# Patient Record
Sex: MALE | Race: WHITE | NOT HISPANIC OR LATINO | Employment: OTHER | URBAN - METROPOLITAN AREA
[De-identification: names, ages, dates, MRNs, and addresses within clinical notes are randomized per-mention and may not be internally consistent; named-entity substitution may affect disease eponyms.]

---

## 2017-05-30 ENCOUNTER — TRANSCRIBE ORDERS (OUTPATIENT)
Dept: ADMINISTRATIVE | Facility: HOSPITAL | Age: 77
End: 2017-05-30

## 2017-05-30 ENCOUNTER — APPOINTMENT (OUTPATIENT)
Dept: LAB | Facility: MEDICAL CENTER | Age: 77
End: 2017-05-30
Payer: COMMERCIAL

## 2017-05-30 DIAGNOSIS — E03.9 UNSPECIFIED HYPOTHYROIDISM: Primary | ICD-10-CM

## 2017-05-30 LAB
T4 FREE SERPL-MCNC: 1.42 NG/DL (ref 0.76–1.46)
TSH SERPL DL<=0.05 MIU/L-ACNC: 0.2 UIU/ML (ref 0.36–3.74)

## 2017-05-30 PROCEDURE — 84439 ASSAY OF FREE THYROXINE: CPT | Performed by: FAMILY MEDICINE

## 2017-05-30 PROCEDURE — 36415 COLL VENOUS BLD VENIPUNCTURE: CPT | Performed by: FAMILY MEDICINE

## 2017-05-30 PROCEDURE — 84443 ASSAY THYROID STIM HORMONE: CPT | Performed by: FAMILY MEDICINE

## 2018-02-17 ENCOUNTER — APPOINTMENT (OUTPATIENT)
Dept: RADIOLOGY | Facility: MEDICAL CENTER | Age: 78
End: 2018-02-17
Payer: COMMERCIAL

## 2018-02-17 ENCOUNTER — TRANSCRIBE ORDERS (OUTPATIENT)
Dept: ADMINISTRATIVE | Facility: HOSPITAL | Age: 78
End: 2018-02-17

## 2018-02-17 DIAGNOSIS — M54.5 LOW BACK PAIN, UNSPECIFIED BACK PAIN LATERALITY, UNSPECIFIED CHRONICITY, WITH SCIATICA PRESENCE UNSPECIFIED: Primary | ICD-10-CM

## 2018-02-17 PROCEDURE — 72100 X-RAY EXAM L-S SPINE 2/3 VWS: CPT

## 2018-02-27 ENCOUNTER — TRANSCRIBE ORDERS (OUTPATIENT)
Dept: ADMINISTRATIVE | Facility: HOSPITAL | Age: 78
End: 2018-02-27

## 2018-02-27 DIAGNOSIS — M54.5 LOW BACK PAIN, UNSPECIFIED BACK PAIN LATERALITY, UNSPECIFIED CHRONICITY, WITH SCIATICA PRESENCE UNSPECIFIED: Primary | ICD-10-CM

## 2018-02-27 DIAGNOSIS — M54.10 RADICULOPATHY, UNSPECIFIED SPINAL REGION: ICD-10-CM

## 2018-03-02 ENCOUNTER — LAB REQUISITION (OUTPATIENT)
Dept: LAB | Facility: HOSPITAL | Age: 78
End: 2018-03-02
Payer: COMMERCIAL

## 2018-03-02 ENCOUNTER — TRANSCRIBE ORDERS (OUTPATIENT)
Dept: ADMINISTRATIVE | Facility: HOSPITAL | Age: 78
End: 2018-03-02

## 2018-03-02 DIAGNOSIS — M54.50 LOW BACK PAIN: ICD-10-CM

## 2018-03-02 DIAGNOSIS — M54.5 LOW BACK PAIN, UNSPECIFIED BACK PAIN LATERALITY, UNSPECIFIED CHRONICITY, WITH SCIATICA PRESENCE UNSPECIFIED: Primary | ICD-10-CM

## 2018-03-02 LAB
BUN SERPL-MCNC: 22 MG/DL (ref 5–25)
CREAT SERPL-MCNC: 1.06 MG/DL (ref 0.6–1.3)
GFR SERPL CREATININE-BSD FRML MDRD: 68 ML/MIN/1.73SQ M

## 2018-03-02 PROCEDURE — 82565 ASSAY OF CREATININE: CPT | Performed by: FAMILY MEDICINE

## 2018-03-02 PROCEDURE — 84520 ASSAY OF UREA NITROGEN: CPT | Performed by: FAMILY MEDICINE

## 2018-03-13 ENCOUNTER — TRANSCRIBE ORDERS (OUTPATIENT)
Dept: URGENT CARE | Facility: MEDICAL CENTER | Age: 78
End: 2018-03-13

## 2018-03-13 ENCOUNTER — APPOINTMENT (OUTPATIENT)
Dept: RADIOLOGY | Facility: MEDICAL CENTER | Age: 78
End: 2018-03-13
Payer: COMMERCIAL

## 2018-03-13 DIAGNOSIS — R05.9 COUGH: ICD-10-CM

## 2018-03-13 DIAGNOSIS — R05.9 COUGH: Primary | ICD-10-CM

## 2018-03-13 PROCEDURE — 71046 X-RAY EXAM CHEST 2 VIEWS: CPT

## 2018-03-15 ENCOUNTER — HOSPITAL ENCOUNTER (OUTPATIENT)
Dept: RADIOLOGY | Facility: MEDICAL CENTER | Age: 78
Discharge: HOME | End: 2018-03-15
Payer: COMMERCIAL

## 2018-03-15 DIAGNOSIS — M54.10 RADICULOPATHY, UNSPECIFIED SPINAL REGION: ICD-10-CM

## 2018-03-15 DIAGNOSIS — M54.5 LOW BACK PAIN, UNSPECIFIED BACK PAIN LATERALITY, UNSPECIFIED CHRONICITY, WITH SCIATICA PRESENCE UNSPECIFIED: ICD-10-CM

## 2018-03-15 PROCEDURE — 72131 CT LUMBAR SPINE W/O DYE: CPT

## 2019-04-03 ENCOUNTER — TRANSCRIBE ORDERS (OUTPATIENT)
Dept: ADMINISTRATIVE | Facility: HOSPITAL | Age: 79
End: 2019-04-03

## 2019-04-03 DIAGNOSIS — M48.062 SPINAL STENOSIS OF LUMBAR REGION WITH NEUROGENIC CLAUDICATION: Primary | ICD-10-CM

## 2019-04-11 ENCOUNTER — HOSPITAL ENCOUNTER (OUTPATIENT)
Dept: RADIOLOGY | Facility: MEDICAL CENTER | Age: 79
Discharge: HOME | End: 2019-04-11
Payer: COMMERCIAL

## 2019-04-11 DIAGNOSIS — M48.062 SPINAL STENOSIS OF LUMBAR REGION WITH NEUROGENIC CLAUDICATION: ICD-10-CM

## 2019-04-11 PROCEDURE — 72131 CT LUMBAR SPINE W/O DYE: CPT

## 2019-04-16 ENCOUNTER — TRANSCRIBE ORDERS (OUTPATIENT)
Dept: NEUROSURGERY | Facility: CLINIC | Age: 79
End: 2019-04-16

## 2019-04-16 DIAGNOSIS — M54.50 LOWER BACK PAIN: Primary | ICD-10-CM

## 2019-04-17 ENCOUNTER — CONSULT (OUTPATIENT)
Dept: NEUROSURGERY | Facility: CLINIC | Age: 79
End: 2019-04-17
Payer: COMMERCIAL

## 2019-04-17 VITALS
RESPIRATION RATE: 16 BRPM | SYSTOLIC BLOOD PRESSURE: 161 MMHG | HEIGHT: 62 IN | BODY MASS INDEX: 34.27 KG/M2 | WEIGHT: 186.2 LBS | HEART RATE: 98 BPM | TEMPERATURE: 98.6 F | DIASTOLIC BLOOD PRESSURE: 94 MMHG

## 2019-04-17 DIAGNOSIS — M96.1 POSTLAMINECTOMY SYNDROME: ICD-10-CM

## 2019-04-17 DIAGNOSIS — M54.50 LOWER BACK PAIN: ICD-10-CM

## 2019-04-17 DIAGNOSIS — M48.062 SPINAL STENOSIS OF LUMBAR REGION WITH NEUROGENIC CLAUDICATION: Primary | ICD-10-CM

## 2019-04-17 PROCEDURE — 99204 OFFICE O/P NEW MOD 45 MIN: CPT | Performed by: NEUROLOGICAL SURGERY

## 2019-04-17 RX ORDER — CHLORAL HYDRATE 500 MG
1000 CAPSULE ORAL 2 TIMES DAILY
COMMUNITY
End: 2021-09-01 | Stop reason: HOSPADM

## 2019-04-17 RX ORDER — LOSARTAN POTASSIUM 100 MG/1
50 TABLET ORAL
COMMUNITY
End: 2021-06-23 | Stop reason: HOSPADM

## 2019-04-17 RX ORDER — MELOXICAM 15 MG/1
15 TABLET ORAL DAILY
COMMUNITY
End: 2021-06-23 | Stop reason: HOSPADM

## 2019-04-22 ENCOUNTER — TELEPHONE (OUTPATIENT)
Dept: OBGYN CLINIC | Facility: HOSPITAL | Age: 79
End: 2019-04-22

## 2019-05-17 ENCOUNTER — CONSULT (OUTPATIENT)
Dept: PAIN MEDICINE | Facility: CLINIC | Age: 79
End: 2019-05-17
Payer: COMMERCIAL

## 2019-05-17 VITALS — HEART RATE: 74 BPM | SYSTOLIC BLOOD PRESSURE: 122 MMHG | DIASTOLIC BLOOD PRESSURE: 76 MMHG | TEMPERATURE: 98.4 F

## 2019-05-17 DIAGNOSIS — M96.1 POSTLAMINECTOMY SYNDROME: ICD-10-CM

## 2019-05-17 DIAGNOSIS — M48.062 SPINAL STENOSIS OF LUMBAR REGION WITH NEUROGENIC CLAUDICATION: ICD-10-CM

## 2019-05-17 DIAGNOSIS — G89.4 CHRONIC PAIN SYNDROME: Primary | ICD-10-CM

## 2019-05-17 PROCEDURE — 99204 OFFICE O/P NEW MOD 45 MIN: CPT | Performed by: ANESTHESIOLOGY

## 2019-06-10 ENCOUNTER — TRANSCRIBE ORDERS (OUTPATIENT)
Dept: ADMINISTRATIVE | Facility: HOSPITAL | Age: 79
End: 2019-06-10

## 2019-06-10 ENCOUNTER — APPOINTMENT (OUTPATIENT)
Dept: RADIOLOGY | Facility: MEDICAL CENTER | Age: 79
End: 2019-06-10
Payer: COMMERCIAL

## 2019-06-10 DIAGNOSIS — J44.9 OBSTRUCTIVE CHRONIC BRONCHITIS WITHOUT EXACERBATION (HCC): ICD-10-CM

## 2019-06-10 DIAGNOSIS — J44.9 OBSTRUCTIVE CHRONIC BRONCHITIS WITHOUT EXACERBATION (HCC): Primary | ICD-10-CM

## 2019-06-10 PROCEDURE — 71046 X-RAY EXAM CHEST 2 VIEWS: CPT

## 2019-07-15 ENCOUNTER — APPOINTMENT (OUTPATIENT)
Dept: LAB | Facility: MEDICAL CENTER | Age: 79
End: 2019-07-15
Payer: COMMERCIAL

## 2019-07-15 ENCOUNTER — TRANSCRIBE ORDERS (OUTPATIENT)
Dept: ADMINISTRATIVE | Facility: HOSPITAL | Age: 79
End: 2019-07-15

## 2019-07-15 DIAGNOSIS — I10 ESSENTIAL HYPERTENSION, BENIGN: Primary | ICD-10-CM

## 2019-07-15 DIAGNOSIS — I25.10 ATHEROSCLEROSIS OF NATIVE CORONARY ARTERY OF NATIVE HEART, ANGINA PRESENCE UNSPECIFIED: ICD-10-CM

## 2019-07-15 DIAGNOSIS — N42.9 DISEASE OF PROSTATE: ICD-10-CM

## 2019-07-15 DIAGNOSIS — E03.9 HYPOTHYROIDISM, UNSPECIFIED TYPE: ICD-10-CM

## 2019-07-15 LAB
ALBUMIN SERPL BCP-MCNC: 4 G/DL (ref 3.5–5)
ALP SERPL-CCNC: 78 U/L (ref 46–116)
ALT SERPL W P-5'-P-CCNC: 32 U/L (ref 12–78)
ANION GAP SERPL CALCULATED.3IONS-SCNC: 4 MMOL/L (ref 4–13)
AST SERPL W P-5'-P-CCNC: 23 U/L (ref 5–45)
BASOPHILS # BLD AUTO: 0.03 THOUSANDS/ΜL (ref 0–0.1)
BASOPHILS NFR BLD AUTO: 1 % (ref 0–1)
BILIRUB SERPL-MCNC: 0.58 MG/DL (ref 0.2–1)
BUN SERPL-MCNC: 24 MG/DL (ref 5–25)
CALCIUM SERPL-MCNC: 9.7 MG/DL (ref 8.3–10.1)
CHLORIDE SERPL-SCNC: 107 MMOL/L (ref 100–108)
CHOLEST SERPL-MCNC: 129 MG/DL (ref 50–200)
CO2 SERPL-SCNC: 28 MMOL/L (ref 21–32)
CREAT SERPL-MCNC: 1.11 MG/DL (ref 0.6–1.3)
EOSINOPHIL # BLD AUTO: 0.18 THOUSAND/ΜL (ref 0–0.61)
EOSINOPHIL NFR BLD AUTO: 3 % (ref 0–6)
ERYTHROCYTE [DISTWIDTH] IN BLOOD BY AUTOMATED COUNT: 13.9 % (ref 11.6–15.1)
GFR SERPL CREATININE-BSD FRML MDRD: 64 ML/MIN/1.73SQ M
GLUCOSE P FAST SERPL-MCNC: 106 MG/DL (ref 65–99)
HCT VFR BLD AUTO: 45.6 % (ref 36.5–49.3)
HDLC SERPL-MCNC: 25 MG/DL (ref 40–60)
HGB BLD-MCNC: 14.7 G/DL (ref 12–17)
IMM GRANULOCYTES # BLD AUTO: 0.05 THOUSAND/UL (ref 0–0.2)
IMM GRANULOCYTES NFR BLD AUTO: 1 % (ref 0–2)
LDLC SERPL CALC-MCNC: 60 MG/DL (ref 0–100)
LYMPHOCYTES # BLD AUTO: 1.17 THOUSANDS/ΜL (ref 0.6–4.47)
LYMPHOCYTES NFR BLD AUTO: 18 % (ref 14–44)
MCH RBC QN AUTO: 30.1 PG (ref 26.8–34.3)
MCHC RBC AUTO-ENTMCNC: 32.2 G/DL (ref 31.4–37.4)
MCV RBC AUTO: 93 FL (ref 82–98)
MONOCYTES # BLD AUTO: 0.5 THOUSAND/ΜL (ref 0.17–1.22)
MONOCYTES NFR BLD AUTO: 8 % (ref 4–12)
NEUTROPHILS # BLD AUTO: 4.59 THOUSANDS/ΜL (ref 1.85–7.62)
NEUTS SEG NFR BLD AUTO: 69 % (ref 43–75)
NONHDLC SERPL-MCNC: 104 MG/DL
NRBC BLD AUTO-RTO: 0 /100 WBCS
PLATELET # BLD AUTO: 175 THOUSANDS/UL (ref 149–390)
PMV BLD AUTO: 11.3 FL (ref 8.9–12.7)
POTASSIUM SERPL-SCNC: 4.6 MMOL/L (ref 3.5–5.3)
PROT SERPL-MCNC: 8.2 G/DL (ref 6.4–8.2)
PSA SERPL-MCNC: 1.9 NG/ML (ref 0–4)
RBC # BLD AUTO: 4.89 MILLION/UL (ref 3.88–5.62)
SODIUM SERPL-SCNC: 139 MMOL/L (ref 136–145)
T4 FREE SERPL-MCNC: 1.34 NG/DL (ref 0.76–1.46)
TRIGL SERPL-MCNC: 218 MG/DL
TSH SERPL DL<=0.05 MIU/L-ACNC: 0.03 UIU/ML (ref 0.36–3.74)
WBC # BLD AUTO: 6.52 THOUSAND/UL (ref 4.31–10.16)

## 2019-07-15 PROCEDURE — 85025 COMPLETE CBC W/AUTO DIFF WBC: CPT | Performed by: FAMILY MEDICINE

## 2019-07-15 PROCEDURE — 80061 LIPID PANEL: CPT | Performed by: FAMILY MEDICINE

## 2019-07-15 PROCEDURE — 80053 COMPREHEN METABOLIC PANEL: CPT | Performed by: FAMILY MEDICINE

## 2019-07-15 PROCEDURE — 36415 COLL VENOUS BLD VENIPUNCTURE: CPT | Performed by: FAMILY MEDICINE

## 2019-07-15 PROCEDURE — 84153 ASSAY OF PSA TOTAL: CPT | Performed by: FAMILY MEDICINE

## 2019-07-15 PROCEDURE — 84439 ASSAY OF FREE THYROXINE: CPT | Performed by: FAMILY MEDICINE

## 2019-07-15 PROCEDURE — 84443 ASSAY THYROID STIM HORMONE: CPT | Performed by: FAMILY MEDICINE

## 2021-02-15 ENCOUNTER — IMMUNIZATIONS (OUTPATIENT)
Dept: FAMILY MEDICINE CLINIC | Facility: HOSPITAL | Age: 81
End: 2021-02-15

## 2021-02-15 DIAGNOSIS — Z23 ENCOUNTER FOR IMMUNIZATION: Primary | ICD-10-CM

## 2021-02-15 PROCEDURE — 0001A SARS-COV-2 / COVID-19 MRNA VACCINE (PFIZER-BIONTECH) 30 MCG: CPT

## 2021-02-15 PROCEDURE — 91300 SARS-COV-2 / COVID-19 MRNA VACCINE (PFIZER-BIONTECH) 30 MCG: CPT

## 2021-03-09 ENCOUNTER — IMMUNIZATIONS (OUTPATIENT)
Dept: FAMILY MEDICINE CLINIC | Facility: HOSPITAL | Age: 81
End: 2021-03-09

## 2021-03-09 DIAGNOSIS — Z23 ENCOUNTER FOR IMMUNIZATION: Primary | ICD-10-CM

## 2021-03-09 PROCEDURE — 91300 SARS-COV-2 / COVID-19 MRNA VACCINE (PFIZER-BIONTECH) 30 MCG: CPT

## 2021-03-09 PROCEDURE — 0002A SARS-COV-2 / COVID-19 MRNA VACCINE (PFIZER-BIONTECH) 30 MCG: CPT

## 2021-05-28 ENCOUNTER — APPOINTMENT (OUTPATIENT)
Dept: LAB | Facility: MEDICAL CENTER | Age: 81
End: 2021-05-28
Payer: COMMERCIAL

## 2021-05-28 ENCOUNTER — TRANSCRIBE ORDERS (OUTPATIENT)
Dept: ADMINISTRATIVE | Facility: HOSPITAL | Age: 81
End: 2021-05-28

## 2021-05-28 DIAGNOSIS — E03.9 HYPOTHYROIDISM, UNSPECIFIED TYPE: ICD-10-CM

## 2021-05-28 DIAGNOSIS — I25.10 ATHEROSCLEROSIS OF NATIVE CORONARY ARTERY, UNSPECIFIED WHETHER ANGINA PRESENT, UNSPECIFIED WHETHER NATIVE OR TRANSPLANTED HEART: Primary | ICD-10-CM

## 2021-05-28 DIAGNOSIS — I25.10 ATHEROSCLEROSIS OF NATIVE CORONARY ARTERY, UNSPECIFIED WHETHER ANGINA PRESENT, UNSPECIFIED WHETHER NATIVE OR TRANSPLANTED HEART: ICD-10-CM

## 2021-05-28 LAB
ALBUMIN SERPL BCP-MCNC: 2.7 G/DL (ref 3.5–5)
ALP SERPL-CCNC: 89 U/L (ref 46–116)
ALT SERPL W P-5'-P-CCNC: 14 U/L (ref 12–78)
ANION GAP SERPL CALCULATED.3IONS-SCNC: 5 MMOL/L (ref 4–13)
AST SERPL W P-5'-P-CCNC: 11 U/L (ref 5–45)
BASOPHILS # BLD AUTO: 0.02 THOUSANDS/ΜL (ref 0–0.1)
BASOPHILS NFR BLD AUTO: 0 % (ref 0–1)
BILIRUB SERPL-MCNC: 0.43 MG/DL (ref 0.2–1)
BUN SERPL-MCNC: 22 MG/DL (ref 5–25)
CALCIUM ALBUM COR SERPL-MCNC: 11 MG/DL (ref 8.3–10.1)
CALCIUM SERPL-MCNC: 10 MG/DL (ref 8.3–10.1)
CHLORIDE SERPL-SCNC: 107 MMOL/L (ref 100–108)
CHOLEST SERPL-MCNC: 118 MG/DL (ref 50–200)
CO2 SERPL-SCNC: 28 MMOL/L (ref 21–32)
CREAT SERPL-MCNC: 1.12 MG/DL (ref 0.6–1.3)
EOSINOPHIL # BLD AUTO: 0.23 THOUSAND/ΜL (ref 0–0.61)
EOSINOPHIL NFR BLD AUTO: 4 % (ref 0–6)
ERYTHROCYTE [DISTWIDTH] IN BLOOD BY AUTOMATED COUNT: 14.4 % (ref 11.6–15.1)
GFR SERPL CREATININE-BSD FRML MDRD: 63 ML/MIN/1.73SQ M
GLUCOSE P FAST SERPL-MCNC: 135 MG/DL (ref 65–99)
HCT VFR BLD AUTO: 33.9 % (ref 36.5–49.3)
HDLC SERPL-MCNC: 28 MG/DL
HGB BLD-MCNC: 9.7 G/DL (ref 12–17)
IMM GRANULOCYTES # BLD AUTO: 0.11 THOUSAND/UL (ref 0–0.2)
IMM GRANULOCYTES NFR BLD AUTO: 2 % (ref 0–2)
LDLC SERPL CALC-MCNC: 69 MG/DL (ref 0–100)
LYMPHOCYTES # BLD AUTO: 0.51 THOUSANDS/ΜL (ref 0.6–4.47)
LYMPHOCYTES NFR BLD AUTO: 9 % (ref 14–44)
MCH RBC QN AUTO: 23.8 PG (ref 26.8–34.3)
MCHC RBC AUTO-ENTMCNC: 28.6 G/DL (ref 31.4–37.4)
MCV RBC AUTO: 83 FL (ref 82–98)
MONOCYTES # BLD AUTO: 0.48 THOUSAND/ΜL (ref 0.17–1.22)
MONOCYTES NFR BLD AUTO: 8 % (ref 4–12)
NEUTROPHILS # BLD AUTO: 4.6 THOUSANDS/ΜL (ref 1.85–7.62)
NEUTS SEG NFR BLD AUTO: 77 % (ref 43–75)
NRBC BLD AUTO-RTO: 0 /100 WBCS
PATHOLOGIST INTERPRETATION: NORMAL
PATHOLOGY REVIEW: YES
PLATELET # BLD AUTO: 3 THOUSANDS/UL (ref 149–390)
PLATELET BLD QL SMEAR: ABNORMAL
POTASSIUM SERPL-SCNC: 4.8 MMOL/L (ref 3.5–5.3)
PROT SERPL-MCNC: 8.2 G/DL (ref 6.4–8.2)
RBC # BLD AUTO: 4.08 MILLION/UL (ref 3.88–5.62)
SODIUM SERPL-SCNC: 140 MMOL/L (ref 136–145)
T4 FREE SERPL-MCNC: 1.54 NG/DL (ref 0.76–1.46)
TOTAL CELLS COUNTED SPEC: 100
TRIGL SERPL-MCNC: 105 MG/DL
TSH SERPL DL<=0.05 MIU/L-ACNC: 0.03 UIU/ML (ref 0.36–3.74)
WBC # BLD AUTO: 5.95 THOUSAND/UL (ref 4.31–10.16)

## 2021-05-28 PROCEDURE — 84443 ASSAY THYROID STIM HORMONE: CPT | Performed by: FAMILY MEDICINE

## 2021-05-28 PROCEDURE — 84439 ASSAY OF FREE THYROXINE: CPT | Performed by: FAMILY MEDICINE

## 2021-05-28 PROCEDURE — 85007 BL SMEAR W/DIFF WBC COUNT: CPT | Performed by: FAMILY MEDICINE

## 2021-05-28 PROCEDURE — 36415 COLL VENOUS BLD VENIPUNCTURE: CPT | Performed by: FAMILY MEDICINE

## 2021-05-28 PROCEDURE — 85027 COMPLETE CBC AUTOMATED: CPT | Performed by: FAMILY MEDICINE

## 2021-05-28 PROCEDURE — 85025 COMPLETE CBC W/AUTO DIFF WBC: CPT | Performed by: FAMILY MEDICINE

## 2021-05-28 PROCEDURE — 80053 COMPREHEN METABOLIC PANEL: CPT | Performed by: FAMILY MEDICINE

## 2021-05-28 PROCEDURE — 80061 LIPID PANEL: CPT

## 2021-06-01 ENCOUNTER — TELEPHONE (OUTPATIENT)
Dept: SURGICAL ONCOLOGY | Facility: CLINIC | Age: 81
End: 2021-06-01

## 2021-06-01 NOTE — TELEPHONE ENCOUNTER
New Patient Encounter    New Patient Intake Form   Patient Details:  Mariam Segura  1940  [id number]    Background Information:  [address] starts by opening a telephone encounter and gathering the following information   Who is calling to schedule? If not self, relationship to patient? provider   Referring Provider Dr Tam Eagle office   What is the diagnosis? Low platelets   Is this diagnosis confirmed? Yes   When was the diagnosis? 6/1   Is there a confirmed diagnosis from a biopsy/tissue reviewed by pathology? No   Were outside slides requested? NA   Is patient aware of diagnosis? Yes   Is there a personal history and what kind? No   Is there a family history and what kind? No   Reason for visit? New Diagnosis   Have you had any imaging or labs done? If so: when, where? yes  [facility]   Are records in [place]? yes   If patient has a prior history of cancer were old records obtained? NA   Was the patient told to bring a disk? No   Does the patient smoke or Vape? No   If yes, how many packs or cartridges per day? na   Scheduling Information:   Preferred [place]: Halie Lim     Are there any dates/time the patient cannot be seen? na   Miscellaneous: Per Dr Tam Eagle office pt refused to go to ED  After completing the above information, please route to Financial Counselor and the appropriate Nurse Navigator for review

## 2021-06-02 ENCOUNTER — TELEPHONE (OUTPATIENT)
Dept: HEMATOLOGY ONCOLOGY | Facility: CLINIC | Age: 81
End: 2021-06-02

## 2021-06-02 NOTE — TELEPHONE ENCOUNTER
Patient referred to our office for low platelets  Has an appointment 6/9/21  Platelet count was 0,198 5/28/21  He needs to be seen ASAP

## 2021-06-02 NOTE — TELEPHONE ENCOUNTER
Spoke with patient scheduled him to see Alyson Crowell on 6/3/21 at 9:30 am in the [place] office, also provided address to patient he voiced understanding

## 2021-06-03 ENCOUNTER — CONSULT (OUTPATIENT)
Dept: HEMATOLOGY ONCOLOGY | Facility: CLINIC | Age: 81
End: 2021-06-03
Payer: COMMERCIAL

## 2021-06-03 ENCOUNTER — APPOINTMENT (OUTPATIENT)
Dept: LAB | Facility: CLINIC | Age: 81
End: 2021-06-03
Payer: COMMERCIAL

## 2021-06-03 ENCOUNTER — TRANSCRIBE ORDERS (OUTPATIENT)
Dept: LAB | Facility: CLINIC | Age: 81
End: 2021-06-03

## 2021-06-03 ENCOUNTER — TELEPHONE (OUTPATIENT)
Dept: HEMATOLOGY ONCOLOGY | Facility: CLINIC | Age: 81
End: 2021-06-03

## 2021-06-03 VITALS
OXYGEN SATURATION: 97 % | SYSTOLIC BLOOD PRESSURE: 138 MMHG | RESPIRATION RATE: 18 BRPM | TEMPERATURE: 98.4 F | BODY MASS INDEX: 32.19 KG/M2 | DIASTOLIC BLOOD PRESSURE: 70 MMHG | WEIGHT: 176 LBS | HEART RATE: 151 BPM

## 2021-06-03 DIAGNOSIS — D69.3 IMMUNE THROMBOCYTOPENIC PURPURA (HCC): ICD-10-CM

## 2021-06-03 DIAGNOSIS — D69.6 THROMBOCYTOPENIA (HCC): ICD-10-CM

## 2021-06-03 DIAGNOSIS — D69.3 ACUTE ITP (HCC): ICD-10-CM

## 2021-06-03 DIAGNOSIS — D69.6 THROMBOCYTOPENIA (HCC): Primary | ICD-10-CM

## 2021-06-03 DIAGNOSIS — D69.6 THROMBOCYTOPENIA, UNSPECIFIED (HCC): Primary | ICD-10-CM

## 2021-06-03 LAB
ABO GROUP BLD: NORMAL
BASOPHILS # BLD AUTO: 0.02 THOUSANDS/ΜL (ref 0–0.1)
BASOPHILS NFR BLD AUTO: 0 % (ref 0–1)
BLD GP AB SCN SERPL QL: NEGATIVE
EOSINOPHIL # BLD AUTO: 0.18 THOUSAND/ΜL (ref 0–0.61)
EOSINOPHIL NFR BLD AUTO: 3 % (ref 0–6)
ERYTHROCYTE [DISTWIDTH] IN BLOOD BY AUTOMATED COUNT: 14.6 % (ref 11.6–15.1)
HCT VFR BLD AUTO: 32.5 % (ref 36.5–49.3)
HGB BLD-MCNC: 9.6 G/DL (ref 12–17)
IMM GRANULOCYTES # BLD AUTO: 0.12 THOUSAND/UL (ref 0–0.2)
IMM GRANULOCYTES NFR BLD AUTO: 2 % (ref 0–2)
LYMPHOCYTES # BLD AUTO: 0.4 THOUSANDS/ΜL (ref 0.6–4.47)
LYMPHOCYTES NFR BLD AUTO: 7 % (ref 14–44)
MCH RBC QN AUTO: 23.8 PG (ref 26.8–34.3)
MCHC RBC AUTO-ENTMCNC: 29.5 G/DL (ref 31.4–37.4)
MCV RBC AUTO: 81 FL (ref 82–98)
MONOCYTES # BLD AUTO: 0.46 THOUSAND/ΜL (ref 0.17–1.22)
MONOCYTES NFR BLD AUTO: 8 % (ref 4–12)
NEUTROPHILS # BLD AUTO: 4.58 THOUSANDS/ΜL (ref 1.85–7.62)
NEUTS SEG NFR BLD AUTO: 80 % (ref 43–75)
NRBC BLD AUTO-RTO: 0 /100 WBCS
PLATELET # BLD AUTO: 6 THOUSANDS/UL (ref 149–390)
RBC # BLD AUTO: 4.03 MILLION/UL (ref 3.88–5.62)
RH BLD: POSITIVE
SPECIMEN EXPIRATION DATE: NORMAL
WBC # BLD AUTO: 5.76 THOUSAND/UL (ref 4.31–10.16)

## 2021-06-03 PROCEDURE — 86900 BLOOD TYPING SEROLOGIC ABO: CPT

## 2021-06-03 PROCEDURE — 86901 BLOOD TYPING SEROLOGIC RH(D): CPT

## 2021-06-03 PROCEDURE — 36415 COLL VENOUS BLD VENIPUNCTURE: CPT

## 2021-06-03 PROCEDURE — 85025 COMPLETE CBC W/AUTO DIFF WBC: CPT | Performed by: NURSE PRACTITIONER

## 2021-06-03 PROCEDURE — 99205 OFFICE O/P NEW HI 60 MIN: CPT | Performed by: NURSE PRACTITIONER

## 2021-06-03 PROCEDURE — 86850 RBC ANTIBODY SCREEN: CPT

## 2021-06-03 RX ORDER — ALLOPURINOL 300 MG/1
300 TABLET ORAL DAILY
COMMUNITY
Start: 2021-02-27 | End: 2021-08-24 | Stop reason: HOSPADM

## 2021-06-03 RX ORDER — DIPHENHYDRAMINE HCL 25 MG
25 TABLET ORAL ONCE
Status: CANCELLED | OUTPATIENT
Start: 2021-06-03

## 2021-06-03 RX ORDER — DIPHENHYDRAMINE HCL 25 MG
25 TABLET ORAL ONCE
Status: CANCELLED | OUTPATIENT
Start: 2021-06-04

## 2021-06-03 RX ORDER — SODIUM CHLORIDE 9 MG/ML
20 INJECTION, SOLUTION INTRAVENOUS ONCE
Status: CANCELLED | OUTPATIENT
Start: 2021-06-03

## 2021-06-03 RX ORDER — LEVOTHYROXINE SODIUM 0.15 MG/1
150 TABLET ORAL DAILY
COMMUNITY
Start: 2021-05-10 | End: 2021-09-01 | Stop reason: HOSPADM

## 2021-06-03 RX ORDER — AMLODIPINE BESYLATE 2.5 MG/1
2.5 TABLET ORAL EVERY MORNING
COMMUNITY
Start: 2021-04-01 | End: 2021-06-23 | Stop reason: HOSPADM

## 2021-06-03 RX ORDER — DEXAMETHASONE 4 MG/1
40 TABLET ORAL
Qty: 40 TABLET | Refills: 0 | Status: SHIPPED | OUTPATIENT
Start: 2021-06-03 | End: 2021-06-07

## 2021-06-03 RX ORDER — ACETAMINOPHEN 325 MG/1
650 TABLET ORAL ONCE
Status: CANCELLED | OUTPATIENT
Start: 2021-06-04

## 2021-06-03 RX ORDER — SODIUM CHLORIDE 9 MG/ML
20 INJECTION, SOLUTION INTRAVENOUS ONCE
Status: CANCELLED | OUTPATIENT
Start: 2021-06-04

## 2021-06-03 RX ORDER — ACETAMINOPHEN 325 MG/1
650 TABLET ORAL ONCE
Status: CANCELLED | OUTPATIENT
Start: 2021-06-03

## 2021-06-03 RX ORDER — PANTOPRAZOLE SODIUM 40 MG/1
40 TABLET, DELAYED RELEASE ORAL DAILY
Qty: 30 TABLET | Refills: 0 | Status: SHIPPED | OUTPATIENT
Start: 2021-06-03 | End: 2021-06-17 | Stop reason: SDUPTHER

## 2021-06-03 NOTE — PROGRESS NOTES
Hematology/Oncology Outpatient Consult Note  Sandy Angel 68 y o  male MRN: @ Encounter: [id number]        Date:  6/3/2021        CC: Thrombocytopenia      HPI:  Damion Massey is a 70-year-old gentleman with a history of chronic pain syndrome secondary to underlying lumbar spinal stenosis as well as post laminectomy syndrome, hypothyroidism, hypertension, ? Parkinson's  who was noted to have an extremely low platelet count on routine blood work completed on 5/28  Patient's platelet count is 3  Path review demonstrated a very low platelet count, less than 5000/mL  He has been referred to Hematology for further evaluation and is being seen for initial consultation 6/3/2021   Blood work on file reviewed:    5/28/2021:  WBC 5 95, hemoglobin 9 7, MCV 83, platelet count 3  CMP: normal LFT's, T BIli 0 43, Creatinine 1 12, Ca+ 10, corrected Ca+ 11    7/15/2019:  WBC 6 52, hemoglobin 14 7, MCV 93, platelets 131  CMP unremarkable     Patient reports he has not had any routine medical care for 2 years  His PCP is Dr April Morales in [place]  He states He went to see his PCP for worsening resting tremors and routine labs were ordered  His platelet count was 3  His PCP recommended ED evaluation however patient refused stating that he is the sole provider/care taker of his wife who is bed bound at home  Patient states he cannot be admitted because there is no one to provide any care for his wife at home  He is responsible for all the cooking, cleaning and caretaking  Patient denies any abnormal bleeding  Patient states that he cut himself shaving last week but this healed easily and has scabbed over without any difficulty  He had a minor nose bleed last week but this resolved spontaneously  He has not had any further epistaxis, gingival bleeding  He denies easy bruisability  There is no ecchymosis on his back, abdomen, lower extremities  He denies any melena, hematochezia, hematuria       He does endorse some fatigue  He has lost approximately 10 lb over last few months  He states he is under a tremendous amount of stress at home  He does have children but due to their work hours they do not provide much help  Patient completed his COVID vaccination series in March 2021    Test Results:    Imaging: No results found  Labs:   Lab Results   Component Value Date    WBC 5 95 05/28/2021    HGB 9 7 (L) 05/28/2021    HCT 33 9 (L) 05/28/2021    MCV 83 05/28/2021    PLT 3 (LL) 05/28/2021     Lab Results   Component Value Date    K 4 8 05/28/2021     05/28/2021    CO2 28 05/28/2021    BUN 22 05/28/2021    CREATININE 1 12 05/28/2021    GLUF 135 (H) 05/28/2021    CALCIUM 10 0 05/28/2021    CORRECTEDCA 11 0 (H) 05/28/2021    AST 11 05/28/2021    ALT 14 05/28/2021    ALKPHOS 89 05/28/2021    EGFR 63 05/28/2021         ROS:  Review of Systems   Constitutional: Positive for fatigue  Hematological: Does not bruise/bleed easily  All other systems reviewed and are negative  Active Problems:   Patient Active Problem List   Diagnosis    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[address] )    Thrombocytopenia (HCC)       Past Medical History:   Past Medical History:   Diagnosis Date    Hypertension     Hypertension        Surgical History:   Past Surgical History:   Procedure Laterality Date    BACK SURGERY      CARPAL TUNNEL RELEASE Bilateral     LUMBAR DISC SURGERY         Family History:    Family History   Problem Relation Age of Onset    Cancer Mother    Jazzy Salcedo Father        Cancer-related family history includes Cancer in his mother      Social History:   Social History     Socioeconomic History    Marital status: /Civil Union     Spouse name: Not on file    Number of children: Not on file    Years of education: Not on file    Highest education level: Not on file   Occupational History    Not on file   Social Needs    Financial resource strain: Not on file    Food insecurity     Worry: Not on file     Inability: Not on file  Transportation needs     Medical: Not on file     Non-medical: Not on file   Tobacco Use    Smoking status: Current Every Day Smoker     Packs/day: 3 00    Smokeless tobacco: Never Used   Substance and Sexual Activity    Alcohol use: Not Currently    Drug use: Never    Sexual activity: Not on file   Lifestyle    Physical activity     Days per week: Not on file     Minutes per session: Not on file    Stress: Not on file   Relationships    Social connections     Talks on phone: Not on file     Gets together: Not on file     Attends Am[employer] service: Not on file     Active member of club or organization: Not on file     Attends meetings of clubs or organizations: Not on file     Relationship status: Not on file    Intimate partner violence     Fear of current or ex partner: Not on file     Emotionally abused: Not on file     Physically abused: Not on file     Forced sexual activity: Not on file   Other Topics Concern    Not on file   Social History Narrative    Not on file       Current Medications:   Current Outpatient Medications   Medication Sig Dispense Refill    acetaminophen (TYLENOL) 100 mg/mL solution Take 10 mg/kg by mouth every 4 (four) hours as needed for fever      amLODIPine (NORVASC) 2 5 mg tablet Take 2 5 mg by mouth every morning      levothyroxine 150 mcg tablet Take 150 mcg by mouth daily      losartan (COZAAR) 100 MG tablet Take 50 mg by mouth daily      meloxicam (MOBIC) 15 mg tablet Take 15 mg by mouth daily      Omega-3 Fatty Acids (FISH OIL) 1,000 mg Take 1,000 mg by mouth 2 (two) times a day      allopurinol (ZYLOPRIM) 300 mg tablet Take 300 mg by mouth daily       No current facility-administered medications for this visit  Allergies: No Known Allergies      Physical Exam:    Body surface area is 1 81 meters squared      Wt Readings from Last 3 Encounters:   06/03/21 79 8 kg (176 lb)   04/17/19 84 5 kg (186 lb 3 2 oz)        Temp Readings from Last 3 Encounters: 06/03/21 98 4 °F (36 9 °C)   05/17/19 98 4 °F (36 9 °C) (Oral)   04/17/19 98 6 °F (37 °C) (Tympanic)        BP Readings from Last 3 Encounters:   06/03/21 138/70   05/17/19 122/76   04/17/19 161/94         Pulse Readings from Last 3 Encounters:   06/03/21 (!) 151   05/17/19 74   04/17/19 98          Physical Exam  Constitutional:       General: He is not in acute distress  Appearance: He is well-developed  He is not diaphoretic  Comments: Patient is alert, pleasant, elderly male  NAD   HENT:      Head: Normocephalic and atraumatic  Mouth/Throat:      Pharynx: No oropharyngeal exudate  Eyes:      General: No scleral icterus  Pupils: Pupils are equal, round, and reactive to light  Neck:      Musculoskeletal: Normal range of motion and neck supple  Cardiovascular:      Rate and Rhythm: Rhythm irregular  Heart sounds: No murmur  Pulmonary:      Effort: Pulmonary effort is normal  No respiratory distress  Breath sounds: Normal breath sounds  Abdominal:      General: Bowel sounds are normal  There is no distension  Palpations: Abdomen is soft  There is no mass  Tenderness: There is no abdominal tenderness  Musculoskeletal: Normal range of motion  Lymphadenopathy:      Cervical: No cervical adenopathy  Skin:     General: Skin is warm and dry  Findings: No bruising or rash (No evidence of petechial rash)  Neurological:      General: No focal deficit present  Mental Status: He is alert and oriented to person, place, and time  Psychiatric:         Mood and Affect: Mood normal          Behavior: Behavior normal          Thought Content: Thought content normal          Judgment: Judgment normal        Assessment/ Plan:    1  Thrombocytopenia ([address] )    2  Acute ITP (HCC)      The patient is a very pleasant 42-year-old gentleman who was noted to have a significant thrombocytopenia with a platelet count of 3 on routine blood work completed on 05/28/2021    Patient was recommended to go to the ED for further evaluation by his PCP, however due to significant social issues he refused  A significant amount of time was spent today explaining the severity of this critically low lab value and associated increased risk for bleeding  Patient is also demonstrating evidence of anemia with a hemoglobin of 9 7  He could have an underlying bone marrow disorder, he could have acute ITP  I explained importance of workup for his lab abnormalities and my recommendation is for hospital admission to expedite this workup  Patient would benefit  from hospitalization and treatment with steroids, IVIG and platelet transfusion  He does require a bone marrow biopsy for further delineation  This was explained in great detail  I also explained that with a platelet count as low as his he could die if he sustained a fall or any type of trauma resulting in a bleed  Patient verbalized understanding, however states he absolutely can not be admitted as he can not leave his wife home alone and uncared for  Patient states he also has a little dog that needs attention as well  I have put in an urgent consult to our social work team to reach out to patient and offer support and review support services that may be available to both him and his wife  I also left a message with BIPIN Banks in this regard    I will set patient up for a platelet transfusion to be done ASAP  Patient was informed of the risk of anaphylaxis, mismatch reaction, risk of infection  Patient was agreeable to proceed, consent obtained   I will treat him for an acute ITP and start him on high-dose dexamethasone 40 mg daily for 4 days along with a PPI  Patient was instructed to start taking steroids today with food  I will check CBC twice a week  Patient was instructed to have blood work done every Monday and Thursday  I will also arrange for him to have outpatient bone marrow biopsy done as soon as possible    IR consult has been placed and [facility] form has been completed  Patient was instructed to report to the emergency room immediately if he has any evidence of bleeding, headaches, visual disturbance, lightheadedness/dizziness, chest pain  He did agree to present to the ED if any of these symptoms present  Patient will be called with results of blood work that he does on Monday  Hopefully he will show some response to the high-dose steroids and platelet transfusion  He is instructed to call at any time with questions or concerns  I also reiterated the importance of coming up with a plan of care for his wife in the case that he needs to have emergent medical attention  He will return for a follow up in 2 weeks  Goals and Barriers:  Current Goal: prolong survival thrombocytopenia   Barriers:  Patient is still caretaker took of his wife who is homebound/bed-bound    Patient's Capacity to Self Care:  Patient  able to self care  Portions of the record may have been created with voice recognition software  Occasional wrong word or "sound a like" substitutions may have occurred due to the inherent limitations of voice recognition software  Read the chart carefully and recognize, using context, where substitutions have occurred

## 2021-06-03 NOTE — H&P (VIEW-ONLY)
Hematology/Oncology Outpatient Consult Note  Taye Angel 68 y o  male MRN: @ Encounter: [id number]        Date:  6/3/2021        CC: Thrombocytopenia      HPI:  Isai Ku is a 68-year-old gentleman with a history of chronic pain syndrome secondary to underlying lumbar spinal stenosis as well as post laminectomy syndrome, hypothyroidism, hypertension, ? Parkinson's  who was noted to have an extremely low platelet count on routine blood work completed on 5/28  Patient's platelet count is 3  Path review demonstrated a very low platelet count, less than 5000/mL  He has been referred to Hematology for further evaluation and is being seen for initial consultation 6/3/2021   Blood work on file reviewed:    5/28/2021:  WBC 5 95, hemoglobin 9 7, MCV 83, platelet count 3  CMP: normal LFT's, T BIli 0 43, Creatinine 1 12, Ca+ 10, corrected Ca+ 11    7/15/2019:  WBC 6 52, hemoglobin 14 7, MCV 93, platelets 082  CMP unremarkable     Patient reports he has not had any routine medical care for 2 years  His PCP is Dr Mark Shipley in [place]  He states He went to see his PCP for worsening resting tremors and routine labs were ordered  His platelet count was 3  His PCP recommended ED evaluation however patient refused stating that he is the sole provider/care taker of his wife who is bed bound at home  Patient states he cannot be admitted because there is no one to provide any care for his wife at home  He is responsible for all the cooking, cleaning and caretaking  Patient denies any abnormal bleeding  Patient states that he cut himself shaving last week but this healed easily and has scabbed over without any difficulty  He had a minor nose bleed last week but this resolved spontaneously  He has not had any further epistaxis, gingival bleeding  He denies easy bruisability  There is no ecchymosis on his back, abdomen, lower extremities  He denies any melena, hematochezia, hematuria       He does endorse some fatigue  He has lost approximately 10 lb over last few months  He states he is under a tremendous amount of stress at home  He does have children but due to their work hours they do not provide much help  Patient completed his COVID vaccination series in March 2021    Test Results:    Imaging: No results found  Labs:   Lab Results   Component Value Date    WBC 5 95 05/28/2021    HGB 9 7 (L) 05/28/2021    HCT 33 9 (L) 05/28/2021    MCV 83 05/28/2021    PLT 3 (LL) 05/28/2021     Lab Results   Component Value Date    K 4 8 05/28/2021     05/28/2021    CO2 28 05/28/2021    BUN 22 05/28/2021    CREATININE 1 12 05/28/2021    GLUF 135 (H) 05/28/2021    CALCIUM 10 0 05/28/2021    CORRECTEDCA 11 0 (H) 05/28/2021    AST 11 05/28/2021    ALT 14 05/28/2021    ALKPHOS 89 05/28/2021    EGFR 63 05/28/2021         ROS:  Review of Systems   Constitutional: Positive for fatigue  Hematological: Does not bruise/bleed easily  All other systems reviewed and are negative  Active Problems:   Patient Active Problem List   Diagnosis    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[facility] [address] )    Thrombocytopenia (HCC)       Past Medical History:   Past Medical History:   Diagnosis Date    Hypertension     Hypertension        Surgical History:   Past Surgical History:   Procedure Laterality Date    BACK SURGERY      CARPAL TUNNEL RELEASE Bilateral     LUMBAR DISC SURGERY         Family History:    Family History   Problem Relation Age of Onset    Cancer Mother    Alla Hurdsfield Father        Cancer-related family history includes Cancer in his mother      Social History:   Social History     Socioeconomic History    Marital status: /Civil Union     Spouse name: Not on file    Number of children: Not on file    Years of education: Not on file    Highest education level: Not on file   Occupational History    Not on file   Social Needs    Financial resource strain: Not on file    Food insecurity     Worry: Not on file     Inability: Not on file  Transportation needs     Medical: Not on file     Non-medical: Not on file   Tobacco Use    Smoking status: Current Every Day Smoker     Packs/day: 3 00    Smokeless tobacco: Never Used   Substance and Sexual Activity    Alcohol use: Not Currently    Drug use: Never    Sexual activity: Not on file   Lifestyle    Physical activity     Days per week: Not on file     Minutes per session: Not on file    Stress: Not on file   Relationships    Social connections     Talks on phone: Not on file     Gets together: Not on file     Attends Judaism service: Not on file     Active member of club or organization: Not on file     Attends meetings of clubs or organizations: Not on file     Relationship status: Not on file    Intimate partner violence     Fear of current or ex partner: Not on file     Emotionally abused: Not on file     Physically abused: Not on file     Forced sexual activity: Not on file   Other Topics Concern    Not on file   Social History Narrative    Not on file       Current Medications:   Current Outpatient Medications   Medication Sig Dispense Refill    acetaminophen (TYLENOL) 100 mg/mL solution Take 10 mg/kg by mouth every 4 (four) hours as needed for fever      amLODIPine (NORVASC) 2 5 mg tablet Take 2 5 mg by mouth every morning      levothyroxine 150 mcg tablet Take 150 mcg by mouth daily      losartan (COZAAR) 100 MG tablet Take 50 mg by mouth daily      meloxicam (MOBIC) 15 mg tablet Take 15 mg by mouth daily      Omega-3 Fatty Acids (FISH OIL) 1,000 mg Take 1,000 mg by mouth 2 (two) times a day      allopurinol (ZYLOPRIM) 300 mg tablet Take 300 mg by mouth daily       No current facility-administered medications for this visit  Allergies: No Known Allergies      Physical Exam:    Body surface area is 1 81 meters squared      Wt Readings from Last 3 Encounters:   06/03/21 79 8 kg (176 lb)   04/17/19 84 5 kg (186 lb 3 2 oz)        Temp Readings from Last 3 Encounters: 06/03/21 98 4 °F (36 9 °C)   05/17/19 98 4 °F (36 9 °C) (Oral)   04/17/19 98 6 °F (37 °C) (Tympanic)        BP Readings from Last 3 Encounters:   06/03/21 138/70   05/17/19 122/76   04/17/19 161/94         Pulse Readings from Last 3 Encounters:   06/03/21 (!) 151   05/17/19 74   04/17/19 98          Physical Exam  Constitutional:       General: He is not in acute distress  Appearance: He is well-developed  He is not diaphoretic  Comments: Patient is alert, pleasant, elderly male  NAD   HENT:      Head: Normocephalic and atraumatic  Mouth/Throat:      Pharynx: No oropharyngeal exudate  Eyes:      General: No scleral icterus  Pupils: Pupils are equal, round, and reactive to light  Neck:      Musculoskeletal: Normal range of motion and neck supple  Cardiovascular:      Rate and Rhythm: Rhythm irregular  Heart sounds: No murmur  Pulmonary:      Effort: Pulmonary effort is normal  No respiratory distress  Breath sounds: Normal breath sounds  Abdominal:      General: Bowel sounds are normal  There is no distension  Palpations: Abdomen is soft  There is no mass  Tenderness: There is no abdominal tenderness  Musculoskeletal: Normal range of motion  Lymphadenopathy:      Cervical: No cervical adenopathy  Skin:     General: Skin is warm and dry  Findings: No bruising or rash (No evidence of petechial rash)  Neurological:      General: No focal deficit present  Mental Status: He is alert and oriented to person, place, and time  Psychiatric:         Mood and Affect: Mood normal          Behavior: Behavior normal          Thought Content: Thought content normal          Judgment: Judgment normal        Assessment/ Plan:    1  Thrombocytopenia ([address] )    2  Acute ITP (HCC)      The patient is a very pleasant 32-year-old gentleman who was noted to have a significant thrombocytopenia with a platelet count of 3 on routine blood work completed on 05/28/2021    Patient was recommended to go to the ED for further evaluation by his PCP, however due to significant social issues he refused  A significant amount of time was spent today explaining the severity of this critically low lab value and associated increased risk for bleeding  Patient is also demonstrating evidence of anemia with a hemoglobin of 9 7  He could have an underlying bone marrow disorder, he could have acute ITP  I explained importance of workup for his lab abnormalities and my recommendation is for hospital admission to expedite this workup  Patient would benefit  from hospitalization and treatment with steroids, IVIG and platelet transfusion  He does require a bone marrow biopsy for further delineation  This was explained in great detail  I also explained that with a platelet count as low as his he could die if he sustained a fall or any type of trauma resulting in a bleed  Patient verbalized understanding, however states he absolutely can not be admitted as he can not leave his wife home alone and uncared for  Patient states he also has a little dog that needs attention as well  I have put in an urgent consult to our social work team to reach out to patient and offer support and review support services that may be available to both him and his wife  I also left a message with BIPIN Angel in this regard    I will set patient up for a platelet transfusion to be done ASAP  Patient was informed of the risk of anaphylaxis, mismatch reaction, risk of infection  Patient was agreeable to proceed, consent obtained   I will treat him for an acute ITP and start him on high-dose dexamethasone 40 mg daily for 4 days along with a PPI  Patient was instructed to start taking steroids today with food  I will check CBC twice a week  Patient was instructed to have blood work done every Monday and Thursday  I will also arrange for him to have outpatient bone marrow biopsy done as soon as possible    IR consult has been placed and [facility] form has been completed  Patient was instructed to report to the emergency room immediately if he has any evidence of bleeding, headaches, visual disturbance, lightheadedness/dizziness, chest pain  He did agree to present to the ED if any of these symptoms present  Patient will be called with results of blood work that he does on Monday  Hopefully he will show some response to the high-dose steroids and platelet transfusion  He is instructed to call at any time with questions or concerns  I also reiterated the importance of coming up with a plan of care for his wife in the case that he needs to have emergent medical attention  He will return for a follow up in 2 weeks  Goals and Barriers:  Current Goal: prolong survival thrombocytopenia   Barriers:  Patient is still caretaker took of his wife who is homebound/bed-bound    Patient's Capacity to Self Care:  Patient  able to self care  Portions of the record may have been created with voice recognition software  Occasional wrong word or "sound a like" substitutions may have occurred due to the inherent limitations of voice recognition software  Read the chart carefully and recognize, using context, where substitutions have occurred

## 2021-06-03 NOTE — TELEPHONE ENCOUNTER
Call received from: Celio Petty  Date of lab draw:6/3/21  Critical value:Platelets 6 thousand  Read back occurred:Yes  Tasked and IM:TAMMIE Mcdowell  Patient was seen in Consult today by Cooper Akers NP  Patient in Epic for 2 units of platelets tomorrow at [facility]      Will forward to Cooper Akers NP RN to review value with NP

## 2021-06-04 ENCOUNTER — PATIENT OUTREACH (OUTPATIENT)
Dept: CASE MANAGEMENT | Facility: HOSPITAL | Age: 81
End: 2021-06-04

## 2021-06-04 ENCOUNTER — TELEPHONE (OUTPATIENT)
Dept: HEMATOLOGY ONCOLOGY | Facility: CLINIC | Age: 81
End: 2021-06-04

## 2021-06-04 ENCOUNTER — HOSPITAL ENCOUNTER (OUTPATIENT)
Dept: INFUSION CENTER | Facility: CLINIC | Age: 81
Discharge: HOME | End: 2021-06-04
Payer: COMMERCIAL

## 2021-06-04 VITALS
RESPIRATION RATE: 18 BRPM | HEART RATE: 75 BPM | TEMPERATURE: 97.3 F | DIASTOLIC BLOOD PRESSURE: 63 MMHG | OXYGEN SATURATION: 96 % | SYSTOLIC BLOOD PRESSURE: 130 MMHG

## 2021-06-04 DIAGNOSIS — D69.3 ACUTE ITP (HCC): Primary | ICD-10-CM

## 2021-06-04 DIAGNOSIS — D69.6 THROMBOCYTOPENIA (HCC): ICD-10-CM

## 2021-06-04 PROCEDURE — 36430 TRANSFUSION BLD/BLD COMPNT: CPT

## 2021-06-04 PROCEDURE — P9037 PLATE PHERES LEUKOREDU IRRAD: HCPCS

## 2021-06-04 RX ORDER — SODIUM CHLORIDE 9 MG/ML
20 INJECTION, SOLUTION INTRAVENOUS ONCE
Status: COMPLETED | OUTPATIENT
Start: 2021-06-04 | End: 2021-06-04

## 2021-06-04 RX ORDER — DIPHENHYDRAMINE HCL 25 MG
25 TABLET ORAL ONCE
Status: COMPLETED | OUTPATIENT
Start: 2021-06-04 | End: 2021-06-04

## 2021-06-04 RX ORDER — ACETAMINOPHEN 325 MG/1
650 TABLET ORAL ONCE
Status: COMPLETED | OUTPATIENT
Start: 2021-06-04 | End: 2021-06-04

## 2021-06-04 RX ADMIN — DIPHENHYDRAMINE HCL 25 MG: 25 TABLET, COATED ORAL at 10:36

## 2021-06-04 RX ADMIN — SODIUM CHLORIDE 20 ML/HR: 0.9 INJECTION, SOLUTION INTRAVENOUS at 10:32

## 2021-06-04 RX ADMIN — ACETAMINOPHEN 650 MG: 325 TABLET, FILM COATED ORAL at 10:36

## 2021-06-04 NOTE — TELEPHONE ENCOUNTER
Yanique Lowery called from [facility] IR department in [place] requesting a gen path form for patient

## 2021-06-04 NOTE — PROGRESS NOTES
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 and has limited support from family  Pt was in clinic yesterday and was noted to have very low platelet count, pt refused inpt admission stating no one to care for his wife  LSW met with pt to assess needs and offer support  Pt stated his wife receives visiting nurse services about 9 hours a week for medication and personal care  LSW discussed respite care in a SNF with pt and pt stated his wife will refuse to go  LSW did provide information for manor care for short term stay if pt needs to be hospitalized  LSW offered to place referral to [address] [place] [address] on aging and pt agreed  LSW will also place referral to [facility] for possible assistance  PT stated he is living on $27 a month after he pays his bills and obtains needed medication  Pt stated he has a large amount of copays due that he cannot afford to pay  LSW offered to refer to financial counselors and pt agreed  Pt utilizes local [employer] to subsidize his food and also receives meals on wheels once a day  Pt is a very strong personality man and feels a great duty to care for his wife  LSW will follow up with pt in one week  LSW contacted [facility] on aging and they stated the pts wife, Brittaney Skaggs has some services with them    The  for Brittaney Skaggs will be calling pt to determine more services available and supervisor, Verdon Sacks will be sending in a referral for personal care for Brittaney Skaggs

## 2021-06-04 NOTE — PRE-PROCEDURE INSTRUCTIONS
Phone Consult completed:Pre procedure instructions for Bone Marrow Biopsy reviewed with verbal understanding  Allergies,meds,NPO, and ride  Approximate arrival time given,SDS phone call evening before procedure  COVID vaccine completed Feb 2021

## 2021-06-04 NOTE — PROGRESS NOTES
Patient is here for transfusion of 2 units of platelets for platelet count of 6  Patient offer no complaints at this time

## 2021-06-04 NOTE — PROGRESS NOTES
Patient tolerated the transfusion of 2 units of platelets today without any adverse reactions   Next appointment confirmed and avs given

## 2021-06-05 LAB
ABO GROUP BLD BPU: NORMAL
ABO GROUP BLD BPU: NORMAL
BPU ID: NORMAL
BPU ID: NORMAL
UNIT DISPENSE STATUS: NORMAL
UNIT DISPENSE STATUS: NORMAL
UNIT PRODUCT CODE: NORMAL
UNIT PRODUCT CODE: NORMAL
UNIT RH: NORMAL
UNIT RH: NORMAL

## 2021-06-07 ENCOUNTER — TELEPHONE (OUTPATIENT)
Dept: HEMATOLOGY ONCOLOGY | Facility: CLINIC | Age: 81
End: 2021-06-07

## 2021-06-07 ENCOUNTER — APPOINTMENT (OUTPATIENT)
Dept: LAB | Facility: MEDICAL CENTER | Age: 81
End: 2021-06-07
Payer: COMMERCIAL

## 2021-06-07 DIAGNOSIS — D69.6 THROMBOCYTOPENIA, UNSPECIFIED (HCC): ICD-10-CM

## 2021-06-07 DIAGNOSIS — D69.3 IMMUNE THROMBOCYTOPENIC PURPURA (HCC): ICD-10-CM

## 2021-06-07 NOTE — TELEPHONE ENCOUNTER
Charis - home care RN with [facility] is calling regarding patient   She states that patient reported he has had SOB since his last transfusion  Today when she got there to see his wife she stated patient was SOB, 02 was 87, patient had an elevated heart rate  Patient was outside at the time  Once he came in and sat in front of the air conditioner for a few minutes he felt "better"  02 sat was in the 90's, HR came down and SOB improved  Patient did go for CBC this morning - results are still pending  Patient took his last dexamethasone yesterday    Charis wanted to update our office  She is concerned that symptoms may be related to his transfusion or dexamethasone?     Instructed her to have to the patient report to the ED if he continues to have episodes like this    Will send to RN to update MD/NP  Patient to be called back with further recommendation

## 2021-06-07 NOTE — TELEPHONE ENCOUNTER
No further instruction  This most likely is due to the heat and over exertion outside  It is extremely hot and humid outside  Patients labs have recovered nicely  Reviewed with Margarita Newton  I will call patient tomorrow to see how he is doing

## 2021-06-08 ENCOUNTER — TELEPHONE (OUTPATIENT)
Dept: HEMATOLOGY ONCOLOGY | Facility: CLINIC | Age: 81
End: 2021-06-08

## 2021-06-08 NOTE — TELEPHONE ENCOUNTER
Patient states he feels well today  He says his symptoms have resolved  He was seen a few days ago with his PCP for an URI and was placed on medication  Patient states this is not resolved yet  His O2 today is 93% and his BP is doing ok  He states he is still having difficulty with feeling stuffy and sits in front of the air conditioner for assistance  Reinforced instructions from yesterday  Patient states he is improving

## 2021-06-08 NOTE — TELEPHONE ENCOUNTER
Call rec'd from [facility] dept requesting patient Genpath form ASAP  Patient scheduled for 6/10/2021@ 11:45  Procedure can't be done without dept having the Genpath form

## 2021-06-09 DIAGNOSIS — D69.3 ACUTE ITP (HCC): ICD-10-CM

## 2021-06-09 DIAGNOSIS — D69.6 THROMBOCYTOPENIA (HCC): ICD-10-CM

## 2021-06-09 RX ORDER — DEXAMETHASONE 4 MG/1
40 TABLET ORAL
Qty: 40 TABLET | Refills: 0 | OUTPATIENT
Start: 2021-06-09 | End: 2021-06-13

## 2021-06-10 ENCOUNTER — APPOINTMENT (OUTPATIENT)
Dept: LAB | Facility: CLINIC | Age: 81
End: 2021-06-10
Payer: COMMERCIAL

## 2021-06-10 ENCOUNTER — HOSPITAL ENCOUNTER (OUTPATIENT)
Dept: RADIOLOGY | Facility: HOSPITAL | Age: 81
Discharge: HOME | End: 2021-06-10
Attending: RADIOLOGY | Admitting: RADIOLOGY
Payer: COMMERCIAL

## 2021-06-10 VITALS
SYSTOLIC BLOOD PRESSURE: 133 MMHG | DIASTOLIC BLOOD PRESSURE: 83 MMHG | OXYGEN SATURATION: 94 % | TEMPERATURE: 98.3 F | RESPIRATION RATE: 18 BRPM | HEIGHT: 62 IN | HEART RATE: 120 BPM | WEIGHT: 175 LBS | BODY MASS INDEX: 32.2 KG/M2

## 2021-06-10 DIAGNOSIS — D69.3 ACUTE ITP (HCC): ICD-10-CM

## 2021-06-10 DIAGNOSIS — D69.6 THROMBOCYTOPENIA (HCC): ICD-10-CM

## 2021-06-10 PROCEDURE — 88305 TISSUE EXAM BY PATHOLOGIST: CPT | Performed by: PATHOLOGY

## 2021-06-10 PROCEDURE — 88342 IMHCHEM/IMCYTCHM 1ST ANTB: CPT | Performed by: PATHOLOGY

## 2021-06-10 PROCEDURE — 85097 BONE MARROW INTERPRETATION: CPT | Performed by: PATHOLOGY

## 2021-06-10 PROCEDURE — 88341 IMHCHEM/IMCYTCHM EA ADD ANTB: CPT | Performed by: PATHOLOGY

## 2021-06-10 PROCEDURE — 99152 MOD SED SAME PHYS/QHP 5/>YRS: CPT

## 2021-06-10 PROCEDURE — 88365 INSITU HYBRIDIZATION (FISH): CPT | Performed by: PATHOLOGY

## 2021-06-10 PROCEDURE — 77012 CT SCAN FOR NEEDLE BIOPSY: CPT | Performed by: RADIOLOGY

## 2021-06-10 PROCEDURE — 88313 SPECIAL STAINS GROUP 2: CPT | Performed by: PATHOLOGY

## 2021-06-10 PROCEDURE — 93005 ELECTROCARDIOGRAM TRACING: CPT

## 2021-06-10 PROCEDURE — 88311 DECALCIFY TISSUE: CPT | Performed by: PATHOLOGY

## 2021-06-10 PROCEDURE — 99152 MOD SED SAME PHYS/QHP 5/>YRS: CPT | Performed by: RADIOLOGY

## 2021-06-10 PROCEDURE — 38222 DX BONE MARROW BX & ASPIR: CPT

## 2021-06-10 PROCEDURE — 38222 DX BONE MARROW BX & ASPIR: CPT | Performed by: RADIOLOGY

## 2021-06-10 RX ORDER — DIPHENHYDRAMINE HYDROCHLORIDE 50 MG/ML
INJECTION INTRAMUSCULAR; INTRAVENOUS CODE/TRAUMA/SEDATION MEDICATION
Status: COMPLETED | OUTPATIENT
Start: 2021-06-10 | End: 2021-06-10

## 2021-06-10 RX ORDER — SODIUM CHLORIDE 9 MG/ML
75 INJECTION, SOLUTION INTRAVENOUS CONTINUOUS
Status: DISCONTINUED | OUTPATIENT
Start: 2021-06-10 | End: 2021-06-10 | Stop reason: HOSPADM

## 2021-06-10 RX ORDER — FENTANYL CITRATE 50 UG/ML
INJECTION, SOLUTION INTRAMUSCULAR; INTRAVENOUS CODE/TRAUMA/SEDATION MEDICATION
Status: COMPLETED | OUTPATIENT
Start: 2021-06-10 | End: 2021-06-10

## 2021-06-10 RX ORDER — MIDAZOLAM HYDROCHLORIDE 2 MG/2ML
INJECTION, SOLUTION INTRAMUSCULAR; INTRAVENOUS CODE/TRAUMA/SEDATION MEDICATION
Status: COMPLETED | OUTPATIENT
Start: 2021-06-10 | End: 2021-06-10

## 2021-06-10 RX ADMIN — FENTANYL CITRATE 50 MCG: 50 INJECTION INTRAMUSCULAR; INTRAVENOUS at 12:58

## 2021-06-10 RX ADMIN — FENTANYL CITRATE 50 MCG: 50 INJECTION INTRAMUSCULAR; INTRAVENOUS at 13:15

## 2021-06-10 RX ADMIN — DIPHENHYDRAMINE HYDROCHLORIDE 50 MG: 50 INJECTION, SOLUTION INTRAMUSCULAR; INTRAVENOUS at 12:53

## 2021-06-10 RX ADMIN — MIDAZOLAM 1 MG: 1 INJECTION INTRAMUSCULAR; INTRAVENOUS at 12:58

## 2021-06-10 NOTE — BRIEF OP NOTE (RAD/CATH)
INTERVENTIONAL RADIOLOGY PROCEDURE NOTE    Date: 6/10/2021    Procedure: IR BIOPSY BONE MARROW    Preoperative diagnosis:   1  Thrombocytopenia ([address] )    2  Acute ITP (HCC)         Postoperative diagnosis: Same  Surgeon: Maribel Calix MD     Assistant: None  No qualified resident was available  Blood loss: 0    Specimens: core touch prep aspirate     Findings: R iliac bone marrow biopsy    HR irregular from 566C to 057Z    Complications: None immediate      Anesthesia: conscious sedation

## 2021-06-10 NOTE — SEDATION DOCUMENTATION
Bone Marrow Biopsy Completed by Dr Dustin Parsons  A-fib is noted on the ECG  Dr Dustin Parsons spoke with Dr Filiberto Lawson PCP about A-Fib  ECG done as ordered  Asymptomatic with HR 90's to 150  Patient instructed to go to ER with any pain, dizziness, s/s of stroke  Discharge patient to home today as per Dr Dustin Parsons  bandaid to bone marrow puncture site right iliac bone   Report to [facility]

## 2021-06-10 NOTE — QUICK NOTE
Called and discussed w/ Dr Modesta Singh - pt now known to have a fib, new diagnosis    Will obtain EKG    Will need cards eval - as outpatient since patient will not be able to go to ED

## 2021-06-10 NOTE — DISCHARGE INSTRUCTIONS
Bone Marrow Biopsy     WHAT YOU NEED TO KNOW:   A bone marrow biopsy is a procedure to remove a small amount of bone marrow from your bone  Bone marrow is the soft tissue inside your bone that helps to make blood cells  The sample is tested for disease or infection  DISCHARGE INSTRUCTIONS:     1  Limit your activities day of biopsy as directed by your doctor  2  Use medication as ordered  3  Return to your normal diet  Small sips of flat soda will help with nausea  4  Remove band-aid or dressing 24 hours after procedure  Contact Interventional Radiology at [phone] [place] PATIENTS: Contact Interventional Radiology at [phone]) [place] PATIENTS: Contact Interventional Radiology at [phone]) if:    1  Difficulty breathing, nausea or vomiting  2  Chills or fever above 101 F     3  Pain at biopsy site not relieved by medication  4  Develop any redness, swelling, heat, unusual drainage, heavy bruising or bleeding from biopsy site  Procedural Sedation   WHAT YOU NEED TO KNOW:   Procedural sedation is medicine used during procedures to help you feel relaxed and calm  You will remember little to none of the procedure  After sedation you may feel tired, weak, or unsteady on your feet  You may also have trouble concentrating or short-term memory loss  These symptoms should go away in 24 hours or less  DISCHARGE INSTRUCTIONS:     Call 911 or have someone else call for any of the following:   · You have sudden trouble breathing      · You cannot be woken        Contact Interventional Radiology at [phone]   [place] PATIENTS: Contact Interventional Radiology at [phone]) [place] PATIENTS: Contact Interventional Radiology at [phone]) if any of the following occur:     · You have a severe headache or dizziness      · Your heart is beating faster than usual     · You have a fever or chills      · Your skin is itchy, swollen, or you have a rash      · You have nausea or are vomiting for more than 8 hours after the procedure       · You have questions or concerns about your condition or care  Self-care:   · Have someone stay with you for 24 hours  This person can drive you to errands and help you do things around the house  This person can also watch for problems       · Rest and do quiet activities for 24 hours  Do not exercise, ride a bike, or play sports  Stand up slowly to prevent dizziness and falls  Take short walks around the house with another person  Slowly return to your usual activities the next day       · Do not drive or use dangerous machines or tools for 24 hours  You may injure yourself or others  Examples include a lawnmower, saw, or drill  Do not return to work for 24 hours if you use dangerous machines or tools for work       · Do not make important decisions for 24 hours  For example, do not sign important papers or invest money       · Drink liquids as directed  Liquids help flush the sedation medicine out of your body  Ask how much liquid to drink each day and which liquids are best for you       · Eat small, frequent meals to prevent nausea and vomiting  Start with clear liquids such as juice or broth  If you do not vomit after clear liquids, you can eat your usual foods       · Do not drink alcohol or take medicines that make you drowsy  This includes medicines that help you sleep and anxiety medicines  Ask your healthcare provider if it is safe for you to take pain medicine  Follow up with your healthcare provider as directed: Write down your questions so you remember to ask them during your visits  Ischemic Stroke   WHAT YOU NEED TO KNOW:   What is an ischemic stroke? An ischemic stroke occurs when blood flow to part of your brain is blocked  The block is usually caused by a blood clot that gets stuck in a narrow blood vessel  When oxygen cannot get to an area of the brain, tissue in that area may get damaged   The damage can cause loss of body functions controlled by that area of the brain  A stroke is a medical emergency that needs immediate treatment  Most medicines and treatments work best the sooner they are given  What are the warning signs of a stroke? The words BE FAST can help you remember and recognize warning signs of a stroke:  · B = Balance:  Sudden loss of balance    · E = Eyes:  Loss of vision in one or both eyes    · F = Face:  Face droops on one side    · A = Arms:  Arm drops when both arms are raised    · S = Speech:  Speech is slurred or sounds different    · T = Time:  Time to get help immediately     What are the signs and symptoms of an ischemic stroke? Signs and symptoms may begin suddenly and worsen quickly  Any of the following may appear minutes or hours after a stroke:  · Severe headache    · Loss of vision in one or both eyes    · Numbness, tingling, weakness, or paralysis on one side of your body    · Trouble walking or speaking    · Dizziness, confusion, or fainting    What increases my risk for an ischemic stroke? · Age 54 or older    · A family history of stroke, or a personal history of transient ischemic attack (TIA)    · Obesity or not enough physical activity    · High cholesterol, high blood pressure, or diabetes    · Smoking cigarettes or using illegal drugs    · A heart condition, such as atrial fibrillation, a recent heart attack, or valve disease    · A blood clotting disorder, such as Factor V Leiden    · In women, oral birth control pills or hormone replacement therapy    How is an ischemic stroke diagnosed? Your healthcare provider will examine you and ask about your symptoms  He or she will ask if you have any medical conditions  You may need any of the following:  · Blood tests  may be used to check your overall health  Your blood's ability to clot will also be tested  Blood tests may include a check for diabetes  Diabetes increases your risk for a stroke      · CT or MRI  pictures may show where the stroke happened and any damage to your brain  You may be given contrast liquid to help your skull and brain show up better in the pictures  Tell the healthcare provider if you have ever had an allergic reaction to contrast liquid  Do not enter the MRI room with anything metal  Metal can cause serious injury  Tell the healthcare provider if you have any metal in or on your body  · An arteriography  is used to take x-rays of your arteries to look for blood flow blockage  How is an ischemic stroke treated? Your healthcare provider will talk to you and your family about ways to treat an ischemic stroke  They will explain the risks and benefits of each treatment  You will be able to help create a treatment plan that may include any of the following:  · Medicines  may be used to help break apart clots, or prevent clots from forming  Other medicines may be given to treat health conditions that increase the risk for a stroke  Examples are high cholesterol, high blood pressure, and diabetes  · Surgery  may be needed to remove the clot  You may also need surgery to widen arteries or to place a filter into a blood vessel  Surgery can help improve blood flow and prevent clots  What can I do to manage an ischemic stroke? · Go to stroke rehabilitation (rehab) if directed  Rehab is a program run by specialists who will help you recover abilities you may have lost  Specialists include physical, occupational, and speech therapists  Physical therapists help you gain strength or keep your balance  Occupational therapists teach you new ways to do daily activities, such as getting dressed  Your therapy may include movements for everyday activities  An example is being able to raise yourself from a chair  A speech therapist helps you improve your ability to talk and swallow  · Wear pressure stockings as directed  Pressure stockings help keep blood from pooling in your leg veins   Your healthcare provider can prescribe stockings that are right for you  Do not buy over-the-counter pressure stockings unless your healthcare provider says it is okay  They may not fit correctly or may have elastic that cuts off your circulation  Ask your healthcare provider when to start wearing pressure stockings and how long to wear them each day  · Make your home safe  Remove anything you might trip over  Tape electrical cords down  Keep paths clear throughout your home  Make sure your home is well lit  Put nonslip materials on surfaces that might be slippery  An example is your bathtub or shower floor  A cane or walker may help you keep your balance as you walk  What can I do to prevent another stroke? · Manage health conditions  A condition such as diabetes can increase your risk for a stroke  Control your blood sugar level if you have hyperglycemia or diabetes  Take your prescribed medicines and check your blood sugar level as directed  · Check your blood pressure as directed  High blood pressure can increase your risk for a stroke  If you have high blood pressure, follow your healthcare provider's directions for controlling it  · Do not use nicotine products or illegal drugs  Nicotine and other chemicals in cigarettes and cigars can cause blood vessel damage  Nicotine and illegal drugs both increase your risk for a stroke  Ask your healthcare provider for information if you currently smoke or use drugs and need help to quit  E- cigarettes or smokeless tobacco still contain nicotine  Talk to your healthcare provider before you use these products  · Talk to your healthcare provider about alcohol  Alcohol can raise your blood pressure  The recommended limit is 2 drinks in a day for men and 1 drink in a day for women  Do not binge drink or save a week's worth of alcohol to drink in 1 or 2 days  Limit weekly amounts as directed by your provider  · Eat a variety of healthy foods    Healthy foods include whole-grain breads, low-fat dairy products, beans, lean meats, and fish  Eat at least 5 servings of fruits and vegetables each day  Choose foods that are low in fat, cholesterol, salt, and sugar  Eat foods that are high in potassium, such as potatoes and bananas  A dietitian can help you create healthy meal plans  · Maintain a healthy weight  Ask your healthcare provider how much you should weigh  Ask him or her to help you create a weight loss plan if you are overweight  He or she can help you create small goals if you have a lot of weight to lose  · Exercise as directed  Exercise can lower your blood pressure, cholesterol, weight, and blood sugar levels  Healthcare providers will help you create exercise goals  They can also help you make a plan to reach your goals  For example, you can break exercise into 10 minute periods, 3 times in the day  Find an exercise that you enjoy  This will make it easier for you to reach your exercise goals  · Manage stress  Stress can raise your blood pressure  Find new ways to relax, such as deep breathing or listening to music  · Talk to your healthcare provider about risk factors for women  Birth control pills increase your risk, especially if you are older than 35 or smoke cigarettes  Talk to your healthcare provider about other forms of contraception  Estrogen levels drop during menopause  Low estrogen levels may increase your risk for stroke  Talk to your healthcare provider about hormone replacement therapy to reduce your risk for a stroke  What do I need to know about depression after a stroke? Talk to your healthcare provider if you have depression that continues or is getting worse  Your provider may be able to help treat your depression  Your provider can also recommend support groups for you to join  A support group is a place to talk with others who have had a stroke   It may also help to talk to friends and family members about how you are feeling  Tell your family and friends to let your healthcare provider know if they see any signs of depression:  · Extreme sadness    · Avoiding social interaction with family or friends    · A lack of interest in things you once enjoyed    · Irritability    · Trouble sleeping    · Low energy levels    · A change in eating habits or sudden weight gain or loss    Where can I find support and more information? · National Stroke Association  [address] , [place] [address]  Phone: 0- 415 - 008-5762  Web Address: EVR[facility] au  org    Call your local emergency number (911 in the [address]) or have someone else call if:   · You have any of the following signs of a stroke:      ? Numbness or drooping on one side of your face     ? Weakness in an arm or leg    ? Confusion or difficulty speaking    ? Dizziness, a severe headache, or vision loss       · You have a seizure  · You have chest pain or shortness of breath  · You cough up blood  When should I seek immediate care? · Your arm or leg feels warm, tender, and painful  It may look swollen and red  · You have loss of balance or coordination  · You have double vision or vision loss  · You have unusual or heavy bleeding  When should I call my doctor? · Your blood pressure is higher or lower than you were told it should be  · You have questions or concerns about your condition or care  CARE AGREEMENT:   You have the right to help plan your care  Learn about your health condition and how it may be treated  Discuss treatment options with your healthcare providers to decide what care you want to receive  You always have the right to refuse treatment  The above information is an  only  It is not intended as medical advice for individual conditions or treatments  Talk to your doctor, nurse or pharmacist before following any medical regimen to see if it is safe and effective for you    © Copyright [address] [place] [address] Drive Information is for End User's use only and may not be sold, redistributed or otherwise used for commercial purposes  All illustrations and images included in CareNotes® are the copyrighted property of A D A M , Inc  or Dara Michaels       A-fib (Atrial Fibrillation)   WHAT YOU NEED TO KNOW:   A-fib may come and go, or it may be a long-term condition  A-fib can cause blood clots, stroke, or heart failure  These conditions may become life-threatening  It is important to treat and manage A-fib to help prevent a blood clot, stroke, or heart failure  DISCHARGE INSTRUCTIONS:   Call your local emergency number (911 in the [address]) or have someone call if:   · You have any of the following signs of a heart attack:      ? Squeezing, pressure, or pain in your chest    ? You may  also have any of the following:     § Discomfort or pain in your back, neck, jaw, stomach, or arm    § Shortness of breath    § Nausea or vomiting    § Lightheadedness or a sudden cold sweat    · You have any of the following signs of a stroke:      ? Numbness or drooping on one side of your face     ? Weakness in an arm or leg    ? Confusion or difficulty speaking    ? Dizziness, a severe headache, or vision loss    Call your doctor or cardiologist if:   · Your arm or leg feels warm, tender, and painful  It may look swollen and red  · Your heart rate is more than 110 beats per minute  · You have new or worsening swelling in your legs, feet, ankles, or abdomen  · You are short of breath, even at rest     · You have questions or concerns about your condition or care  Medicines: You may need any of the following:  · Heart medicines  help control your heart rate or rhythm  You may need more than one medicine to treat your symptoms  · Blood thinners  help prevent blood clots  Clots can cause strokes, heart attacks, and death  The following are general safety guidelines to follow while you are taking a blood thinner:    ?  Watch for bleeding and bruising while you take blood thinners  Watch for bleeding from your gums or nose  Watch for blood in your urine and bowel movements  Use a soft washcloth on your skin, and a soft toothbrush to brush your teeth  This can keep your skin and gums from bleeding  If you shave, use an electric shaver  Do not play contact sports  ? Tell your dentist and other healthcare providers that you take a blood thinner  Wear a bracelet or necklace that says you take this medicine  ? Do not start or stop any other medicines unless your healthcare provider tells you to  Many medicines cannot be used with blood thinners  ? Take your blood thinner exactly as prescribed by your healthcare provider  Do not skip does or take less than prescribed  Tell your provider right away if you forget to take your blood thinner, or if you take too much  ? Warfarin  is a blood thinner that you may need to take  The following are things you should be aware of if you take warfarin:     § Foods and medicines can affect the amount of warfarin in your blood  Do not make major changes to your diet while you take warfarin  Warfarin works best when you eat about the same amount of vitamin K every day  Vitamin K is found in green leafy vegetables and certain other foods  Ask for more information about what to eat when you are taking warfarin  § You will need to see your healthcare provider for follow-up visits when you are on warfarin  You will need regular blood tests  These tests are used to decide how much medicine you need  · Antiplatelets , such as aspirin, help prevent blood clots  Take your antiplatelet medicine exactly as directed  These medicines make it more likely for you to bleed or bruise  If you are told to take aspirin, do not take acetaminophen or ibuprofen instead  · Take your medicine as directed  Contact your healthcare provider if you think your medicine is not helping or if you have side effects   Tell him or her if you are allergic to any medicine  Keep a list of the medicines, vitamins, and herbs you take  Include the amounts, and when and why you take them  Bring the list or the pill bottles to follow-up visits  Carry your medicine list with you in case of an emergency  Manage A-fib:   · Know your target heart rate  Learn how to check your pulse and monitor your heart rate  · Know the risks if you choose to drink alcohol  Alcohol can increase your risk for A-fib or make A-fib harder to manage  Ask your healthcare provider if it is okay for you to drink any alcohol  He or she can help you set limits for the number of drinks you have in 24 hours and in a week  A drink of alcohol is 12 ounces of beer, 5 ounces of wine, or 1½ ounces of liquor  · Do not smoke  Nicotine can cause heart damage and make it more difficult to manage your A-fib  Do not use e-cigarettes or smokeless tobacco in place of cigarettes or to help you quit  They still contain nicotine  Ask your healthcare provider for information if you currently smoke and need help quitting  · Eat heart-healthy foods  Heart healthy foods will help keep your cholesterol low  These include fruits, vegetables, whole-grain breads, low-fat dairy products, beans, lean meats, and fish  Replace butter and margarine with heart-healthy oils such as olive oil and canola oil  · Maintain a healthy weight  Ask your healthcare provider what a healthy weight is for you  Ask him or her to help you create a safe weight loss plan if you are overweight  Even a small goal of a 10% weight loss can improve your heart health  · Get regular physical activity  Physical activity helps improve your heart health  Get at least 150 minutes of moderate aerobic physical activity each week  Your healthcare provider can help you create an activity plan  · Manage other health conditions    This includes high blood pressure or cholesterol, sleep apnea, diabetes, and other heart conditions  Take medicine as directed and follow your treatment plan  Your healthcare provider may need to change a medicine you are taking if it is causing your A-fib  Do not  stop taking any medicine unless directed by your provider  Follow up with your doctor or cardiologist as directed: You will need regular blood tests and monitoring  Write down your questions so you remember to ask them during your visits  © Copyright [employer] 2021 Information is for End User's use only and may not be sold, redistributed or otherwise used for commercial purposes  All illustrations and images included in CareNotes® are the copyrighted property of A D A M , Inc  or [facility] Asa Michaels   The above information is an  only  It is not intended as medical advice for individual conditions or treatments  Talk to your doctor, nurse or pharmacist before following any medical regimen to see if it is safe and effective for you

## 2021-06-10 NOTE — INTERVAL H&P NOTE
Update: (This section must be completed if the H&P was completed greater than 24 hrs to procedure or admission)    H&P reviewed  After examining the patient, I find no changed to the H&P since it had been written  78M w/ unexpected severe thrombocytopenia, bone marrow biopsy appropriate for diagnosis    He has recent upper respiratory tract infection but oxygenation is good  Irregular heartbeat  He has poor medical follow-up and these are on treated as he takes care of his wife    Risks benefits alternatives discussed he wishes to proceed    MP2 ASA3    Patient re-evaluated   Accept as history and physical     Sammie Hill MD/Yoselin 10, 2021/1:02 PM

## 2021-06-11 ENCOUNTER — TRANSCRIBE ORDERS (OUTPATIENT)
Dept: ADMINISTRATIVE | Facility: HOSPITAL | Age: 81
End: 2021-06-11

## 2021-06-11 ENCOUNTER — APPOINTMENT (OUTPATIENT)
Dept: RADIOLOGY | Facility: MEDICAL CENTER | Age: 81
End: 2021-06-11
Payer: COMMERCIAL

## 2021-06-11 DIAGNOSIS — R05.9 COUGH: ICD-10-CM

## 2021-06-11 DIAGNOSIS — R05.9 COUGH: Primary | ICD-10-CM

## 2021-06-11 LAB
ATRIAL RATE: 178 BPM
QRS AXIS: 51 DEGREES
QRSD INTERVAL: 74 MS
QT INTERVAL: 314 MS
QTC INTERVAL: 471 MS
T WAVE AXIS: 32 DEGREES
VENTRICULAR RATE: 135 BPM

## 2021-06-11 PROCEDURE — 71046 X-RAY EXAM CHEST 2 VIEWS: CPT

## 2021-06-11 PROCEDURE — 93010 ELECTROCARDIOGRAM REPORT: CPT | Performed by: INTERNAL MEDICINE

## 2021-06-14 PROCEDURE — 93010 ELECTROCARDIOGRAM REPORT: CPT | Performed by: INTERNAL MEDICINE

## 2021-06-17 ENCOUNTER — TELEPHONE (OUTPATIENT)
Dept: HEMATOLOGY ONCOLOGY | Facility: CLINIC | Age: 81
End: 2021-06-17

## 2021-06-17 ENCOUNTER — OFFICE VISIT (OUTPATIENT)
Dept: HEMATOLOGY ONCOLOGY | Facility: CLINIC | Age: 81
End: 2021-06-17
Payer: COMMERCIAL

## 2021-06-17 ENCOUNTER — APPOINTMENT (OUTPATIENT)
Dept: LAB | Facility: CLINIC | Age: 81
DRG: 167 | End: 2021-06-17
Payer: COMMERCIAL

## 2021-06-17 ENCOUNTER — TELEPHONE (OUTPATIENT)
Dept: CARDIOLOGY CLINIC | Facility: CLINIC | Age: 81
End: 2021-06-17

## 2021-06-17 VITALS
OXYGEN SATURATION: 94 % | TEMPERATURE: 97.8 F | DIASTOLIC BLOOD PRESSURE: 68 MMHG | SYSTOLIC BLOOD PRESSURE: 118 MMHG | HEIGHT: 62 IN | BODY MASS INDEX: 31.83 KG/M2 | HEART RATE: 148 BPM | WEIGHT: 173 LBS | RESPIRATION RATE: 20 BRPM

## 2021-06-17 DIAGNOSIS — D69.3 ACUTE ITP (HCC): ICD-10-CM

## 2021-06-17 DIAGNOSIS — D69.6 THROMBOCYTOPENIA (HCC): ICD-10-CM

## 2021-06-17 DIAGNOSIS — D69.3 ACUTE ITP (HCC): Primary | ICD-10-CM

## 2021-06-17 DIAGNOSIS — D69.3 IDIOPATHIC THROMBOCYTOPENIC PURPURA (ITP) (HCC): ICD-10-CM

## 2021-06-17 DIAGNOSIS — D69.6 THROMBOCYTOPENIA (HCC): Primary | ICD-10-CM

## 2021-06-17 LAB
ALBUMIN SERPL BCP-MCNC: 2.3 G/DL (ref 3.5–5)
ALP SERPL-CCNC: 171 U/L (ref 46–116)
ALT SERPL W P-5'-P-CCNC: 35 U/L (ref 12–78)
ANION GAP SERPL CALCULATED.3IONS-SCNC: 10 MMOL/L (ref 4–13)
AST SERPL W P-5'-P-CCNC: 17 U/L (ref 5–45)
BILIRUB SERPL-MCNC: 0.3 MG/DL (ref 0.2–1)
BUN SERPL-MCNC: 34 MG/DL (ref 5–25)
CALCIUM ALBUM COR SERPL-MCNC: 11.1 MG/DL (ref 8.3–10.1)
CALCIUM SERPL-MCNC: 9.7 MG/DL (ref 8.3–10.1)
CHLORIDE SERPL-SCNC: 104 MMOL/L (ref 100–108)
CO2 SERPL-SCNC: 26 MMOL/L (ref 21–32)
CREAT SERPL-MCNC: 1.67 MG/DL (ref 0.6–1.3)
GFR SERPL CREATININE-BSD FRML MDRD: 39 ML/MIN/1.73SQ M
GLUCOSE SERPL-MCNC: 156 MG/DL (ref 65–140)
POTASSIUM SERPL-SCNC: 4.4 MMOL/L (ref 3.5–5.3)
PROT SERPL-MCNC: 7.3 G/DL (ref 6.4–8.2)
SODIUM SERPL-SCNC: 140 MMOL/L (ref 136–145)

## 2021-06-17 PROCEDURE — 1160F RVW MEDS BY RX/DR IN RCRD: CPT | Performed by: INTERNAL MEDICINE

## 2021-06-17 PROCEDURE — 99214 OFFICE O/P EST MOD 30 MIN: CPT | Performed by: INTERNAL MEDICINE

## 2021-06-17 PROCEDURE — 80053 COMPREHEN METABOLIC PANEL: CPT

## 2021-06-17 RX ORDER — LEVOFLOXACIN 500 MG/1
500 TABLET, FILM COATED ORAL DAILY
COMMUNITY
Start: 2021-06-11 | End: 2021-06-23 | Stop reason: HOSPADM

## 2021-06-17 RX ORDER — ACETAMINOPHEN 325 MG/1
650 TABLET ORAL ONCE
Status: CANCELLED | OUTPATIENT
Start: 2021-06-29

## 2021-06-17 RX ORDER — SODIUM CHLORIDE 9 MG/ML
20 INJECTION, SOLUTION INTRAVENOUS ONCE
Status: CANCELLED | OUTPATIENT
Start: 2021-07-29

## 2021-06-17 RX ORDER — HYDROCHLOROTHIAZIDE 25 MG/1
25 TABLET ORAL DAILY
COMMUNITY
End: 2021-06-23 | Stop reason: HOSPADM

## 2021-06-17 RX ORDER — ACETAMINOPHEN 325 MG/1
650 TABLET ORAL ONCE
Status: CANCELLED | OUTPATIENT
Start: 2021-07-29

## 2021-06-17 RX ORDER — ACETAMINOPHEN 325 MG/1
650 TABLET ORAL ONCE
Status: CANCELLED | OUTPATIENT
Start: 2021-07-23

## 2021-06-17 RX ORDER — SODIUM CHLORIDE 9 MG/ML
20 INJECTION, SOLUTION INTRAVENOUS ONCE
Status: CANCELLED | OUTPATIENT
Start: 2021-07-08

## 2021-06-17 RX ORDER — SODIUM CHLORIDE 9 MG/ML
20 INJECTION, SOLUTION INTRAVENOUS ONCE
Status: CANCELLED | OUTPATIENT
Start: 2021-07-23

## 2021-06-17 RX ORDER — ACETAMINOPHEN 325 MG/1
650 TABLET ORAL ONCE
Status: CANCELLED | OUTPATIENT
Start: 2021-07-08

## 2021-06-17 RX ORDER — DEXAMETHASONE 4 MG/1
40 TABLET ORAL
Qty: 40 TABLET | Refills: 0 | Status: SHIPPED | OUTPATIENT
Start: 2021-06-17 | End: 2021-06-23 | Stop reason: HOSPADM

## 2021-06-17 RX ORDER — PANTOPRAZOLE SODIUM 40 MG/1
40 TABLET, DELAYED RELEASE ORAL DAILY
Qty: 30 TABLET | Refills: 0 | Status: SHIPPED | OUTPATIENT
Start: 2021-06-17

## 2021-06-17 RX ORDER — SODIUM CHLORIDE 9 MG/ML
20 INJECTION, SOLUTION INTRAVENOUS ONCE
Status: CANCELLED | OUTPATIENT
Start: 2021-06-29

## 2021-06-17 NOTE — TELEPHONE ENCOUNTER
Critical Results   Call Received From  [facility] Department Location [facility]   Lab Study Platelet 56,285   Date Blood Work was Done 06/17/2021   Read Back of Information Done Yes   Relevant Information

## 2021-06-17 NOTE — TELEPHONE ENCOUNTER
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  Platelet count as of 6/17 13     Reviewed patients chart  EKG completed 6/10 rapid afib w/ RVR  HR @ Dr Jennifer Quinn office today 148, /68, o2 sat RA 97%  HR documented at initial consult w/ Dr Richie Arevalo on 6/3 151     Asked staff to recheck HR radially, current HR 86 and irregular    Patient has never seen cardiology  No other cardiac testing completed  Patient c/o SOB w/ minimal exertion and trace B/L LE edema  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

## 2021-06-17 NOTE — TELEPHONE ENCOUNTER
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

## 2021-06-17 NOTE — PROGRESS NOTES
Hematology/Oncology Outpatient Follow- up Note  Yoav Angel 68 y o  male MRN: @ Encounter: [id number]        Date:  6/17/2021    Presenting Complaint/Diagnosis : Thrombocytopenia/ITP      Previous Hematologic/ Oncologic History:     workup   Decadron    Current Hematologic/ Oncologic Treatment:       the patient is status post Decadron    Interval History:     the patient returns for follow-up visit  He states he is doing well  He does have some lower back pain on the right side which he states his position on just started last night but he thinks he may have slept funny  Denies any nausea denies any vomiting denies any diarrhea  He initially presented with a platelet count of 3 and was put on Decadron for 4 days along with a platelet transfusion  His platelet count went to 267 and the last 1 I have from a week ago was 169  As far symptoms are concerned he states apart from the slight discomfort in his back which may be positional he denies any other complaints in the rest of his 14 point review of systems today was negative  Most recent white count again on the 10th of June was 12 6 with a hemoglobin of 10 8 and platelet count of 017  MCV was 82  Bone marrow biopsy did not show any evidence of leukemia  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 based on the clinical picture  Test Results:    Imaging: XR chest pa & lateral    Result Date: 6/11/2021  Narrative: CHEST INDICATION:   R05: Cough  History of thrombocytopenia and acute ITP and hypertension  Status post bone biopsy yesterday  COMPARISON:  6/10/2019 EXAM PERFORMED/VIEWS:  XR CHEST PA & LATERAL FINDINGS: New, large masslike opacity in the right upper lobe bows the major fissure downward on the lateral view  Mild right lung volume loss compared to left  Clear left lung  Small right pleural effusion  No left effusion  No pneumothorax  Heart, mediastinal and hilar structures are stable in appearance and within normal limits for age  Mild aortic uncoiling and calcification  No acute osseous or soft tissue pathology  The study was marked in [facility] for immediate notification  Impression: Large right upper lung airspace opacity and small right pleural effusion  Infection versus mass  Recommend follow-up to completion  Workstation performed: [id number]     IR biopsy bone marrow    Result Date: 6/10/2021  Narrative: CT-guided bone marrow biopsy Clinical History: Thrombocytopenia, bone marrow biopsy for diagnosis Moderate sedation time: 15 minutes Procedure: After explaining the risks and benefits of the procedure to the patient, informed consent was obtained  CT was used to localize the iliac bones   The right iliac crest was targeted Radiation dose length product (DLP) for this visit:  608 23 mGy   This examination, like all CT scans performed in the [facility], was performed utilizing techniques to minimize radiation dose exposure, including the use of iterative reconstruction and automated exposure control  The overlying skin was prepped and draped in the usual sterile fashion  Local anesthesia was obtained with a 1% lidocaine solution  Using CT guidance, an 11-gauge coaxial needle was advanced  Marrow was aspirated  A core biopsy was obtained  The tissue was given to pathology  The preliminary interpretation is adequate material   The patient tolerated the procedure well and left the department in stable condition  Findings: Needle within the right iliac bone  Postprocedure changes  Heavily calcified arterial disease  Specimens: Core touch prep aspirate     Impression: Impression: CT guided bone marrow biopsy Note: The patient was noted to be in irregular heart rhythm with rate varying from 100-150  EKG was obtained confirming suspected atrial fibrillation  This is new for the patient and was discussed with the patient and communicated to his primary care physician  Patient was asymptomatic and not hypotensive  Workstation performed: [id number]       Labs:   Lab Results   Component Value Date    WBC 12 60 (H) 06/10/2021    HGB 10 8 (L) 06/10/2021    HCT 37 7 06/10/2021    MCV 82 06/10/2021     06/10/2021     Lab Results   Component Value Date    K 4 8 05/28/2021     05/28/2021    CO2 28 05/28/2021    BUN 22 05/28/2021    CREATININE 1 12 05/28/2021    GLUF 135 (H) 05/28/2021    CALCIUM 10 0 05/28/2021    CORRECTEDCA 11 0 (H) 05/28/2021    AST 11 05/28/2021    ALT 14 05/28/2021    ALKPHOS 89 05/28/2021    EGFR 63 05/28/2021       Lab Results   Component Value Date    PSA 1 9 07/15/2019       ROS: As stated in the history of present illness otherwise his 14 point review of systems today was negative  Active Problems:   Patient Active Problem List   Diagnosis    Acute ITP (HCC)    Thrombocytopenia (HCC)       Past Medical History:   Past Medical History:   Diagnosis Date    Hypertension     Hypertension        Surgical History:   Past Surgical History:   Procedure Laterality Date    BACK SURGERY      CARPAL TUNNEL RELEASE Bilateral     IR BIOPSY BONE MARROW  6/10/2021    LUMBAR DISC SURGERY         Family History:    Family History   Problem Relation Age of Onset    Cancer Mother    Brent Pickup Father        Cancer-related family history includes Cancer in his mother      Social History:   Social History     Socioeconomic History    Marital status: /Civil Union     Spouse name: Not on file    Number of children: Not on file    Years of education: Not on file    Highest education level: Not on file   Occupational History    Not on file   Tobacco Use    Smoking status: Current Every Day Smoker     Packs/day: 0 50    Smokeless tobacco: Never Used   Substance and Sexual Activity    Alcohol use: Not Currently    Drug use: Never    Sexual activity: Not on file   Other Topics Concern    Not on file   Social History Narrative    Not on file     Social Determinants of Health     Financial Resource Strain:     Difficulty of Paying Living Expenses:    Food Insecurity:     Worried About Running Out of Food in the Last Year:     [address] in the Last Year:    Transportation Needs:     Lack of Transportation (Medical):  Lack of Transportation (Non-Medical):    Physical Activity:     Days of Exercise per Week:     Minutes of Exercise per Session:    Stress:     Feeling of Stress :    Social Connections:     Frequency of Communication with Friends and Family:     Frequency of Social Gatherings with Friends and Family:     Attends Buddhist Services:     Active Member of Clubs or Organizations:     Attends Club or Organization Meetings:     Marital Status:    Intimate Partner Violence:     Fear of Current or Ex-Partner:     Emotionally Abused:     Physically Abused:     Sexually Abused:        Current Medications:   Current Outpatient Medications   Medication Sig Dispense Refill    acetaminophen (TYLENOL) 100 mg/mL solution Take 10 mg/kg by mouth every 4 (four) hours as needed for fever      allopurinol (ZYLOPRIM) 300 mg tablet Take 300 mg by mouth daily      amLODIPine (NORVASC) 2 5 mg tablet Take 2 5 mg by mouth every morning      hydrochlorothiazide (HYDRODIURIL) 25 mg tablet Take 25 mg by mouth daily      levofloxacin (LEVAQUIN) 500 mg tablet Take 500 mg by mouth daily      levothyroxine 150 mcg tablet Take 150 mcg by mouth daily      losartan (COZAAR) 100 MG tablet Take 50 mg by mouth daily      meloxicam (MOBIC) 15 mg tablet Take 15 mg by mouth daily      Omega-3 Fatty Acids (FISH OIL) 1,000 mg Take 1,000 mg by mouth 2 (two) times a day      pantoprazole (PROTONIX) 40 mg tablet Take 1 tablet (40 mg total) by mouth daily 30 tablet 0     No current facility-administered medications for this visit  Allergies: No Known Allergies    Physical Exam:    Body surface area is 1 8 meters squared      Wt Readings from Last 3 Encounters:   06/17/21 78 5 kg (173 lb) 06/10/21 79 4 kg (175 lb)   06/03/21 79 8 kg (176 lb)        Temp Readings from Last 3 Encounters:   06/17/21 97 8 °F (36 6 °C) (Temporal)   06/10/21 98 3 °F (36 8 °C) (Oral)   06/04/21 (!) 97 3 °F (36 3 °C) (Temporal)        BP Readings from Last 3 Encounters:   06/17/21 118/68   06/10/21 133/83   06/04/21 130/63         Pulse Readings from Last 3 Encounters:   06/17/21 (!) 148   06/10/21 (!) 120   06/04/21 75         Physical Exam     Constitutional   General appearance: No acute distress, well appearing and well nourished  Eyes   Conjunctiva and lids: No swelling, erythema or discharge  Pupils and irises: Equal, round and reactive to light  Ears, Nose, Mouth, and Throat   External inspection of ears and nose: Normal     Nasal mucosa, septum, and turbinates: Normal without edema or erythema  Oropharynx: Normal with no erythema, edema, exudate or lesions  Pulmonary   Respiratory effort: No increased work of breathing or signs of respiratory distress  Auscultation of lungs: Clear to auscultation  Cardiovascular   Palpation of heart: Normal PMI, no thrills  Auscultation of heart: Normal rate and rhythm, normal S1 and S2, without murmurs  Examination of extremities for edema and/or varicosities: Normal     Carotid pulses: Normal     Abdomen   Abdomen: Non-tender, no masses  Liver and spleen: No hepatomegaly or splenomegaly  Lymphatic   Palpation of lymph nodes in neck: No lymphadenopathy  Musculoskeletal   Gait and station: Normal     Digits and nails: Normal without clubbing or cyanosis  Inspection/palpation of joints, bones, and muscles: Normal     Skin   Skin and subcutaneous tissue: Normal without rashes or lesions  Neurologic   Cranial nerves: Cranial nerves 2-12 intact  Sensation: No sensory loss      Psychiatric   Orientation to person, place, and time: Normal     Mood and affect: Normal       Assessment / Plan:        The patient is a pleasant 42-year-old gentleman who was noted to have a significant thrombocytopenia with a platelet count of 3 on blood work done on the 28th of May  He was referred to the emergency room and then ended up in our clinic  He refused to have IVIG or get admitted to the hospital because of personal issues as he is the sole caregiver for his wife who is ill  We ended up giving him Decadron for 4 days and a platelet transfusion  His numbers improved and his platelet count went to 267 and then came down to 169  He has not had blood work in the last week according to him because he did not know he was supposed to have this done  His sons accompany him today and he is going to go have his blood work today immediately  I advised him it is not fasting blood work he needs to get every week  His sons promised to take him  I will see him back in a month  He will get weekly blood work  If his platelet count starts going towards 50 we will consider Rituxan  The patient and his family agree with the plan  He will get blood work today and we will re-evaluate  If he has any questions he will call our office  Goals and Barriers:  Current Goal:  Prolong Survival from   ITP   Barriers: None  Patient's Capacity to Self Care:  Patient able to self care  Portions of the record may have been created with voice recognition software   Occasional wrong word or "sound a like" substitutions may have occurred due to the inherent limitations of voice recognition software   Read the chart carefully and recognize, using context, where substitutions have occurred

## 2021-06-17 NOTE — TELEPHONE ENCOUNTER
Patient was seen today  Has  Thrombocytopenia/ITP   Will send critical plt level to Dr Cuco Moya covering RN

## 2021-06-17 NOTE — PROGRESS NOTES
Patient CBC this morning has resulted with a platelet count of 57[address]  Discussed with Dr Dexter Bamberger  Patient was called with results and treatment plan  Will re-initiate a course of steroids, dexamethasone 40 mg x 4 days  This was E prescribed to his pharmacy  He was instructed to continue take Protonix while on steroids to prevent gastritis  Rituxan was discussed at patient's office visit with Dr Dexter Bamberger this morning  He will be set up to start Rituxan 375 mg per metered subcutaneously weekly x4 doses for his acute ITP  Patient is in agreement  In regards to Rituxan allergic reaction is possible  Reactivation of hepatitis is possible  Chronic hepatitis panel will be ordered  Patient will come back in today to sign consent for Rituxan and be started as soon as possible

## 2021-06-18 ENCOUNTER — APPOINTMENT (EMERGENCY)
Dept: CT IMAGING | Facility: HOSPITAL | Age: 81
DRG: 167 | End: 2021-06-18
Payer: COMMERCIAL

## 2021-06-18 ENCOUNTER — APPOINTMENT (EMERGENCY)
Dept: VASCULAR ULTRASOUND | Facility: HOSPITAL | Age: 81
DRG: 167 | End: 2021-06-18
Payer: COMMERCIAL

## 2021-06-18 ENCOUNTER — APPOINTMENT (EMERGENCY)
Dept: RADIOLOGY | Facility: HOSPITAL | Age: 81
DRG: 167 | End: 2021-06-18
Payer: COMMERCIAL

## 2021-06-18 ENCOUNTER — HOSPITAL ENCOUNTER (INPATIENT)
Facility: HOSPITAL | Age: 81
LOS: 5 days | Discharge: HOME HEALTH | DRG: 167 | End: 2021-06-23
Attending: EMERGENCY MEDICINE | Admitting: INTERNAL MEDICINE
Payer: COMMERCIAL

## 2021-06-18 DIAGNOSIS — R91.8 MASS OF RIGHT LUNG: ICD-10-CM

## 2021-06-18 DIAGNOSIS — E11.9 NEW ONSET TYPE 2 DIABETES MELLITUS (HCC): ICD-10-CM

## 2021-06-18 DIAGNOSIS — I48.0 PAROXYSMAL A-FIB (HCC): ICD-10-CM

## 2021-06-18 DIAGNOSIS — D69.6 THROMBOCYTOPENIA (HCC): ICD-10-CM

## 2021-06-18 DIAGNOSIS — R07.89 ATYPICAL CHEST PAIN: Primary | ICD-10-CM

## 2021-06-18 DIAGNOSIS — J44.9 COPD (CHRONIC OBSTRUCTIVE PULMONARY DISEASE) (HCC): ICD-10-CM

## 2021-06-18 DIAGNOSIS — N17.9 AKI (ACUTE KIDNEY INJURY) (HCC): ICD-10-CM

## 2021-06-18 DIAGNOSIS — R06.02 SHORTNESS OF BREATH: ICD-10-CM

## 2021-06-18 DIAGNOSIS — R06.00 DYSPNEA: ICD-10-CM

## 2021-06-18 PROBLEM — I10 HYPERTENSION: Status: ACTIVE | Noted: 2021-06-18

## 2021-06-18 PROBLEM — D69.3 IDIOPATHIC THROMBOCYTOPENIC PURPURA (ITP) (HCC): Status: ACTIVE | Noted: 2021-06-03

## 2021-06-18 PROBLEM — E03.9 HYPOTHYROIDISM: Status: RESOLVED | Noted: 2021-06-18 | Resolved: 2021-06-18

## 2021-06-18 PROBLEM — E03.9 HYPOTHYROIDISM: Status: ACTIVE | Noted: 2021-06-18

## 2021-06-18 LAB
ALBUMIN SERPL BCP-MCNC: 2.4 G/DL (ref 3.5–5)
ALP SERPL-CCNC: 152 U/L (ref 46–116)
ALT SERPL W P-5'-P-CCNC: 31 U/L (ref 12–78)
ANION GAP SERPL CALCULATED.3IONS-SCNC: 10 MMOL/L (ref 4–13)
ANISOCYTOSIS BLD QL SMEAR: PRESENT
AST SERPL W P-5'-P-CCNC: 13 U/L (ref 5–45)
ATRIAL RATE: 93 BPM
BASOPHILS # BLD MANUAL: 0 THOUSAND/UL (ref 0–0.1)
BASOPHILS NFR MAR MANUAL: 0 % (ref 0–1)
BILIRUB SERPL-MCNC: 0.38 MG/DL (ref 0.2–1)
BUN SERPL-MCNC: 34 MG/DL (ref 5–25)
CALCIUM ALBUM COR SERPL-MCNC: 10.7 MG/DL (ref 8.3–10.1)
CALCIUM SERPL-MCNC: 9.4 MG/DL (ref 8.3–10.1)
CHLORIDE SERPL-SCNC: 106 MMOL/L (ref 100–108)
CO2 SERPL-SCNC: 25 MMOL/L (ref 21–32)
CREAT SERPL-MCNC: 1.68 MG/DL (ref 0.6–1.3)
D DIMER PPP FEU-MCNC: 1.92 UG/ML FEU
EOSINOPHIL # BLD MANUAL: 0.08 THOUSAND/UL (ref 0–0.4)
EOSINOPHIL NFR BLD MANUAL: 1 % (ref 0–6)
ERYTHROCYTE [DISTWIDTH] IN BLOOD BY AUTOMATED COUNT: 17.4 % (ref 11.6–15.1)
GFR SERPL CREATININE-BSD FRML MDRD: 38 ML/MIN/1.73SQ M
GLUCOSE SERPL-MCNC: 185 MG/DL (ref 65–140)
HCT VFR BLD AUTO: 32.7 % (ref 36.5–49.3)
HGB BLD-MCNC: 9.7 G/DL (ref 12–17)
HYPERCHROMIA BLD QL SMEAR: PRESENT
INR PPP: 1.12 (ref 0.84–1.19)
LYMPHOCYTES # BLD AUTO: 0.08 THOUSAND/UL (ref 0.6–4.47)
LYMPHOCYTES # BLD AUTO: 1 % (ref 14–44)
MAGNESIUM SERPL-MCNC: 1.8 MG/DL (ref 1.6–2.6)
MCH RBC QN AUTO: 24 PG (ref 26.8–34.3)
MCHC RBC AUTO-ENTMCNC: 29.7 G/DL (ref 31.4–37.4)
MCV RBC AUTO: 81 FL (ref 82–98)
MONOCYTES # BLD AUTO: 0 THOUSAND/UL (ref 0–1.22)
MONOCYTES NFR BLD: 0 % (ref 4–12)
NEUTROPHILS # BLD MANUAL: 7.76 THOUSAND/UL (ref 1.85–7.62)
NEUTS BAND NFR BLD MANUAL: 1 % (ref 0–8)
NEUTS SEG NFR BLD AUTO: 97 % (ref 43–75)
NRBC BLD AUTO-RTO: 0 /100 WBCS
NT-PROBNP SERPL-MCNC: 373 PG/ML
P AXIS: 99 DEGREES
PLATELET # BLD AUTO: 44 THOUSANDS/UL (ref 149–390)
PLATELET # BLD AUTO: 46 THOUSANDS/UL (ref 149–390)
PLATELET BLD QL SMEAR: ABNORMAL
PMV BLD AUTO: 10.9 FL (ref 8.9–12.7)
PMV BLD AUTO: 11.3 FL (ref 8.9–12.7)
POTASSIUM SERPL-SCNC: 4.8 MMOL/L (ref 3.5–5.3)
PR INTERVAL: 146 MS
PROT SERPL-MCNC: 7.3 G/DL (ref 6.4–8.2)
PROTHROMBIN TIME: 14.5 SECONDS (ref 11.6–14.5)
QRS AXIS: 70 DEGREES
QRSD INTERVAL: 68 MS
QT INTERVAL: 332 MS
QTC INTERVAL: 412 MS
RBC # BLD AUTO: 4.04 MILLION/UL (ref 3.88–5.62)
SODIUM SERPL-SCNC: 141 MMOL/L (ref 136–145)
T WAVE AXIS: 52 DEGREES
TOTAL CELLS COUNTED SPEC: 100
TROPONIN I SERPL-MCNC: <0.02 NG/ML
TSH SERPL DL<=0.05 MIU/L-ACNC: 0.24 UIU/ML (ref 0.36–3.74)
VENTRICULAR RATE: 93 BPM
WBC # BLD AUTO: 7.92 THOUSAND/UL (ref 4.31–10.16)

## 2021-06-18 PROCEDURE — 93005 ELECTROCARDIOGRAM TRACING: CPT

## 2021-06-18 PROCEDURE — 85049 AUTOMATED PLATELET COUNT: CPT | Performed by: PHYSICIAN ASSISTANT

## 2021-06-18 PROCEDURE — 93970 EXTREMITY STUDY: CPT

## 2021-06-18 PROCEDURE — 96366 THER/PROPH/DIAG IV INF ADDON: CPT

## 2021-06-18 PROCEDURE — 36415 COLL VENOUS BLD VENIPUNCTURE: CPT | Performed by: PHYSICIAN ASSISTANT

## 2021-06-18 PROCEDURE — 85007 BL SMEAR W/DIFF WBC COUNT: CPT | Performed by: PHYSICIAN ASSISTANT

## 2021-06-18 PROCEDURE — 85379 FIBRIN DEGRADATION QUANT: CPT | Performed by: PHYSICIAN ASSISTANT

## 2021-06-18 PROCEDURE — 84443 ASSAY THYROID STIM HORMONE: CPT | Performed by: PHYSICIAN ASSISTANT

## 2021-06-18 PROCEDURE — 80053 COMPREHEN METABOLIC PANEL: CPT | Performed by: PHYSICIAN ASSISTANT

## 2021-06-18 PROCEDURE — 84484 ASSAY OF TROPONIN QUANT: CPT | Performed by: INTERNAL MEDICINE

## 2021-06-18 PROCEDURE — 85027 COMPLETE CBC AUTOMATED: CPT | Performed by: PHYSICIAN ASSISTANT

## 2021-06-18 PROCEDURE — 99285 EMERGENCY DEPT VISIT HI MDM: CPT

## 2021-06-18 PROCEDURE — G1004 CDSM NDSC: HCPCS

## 2021-06-18 PROCEDURE — 83880 ASSAY OF NATRIURETIC PEPTIDE: CPT | Performed by: PHYSICIAN ASSISTANT

## 2021-06-18 PROCEDURE — 71275 CT ANGIOGRAPHY CHEST: CPT

## 2021-06-18 PROCEDURE — 83735 ASSAY OF MAGNESIUM: CPT | Performed by: PHYSICIAN ASSISTANT

## 2021-06-18 PROCEDURE — 85610 PROTHROMBIN TIME: CPT | Performed by: PHYSICIAN ASSISTANT

## 2021-06-18 PROCEDURE — 96365 THER/PROPH/DIAG IV INF INIT: CPT

## 2021-06-18 PROCEDURE — 99285 EMERGENCY DEPT VISIT HI MDM: CPT | Performed by: EMERGENCY MEDICINE

## 2021-06-18 PROCEDURE — 93010 ELECTROCARDIOGRAM REPORT: CPT | Performed by: INTERNAL MEDICINE

## 2021-06-18 PROCEDURE — 84484 ASSAY OF TROPONIN QUANT: CPT | Performed by: PHYSICIAN ASSISTANT

## 2021-06-18 PROCEDURE — 71045 X-RAY EXAM CHEST 1 VIEW: CPT

## 2021-06-18 PROCEDURE — 99223 1ST HOSP IP/OBS HIGH 75: CPT | Performed by: HOSPITALIST

## 2021-06-18 RX ORDER — SODIUM CHLORIDE 9 MG/ML
100 INJECTION, SOLUTION INTRAVENOUS CONTINUOUS
Status: DISCONTINUED | OUTPATIENT
Start: 2021-06-18 | End: 2021-06-19

## 2021-06-18 RX ORDER — DEXAMETHASONE 4 MG/1
40 TABLET ORAL
Status: DISCONTINUED | OUTPATIENT
Start: 2021-06-19 | End: 2021-06-21

## 2021-06-18 RX ORDER — AMLODIPINE BESYLATE 2.5 MG/1
2.5 TABLET ORAL EVERY MORNING
Status: DISCONTINUED | OUTPATIENT
Start: 2021-06-19 | End: 2021-06-19

## 2021-06-18 RX ORDER — HYDROCHLOROTHIAZIDE 25 MG/1
25 TABLET ORAL DAILY
Status: CANCELLED | OUTPATIENT
Start: 2021-06-19

## 2021-06-18 RX ORDER — ONDANSETRON 2 MG/ML
4 INJECTION INTRAMUSCULAR; INTRAVENOUS EVERY 4 HOURS PRN
Status: DISCONTINUED | OUTPATIENT
Start: 2021-06-18 | End: 2021-06-23 | Stop reason: HOSPADM

## 2021-06-18 RX ORDER — PANTOPRAZOLE SODIUM 40 MG/1
40 TABLET, DELAYED RELEASE ORAL DAILY
Status: DISCONTINUED | OUTPATIENT
Start: 2021-06-19 | End: 2021-06-23 | Stop reason: HOSPADM

## 2021-06-18 RX ORDER — ALLOPURINOL 300 MG/1
300 TABLET ORAL DAILY
Status: DISCONTINUED | OUTPATIENT
Start: 2021-06-19 | End: 2021-06-23 | Stop reason: HOSPADM

## 2021-06-18 RX ORDER — MAGNESIUM SULFATE HEPTAHYDRATE 40 MG/ML
2 INJECTION, SOLUTION INTRAVENOUS ONCE
Status: COMPLETED | OUTPATIENT
Start: 2021-06-18 | End: 2021-06-18

## 2021-06-18 RX ORDER — LEVOTHYROXINE SODIUM 0.15 MG/1
150 TABLET ORAL
Status: DISCONTINUED | OUTPATIENT
Start: 2021-06-19 | End: 2021-06-23 | Stop reason: HOSPADM

## 2021-06-18 RX ADMIN — MAGNESIUM SULFATE HEPTAHYDRATE 2 G: 40 INJECTION, SOLUTION INTRAVENOUS at 15:22

## 2021-06-18 RX ADMIN — SODIUM CHLORIDE 1000 ML: 0.9 INJECTION, SOLUTION INTRAVENOUS at 15:15

## 2021-06-18 RX ADMIN — IOHEXOL 85 ML: 350 INJECTION, SOLUTION INTRAVENOUS at 15:38

## 2021-06-18 NOTE — PROGRESS NOTES
Rituxan scheduled for 6/29  Pt very reluctant to go to alternate infusion location for sooner treatment

## 2021-06-18 NOTE — ASSESSMENT & PLAN NOTE
Present on arrival to the emergency department  Patient states that his shortness of breath worsens with activity  Patient is a lifelong smoker  Patient currently smokes half pack a day  Has not seen a doctor in 2 years prior to being diagnosed with ITP  Was treated as an outpatient by his PCP for pneumonia  Chest x-ray at that time did show a large right upper airspace opacification      · NC oxygen as needed  · Telemetry  · Pulmonology consult  · Shortness of breath likely secondary to right lung mass

## 2021-06-18 NOTE — ED PROVIDER NOTES
History  Chief Complaint   Patient presents with    Chest Pain     Pt presents to the ED with c/o chest pain and SOB  History provided by:  Patient  Chest Pain  Pain location:  Substernal area  Pain quality: sharp    Pain radiates to:  Does not radiate  Pain radiates to the back: no    Pain severity:  Mild  Onset quality:  Sudden  Duration:  2 weeks  Date/time of last known well:  5/21/2021 2:05 PM  Timing:  Intermittent  Progression:  Waxing and waning  Chronicity:  Recurrent  Context: breathing, movement and at rest    Context: not eating    Relieved by:  Nothing  Worsened by:  Nothing tried  Ineffective treatments:  None tried  Associated symptoms: dizziness, numbness, orthopnea, palpitations, shortness of breath and weakness    Associated symptoms: no abdominal pain, no cough, no fatigue, no fever, no headache, no nausea, no syncope and not vomiting    Risk factors comment: Thrombocytopenia/ITP      Prior to Admission Medications   Prescriptions Last Dose Informant Patient Reported? Taking?    Omega-3 Fatty Acids (FISH OIL) 1,000 mg  Self Yes No   Sig: Take 1,000 mg by mouth 2 (two) times a day   acetaminophen (TYLENOL) 100 mg/mL solution  Self Yes No   Sig: Take 10 mg/kg by mouth every 4 (four) hours as needed for fever   allopurinol (ZYLOPRIM) 300 mg tablet  Self Yes No   Sig: Take 300 mg by mouth daily   amLODIPine (NORVASC) 2 5 mg tablet  Self Yes No   Sig: Take 2 5 mg by mouth every morning   dexamethasone (DECADRON) 4 mg tablet   No No   Sig: Take 10 tablets (40 mg total) by mouth daily with breakfast   hydrochlorothiazide (HYDRODIURIL) 25 mg tablet   Yes No   Sig: Take 25 mg by mouth daily   levofloxacin (LEVAQUIN) 500 mg tablet   Yes No   Sig: Take 500 mg by mouth daily   levothyroxine 150 mcg tablet  Self Yes No   Sig: Take 150 mcg by mouth daily   losartan (COZAAR) 100 MG tablet  Self Yes No   Sig: Take 50 mg by mouth daily   meloxicam (MOBIC) 15 mg tablet  Self Yes No   Sig: Take 15 mg by mouth daily   pantoprazole (PROTONIX) 40 mg tablet   No No   Sig: Take 1 tablet (40 mg total) by mouth daily      Facility-Administered Medications: None       Past Medical History:   Diagnosis Date    Hypertension     Hypertension        Past Surgical History:   Procedure Laterality Date    BACK SURGERY      CARPAL TUNNEL RELEASE Bilateral     IR BIOPSY BONE MARROW  6/10/2021    LUMBAR DISC SURGERY         Family History   Problem Relation Age of Onset    Cancer Mother     Anuerysm Father      I have reviewed and agree with the history as documented  E-Cigarette/Vaping     E-Cigarette/Vaping Substances     Social History     Tobacco Use    Smoking status: Current Every Day Smoker     Packs/day: 0 50    Smokeless tobacco: Never Used   Substance Use Topics    Alcohol use: Not Currently    Drug use: Never        Review of Systems   Constitutional: Negative for activity change, appetite change, chills, fatigue, fever and unexpected weight change  HENT: Negative for congestion  Eyes: Negative for visual disturbance  Respiratory: Positive for shortness of breath  Negative for cough  Cardiovascular: Positive for chest pain, palpitations and orthopnea  Negative for syncope  Gastrointestinal: Negative for abdominal pain, constipation, diarrhea, nausea and vomiting  Genitourinary: Negative for difficulty urinating  Musculoskeletal: Negative for arthralgias, joint swelling, myalgias and neck pain  Skin: Negative for wound  Neurological: Positive for dizziness, weakness and numbness  Negative for headaches  Hematological: Does not bruise/bleed easily  Psychiatric/Behavioral: Negative for agitation, behavioral problems and confusion         Physical Exam  ED Triage Vitals   Temperature Pulse Respirations Blood Pressure SpO2   06/18/21 1306 06/18/21 1306 06/18/21 1306 06/18/21 1306 06/18/21 1306   97 9 °F (36 6 °C) 95 (!) 26 128/59 97 %      Temp Source Heart Rate Source Patient Position - Orthostatic VS BP Location FiO2 (%)   06/18/21 1306 06/18/21 1306 06/18/21 1610 06/18/21 1306 --   Oral Monitor Lying Right arm       Pain Score       --                    Orthostatic Vital Signs  Vitals:    06/18/21 1306 06/18/21 1610   BP: 128/59 111/56   Pulse: 95 89   Patient Position - Orthostatic VS:  Lying       Physical Exam  Vitals and nursing note reviewed  Constitutional:       General: He is not in acute distress  Appearance: Normal appearance  He is not ill-appearing, toxic-appearing or diaphoretic  HENT:      Head: Normocephalic and atraumatic  Nose: Nose normal    Eyes:      General: No scleral icterus  Right eye: No discharge  Left eye: No discharge  Extraocular Movements: Extraocular movements intact  Pupils: Pupils are equal, round, and reactive to light  Neck:      Vascular: No carotid bruit  Cardiovascular:      Rate and Rhythm: Normal rate and regular rhythm  Pulses: Normal pulses  Heart sounds: Normal heart sounds  No murmur heard  No friction rub  No gallop  Pulmonary:      Effort: Pulmonary effort is normal       Breath sounds: Examination of the left-lower field reveals rhonchi and rales  Rhonchi and rales present  No wheezing  Abdominal:      General: Bowel sounds are normal       Palpations: Abdomen is soft  Tenderness: There is no abdominal tenderness  There is no guarding or rebound  Musculoskeletal:         General: No swelling  Cervical back: Normal range of motion and neck supple  No rigidity  No muscular tenderness  Right lower leg: Tenderness present  No edema  Left lower leg: Tenderness present  No edema  Skin:     Capillary Refill: Capillary refill takes less than 2 seconds  Coloration: Skin is not jaundiced  Findings: No bruising, erythema, lesion or rash  Neurological:      General: No focal deficit present  Mental Status: He is alert and oriented to person, place, and time  Motor: No weakness  Gait: Gait normal    Psychiatric:         Mood and Affect: Mood normal          Behavior: Behavior normal          Thought Content:  Thought content normal          Judgment: Judgment normal          ED Medications  Medications   ondansetron (ZOFRAN) injection 4 mg (has no administration in time range)   magnesium sulfate 2 g/50 mL IVPB (premix) 2 g (2 g Intravenous New Bag 6/18/21 1522)   sodium chloride 0 9 % infusion (has no administration in time range)   sodium chloride 0 9 % bolus 1,000 mL (1,000 mL Intravenous New Bag 6/18/21 1515)   iohexol (OMNIPAQUE) 350 MG/ML injection (SINGLE-DOSE) 85 mL (85 mL Intravenous Given 6/18/21 1538)       Diagnostic Studies  Results Reviewed     Procedure Component Value Units Date/Time    Troponin I [[id number]]  (Normal) Collected: 06/18/21 1610    Lab Status: Final result Specimen: Blood from Arm, Left Updated: 06/18/21 1648     Troponin I <0 02 ng/mL     Platelet count [[id number]]  (Abnormal) Collected: 06/18/21 1610    Lab Status: Final result Specimen: Blood from Arm, Left Updated: 06/18/21 1641     Platelets 46 Thousands/uL      MPV 11 3 fL     CBC and differential [[id number]]  (Abnormal) Collected: 06/18/21 1328    Lab Status: Final result Specimen: Blood from Arm, Right Updated: 06/18/21 1432     WBC 7 92 Thousand/uL      RBC 4 04 Million/uL      Hemoglobin 9 7 g/dL      Hematocrit 32 7 %      MCV 81 fL      MCH 24 0 pg      MCHC 29 7 g/dL      RDW 17 4 %      MPV 10 9 fL      Platelets 44 Thousands/uL      nRBC 0 /100 WBCs     Manual Differential(PHLEBS Do Not Order) [[id number]]  (Abnormal) Collected: 06/18/21 1328    Lab Status: Final result Specimen: Blood from Arm, Right Updated: 06/18/21 1432     Segmented % 97 %      Bands % 1 %      Lymphocytes % 1 %      Monocytes % 0 %      Eosinophils, % 1 %      Basophils % 0 %      Absolute Neutrophils 7 76 Thousand/uL      Lymphocytes Absolute 0 08 Thousand/uL      Monocytes Absolute 0 00 Thousand/uL Eosinophils Absolute 0 08 Thousand/uL      Basophils Absolute 0 00 Thousand/uL      Total Counted 100     Anisocytosis Present     Hypochromia Present     Platelet Estimate Decreased    D-Dimer [[id number]]  (Abnormal) Collected: 06/18/21 1328    Lab Status: Final result Specimen: Blood from Arm, Right Updated: 06/18/21 1426     D-Dimer, Quant 1 92 ug/ml FEU     TSH [[id number]]  (Abnormal) Collected: 06/18/21 1328    Lab Status: Final result Specimen: Blood from Arm, Right Updated: 06/18/21 1415     TSH 3RD GENERATON 0 240 uIU/mL     Narrative:      Patients undergoing fluorescein dye angiography may retain small amounts of fluorescein in the body for 48-72 hours post procedure  Samples containing fluorescein can produce falsely depressed TSH values  If the patient had this procedure,a specimen should be resubmitted post fluorescein clearance        Magnesium [[id number]]  (Normal) Collected: 06/18/21 1328    Lab Status: Final result Specimen: Blood from Arm, Right Updated: 06/18/21 1415     Magnesium 1 8 mg/dL     NT-BNP PRO [[id number]]  (Normal) Collected: 06/18/21 1328    Lab Status: Final result Specimen: Blood from Arm, Right Updated: 06/18/21 1412     NT-proBNP 373 pg/mL     Comprehensive metabolic panel [[id number]]  (Abnormal) Collected: 06/18/21 1328    Lab Status: Final result Specimen: Blood from Arm, Right Updated: 06/18/21 1410     Sodium 141 mmol/L      Potassium 4 8 mmol/L      Chloride 106 mmol/L      CO2 25 mmol/L      ANION GAP 10 mmol/L      BUN 34 mg/dL      Creatinine 1 68 mg/dL      Glucose 185 mg/dL      Calcium 9 4 mg/dL      Corrected Calcium 10 7 mg/dL      AST 13 U/L      ALT 31 U/L      Alkaline Phosphatase 152 U/L      Total Protein 7 3 g/dL      Albumin 2 4 g/dL      Total Bilirubin 0 38 mg/dL      eGFR 38 ml/min/1 73sq m     Narrative:      [place] guidelines for Chronic Kidney Disease (CKD):     Stage 1 with normal or high GFR (GFR > 90 mL/min/1 73 square meters)    Stage 2 Mild CKD (GFR = 60-89 mL/min/1 73 square meters)    Stage 3A Moderate CKD (GFR = 45-59 mL/min/1 73 square meters)    Stage 3B Moderate CKD (GFR = 30-44 mL/min/1 73 square meters)    Stage 4 Severe CKD (GFR = 15-29 mL/min/1 73 square meters)    Stage 5 End Stage CKD (GFR <15 mL/min/1 73 square meters)  Note: GFR calculation is accurate only with a steady state creatinine    Troponin I [[id number]]  (Normal) Collected: 06/18/21 1328    Lab Status: Final result Specimen: Blood from Arm, Right Updated: 06/18/21 1408     Troponin I <0 02 ng/mL     Protime-INR [[id number]]  (Normal) Collected: 06/18/21 1328    Lab Status: Final result Specimen: Blood from Arm, Right Updated: 06/18/21 1357     Protime 14 5 seconds      INR 1 12                 CTA chest pe study   Final Result by Austin Arenas MD (06/18 8640)      1  No pulmonary embolus  Measured RV/LV ratio is within normal limits at less than 0 9         2   Large solid right lung mass encasing the right pulmonary artery and branches with no narrowing  Encasement with narrowing and occlusion of the anterior right upper lobe and middle lobe bronchial branches with obstructive atelectasis   3   Scattered bilateral lung nodules largest in the left upper lobe subpleural lobulated mass  Findings correlate with recent chest x-rays  Primary lung malignancy with metastatic nodule suggested biopsy correlation recommended  The study was marked in [facility] for immediate notification         Workstation performed: [id number]         XR chest 1 view portable   Final Result by Keshia Rice MD (06/18 6490)      Again seen is a large mass occupying the periphery of the right lung  There is also a 2 4 cm nodular mass in the left midlung zone laterally  Please see subsequent chest CT for further evaluation                    Workstation performed: [id number]         VAS lower limb venous duplex study, complete bilateral    (Results Pending)         Procedures  ECG 12 Lead Documentation Only    Date/Time: 6/18/2021 2:09 PM  Performed by: Carine Donato MD  Authorized by: Carine Donato MD     ECG reviewed by me, the ED Provider: yes    Patient location:  ED  Previous ECG:     Previous ECG:  Compared to current    Similarity:  Changes noted  Interpretation:     Interpretation: normal    Rate:     ECG rate:  93    ECG rate assessment: normal    Rhythm:     Rhythm: sinus rhythm    Ectopy:     Ectopy: PVCs    QRS:     QRS axis:  Normal  Conduction:     Conduction: normal    ST segments:     ST segments:  Normal          ED Course  ED Course as of Jun 18 1700 Fri Jun 18, 2021   1422 Comprehensive metabolic panel(!)                                       MDM  Number of Diagnoses or Management Options  SULEMA (acute kidney injury) ([facility] [address] )  Atypical chest pain  Dyspnea  Mass of right lung  Thrombocytopenia ([facility] [address] )  Diagnosis management comments: 53-year-old gentleman with 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 day 1 of 4, current smoker presents with intermittent CP and progressively worsening SOB x2 weeks  Patient had CXR a week ago that showed right lung opacities and was treated for pneumonia with Levaquin and is on day 5/7  He has not had improvement of SOB  In the ED patient was afebrile hemodynamically stable and NAD  Labs were significant for thrombocytopenia, improved from yesterday, elevated D-dimer and elevated creatinine  CXR and CTA PE were significant for lung mass with Mets  Venous Doppler studies were negative for bilateral DVT in lower extremities  Patient was admitted for further evaluation and management         Amount and/or Complexity of Data Reviewed  Clinical lab tests: ordered and reviewed  Tests in the radiology section of CPT®: ordered and reviewed  Review and summarize past medical records: yes    Patient Progress  Patient progress: stable      Disposition  Final diagnoses:   Atypical chest pain   Dyspnea   Mass of right lung   SULEMA (acute kidney injury) ([facility] [address] )   Thrombocytopenia ([facility] [address] )     Time reflects when diagnosis was documented in both MDM as applicable and the Disposition within this note     Time User Action Codes Description Comment    6/18/2021  4:40 PM Rina Mt Add [R07 89] Atypical chest pain     6/18/2021  4:41 PM FALLON Becerra Add [R06 00] Dyspnea     6/18/2021  4:41 PM Hernan, [address] [place] [address] [facility] [place] [R91 8] Mass of right lung     6/18/2021  4:41 PM Hernan[address] [place] [N17 9] SULEMA (acute kidney injury) ([facility] [address] )     6/18/2021  4:50 PM FALLON Becerra Add [D69 6] Thrombocytopenia [facility])       ED Disposition     ED Disposition Condition Date/Time Comment    Admit Stable Fri Jun 18, 2021  4:47 PM Case was discussed with TRAVIS and the patient's admission status was agreed to be Admission Status: inpatient status to the service of Dr Mark Palacios   Follow-up Information    None         Patient's Medications   Discharge Prescriptions    No medications on file     No discharge procedures on file  PDMP Review     None           ED Provider  Attending physically available and evaluated Lula Alvaro OTOOLE managed the patient along with the ED Attending      Electronically Signed by         Vlad Garcia MD  06/18/21 8202       Vlad Garcia MD  06/18/21 5943

## 2021-06-18 NOTE — H&P
[facility]  H&P- Jaison Cash 1940, 68 y o  male MRN: [id number]  Unit/Bed#: ED 24 Encounter: [id number]  Primary Care Provider: Alyx Alvarado DO   Date and time admitted to hospital: 6/18/2021  1:03 PM    * Paroxysmal A-fib [facility])  Assessment & Plan  Patient recently diagnosed with atrial fibrillation after EKG in office showed AFib with RVR  Patient is not currently in AFib  Plan  · 24 hour telemetry   · Cardiology Consult- speak to Cardiology regarding anticoagulation in setting of ITP  · Troponin negative x2 continue to monitor  · [id number] score 3  · HAS-BLED score 2- Moderate risk of major bleeding     Idiopathic thrombocytopenic purpura (ITP) (HCC)  Assessment & Plan  Patient follows with Dr Abeba Clement  Initially diagnosed with ITP on 06/03 after being referred by his primary care physician  Patient saw Dr Abeba Clement yesterday in office  He had been noncompliant with his weekly blood tests secondary to not knowing he needed weekly blood test   Dr Abeba Clement started patient on dexamethasone 40 mg for 4 days  Patient was also continued on Protonix while on steroids in order to prevent gastritis  At their office visit they did discuss starting Rituxan  This will be administered subcutaneously weekly x4 doses for his acute ITP  Plan   · Continue dexamethasone 40 mg day #2/4  · CBC daily  · Platelets 14 K on 18/82  Currently trending up  Most recent platelet 46 K  · Continue Protonix 40 mg daily  · Fall precautions  · Monitor for signs of bleeding     Mass of right lung  Assessment & Plan  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    He is also response for taking care of his wife who is home in a hospital bed  A was showing a large mass in the right lung  Plan   · Nasal cannula oxygen as needed  · Pulmonology consult      CTA chest PE study  IMPRESSION:     1  No pulmonary embolus      Measured RV/LV ratio is within normal limits at less than 0 9        2   Large solid right lung mass encasing the right pulmonary artery and branches with no narrowing  Encasement with narrowing and occlusion of the anterior right upper lobe and middle lobe bronchial branches with obstructive atelectasis       3  Scattered bilateral lung nodules largest in the left upper lobe subpleural lobulated mass      Findings correlate with recent chest x-rays  Primary lung malignancy with metastatic nodule suggested biopsy correlation recommended  Shortness of breath  Assessment & Plan  Present on arrival to the emergency department  Patient states that his shortness of breath worsens with activity  Patient is a lifelong smoker  Patient currently smokes half pack a day  Has not seen a doctor in 2 years prior to being diagnosed with ITP  Was treated as an outpatient by his PCP for pneumonia  Chest x-ray at that time did show a large right upper airspace opacification  · NC oxygen as needed  · Telemetry  · Pulmonology consult  · Shortness of breath likely secondary to right lung mass    SULEMA (acute kidney injury) [facility])  Assessment & Plan  Present on admission  Baseline is around 1 1  Plan  · Gentle hydration 75 mL an hour  · Bladder scan  · UA  · Hold hydrochlorothiazide  · CMP a m  Hypertension  Assessment & Plan  Patient with history of hypertension  Blood pressure currently controlled 111/56  Currently on amlodipine hydrochlorothiazide as an outpatient  Of note patient has been recently diagnosed with atrial fibrillation  He has not seen a cardiologist     Plan  · Continue current blood pressure medications  · Cardiac diet  · CBC, CMP a m        VTE Prophylaxis: Pharmacologic VTE Prophylaxis contraindicated due to ITP  / sequential compression device   Code Status:  DNR DNI level 3  POLST: There is no POLST form on file for this patient (pre-hospital)    Anticipated Length of Stay:  Patient will be admitted on an Inpatient basis with an anticipated length of stay of  >2 midnights  Justification for Hospital Stay:  ITP    Chief Complaint:   Shortness of breath/chest pain    History of Present Illness:    Kali Aguilar is a 68 y o  male who presents with shortness of breath and chest pain  Patient was seen by his hematologist on 06/17 for acute ITP where he was found to be in AFib with RVR  Hematology recommended patient  go to the emergency room for further evaluation  Patient was unable to come to the emergency room secondary to wife being bed ridden at home  He is her primary caregiver  Blood work by Hematology yesterday showed blood count of 14 today  He was restarted back on dexamethasone 40 mg for 4 days  Currently on day 2 of treatment  CT of the chest was ordered in emergency department to rule out PE secondary shortness of breath     CTA of the chest in the emergency department was negative for PE  Imaging did show  Large solid right lung mass concerning for primary lung malignancy with metastatic nodule  Biopsy is recommended per Radiology  Of note chest x-ray ordered by PCP did show right lung opacification 6/11  We have consulted pulmonology  Patient is currently in normal sinus rhythm and denies any chest pain  Troponins have been negative x2  On examination he denies any chest pain at this time  He does have shortness of breath on exertion  Currently satting 98% on room air  Shortness rubs likely secondary to mass  Patient will be admitted to the inpatient service  Review of Systems:    Review of Systems   Constitutional: Positive for fatigue and unexpected weight change  HENT: Negative for congestion, rhinorrhea and sore throat      Eyes: Negative for pain and redness  Respiratory: Positive for shortness of breath  Negative for cough and chest tightness  Cardiovascular: Negative for chest pain and palpitations  Gastrointestinal: Negative for abdominal pain, diarrhea, nausea, rectal pain and vomiting  Genitourinary: Negative for decreased urine volume, flank pain and urgency  Neurological: Negative for dizziness, speech difficulty, weakness, light-headedness and headaches  Hematological: Bruises/bleeds easily  Past Medical and Surgical History:     Past Medical History:   Diagnosis Date    Hypertension     Hypertension        Past Surgical History:   Procedure Laterality Date    BACK SURGERY      CARPAL TUNNEL RELEASE Bilateral     IR BIOPSY BONE MARROW  6/10/2021    LUMBAR DISC SURGERY         Meds/Allergies:    Prior to Admission medications    Medication Sig Start Date End Date Taking?  Authorizing Provider   acetaminophen (TYLENOL) 100 mg/mL solution Take 10 mg/kg by mouth every 4 (four) hours as needed for fever    Historical Provider, MD   allopurinol (ZYLOPRIM) 300 mg tablet Take 300 mg by mouth daily 2/27/21   Historical Provider, MD   amLODIPine (NORVASC) 2 5 mg tablet Take 2 5 mg by mouth every morning 4/1/21   Historical Provider, MD   dexamethasone (DECADRON) 4 mg tablet Take 10 tablets (40 mg total) by mouth daily with breakfast 6/17/21   CHRIS Gould   hydrochlorothiazide (HYDRODIURIL) 25 mg tablet Take 25 mg by mouth daily    Historical Provider, MD   levofloxacin (LEVAQUIN) 500 mg tablet Take 500 mg by mouth daily 6/11/21   Historical Provider, MD   levothyroxine 150 mcg tablet Take 150 mcg by mouth daily 5/10/21   Historical Provider, MD   losartan (COZAAR) 100 MG tablet Take 50 mg by mouth daily    Historical Provider, MD   meloxicam (MOBIC) 15 mg tablet Take 15 mg by mouth daily    Historical Provider, MD   Omega-3 Fatty Acids (FISH OIL) 1,000 mg Take 1,000 mg by mouth 2 (two) times a day    Historical Provider, MD pantoprazole (PROTONIX) 40 mg tablet Take 1 tablet (40 mg total) by mouth daily 6/17/21   CHRIS Chandler     I have reviewed home medications with patient personally  Allergies: Allergies   Allergen Reactions    Penicillins Hives       Social History:     Marital Status: /Civil Union   Occupation:  Retired  Patient Pre-hospital Living Situation:  Home with wife  Patient Pre-hospital Level of Mobility:  Active, ambulates on his own  Patient Pre-hospital Diet Restrictions:  None  Substance Use History:   Social History     Substance and Sexual Activity   Alcohol Use Not Currently     Social History     Tobacco Use   Smoking Status Current Every Day Smoker    Packs/day: 0 50   Smokeless Tobacco Never Used     Social History     Substance and Sexual Activity   Drug Use Never       Family History:    Family History   Problem Relation Age of Onset    Cancer Mother    Catheline [place] Father        Physical Exam:     Vitals:   Blood Pressure: 111/56 (06/18/21 1610)  Pulse: 89 (06/18/21 1610)  Temperature: 97 9 °F (36 6 °C) (06/18/21 1306)  Temp Source: Oral (06/18/21 1306)  Respirations: 22 (06/18/21 1610)  SpO2: 97 % (06/18/21 1610)    Physical Exam  Vitals and nursing note reviewed  Constitutional:       General: He is not in acute distress  Appearance: He is obese  HENT:      Head: Normocephalic and atraumatic  Eyes:      Extraocular Movements: Extraocular movements intact  Pupils: Pupils are equal, round, and reactive to light  Cardiovascular:      Rate and Rhythm: Normal rate and regular rhythm  Heart sounds: No murmur heard  Pulmonary:      Effort: Tachypnea present  No respiratory distress  Breath sounds: Decreased air movement present  Examination of the right-upper field reveals rhonchi  Examination of the right-middle field reveals rhonchi  Examination of the right-lower field reveals rhonchi  Rhonchi present  Abdominal:      General: There is no distension  Palpations: There is no mass  Tenderness: There is no abdominal tenderness  Musculoskeletal:         General: No swelling or tenderness  Right lower leg: No edema  Left lower leg: No edema  Skin:     General: Skin is warm  Findings: No bruising, erythema, lesion or rash  Comments: No petechia   Neurological:      General: No focal deficit present  Mental Status: He is alert and oriented to person, place, and time  Psychiatric:         Mood and Affect: Mood normal          Behavior: Behavior normal              Additional Data:     Lab Results: I have personally reviewed pertinent reports  Results from last 7 days   Lab Units 06/18/21  1610 06/18/21  1328   WBC Thousand/uL  --  7 92   HEMOGLOBIN g/dL  --  9 7*   HEMATOCRIT %  --  32 7*   PLATELETS Thousands/uL 46* 44*   LYMPHO PCT %  --  1*   MONO PCT %  --  0*   EOS PCT %  --  1     Results from last 7 days   Lab Units 06/18/21  1328   POTASSIUM mmol/L 4 8   CHLORIDE mmol/L 106   CO2 mmol/L 25   BUN mg/dL 34*   CREATININE mg/dL 1 68*   CALCIUM mg/dL 9 4   ALK PHOS U/L 152*   ALT U/L 31   AST U/L 13     Results from last 7 days   Lab Units 06/18/21  1328   INR  1 12       Imaging: I have personally reviewed pertinent reports  XR chest 1 view portable    Result Date: 6/18/2021  Narrative: CHEST INDICATION:   chest pain  COMPARISON:  Chest x-ray from 6/11/2021  EXAM PERFORMED/VIEWS:  XR CHEST PORTABLE FINDINGS: Cardiomediastinal silhouette appears unremarkable  Again seen is a large mass occupying the periphery of the right lung  There is also a 2 4 cm nodular mass in the left midlung zone laterally  No pleural effusions  No pneumothorax  Osseous structures appear within normal limits for patient age  Impression: Again seen is a large mass occupying the periphery of the right lung  There is also a 2 4 cm nodular mass in the left midlung zone laterally  Please see subsequent chest CT for further evaluation  Workstation performed: [id number]     XR chest pa & lateral    Result Date: 6/11/2021  Narrative: CHEST INDICATION:   R05: Cough  History of thrombocytopenia and acute ITP and hypertension  Status post bone biopsy yesterday  COMPARISON:  6/10/2019 EXAM PERFORMED/VIEWS:  XR CHEST PA & LATERAL FINDINGS: New, large masslike opacity in the right upper lobe bows the major fissure downward on the lateral view  Mild right lung volume loss compared to left  Clear left lung  Small right pleural effusion  No left effusion  No pneumothorax  Heart, mediastinal and hilar structures are stable in appearance and within normal limits for age  Mild aortic uncoiling and calcification  No acute osseous or soft tissue pathology  The study was marked in [facility] for immediate notification  Impression: Large right upper lung airspace opacity and small right pleural effusion  Infection versus mass  Recommend follow-up to completion  Workstation performed: [id number]     CTA chest pe study    Result Date: 6/18/2021  Narrative: CTA - CHEST WITH IV CONTRAST - PULMONARY ANGIOGRAM INDICATION:   Shortness of breath SOB  COMPARISON: Compared with chest x-ray of 6/18/2021 TECHNIQUE: CTA examination of the chest was performed using angiographic technique according to a protocol specifically tailored to evaluate for pulmonary embolism  Axial, sagittal, and coronal 2D reformatted images were created from the source data and  submitted for interpretation  In addition, coronal 3D MIP postprocessing was performed on the acquisition scanner  Radiation dose length product (DLP) for this visit:  512 mGy-cm   This examination, like all CT scans performed in the [facility], was performed utilizing techniques to minimize radiation dose exposure, including the use of iterative reconstruction and automated exposure control   IV Contrast:  85 mL of iohexol (OMNIPAQUE)  FINDINGS: PULMONARY ARTERIAL TREE:  No pulmonary embolus is seen   The right lung mass encases but does not narrow the right lobar pulmonary artery and branches  LUNGS:  Moderate centrilobular emphysematous changes  Right upper lobe mass measuring 12 2 x 9 7 x 12 3 cm  Mass effect on the right upper lobe anterior bronchial branches and middle lobe bronchial branches with obstructive atelectasis  Patchy peripheral interstitial changes in the medial middle lobe  8 9 mm nodule in the right lower lobe in series 3 image 85  Lobulated left upper lobe subpleural mass measuring 2 2 x 1 7 cm in series 3 image 47  6 mm nodule in the left lower lobe in series 3 image 49  9 mm nodule in the lingula in series 3 image 78  PLEURA:  Small right pleural effusion  HEART/GREAT VESSELS:  Unremarkable for patient's age  MEDIASTINUM AND NANCY:  Subcentimeter right paratracheal lymph nodes largest measuring 0 9 x 1 9 cm  Subcentimeter AP window lymph nodes largest measuring 8 4 mm  Right paratracheal lymph node measuring 2 3 x 2 0 cm anterior to the right main bronchus  Subcarinal lymph node complex measuring 1 7 cm  CHEST WALL AND LOWER NECK:   Unremarkable  VISUALIZED STRUCTURES IN THE UPPER ABDOMEN:  Unremarkable  OSSEOUS STRUCTURES:  No acute fracture or destructive osseous lesion  Impression: 1  No pulmonary embolus  Measured RV/LV ratio is within normal limits at less than 0 9   2   Large solid right lung mass encasing the right pulmonary artery and branches with no narrowing  Encasement with narrowing and occlusion of the anterior right upper lobe and middle lobe bronchial branches with obstructive atelectasis   3   Scattered bilateral lung nodules largest in the left upper lobe subpleural lobulated mass  Findings correlate with recent chest x-rays  Primary lung malignancy with metastatic nodule suggested biopsy correlation recommended  The study was marked in [facility] for immediate notification     Workstation performed: [id number]     VAS lower limb venous duplex study, complete bilateral    Result Date: 6/18/2021  Narrative:  [facility] REPORT CLINICAL: Indications: Patient presents with bilateral lower extremity lateral calf pain and chest pains over the past week  Operative History: Patient denies any cardiovascular procedures Risk Factors The patient has history of HTN and smoking (current) 0 5 ppd  CONCLUSION: RIGHT LOWER LIMB: No evidence of acute or chronic deep vein thrombosis  No evidence of superficial thrombophlebitis noted  Doppler evaluation shows a normal response to augmentation maneuvers  Popliteal, posterior tibial and anterior tibial arterial Doppler waveforms are triphasic  LEFT LOWER LIMB: No evidence of acute or chronic deep vein thrombosis  No evidence of superficial thrombophlebitis noted  Doppler evaluation shows a normal response to augmentation maneuvers  Popliteal, posterior tibial and anterior tibial arterial Doppler waveforms are triphasic  Technical findings were given to Dr Harshal Arellano via tiger text  IR biopsy bone marrow    Result Date: 6/10/2021  Narrative: CT-guided bone marrow biopsy Clinical History: Thrombocytopenia, bone marrow biopsy for diagnosis Moderate sedation time: 15 minutes Procedure: After explaining the risks and benefits of the procedure to the patient, informed consent was obtained  CT was used to localize the iliac bones   The right iliac crest was targeted Radiation dose length product (DLP) for this visit:  608 23 mGy   This examination, like all CT scans performed in the [place], was performed utilizing techniques to minimize radiation dose exposure, including the use of iterative reconstruction and automated exposure control  The overlying skin was prepped and draped in the usual sterile fashion  Local anesthesia was obtained with a 1% lidocaine solution  Using CT guidance, an 11-gauge coaxial needle was advanced  Marrow was aspirated  A core biopsy was obtained    The tissue was given to pathology  The preliminary interpretation is adequate material   The patient tolerated the procedure well and left the department in stable condition  Findings: Needle within the right iliac bone  Postprocedure changes  Heavily calcified arterial disease  Specimens: Core touch prep aspirate     Impression: Impression: CT guided bone marrow biopsy Note: The patient was noted to be in irregular heart rhythm with rate varying from 100-150  EKG was obtained confirming suspected atrial fibrillation  This is new for the patient and was discussed with the patient and communicated to his primary care physician  Patient was asymptomatic and not hypotensive  Workstation performed: [id number]       EKG, Pathology, and Other Studies Reviewed on Admission:   · EKG:  Pending read    [facility] / Care Everywhere Records Reviewed: Yes     ** Please Note: This note has been constructed using a voice recognition system   **

## 2021-06-18 NOTE — ASSESSMENT & PLAN NOTE
Present on admission  Baseline is around 1 1  Plan  · Gentle hydration 75 mL an hour  · Bladder scan  · UA  · Hold hydrochlorothiazide  · CMP a m

## 2021-06-18 NOTE — ASSESSMENT & PLAN NOTE
Patient recently diagnosed with atrial fibrillation after EKG in office showed AFib with RVR  Patient is not currently in AFib      Plan  · 24 hour telemetry   · Cardiology Consult- speak to Cardiology regarding anticoagulation in setting of ITP  · Troponin negative x2 continue to monitor  · [id number] score 3  · HAS-BLED score 2- Moderate risk of major bleeding

## 2021-06-18 NOTE — ASSESSMENT & PLAN NOTE
Patient follows with Dr Chidi Zurita  Initially diagnosed with ITP on 06/03 after being referred by his primary care physician  Patient saw Dr Chidi Zurita yesterday in office  He had been noncompliant with his weekly blood tests secondary to not knowing he needed weekly blood test   Dr Chidi Zurita started patient on dexamethasone 40 mg for 4 days  Patient was also continued on Protonix while on steroids in order to prevent gastritis  At their office visit they did discuss starting Rituxan  This will be administered subcutaneously weekly x4 doses for his acute ITP  Plan   · Continue dexamethasone 40 mg day #2/4  · CBC daily  · Platelets 14 K on 70/77  Currently trending up    Most recent platelet 46 K  · Continue Protonix 40 mg daily  · Fall precautions  · Monitor for signs of bleeding

## 2021-06-18 NOTE — ED ATTENDING ATTESTATION
6/18/2021  Ronel OTOOLE DO, saw and evaluated the patient  I have discussed the patient with the resident/non-physician practitioner and agree with the resident's/non-physician practitioner's findings, Plan of Care, and MDM as documented in the resident's/non-physician practitioner's note, except where noted  All available labs and Radiology studies were reviewed  I was present for key portions of any procedure(s) performed by the resident/non-physician practitioner and I was immediately available to provide assistance  At this point I agree with the current assessment done in the Emergency Department  I have conducted an independent evaluation of this patient a history and physical is as follows: This 72-year-old male with chest pain, SOB, intermittently  Was in rapid AFib it is doctor's office, here he is in normal sinus rhythm  He states he discussed this vague transient pain, currently no chest pain  Recently he has had ITP this given transfusion of platelets is also had a bone marrow biopsy  About a week ago he had and a chest x-ray showing a lung mass  Physical exam is unremarkable, lungs are clear to auscultation bilaterally, regular rate and rhythm, no murmurs rubs or gallops  Mild bilateral calf tenderness but no appreciable swelling or pitting edema  CT a showing a large lung mass, will admit to hospital for further workup  His platelets today are 40 which is improvement of his last lab work        ED Course         Critical Care Time  Procedures

## 2021-06-18 NOTE — ASSESSMENT & PLAN NOTE
Patient follows with Dr Joey Powell  Initially diagnosed with ITP on 06/03 after being referred by his primary care physician  Patient saw Dr Joey Powell yesterday in office  He had been noncompliant with his weekly blood tests secondary to not knowing he needed weekly blood test   Dr Joey Powell started patient on dexamethasone 40 mg for 4 days  Patient was also continued on Protonix while on steroids in order to prevent gastritis  At their office visit they did discuss starting Rituxan  This will be administered subcutaneously weekly x4 doses for his acute ITP     dexamethasone 40 mg x 4 days  This was E prescribed to his pharmacy  He was instructed to continue take Protonix while on steroids to prevent gastritis  Rituxan was discussed at patient's office visit with Dr Joey Powell this morning  He will be set up to start Rituxan 375 mg per metered subcutaneously weekly x4 doses for his acute ITP    Patient is in agreement    Plan  ·

## 2021-06-18 NOTE — ASSESSMENT & PLAN NOTE
Patient with history of hypertension  Blood pressure currently controlled 111/56  Currently on amlodipine hydrochlorothiazide as an outpatient  Of note patient has been recently diagnosed with atrial fibrillation  He has not seen a cardiologist     Plan  · Continue current blood pressure medications  · Cardiac diet  · CBC, CMP jyoti banerjee

## 2021-06-19 PROBLEM — B20 HIV (HUMAN IMMUNODEFICIENCY VIRUS INFECTION) (HCC): Status: ACTIVE | Noted: 2021-06-19

## 2021-06-19 LAB
ALBUMIN SERPL BCP-MCNC: 2.1 G/DL (ref 3.5–5)
ALP SERPL-CCNC: 154 U/L (ref 46–116)
ALT SERPL W P-5'-P-CCNC: 24 U/L (ref 12–78)
ANION GAP SERPL CALCULATED.3IONS-SCNC: 9 MMOL/L (ref 4–13)
AST SERPL W P-5'-P-CCNC: 9 U/L (ref 5–45)
BACTERIA UR QL AUTO: ABNORMAL /HPF
BILIRUB SERPL-MCNC: 0.18 MG/DL (ref 0.2–1)
BILIRUB UR QL STRIP: NEGATIVE
BUN SERPL-MCNC: 31 MG/DL (ref 5–25)
CALCIUM ALBUM COR SERPL-MCNC: 10.4 MG/DL (ref 8.3–10.1)
CALCIUM SERPL-MCNC: 8.9 MG/DL (ref 8.3–10.1)
CHLORIDE SERPL-SCNC: 108 MMOL/L (ref 100–108)
CLARITY UR: CLEAR
CO2 SERPL-SCNC: 23 MMOL/L (ref 21–32)
COLOR UR: YELLOW
CREAT SERPL-MCNC: 1.67 MG/DL (ref 0.6–1.3)
ERYTHROCYTE [DISTWIDTH] IN BLOOD BY AUTOMATED COUNT: 17.2 % (ref 11.6–15.1)
GFR SERPL CREATININE-BSD FRML MDRD: 39 ML/MIN/1.73SQ M
GLUCOSE SERPL-MCNC: 345 MG/DL (ref 65–140)
GLUCOSE UR STRIP-MCNC: ABNORMAL MG/DL
HCT VFR BLD AUTO: 29.5 % (ref 36.5–49.3)
HGB BLD-MCNC: 8.4 G/DL (ref 12–17)
HGB UR QL STRIP.AUTO: NEGATIVE
HIV 1+2 AB+HIV1 P24 AG SERPL QL IA: REACTIVE
HIV1 P24 AG SER QL: ABNORMAL
KETONES UR STRIP-MCNC: NEGATIVE MG/DL
LEUKOCYTE ESTERASE UR QL STRIP: ABNORMAL
MCH RBC QN AUTO: 23.7 PG (ref 26.8–34.3)
MCHC RBC AUTO-ENTMCNC: 28.5 G/DL (ref 31.4–37.4)
MCV RBC AUTO: 83 FL (ref 82–98)
NITRITE UR QL STRIP: NEGATIVE
NON-SQ EPI CELLS URNS QL MICRO: ABNORMAL /HPF
PH UR STRIP.AUTO: 5.5 [PH]
PLATELET # BLD AUTO: 69 THOUSANDS/UL (ref 149–390)
PMV BLD AUTO: 11.3 FL (ref 8.9–12.7)
POTASSIUM SERPL-SCNC: 5.3 MMOL/L (ref 3.5–5.3)
PROT SERPL-MCNC: 6.8 G/DL (ref 6.4–8.2)
PROT UR STRIP-MCNC: ABNORMAL MG/DL
RBC # BLD AUTO: 3.55 MILLION/UL (ref 3.88–5.62)
RBC #/AREA URNS AUTO: ABNORMAL /HPF
SODIUM SERPL-SCNC: 140 MMOL/L (ref 136–145)
SP GR UR STRIP.AUTO: 1.01 (ref 1–1.03)
UROBILINOGEN UR QL STRIP.AUTO: 0.2 E.U./DL
WBC # BLD AUTO: 6.24 THOUSAND/UL (ref 4.31–10.16)
WBC #/AREA URNS AUTO: ABNORMAL /HPF

## 2021-06-19 PROCEDURE — 99222 1ST HOSP IP/OBS MODERATE 55: CPT | Performed by: INTERNAL MEDICINE

## 2021-06-19 PROCEDURE — 86701 HIV-1ANTIBODY: CPT | Performed by: PHYSICIAN ASSISTANT

## 2021-06-19 PROCEDURE — 99222 1ST HOSP IP/OBS MODERATE 55: CPT | Performed by: PHYSICIAN ASSISTANT

## 2021-06-19 PROCEDURE — 87806 HIV AG W/HIV1&2 ANTB W/OPTIC: CPT | Performed by: PHYSICIAN ASSISTANT

## 2021-06-19 PROCEDURE — 86702 HIV-2 ANTIBODY: CPT | Performed by: PHYSICIAN ASSISTANT

## 2021-06-19 PROCEDURE — 80053 COMPREHEN METABOLIC PANEL: CPT | Performed by: FAMILY MEDICINE

## 2021-06-19 PROCEDURE — G0432 EIA HIV-1/HIV-2 SCREEN: HCPCS | Performed by: PHYSICIAN ASSISTANT

## 2021-06-19 PROCEDURE — 81001 URINALYSIS AUTO W/SCOPE: CPT | Performed by: FAMILY MEDICINE

## 2021-06-19 PROCEDURE — 85027 COMPLETE CBC AUTOMATED: CPT | Performed by: FAMILY MEDICINE

## 2021-06-19 PROCEDURE — 99232 SBSQ HOSP IP/OBS MODERATE 35: CPT | Performed by: INTERNAL MEDICINE

## 2021-06-19 RX ORDER — GUAIFENESIN 600 MG
600 TABLET, EXTENDED RELEASE 12 HR ORAL 2 TIMES DAILY
Status: DISCONTINUED | OUTPATIENT
Start: 2021-06-19 | End: 2021-06-23 | Stop reason: HOSPADM

## 2021-06-19 RX ORDER — FUROSEMIDE 10 MG/ML
40 INJECTION INTRAMUSCULAR; INTRAVENOUS ONCE
Status: COMPLETED | OUTPATIENT
Start: 2021-06-19 | End: 2021-06-19

## 2021-06-19 RX ORDER — ACETAMINOPHEN 325 MG/1
650 TABLET ORAL EVERY 8 HOURS PRN
Status: DISCONTINUED | OUTPATIENT
Start: 2021-06-19 | End: 2021-06-23 | Stop reason: HOSPADM

## 2021-06-19 RX ADMIN — ALLOPURINOL 300 MG: 300 TABLET ORAL at 08:05

## 2021-06-19 RX ADMIN — Medication 12.5 MG: at 14:05

## 2021-06-19 RX ADMIN — AMLODIPINE BESYLATE 2.5 MG: 2.5 TABLET ORAL at 08:05

## 2021-06-19 RX ADMIN — TIOTROPIUM BROMIDE 18 MCG: 18 CAPSULE ORAL; RESPIRATORY (INHALATION) at 17:26

## 2021-06-19 RX ADMIN — SODIUM CHLORIDE 100 ML/HR: 0.9 INJECTION, SOLUTION INTRAVENOUS at 00:16

## 2021-06-19 RX ADMIN — Medication 12.5 MG: at 21:41

## 2021-06-19 RX ADMIN — PANTOPRAZOLE SODIUM 40 MG: 40 TABLET, DELAYED RELEASE ORAL at 08:05

## 2021-06-19 RX ADMIN — DEXAMETHASONE 40 MG: 4 TABLET ORAL at 08:04

## 2021-06-19 RX ADMIN — LEVOTHYROXINE SODIUM 150 MCG: 150 TABLET ORAL at 05:09

## 2021-06-19 RX ADMIN — GUAIFENESIN 600 MG: 600 TABLET, EXTENDED RELEASE ORAL at 17:26

## 2021-06-19 RX ADMIN — FUROSEMIDE 40 MG: 10 INJECTION, SOLUTION INTRAMUSCULAR; INTRAVENOUS at 14:05

## 2021-06-19 RX ADMIN — ACETAMINOPHEN 650 MG: 325 TABLET, FILM COATED ORAL at 10:47

## 2021-06-19 RX ADMIN — GUAIFENESIN 600 MG: 600 TABLET, EXTENDED RELEASE ORAL at 13:07

## 2021-06-19 RX ADMIN — ACETAMINOPHEN 325 MG: 325 TABLET, FILM COATED ORAL at 21:42

## 2021-06-19 NOTE — ASSESSMENT & PLAN NOTE
Patient's rapid HIV was reactive  HIV 1/2 multi spot is pending    No history of IV drug abuse, 1 sexual partner, asymptomatic

## 2021-06-19 NOTE — QUICK NOTE
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

## 2021-06-19 NOTE — PROGRESS NOTES
[facility]  Progress Note - Mariam Segura 1940, 68 y o  male MRN: [id number]  Unit/Bed#: S -01 Encounter: [id number]  Primary Care Provider: Garrick Mcintyre DO   Date and time admitted to hospital: 6/18/2021  1:03 PM    * Paroxysmal A-fib [facility])  Assessment & Plan  Patient recently diagnosed with atrial fibrillation after EKG in office showed AFib with RVR  Patient is not currently in AFib  Plan  · 24 hour telemetry- no acute events overnight  · Cardiology Consult- speak to Cardiology regarding anticoagulation in setting of ITP  · Troponin negative x2 continue to monitor  · [id number] score 3  · HAS-BLED score 2- Moderate risk of major bleeding     Idiopathic thrombocytopenic purpura (ITP) (HCC)  Assessment & Plan  Patient follows with Dr Jerome Curry  Initially diagnosed with ITP on 06/03 after being referred by his primary care physician  Patient saw Dr Jerome Curry yesterday in office  He had been noncompliant with his weekly blood tests secondary to not knowing he needed weekly blood test   Dr Jerome Curry started patient on dexamethasone 40 mg for 4 days  Patient was also continued on Protonix while on steroids in order to prevent gastritis  At their office visit they did discuss starting Rituxan  This will be administered subcutaneously weekly x4 doses for his acute ITP  Plan   · Continue dexamethasone 40 mg day #3/4  · CBC daily  · Platelets 14 K on 72/28  Currently trending up  Most recent platelet 67 K  · Continue Protonix 40 mg daily  · Fall precautions  · Monitor for signs of bleeding     Mass of right lung  Assessment & Plan  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    He is also response for taking care of his wife who is home in a hospital bed  A was showing a large mass in the right lung  Plan   · Nasal cannula oxygen as needed- no oxygen required overnight  · Pulmonology consult      CTA chest PE study  IMPRESSION:     1  No pulmonary embolus      Measured RV/LV ratio is within normal limits at less than 0 9        2   Large solid right lung mass encasing the right pulmonary artery and branches with no narrowing  Encasement with narrowing and occlusion of the anterior right upper lobe and middle lobe bronchial branches with obstructive atelectasis       3  Scattered bilateral lung nodules largest in the left upper lobe subpleural lobulated mass      Findings correlate with recent chest x-rays  Primary lung malignancy with metastatic nodule suggested biopsy correlation recommended  HIV (human immunodeficiency virus infection) ([facility] [address] )  Assessment & Plan  Patient's rapid HIV was reactive  HIV 1/2 multi spot is pending  No history of IV drug abuse, 1 sexual partner, asymptomatic    SULEMA (acute kidney injury) [facility])  Assessment & Plan  Present on admission  Baseline is around 1 1  Plan  · Gentle hydration 75 mL an hour  · Bladder scan  · UA  · Hold hydrochlorothiazide  · CMP a m  Hypertension  Assessment & Plan  Patient with history of hypertension  Blood pressure currently controlled 111/56  Currently on amlodipine hydrochlorothiazide as an outpatient  Of note patient has been recently diagnosed with atrial fibrillation  He has not seen a cardiologist     Plan  · Continue amlodipine  · Blood pressure is currently well controlled 124/59  · CBC, CMP a m  Subjective/Objective     Subjective:   Patient currently in no acute distress  No overnight events on telemetry  Patient still reports shortness of breath with exertion  Did not require nasal cannula oxygen overnight    Patient is anxiously awaiting pulmonology to discuss further steps regarding mass on noted on CT scan  Patient's rapid HIV was positive so we are repeating HIV 1/2 multi spot    Objective:  Vitals: Blood pressure 124/59, pulse 81, temperature 97 7 °F (36 5 °C), temperature source Oral, resp  rate 18, weight 79 1 kg (174 lb 6 4 oz), SpO2 94 %  ,Body mass index is 31 9 kg/m²  Intake/Output Summary (Last 24 hours) at 6/19/2021 0930  Last data filed at 6/19/2021 0732  Gross per 24 hour   Intake 0 ml   Output 0 ml   Net 0 ml       Invasive Devices     Peripheral Intravenous Line            Peripheral IV 06/18/21 Right Antecubital <1 day                Physical Exam  Constitutional:       Appearance: Normal appearance  He is well-developed  HENT:      Head: Normocephalic and atraumatic  Eyes:      Conjunctiva/sclera: Conjunctivae normal       Pupils: Pupils are equal, round, and reactive to light  Cardiovascular:      Rate and Rhythm: Normal rate and regular rhythm  Heart sounds: Normal heart sounds  Pulmonary:      Effort: Pulmonary effort is normal       Breath sounds: Wheezing and rhonchi present  Abdominal:      General: Bowel sounds are normal       Palpations: Abdomen is soft  Musculoskeletal:         General: Normal range of motion  Cervical back: Normal range of motion  Right lower leg: No edema  Left lower leg: No edema  Skin:     General: Skin is warm and dry  Neurological:      Mental Status: He is alert and oriented to person, place, and time  Psychiatric:         Speech: Speech normal          Behavior: Behavior normal            Lab, Imaging and other studies: I have personally reviewed pertinent reports      VTE Pharmacologic Prophylaxis: Reason for no pharmacologic prophylaxis ITP  VTE Mechanical Prophylaxis: sequential compression device

## 2021-06-19 NOTE — CONSULTS
General Cardiology Consultation    Yfn Angel 68 y o  male  MRN: [id number]  Unit/Bed#: S -01; Encounter: [id number]    Assessment:  Principal Problem:    Paroxysmal A-fib ([facility] [address] )  Active Problems:    Idiopathic thrombocytopenic purpura (ITP) (HCC)    Hypertension    Mass of right lung    SULEMA (acute kidney injury) ([facility] [address] )    Shortness of breath    HIV (human immunodeficiency virus infection) ([facility] [address] )      HPI:   Keli Veronica is a 72-year-old man with HTN, acute ITP, right lung mass, chronic pain, lumbar spinal stenosis, and bilateral carpal tunnel syndrome who presented to [facility] on 06/18 with SOB and intermittent chest pain  EKG in ED with NSR  Noted to have SULEMA on CMP; outpatient losartan held at time of admission and IV started  Cardiology office was contacted on 06/17 from patient's hematologist's office requesting sooner cardiology appointment than one that was scheduled for 08/27/2021 with Dr Martinez Banerjee  Patient noted to be in Afib with RVR on EKG from 06/10/2021 during admission for elective bone marrow biopsy for new severe thrombocytopenia diagnosis  Heart rates documented as high as 140-150s  Cardiology RN contacted patient on 06/17, and provided education on Afib and advised to go to ED for further evaluation  Due to no alternative caregivers for his bed-bound wife, patient agreed to proceed to ED the following AM      Patient seen and examined  Primary complaint this morning is of a ongoing productive cough  Reports first noting this cough about 2 months ago; reports cough being productive of white colored sputum; denies any odor, blood, or color changes with this sputum  This morning he experienced a few episodes of a "shock" sensation lasting 1-2 seconds over his left chest; no associated symptoms with this  Has been experiencing this as outpatient on occasion with intermittent associated SOB   Upon further questioning, patient does also endorse orthopnea, bendopnea, and SADLER for the past 2-3 months  Reviewed patient's medical, family, and social history; EMR updated as appropriate  Is aware of new lung masses and nodules found; states he is willing to "do whatever needs to be done to get this taken care of "    Cardiology was consulted for "atypical chest pain; atrial fibirllation " Patient has never followed with cardiology as outpatient  Today's Plan:   Currently maintaining normal sinus rhythm with rates in 70s to 80s  Continue on telemetry   Check complete TTE   Will stop amlodipine, and start low dose metoprolol tartrate   Advise discontinuation of IV fluids as volume up on exam (elevated JVP, abdominal distension) as well as reported ongoing SADLER, orthopnea, and bendopnea  Would consider trialing one-time dose of IV Lasix to assess response   Poor anticoagulation candidate for new onset Afib given acute ITP  Will need to be reviewed with hematology before starting any AC   Rapid HIV reactive with non-reactive HIV-1 p24 Ag  Multispot HIV1/HIV2 in process  Plan:  New onset paroxysmal atrial fibrillation   [id number] = 3 (age, HTN)  Anticoagulation: none  EKG 06/10/2021: Afib with RVR and PVCs, 135 bpm    EKG 06/18/2021: sinus rhythm with PVCs, 93 bpm   Telemetry reviewed: normal sinus rhythm, rates in 70-80s with occasional PVCs     Rate control: None ordered  Rhythm control: None  Bilateral lung masses and nodules with lymphadenopathy   With reported 10+ lbs unintentional weight loss over past few months  CXR 06/11/2021: "New, large masslike opacity in the right upper lobe bows the major fissure downward on the lateral view " Small right pleural effusion  CXR 06/18/2021: "Again seen is a large mass occupying the periphery of the right lung  There is also a 2 4 cm nodular mass in the left midlung zone laterally "   CTA chest/PE study: No PE seen   "Large solid right lung mass (12 2 x 9 7 x 12 3 cm) encasing the right pulmonary artery and branches with no narrowing  Encasement with narrowing and occlusion of the anterior right upper lobe and middle lobe bronchial branches with obstructive atelectasis  Jenny Hines Lobulated left upper lobe subpleural mass measuring 2 2 x 1 7 cm  Jenny Hines Primary lung malignancy with metastatic nodule suggested biopsy correlation recommended "   Pulmonology consulted  Idiopathic thrombocytopenia purpura   Diagnosed in 06/2021  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 Bone marrow biopsy on 06/10/2021 without evidence of leukemia  Rapid HIV reactive with non-reactive HIV-1 p24 Ag  Multispot HIV1/HIV2 in process  To start rituximab in near future  Follows with Dr Jerome Curry as outpatient  Acute kidney injury   Last known baseline creatinine of 1 0-1 1  Today, creatinine of 1 67 (1 68 on 06/18)  Hypertension: continues on amlodipine 2 5 mg daily  Was also on losaran 100 mg daily prior to admission  Hypothyroidism  Chronic pain  Spinal stenosis, lumbar  Carpal tunnel syndrome, bilateral: s/p bilateral releases about 3-4 years ago with no improvement in symptoms  Previously worked as heavy   Significant financial strain: hematology outpatient  on board; patient living on "$27 a month," receives Meals on Wheels weekly, and subsidizes food from [employer]  Tobacco abuse    Past Medical History:   Diagnosis Date    Hypertension        Review of Systems   Constitutional: Positive for unexpected weight change  Negative for activity change, appetite change, diaphoresis, fatigue and fever  HENT: Negative for congestion, postnasal drip, rhinorrhea, sneezing, sore throat and trouble swallowing  Eyes: Negative  Respiratory: Positive for cough and shortness of breath  Negative for chest tightness  Cardiovascular: Negative for chest pain, palpitations and leg swelling  Gastrointestinal: Positive for abdominal distention   Negative for abdominal pain, blood in stool, diarrhea, nausea and vomiting  Endocrine: Negative  Genitourinary: Negative for decreased urine volume, difficulty urinating, dysuria, hematuria and urgency  Musculoskeletal: Negative  Skin: Negative  Allergic/Immunologic: Negative  Neurological: Positive for numbness (bilateral hands)  Negative for dizziness, tremors, syncope, weakness, light-headedness and headaches  Hematological: Negative  Psychiatric/Behavioral: Negative for agitation, confusion and sleep disturbance  The patient is not nervous/anxious  14-point ROS completed and negative except as stated above and/or in the HPI      Current Facility-Administered Medications:     allopurinol (ZYLOPRIM) tablet 300 mg, 300 mg, Oral, Daily, Doria Spurling, MD, 300 mg at 06/19/21 0805    amLODIPine (NORVASC) tablet 2 5 mg, 2 5 mg, Oral, QAM, Doria Spurling, MD, 2 5 mg at 06/19/21 0805    dexamethasone (DECADRON) tablet 40 mg, 40 mg, Oral, Daily With Breakfast, Doria Spurling, MD, 40 mg at 06/19/21 0804    levothyroxine tablet 150 mcg, 150 mcg, Oral, Early Morning, Doria Spurling, MD, 150 mcg at 06/19/21 0509    nicotine (NICODERM CQ) 7 mg/24hr TD 24 hr patch 1 patch, 1 patch, Transdermal, Daily, Doria Spurling, MD    ondansetron (ZOFRAN) injection 4 mg, 4 mg, Intravenous, Q4H PRN, Doria Spurling, MD    pantoprazole (PROTONIX) EC tablet 40 mg, 40 mg, Oral, Daily, Doria Spurling, MD, 40 mg at 06/19/21 0805    sodium chloride 0 9 % infusion, 100 mL/hr, Intravenous, Continuous, Doria Spurling, MD, Last Rate: 100 mL/hr at 06/19/21 0016, 100 mL/hr at 06/19/21 0016    Allergies   Allergen Reactions    Penicillins Hives     Social History     Socioeconomic History    Marital status: /Civil Union     Spouse name: Marisela Bose Number of children: 6    Years of education: Not on file    Highest education level: Not on file   Occupational History    Not on file Tobacco Use    Smoking status: Current Every Day Smoker     Packs/day: 0 50     Types: Cigarettes     Start date: 14    Smokeless tobacco: Never Used    Tobacco comment: Began smoking at age 17  Max smoked 1 5 packs daily; now averaged 0 5 packs daily (Updated 06/19/2021)  Vaping Use    Vaping Use: Never used   Substance and Sexual Activity    Alcohol use: Not Currently     Comment: History of heavier drinking in early 25s  Denies any current alcohol use (Updated 06/19/2021)   Drug use: Never     Comment: Last assessed 06/19/2021   Sexual activity: Not on file   Other Topics Concern    Not on file   Social History Narrative    Previously worked as heavy machinery technician  Lives with his wife who is essentially bed bound  Social Determinants of Health     Financial Resource Strain: High Risk    Difficulty of Paying Living Expenses: Hard   Food Insecurity: Food Insecurity Present    Worried About Running Out of Food in the Last Year: Sometimes true    [place] of Food in the Last Year: Not on file   Transportation Needs:     Lack of Transportation (Medical):      Lack of Transportation (Non-Medical):    Physical Activity:     Days of Exercise per Week:     Minutes of Exercise per Session:    Stress:     Feeling of Stress :    Social Connections:     Frequency of Communication with Friends and Family:     Frequency of Social Gatherings with Friends and Family:     Attends Synagogue Services:     Active Member of Clubs or Organizations:     Attends Club or Organization Meetings:     Marital Status:    Intimate Partner Violence:     Fear of Current or Ex-Partner:     Emotionally Abused:     Physically Abused:     Sexually Abused:      Family History   Problem Relation Age of Onset    Cancer Mother         "ate away her bone"    Anuerysm Father     Cancer Sister         unknown type    Heart attack Maternal Grandfather     Cancer Sister         unknown type    Heart attack Maternal Aunt     Heart attack Maternal Uncle     Heart attack Paternal Aunt        Vitals:  Blood pressure 124/59, pulse 81, temperature 97 7 °F (36 5 °C), temperature source Oral, resp  rate 18, weight 79 1 kg (174 lb 6 4 oz), SpO2 94 %  Body mass index is 31 9 kg/m²  No intake/output data recorded  Wt Readings from Last 3 Encounters:   06/19/21 79 1 kg (174 lb 6 4 oz)   06/17/21 78 5 kg (173 lb)   06/10/21 79 4 kg (175 lb)     Vitals:    06/18/21 2300 06/19/21 0131 06/19/21 0600 06/19/21 0732   BP:  133/62  124/59   BP Location:  Left arm  Right arm   Pulse: 72 73  81   Resp:  18  18   Temp:  (!) 97 4 °F (36 3 °C)  97 7 °F (36 5 °C)   TempSrc:  Oral  Oral   SpO2:  94%  94%   Weight:   79 1 kg (174 lb 6 4 oz)        Physical Exam  Vitals reviewed  Constitutional:       General: He is awake  He is not in acute distress  Appearance: Normal appearance  He is well-developed and overweight  HENT:      Head: Normocephalic  Nose: Nose normal       Mouth/Throat:      Mouth: Mucous membranes are moist    Eyes:      General: No scleral icterus  Conjunctiva/sclera: Conjunctivae normal    Neck:      Vascular: JVD present  Trachea: No tracheal deviation  Cardiovascular:      Rate and Rhythm: Normal rate and regular rhythm  No extrasystoles are present  Pulses: Normal pulses  Pulmonary:      Effort: Pulmonary effort is normal  No tachypnea, bradypnea, accessory muscle usage or respiratory distress  Breath sounds: Normal air entry  No decreased air movement  Examination of the right-upper field reveals wheezing  Examination of the left-upper field reveals wheezing  Examination of the right-middle field reveals wheezing  Examination of the right-lower field reveals decreased breath sounds and rales  Examination of the left-lower field reveals decreased breath sounds  Decreased breath sounds, wheezing (expiratory) and rales present     Abdominal:      General: Bowel sounds are normal  There is distension  Palpations: Abdomen is soft  Tenderness: There is no abdominal tenderness  Musculoskeletal:      Cervical back: Neck supple  Right lower leg: No edema  Left lower leg: No edema  Skin:     General: Skin is warm and dry  Coloration: Skin is pale  Skin is not jaundiced  Neurological:      General: No focal deficit present  Mental Status: He is alert and oriented to person, place, and time  Psychiatric:         Attention and Perception: Attention normal          Mood and Affect: Mood and affect normal          Speech: Speech normal          Behavior: Behavior normal  Behavior is cooperative  Thought Content:  Thought content normal      Central Line (day, reason): No   [place] Catheter (day, reason): No     Labs & Results:  Results from last 7 days   Lab Units 06/18/21 2001 06/18/21  1610 06/18/21  1328   TROPONIN I ng/mL <0 02 <0 02 <0 02     Results from last 7 days   Lab Units 06/19/21  0506 06/18/21  1610 06/18/21  1328 06/17/21  1023   WBC Thousand/uL 6 24  --  7 92 6 91   HEMOGLOBIN g/dL 8 4*  --  9 7* 10 2*   HEMATOCRIT % 29 5*  --  32 7* 35 0*   PLATELETS Thousands/uL 69* 46* 44* 14*         Results from last 7 days   Lab Units 06/19/21  0506 06/18/21  1328 06/17/21  1023   POTASSIUM mmol/L 5 3 4 8 4 4   CHLORIDE mmol/L 108 106 104   CO2 mmol/L 23 25 26   BUN mg/dL 31* 34* 34*   CREATININE mg/dL 1 67* 1 68* 1 67*   CALCIUM mg/dL 8 9 9 4 9 7   ALK PHOS U/L 154* 152* 171*   ALT U/L 24 31 35   AST U/L 9 13 17     Results from last 7 days   Lab Units 06/18/21  1328   INR  1 12     Anum Cruz PA-C

## 2021-06-19 NOTE — ASSESSMENT & PLAN NOTE
Patient with history of hypertension  Blood pressure currently controlled 111/56  Currently on amlodipine hydrochlorothiazide as an outpatient  Of note patient has been recently diagnosed with atrial fibrillation  He has not seen a cardiologist     Plan  · Continue amlodipine  · Blood pressure is currently well controlled 124/59  · CBC, CMP jyoti banerjee

## 2021-06-19 NOTE — ASSESSMENT & PLAN NOTE
Patient recently diagnosed with atrial fibrillation after EKG in office showed AFib with RVR  Patient is not currently in AFib      Plan  · 24 hour telemetry- no acute events overnight  · Cardiology Consult- speak to Cardiology regarding anticoagulation in setting of ITP  · Troponin negative x2 continue to monitor  · [id number] score 3  · HAS-BLED score 2- Moderate risk of major bleeding

## 2021-06-19 NOTE — ASSESSMENT & PLAN NOTE
Patient follows with Dr Rasheed Nicole  Initially diagnosed with ITP on 06/03 after being referred by his primary care physician  Patient saw Dr Rasheed Nicole yesterday in office  He had been noncompliant with his weekly blood tests secondary to not knowing he needed weekly blood test   Dr Rasheed Nicole started patient on dexamethasone 40 mg for 4 days  Patient was also continued on Protonix while on steroids in order to prevent gastritis  At their office visit they did discuss starting Rituxan  This will be administered subcutaneously weekly x4 doses for his acute ITP  Plan   · Continue dexamethasone 40 mg day #3/4  · CBC daily  · Platelets 14 K on 85/14  Currently trending up    Most recent platelet 67 K  · Continue Protonix 40 mg daily  · Fall precautions  · Monitor for signs of bleeding

## 2021-06-19 NOTE — CONSULTS
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, suspicious for small cell  2  Probable contralateral metastasis  Recommendations:  1  NPO at midnight Sunday night  2  Plan on flex bronch/biopsy with [facility] if possible  3  If small cell may need inpatient chemo and/or radiation  4  MRI brain ASAP  5  Hold heparin or lovenox on Monday  6  Start spiriva daily  7  No need for antibiotics from my perspective  8  Platelets > 96Y are acceptable  History of Present Illness   HPI:  Rocio Arnold is a 68 y o  male who presents for shortness of breath  He had been treated as an outpatient by his PCP for pneumonia about 3 weeks ago; prior to that he has not seen a doctor in many years  He notes a chronic cough with mostly white phlegm with occasional discoloration but never any blood  He has only recently been started on a rescue inhaler which he used only once prior to admission  He does not carry a diagnosis of COPD and does not follow with any pulmonologists  Presently he complains of shortness of breath and cough, no fevers, no chills  He has lost 10 pounds over the past month  He was smoking a half pack per day prior to admission  He admits to night sweats but no fevers  Review of systems:  Review of Systems   Constitutional: Positive for unexpected weight change  Negative for fever  Respiratory: Positive for cough, shortness of breath and wheezing  All other systems reviewed and are negative  All other 12-point review of systems are negative      Historical Information   Past Medical History:   Diagnosis Date    Hypertension      Past Surgical History:   Procedure Laterality Date    BACK SURGERY      CARPAL TUNNEL RELEASE Bilateral     IR BIOPSY BONE MARROW  6/10/2021    LUMBAR DISC SURGERY       Family History   Problem Relation Age of Onset   Jerilyn Powell Cancer Mother         "ate away her bone"    Anuerysm Father     Cancer Sister         unknown type    Heart attack Maternal Grandfather     Cancer Sister         unknown type    Heart attack Maternal Aunt     Heart attack Maternal Uncle     Heart attack Paternal Aunt        Tobacco history: Current smoker of 1/2 ppd  Family history: [place]     Meds/Allergies   Current Facility-Administered Medications   Medication Dose Route Frequency    acetaminophen (TYLENOL) tablet 650 mg  650 mg Oral Q8H PRN    allopurinol (ZYLOPRIM) tablet 300 mg  300 mg Oral Daily    dexamethasone (DECADRON) tablet 40 mg  40 mg Oral Daily With Breakfast    guaiFENesin (MUCINEX) 12 hr tablet 600 mg  600 mg Oral BID    levothyroxine tablet 150 mcg  150 mcg Oral Early Morning    metoprolol tartrate (LOPRESSOR) partial tablet 12 5 mg  12 5 mg Oral Q12H [address]    nicotine (NICODERM CQ) 7 mg/24hr TD 24 hr patch 1 patch  1 patch Transdermal Daily    ondansetron (ZOFRAN) injection 4 mg  4 mg Intravenous Q4H PRN    pantoprazole (PROTONIX) EC tablet 40 mg  40 mg Oral Daily     Allergies   Allergen Reactions    Penicillins Hives       Vitals: Blood pressure 118/57, pulse 81, temperature 97 7 °F (36 5 °C), temperature source Oral, resp  rate 18, weight 79 1 kg (174 lb 6 4 oz), SpO2 94 % , on room air, Body mass index is 31 9 kg/m²  Intake/Output Summary (Last 24 hours) at 6/19/2021 1506  Last data filed at 6/19/2021 1310  Gross per 24 hour   Intake 560 ml   Output 0 ml   Net 560 ml     Physical exam:     Physical Exam  Vitals reviewed  Constitutional:       General: He is not in acute distress  Appearance: He is well-developed  HENT:      Head: Normocephalic and atraumatic  Mouth/Throat:      Pharynx: No oropharyngeal exudate     Eyes:      Conjunctiva/sclera: Conjunctivae normal       Pupils: Pupils are equal, round, and reactive to light  Neck:      Thyroid: No thyromegaly  Vascular: No JVD  Cardiovascular:      Rate and Rhythm: Normal rate and regular rhythm  Heart sounds: Normal heart sounds  No murmur heard  No friction rub  No gallop  Pulmonary:      Effort: Pulmonary effort is normal  Tachypnea present  No respiratory distress  Breath sounds: Decreased breath sounds and wheezing present  No rhonchi or rales  Musculoskeletal:         General: No tenderness  Cervical back: Neck supple  Right lower leg: No edema  Left lower leg: No edema  Lymphadenopathy:      Cervical: No cervical adenopathy  Skin:     General: Skin is warm and dry  Findings: No rash  Neurological:      Mental Status: He is alert and oriented to person, place, and time  Labs: I have personally reviewed pertinent lab results  Results from last 7 days   Lab Units 06/19/21  0506 06/18/21  1610 06/18/21  1328 06/17/21  1023   WBC Thousand/uL 6 24  --  7 92 6 91   HEMOGLOBIN g/dL 8 4*  --  9 7* 10 2*   HEMATOCRIT % 29 5*  --  32 7* 35 0*   PLATELETS Thousands/uL 69* 46* 44* 14*         Results from last 7 days   Lab Units 06/19/21  0506 06/18/21  1328 06/17/21  1023   POTASSIUM mmol/L 5 3 4 8 4 4   CHLORIDE mmol/L 108 106 104   CO2 mmol/L 23 25 26   BUN mg/dL 31* 34* 34*   CREATININE mg/dL 1 67* 1 68* 1 67*   CALCIUM mg/dL 8 9 9 4 9 7   ALK PHOS U/L 154* 152* 171*   ALT U/L 24 31 35   AST U/L 9 13 17     Results from last 7 days   Lab Units 06/18/21  1328   INR  1 12     Results from last 7 days   Lab Units 06/18/21  1328   MAGNESIUM mg/dL 1 8     Imaging and other studies: I have personally reviewed pertinent films in PACS    Pulmonary function testing:   Never performed  Code Status: Level 3 - DNAR and DNI    Thank you for allowing us to participate in the care of your patient      Jenna Ortiz MD

## 2021-06-19 NOTE — UTILIZATION REVIEW
Initial Clinical Review    Admission: Date/Time/Statement:   Admission Orders (From admission, onward)     Ordered        06/18/21 1702  Inpatient Admission  Once                   Orders Placed This Encounter   Procedures    Inpatient Admission     Standing Status:   Standing     Number of Occurrences:   1     Order Specific Question:   Level of Care     Answer:   Med Surg [16]     Order Specific Question:   Bed request comments     Answer:   tele     Order Specific Question:   Estimated length of stay     Answer:   More than 2 Midnights     Order Specific Question:   Certification     Answer:   I certify that inpatient services are medically necessary for this patient for a duration of greater than two midnights  See H&P and MD Progress Notes for additional information about the patient's course of treatment  ED Arrival Information     Expected Arrival Acuity    - 6/18/2021 12:48 Urgent         Means of arrival Escorted by Service Admission type    Walk-In Family Member Hospitalist Urgent         Arrival complaint    Chest Pain, SOB        Chief Complaint   Patient presents with    Chest Pain     Pt presents to the ED with c/o chest pain and SOB  Initial Presentation:   68  Y O male  Presents to ED from home with shortness of breath and chest pain  Saw  PCP recently with same complaints,  CXR showed a large opacity and started on po antibiotics  Also has been following with hematology for  His   ITP, platelets  14, started on steroids and plan was to  Initiate rituximab OP  At that time , was in  AFIB/RVR, ED recommended, patient  Unable  To go to ED  CTA now shows a  Large tumor/mass  Admit  Ip with  PAF, ITP and right lung mass and plan is   Tele, monitor labs, cardiology consult, fall precautions, monitor  For signs of bleeding, O2 as needed and pulmonary consult       Date:    6/19        Day 2:   Cardiology consult  Lung mass likely  Contributing to atrial fibrillation,  Currently  In SR  Denies  Cardiac  Complaints  Recommend low dose metoprolol  Need   TTe   D/C  IVF, volume  Up  On exam, + JVD and abdominal distention  Continue tele  Troponin negative  X 2  Progress note  Most recent platelets  67  Continue to monitor, continue fall precautions  Monitor for signs of  Bleeding  Continue  O2 as needed, none required overnight  Still feels  Short of breath with exertion  Rapid  HIV   Positive, now repeating  1/2 multi spot  Continue  Current  Treatment plan  ED Triage Vitals   Temperature Pulse Respirations Blood Pressure SpO2   06/18/21 1306 06/18/21 1306 06/18/21 1306 06/18/21 1306 06/18/21 1306   97 9 °F (36 6 °C) 95 (!) 26 128/59 97 %      Temp Source Heart Rate Source Patient Position - Orthostatic VS BP Location FiO2 (%)   06/18/21 1306 06/18/21 1306 06/18/21 1610 06/18/21 1306 --   Oral Monitor Lying Right arm       Pain Score       06/19/21 0900       No Pain          Wt Readings from Last 1 Encounters:   06/19/21 79 1 kg (174 lb 6 4 oz)     Additional Vital Signs:   06/19/21 0732  97 7 °F (36 5 °C)  81  18  124/59  --  94 %  None (Room air)  Lying   06/19/21 0131  97 4 °F (36 3 °C)Abnormal   73  18  133/62  89  94 %  None (Room air)  Lying   06/18/21 2300  --  72  --  --  --  --  None (Room air)  --   06/18/21 2145  --  82  22  121/60  86  97 %  None (Room air)  Lying   06/18/21 1610  --  89  22  111/56  --  97 %  None (Room air)  Lying   06/18/21 1550  --  --  --  --  --  --  None (Room air)  --   06/18/21 1458  --  --  --  --  --  --  None (Room air)  --   06/18/21 1306  97 9 °F (36 6 °C)  95  26Abnormal   128/59  --  97 %  None (Room air)           Pertinent Labs/Diagnostic Test Results:   CTA  Chest  ( 6/18)    No pulmonary embolus       Measured RV/LV ratio is within normal limits at less than 0 9        2   Large solid right lung mass encasing the right pulmonary artery and branches with no narrowing   Encasement with narrowing and occlusion of the anterior right upper lobe and middle lobe bronchial branches with obstructive atelectasis  [place] 3   Scattered bilateral lung nodules largest in the left upper lobe subpleural lobulated mass  Primary lung malignancy with metastatic nodule suggested biopsy correlation recommended  CXR  ( 6/18)     Again seen is a large mass occupying the periphery of the right lung  Timothy Aron is also a 2 4 cm nodular mass in the left midlung zone laterally     EKG     NSR     PVC'S    Normal  QRS   HR 93      Results from last 7 days   Lab Units 06/19/21  0506 06/18/21  1610 06/18/21  1328 06/17/21  1023   WBC Thousand/uL 6 24  --  7 92 6 91   HEMOGLOBIN g/dL 8 4*  --  9 7* 10 2*   HEMATOCRIT % 29 5*  --  32 7* 35 0*   PLATELETS Thousands/uL 69* 46* 44* 14*   BANDS PCT %  --   --  1 4         Results from last 7 days   Lab Units 06/19/21  0506 06/18/21  1328 06/17/21  1023   SODIUM mmol/L 140 141 140   POTASSIUM mmol/L 5 3 4 8 4 4   CHLORIDE mmol/L 108 106 104   CO2 mmol/L 23 25 26   ANION GAP mmol/L 9 10 10   BUN mg/dL 31* 34* 34*   CREATININE mg/dL 1 67* 1 68* 1 67*   EGFR ml/min/1 73sq m 39 38 39   CALCIUM mg/dL 8 9 9 4 9 7   MAGNESIUM mg/dL  --  1 8  --      Results from last 7 days   Lab Units 06/19/21  0506 06/18/21  1328 06/17/21  1023   AST U/L 9 13 17   ALT U/L 24 31 35   ALK PHOS U/L 154* 152* 171*   TOTAL PROTEIN g/dL 6 8 7 3 7 3   ALBUMIN g/dL 2 1* 2 4* 2 3*   TOTAL BILIRUBIN mg/dL 0 18* 0 38 0 30         Results from last 7 days   Lab Units 06/19/21  0506 06/18/21  1328 06/17/21  1023   GLUCOSE RANDOM mg/dL 345* 185* 156*           Results from last 7 days   Lab Units 06/18/21 2001 06/18/21  1610 06/18/21  1328   TROPONIN I ng/mL <0 02 <0 02 <0 02     Results from last 7 days   Lab Units 06/18/21  1328   D-DIMER QUANTITATIVE ug/ml FEU 1 92*     Results from last 7 days   Lab Units 06/18/21  1328   PROTIME seconds 14 5   INR  1 12     Results from last 7 days   Lab Units 06/18/21  1328   TSH 3RD GENERATON uIU/mL 0 240* Results from last 7 days   Lab Units 06/18/21  1328   NT-PRO BNP pg/mL 373                         Results from last 7 days   Lab Units 06/19/21  0112   CLARITY UA  Clear   COLOR UA  Yellow   SPEC GRAV UA  1 015   PH UA  5 5   GLUCOSE UA mg/dl >=1000 (1%)*   KETONES UA mg/dl Negative   BLOOD UA  Negative   PROTEIN UA mg/dl Trace*   NITRITE UA  Negative   BILIRUBIN UA  Negative   UROBILINOGEN UA E U /dl 0 2   LEUKOCYTES UA  Elevated glucose may cause decreased leukocyte values   See urine microscopic for [facility] result/*   WBC UA /hpf None Seen   RBC UA /hpf 0-1*   BACTERIA UA /hpf None Seen   EPITHELIAL CELLS WET PREP /hpf Occasional           Results from last 7 days   Lab Units 06/18/21  1328 06/17/21  1023   TOTAL COUNTED  100 100           ED Treatment:   Medication Administration from 06/18/2021 1247 to 06/19/2021 0122       Date/Time Order Dose Route Action Comments     06/18/2021 1712 sodium chloride 0 9 % bolus 1,000 mL 0 mL Intravenous Stopped      06/18/2021 1515 sodium chloride 0 9 % bolus 1,000 mL 1,000 mL Intravenous New Bag      06/18/2021 1830 magnesium sulfate 2 g/50 mL IVPB (premix) 2 g 0 g Intravenous Stopped      06/18/2021 1522 magnesium sulfate 2 g/50 mL IVPB (premix) 2 g 2 g Intravenous New Bag      06/19/2021 0016 sodium chloride 0 9 % infusion 100 mL/hr Intravenous New Bag      06/18/2021 1538 iohexol (OMNIPAQUE) 350 MG/ML injection (SINGLE-DOSE) 85 mL 85 mL Intravenous Given         Present on Admission:   Mass of right lung   Idiopathic thrombocytopenic purpura (ITP) (HCC)      Admitting Diagnosis: Dyspnea [R06 00]  Thrombocytopenia (HCC) [D69 6]  Atypical chest pain [R07 89]  SULEMA (acute kidney injury) ([facility] [address] ) [N17 9]  Paroxysmal A-fib ([facility] [address] ) [I48 0]  Mass of right lung [R91 8]  Age/Sex: 68 y o  male  Admission Orders:  Scheduled Medications:  allopurinol, 300 mg, Oral, Daily  amLODIPine, 2 5 mg, Oral, QAM  dexamethasone, 40 mg, Oral, Daily With Breakfast  guaiFENesin, 600 mg, Oral, BID  levothyroxine, 150 mcg, Oral, Early Morning  nicotine, 1 patch, Transdermal, Daily  pantoprazole, 40 mg, Oral, Daily      Continuous IV Infusions:     PRN Meds:  acetaminophen, 650 mg, Oral, Q8H PRN  ondansetron, 4 mg, Intravenous, Q4H PRN        IP CONSULT TO PULMONOLOGY  IP CONSULT TO CARDIOLOGY     48  Hr  tele    Network Utilization Review Department  ATTENTION: Please call with any questions or concerns to [phone] and carefully listen to the prompts so that you are directed to the right person  All voicemails are confidential   Asiya Sifuentes all requests for admission clinical reviews, approved or denied determinations and any other requests to dedicated fax number below belonging to the campus where the patient is receiving treatment   List of dedicated fax numbers for the Facilities:  [address] DENIALS (Administrative/Medical Necessity) [phone]   [address] (Maternity/NICU/Pediatrics) [phone]   [address] Ibarra [facility] Sophia Howard [facility] [place] [id number] [address] [facility] [facility] [phone]

## 2021-06-19 NOTE — PLAN OF CARE
Problem: Potential for Falls  Goal: Patient will remain free of falls  Description: INTERVENTIONS:  - Educate patient/family on patient safety including physical limitations  - Instruct patient to call for assistance with activity   - Consult OT/PT to assist with strengthening/mobility   - Keep Call bell within reach  - Keep bed low and locked with side rails adjusted as appropriate  - Keep care items and personal belongings within reach  - Initiate and maintain comfort rounds  - Make Fall Risk Sign visible to staff  - Offer Toileting every 1 Hours, in advance of need  - Initiate/Maintain bed alarm  - Obtain necessary fall risk management equipment: assist w/ nurse  - Apply yellow socks and bracelet for high fall risk patients  - Consider moving patient to room near nurses station  Outcome: Progressing     Problem: CARDIOVASCULAR - ADULT  Goal: Maintains optimal cardiac output and hemodynamic stability  Description: INTERVENTIONS:  - Monitor I/O, vital signs and rhythm  - Monitor for S/S and trends of decreased cardiac output  - Administer and titrate ordered vasoactive medications to optimize hemodynamic stability  - Assess quality of pulses, skin color and temperature  - Assess for signs of decreased coronary artery perfusion  - Instruct patient to report change in severity of symptoms  Outcome: Progressing  Goal: Absence of cardiac dysrhythmias or at baseline rhythm  Description: INTERVENTIONS:  - Continuous cardiac monitoring, vital signs, obtain 12 lead EKG if ordered  - Administer antiarrhythmic and heart rate control medications as ordered  - Monitor electrolytes and administer replacement therapy as ordered  Outcome: Progressing     Problem: HEMATOLOGIC - ADULT  Goal: Maintains hematologic stability  Description: INTERVENTIONS  - Assess for signs and symptoms of bleeding or hemorrhage  - Monitor labs  - Administer supportive blood products/factors as ordered and appropriate  Outcome: Progressing

## 2021-06-19 NOTE — ASSESSMENT & PLAN NOTE
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 for taking care of his wife who is home in a hospital bed  A was showing a large mass in the right lung  Plan   · Nasal cannula oxygen as needed- no oxygen required overnight  · Pulmonology consult      CTA chest PE study  IMPRESSION:     1  No pulmonary embolus      Measured RV/LV ratio is within normal limits at less than 0 9        2   Large solid right lung mass encasing the right pulmonary artery and branches with no narrowing  Encasement with narrowing and occlusion of the anterior right upper lobe and middle lobe bronchial branches with obstructive atelectasis       3  Scattered bilateral lung nodules largest in the left upper lobe subpleural lobulated mass      Findings correlate with recent chest x-rays  Primary lung malignancy with metastatic nodule suggested biopsy correlation recommended

## 2021-06-20 ENCOUNTER — APPOINTMENT (INPATIENT)
Dept: RADIOLOGY | Facility: HOSPITAL | Age: 81
DRG: 167 | End: 2021-06-20
Payer: COMMERCIAL

## 2021-06-20 ENCOUNTER — APPOINTMENT (INPATIENT)
Dept: MRI IMAGING | Facility: HOSPITAL | Age: 81
DRG: 167 | End: 2021-06-20
Payer: COMMERCIAL

## 2021-06-20 PROBLEM — R73.9 HYPERGLYCEMIA: Status: ACTIVE | Noted: 2021-06-20

## 2021-06-20 PROBLEM — D72.829 LEUKOCYTOSIS: Status: ACTIVE | Noted: 2021-06-20

## 2021-06-20 LAB
ANION GAP SERPL CALCULATED.3IONS-SCNC: 11 MMOL/L (ref 4–13)
BASOPHILS # BLD AUTO: 0.02 THOUSANDS/ΜL (ref 0–0.1)
BASOPHILS NFR BLD AUTO: 0 % (ref 0–1)
BUN SERPL-MCNC: 42 MG/DL (ref 5–25)
CALCIUM SERPL-MCNC: 9.1 MG/DL (ref 8.3–10.1)
CHLORIDE SERPL-SCNC: 109 MMOL/L (ref 100–108)
CO2 SERPL-SCNC: 22 MMOL/L (ref 21–32)
CREAT SERPL-MCNC: 1.65 MG/DL (ref 0.6–1.3)
EOSINOPHIL # BLD AUTO: 0 THOUSAND/ΜL (ref 0–0.61)
EOSINOPHIL NFR BLD AUTO: 0 % (ref 0–6)
ERYTHROCYTE [DISTWIDTH] IN BLOOD BY AUTOMATED COUNT: 17.6 % (ref 11.6–15.1)
EST. AVERAGE GLUCOSE BLD GHB EST-MCNC: 143 MG/DL
GFR SERPL CREATININE-BSD FRML MDRD: 39 ML/MIN/1.73SQ M
GLUCOSE SERPL-MCNC: 360 MG/DL (ref 65–140)
GLUCOSE SERPL-MCNC: 374 MG/DL (ref 65–140)
GLUCOSE SERPL-MCNC: 382 MG/DL (ref 65–140)
GLUCOSE SERPL-MCNC: 382 MG/DL (ref 65–140)
GLUCOSE SERPL-MCNC: 435 MG/DL (ref 65–140)
HBA1C MFR BLD: 6.6 %
HCT VFR BLD AUTO: 31.4 % (ref 36.5–49.3)
HGB BLD-MCNC: 8.6 G/DL (ref 12–17)
IMM GRANULOCYTES # BLD AUTO: 0.23 THOUSAND/UL (ref 0–0.2)
IMM GRANULOCYTES NFR BLD AUTO: 2 % (ref 0–2)
LYMPHOCYTES # BLD AUTO: 0.36 THOUSANDS/ΜL (ref 0.6–4.47)
LYMPHOCYTES NFR BLD AUTO: 3 % (ref 14–44)
MCH RBC QN AUTO: 23.1 PG (ref 26.8–34.3)
MCHC RBC AUTO-ENTMCNC: 27.4 G/DL (ref 31.4–37.4)
MCV RBC AUTO: 84 FL (ref 82–98)
MONOCYTES # BLD AUTO: 0.39 THOUSAND/ΜL (ref 0.17–1.22)
MONOCYTES NFR BLD AUTO: 3 % (ref 4–12)
NEUTROPHILS # BLD AUTO: 13.1 THOUSANDS/ΜL (ref 1.85–7.62)
NEUTS SEG NFR BLD AUTO: 92 % (ref 43–75)
NRBC BLD AUTO-RTO: 0 /100 WBCS
PLATELET # BLD AUTO: 141 THOUSANDS/UL (ref 149–390)
PMV BLD AUTO: 11.2 FL (ref 8.9–12.7)
POTASSIUM SERPL-SCNC: 4.5 MMOL/L (ref 3.5–5.3)
RBC # BLD AUTO: 3.72 MILLION/UL (ref 3.88–5.62)
SODIUM SERPL-SCNC: 142 MMOL/L (ref 136–145)
WBC # BLD AUTO: 14.1 THOUSAND/UL (ref 4.31–10.16)

## 2021-06-20 PROCEDURE — A9585 GADOBUTROL INJECTION: HCPCS | Performed by: INTERNAL MEDICINE

## 2021-06-20 PROCEDURE — 70030 X-RAY EYE FOR FOREIGN BODY: CPT

## 2021-06-20 PROCEDURE — G1004 CDSM NDSC: HCPCS

## 2021-06-20 PROCEDURE — 80048 BASIC METABOLIC PNL TOTAL CA: CPT | Performed by: FAMILY MEDICINE

## 2021-06-20 PROCEDURE — 82948 REAGENT STRIP/BLOOD GLUCOSE: CPT

## 2021-06-20 PROCEDURE — 99232 SBSQ HOSP IP/OBS MODERATE 35: CPT | Performed by: PHYSICIAN ASSISTANT

## 2021-06-20 PROCEDURE — 99232 SBSQ HOSP IP/OBS MODERATE 35: CPT | Performed by: INTERNAL MEDICINE

## 2021-06-20 PROCEDURE — 83036 HEMOGLOBIN GLYCOSYLATED A1C: CPT | Performed by: FAMILY MEDICINE

## 2021-06-20 PROCEDURE — 70553 MRI BRAIN STEM W/O & W/DYE: CPT

## 2021-06-20 PROCEDURE — 85025 COMPLETE CBC W/AUTO DIFF WBC: CPT | Performed by: FAMILY MEDICINE

## 2021-06-20 PROCEDURE — 93970 EXTREMITY STUDY: CPT | Performed by: SURGERY

## 2021-06-20 RX ORDER — FUROSEMIDE 10 MG/ML
40 INJECTION INTRAMUSCULAR; INTRAVENOUS ONCE
Status: COMPLETED | OUTPATIENT
Start: 2021-06-20 | End: 2021-06-20

## 2021-06-20 RX ORDER — SODIUM CHLORIDE 9 MG/ML
100 INJECTION, SOLUTION INTRAVENOUS CONTINUOUS
Status: DISCONTINUED | OUTPATIENT
Start: 2021-06-20 | End: 2021-06-20

## 2021-06-20 RX ORDER — INSULIN GLARGINE 100 [IU]/ML
15 INJECTION, SOLUTION SUBCUTANEOUS EVERY MORNING
Status: DISCONTINUED | OUTPATIENT
Start: 2021-06-20 | End: 2021-06-23 | Stop reason: HOSPADM

## 2021-06-20 RX ADMIN — GUAIFENESIN 600 MG: 600 TABLET, EXTENDED RELEASE ORAL at 17:24

## 2021-06-20 RX ADMIN — ACETAMINOPHEN 325 MG: 325 TABLET, FILM COATED ORAL at 21:39

## 2021-06-20 RX ADMIN — Medication 12.5 MG: at 21:39

## 2021-06-20 RX ADMIN — Medication 12.5 MG: at 08:10

## 2021-06-20 RX ADMIN — GADOBUTROL 7.5 ML: 604.72 INJECTION INTRAVENOUS at 11:00

## 2021-06-20 RX ADMIN — FUROSEMIDE 40 MG: 10 INJECTION, SOLUTION INTRAMUSCULAR; INTRAVENOUS at 12:57

## 2021-06-20 RX ADMIN — PANTOPRAZOLE SODIUM 40 MG: 40 TABLET, DELAYED RELEASE ORAL at 08:10

## 2021-06-20 RX ADMIN — TIOTROPIUM BROMIDE 18 MCG: 18 CAPSULE ORAL; RESPIRATORY (INHALATION) at 08:13

## 2021-06-20 RX ADMIN — INSULIN LISPRO 6 UNITS: 100 INJECTION, SOLUTION INTRAVENOUS; SUBCUTANEOUS at 17:24

## 2021-06-20 RX ADMIN — ALLOPURINOL 300 MG: 300 TABLET ORAL at 08:10

## 2021-06-20 RX ADMIN — INSULIN GLARGINE 15 UNITS: 100 INJECTION, SOLUTION SUBCUTANEOUS at 11:24

## 2021-06-20 RX ADMIN — INSULIN LISPRO 6 UNITS: 100 INJECTION, SOLUTION INTRAVENOUS; SUBCUTANEOUS at 13:00

## 2021-06-20 RX ADMIN — DEXAMETHASONE 40 MG: 4 TABLET ORAL at 08:10

## 2021-06-20 RX ADMIN — LEVOTHYROXINE SODIUM 150 MCG: 150 TABLET ORAL at 06:46

## 2021-06-20 RX ADMIN — GUAIFENESIN 600 MG: 600 TABLET, EXTENDED RELEASE ORAL at 08:10

## 2021-06-20 RX ADMIN — ACETAMINOPHEN 650 MG: 325 TABLET, FILM COATED ORAL at 06:48

## 2021-06-20 NOTE — PROGRESS NOTES
[facility]  Progress Note - Tal Roup 1940, 68 y o  male MRN: [id number]  Unit/Bed#: S -01 Encounter: [id number]  Primary Care Provider: Kendal Jimenez DO   Date and time admitted to hospital: 6/18/2021  1:03 PM    * Paroxysmal A-fib [facility])  Assessment & Plan  Patient recently diagnosed with atrial fibrillation after EKG in office showed AFib with RVR  Patient is not currently in AFib  Most likely secondary to lung lesion  Troponin negative x3  CHADS2 score = 3, HASBLED score 2  Moderate risk of major bleeding     Plan  · Continue telemetry monitoring  · Cardiology Consult- discontinue amlodipine, start metoprolol low dose, hold anticoagulation at this time, ECHO pending    Idiopathic thrombocytopenic purpura (ITP) [facility])  Assessment & Plan  Patient follows with Dr Bruno Garrido  Initially diagnosed with ITP on 06/03 after being referred by his primary care physician  Patient saw Dr Bruno Garrido yesterday in office  He had been noncompliant with his weekly blood tests secondary to not knowing he needed weekly blood test   Dr Bruno Garrido started patient on dexamethasone 40 mg for 4 days  Patient was also continued on Protonix while on steroids in order to prevent gastritis  At their office visit they did discuss starting Rituxan  This will be administered subcutaneously weekly x4 doses for his acute ITP  Plan   · Continue dexamethasone 40 mg day #3/4 (started on 4/18)  · CBC daily  · Platelets 14 K on 17/87  Currently trending up    Most recent platelet 681H  · Continue Protonix 40 mg daily  · Fall precautions  · Monitor for signs of bleeding     Hyperglycemia  Assessment & Plan  · Glucose 360 this morning  · Possibly secondary to dexamethasone treatment  · Will check A1c  · Accu-Cheks and SSI  · 15 units of Lantus this morning    Leukocytosis  Assessment & Plan  Likely secondary to dexamethasone  Continue to monitor am CBC    HIV (human immunodeficiency virus infection) [facility])  Assessment & Plan  Patient's rapid HIV was reactive  HIV 1/2 multi spot is pending  No history of IV drug abuse, 1 sexual partner, asymptomatic  Shortness of breath  Assessment & Plan  Present on arrival to the emergency department  Patient states that his shortness of breath worsens with activity  Patient is a lifelong smoker  Patient currently smokes half pack a day  Has not seen a doctor in 2 years prior to being diagnosed with ITP  Was treated as an outpatient by his PCP for pneumonia  Chest x-ray at that time did show a large right upper airspace opacification  · NC oxygen as needed  · Telemetry  · Pulmonology consult - started spiriva, bronch on monday  · Shortness of breath likely secondary to right lung mass    SULEMA (acute kidney injury) [facility])  Assessment & Plan  Present on admission  Baseline is around 1 1  UA: Glucose >1000  Recent Labs     06/18/21  1328 06/19/21  0506 06/20/21  0506   CREATININE 1 68* 1 67* 1 65*   EGFR 38 39 39     Estimated Creatinine Clearance: 33 6 mL/min (A) (by C-G formula based on SCr of 1 65 mg/dL (H))  Plan  · Bladder scan  · Hold hydrochlorothiazide  · Hold IVF with mild volume overload, received IV lasix yesterday may be contributing to SULEMA  · CMP a m  Mass of right lung  Assessment & Plan  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 for taking care of his wife who is home in a hospital bed  A was showing a large mass in the right lung         Plan   · Nasal cannula oxygen as needed- no oxygen required overnight  · Pulmonology consulted - start Spiriva, MRI brain asap, bronchoscopy Monday, NPO Sunday night      CTA chest PE study  IMPRESSION:     1  No pulmonary embolus      Measured RV/LV ratio is within normal limits at less than 0 9        2   Large solid right lung mass encasing the right pulmonary artery and branches with no narrowing  Encasement with narrowing and occlusion of the anterior right upper lobe and middle lobe bronchial branches with obstructive atelectasis       3  Scattered bilateral lung nodules largest in the left upper lobe subpleural lobulated mass      Findings correlate with recent chest x-rays  Primary lung malignancy with metastatic nodule suggested biopsy correlation recommended  Hypertension  Assessment & Plan  Patient with history of hypertension  Blood pressure currently controlled 111/56  Currently on amlodipine hydrochlorothiazide as an outpatient  Of note patient has been recently diagnosed with atrial fibrillation  He has not seen a cardiologist     Plan  · Cardiology consulted, discontinue amlodipine, started low-dose metoprolol  · Blood pressure is currently well controlled 112/56  · CBC, CMP a m  VTE Pharmacologic Prophylaxis:   Pharmacologic: Pharmacologic VTE Prophylaxis contraindicated due to Thrombocytopenia  Mechanical VTE Prophylaxis in Place: Yes    Discussions with Specialists or Other Care Team Provider:  Cardiology, pulmonology    Education and Discussions with Family / Patient:  Patient and his wife    Current Length of Stay: 2 day(s)    Current Patient Status: Inpatient     Discharge Plan / Estimated Discharge Date: >2 days    Code Status: Level 3 - DNAR and DNI      Subjective:   Patient reports his breathing is okay this morning  He reported an episode of chest pain overnight on the left side, with breathing that lasted a few minutes and then went away  He currently has no pain  He reports good urine and stool output  He is aware of procedure tomorrow  He confirmed he started the decadron on 4/18 and today is his 4th day       Objective:     Vitals:   Temp (24hrs), Av 1 °F (36 7 °C), Min:98 °F (36 7 °C), Max:98 2 °F (36 8 °C)    Temp:  [98 °F (36 7 °C)-98 2 °F (36 8 °C)] 98 1 °F (36 7 °C)  HR:  [63-76] 76  Resp:  [16-18] 18  BP: (112-123)/(56-65) 112/56  SpO2:  [93 %-96 %] 96 %  Body mass index is 31 83 kg/m²  Input and Output Summary (last 24 hours): Intake/Output Summary (Last 24 hours) at 2021 1040  Last data filed at 2021 0900  Gross per 24 hour   Intake 820 ml   Output 0 ml   Net 820 ml       Physical Exam:     Physical Exam  Vitals reviewed  Constitutional:       Appearance: He is obese  HENT:      Head: Normocephalic and atraumatic  Nose: Nose normal  No congestion  Mouth/Throat:      Mouth: Mucous membranes are moist       Pharynx: Oropharynx is clear  Eyes:      Extraocular Movements: Extraocular movements intact  Conjunctiva/sclera: Conjunctivae normal    Cardiovascular:      Rate and Rhythm: Normal rate and regular rhythm  Pulses: Normal pulses  Heart sounds: Normal heart sounds  No murmur heard  Pulmonary:      Effort: Pulmonary effort is normal       Breath sounds: Wheezing and rhonchi present  Abdominal:      General: Abdomen is flat  Bowel sounds are normal       Palpations: Abdomen is soft  Musculoskeletal:      Right lower leg: No edema  Left lower leg: No edema  Skin:     General: Skin is warm and dry  Capillary Refill: Capillary refill takes less than 2 seconds  Neurological:      General: No focal deficit present  Mental Status: He is alert and oriented to person, place, and time     Psychiatric:         Mood and Affect: Mood normal          Behavior: Behavior normal          Additional Data:     Labs:    Results from last 7 days   Lab Units 21  0506   WBC Thousand/uL 14 10*   HEMOGLOBIN g/dL 8 6*   HEMATOCRIT % 31 4*   PLATELETS Thousands/uL 141*   NEUTROS PCT % 92*   LYMPHS PCT % 3*   MONOS PCT % 3*   EOS PCT % 0     Results from last 7 days   Lab Units 21  0506 06/19/21  0506   POTASSIUM mmol/L 4 5 5 3   CHLORIDE mmol/L 109* 108   CO2 mmol/L 22 23   BUN mg/dL 42* 31*   CREATININE mg/dL 1 65* 1 67*   CALCIUM mg/dL 9 1 8 9   ALK PHOS U/L  --  154*   ALT U/L  --  24   AST U/L  --  9     Results from last 7 days   Lab Units 06/18/21  1328   INR  1 12       * I Have Reviewed All Lab Data Listed Above  * Additional Pertinent Lab Tests Reviewed: [facility] [place] [facility]    Imaging:    Imaging Reports Reviewed Today Include: XR chest 1 view portable    Result Date: 6/18/2021  Impression: Again seen is a large mass occupying the periphery of the right lung  There is also a 2 4 cm nodular mass in the left midlung zone laterally  Please see subsequent chest CT for further evaluation  Workstation performed: [id number]     CTA chest pe study    Result Date: 6/18/2021  Impression: 1  No pulmonary embolus  Measured RV/LV ratio is within normal limits at less than 0 9   2   Large solid right lung mass encasing the right pulmonary artery and branches with no narrowing  Encasement with narrowing and occlusion of the anterior right upper lobe and middle lobe bronchial branches with obstructive atelectasis   3   Scattered bilateral lung nodules largest in the left upper lobe subpleural lobulated mass  Findings correlate with recent chest x-rays  Primary lung malignancy with metastatic nodule suggested biopsy correlation recommended  The study was marked in [facility] for immediate notification     Workstation performed: [id number]       Recent Cultures (last 7 days):           Last 24 Hours Medication List:   Current Facility-Administered Medications   Medication Dose Route Frequency Provider Last Rate    acetaminophen  650 mg Oral Q8H PRN Jazmin Sneed MD      allopurinol  300 mg Oral Daily Jazmin Sneed MD      dexamethasone  40 mg Oral Daily With Beverley Chen MD      guaiFENesin  600 mg Oral BID MD Sirisha Garduno insulin glargine  15 Units Subcutaneous QAM Cristino Newman MD      insulin lispro  1-5 Units Subcutaneous HS Cristino Newman MD      insulin lispro  1-6 Units Subcutaneous TID AC Crisitno Newman MD      levothyroxine  150 mcg Oral Early Morning Ashley Torres MD      metoprolol tartrate  12 5 mg Oral Q12H [address] Kizzy Gipson PA-C      nicotine  1 patch Transdermal Daily Ashley Torres MD      ondansetron  4 mg Intravenous Q4H PRN Ashley Torres MD      pantoprazole  40 mg Oral Daily Ashley Torres MD      tiotropium  18 mcg Inhalation Daily Christine Ruth MD          Today, Patient Was Seen By: Princess Servin DO    ** Please Note: This note has been constructed using a voice recognition system   **

## 2021-06-20 NOTE — ASSESSMENT & PLAN NOTE
Present on admission  Baseline is around 1 1  UA: Glucose >1000  Recent Labs     06/18/21  1328 06/19/21  0506 06/20/21  0506   CREATININE 1 68* 1 67* 1 65*   EGFR 38 39 39     Estimated Creatinine Clearance: 33 6 mL/min (A) (by C-G formula based on SCr of 1 65 mg/dL (H))  Plan  · Bladder scan  · Hold hydrochlorothiazide  · Hold IVF with mild volume overload, received IV lasix yesterday may be contributing to SULEMA  · CMP a m

## 2021-06-20 NOTE — ASSESSMENT & PLAN NOTE
Patient with history of hypertension  Blood pressure currently controlled 111/56  Currently on amlodipine hydrochlorothiazide as an outpatient  Of note patient has been recently diagnosed with atrial fibrillation  He has not seen a cardiologist     Plan  · Cardiology consulted, discontinue amlodipine, started low-dose metoprolol  · Blood pressure is currently well controlled 112/56  · CBC, CMP a m

## 2021-06-20 NOTE — ASSESSMENT & PLAN NOTE
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 for taking care of his wife who is home in a hospital bed  A was showing a large mass in the right lung  Plan   · Nasal cannula oxygen as needed- no oxygen required overnight  · Pulmonology consulted - start Spiriva, MRI brain asap, bronchoscopy Monday, NPO Sunday night      CTA chest PE study  IMPRESSION:     1  No pulmonary embolus      Measured RV/LV ratio is within normal limits at less than 0 9        2   Large solid right lung mass encasing the right pulmonary artery and branches with no narrowing  Encasement with narrowing and occlusion of the anterior right upper lobe and middle lobe bronchial branches with obstructive atelectasis       3  Scattered bilateral lung nodules largest in the left upper lobe subpleural lobulated mass      Findings correlate with recent chest x-rays  Primary lung malignancy with metastatic nodule suggested biopsy correlation recommended

## 2021-06-20 NOTE — PROGRESS NOTES
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  Plan:  NPO p MN  Continue spiriva daily and albuterol PRN  Platelets acceptable  Have CBC ordered for tomorrow  Chief Complaint:   "I'm still short of breath when I walk "    Subjective:   No other complaints  Now clearing out green mucus  Prepared for the procedure tomorrow  Objective:     Vitals: Blood pressure 138/65, pulse 76, temperature 98 1 °F (36 7 °C), temperature source Oral, resp  rate 18, weight 78 9 kg (174 lb), SpO2 96 %  ,Body mass index is 31 83 kg/m²  Intake/Output Summary (Last 24 hours) at 6/20/2021 1522  Last data filed at 6/20/2021 1303  Gross per 24 hour   Intake 740 ml   Output 375 ml   Net 365 ml       Invasive Devices     Peripheral Intravenous Line            Peripheral IV 06/18/21 Right Antecubital 2 days              Physical Exam:    General:  No acute distress  Alert and oriented x 3  HEENT:  PERRL   MMM  Chest:  Clear to auscultation bilaterally, but diminished  Cardiovascular:  S1 + S2, RRR, no M/R/G  Abdomen:  Soft, nontender, nondistended, no palpable masses or organomegaly  Extremities:  No significant edema  Neuro:  No focal deficits, grossly intact  Labs: I have personally reviewed pertinent lab results    Imaging and other studies: I have personally reviewed pertinent films in PACS

## 2021-06-20 NOTE — ASSESSMENT & PLAN NOTE
· Glucose 360 this morning  · Possibly secondary to dexamethasone treatment  · Will check A1c  · Accu-Cheks and SSI  · 15 units of Lantus this morning

## 2021-06-20 NOTE — ASSESSMENT & PLAN NOTE
Present on arrival to the emergency department  Patient states that his shortness of breath worsens with activity  Patient is a lifelong smoker  Patient currently smokes half pack a day  Has not seen a doctor in 2 years prior to being diagnosed with ITP  Was treated as an outpatient by his PCP for pneumonia  Chest x-ray at that time did show a large right upper airspace opacification      · NC oxygen as needed  · Telemetry  · Pulmonology consult - started spiriva, bronch on monday  · Shortness of breath likely secondary to right lung mass

## 2021-06-20 NOTE — ASSESSMENT & PLAN NOTE
Patient follows with Dr Leatha Schmidt  Initially diagnosed with ITP on 06/03 after being referred by his primary care physician  Patient saw Dr Leatha Schmidt yesterday in office  He had been noncompliant with his weekly blood tests secondary to not knowing he needed weekly blood test   Dr Leatha Schmidt started patient on dexamethasone 40 mg for 4 days  Patient was also continued on Protonix while on steroids in order to prevent gastritis  At their office visit they did discuss starting Rituxan  This will be administered subcutaneously weekly x4 doses for his acute ITP  Plan   · Continue dexamethasone 40 mg day #3/4 (started on 4/18)  · CBC daily  · Platelets 14 K on 28/94  Currently trending up    Most recent platelet 774F  · Continue Protonix 40 mg daily  · Fall precautions  · Monitor for signs of bleeding

## 2021-06-20 NOTE — ASSESSMENT & PLAN NOTE
Patient recently diagnosed with atrial fibrillation after EKG in office showed AFib with RVR  Patient is not currently in AFib  Most likely secondary to lung lesion  Troponin negative x3  CHADS2 score = 3, HASBLED score 2   Moderate risk of major bleeding     Plan  · Continue telemetry monitoring  · Cardiology Consult- discontinue amlodipine, start metoprolol low dose, hold anticoagulation at this time, ECHO pending

## 2021-06-20 NOTE — PROGRESS NOTES
General Cardiology Progress Note    Kemar Angel 68 y o  male   MRN: [id number]  Unit/Bed#: S -01; Encounter: [id number]    Assessment:  Principal Problem:    Paroxysmal A-fib ([facility] [address] )  Active Problems:    Idiopathic thrombocytopenic purpura (ITP) (HCC)    Hypertension    Mass of right lung    SULEMA (acute kidney injury) ([facility] [address] )    Shortness of breath    HIV (human immunodeficiency virus infection) ([facility] )    Leukocytosis    Hyperglycemia      Subjective:   Kemar nAgel joselyn 81-year-old man with HTN, acute ITP, right lung mass, chronic pain, lumbar spinal stenosis, and bilateral carpal tunnel syndrome who presented to [facility] on 06/18 with SOB and intermittent chest pain  EKG in ED with NSR  Noted to have SULEMA on CMP; outpatient losartan held at time of admission and IV fluids were started  Patient seen and examined  No significant events overnight  Completed brain MRI this morning  Numerous family members at bedside to celebrate Father's Day  Patient does endorse increased urinary response s/p IV Lasix  Continues to have SOB with heavy exertion; denies SOB at rest  No other complaints  Objective: Intake/ Output: Not kept  Weight: 174 lbs, standing (same as on 06/19)  Telemetry: normal sinus rhythm 65-75 bpm  Occasional runs of ATach  Today's Plan:   TTE to be completed today   Renal function unchanged after IV Lasix yesterday  Volume still up today with elevated JVP and taut abdominal distention  Would consider giving additional IV Lasix today  · Poor anticoagulation candidate for new onset Afib given acute ITP and upcoming biopsy  Case will need to be reviewed with hematology/oncology before starting any AC   Ongoing work-up for lung mass; likely bronchoscopy on 06/21   Rapid HIV reactive with non-reactive HIV-1 p24 Ag  Multispot HIV1/HIV2 in process  Plan:  New onset paroxysmal atrial fibrillation              [id number] = 3 (age, HTN)  Anticoagulation: none     EKG 06/10/2021: Afib with RVR and PVCs, 135 bpm    EKG 06/18/2021: sinus rhythm with PVCs, 93 bpm   Rate control: metoprolol tartrate 12 5 mg q12 hours  Rhythm control: None       Bilateral lung masses and nodules with lymphadenopathy              With reported 10+ lbs unintentional weight loss over past few months  CXR 06/11/2021: "New, large masslike opacity in the right upper lobe bows the major fissure downward on the lateral view " Small right pleural effusion  CXR 06/18/2021: "Again seen is a large mass occupying the periphery of the right lung  There is also a 2 4 cm nodular mass in the left midlung zone laterally "              CTA chest/PE study: No PE seen  "Large solid right lung mass (12 2 x 9 7 x 12 3 cm) encasing the right pulmonary artery and branches with no narrowing   Encasement with narrowing and occlusion of the anterior right upper lobe and middle lobe bronchial branches with obstructive atelectasis  Elena [place] Elena Purvi Elena Purvi Lobulated left upper lobe subpleural mass measuring 2 2 x 1 7 cm  Elena [place] Primary lung malignancy with metastatic nodule suggested biopsy correlation recommended "              Pulmonology consulted       Idiopathic thrombocytopenia purpura              Diagnosed in 06/2021  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 Bone marrow biopsy on 06/10/2021 without evidence of leukemia  Rapid HIV reactive with non-reactive HIV-1 p24 Ag  Multispot HIV1/HIV2 in process  To start rituximab in near future  Follows with Dr Bruno Garrido as outpatient       Acute kidney injury              Last known baseline creatinine of 1 0-1 1  Today, creatinine of 1 65 (1 67 on 06/19)     Hypertension  Hypothyroidism  Chronic pain  Spinal stenosis, lumbar  Carpal tunnel syndrome, bilateral: s/p bilateral releases about 3-4 years ago with no improvement in symptoms  Previously worked as heavy   Significant financial strain: hematology outpatient  on board; patient living on "$27 a month," receives Meals on Wheels weekly, and subsidizes food from [employer]  Tobacco abuse     Vitals:   Blood pressure 112/56, pulse 76, temperature 98 1 °F (36 7 °C), temperature source Oral, resp  rate 18, weight 78 9 kg (174 lb), SpO2 96 %  Body mass index is 31 83 kg/m²  I/O last 3 completed shifts: In: 36 [P O :820]  Out: 0     Wt Readings from Last 3 Encounters:   06/20/21 78 9 kg (174 lb)   06/17/21 78 5 kg (173 lb)   06/10/21 79 4 kg (175 lb)     Vitals:    06/19/21 1541 06/19/21 2125 06/20/21 0600 06/20/21 0728   BP: 123/65 123/59  112/56   BP Location: Right arm Left arm  Left arm   Pulse: 63 75  76   Resp: 16 16  18   Temp: 98 °F (36 7 °C) 98 2 °F (36 8 °C)  98 1 °F (36 7 °C)   TempSrc: Oral Oral  Oral   SpO2: 93% 95%  96%   Weight:   78 9 kg (174 lb)        Physical Exam  Vitals reviewed  Constitutional:       General: He is awake  He is not in acute distress  Appearance: Normal appearance  He is well-developed and overweight  HENT:      Head: Normocephalic  Nose: Nose normal       Mouth/Throat:      Mouth: Mucous membranes are moist    Eyes:      General: No scleral icterus  Conjunctiva/sclera: Conjunctivae normal    Neck:      Vascular: JVD present  Trachea: No tracheal deviation  Cardiovascular:      Rate and Rhythm: Normal rate and regular rhythm  Occasional extrasystoles are present  Pulses: Normal pulses  Heart sounds: Murmur heard  Pulmonary:      Effort: Pulmonary effort is normal  No tachypnea, bradypnea, accessory muscle usage or respiratory distress  Breath sounds: Decreased air movement present  Examination of the right-lower field reveals decreased breath sounds  Examination of the left-lower field reveals decreased breath sounds  Decreased breath sounds and wheezing present  No rhonchi     Abdominal:      General: Bowel sounds are normal  There is distension  Palpations: Abdomen is soft  Tenderness: There is no abdominal tenderness  Musculoskeletal:      Cervical back: Neck supple  Right lower leg: No edema  Left lower leg: No edema  Skin:     General: Skin is warm and dry  Coloration: Skin is pale  Skin is not jaundiced  Neurological:      General: No focal deficit present  Mental Status: He is alert and oriented to person, place, and time  Psychiatric:         Attention and Perception: Attention normal          Mood and Affect: Mood and affect normal          Speech: Speech normal          Behavior: Behavior normal  Behavior is cooperative  Thought Content:  Thought content normal      Central Line (day, reason): No   [place] Catheter (day, reason): No     Current Facility-Administered Medications:     acetaminophen (TYLENOL) tablet 650 mg, 650 mg, Oral, Q8H PRN, Judge Jesenia MD, 650 mg at 06/20/21 0648    allopurinol (ZYLOPRIM) tablet 300 mg, 300 mg, Oral, Daily, Judge Jesenia MD, 300 mg at 06/20/21 0810    dexamethasone (DECADRON) tablet 40 mg, 40 mg, Oral, Daily With Breakfast, Judge Jesenia MD, 40 mg at 06/20/21 0810    guaiFENesin (MUCINEX) 12 hr tablet 600 mg, 600 mg, Oral, BID, Marilou Holm MD, 600 mg at 06/20/21 0810    insulin glargine (LANTUS) subcutaneous injection 15 Units 0 15 mL, 15 Units, Subcutaneous, QAM, Marilou Holm MD, 15 Units at 06/20/21 1124    insulin lispro (HumaLOG) 100 units/mL subcutaneous injection 1-5 Units, 1-5 Units, Subcutaneous, HS, Marilou Holm MD    insulin lispro (HumaLOG) 100 units/mL subcutaneous injection 1-6 Units, 1-6 Units, Subcutaneous, TID AC **AND** Fingerstick Glucose (POCT), , , TID AC, Marilou Holm MD    levothyroxine tablet 150 mcg, 150 mcg, Oral, Early Morning, Judge Jesenia MD, 150 mcg at 06/20/21 0646    metoprolol tartrate (LOPRESSOR) partial tablet 12 5 mg, 12 5 mg, Oral, Q12H [address], Charlotte Samples MARINA Michelle PA-C, 12 5 mg at 06/20/21 0810    nicotine (NICODERM CQ) 7 mg/24hr TD 24 hr patch 1 patch, 1 patch, Transdermal, Daily, Sean Caceres MD    ondansetron [facility]) injection 4 mg, 4 mg, Intravenous, Q4H PRN, Sean Caceres MD    pantoprazole (PROTONIX) EC tablet 40 mg, 40 mg, Oral, Daily, Sean Caceres MD, 40 mg at 06/20/21 0810    tiotropium (SPIRIVA) capsule for inhaler 18 mcg, 18 mcg, Inhalation, Daily, Addis Lyman MD, 18 mcg at 06/20/21 0813    Labs & Results:  Results from last 7 days   Lab Units 06/18/21 2001 06/18/21  1610 06/18/21  1328   TROPONIN I ng/mL <0 02 <0 02 <0 02     Results from last 7 days   Lab Units 06/20/21  0506 06/19/21  0506 06/18/21  1610 06/18/21  1328   WBC Thousand/uL 14 10* 6 24  --  7 92   HEMOGLOBIN g/dL 8 6* 8 4*  --  9 7*   HEMATOCRIT % 31 4* 29 5*  --  32 7*   PLATELETS Thousands/uL 141* 69* 46* 44*         Results from last 7 days   Lab Units 06/20/21  0506 06/19/21  0506 06/18/21  1328 06/17/21  1023   POTASSIUM mmol/L 4 5 5 3 4 8 4 4   CHLORIDE mmol/L 109* 108 106 104   CO2 mmol/L 22 23 25 26   BUN mg/dL 42* 31* 34* 34*   CREATININE mg/dL 1 65* 1 67* 1 68* 1 67*   CALCIUM mg/dL 9 1 8 9 9 4 9 7   ALK PHOS U/L  --  154* 152* 171*   ALT U/L  --  24 31 35   AST U/L  --  9 13 17     Results from last 7 days   Lab Units 06/18/21  1328   INR  1 12       Angela Denise PA-C

## 2021-06-21 ENCOUNTER — ANESTHESIA (INPATIENT)
Dept: GASTROENTEROLOGY | Facility: HOSPITAL | Age: 81
DRG: 167 | End: 2021-06-21
Payer: COMMERCIAL

## 2021-06-21 ENCOUNTER — APPOINTMENT (INPATIENT)
Dept: NON INVASIVE DIAGNOSTICS | Facility: HOSPITAL | Age: 81
DRG: 167 | End: 2021-06-21
Payer: COMMERCIAL

## 2021-06-21 ENCOUNTER — ANESTHESIA EVENT (INPATIENT)
Dept: GASTROENTEROLOGY | Facility: HOSPITAL | Age: 81
DRG: 167 | End: 2021-06-21
Payer: COMMERCIAL

## 2021-06-21 ENCOUNTER — APPOINTMENT (INPATIENT)
Dept: GASTROENTEROLOGY | Facility: HOSPITAL | Age: 81
DRG: 167 | End: 2021-06-21
Payer: COMMERCIAL

## 2021-06-21 PROBLEM — E11.9 NEW ONSET TYPE 2 DIABETES MELLITUS (HCC): Status: ACTIVE | Noted: 2021-06-20

## 2021-06-21 LAB
ANION GAP SERPL CALCULATED.3IONS-SCNC: 8 MMOL/L (ref 4–13)
ANISOCYTOSIS BLD QL SMEAR: PRESENT
BASOPHILS # BLD MANUAL: 0 THOUSAND/UL (ref 0–0.1)
BASOPHILS NFR MAR MANUAL: 0 % (ref 0–1)
BUN SERPL-MCNC: 45 MG/DL (ref 5–25)
CALCIUM SERPL-MCNC: 9.5 MG/DL (ref 8.3–10.1)
CHLORIDE SERPL-SCNC: 107 MMOL/L (ref 100–108)
CO2 SERPL-SCNC: 28 MMOL/L (ref 21–32)
CREAT SERPL-MCNC: 1.46 MG/DL (ref 0.6–1.3)
EOSINOPHIL # BLD MANUAL: 0 THOUSAND/UL (ref 0–0.4)
EOSINOPHIL NFR BLD MANUAL: 0 % (ref 0–6)
ERYTHROCYTE [DISTWIDTH] IN BLOOD BY AUTOMATED COUNT: 17.5 % (ref 11.6–15.1)
GFR SERPL CREATININE-BSD FRML MDRD: 45 ML/MIN/1.73SQ M
GLUCOSE SERPL-MCNC: 144 MG/DL (ref 65–140)
GLUCOSE SERPL-MCNC: 186 MG/DL (ref 65–140)
GLUCOSE SERPL-MCNC: 252 MG/DL (ref 65–140)
GLUCOSE SERPL-MCNC: 304 MG/DL (ref 65–140)
GLUCOSE SERPL-MCNC: 342 MG/DL (ref 65–140)
GLUCOSE SERPL-MCNC: 485 MG/DL (ref 65–140)
HCT VFR BLD AUTO: 31.4 % (ref 36.5–49.3)
HGB BLD-MCNC: 9 G/DL (ref 12–17)
HIV 2 AB SERPL QL IA: NEGATIVE
HIV1 AB SERPL QL IA: NEGATIVE
HYPERCHROMIA BLD QL SMEAR: PRESENT
LYMPHOCYTES # BLD AUTO: 0.52 THOUSAND/UL (ref 0.6–4.47)
LYMPHOCYTES # BLD AUTO: 3 % (ref 14–44)
MCH RBC QN AUTO: 23.4 PG (ref 26.8–34.3)
MCHC RBC AUTO-ENTMCNC: 28.7 G/DL (ref 31.4–37.4)
MCV RBC AUTO: 82 FL (ref 82–98)
MONOCYTES # BLD AUTO: 0.69 THOUSAND/UL (ref 0–1.22)
MONOCYTES NFR BLD: 4 % (ref 4–12)
NEUTROPHILS # BLD MANUAL: 16.05 THOUSAND/UL (ref 1.85–7.62)
NEUTS BAND NFR BLD MANUAL: 2 % (ref 0–8)
NEUTS SEG NFR BLD AUTO: 91 % (ref 43–75)
NRBC BLD AUTO-RTO: 0 /100 WBCS
PLATELET # BLD AUTO: 209 THOUSANDS/UL (ref 149–390)
PLATELET BLD QL SMEAR: ADEQUATE
PMV BLD AUTO: 11.1 FL (ref 8.9–12.7)
POTASSIUM SERPL-SCNC: 4.9 MMOL/L (ref 3.5–5.3)
RBC # BLD AUTO: 3.85 MILLION/UL (ref 3.88–5.62)
SL AMB NOTE:: NEGATIVE
SODIUM SERPL-SCNC: 143 MMOL/L (ref 136–145)
TOTAL CELLS COUNTED SPEC: 100
WBC # BLD AUTO: 17.26 THOUSAND/UL (ref 4.31–10.16)

## 2021-06-21 PROCEDURE — 88341 IMHCHEM/IMCYTCHM EA ADD ANTB: CPT | Performed by: PATHOLOGY

## 2021-06-21 PROCEDURE — 85007 BL SMEAR W/DIFF WBC COUNT: CPT | Performed by: FAMILY MEDICINE

## 2021-06-21 PROCEDURE — 07B74ZX EXCISION OF THORAX LYMPHATIC, PERCUTANEOUS ENDOSCOPIC APPROACH, DIAGNOSTIC: ICD-10-PCS | Performed by: INTERNAL MEDICINE

## 2021-06-21 PROCEDURE — 99232 SBSQ HOSP IP/OBS MODERATE 35: CPT | Performed by: INTERNAL MEDICINE

## 2021-06-21 PROCEDURE — 87116 MYCOBACTERIA CULTURE: CPT | Performed by: INTERNAL MEDICINE

## 2021-06-21 PROCEDURE — 87070 CULTURE OTHR SPECIMN AEROBIC: CPT | Performed by: INTERNAL MEDICINE

## 2021-06-21 PROCEDURE — 82948 REAGENT STRIP/BLOOD GLUCOSE: CPT

## 2021-06-21 PROCEDURE — 88173 CYTOPATH EVAL FNA REPORT: CPT | Performed by: PATHOLOGY

## 2021-06-21 PROCEDURE — 31623 DX BRONCHOSCOPE/BRUSH: CPT | Performed by: INTERNAL MEDICINE

## 2021-06-21 PROCEDURE — 88305 TISSUE EXAM BY PATHOLOGIST: CPT | Performed by: PATHOLOGY

## 2021-06-21 PROCEDURE — 80048 BASIC METABOLIC PNL TOTAL CA: CPT | Performed by: FAMILY MEDICINE

## 2021-06-21 PROCEDURE — 94668 MNPJ CHEST WALL SBSQ: CPT

## 2021-06-21 PROCEDURE — 88342 IMHCHEM/IMCYTCHM 1ST ANTB: CPT | Performed by: PATHOLOGY

## 2021-06-21 PROCEDURE — 88172 CYTP DX EVAL FNA 1ST EA SITE: CPT | Performed by: PATHOLOGY

## 2021-06-21 PROCEDURE — 94760 N-INVAS EAR/PLS OXIMETRY 1: CPT

## 2021-06-21 PROCEDURE — 87176 TISSUE HOMOGENIZATION CULTR: CPT | Performed by: INTERNAL MEDICINE

## 2021-06-21 PROCEDURE — 88184 FLOWCYTOMETRY/ TC 1 MARKER: CPT | Performed by: INTERNAL MEDICINE

## 2021-06-21 PROCEDURE — 85027 COMPLETE CBC AUTOMATED: CPT | Performed by: FAMILY MEDICINE

## 2021-06-21 PROCEDURE — 88185 FLOWCYTOMETRY/TC ADD-ON: CPT

## 2021-06-21 PROCEDURE — 94640 AIRWAY INHALATION TREATMENT: CPT

## 2021-06-21 PROCEDURE — 88112 CYTOPATH CELL ENHANCE TECH: CPT | Performed by: PATHOLOGY

## 2021-06-21 PROCEDURE — 31652 BRONCH EBUS SAMPLNG 1/2 NODE: CPT | Performed by: INTERNAL MEDICINE

## 2021-06-21 PROCEDURE — 87205 SMEAR GRAM STAIN: CPT | Performed by: INTERNAL MEDICINE

## 2021-06-21 PROCEDURE — 94664 DEMO&/EVAL PT USE INHALER: CPT

## 2021-06-21 PROCEDURE — 87102 FUNGUS ISOLATION CULTURE: CPT | Performed by: INTERNAL MEDICINE

## 2021-06-21 PROCEDURE — 07DR3ZX EXTRACTION OF ILIAC BONE MARROW, PERCUTANEOUS APPROACH, DIAGNOSTIC: ICD-10-PCS | Performed by: INTERNAL MEDICINE

## 2021-06-21 PROCEDURE — 87206 SMEAR FLUORESCENT/ACID STAI: CPT | Performed by: INTERNAL MEDICINE

## 2021-06-21 RX ORDER — LIDOCAINE HYDROCHLORIDE 10 MG/ML
INJECTION, SOLUTION EPIDURAL; INFILTRATION; INTRACAUDAL; PERINEURAL AS NEEDED
Status: DISCONTINUED | OUTPATIENT
Start: 2021-06-21 | End: 2021-06-21

## 2021-06-21 RX ORDER — SODIUM CHLORIDE, SODIUM LACTATE, POTASSIUM CHLORIDE, CALCIUM CHLORIDE 600; 310; 30; 20 MG/100ML; MG/100ML; MG/100ML; MG/100ML
INJECTION, SOLUTION INTRAVENOUS CONTINUOUS PRN
Status: DISCONTINUED | OUTPATIENT
Start: 2021-06-21 | End: 2021-06-21

## 2021-06-21 RX ORDER — PROPOFOL 10 MG/ML
INJECTION, EMULSION INTRAVENOUS CONTINUOUS PRN
Status: DISCONTINUED | OUTPATIENT
Start: 2021-06-21 | End: 2021-06-21

## 2021-06-21 RX ORDER — ONDANSETRON 2 MG/ML
4 INJECTION INTRAMUSCULAR; INTRAVENOUS EVERY 4 HOURS PRN
Status: DISCONTINUED | OUTPATIENT
Start: 2021-06-21 | End: 2021-06-23 | Stop reason: HOSPADM

## 2021-06-21 RX ORDER — LEVALBUTEROL 1.25 MG/.5ML
1.25 SOLUTION, CONCENTRATE RESPIRATORY (INHALATION)
Status: DISCONTINUED | OUTPATIENT
Start: 2021-06-21 | End: 2021-06-23 | Stop reason: HOSPADM

## 2021-06-21 RX ORDER — REMIFENTANIL HYDROCHLORIDE 1 MG/ML
INJECTION, POWDER, LYOPHILIZED, FOR SOLUTION INTRAVENOUS CONTINUOUS PRN
Status: DISCONTINUED | OUTPATIENT
Start: 2021-06-21 | End: 2021-06-21

## 2021-06-21 RX ORDER — DIPHENHYDRAMINE HYDROCHLORIDE 50 MG/ML
12.5 INJECTION INTRAMUSCULAR; INTRAVENOUS ONCE AS NEEDED
Status: DISCONTINUED | OUTPATIENT
Start: 2021-06-21 | End: 2021-06-23 | Stop reason: HOSPADM

## 2021-06-21 RX ORDER — FENTANYL CITRATE 50 UG/ML
INJECTION, SOLUTION INTRAMUSCULAR; INTRAVENOUS AS NEEDED
Status: DISCONTINUED | OUTPATIENT
Start: 2021-06-21 | End: 2021-06-21

## 2021-06-21 RX ORDER — GUAIFENESIN 600 MG
600 TABLET, EXTENDED RELEASE 12 HR ORAL 2 TIMES DAILY
Qty: 30 TABLET | Refills: 0 | OUTPATIENT
Start: 2021-06-21

## 2021-06-21 RX ORDER — PROPOFOL 10 MG/ML
INJECTION, EMULSION INTRAVENOUS AS NEEDED
Status: DISCONTINUED | OUTPATIENT
Start: 2021-06-21 | End: 2021-06-21

## 2021-06-21 RX ADMIN — ONDANSETRON 4 MG: 2 INJECTION INTRAMUSCULAR; INTRAVENOUS at 11:28

## 2021-06-21 RX ADMIN — LIDOCAINE HYDROCHLORIDE 50 MG: 10 INJECTION, SOLUTION EPIDURAL; INFILTRATION; INTRACAUDAL at 11:28

## 2021-06-21 RX ADMIN — PHENYLEPHRINE HYDROCHLORIDE 200 MCG: 10 INJECTION INTRAVENOUS at 11:56

## 2021-06-21 RX ADMIN — Medication 12.5 MG: at 21:22

## 2021-06-21 RX ADMIN — PROPOFOL 50 MG: 10 INJECTION, EMULSION INTRAVENOUS at 11:29

## 2021-06-21 RX ADMIN — DEXAMETHASONE 40 MG: 4 TABLET ORAL at 15:53

## 2021-06-21 RX ADMIN — PHENYLEPHRINE HYDROCHLORIDE 200 MCG: 10 INJECTION INTRAVENOUS at 11:53

## 2021-06-21 RX ADMIN — IPRATROPIUM BROMIDE 0.5 MG: 0.5 SOLUTION RESPIRATORY (INHALATION) at 15:11

## 2021-06-21 RX ADMIN — SODIUM CHLORIDE, SODIUM LACTATE, POTASSIUM CHLORIDE, AND CALCIUM CHLORIDE: .6; .31; .03; .02 INJECTION, SOLUTION INTRAVENOUS at 11:11

## 2021-06-21 RX ADMIN — PROPOFOL 100 MCG/KG/MIN: 10 INJECTION, EMULSION INTRAVENOUS at 11:31

## 2021-06-21 RX ADMIN — PROPOFOL 20 MG: 10 INJECTION, EMULSION INTRAVENOUS at 12:03

## 2021-06-21 RX ADMIN — LEVALBUTEROL HYDROCHLORIDE 1.25 MG: 1.25 SOLUTION, CONCENTRATE RESPIRATORY (INHALATION) at 20:06

## 2021-06-21 RX ADMIN — REMIFENTANIL HYDROCHLORIDE 0.05 MCG/KG/MIN: 1 INJECTION, POWDER, LYOPHILIZED, FOR SOLUTION INTRAVENOUS at 11:56

## 2021-06-21 RX ADMIN — LEVALBUTEROL HYDROCHLORIDE 1.25 MG: 1.25 SOLUTION, CONCENTRATE RESPIRATORY (INHALATION) at 15:11

## 2021-06-21 RX ADMIN — FENTANYL CITRATE 25 MCG: 50 INJECTION, SOLUTION INTRAMUSCULAR; INTRAVENOUS at 11:49

## 2021-06-21 RX ADMIN — INSULIN GLARGINE 15 UNITS: 100 INJECTION, SOLUTION SUBCUTANEOUS at 08:07

## 2021-06-21 RX ADMIN — PROPOFOL 100 MG: 10 INJECTION, EMULSION INTRAVENOUS at 11:28

## 2021-06-21 RX ADMIN — INSULIN LISPRO 5 UNITS: 100 INJECTION, SOLUTION INTRAVENOUS; SUBCUTANEOUS at 21:28

## 2021-06-21 RX ADMIN — INSULIN LISPRO 3 UNITS: 100 INJECTION, SOLUTION INTRAVENOUS; SUBCUTANEOUS at 17:18

## 2021-06-21 RX ADMIN — PHENYLEPHRINE HYDROCHLORIDE 100 MCG: 10 INJECTION INTRAVENOUS at 11:34

## 2021-06-21 RX ADMIN — GUAIFENESIN 600 MG: 600 TABLET, EXTENDED RELEASE ORAL at 17:18

## 2021-06-21 RX ADMIN — PHENYLEPHRINE HYDROCHLORIDE 50 MCG/MIN: 10 INJECTION INTRAVENOUS at 11:34

## 2021-06-21 RX ADMIN — FENTANYL CITRATE 25 MCG: 50 INJECTION, SOLUTION INTRAMUSCULAR; INTRAVENOUS at 11:41

## 2021-06-21 RX ADMIN — IPRATROPIUM BROMIDE 0.5 MG: 0.5 SOLUTION RESPIRATORY (INHALATION) at 20:06

## 2021-06-21 RX ADMIN — FENTANYL CITRATE 50 MCG: 50 INJECTION, SOLUTION INTRAMUSCULAR; INTRAVENOUS at 11:28

## 2021-06-21 NOTE — ANESTHESIA POSTPROCEDURE EVALUATION
Post-Op Assessment Note    CV Status:  Stable  Pain Score: 0    Pain management: adequate     Mental Status:  Alert and awake   Hydration Status:  Euvolemic   PONV Controlled:  Controlled   Airway Patency:  Patent      Post Op Vitals Reviewed: Yes      Staff: CRNA         No complications documented      BP   102/56   Temp      Pulse  75   Resp 19   SpO2   94

## 2021-06-21 NOTE — ASSESSMENT & PLAN NOTE
Patient with history of hypertension  Blood pressure currently controlled 111/56  Currently on amlodipine hydrochlorothiazide as an outpatient  Of note patient has been recently diagnosed with atrial fibrillation  He has not seen a cardiologist     Plan  · Cardiology consulted, discontinue amlodipine, started low-dose metoprolol  · Monitor BP  · CBC, CMP a m

## 2021-06-21 NOTE — ASSESSMENT & PLAN NOTE
· Glucose high 300's, worse in the setting of dexamethasone treatment for ITP  · A1c 6 6 this admission  · Received 15 units of Lantus yesterday morning  · Plan:  · Lantus 15 units this morning  · Accu-Cheks and SSI  · Currently NPO pending procedure today, will begin diabetic diet following procedure

## 2021-06-21 NOTE — ASSESSMENT & PLAN NOTE
Present on arrival to the emergency department  Patient states that his shortness of breath worsens with activity  Patient is a lifelong smoker  Patient currently smokes half pack a day  Has not seen a doctor in 2 years prior to being diagnosed with ITP  Was treated as an outpatient by his PCP for pneumonia  Chest x-ray at that time did show a large right upper airspace opacification  Continues with wheezing and rhonchi on the right      · NC oxygen as needed, home O2 eval, ambulatory pulse ox  · Pulmonology consult - started spiriva, xopenex/atrovent nebs and flutter valve, PFTs as outpatient, Bronch completed on Monday, f/u pathology  · Shortness of breath likely secondary to right lung mass and suspected COPD  · Tessalon pearles 200TID prn for cough  · Continue mucinex

## 2021-06-21 NOTE — ASSESSMENT & PLAN NOTE
Patient with history of hypertension  Blood pressure currently controlled 127/61  Currently on amlodipine hydrochlorothiazide as an outpatient  Of note patient has been recently diagnosed with atrial fibrillation  He has not seen a cardiologist     Plan  · Cardiology consulted, discontinue amlodipine, started low-dose metoprolol, continue metoprolol  · Monitor BP  · CBC, CMP a m

## 2021-06-21 NOTE — PROGRESS NOTES
Cardiology Progress Note - Holger Boyd 68 y o  male MRN: [id number]    Unit/Bed#: S -01 Encounter: [id number]      Assessment/Recommendations:  1  Paroxysmal atrial fibrillation:  Likely in the setting of right lung mass  Currently back in sinus rhythm  Continued on beta-blocker  No anticoagulation due to ITP and thrombocytopenia and for bronchoscopy and biopsy today  If platelets continue to improve, will need to consider anticoagulation going forward  Will review echocardiogram, once available  2  ITP:  As per primary team   3  Hypertension:  Elevated this morning, continue follow on beta-blocker for now  4  A KI:  Creatinine does seem to be improving  5  Shortness of breath:  Had some mild volume overload, status post IV Lasix  Relatively euvolemic today  No further diuresis recommended today  6  Leukocytosis:  As per primary team   7  Hyperglycemia:  As per primary team       Subjective:   Patient seen and examined  No significant events overnight   ; pertinent negatives - chest pain, chest pressure/discomfort, irregular heart beat, lower extremity edema and palpitations  Objective:     Vitals: Blood pressure 152/67, pulse 80, temperature 97 8 °F (36 6 °C), temperature source Oral, resp  rate 20, weight 78 9 kg (174 lb), SpO2 95 %  , Body mass index is 31 83 kg/m² ,   Orthostatic Blood Pressures      Most Recent Value   Blood Pressure  152/67 filed at 06/21/2021 0747   Patient Position - Orthostatic VS  Sitting filed at 06/21/2021 0747            Intake/Output Summary (Last 24 hours) at 6/21/2021 1009  Last data filed at 6/21/2021 0800  Gross per 24 hour   Intake 480 ml   Output 2375 ml   Net -1895 ml       TELE:  Occasional PVCs    Physical Exam:    GEN: Holger Boyd appears well, alert and oriented x 3, pleasant and cooperative   HEENT: pupils equal, round, and reactive to light; extraocular muscles intact  NECK: supple, no carotid bruits   HEART: regular rhythm, normal S1 and S2, no murmurs, clicks, gallops or rubs   LUNGS:  Decreased breath sounds bilaterally; no wheezes, rales, or rhonchi   ABDOMEN: normal bowel sounds, soft, no tenderness, no distention  EXTREMITIES: peripheral pulses normal; no clubbing, cyanosis, or edema  NEURO: no focal findings   SKIN: normal without suspicious lesions on exposed skin    Medications:      Current Facility-Administered Medications:     acetaminophen (TYLENOL) tablet 650 mg, 650 mg, Oral, Q8H PRN, Afia Jimenez MD, 325 mg at 06/20/21 2139    allopurinol (ZYLOPRIM) tablet 300 mg, 300 mg, Oral, Daily, Afia Jimenez MD, 300 mg at 06/20/21 0810    dexamethasone (DECADRON) tablet 40 mg, 40 mg, Oral, Daily With Breakfast, Afia Jimenez MD, 40 mg at 06/20/21 0810    guaiFENesin (MUCINEX) 12 hr tablet 600 mg, 600 mg, Oral, BID, Romie Coronado MD, 600 mg at 06/20/21 1724    insulin glargine (LANTUS) subcutaneous injection 15 Units 0 15 mL, 15 Units, Subcutaneous, QAM, Romie Coronado MD, 15 Units at 06/21/21 0807    insulin lispro (HumaLOG) 100 units/mL subcutaneous injection 1-5 Units, 1-5 Units, Subcutaneous, HS, Romie Coronado MD    insulin lispro (HumaLOG) 100 units/mL subcutaneous injection 1-6 Units, 1-6 Units, Subcutaneous, TID AC, 6 Units at 06/20/21 1724 **AND** Fingerstick Glucose (POCT), , , TID AC, Romie Coronado MD    levothyroxine tablet 150 mcg, 150 mcg, Oral, Early Morning, Afia Jimenez MD, 150 mcg at 06/20/21 0646    metoprolol tartrate (LOPRESSOR) partial tablet 12 5 mg, 12 5 mg, Oral, Q12H [facility], Jessica Sanchez PA-C, 12 5 mg at 06/20/21 2139    nicotine (NICODERM CQ) 7 mg/24hr TD 24 hr patch 1 patch, 1 patch, Transdermal, Daily, Afia Jimenez MD    ondansetron (ZOFRAN) injection 4 mg, 4 mg, Intravenous, Q4H PRN, Afia Jimenez MD    pantoprazole (PROTONIX) EC tablet 40 mg, 40 mg, Oral, Daily, Afia Jimenez MD, 40 mg at 06/20/21 0810    tiotropium (SPIRIVA) capsule for inhaler 18 mcg, 18 mcg, Inhalation, Daily, Braxton Santamaria MD, 18 mcg at 06/20/21 0813     Labs & Results:    Results from last 7 days   Lab Units 06/18/21 2001 06/18/21  1610 06/18/21  1328   TROPONIN I ng/mL <0 02 <0 02 <0 02     Results from last 7 days   Lab Units 06/21/21  0529 06/20/21  0506 06/19/21  0506   WBC Thousand/uL 17 26* 14 10* 6 24   HEMOGLOBIN g/dL 9 0* 8 6* 8 4*   HEMATOCRIT % 31 4* 31 4* 29 5*   PLATELETS Thousands/uL 209 141* 69*         Results from last 7 days   Lab Units 06/21/21  0529 06/20/21  0506 06/19/21  0506 06/18/21  1328 06/17/21  1023   POTASSIUM mmol/L 4 9 4 5 5 3 4 8 4 4   CHLORIDE mmol/L 107 109* 108 106 104   CO2 mmol/L 28 22 23 25 26   BUN mg/dL 45* 42* 31* 34* 34*   CREATININE mg/dL 1 46* 1 65* 1 67* 1 68* 1 67*   CALCIUM mg/dL 9 5 9 1 8 9 9 4 9 7   ALK PHOS U/L  --   --  154* 152* 171*   ALT U/L  --   --  24 31 35   AST U/L  --   --  9 13 17     Results from last 7 days   Lab Units 06/18/21  1328   INR  1 12     Results from last 7 days   Lab Units 06/18/21  1328   MAGNESIUM mg/dL 1 8       Echo: pending    EKG personally reviewed by Shruthi Almazan MD

## 2021-06-21 NOTE — DISCHARGE INSTR - AVS FIRST PAGE
Dear Mamadou Zavala,     It was our pleasure to care for you here at [facility] [address]  It is our hope that we were always able to exceed the expected standards for your care during your stay  You were hospitalized due to SOB with Right lung mass  You were cared for on the 3rd floor by Carlton Laurent DO under the service of Krys Leal MD with the [facility] who covers for your primary care physician (PCP), Chanell De Jesus DO, while you were hospitalized  If you have any questions or concerns related to this hospitalization, you may contact us at [phone]  For follow up as well as any medication refills, we recommend that you follow up with your primary care physician  A registered nurse will reach out to you by phone within a few days after your discharge to answer any additional questions that you may have after going home  However, at this time we provide for you here, the most important instructions / recommendations at discharge:     · Notable Medication Adjustments -   · Spiriva daily  · Metformin 500 mg twice daily  · Testing Required after Discharge -   · PFTs with pulmonology  · Repeat A1c in 3 months  · Important follow up information -   · With PCP  · With pulmonology  · With cardiology  · With Hematology/Oncology  · Please review this entire after visit summary as additional general instructions including medication list, appointments, activity, diet, any pertinent wound care, and other additional recommendations from your care team that may be provided for you        Sincerely,     Carlton Laurent DO

## 2021-06-21 NOTE — PROGRESS NOTES
Progress Note - Pulmonary   Illa Sarthak Angel 68 y o  male MRN: [id number]  Unit/Bed#: S -01 Encounter: [id number]      Assessment & Recommendations:  1  Abnormal CT Chest - RUL mass and mediastinal adenopathy - suspected lung cancer with possible contralateral lesion/metastasis vs synchronous primary  · Status post EBUS with level 7 and hilar mass biopsy  · Follow up pathology results  · OK to resume VTE prophylaxis  · Will need CT/PET as outpatient    2  Suspected COPD w/o AE  · Continue spiriva  · Add xopenex/atrovent nebs and flutter valve  · PFTs as outpatient    3  ITP on steroids    4  (+) Rapid HIV - awaiting confirmatory testing      Subjective:   Feels stable, some sputum production, no chest pain, no pleurisy, denied hemoptysis    Objective:       Vitals: Blood pressure 106/52, pulse 83, temperature (!) 97 1 °F (36 2 °C), resp  rate 16, weight 78 9 kg (174 lb), SpO2 94 % , RA, Body mass index is 31 83 kg/m²  Intake/Output Summary (Last 24 hours) at 6/21/2021 1323  Last data filed at 6/21/2021 1254  Gross per 24 hour   Intake 790 ml   Output 2000 ml   Net -1210 ml         Physical Exam  Gen: Older man, awake, alert, oriented x 3, no acute distress  HEENT: Mucous membranes moist, no oral lesions, no thrush  NECK: No accessory muscle use, JVP not elevated  Cardiac: Regular, single S1, single S2, no murmurs, no rubs, no gallops  Lungs: slight rhonchi cleared with cough, no wheeze, no rales  Abdomen: normoactive bowel sounds, soft nontender, nondistended, no rebound or rigidity, no guarding  Extremities: no cyanosis, no clubbing, no edema    Labs: I have personally reviewed pertinent lab results    Laboratory and Diagnostics  Results from last 7 days   Lab Units 06/21/21  0529 06/20/21  0506 06/19/21  0506 06/18/21  1610 06/18/21  1328 06/17/21  1023   WBC Thousand/uL 17 26* 14 10* 6 24  --  7 92 6 91   HEMOGLOBIN g/dL 9 0* 8 6* 8 4*  --  9 7* 10 2*   HEMATOCRIT % 31 4* 31 4* 29 5*  --  32 7* 35 0* PLATELETS Thousands/uL 209 141* 69* 46* 44* 14*   NEUTROS PCT %  --  92*  --   --   --   --    BANDS PCT % 2  --   --   --  1 4   MONOS PCT %  --  3*  --   --   --   --    MONO PCT % 4  --   --   --  0* 4     Results from last 7 days   Lab Units 06/21/21  0529 06/20/21  0506 06/19/21  0506 06/18/21  1328 06/17/21  1023   SODIUM mmol/L 143 142 140 141 140   POTASSIUM mmol/L 4 9 4 5 5 3 4 8 4 4   CHLORIDE mmol/L 107 109* 108 106 104   CO2 mmol/L 28 22 23 25 26   ANION GAP mmol/L 8 11 9 10 10   BUN mg/dL 45* 42* 31* 34* 34*   CREATININE mg/dL 1 46* 1 65* 1 67* 1 68* 1 67*   CALCIUM mg/dL 9 5 9 1 8 9 9 4 9 7   GLUCOSE RANDOM mg/dL 342* 360* 345* 185* 156*   ALT U/L  --   --  24 31 35   AST U/L  --   --  9 13 17   ALK PHOS U/L  --   --  154* 152* 171*   ALBUMIN g/dL  --   --  2 1* 2 4* 2 3*   TOTAL BILIRUBIN mg/dL  --   --  0 18* 0 38 0 30     Results from last 7 days   Lab Units 06/18/21  1328   MAGNESIUM mg/dL 1 8      Results from last 7 days   Lab Units 06/18/21  1328   INR  1 12      Results from last 7 days   Lab Units 06/18/21 2001 06/18/21  1610 06/18/21  1328   TROPONIN I ng/mL <0 02 <0 02 <0 02                     Results from last 7 days   Lab Units 06/18/21  1328   D-DIMER QUANTITATIVE ug/ml FEU 1 92*     Rapid HIV (+), P24 ag neg, Multispot pending    Microbiology:  None    Imaging and other studies: I have personally reviewed pertinent reports  and I have personally reviewed pertinent films in PACS  MRI Brain 6/20 - no mass or lesions appreciated    CT angio 6/20 - large RUL mass [id number], some 4R, level 7 adenopathy, small right effusion, left sided NICHELLE subpleural lesion, background emphysema, no PE      Luke Casillas DO, [facility]

## 2021-06-21 NOTE — ASSESSMENT & PLAN NOTE
Patient recently diagnosed with atrial fibrillation after EKG in office showed AFib with RVR  Patient is not currently in AFib  Most likely secondary to lung lesion  Troponin negative x3  CHADS2 score = 3, HASBLED score 2  Moderate risk of major bleeding     Plan  · Cardiology Consult- discontinue amlodipine, start metoprolol low dose, begin eliquis 5mg BID, ECHO pending, no further diuresis at this time  F/u outpatient     · Case management consult as patient and his daughter requesting home VNA services

## 2021-06-21 NOTE — ASSESSMENT & PLAN NOTE
Recent Labs     06/19/21  0506 06/20/21  0506 06/21/21  0529   WBC 6 24 14 10* 17 26*     · Likely secondary to dexamethasone  · Continue to monitor am CBC

## 2021-06-21 NOTE — ASSESSMENT & PLAN NOTE
· Patient's rapid HIV was reactive  HIV 1/2 multi spot is pending  No history of IV drug abuse, 1 sexual partner, asymptomatic

## 2021-06-21 NOTE — ASSESSMENT & PLAN NOTE
Patient follows with Dr Almas Woodard  Initially diagnosed with ITP on 06/03 after being referred by his primary care physician  Patient saw Dr Almas Woodard yesterday in office  He had been noncompliant with his weekly blood tests secondary to not knowing he needed weekly blood test   Dr Almas Woodard started patient on dexamethasone 40 mg for 4 days  Patient was also continued on Protonix while on steroids in order to prevent gastritis  At their office visit they did discuss starting Rituxan  This will be administered subcutaneously weekly x4 doses for his acute ITP  Completed 4 day course of dexamethasone  Platelets improved, now 209k       Plan   · CBC daily  · Continue Protonix 40 mg daily  · Fall precautions  · Monitor for signs of bleeding

## 2021-06-21 NOTE — DISCHARGE INSTRUCTIONS
A-fib (Atrial Fibrillation)   WHAT YOU NEED TO KNOW:   A-fib may come and go, or it may be a long-term condition  A-fib can cause blood clots, stroke, or heart failure  These conditions may become life-threatening  It is important to treat and manage A-fib to help prevent a blood clot, stroke, or heart failure  DISCHARGE INSTRUCTIONS:   Call your local emergency number (911 in the [address] [facility]) or have someone call if:   · You have any of the following signs of a heart attack:      ? Squeezing, pressure, or pain in your chest    ? You may  also have any of the following:     § Discomfort or pain in your back, neck, jaw, stomach, or arm    § Shortness of breath    § Nausea or vomiting    § Lightheadedness or a sudden cold sweat    · You have any of the following signs of a stroke:      ? Numbness or drooping on one side of your face     ? Weakness in an arm or leg    ? Confusion or difficulty speaking    ? Dizziness, a severe headache, or vision loss    Call your doctor or cardiologist if:   · Your arm or leg feels warm, tender, and painful  It may look swollen and red  · Your heart rate is more than 110 beats per minute  · You have new or worsening swelling in your legs, feet, ankles, or abdomen  · You are short of breath, even at rest     · You have questions or concerns about your condition or care  Medicines: You may need any of the following:  · Heart medicines  help control your heart rate or rhythm  You may need more than one medicine to treat your symptoms  · Blood thinners  help prevent blood clots  Clots can cause strokes, heart attacks, and death  The following are general safety guidelines to follow while you are taking a blood thinner:    ? Watch for bleeding and bruising while you take blood thinners  Watch for bleeding from your gums or nose  Watch for blood in your urine and bowel movements  Use a soft washcloth on your skin, and a soft toothbrush to brush your teeth   This can keep your skin and gums from bleeding  If you shave, use an electric shaver  Do not play contact sports  ? Tell your dentist and other healthcare providers that you take a blood thinner  Wear a bracelet or necklace that says you take this medicine  ? Do not start or stop any other medicines unless your healthcare provider tells you to  Many medicines cannot be used with blood thinners  ? Take your blood thinner exactly as prescribed by your healthcare provider  Do not skip does or take less than prescribed  Tell your provider right away if you forget to take your blood thinner, or if you take too much  ? Warfarin  is a blood thinner that you may need to take  The following are things you should be aware of if you take warfarin:     § Foods and medicines can affect the amount of warfarin in your blood  Do not make major changes to your diet while you take warfarin  Warfarin works best when you eat about the same amount of vitamin K every day  Vitamin K is found in green leafy vegetables and certain other foods  Ask for more information about what to eat when you are taking warfarin  § You will need to see your healthcare provider for follow-up visits when you are on warfarin  You will need regular blood tests  These tests are used to decide how much medicine you need  · Antiplatelets , such as aspirin, help prevent blood clots  Take your antiplatelet medicine exactly as directed  These medicines make it more likely for you to bleed or bruise  If you are told to take aspirin, do not take acetaminophen or ibuprofen instead  · Take your medicine as directed  Contact your healthcare provider if you think your medicine is not helping or if you have side effects  Tell him or her if you are allergic to any medicine  Keep a list of the medicines, vitamins, and herbs you take  Include the amounts, and when and why you take them  Bring the list or the pill bottles to follow-up visits   Carry your medicine list with you in case of an emergency  Manage A-fib:   · Know your target heart rate  Learn how to check your pulse and monitor your heart rate  · Know the risks if you choose to drink alcohol  Alcohol can increase your risk for A-fib or make A-fib harder to manage  Ask your healthcare provider if it is okay for you to drink any alcohol  He or she can help you set limits for the number of drinks you have in 24 hours and in a week  A drink of alcohol is 12 ounces of beer, 5 ounces of wine, or 1½ ounces of liquor  · Do not smoke  Nicotine can cause heart damage and make it more difficult to manage your A-fib  Do not use e-cigarettes or smokeless tobacco in place of cigarettes or to help you quit  They still contain nicotine  Ask your healthcare provider for information if you currently smoke and need help quitting  · Eat heart-healthy foods  Heart healthy foods will help keep your cholesterol low  These include fruits, vegetables, whole-grain breads, low-fat dairy products, beans, lean meats, and fish  Replace butter and margarine with heart-healthy oils such as olive oil and canola oil  · Maintain a healthy weight  Ask your healthcare provider what a healthy weight is for you  Ask him or her to help you create a safe weight loss plan if you are overweight  Even a small goal of a 10% weight loss can improve your heart health  · Get regular physical activity  Physical activity helps improve your heart health  Get at least 150 minutes of moderate aerobic physical activity each week  Your healthcare provider can help you create an activity plan  · Manage other health conditions  This includes high blood pressure or cholesterol, sleep apnea, diabetes, and other heart conditions  Take medicine as directed and follow your treatment plan  Your healthcare provider may need to change a medicine you are taking if it is causing your A-fib   Do not  stop taking any medicine unless directed by your provider  Follow up with your doctor or cardiologist as directed: You will need regular blood tests and monitoring  Write down your questions so you remember to ask them during your visits  © Copyright [employer] 2021 Information is for End User's use only and may not be sold, redistributed or otherwise used for commercial purposes  All illustrations and images included in CareNotes® are the copyrighted property of [employer]  or Dara Michaels   The above information is an  only  It is not intended as medical advice for individual conditions or treatments  Talk to your doctor, nurse or pharmacist before following any medical regimen to see if it is safe and effective for you  Lung Cancer   WHAT YOU SHOULD KNOW:   Lung cancer is a cancer that generally starts in the cells that line the airways of the lungs  The 2 basic types of lung cancer are non-small cell lung cancer (NSCLC) and small cell lung cancer (SCLC)  INSTRUCTIONS:   Follow up with your oncologist as directed: You will need to see your oncologist for ongoing treatment  Write down your questions so you remember to ask them during your visits  Oxygen: You may be given oxygen through a mask or nasal cannula to help you breathe easier  Do not smoke or let anyone else smoke in the same room while your oxygen is on  This may cause a fire  Self-care during cancer treatment:   · Rest as needed  Return to activities slowly, and do more as you feel stronger  You may have trouble breathing when you are lying down  Use foam wedges or elevate the head of your bed  This may help you breathe easier while you are resting or sleeping  Use a device that will tilt your whole body, or bend your body at the waist  The device should not bend your body at the upper back or neck  · Drink liquids as directed  Ask how much liquid to drink each day and which liquids are best for you  Drink extra liquids to prevent dehydration   You will also need to drink extra liquids if you are vomiting or have diarrhea from cancer treatments  · Eat healthy foods  Healthy foods include fruits, vegetables, whole-grain breads, low-fat dairy products, beans, lean meats, and fish  It may be easier for you to eat several small meals a day rather than a few large meals  · Do not smoke  If you smoke, it is never too late to quit  Ask for information if you need help quitting  Avoid being around others who smoke  Contact your primary healthcare provider or oncologist if:   · You have a fever  · You have blood in your mucus or spit  · You are vomiting and cannot keep food or liquids down  · You have questions or concerns about your condition or care  Return to the emergency department if:   · You cannot think clearly  · You suddenly feel lightheaded or are short of breath  · Your lips or nails look blue or pale  · Your arm or leg feels warm, tender, and painful  It may look swollen and red  · You have chest pain when you take a deep breath or cough  You cough up blood  © 2014 [address] is for End User's use only and may not be sold, redistributed or otherwise used for commercial purposes  All illustrations and images included in CareNotes® are the copyrighted property of [employer] M [employer]  or Yves De Los Santos  The above information is an  only  It is not intended as medical advice for individual conditions or treatments  Talk to your doctor, nurse or pharmacist before following any medical regimen to see if it is safe and effective for you  COPD (Chronic Obstructive Pulmonary Disease)   WHAT YOU NEED TO KNOW:   COPD (chronic obstructive pulmonary disease) can get worse quickly  Your healthcare providers will help you create a care plan to use at home  The plan will give directions on how to prevent or manage shortness of breath   Your family members or anyone who cares for you will also get directions to help you  DISCHARGE INSTRUCTIONS:   Call your local emergency number (911 in the [address]) if:   · You feel lightheaded, short of breath, and have chest pain  Return to the emergency department if:   · You cough up blood  · You are confused, dizzy, or feel faint  · Your arm or leg feels warm, tender, and painful  It may look swollen and red  Call your doctor if:   · You have increased shortness of breath  · You need more medicine than usual to control your symptoms  · You are coughing or wheezing more than usual     · You are coughing up more mucus, or it has a new color or odor  · You gain more than 3 pounds in a week  · You have a fever, a runny or stuffy nose, and a sore throat, or other cold or flu symptoms  · Your skin, lips, or nails start to turn blue  · You have swelling in your legs or ankles  · You are very tired or weak for more than a day  · You notice changes in your mood, or changes in your ability to think or concentrate  · You have questions or concerns about your condition or care  Medicines:   · Short-acting bronchodilators  may be called rescue inhalers or relievers  They relieve sudden, severe symptoms and start to work right away  · Long-acting bronchodilators  may be called controllers  This medicine helps open the airways over time, and is used to decrease and prevent breathing problems  Long-acting bronchodilators should not be used to treat sudden, severe symptoms, such as trouble breathing  · Take your medicine as directed  Contact your healthcare provider if you think your medicine is not helping or if you have side effects  Tell him or her if you are allergic to any medicine  Keep a list of the medicines, vitamins, and herbs you take  Include the amounts, and when and why you take them  Bring the list or the pill bottles to follow-up visits  Carry your medicine list with you in case of an emergency      Help make breathing easier:   · Use pursed-lip breathing any time you feel short of breath  Take a deep breath in through your nose  Slowly breathe out through your mouth with your lips pursed  Try to take 2 times as long to breathe out as to breathe in  This helps you get rid of as much air from your lungs as possible  You can also practice this breathing pattern while you bend, lift, climb stairs, or exercise  It slows down your breathing and helps move more air in and out of your lungs  · Avoid anything that makes your symptoms worse  Stay out of high altitudes and places with high humidity  Stay inside, or cover your mouth and nose with a scarf when you are outside in cold weather  Stay inside on days when air pollution or pollen counts are high  Do not use aerosol sprays such as deodorant, bug spray, and hairspray  · Exercise daily  Exercise for at least 20 minutes each day to help increase your energy and decrease shortness of breath  Talk to your healthcare provider about the best exercise plan for you  Manage COPD and help prevent exacerbations:  COPD is a serious condition that gets worse over time  A COPD exacerbation means your symptoms suddenly get worse  It is important to prevent exacerbations  An exacerbation can cause more lung damage  COPD cannot be cured, but you can take action to feel better and prevent exacerbations:  · Do not smoke  Nicotine and other chemicals in cigarettes and cigars can cause lung damage and make your COPD worse  Ask your healthcare provider for information if you currently smoke and need help to quit  E-cigarettes or smokeless tobacco still contain nicotine  Talk to your healthcare provider before you use these products  · Avoid secondhand smoke  This is smoke another person exhales  Even if you have never smoked or have quit, it is important to avoid secondhand smoke  This smoke can also cause lung damage or trigger an exacerbation      · Go to pulmonary rehabilitation (rehab) if directed  Rehab is a program run by specialists who help you learn to manage COPD  Examples include a pulmonologist (lung specialist), dietitian, or exercise therapist  The specialists will help you make a plan to avoid triggers that cause an exacerbation  · Take your medicines as directed  Refill your medicines before you are out so that you do not miss a dose  Ask your healthcare provider if you have any questions on how to take your medicines  · Protect yourself from germs  Germs can get into your lungs and cause an infection  An infection in your lungs can create more mucus and make it harder to breathe  An infection can also create swelling in your airway and prevent air from getting in  You can decrease your risk for infection by doing the following:         ? Wash your hands often with soap and water  Carry germ-killing gel with you  You can use the gel to clean your hands when soap and water are not available  ? Do not touch your eyes, nose, or mouth unless you have washed your hands first     ? Always cover your mouth when you cough  Cough into a tissue or your shirtsleeve so you do not spread germs from your hands  ? Try to avoid people who have a cold or the flu  If you are sick, stay away from others as much as possible  ? Ask about vaccines  Influenza (the flu) and pneumonia can become life-threatening for a person who has COPD  Get a flu vaccine each year as soon as it becomes available  The pneumonia vaccine may be given every 5 years, or as directed  Ask about other vaccines you may need and when to get them  · Drink liquids as directed  You may need to drink more liquid than usual  Liquid will help to keep your air passages moist and help you cough up mucus  Ask how much liquid to drink each day and which liquids are best for you  Follow up with your doctor as directed: You may need more tests   Your doctor may refer you to a specialist, depending on your needs  Some specialist services may be available through your pulmonary rehab program  Write down your questions so you remember to ask them during your visits  © Copyright [address] Information is for End User's use only and may not be sold, redistributed or otherwise used for commercial purposes  All illustrations and images included in CareNotes® are the copyrighted property of [employer] M , [employer]  or [address] [place], [place] [address]dolores Michaels   The above information is an  only  It is not intended as medical advice for individual conditions or treatments  Talk to your doctor, nurse or pharmacist before following any medical regimen to see if it is safe and effective for you

## 2021-06-21 NOTE — ASSESSMENT & PLAN NOTE
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

## 2021-06-21 NOTE — DISCHARGE SUMMARY
[facility]  Discharge- Mingo Angel 1940, 68 y o  male MRN: [id number]  Unit/Bed#: S -01 Encounter: [id number]  Primary Care Provider: Sultana Melendrez DO   Date and time admitted to hospital: 6/18/2021  1:03 PM    * Paroxysmal A-fib [facility])  Assessment & Plan  Patient recently diagnosed with atrial fibrillation after EKG in office showed AFib with RVR  Patient is not currently in AFib  Most likely secondary to lung lesion  Troponin negative x3  CHADS2 score = 3, HASBLED score 2  Moderate risk of major bleeding     Plan  · Cardiology Consult- metoprolol 12 5 mg  Amlodipine discontinued  · No further diuresis  · Case management consult as patient and his daughter requesting home VNA services    Idiopathic thrombocytopenic purpura (ITP) [facility])  Assessment & Plan  Patient follows with Dr Ariel Gill  Initially diagnosed with ITP on 06/03 after being referred by his primary care physician  Patient saw Dr Ariel Gill yesterday in office  He had been noncompliant with his weekly blood tests secondary to not knowing he needed weekly blood test   Dr Ariel Gill started patient on dexamethasone 40 mg for 4 days  Patient was also continued on Protonix while on steroids in order to prevent gastritis  At their office visit they did discuss starting Rituxan  This will be administered subcutaneously weekly x4 doses for his acute ITP  Completed 4 day course of dexamethasone  Platelets improved, now 223k  Plan   · CBC daily  · Continue Protonix 40 mg daily  · Fall precautions  · Monitor for signs of bleeding     Mass of right lung  Assessment & Plan  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    He is also response for taking care of his wife who is home in a hospital bed  CT and CXR showing a large mass in the right lung  MRI brain unremarkable for mets/lesions  Plan   · Nasal cannula oxygen as needed, home OT eval, ambulatory pulse ox  · Pulmonology consulted - start Spiriva, Status post EBUS with level 7 and hilar mass biopsy, f/u pathology report, outpatient PFTs- per pulmonology stable for discharge    COPD (chronic obstructive pulmonary disease) ([facility] [address] )  Assessment & Plan  Nebulizer per Pulmonology     New onset type 2 diabetes mellitus ([facility] [address] )  Assessment & Plan  · Glucose high 300's, worse in the setting of dexamethasone treatment for ITP  · A1c 6 6 this admission  · Received 15 units of Lantus yesterday morning  · Plan:  · Lantus 15 units daily while inpatient  · Start metformin 500 b i d  tomorrow if Cr continues to improve, f/u with PCP      Leukocytosis  Assessment & Plan  Recent Labs     06/21/21  0529 06/22/21  0623 06/23/21  0446   WBC 17 26* 11 63* 12 13*     · Likely secondary to dexamethasone      Shortness of breath  Assessment & Plan  Present on arrival to the emergency department  Patient states that his shortness of breath worsens with activity  Patient is a lifelong smoker  Patient currently smokes half pack a day  Has not seen a doctor in 2 years prior to being diagnosed with ITP  Was treated as an outpatient by his PCP for pneumonia  Chest x-ray at that time did show a large right upper airspace opacification  Continues with wheezing and rhonchi on the right  · NC oxygen as needed, home O2 eval, ambulatory pulse ox  · Pulmonology consult - started spiriva,discharge with nebulizer and albuterol  PFTs as outpatient, Bronch completed on Monday, f/u pathology  · Shortness of breath likely secondary to right lung mass and suspected COPD  · Tessalon pearles 200TID prn for cough  · Continue mucinex    SULEMA (acute kidney injury) [facility])  Assessment & Plan  Present on admission    Baseline is around 1 1  UA: Glucose >1000  Recent Labs     06/21/21  0529 06/22/21  0623 06/23/21  0446   CREATININE 1 46* 1 32* 1 10   EGFR 45 51 64     Estimated Creatinine Clearance: 50 7 mL/min (by C-G formula based on SCr of 1 1 mg/dL)  Improving  Plan  · Hold hydrochlorothiazide  · resolved  · Diuresis held per Cardiology  · CMP a m  Hypertension  Assessment & Plan  Patient with history of hypertension  Blood pressure currently controlled 127/61  Currently on amlodipine hydrochlorothiazide as an outpatient  Of note patient has been recently diagnosed with atrial fibrillation  He has not seen a cardiologist     Plan  · Cardiology consulted- amlodipine discontinued  · Metoprolol 12 5 mg daily  · Monitor BP  · CBC, CMP a m  Hyperkalemia-resolved as of 6/23/2021  Assessment & Plan  Recent Labs     06/22/21  0623 06/22/21  1559 06/23/21  0446   K 5 8* 5 3 4 8     · Possible in the setting of SULEMA and hyperglycemia 2/2 dexamethasone treatment (now completed)  · SULEMA now improving  · Will recheck K and give Kayexalate if continues to be elevated  · Continue tele monitoring    HIV (human immunodeficiency virus infection) (HCC)-resolved as of 6/23/2021  Assessment & Plan  · Patient's rapid HIV was reactive  HIV 1/2 multi spot is pending  No history of IV drug abuse, 1 sexual partner, asymptomatic  Discharging Resident Physician: Meera Brunner MD  Attending: Gilberto Akers DO  PCP: Jaclyn Crawford DO  Admission Date: 6/18/2021  Discharge Date: 06/23/21    Disposition:     Home    Reason for Admission: Afib and SOB in setting of large right lung mass    Consultations During Hospital Stay:  · Cardiology, Pulmonology    Procedures Performed:     · EBUS with level 7 and hilar mass biopsy 6/21    Significant Findings / Test Results:     XR orbits for foreign body  Result Date: 6/20/2021  Impression: No radiopaque orbital foreign body   Workstation performed: [id number]     XR chest 1 view portable  Result Date: 6/18/2021  Impression: Again seen is a large mass occupying the periphery of the right lung  There is also a 2 4 cm nodular mass in the left midlung zone laterally  Please see subsequent chest CT for further evaluation  Workstation performed: [id number]     MRI brain w wo contrast  Result Date: 6/20/2021  Impression: No intracranial metastatic disease or other acute pathology  Mild chronic microangiopathic change  Workstation performed: [id number]     CTA chest pe study  Result Date: 6/18/2021  Impression: 1  No pulmonary embolus  Measured RV/LV ratio is within normal limits at less than 0 9   2   Large solid right lung mass encasing the right pulmonary artery and branches with no narrowing  Encasement with narrowing and occlusion of the anterior right upper lobe and middle lobe bronchial branches with obstructive atelectasis   3   Scattered bilateral lung nodules largest in the left upper lobe subpleural lobulated mass  Findings correlate with recent chest x-rays  Primary lung malignancy with metastatic nodule suggested biopsy correlation recommended  The study was marked in [facility] for immediate notification    Workstation performed: [id number]     Endobronchial ultrasound (EBUS)  Result Date: 6/21/2021  · Impression: Mediastinal adenopathy and right hilar mass lesion with RUL endobronchial disease, extrinsic compression RLL and RML orifices RECOMMENDATION: Follow up pathology and culture results       Incidental Findings: SULEMA   Recent Labs     06/21/21  0529 06/22/21  0623 06/23/21  0446   CREATININE 1 46* 1 32* 1 10   EGFR 45 51 64     · Estimated Creatinine Clearance: 50 7 mL/min (by C-G formula based on SCr of 1 1 mg/dL)  HIV   Ref Range & Units 6/19/21 0506   Rapid HIV 1 AND 2 Non-Reactive ReactiveAbnormal     HIV-1 P24 Ag Screen Non-Reactive Non-Reactive       Narrative    Negative for HIV-1 p24 Antigen  Preliminary POSITIVE for HIV-1 and/or HIV-2 Antibody        Hyperglycemia  Recent Labs 06/22/21  1516 06/22/21  2143 06/23/21  0630   POCGLU 444* 342* 186*     Lab Results   Component Value Date    HGBA1C 6 6 (H) 06/20/2021         Test Results Pending at Discharge (will require follow up): · Leukemia/lymphoma flow cytometry  · Pulmonary cytology  · FNA     Outpatient Tests Requested:  · PFTs with pulmonology    Complications:  See hospital course summary below    Hospital Course:     Amanda Lynch is a 68 y o  male patient with recent history of ITP, HTN, and tabacco use who originally presented to the hospital on 6/18/2021 due to shortness of breath  Patient recently presented to his PCP with complaints of intermittent shortness of breath and productive cough  He had a chest x-ray performed which showed a large right-sided opacity  Patient was started on Levaquin she just picked up yesterday  During this same time patient had been following with Dr Tod Gowers with Hematology regarding his ITP  As his platelets were 14, Dr Tod Gowers started him on oral steroids with plan to initiate rituximab therapy as an outpatient  Patient noted to have AFib with RVR during the intake of that appointment  They did recommend he go to the ED at that time however due to social issues (patient is primary caregiver of his wife) he could not presented that time  Repeat chest x-ray here shows large right-sided mass  CTA shows large tumor with incasement of the right PA as well as mass effect on the right upper and middle lobe bronchial branches  Cardiology and Pulmonology were consulted  Pulmonology suspects underlying COPD and started patient on Spiriva  He underwent EBUS on 6/21 and should follow up with pulmonology outpatient for results as well as PFTs  He did not require NC oxygen during his hospitalization  Cardiology started patient on tele monitoring and he remained in NSR throughout hospitalization   His medications were switched from amlodipine to metoprolol and anticoagulation was initially held in setting of ITP  HE completed his 4 day course of steroids for ITP and his platelets improved  He was started on anticoagulation on 6/22 Incidentally during his admission his blood sugars were elevated and a Hgb A1C was ordered revealing new onset T2DM  He was managed with diabetic diet and SSI during hospitalization with instructions to begin metformin at discharge and follow up with his PCP for repeat A1c in 3 months outpatient and instructions to resume diabetic diet outpatient  He was found to have an SULEMA most likely secondary to volume overload  He was managed with IV lasix by cardiology and fluids were held due to volume overload  Cardiology then discontinued amlodipine and started patient on metoprolol 12 5 mg  Condition at Discharge: good     Discharge Day Visit / Exam:     Subjective:  No acute distress ready to go home  Vitals: Blood Pressure: 134/70 (06/23/21 0631)  Pulse: 79 (06/23/21 0631)  Temperature: 98 7 °F (37 1 °C) (06/23/21 0631)  Temp Source: Oral (06/23/21 0631)  Respirations: 19 (06/23/21 0631)  Weight - Scale: 80 kg (176 lb 5 9 oz) (06/23/21 0600)  SpO2: 97 % (06/23/21 0738)  Exam:   Physical Exam  Vitals and nursing note reviewed  Constitutional:       Appearance: He is well-developed  He is obese  HENT:      Head: Normocephalic and atraumatic  Nose: Nose normal    Eyes:      Extraocular Movements: Extraocular movements intact  Pupils: Pupils are equal, round, and reactive to light  Cardiovascular:      Rate and Rhythm: Normal rate and regular rhythm  Heart sounds: Normal heart sounds  No murmur heard  Pulmonary:      Effort: No respiratory distress  Comments: Decreased breath sounds  Right-sided rhonchi  Abdominal:      General: Bowel sounds are normal       Palpations: Abdomen is soft  Musculoskeletal:         General: Normal range of motion  Cervical back: Normal range of motion and neck supple  Right lower leg: No edema  Left lower leg: No edema  Skin:     General: Skin is warm and dry  Neurological:      Mental Status: He is alert and oriented to person, place, and time  Psychiatric:         Behavior: Behavior normal          Discharge instructions/Information to patient and family:   See after visit summary for information provided to patient and family  Provisions for Follow-Up Care:  See after visit summary for information related to follow-up care and any pertinent home health orders  Planned Readmission: No     Discharge Medications:  See after visit summary for reconciled discharge medications provided to patient and family        ** Please Note: This note has been constructed using a voice recognition system **

## 2021-06-21 NOTE — PROGRESS NOTES
[facility]  Progress Note - Elvia Núñez 1940, 68 y o  male MRN: [id number]  Unit/Bed#: S -01 Encounter: [id number]  Primary Care Provider: Lubna Soriano DO   Date and time admitted to hospital: 6/18/2021  1:03 PM    * Paroxysmal A-fib [facility])  Assessment & Plan  Patient recently diagnosed with atrial fibrillation after EKG in office showed AFib with RVR  Patient is not currently in AFib  Most likely secondary to lung lesion  Troponin negative x3  CHADS2 score = 3, HASBLED score 2  Moderate risk of major bleeding     Plan  · Cardiology Consult- discontinue amlodipine, start metoprolol low dose, hold anticoagulation at this time, discontinue tele (no events recorded), ECHO pending, 40mg IV lasix once yesterday    Idiopathic thrombocytopen[facility]pura [facility] )  Assessment & Plan  Patient follows with Dr Ketty Arnold  Initially diagnosed with ITP on 06/03 after being referred by his primary care physician  Patient saw Dr Ketty Arnold yesterday in office  He had been noncompliant with his weekly blood tests secondary to not knowing he needed weekly blood test   Dr Ketty Arnold started patient on dexamethasone 40 mg for 4 days  Patient was also continued on Protonix while on steroids in order to prevent gastritis  At their office visit they did discuss starting Rituxan  This will be administered subcutaneously weekly x4 doses for his acute ITP  Plan   · Continue dexamethasone 40 mg day #4/4 (started on 4/18)  · CBC daily  · Platelets 14 K on 95/85  Currently trending up    Most recent platelet 503F  · Continue Protonix 40 mg daily  · Fall precautions  · Monitor for signs of bleeding     New onset type 2 diabetes mellitus ([facility] [address] )  Assessment & Plan  · Glucose high 300's, worse in the setting of dexamethasone treatment for ITP  · A1c 6 6 this admission  · Received 15 units of Lantus yesterday morning  · Plan:  · Lantus 15 units this morning  · Accu-Cheks and SSI  · Currently NPO pending procedure today, will begin diabetic diet following procedure      Leukocytosis  Assessment & Plan  · Likely secondary to dexamethasone  · Continue to monitor am CBC    HIV (human immunodeficiency virus infection) ([facility] [address] )  Assessment & Plan  · Patient's rapid HIV was reactive  HIV 1/2 multi spot is pending  No history of IV drug abuse, 1 sexual partner, asymptomatic  Shortness of breath  Assessment & Plan  Present on arrival to the emergency department  Patient states that his shortness of breath worsens with activity  Patient is a lifelong smoker  Patient currently smokes half pack a day  Has not seen a doctor in 2 years prior to being diagnosed with ITP  Was treated as an outpatient by his PCP for pneumonia  Chest x-ray at that time did show a large right upper airspace opacification  Continues with wheezing and rhonchi on the right    · NC oxygen as needed, patient has not required yet  · Pulmonology consult - started spiriva, bronch on monday  · Shortness of breath likely secondary to right lung mass    SULEMA (acute kidney injury) [facility])  Assessment & Plan  Present on admission  Baseline is around 1 1  UA: Glucose >1000  Recent Labs     06/19/21  0506 06/20/21  0506 06/21/21  0529   CREATININE 1 67* 1 65* 1 46*   EGFR 39 39 45     Estimated Creatinine Clearance: 37 9 mL/min (A) (by C-G formula based on SCr of 1 46 mg/dL (H))  Improving  Plan  · Hold hydrochlorothiazide  · Hold IVF with mild volume overload, received IV lasix yesterday  · CMP a m  Mass of right lung  Assessment & Plan  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    He is also response for taking care of his wife who is home in a hospital bed   A was showing a large mass in the right lung  Plan   · Nasal cannula oxygen as needed- no oxygen required overnight  · Pulmonology consulted - start Spiriva, MRI brain asap, bronchoscopy Monday, NPO Sunday night      CTA chest PE study  IMPRESSION:     1  No pulmonary embolus      Measured RV/LV ratio is within normal limits at less than 0 9        2   Large solid right lung mass encasing the right pulmonary artery and branches with no narrowing  Encasement with narrowing and occlusion of the anterior right upper lobe and middle lobe bronchial branches with obstructive atelectasis       3  Scattered bilateral lung nodules largest in the left upper lobe subpleural lobulated mass      Findings correlate with recent chest x-rays  Primary lung malignancy with metastatic nodule suggested biopsy correlation recommended  Hypertension  Assessment & Plan  Patient with history of hypertension  Blood pressure currently controlled 111/56  Currently on amlodipine hydrochlorothiazide as an outpatient  Of note patient has been recently diagnosed with atrial fibrillation  He has not seen a cardiologist     Plan  · Cardiology consulted, discontinue amlodipine, started low-dose metoprolol  · Monitor BP  · CBC, CMP a m  VTE Pharmacologic Prophylaxis:   VTE Score: 4 High Risk (Score >/= 5) - Pharmacological DVT Prophylaxis Contraindicated  Sequential Compression Devices Ordered  Mechanical VTE Prophylaxis in Place: Yes    Patient Centered Rounds: I have performed bedside rounds with nursing staff today  Discussions with Specialists or Other Care Team Provider:  Cardiology, pulmonology    Education and Discussions with Family / Patient:  Patient    Current Length of Stay: 3 day(s)    Current Patient Status: Inpatient     Discharge Plan / Estimated Discharge Date: Anticipate discharge tomorrow to home  Code Status: Level 3 - DNAR and DNI      Subjective:   Patient reports feeling well this morning  He continues to have shortness of breath with ambulation  He has not required any nasal cannula oxygen  He denies any acute complaints this morning  He reports good urine and stool output  He was made aware of his new onset type 2 diabetes and need for follow-up with his PCP outpatient  Objective:     Vitals:   Temp (24hrs), Av 7 °F (36 5 °C), Min:97 5 °F (36 4 °C), Max:97 8 °F (36 6 °C)    Temp:  [97 5 °F (36 4 °C)-97 8 °F (36 6 °C)] 97 8 °F (36 6 °C)  HR:  [77-82] 80  Resp:  [16-20] 20  BP: (119-152)/(60-67) 152/67  SpO2:  [93 %-95 %] 95 %  Body mass index is 31 83 kg/m²  Input and Output Summary (last 24 hours): Intake/Output Summary (Last 24 hours) at 2021 1028  Last data filed at 2021 0800  Gross per 24 hour   Intake 480 ml   Output 2375 ml   Net -1895 ml       Physical Exam:     Physical Exam  Vitals reviewed  Constitutional:       General: He is not in acute distress  Appearance: He is obese  He is not ill-appearing  HENT:      Head: Normocephalic and atraumatic  Nose: Nose normal  No congestion  Mouth/Throat:      Mouth: Mucous membranes are moist       Pharynx: Oropharynx is clear  Eyes:      Extraocular Movements: Extraocular movements intact  Conjunctiva/sclera: Conjunctivae normal       Pupils: Pupils are equal, round, and reactive to light  Cardiovascular:      Rate and Rhythm: Normal rate and regular rhythm  Pulses: Normal pulses  Heart sounds: Normal heart sounds  Pulmonary:      Effort: Pulmonary effort is normal       Breath sounds: Wheezing and rhonchi (Worse on right) present  Abdominal:      General: Abdomen is flat  Bowel sounds are normal       Palpations: Abdomen is soft  Musculoskeletal:      Right lower leg: No edema  Left lower leg: No edema  Skin:     General: Skin is warm and dry  Findings: No rash  Neurological:      General: No focal deficit present        Mental Status: He is alert and oriented to person, place, and time  Psychiatric:         Mood and Affect: Mood normal          Behavior: Behavior normal          Additional Data:     Labs:  Results from last 7 days   Lab Units 06/21/21  0529 06/20/21  0506 06/18/21  1328   WBC Thousand/uL 17 26* 14 10*   < >   HEMOGLOBIN g/dL 9 0* 8 6*   < >   HEMATOCRIT % 31 4* 31 4*   < >   PLATELETS Thousands/uL 209 141*   < >   BANDS PCT % 2  --   --    NEUTROS PCT %  --  92*  --    LYMPHS PCT %  --  3*  --    LYMPHO PCT % 3*  --   --    MONOS PCT %  --  3*  --    MONO PCT % 4  --   --    EOS PCT % 0 0  --     < > = values in this interval not displayed       Results from last 7 days   Lab Units 06/21/21  0529 06/19/21  0506   SODIUM mmol/L 143 140   POTASSIUM mmol/L 4 9 5 3   CHLORIDE mmol/L 107 108   CO2 mmol/L 28 23   BUN mg/dL 45* 31*   CREATININE mg/dL 1 46* 1 67*   ANION GAP mmol/L 8 9   CALCIUM mg/dL 9 5 8 9   ALBUMIN g/dL  --  2 1*   TOTAL BILIRUBIN mg/dL  --  0 18*   ALK PHOS U/L  --  154*   ALT U/L  --  24   AST U/L  --  9   GLUCOSE RANDOM mg/dL 342* 345*     Results from last 7 days   Lab Units 06/18/21  1328   INR  1 12     Results from last 7 days   Lab Units 06/21/21  0746 06/20/21  2038 06/20/21  1912 06/20/21  1541 06/20/21  1153   POC GLUCOSE mg/dl 304* 382* 382* 435* 374*     Results from last 7 days   Lab Units 06/20/21  0506   HEMOGLOBIN A1C % 6 6*           Imaging: Reviewed radiology reports from this admission including: chest xray, chest CT scan and MRI brain    Recent Cultures (last 7 days):           Lines/Drains:  Invasive Devices     Peripheral Intravenous Line            Peripheral IV 06/18/21 Right Antecubital 2 days                Telemetry:   Telemetry Orders (From admission, onward)             48 Hour Telemetry Monitoring  Continuous x 48 hours     Question:  Reason for 48 Hour Telemetry  Answer:  Arrhythmias Requiring Medical Therapy (eg  SVT, Vtach/fib, Bradycardia, Uncontrolled A-fib)                    Last 24 Hours Medication List: Current Facility-Administered Medications   Medication Dose Route Frequency Provider Last Rate    acetaminophen  650 mg Oral Q8H PRN Jazmin Sneed MD      allopurinol  300 mg Oral Daily Jazmin Sneed MD      dexamethasone  40 mg Oral Daily With Breakfast Jazmin Sneed MD      guaiFENesin  600 mg Oral BID Lilliana Jiménez MD      insulin glargine  15 Units Subcutaneous QAM Lilliana Jiménez MD      insulin lispro  1-5 Units Subcutaneous HS Lilliana Jiménez MD      insulin lispro  1-6 Units Subcutaneous TID AC Lilliana Jiménez MD      levothyroxine  150 mcg Oral Early Morning Jazmin Sneed MD      metoprolol tartrate  12 5 mg Oral Q12H [facility] Camila Valadez PA-C      nicotine  1 patch Transdermal Daily Jazmin Sneed MD      ondansetron  4 mg Intravenous Q4H PRN Jazmin Sneed MD      pantoprazole  40 mg Oral Daily Jazmin Sneed MD      tiotropium  18 mcg Inhalation Daily Kavita Hawkins MD          Today, Patient Was Seen By: Romain Carey DO    ** Please Note: This note has been constructed using a voice recognition system   **

## 2021-06-21 NOTE — ASSESSMENT & PLAN NOTE
Present on admission  Baseline is around 1 1  UA: Glucose >1000  Recent Labs     06/19/21  0506 06/20/21  0506 06/21/21  0529   CREATININE 1 67* 1 65* 1 46*   EGFR 39 39 45     Estimated Creatinine Clearance: 37 9 mL/min (A) (by C-G formula based on SCr of 1 46 mg/dL (H))  Improving  Plan  · Hold hydrochlorothiazide  · Hold IVF with mild volume overload, received IV lasix yesterday  · CMP a m

## 2021-06-21 NOTE — ASSESSMENT & PLAN NOTE
Patient follows with Dr Jerome Curry  Initially diagnosed with ITP on 06/03 after being referred by his primary care physician  Patient saw Dr Jerome Curry yesterday in office  He had been noncompliant with his weekly blood tests secondary to not knowing he needed weekly blood test   Dr Jerome Curry started patient on dexamethasone 40 mg for 4 days  Patient was also continued on Protonix while on steroids in order to prevent gastritis  At their office visit they did discuss starting Rituxan  This will be administered subcutaneously weekly x4 doses for his acute ITP  Plan   · Continue dexamethasone 40 mg day #4/4 (started on 4/18)  · CBC daily  · Platelets 14 K on 56/04  Currently trending up    Most recent platelet 975D  · Continue Protonix 40 mg daily  · Fall precautions  · Monitor for signs of bleeding

## 2021-06-21 NOTE — ASSESSMENT & PLAN NOTE
Present on arrival to the emergency department  Patient states that his shortness of breath worsens with activity  Patient is a lifelong smoker  Patient currently smokes half pack a day  Has not seen a doctor in 2 years prior to being diagnosed with ITP  Was treated as an outpatient by his PCP for pneumonia  Chest x-ray at that time did show a large right upper airspace opacification    Continues with wheezing and rhonchi on the right    · NC oxygen as needed, patient has not required yet  · Pulmonology consult - started spiriva, bronch on monday  · Shortness of breath likely secondary to right lung mass

## 2021-06-21 NOTE — ASSESSMENT & PLAN NOTE
Present on admission  Baseline is around 1 1  UA: Glucose >1000  Recent Labs     06/20/21  0506 06/21/21  0529 06/22/21  0623   CREATININE 1 65* 1 46* 1 32*   EGFR 39 45 51     Estimated Creatinine Clearance: 42 3 mL/min (A) (by C-G formula based on SCr of 1 32 mg/dL (H))  Improving  Plan  · Hold hydrochlorothiazide  · Improving  · Hold IVF with mild volume overload, received IV lasix yesterday - no further diuresis at this time per cardiology  · CMP a m

## 2021-06-21 NOTE — ASSESSMENT & PLAN NOTE
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

## 2021-06-21 NOTE — ANESTHESIA PREPROCEDURE EVALUATION
Procedure:  ENDOBRONCHIAL ULTRASOUND (EBUS)    Relevant Problems   CARDIO   (+) Hypertension   (+) Paroxysmal A-fib (HCC)      ENDO   (+) New onset type 2 diabetes mellitus (HCC)      /RENAL   (+) SULEMA (acute kidney injury) ([facility] [address] )      HEMATOLOGY   (+) Acute ITP (HCC)   (+) Idiopathic thrombocytopenic purpura (ITP) (HCC)      PULMONARY   (+) Shortness of breath      Other   (+) Acute ITP (HCC)        Physical Exam    Airway    Mallampati score: II  TM Distance: >3 FB  Neck ROM: full     Dental   upper dentures and lower dentures,     Cardiovascular  Rhythm: regular, Rate: normal,     Pulmonary  Breath sounds clear to auscultation, Decreased breath sounds, Wheezes,     Other Findings        Anesthesia Plan  ASA Score- 3     Anesthesia Type- general with ASA Monitors  Additional Monitors:   Airway Plan:           Plan Factors-Exercise tolerance (METS): <4 METS  Chart reviewed  EKG reviewed  Existing labs reviewed  Patient summary reviewed  Patient is a current smoker  Patient instructed to abstain from smoking on day of procedure  Patient did not smoke on day of surgery  Induction- intravenous  Postoperative Plan- Plan for postoperative opioid use  Informed Consent- Anesthetic plan and risks discussed with patient  I personally reviewed this patient with the CRNA  Discussed and agreed on the Anesthesia Plan with the CRNA  Dara Soulier

## 2021-06-21 NOTE — RESPIRATORY THERAPY NOTE
RT  Note  Geneva Calderahuan Angel 68 y o  male MRN: [id number]  Unit/Bed#: S -01 Encounter: [id number]      EBUS preformed in GI procedure room  LMA inserted  FNA samples prepared by lab at bedside  Pt tolerated well  Brushing samples collected and sent to lab

## 2021-06-21 NOTE — ASSESSMENT & PLAN NOTE
Patient recently diagnosed with atrial fibrillation after EKG in office showed AFib with RVR  Patient is not currently in AFib  Most likely secondary to lung lesion  Troponin negative x3  CHADS2 score = 3, HASBLED score 2   Moderate risk of major bleeding     Plan  · Cardiology Consult- discontinue amlodipine, start metoprolol low dose, hold anticoagulation at this time, discontinue tele (no events recorded), ECHO pending, 40mg IV lasix once yesterday

## 2021-06-22 ENCOUNTER — APPOINTMENT (INPATIENT)
Dept: NON INVASIVE DIAGNOSTICS | Facility: HOSPITAL | Age: 81
DRG: 167 | End: 2021-06-22
Payer: COMMERCIAL

## 2021-06-22 PROBLEM — E87.5 HYPERKALEMIA: Status: ACTIVE | Noted: 2021-06-22

## 2021-06-22 LAB
ANION GAP SERPL CALCULATED.3IONS-SCNC: 7 MMOL/L (ref 4–13)
BUN SERPL-MCNC: 47 MG/DL (ref 5–25)
CALCIUM SERPL-MCNC: 9.8 MG/DL (ref 8.3–10.1)
CHLORIDE SERPL-SCNC: 105 MMOL/L (ref 100–108)
CO2 SERPL-SCNC: 30 MMOL/L (ref 21–32)
CREAT SERPL-MCNC: 1.32 MG/DL (ref 0.6–1.3)
ERYTHROCYTE [DISTWIDTH] IN BLOOD BY AUTOMATED COUNT: 17.5 % (ref 11.6–15.1)
GFR SERPL CREATININE-BSD FRML MDRD: 51 ML/MIN/1.73SQ M
GLUCOSE SERPL-MCNC: 315 MG/DL (ref 65–140)
GLUCOSE SERPL-MCNC: 324 MG/DL (ref 65–140)
GLUCOSE SERPL-MCNC: 342 MG/DL (ref 65–140)
GLUCOSE SERPL-MCNC: 357 MG/DL (ref 65–140)
GLUCOSE SERPL-MCNC: 396 MG/DL (ref 65–140)
GLUCOSE SERPL-MCNC: 444 MG/DL (ref 65–140)
GLUCOSE SERPL-MCNC: 448 MG/DL (ref 65–140)
HCT VFR BLD AUTO: 30.5 % (ref 36.5–49.3)
HGB BLD-MCNC: 8.7 G/DL (ref 12–17)
MCH RBC QN AUTO: 23.5 PG (ref 26.8–34.3)
MCHC RBC AUTO-ENTMCNC: 28.5 G/DL (ref 31.4–37.4)
MCV RBC AUTO: 82 FL (ref 82–98)
NRBC BLD AUTO-RTO: 0 /100 WBCS
PLATELET # BLD AUTO: 210 THOUSANDS/UL (ref 149–390)
PMV BLD AUTO: 10.1 FL (ref 8.9–12.7)
POTASSIUM SERPL-SCNC: 5.3 MMOL/L (ref 3.5–5.3)
POTASSIUM SERPL-SCNC: 5.8 MMOL/L (ref 3.5–5.3)
RBC # BLD AUTO: 3.71 MILLION/UL (ref 3.88–5.62)
SODIUM SERPL-SCNC: 142 MMOL/L (ref 136–145)
WBC # BLD AUTO: 11.63 THOUSAND/UL (ref 4.31–10.16)

## 2021-06-22 PROCEDURE — 93306 TTE W/DOPPLER COMPLETE: CPT

## 2021-06-22 PROCEDURE — 80048 BASIC METABOLIC PNL TOTAL CA: CPT | Performed by: INTERNAL MEDICINE

## 2021-06-22 PROCEDURE — 94760 N-INVAS EAR/PLS OXIMETRY 1: CPT

## 2021-06-22 PROCEDURE — 85027 COMPLETE CBC AUTOMATED: CPT | Performed by: INTERNAL MEDICINE

## 2021-06-22 PROCEDURE — 99232 SBSQ HOSP IP/OBS MODERATE 35: CPT | Performed by: INTERNAL MEDICINE

## 2021-06-22 PROCEDURE — 82948 REAGENT STRIP/BLOOD GLUCOSE: CPT

## 2021-06-22 PROCEDURE — 94640 AIRWAY INHALATION TREATMENT: CPT

## 2021-06-22 PROCEDURE — 93306 TTE W/DOPPLER COMPLETE: CPT | Performed by: INTERNAL MEDICINE

## 2021-06-22 PROCEDURE — 84132 ASSAY OF SERUM POTASSIUM: CPT | Performed by: FAMILY MEDICINE

## 2021-06-22 RX ORDER — BENZONATATE 100 MG/1
100 CAPSULE ORAL 3 TIMES DAILY PRN
Status: DISCONTINUED | OUTPATIENT
Start: 2021-06-22 | End: 2021-06-23 | Stop reason: HOSPADM

## 2021-06-22 RX ADMIN — INSULIN LISPRO 6 UNITS: 100 INJECTION, SOLUTION INTRAVENOUS; SUBCUTANEOUS at 11:44

## 2021-06-22 RX ADMIN — PANTOPRAZOLE SODIUM 40 MG: 40 TABLET, DELAYED RELEASE ORAL at 08:31

## 2021-06-22 RX ADMIN — TIOTROPIUM BROMIDE 18 MCG: 18 CAPSULE ORAL; RESPIRATORY (INHALATION) at 08:32

## 2021-06-22 RX ADMIN — IPRATROPIUM BROMIDE 0.5 MG: 0.5 SOLUTION RESPIRATORY (INHALATION) at 07:19

## 2021-06-22 RX ADMIN — INSULIN LISPRO 3 UNITS: 100 INJECTION, SOLUTION INTRAVENOUS; SUBCUTANEOUS at 22:07

## 2021-06-22 RX ADMIN — IPRATROPIUM BROMIDE 0.5 MG: 0.5 SOLUTION RESPIRATORY (INHALATION) at 13:03

## 2021-06-22 RX ADMIN — INSULIN GLARGINE 15 UNITS: 100 INJECTION, SOLUTION SUBCUTANEOUS at 08:30

## 2021-06-22 RX ADMIN — ACETAMINOPHEN 650 MG: 325 TABLET, FILM COATED ORAL at 11:22

## 2021-06-22 RX ADMIN — GUAIFENESIN 600 MG: 600 TABLET, EXTENDED RELEASE ORAL at 17:13

## 2021-06-22 RX ADMIN — LEVALBUTEROL HYDROCHLORIDE 1.25 MG: 1.25 SOLUTION, CONCENTRATE RESPIRATORY (INHALATION) at 13:03

## 2021-06-22 RX ADMIN — APIXABAN 5 MG: 5 TABLET, FILM COATED ORAL at 17:13

## 2021-06-22 RX ADMIN — Medication 12.5 MG: at 21:56

## 2021-06-22 RX ADMIN — LEVOTHYROXINE SODIUM 150 MCG: 150 TABLET ORAL at 05:12

## 2021-06-22 RX ADMIN — LEVALBUTEROL HYDROCHLORIDE 1.25 MG: 1.25 SOLUTION, CONCENTRATE RESPIRATORY (INHALATION) at 19:49

## 2021-06-22 RX ADMIN — IPRATROPIUM BROMIDE 0.5 MG: 0.5 SOLUTION RESPIRATORY (INHALATION) at 19:50

## 2021-06-22 RX ADMIN — GUAIFENESIN 600 MG: 600 TABLET, EXTENDED RELEASE ORAL at 08:31

## 2021-06-22 RX ADMIN — INSULIN LISPRO 6 UNITS: 100 INJECTION, SOLUTION INTRAVENOUS; SUBCUTANEOUS at 16:35

## 2021-06-22 RX ADMIN — LEVALBUTEROL HYDROCHLORIDE 1.25 MG: 1.25 SOLUTION, CONCENTRATE RESPIRATORY (INHALATION) at 07:19

## 2021-06-22 RX ADMIN — Medication 12.5 MG: at 08:31

## 2021-06-22 RX ADMIN — INSULIN LISPRO 6 UNITS: 100 INJECTION, SOLUTION INTRAVENOUS; SUBCUTANEOUS at 06:08

## 2021-06-22 NOTE — CASE MANAGEMENT
LOS: 4 DAYS  PATIENT IS NOT A BUNDLE  PATIENT IS NOT A READMISSION  UNPLANNED RISK OF READMISSION: 10     CM met with patient at bedside to introduce self, explain role, complete CM assessment, and discuss DC planning  Patient alert and oriented and sitting at edge of bed  Lives where: [address] with: spouse, Juan Up  Supports: spouse, son Halima Gardner Type & Entry: single story home, 2 NICK  No concerns with navigating his home  DME: none  ADLs: completely independent  VNA history:  none  STR history: hx 40+ years ago after back surgery  Pharmacy Preference & Coverage: [address] [address]; has Rx plan and is able to afford his copays  He does report that it his copays are costly only because he's on so many  Medications  He tried to apply for PACE but was denied  He knows to ask his OP team for generic medications  Mental Health/Drug/ETOH history: none  POA/AD/LW: patient denies but requested and received information which CM reviewed  Patient identifies his spouse Juan Up as HCR  Income/Employment: SSI  Transportation: patient drives in the community  PCP: Dr Lubna Soriano; normally follows up every few years but knows he'll have more frequent follow up  His office already called and scheduled an appointment for next week  Transport Home: patient's son Cici Diallo will provide transport at discharge  DCP: CM reviewed discharge plan with patient  As per SLIM, he's anticipated for discharge tomorrow pending blood sugar stabilization  Patient stated that his wife has [id number] services coming in and he would also like SN visits at discharge for new diabetic management  CM sent referral as requested via [facility]  CM offered to update patient's family re: discharge plan and he asked that CM contact either his spouse or son but he'll update them as well  IMM reviewed with patient and he understands his rights  Copy provided and original signed and placed for scanning to medical records   CM attempted to call patient's spouse Gilda Gómez but the line was busy  CM contacted Mars Mackey and let him know about [facility] services being arranged at discharge, which he appreciates  He confirms he'll provide transport at discharge and can stop at patient's pharmacy on the way home to  any new medications  CM Dept will continue to follow  CM reviewed discharge planning process including the following: identifying caregivers at home, preference for d/c planning needs,   availability of [facility] program, availability of treatment team to discuss questions or concerns patient and/or family may have regarding diagnosis, plan of care, old or new medications and discharge planning   CM will continue to follow for care coordination and update assessment as appropriate

## 2021-06-22 NOTE — CASE MANAGEMENT
CM received notification from [address] [place] [phone] that they can accept for [place] [id number] services but not until next weekend  CM met with patient at bedside to review  He reports that he is ok with delayed start of care, as the RN always checks him when they visit his spouse anyway  Melanieust aware of same  CM will continue to follow

## 2021-06-22 NOTE — PROGRESS NOTES
Progress Note - Pulmonary   Ace Prow Jeanne 68 y o  male MRN: [id number]  Unit/Bed#: S -01 Encounter: [id number]    Assessment/Plan:    1  Abnormal CT Chest - RUL mass and mediastinal adenopathy  · S/P EBUS on 06/21 with level 7 and hilar mass biopsy with RYL endobronchial disease, extrinsic compression RLL and RML orifices  · Follow up pathology results  · Patient will need CT/PET as outpatient  · Patient follows with Dr Ariel Gill (oncology)    2  Suspected COPD without acute exacerbation  · Continue Spiriva  · Continue Xopenex/Atrovent nebs and flutter valve  · Follow up with pulmonology as outpatient  He will need PFTs  Appointment will be scheduled  · Discharge on Spiriva daily and albuterol nebulizer TID    3  ITP on steroids    4  Positive rapid HIV  · HIV antibody and P24 Ag negative    Chief Complaint:    "I feel ok"    Subjective:    Patient seen and examined at bedside  He denies any current shortness of breath or chest pain, however he reports he was on supplemental O2 overnight  He is currently on RA  He denies any cough, hemoptysis, fever, chills, nausea or vomiting  Patient has no other complaints  Objective:    Vitals: Blood pressure 127/61, pulse 82, temperature 97 6 °F (36 4 °C), temperature source Oral, resp  rate 20, weight 80 2 kg (176 lb 14 4 oz), SpO2 96 %  RA,Body mass index is 32 36 kg/m²  Intake/Output Summary (Last 24 hours) at 6/22/2021 0847  Last data filed at 6/22/2021 0600  Gross per 24 hour   Intake 1010 ml   Output 0 ml   Net 1010 ml       Invasive Devices     Peripheral Intravenous Line            Peripheral IV 06/18/21 Right Antecubital 3 days    Peripheral IV 06/21/21 Right Wrist <1 day                Physical Exam:     Physical Exam  Vitals and nursing note reviewed  Constitutional:       General: He is not in acute distress  Appearance: He is obese  He is not toxic-appearing  HENT:      Head: Normocephalic  Eyes:      General: No scleral icterus  Pupils: Pupils are equal, round, and reactive to light  Cardiovascular:      Rate and Rhythm: Normal rate and regular rhythm  Heart sounds: No murmur heard  No gallop  Pulmonary:      Effort: Pulmonary effort is normal  No respiratory distress  Breath sounds: No wheezing, rhonchi or rales  Abdominal:      General: Bowel sounds are normal  There is no distension  Palpations: Abdomen is soft  Tenderness: There is no abdominal tenderness  There is no guarding  Musculoskeletal:      Cervical back: Normal range of motion  Right lower leg: No edema  Left lower leg: No edema  Skin:     General: Skin is warm  Neurological:      General: No focal deficit present  Mental Status: He is alert and oriented to person, place, and time  Cranial Nerves: No cranial nerve deficit  Psychiatric:         Mood and Affect: Mood normal          Behavior: Behavior normal          Thought Content: Thought content normal          Judgment: Judgment normal          Labs: I have personally reviewed pertinent lab results  , CBC:   Lab Results   Component Value Date    WBC 11 63 (H) 06/22/2021    HGB 8 7 (L) 06/22/2021    HCT 30 5 (L) 06/22/2021    MCV 82 06/22/2021     06/22/2021    MCH 23 5 (L) 06/22/2021    MCHC 28 5 (L) 06/22/2021    RDW 17 5 (H) 06/22/2021    MPV 10 1 06/22/2021    NRBC 0 06/22/2021   , CMP:   Lab Results   Component Value Date    SODIUM 142 06/22/2021    K 5 8 (H) 06/22/2021     06/22/2021    CO2 30 06/22/2021    BUN 47 (H) 06/22/2021    CREATININE 1 32 (H) 06/22/2021    CALCIUM 9 8 06/22/2021    EGFR 51 06/22/2021         Imaging and other studies: I have personally reviewed pertinent reports  XR orbits for foreign body    Result Date: 6/20/2021  Impression: No radiopaque orbital foreign body   Workstation performed: [id number]     XR chest 1 view portable    Result Date: 6/18/2021  Impression: Again seen is a large mass occupying the periphery of the right lung  There is also a 2 4 cm nodular mass in the left midlung zone laterally  Please see subsequent chest CT for further evaluation  Workstation performed: [id number]     MRI brain w wo contrast    Result Date: 6/20/2021  Impression: No intracranial metastatic disease or other acute pathology  Mild chronic microangiopathic change  Workstation performed: [id number]     CTA chest pe study    Result Date: 6/18/2021  Impression: 1  No pulmonary embolus  Measured RV/LV ratio is within normal limits at less than 0 9   2   Large solid right lung mass encasing the right pulmonary artery and branches with no narrowing  Encasement with narrowing and occlusion of the anterior right upper lobe and middle lobe bronchial branches with obstructive atelectasis   3   Scattered bilateral lung nodules largest in the left upper lobe subpleural lobulated mass  Findings correlate with recent chest x-rays  Primary lung malignancy with metastatic nodule suggested biopsy correlation recommended  The study was marked in [facility] for immediate notification     Workstation performed: [id number]     Endobronchial ultrasound (EBUS)    Result Date: 6/21/2021  Impression: Mediastinal adenopathy and right hilar mass lesion with RUL endobronchial disease, extrinsic compression RLL and RML orifices RECOMMENDATION: Follow up pathology and culture results

## 2021-06-22 NOTE — ASSESSMENT & PLAN NOTE
Recent Labs     06/20/21  0506 06/21/21  0529 06/22/21  0623   K 4 5 4 9 5 8*     · Possible in the setting of SULEMA and hyperglycemia 2/2 dexamethasone treatment (now completed)  · SULEMA now improving  · Will recheck K and give Kayexalate if continues to be elevated  · Continue tele monitoring

## 2021-06-22 NOTE — PROGRESS NOTES
Cardiology Progress Note - Mariam Segura 68 y o  male MRN: [id number]    Unit/Bed#: S -01 Encounter: [id number]      Assessment/Recommendations:  1  Paroxysmal atrial fibrillation:  Likely in the setting of right lung mass  Currently back in sinus rhythm  Continued on beta-blocker  No anticoagulation due to ITP and thrombocytopenia and recent bronchoscopy and biopsy  If platelets continue to improve, will need to consider anticoagulation going forward  Will review echocardiogram, once available  2  ITP:  As per primary team   3  Hypertension:  Much improved this morning, continue follow on beta-blocker for now  4  A KI:  Creatinine does seem to be improving  5  Shortness of breath:  Had some mild volume overload, status post IV Lasix  Relatively euvolemic today  No further diuresis recommended today  6  Leukocytosis:  As per primary team   7  Hyperglycemia:  As per primary team       Subjective:   Patient seen and examined  No significant events overnight   ; pertinent negatives - chest pain, chest pressure/discomfort, irregular heart beat, lower extremity edema and palpitations  Objective:     Vitals: Blood pressure 127/61, pulse 82, temperature 97 6 °F (36 4 °C), temperature source Oral, resp  rate 20, weight 80 2 kg (176 lb 14 4 oz), SpO2 96 %  , Body mass index is 32 36 kg/m² ,   Orthostatic Blood Pressures      Most Recent Value   Blood Pressure  127/61 filed at 06/22/2021 0740   Patient Position - Orthostatic VS  Sitting filed at 06/22/2021 0740            Intake/Output Summary (Last 24 hours) at 6/22/2021 0950  Last data filed at 6/22/2021 0600  Gross per 24 hour   Intake 1010 ml   Output 0 ml   Net 1010 ml       TELE:  No significant arrhythmias overnight, maintaining sinus rhythm      Physical Exam:    GEN: Mariam Segura appears well, alert and oriented x 3, pleasant and cooperative   HEENT: pupils equal, round, and reactive to light; extraocular muscles intact  NECK: supple, no carotid bruits   HEART: regular rhythm, normal S1 and S2, no murmurs, clicks, gallops or rubs   LUNGS:  Decreased breath sounds bilaterally; no wheezes, rales, or rhonchi   ABDOMEN: normal bowel sounds, soft, no tenderness, no distention  EXTREMITIES: peripheral pulses normal; no clubbing, cyanosis, or edema  NEURO: no focal findings   SKIN: normal without suspicious lesions on exposed skin    Medications:      Current Facility-Administered Medications:     acetaminophen (TYLENOL) tablet 650 mg, 650 mg, Oral, Q8H PRN, Desiree Custard, DO, 325 mg at 06/20/21 2139    allopurinol (ZYLOPRIM) tablet 300 mg, 300 mg, Oral, Daily, Desiree Custard, DO, 300 mg at 06/20/21 0810    diphenhydrAMINE (BENADRYL) injection 12 5 mg, 12 5 mg, Intravenous, Once PRN, Roberto Carlos Zepeda CRNA    guaiFENesin (MUCINEX) 12 hr tablet 600 mg, 600 mg, Oral, BID, Desiree Mcnally, DO, 600 mg at 06/22/21 0831    insulin glargine (LANTUS) subcutaneous injection 15 Units 0 15 mL, 15 Units, Subcutaneous, QAM, Desiree Custard, DO, 15 Units at 06/22/21 0830    insulin lispro (HumaLOG) 100 units/mL subcutaneous injection 1-5 Units, 1-5 Units, Subcutaneous, HS, Desiree Custard, DO, 5 Units at 06/21/21 2128    insulin lispro (HumaLOG) 100 units/mL subcutaneous injection 1-6 Units, 1-6 Units, Subcutaneous, TID AC, 6 Units at 06/22/21 0608 **AND** Fingerstick Glucose (POCT), , , TID AC, Desiree Custard, DO    ipratropium (ATROVENT) 0 02 % inhalation solution 0 5 mg, 0 5 mg, Nebulization, TID, Desiree Custard, DO, 0 5 mg at 06/22/21 0719    levalbuterol (XOPENEX) inhalation solution 1 25 mg, 1 25 mg, Nebulization, TID, Desiree Custard, DO, 1 25 mg at 06/22/21 0719    levothyroxine tablet 150 mcg, 150 mcg, Oral, Early Morning, Desiree Custard, DO, 150 mcg at 06/22/21 9129    metoprolol tartrate (LOPRESSOR) partial tablet 12 5 mg, 12 5 mg, Oral, Q12H [address], Luke Vinny, DO, 12 5 mg at 06/22/21 0831    nicotine (NICODERM CQ) 7 mg/24hr TD 24 hr patch 1 patch, 1 patch, Transdermal, Daily, Anne Milton, DO    ondansetron [facility]) injection 4 mg, 4 mg, Intravenous, Q4H PRN, Anne Francisco, DO, 4 mg at 06/21/21 1128    ondansetron (ZOFRAN) injection 4 mg, 4 mg, Intravenous, Q4H PRN, Marina Herrera CRNA    pantoprazole (PROTONIX) EC tablet 40 mg, 40 mg, Oral, Daily, Anne Francisco, DO, 40 mg at 06/22/21 0831    tiotropium (SPIRIVA) capsule for inhaler 18 mcg, 18 mcg, Inhalation, Daily, Anne Francisco, DO, 18 mcg at 06/22/21 0832     Labs & Results:    Results from last 7 days   Lab Units 06/18/21 2001 06/18/21  1610 06/18/21  1328   TROPONIN I ng/mL <0 02 <0 02 <0 02     Results from last 7 days   Lab Units 06/22/21  0623 06/21/21  0529 06/20/21  0506   WBC Thousand/uL 11 63* 17 26* 14 10*   HEMOGLOBIN g/dL 8 7* 9 0* 8 6*   HEMATOCRIT % 30 5* 31 4* 31 4*   PLATELETS Thousands/uL 210 209 141*         Results from last 7 days   Lab Units 06/22/21  0623 06/21/21  0529 06/20/21  0506 06/19/21  0506 06/18/21  1328 06/17/21  1023   POTASSIUM mmol/L 5 8* 4 9 4 5 5 3 4 8 4 4   CHLORIDE mmol/L 105 107 109* 108 106 104   CO2 mmol/L 30 28 22 23 25 26   BUN mg/dL 47* 45* 42* 31* 34* 34*   CREATININE mg/dL 1 32* 1 46* 1 65* 1 67* 1 68* 1 67*   CALCIUM mg/dL 9 8 9 5 9 1 8 9 9 4 9 7   ALK PHOS U/L  --   --   --  154* 152* 171*   ALT U/L  --   --   --  24 31 35   AST U/L  --   --   --  9 13 17     Results from last 7 days   Lab Units 06/18/21  1328   INR  1 12     Results from last 7 days   Lab Units 06/18/21  1328   MAGNESIUM mg/dL 1 8       Echo: pending    EKG personally reviewed by Santa Choi MD

## 2021-06-22 NOTE — PROGRESS NOTES
[facility]  Progress Note - Wu Guillen 1940, 68 y o  male MRN: [id number]  Unit/Bed#: S -01 Encounter: [id number]  Primary Care Provider: Kelsea Knight DO   Date and time admitted to hospital: 6/18/2021  1:03 PM    * Paroxysmal A-fib [facility])  Assessment & Plan  Patient recently diagnosed with atrial fibrillation after EKG in office showed AFib with RVR  Patient is not currently in AFib  Most likely secondary to lung lesion  Troponin negative x3  CHADS2 score = 3, HASBLED score 2  Moderate risk of major bleeding     Plan  · Cardiology Consult- discontinue amlodipine, start metoprolol low dose, begin eliquis 5mg BID, ECHO pending, no further diuresis at this time  F/u outpatient  · Case management consult as patient and his daughter requesting home VNA services    Idiopathic thrombocytopenic purpura (ITP) [facility])  Assessment & Plan  Patient follows with Dr Corey Hanson  Initially diagnosed with ITP on 06/03 after being referred by his primary care physician  Patient saw Dr Corey Hanson yesterday in office  He had been noncompliant with his weekly blood tests secondary to not knowing he needed weekly blood test   Dr Corey Hanson started patient on dexamethasone 40 mg for 4 days  Patient was also continued on Protonix while on steroids in order to prevent gastritis  At their office visit they did discuss starting Rituxan  This will be administered subcutaneously weekly x4 doses for his acute ITP  Completed 4 day course of dexamethasone  Platelets improved, now 209k       Plan   · CBC daily  · Continue Protonix 40 mg daily  · Fall precautions  · Monitor for signs of bleeding     New onset type 2 diabetes mellitus ([facility] [address] )  Assessment & Plan  · Glucose high 300's, worse in the setting of dexamethasone treatment for ITP  · A1c 6 6 this admission  · Received 15 units of Lantus yesterday morning  · Plan:  · Lantus 15 units daily  · Accu-Cheks and SSI  · Continue diabetic diet  · Start metformin 500 b i d  tomorrow if Cr continues to improve, f/u with PCP      Hyperkalemia  Assessment & Plan  Recent Labs     06/20/21  0506 06/21/21  0529 06/22/21  0623   K 4 5 4 9 5 8*     · Possible in the setting of SULEMA and hyperglycemia 2/2 dexamethasone treatment (now completed)  · SULEMA now improving  · Will recheck K and give Kayexalate if continues to be elevated  · Continue tele monitoring    Leukocytosis  Assessment & Plan  Recent Labs     06/19/21  0506 06/20/21  0506 06/21/21  0529   WBC 6 24 14 10* 17 26*     · Likely secondary to dexamethasone  · Continue to monitor am CBC    HIV (human immunodeficiency virus infection) ([facility] [address] )  Assessment & Plan  · Patient's rapid HIV was reactive  HIV 1/2 multi spot is pending  No history of IV drug abuse, 1 sexual partner, asymptomatic  Shortness of breath  Assessment & Plan  Present on arrival to the emergency department  Patient states that his shortness of breath worsens with activity  Patient is a lifelong smoker  Patient currently smokes half pack a day  Has not seen a doctor in 2 years prior to being diagnosed with ITP  Was treated as an outpatient by his PCP for pneumonia  Chest x-ray at that time did show a large right upper airspace opacification  Continues with wheezing and rhonchi on the right  · NC oxygen as needed, home O2 eval, ambulatory pulse ox  · Pulmonology consult - started spiriva, xopenex/atrovent nebs and flutter valve, PFTs as outpatient, Bronch completed on Monday, f/u pathology  · Shortness of breath likely secondary to right lung mass and suspected COPD  · Tessalon pearles 200TID prn for cough  · Continue mucinex    SULEMA (acute kidney injury) [facility])  Assessment & Plan  Present on admission    Baseline is around 1 1  UA: Glucose >1000  Recent Labs     06/20/21  0506 06/21/21  0529 06/22/21  0623   CREATININE 1 65* 1 46* 1 32*   EGFR 39 45 51     Estimated Creatinine Clearance: 42 3 mL/min (A) (by C-G formula based on SCr of 1 32 mg/dL (H))   Improving  Plan  · Hold hydrochlorothiazide  · Improving  · Hold IVF with mild volume overload, received IV lasix yesterday - no further diuresis at this time per cardiology  · CMP a m  Mass of right lung  Assessment & Plan  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 for taking care of his wife who is home in a hospital bed  CT and CXR showing a large mass in the right lung  MRI brain unremarkable for mets/lesions  Plan   · Nasal cannula oxygen as needed, home OT eval, ambulatory pulse ox  · Pulmonology consulted - start Spiriva, Status post EBUS with level 7 and hilar mass biopsy, f/u pathology report, outpatient PFTs    Hypertension  Assessment & Plan  Patient with history of hypertension  Blood pressure currently controlled 127/61  Currently on amlodipine hydrochlorothiazide as an outpatient  Of note patient has been recently diagnosed with atrial fibrillation  He has not seen a cardiologist     Plan  · Cardiology consulted, discontinue amlodipine, started low-dose metoprolol, continue metoprolol  · Monitor BP  · CBC, CMP a m  VTE Pharmacologic Prophylaxis:   VTE Score: 4 High Risk (Score >/= 5) - Pharmacological DVT Prophylaxis Contraindicated  Sequential Compression Devices Ordered  Cardiology considering starting DVT prophylaxis today    Mechanical VTE Prophylaxis in Place: Yes    Patient Centered Rounds: I have performed bedside rounds with nursing staff today  Discussions with Specialists or Other Care Team Provider: Cardiology, pulmonology    Education and Discussions with Family / Patient: Updated  (daughter) via phone      Current Length of Stay: 4 day(s)    Current Patient Status: Inpatient     Discharge Plan / Estimated Discharge Date: today vs tomorrow    Code Status: Level 3 - DNAR and DNI      Subjective:   Patient reports ongoing SOBoE but improves with rest  He declines any other acute complaints  He reports he would prefer some help at home with home health care  He reports now requiring NC oxygen since yesterday evening  He does not acutely feel SOB this morning on 2L NC  Objective:     Vitals:   Temp (24hrs), Av °F (36 7 °C), Min:97 6 °F (36 4 °C), Max:98 5 °F (36 9 °C)    Temp:  [97 6 °F (36 4 °C)-98 5 °F (36 9 °C)] 97 6 °F (36 4 °C)  HR:  [82-96] 82  Resp:  [16-20] 20  BP: (109-127)/(53-61) 127/61  SpO2:  [96 %-98 %] 98 %  Body mass index is 32 36 kg/m²  Input and Output Summary (last 24 hours): Intake/Output Summary (Last 24 hours) at 2021 1445  Last data filed at 2021 1304  Gross per 24 hour   Intake 778 ml   Output 0 ml   Net 778 ml       Physical Exam:     Physical Exam  Vitals reviewed  Constitutional:       General: He is not in acute distress  Appearance: He is obese  He is not ill-appearing  HENT:      Head: Normocephalic and atraumatic  Mouth/Throat:      Mouth: Mucous membranes are moist       Pharynx: Oropharynx is clear  Eyes:      Extraocular Movements: Extraocular movements intact  Conjunctiva/sclera: Conjunctivae normal    Cardiovascular:      Rate and Rhythm: Normal rate and regular rhythm  Pulses: Normal pulses  Heart sounds: Normal heart sounds  No murmur heard  Pulmonary:      Effort: Pulmonary effort is normal       Breath sounds: Wheezing and rhonchi (Worse on right) present  Comments: NC oxygen  Abdominal:      General: Abdomen is flat  Bowel sounds are normal       Palpations: Abdomen is soft  Musculoskeletal:      Right lower leg: No edema  Left lower leg: No edema  Skin:     General: Skin is warm and dry        Capillary Refill: Capillary refill takes less than 2 seconds  Neurological:      General: No focal deficit present  Mental Status: He is alert and oriented to person, place, and time  Psychiatric:         Mood and Affect: Mood normal          Behavior: Behavior normal          Additional Data:     Labs:  Results from last 7 days   Lab Units 06/22/21  0623 06/21/21  0529 06/20/21  0506 06/18/21  1328   WBC Thousand/uL 11 63* 17 26* 14 10*   < >   HEMOGLOBIN g/dL 8 7* 9 0* 8 6*   < >   HEMATOCRIT % 30 5* 31 4* 31 4*   < >   PLATELETS Thousands/uL 210 209 141*   < >   BANDS PCT %  --  2  --   --    NEUTROS PCT %  --   --  92*  --    LYMPHS PCT %  --   --  3*  --    LYMPHO PCT %  --  3*  --   --    MONOS PCT %  --   --  3*  --    MONO PCT %  --  4  --   --    EOS PCT %  --  0 0  --     < > = values in this interval not displayed  Results from last 7 days   Lab Units 06/22/21  0623 06/19/21  0506   SODIUM mmol/L 142 140   POTASSIUM mmol/L 5 8* 5 3   CHLORIDE mmol/L 105 108   CO2 mmol/L 30 23   BUN mg/dL 47* 31*   CREATININE mg/dL 1 32* 1 67*   ANION GAP mmol/L 7 9   CALCIUM mg/dL 9 8 8 9   ALBUMIN g/dL  --  2 1*   TOTAL BILIRUBIN mg/dL  --  0 18*   ALK PHOS U/L  --  154*   ALT U/L  --  24   AST U/L  --  9   GLUCOSE RANDOM mg/dL 324* 345*     Results from last 7 days   Lab Units 06/18/21  1328   INR  1 12     Results from last 7 days   Lab Units 06/22/21  1050 06/22/21  0742 06/22/21  0548 06/21/21  2251 06/21/21  2101 06/21/21  1605 06/21/21  1422 06/21/21  1040 06/21/21  0746 06/20/21  2038 06/20/21  1912 06/20/21  1541   POC GLUCOSE mg/dl 357* 315* 396* 448* 485* 252* 144* 186* 304* 382* 382* 435*     Results from last 7 days   Lab Units 06/20/21  0506   HEMOGLOBIN A1C % 6 6*           Imaging: No pertinent imaging reviewed      Recent Cultures (last 7 days):     Results from last 7 days   Lab Units 06/21/21  1252   GRAM STAIN RESULT  1+ Polys  No bacteria seen       Lines/Drains:  Invasive Devices     Peripheral Intravenous Line Peripheral IV 06/18/21 Right Antecubital 4 days    Peripheral IV 06/21/21 Right Wrist 1 day                Telemetry:        Last 24 Hours Medication List:   Current Facility-Administered Medications   Medication Dose Route Frequency Provider Last Rate    acetaminophen  650 mg Oral Q8H PRN Narvaez Westhampton, DO      allopurinol  300 mg Oral Daily Narvaez Westhampton, DO      diphenhydrAMINE  12 5 mg Intravenous Once PRN Esther Berman CRNA      guaiFENesin  600 mg Oral BID Narvaez Westhampton, DO      insulin glargine  15 Units Subcutaneous QAM Narvaez Westhampton, DO      insulin lispro  1-5 Units Subcutaneous HS Narvaez Westhampton, DO      insulin lispro  1-6 Units Subcutaneous TID AC Luke Casillas, DO      ipratropium  0 5 mg Nebulization TID Narvaez Westhampton, DO      levalbuterol  1 25 mg Nebulization TID Narvaez Westhampton, DO      levothyroxine  150 mcg Oral Early Morning Narvaez Westhampton, DO      metoprolol tartrate  12 5 mg Oral Q12H [facility] Narvaez Westhampton, DO      nicotine  1 patch Transdermal Daily Narvaez Westhampton, DO      ondansetron  4 mg Intravenous Q4H PRN Narvaez Westhampton, DO      ondansetron  4 mg Intravenous Q4H PRN Esther Berman CRNA      pantoprazole  40 mg Oral Daily Narvaez Westhampton, DO      tiotropium  18 mcg Inhalation Daily Narvaez Westhampton, DO          Today, Patient Was Seen By: Shira Shepard DO    ** Please Note: This note has been constructed using a voice recognition system   **

## 2021-06-23 VITALS
HEART RATE: 83 BPM | SYSTOLIC BLOOD PRESSURE: 128 MMHG | OXYGEN SATURATION: 96 % | RESPIRATION RATE: 19 BRPM | DIASTOLIC BLOOD PRESSURE: 75 MMHG | BODY MASS INDEX: 31.69 KG/M2 | WEIGHT: 173.28 LBS | TEMPERATURE: 98.2 F

## 2021-06-23 PROBLEM — B20 HIV (HUMAN IMMUNODEFICIENCY VIRUS INFECTION) (HCC): Status: RESOLVED | Noted: 2021-06-19 | Resolved: 2021-06-23

## 2021-06-23 PROBLEM — E87.5 HYPERKALEMIA: Status: RESOLVED | Noted: 2021-06-22 | Resolved: 2021-06-23

## 2021-06-23 PROBLEM — J44.9 COPD (CHRONIC OBSTRUCTIVE PULMONARY DISEASE) (HCC): Status: ACTIVE | Noted: 2021-06-23

## 2021-06-23 LAB
ALBUMIN SERPL BCP-MCNC: 1.8 G/DL (ref 3.5–5)
ALP SERPL-CCNC: 107 U/L (ref 46–116)
ALT SERPL W P-5'-P-CCNC: 75 U/L (ref 12–78)
ANION GAP SERPL CALCULATED.3IONS-SCNC: 7 MMOL/L (ref 4–13)
AST SERPL W P-5'-P-CCNC: 18 U/L (ref 5–45)
BILIRUB SERPL-MCNC: 0.25 MG/DL (ref 0.2–1)
BUN SERPL-MCNC: 47 MG/DL (ref 5–25)
CALCIUM ALBUM COR SERPL-MCNC: 10.6 MG/DL (ref 8.3–10.1)
CALCIUM SERPL-MCNC: 8.8 MG/DL (ref 8.3–10.1)
CHLORIDE SERPL-SCNC: 104 MMOL/L (ref 100–108)
CO2 SERPL-SCNC: 29 MMOL/L (ref 21–32)
CREAT SERPL-MCNC: 1.1 MG/DL (ref 0.6–1.3)
ERYTHROCYTE [DISTWIDTH] IN BLOOD BY AUTOMATED COUNT: 17.8 % (ref 11.6–15.1)
GFR SERPL CREATININE-BSD FRML MDRD: 64 ML/MIN/1.73SQ M
GLUCOSE SERPL-MCNC: 130 MG/DL (ref 65–140)
GLUCOSE SERPL-MCNC: 178 MG/DL (ref 65–140)
GLUCOSE SERPL-MCNC: 186 MG/DL (ref 65–140)
GLUCOSE SERPL-MCNC: 217 MG/DL (ref 65–140)
HCT VFR BLD AUTO: 29.9 % (ref 36.5–49.3)
HGB BLD-MCNC: 8.6 G/DL (ref 12–17)
MCH RBC QN AUTO: 23.3 PG (ref 26.8–34.3)
MCHC RBC AUTO-ENTMCNC: 28.8 G/DL (ref 31.4–37.4)
MCV RBC AUTO: 81 FL (ref 82–98)
NRBC BLD AUTO-RTO: 0 /100 WBCS
PLATELET # BLD AUTO: 223 THOUSANDS/UL (ref 149–390)
PMV BLD AUTO: 10.9 FL (ref 8.9–12.7)
POTASSIUM SERPL-SCNC: 4.8 MMOL/L (ref 3.5–5.3)
PROT SERPL-MCNC: 5.6 G/DL (ref 6.4–8.2)
RBC # BLD AUTO: 3.69 MILLION/UL (ref 3.88–5.62)
SCAN RESULT: NORMAL
SCAN RESULT: NORMAL
SODIUM SERPL-SCNC: 140 MMOL/L (ref 136–145)
WBC # BLD AUTO: 12.13 THOUSAND/UL (ref 4.31–10.16)

## 2021-06-23 PROCEDURE — 94668 MNPJ CHEST WALL SBSQ: CPT

## 2021-06-23 PROCEDURE — 82948 REAGENT STRIP/BLOOD GLUCOSE: CPT

## 2021-06-23 PROCEDURE — 94760 N-INVAS EAR/PLS OXIMETRY 1: CPT

## 2021-06-23 PROCEDURE — 80053 COMPREHEN METABOLIC PANEL: CPT | Performed by: FAMILY MEDICINE

## 2021-06-23 PROCEDURE — 99232 SBSQ HOSP IP/OBS MODERATE 35: CPT | Performed by: INTERNAL MEDICINE

## 2021-06-23 PROCEDURE — 94761 N-INVAS EAR/PLS OXIMETRY MLT: CPT

## 2021-06-23 PROCEDURE — 85027 COMPLETE CBC AUTOMATED: CPT | Performed by: FAMILY MEDICINE

## 2021-06-23 PROCEDURE — 94640 AIRWAY INHALATION TREATMENT: CPT

## 2021-06-23 PROCEDURE — 99239 HOSP IP/OBS DSCHRG MGMT >30: CPT | Performed by: INTERNAL MEDICINE

## 2021-06-23 RX ORDER — LANOLIN ALCOHOL/MO/W.PET/CERES
3 CREAM (GRAM) TOPICAL
Status: DISCONTINUED | OUTPATIENT
Start: 2021-06-23 | End: 2021-06-23 | Stop reason: HOSPADM

## 2021-06-23 RX ORDER — ALBUTEROL SULFATE 2.5 MG/3ML
2.5 SOLUTION RESPIRATORY (INHALATION) EVERY 6 HOURS PRN
Qty: 360 ML | Refills: 1 | Status: ON HOLD | OUTPATIENT
Start: 2021-06-23 | End: 2021-08-05

## 2021-06-23 RX ORDER — LEVALBUTEROL 1.25 MG/.5ML
1.25 SOLUTION, CONCENTRATE RESPIRATORY (INHALATION)
Status: CANCELLED | OUTPATIENT
Start: 2021-06-23

## 2021-06-23 RX ORDER — BENZONATATE 100 MG/1
100 CAPSULE ORAL 3 TIMES DAILY PRN
Qty: 20 CAPSULE | Refills: 0 | Status: SHIPPED | OUTPATIENT
Start: 2021-06-23

## 2021-06-23 RX ORDER — GUAIFENESIN 600 MG
600 TABLET, EXTENDED RELEASE 12 HR ORAL 2 TIMES DAILY
Qty: 30 TABLET | Refills: 0 | Status: SHIPPED | OUTPATIENT
Start: 2021-06-23

## 2021-06-23 RX ORDER — LANOLIN ALCOHOL/MO/W.PET/CERES
3 CREAM (GRAM) TOPICAL
Status: COMPLETED | OUTPATIENT
Start: 2021-06-23 | End: 2021-06-23

## 2021-06-23 RX ADMIN — IPRATROPIUM BROMIDE 0.5 MG: 0.5 SOLUTION RESPIRATORY (INHALATION) at 07:38

## 2021-06-23 RX ADMIN — GUAIFENESIN 600 MG: 600 TABLET, EXTENDED RELEASE ORAL at 08:20

## 2021-06-23 RX ADMIN — IPRATROPIUM BROMIDE 0.5 MG: 0.5 SOLUTION RESPIRATORY (INHALATION) at 12:11

## 2021-06-23 RX ADMIN — LEVALBUTEROL HYDROCHLORIDE 1.25 MG: 1.25 SOLUTION, CONCENTRATE RESPIRATORY (INHALATION) at 07:38

## 2021-06-23 RX ADMIN — GUAIFENESIN 600 MG: 600 TABLET, EXTENDED RELEASE ORAL at 17:08

## 2021-06-23 RX ADMIN — Medication 12.5 MG: at 08:21

## 2021-06-23 RX ADMIN — INSULIN GLARGINE 15 UNITS: 100 INJECTION, SOLUTION SUBCUTANEOUS at 08:21

## 2021-06-23 RX ADMIN — LEVOTHYROXINE SODIUM 150 MCG: 150 TABLET ORAL at 04:48

## 2021-06-23 RX ADMIN — LEVALBUTEROL HYDROCHLORIDE 1.25 MG: 1.25 SOLUTION, CONCENTRATE RESPIRATORY (INHALATION) at 12:10

## 2021-06-23 RX ADMIN — INSULIN LISPRO 1 UNITS: 100 INJECTION, SOLUTION INTRAVENOUS; SUBCUTANEOUS at 08:22

## 2021-06-23 RX ADMIN — APIXABAN 5 MG: 5 TABLET, FILM COATED ORAL at 17:08

## 2021-06-23 RX ADMIN — INSULIN LISPRO 2 UNITS: 100 INJECTION, SOLUTION INTRAVENOUS; SUBCUTANEOUS at 12:31

## 2021-06-23 RX ADMIN — ACETAMINOPHEN 650 MG: 325 TABLET, FILM COATED ORAL at 15:55

## 2021-06-23 RX ADMIN — PANTOPRAZOLE SODIUM 40 MG: 40 TABLET, DELAYED RELEASE ORAL at 08:20

## 2021-06-23 RX ADMIN — APIXABAN 5 MG: 5 TABLET, FILM COATED ORAL at 08:20

## 2021-06-23 RX ADMIN — Medication 3 MG: at 03:59

## 2021-06-23 NOTE — PROGRESS NOTES
Cardiology Progress Note - Rhianna Fink 68 y o  male MRN: [id number]    Unit/Bed#: S -01 Encounter: [id number]      Assessment/Recommendations:  1  Paroxysmal atrial fibrillation:  Likely in the setting of right lung mass  Currently back in sinus rhythm  Continued on beta-blocker  ITP and thrombocytopenia have been treated and now resumed on Eliquis  If platelets continue to improve, will need to consider anticoagulation going forward  Echocardiogram revealed normal LV function and no significant valvular abnormalities  2  ITP:  As per primary team, improved  3  Hypertension:  Much improved this morning, continue follow on beta-blocker for now  4  A KI:  Creatinine does seem to be improving  5  Shortness of breath:  Had some mild volume overload, status post IV Lasix  Relatively euvolemic today  No further diuresis recommended today  6  Leukocytosis:  As per primary team   7  Hyperglycemia:  As per primary team   8  Stable from cardiac standpoint for discharge, no further recommendations  Outpatient follow-up recommended  Please call with questions  Subjective:   Patient seen and examined  No significant events overnight   ; pertinent negatives - chest pain, chest pressure/discomfort, irregular heart beat, lower extremity edema and palpitations  Objective:     Vitals: Blood pressure 134/70, pulse 79, temperature 98 7 °F (37 1 °C), temperature source Oral, resp  rate 19, weight 78 6 kg (173 lb 4 5 oz), SpO2 97 %  , Body mass index is 31 69 kg/m² ,   Orthostatic Blood Pressures      Most Recent Value   Blood Pressure  134/70 filed at 06/23/2021 0631   Patient Position - Orthostatic VS  Lying filed at 06/23/2021 0631            Intake/Output Summary (Last 24 hours) at 6/23/2021 0914  Last data filed at 6/23/2021 0555  Gross per 24 hour   Intake 360 ml   Output 900 ml   Net -540 ml       TELE:  No significant arrhythmias overnight, maintaining sinus rhythm      Physical Exam:    GEN: Rona Mort Mell Deluca appears well, alert and oriented x 3, pleasant and cooperative   HEENT: pupils equal, round, and reactive to light; extraocular muscles intact  NECK: supple, no carotid bruits   HEART: regular rhythm, normal S1 and S2, no murmurs, clicks, gallops or rubs   LUNGS: clear to auscultation bilaterally; no wheezes, rales, or rhonchi   ABDOMEN: normal bowel sounds, soft, no tenderness, no distention  EXTREMITIES: peripheral pulses normal; no clubbing, cyanosis, or edema  NEURO: no focal findings   SKIN: normal without suspicious lesions on exposed skin    Medications:      Current Facility-Administered Medications:     acetaminophen (TYLENOL) tablet 650 mg, 650 mg, Oral, Q8H PRN, Matt Child, DO, 650 mg at 06/22/21 1122    allopurinol (ZYLOPRIM) tablet 300 mg, 300 mg, Oral, Daily, Janclaude Child, DO, 300 mg at 06/20/21 0810    apixaban (ELIQUIS) tablet 5 mg, 5 mg, Oral, BID, Meliton Pulling, DO, 5 mg at 06/23/21 0820    benzonatate (TESSALON PERLES) capsule 100 mg, 100 mg, Oral, TID PRN, Meliton Pulling, DO    diphenhydrAMINE (BENADRYL) injection 12 5 mg, 12 5 mg, Intravenous, Once PRN, Tanja Perdomo CRNA    guaiFENesin (MUCINEX) 12 hr tablet 600 mg, 600 mg, Oral, BID, Janyth Child, DO, 600 mg at 06/23/21 0820    insulin glargine (LANTUS) subcutaneous injection 15 Units 0 15 mL, 15 Units, Subcutaneous, QAM, Matt Child, DO, 15 Units at 06/23/21 0821    insulin lispro (HumaLOG) 100 units/mL subcutaneous injection 1-5 Units, 1-5 Units, Subcutaneous, HS, Matt Child, DO, 3 Units at 06/22/21 2207    insulin lispro (HumaLOG) 100 units/mL subcutaneous injection 1-6 Units, 1-6 Units, Subcutaneous, TID AC, 1 Units at 06/23/21 2338 **AND** Fingerstick Glucose (POCT), , , TID AC, Matt Child, DO    ipratropium (ATROVENT) 0 02 % inhalation solution 0 5 mg, 0 5 mg, Nebulization, TID, Matt Child, DO, 0 5 mg at 06/23/21 0738    levalbuterol (XOPENEX) inhalation solution 1 25 mg, 1 25 mg, Nebulization, TID, Marleny Harris DO, 1 25 mg at 06/23/21 2736    levothyroxine tablet 150 mcg, 150 mcg, Oral, Early Morning, Luke Casillas DO, 150 mcg at 06/23/21 0448    melatonin tablet 3 mg, 3 mg, Oral, HS, Aleksander Munoz MD    metoprolol tartrate (LOPRESSOR) partial tablet 12 5 mg, 12 5 mg, Oral, Q12H [facility], Luke Casillas DO, 12 5 mg at 06/23/21 7056    nicotine (NICODERM CQ) 7 mg/24hr TD 24 hr patch 1 patch, 1 patch, Transdermal, Daily, Marleny Harris DO    ondansetron [facility] [place] [facility]) injection 4 mg, 4 mg, Intravenous, Q4H PRN, Marleny Harris DO, 4 mg at 06/21/21 1128    ondansetron (ZOFRAN) injection 4 mg, 4 mg, Intravenous, Q4H PRN, Tremayne Garza CRNA    pantoprazole (PROTONIX) EC tablet 40 mg, 40 mg, Oral, Daily, Marleny Harris DO, 40 mg at 06/23/21 0820    tiotropium (SPIRIVA) capsule for inhaler 18 mcg, 18 mcg, Inhalation, Daily, Marleny Harris DO, 18 mcg at 06/22/21 0832     Labs & Results:    Results from last 7 days   Lab Units 06/18/21 2001 06/18/21  1610 06/18/21  1328   TROPONIN I ng/mL <0 02 <0 02 <0 02     Results from last 7 days   Lab Units 06/23/21  0446 06/22/21  0623 06/21/21  0529   WBC Thousand/uL 12 13* 11 63* 17 26*   HEMOGLOBIN g/dL 8 6* 8 7* 9 0*   HEMATOCRIT % 29 9* 30 5* 31 4*   PLATELETS Thousands/uL 223 210 209         Results from last 7 days   Lab Units 06/23/21  0446 06/22/21  1559 06/22/21  0623 06/21/21  0529 06/19/21  0506 06/18/21  1328   POTASSIUM mmol/L 4 8 5 3 5 8* 4 9 5 3 4 8   CHLORIDE mmol/L 104  --  105 107 108 106   CO2 mmol/L 29  --  30 28 23 25   BUN mg/dL 47*  --  47* 45* 31* 34*   CREATININE mg/dL 1 10  --  1 32* 1 46* 1 67* 1 68*   CALCIUM mg/dL 8 8  --  9 8 9 5 8 9 9 4   ALK PHOS U/L 107  --   --   --  154* 152*   ALT U/L 75  --   --   --  24 31   AST U/L 18  --   --   --  9 13     Results from last 7 days   Lab Units 06/18/21  1328   INR  1 12     Results from last 7 days   Lab Units 06/18/21  1328   MAGNESIUM mg/dL 1 8       Echo:  Personally reviewed-EF 60%, mild MR, mild pulmonary hypertension    EKG personally reviewed by Denton Lesches, MD

## 2021-06-23 NOTE — ASSESSMENT & PLAN NOTE
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

## 2021-06-23 NOTE — PROGRESS NOTES
Noticed a script for glucose testing strips in the printer after patient had already left  Called [facility] [place] to verify the patient did not  the prescribed testing strips  Attempted to call patient's spouse, Sabra Tipton, 3 times with no success on the number listed ([phone])  Script left at charge nurse's desk with number to reach Sabra Tipton

## 2021-06-23 NOTE — ASSESSMENT & PLAN NOTE
Recent Labs     06/21/21  0529 06/22/21  0623 06/23/21  0446   WBC 17 26* 11 63* 12 13*     · Likely secondary to dexamethasone

## 2021-06-23 NOTE — ASSESSMENT & PLAN NOTE
Recent Labs     06/22/21  0623 06/22/21  1559 06/23/21  0446   K 5 8* 5 3 4 8     · Possible in the setting of SULEMA and hyperglycemia 2/2 dexamethasone treatment (now completed)  · SULEMA now improving  · Will recheck K and give Kayexalate if continues to be elevated  · Continue tele monitoring

## 2021-06-23 NOTE — ASSESSMENT & PLAN NOTE
Present on arrival to the emergency department  Patient states that his shortness of breath worsens with activity  Patient is a lifelong smoker  Patient currently smokes half pack a day  Has not seen a doctor in 2 years prior to being diagnosed with ITP  Was treated as an outpatient by his PCP for pneumonia  Chest x-ray at that time did show a large right upper airspace opacification  Continues with wheezing and rhonchi on the right  · NC oxygen as needed, home O2 eval, ambulatory pulse ox  · Pulmonology consult - started spiriva,discharge with nebulizer and albuterol    PFTs as outpatient, Bronch completed on Monday, f/u pathology  · Shortness of breath likely secondary to right lung mass and suspected COPD  · Tessalon pearles 200TID prn for cough  · Continue mucinex

## 2021-06-23 NOTE — ASSESSMENT & PLAN NOTE
Patient recently diagnosed with atrial fibrillation after EKG in office showed AFib with RVR  Patient is not currently in AFib  Most likely secondary to lung lesion  Troponin negative x3  CHADS2 score = 3, HASBLED score 2  Moderate risk of major bleeding     Plan  · Cardiology Consult- metoprolol 12 5 mg    Amlodipine discontinued  · No further diuresis  · Case management consult as patient and his daughter requesting home VNA services

## 2021-06-23 NOTE — ASSESSMENT & PLAN NOTE
Patient with history of hypertension  Blood pressure currently controlled 127/61  Currently on amlodipine hydrochlorothiazide as an outpatient  Of note patient has been recently diagnosed with atrial fibrillation  He has not seen a cardiologist     Plan  · Cardiology consulted- amlodipine discontinued  · Metoprolol 12 5 mg daily  · Monitor BP  · CBC, CMP a m

## 2021-06-23 NOTE — RESPIRATORY THERAPY NOTE
Home Oxygen Qualifying Test       Patient name: Yon Gusman        : 1940   Date of Test:  2021  Diagnosis:      Home Oxygen Test:    **Medicare Guidelines require item(s) 1-5 on all ambulatory patients or 1 and 2 on non-ambulatory patients  1   Baseline SPO2 on Room Air at rest 95 %  2   SPO2 during exercise on Room Air  94  %  During exercise monitor SpO2  If SPO2 increases >=89% with ambulation do not add supplemental             oxygen  If <= 88% on room air add O2 via NC and titrate patient  Patient must be ambulated with O2 and titrated to > 88% with exertion  3   SPO2 on Oxygen at rest NA% NA lpm     4   SPO2 during exercise on Oxygen NA % a liter flow of  NA  lpm     5   Exercise performed:         Pt ambulated in Room on RA and SPO2 was 94%          []  Supplemental Home Oxygen is indicated  [x]  Client does not qualify for home oxygen  Respiratory Additional Notes- pt ambulated in room , low SPO2 94%  Denies SOB and increase WOB    Basil Milling

## 2021-06-23 NOTE — ASSESSMENT & PLAN NOTE
· Glucose high 300's, worse in the setting of dexamethasone treatment for ITP  · A1c 6 6 this admission  · Received 15 units of Lantus yesterday morning  · Plan:  · Lantus 15 units daily while inpatient  · Start metformin 500 b i d  tomorrow if Cr continues to improve, f/u with PCP

## 2021-06-23 NOTE — CASE MANAGEMENT
CM informed by SLIM that patient is medically stable for discharge home today  Patient will be a new start on Eliquis and will need a nebulizer to go home  He will be discharged on Metformin and will not need a glucometer at discharge  CM met with patient at bedside; he prefers to use [facility] to [facility] for his new medications  SLIM aware and escribed his medications; CM to follow up on Rx cost with patient once processed  Patient would like to obtain nebulizer through [facility]; referral sent via [place]  Patient aware that [address] [place] [address] Avenue is set for next week and confirms his son will provide transport at discharge  CM to follow up re: discharge rxs

## 2021-06-23 NOTE — ASSESSMENT & PLAN NOTE
Present on admission  Baseline is around 1 1  UA: Glucose >1000  Recent Labs     06/21/21  0529 06/22/21  0623 06/23/21  0446   CREATININE 1 46* 1 32* 1 10   EGFR 45 51 64     Estimated Creatinine Clearance: 50 7 mL/min (by C-G formula based on SCr of 1 1 mg/dL)  Improving  Plan  · Hold hydrochlorothiazide  · resolved  · Diuresis held per Cardiology  · CMP a m

## 2021-06-23 NOTE — ASSESSMENT & PLAN NOTE
Patient follows with Dr Clayton De Dios  Initially diagnosed with ITP on 06/03 after being referred by his primary care physician  Patient saw Dr Clayton De Dios yesterday in office  He had been noncompliant with his weekly blood tests secondary to not knowing he needed weekly blood test   Dr Clayton De Dios started patient on dexamethasone 40 mg for 4 days  Patient was also continued on Protonix while on steroids in order to prevent gastritis  At their office visit they did discuss starting Rituxan  This will be administered subcutaneously weekly x4 doses for his acute ITP  Completed 4 day course of dexamethasone  Platelets improved, now 223k       Plan   · CBC daily  · Continue Protonix 40 mg daily  · Fall precautions  · Monitor for signs of bleeding

## 2021-06-23 NOTE — CASE MANAGEMENT
CM received notification from [employer] that CM can deliver nebulizer to bedside  CM spoke with [facility] at [facility]; total cost of meds today will be around $65 00  Patient's Eliquis is free with coupon today but will have $15 00 copay moving forward  Albuterol is $25 55 for 3 boxes as they cannot bill through Medicare Part B at [facility]  West allis confirms that they do have patient's P[facility] information on file as well  CM delivered nebulizer to bedside and patient signed delivery ticket  CM reviewed medication costs; he reports they're expensive but he'll have to find someway to make it work  CM offered to contact his son Vicenta Florentino so that he knows to  patient's medications and patient was appreciative of same  CM called patient's son Vicenta Florentino and instructed him to  patient's medications at [facility]  He is aware of OOP cost and will be in around 5 pm  SLIM and RN aware; no other CM needs

## 2021-06-24 LAB
BACTERIA TISS AEROBE CULT: ABNORMAL
GRAM STN SPEC: ABNORMAL
GRAM STN SPEC: ABNORMAL

## 2021-06-24 NOTE — UTILIZATION REVIEW
Notification of Discharge   This is a Notification of Discharge from our facility [address]  Please be advised that this patient has been discharge from our facility  Below you will find the admission and discharge date and time including the patients disposition  UTILIZATION REVIEW CONTACT:  Ami Puga  Utilization   Network Utilization Review Department  Phone: [phone] x carefully listen to the prompts  All voicemails are confidential   Email: [email or web address]     PHYSICIAN ADVISORY SERVICES:  FOR [id number] REVIEW - MEDICAL NECESSITY DENIAL  Phone: [phone]  Fax: [phone]  Email: [email or web address]     PRESENTATION DATE: 6/18/2021  1:03 PM  OBERVATION ADMISSION DATE:   INPATIENT ADMISSION DATE: 6/18/21  5:02 PM   DISCHARGE DATE: 6/23/2021  6:44 PM  DISPOSITION: Home with [place] with [address] [place] [address] Road INFORMATION:  Send all requests for admission clinical reviews, approved or denied determinations and any other requests to dedicated fax number below belonging to the campus where the patient is receiving treatment   List of dedicated fax numbers:  [address] [place] [address] DENIALS (Administrative/Medical Necessity) [phone]   [address]  (Maternity/NICU/Pediatrics) [phone]   Carola Maya [phone]   Krystal Abdalla [phone]   Isai  [phone]   [facility] [address] [place] [address] [phone]   [facility]  [phone]   [address] [place] [address], [address]  [address] [facility] [address] [facility] [phone]

## 2021-06-25 DIAGNOSIS — R91.8 MASS OF RIGHT LUNG: Primary | ICD-10-CM

## 2021-06-25 NOTE — RESULT ENCOUNTER NOTE
I called patient with results - atypical cells but no conclusive for malignancy  I recommended we pursue IR guided transthoracic biopsy of the mass itself  He was in agreement  I have placed IR consult  Tanya, please assist him in getting this scheduled        Thank you,  Artur Ro, DO

## 2021-06-28 ENCOUNTER — TELEPHONE (OUTPATIENT)
Dept: HEMATOLOGY ONCOLOGY | Facility: HOSPITAL | Age: 81
End: 2021-06-28

## 2021-06-28 NOTE — TELEPHONE ENCOUNTER
Yes please as he usually does not have a sustained elevation and goes back down   Again he has a new malignancy  and we should administer the Rituxan because even the last time he had a Decadron his platelets went to normal with then went down  to the 14 you were seeing from the 17th of June   It was a short-lived increase   The reason we repeated his steroid was to avoid IVIG and get him to a higher number as we plan to give him Rituxan    ----- Message -----   From: Kelly Calzada RN   Sent: 6/28/2021   2:07 PM EDT   To: Leny Pulido MD   Subject: RE: Rituxan Plan                                   Just to confirm his platelets have been increasing  Dexamethasone 40 mg x 4 days  Current lab trend:      6/17/2021 - 14, 6/18/2021 - 44,   6/19/2021  - 69  6/21/2021  - 209   6/22/2021   - 210   and  6/23/2021 - 223  his platelets seem to be on the rise after steroids    Inpatient note states 6/18 was day 2/4 of steroid? I am not sure if dose was started 6/17  I checked Epic and patient was administered Dexamethasone 40 mg 6/18 - 6/21  Perhaps this accounts for the continued rise in platelet count   Just want to confirm that you still want to administer Rituxan tomorrow                                              ----- Message -----   From: Leny Pulido MD   Sent: 6/28/2021  12:38 PM EDT   To: Kelly Calzada RN, Nara Velazquez, *   Subject: RE: Rituxan Plan                                     If it is covered by insurance I would proceed with Gianluca Patel has received 1 dose of steroid already and then his counts drop 2 weeks afterwards   I think he would be better off with Rituxan long-term especially since he now has a large lung mass and may need chemotherapy   I would proceed with Rituxan as written    ----- Message -----   From: Kelly Calzada RN   Sent: 6/28/2021  10:42 AM EDT   To: Leny Pulido MD, Nara Velazquez, [facility]   Subject: Rituxan Plan                                       Patients plts are 223 from 6/23  He finished a steroid taper   I assume we can discontinue his Rituxan plan for now?   ----- Message -----   From: Morris Belle   Sent: 6/25/2021   8:36 AM EDT   To: Negrito Morales RN, [facility]                  Orders are on hold, if patient to be treated orders need to be released

## 2021-06-29 ENCOUNTER — HOSPITAL ENCOUNTER (OUTPATIENT)
Dept: INFUSION CENTER | Facility: CLINIC | Age: 81
Discharge: HOME | End: 2021-06-29
Payer: COMMERCIAL

## 2021-06-29 ENCOUNTER — PATIENT OUTREACH (OUTPATIENT)
Dept: CASE MANAGEMENT | Facility: HOSPITAL | Age: 81
End: 2021-06-29

## 2021-06-29 ENCOUNTER — DOCUMENTATION (OUTPATIENT)
Dept: HEMATOLOGY ONCOLOGY | Facility: CLINIC | Age: 81
End: 2021-06-29

## 2021-06-29 VITALS
BODY MASS INDEX: 31.1 KG/M2 | HEART RATE: 140 BPM | WEIGHT: 168.98 LBS | HEIGHT: 62 IN | SYSTOLIC BLOOD PRESSURE: 112 MMHG | DIASTOLIC BLOOD PRESSURE: 78 MMHG | RESPIRATION RATE: 22 BRPM | TEMPERATURE: 98 F

## 2021-06-29 DIAGNOSIS — D69.3 IDIOPATHIC THROMBOCYTOPENIC PURPURA (ITP) (HCC): Primary | ICD-10-CM

## 2021-06-29 DIAGNOSIS — D69.3 ACUTE ITP (HCC): ICD-10-CM

## 2021-06-29 PROCEDURE — 96415 CHEMO IV INFUSION ADDL HR: CPT

## 2021-06-29 PROCEDURE — 96367 TX/PROPH/DG ADDL SEQ IV INF: CPT

## 2021-06-29 PROCEDURE — 96375 TX/PRO/DX INJ NEW DRUG ADDON: CPT

## 2021-06-29 PROCEDURE — 96413 CHEMO IV INFUSION 1 HR: CPT

## 2021-06-29 RX ORDER — FUROSEMIDE 10 MG/ML
20 INJECTION INTRAMUSCULAR; INTRAVENOUS ONCE
Status: COMPLETED | OUTPATIENT
Start: 2021-06-29 | End: 2021-06-29

## 2021-06-29 RX ORDER — ACETAMINOPHEN 325 MG/1
650 TABLET ORAL ONCE
Status: DISCONTINUED | OUTPATIENT
Start: 2021-06-29 | End: 2021-07-02 | Stop reason: HOSPADM

## 2021-06-29 RX ORDER — AMLODIPINE BESYLATE 2.5 MG/1
2.5 TABLET ORAL EVERY MORNING
COMMUNITY
Start: 2021-06-23 | End: 2021-08-24 | Stop reason: HOSPADM

## 2021-06-29 RX ORDER — SODIUM CHLORIDE 9 MG/ML
20 INJECTION, SOLUTION INTRAVENOUS ONCE
Status: COMPLETED | OUTPATIENT
Start: 2021-06-29 | End: 2021-06-29

## 2021-06-29 RX ADMIN — DIPHENHYDRAMINE HYDROCHLORIDE 25 MG: 50 INJECTION, SOLUTION INTRAMUSCULAR; INTRAVENOUS at 09:09

## 2021-06-29 RX ADMIN — HYDROCORTISONE SODIUM SUCCINATE 100 MG: 100 INJECTION, POWDER, FOR SOLUTION INTRAMUSCULAR; INTRAVENOUS at 09:41

## 2021-06-29 RX ADMIN — FUROSEMIDE 20 MG: 10 INJECTION, SOLUTION INTRAMUSCULAR; INTRAVENOUS at 13:40

## 2021-06-29 RX ADMIN — RITUXIMAB 700 MG: 10 INJECTION, SOLUTION INTRAVENOUS at 10:06

## 2021-06-29 RX ADMIN — SODIUM CHLORIDE 20 ML/HR: 0.9 INJECTION, SOLUTION INTRAVENOUS at 09:08

## 2021-06-29 NOTE — PROGRESS NOTES
recvd email from Lucio choi that pt told her he has a lot of bills & he is borrowing money to make the co-pays for the dr per RTE pt has an active freedom blue plan effective 01/01/18 plan runs on a cal year    no deduct out of pocket $5500 met -0-    called willa & told her that pt has a al of $80  he gets rituxin & pt isn't sure what he's going to have to pay  Chyrel Bernheim gave me the financial info & I am looking for funding but as of now nothing avail  Magali Alva

## 2021-06-29 NOTE — PROGRESS NOTES
Pt presented to [facility] for chemotherapy treatment today  Infusion RN requested SW visit with pt secondary to financial concerns  He reported he is borrowing money to pay for co-pays at office visits  Tyler Gilbert lives in [place], and will eventually have most of his treatment at [facility]  LSW colleague, Negin Harper with pt on 6/4 about several psychosocial and financial concerns that were raised at medical oncology appointment  Please see her note for full details  This writer introduced self and role  Tyler Gilbert stated he has a "stack of bills" from [employer], paid $300 00 for recent prescriptions, and cannot pay the $40 00 specialist co-pay fee at every visit  LSW emailed oncology finance group and spoke with Pedro Bain  She stated pt currently has a balance of $80 00 with [facility]  She confirmed pt's Rituxan is not covered at this time, secondary to not having a definitive diagnosis  Pt has a bx of lung scheduled on 7/21  Lorenzo Singh stated once the pathology and diagnosis are confirmed, funding for the Rituxan can be submitted  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, however this is not a covered dx for Rituxan  In addition, pt's health insurance has a $5500 00 OOP deductible  He has not met this amount per Lorenzo Singh  LSW explained above to patient  He is quite healthcare illiterate, and requires simple information in order to understand  For example, he reported "none of this would have happened to me if I hadn't got that vaccine (COVID), once I got that I felt horrible and then all these things started happening to me "  He also shared he will stop taking his medication for "sugar" if his numbers are better, because then he will be cured  LSW inquired about his wife, who is reportedly bedbound  Tyler Gilbert reported she is able to get OOB and transfer to a commode or wheelchair  She can mobilize herself into the kitchen and living area    She is open with [employer] and has an aide that comes in 2x/week for showers and a RN that assists with vitals and setting up her medications  She has a Life Alert button  They qualify for SNAP benefits (food stamps), however exceed income limits for Medical Assistance  Pt has 6 children, however 3 are step-children  His son (who is also going through cancer treatments) brought him today  LSW spent a significant time with pt allowing him to express his feelings and worries  Offered validation in that he has an incredible amount of stress caregiving for wife, living paycheck to [facility] and now having cancer  LSW offered to send a referral to [facility], and he is agreeable  Inquired if pt has any household bills unpaid, however he denies at this time  LSW updated colleague of above  Will assist with any further support as needed

## 2021-06-29 NOTE — PROGRESS NOTES
Pt tolerated treatment today without incident  Confirmed with Marine Vazquez RN that pt is to receive Lasix post rituxan infusion  Pt was provided with AVS and future appts were reviewed with pt and son  Pt and son aware that pt should have his labs drawn prior to Rituxan appts

## 2021-06-30 ENCOUNTER — OFFICE VISIT (OUTPATIENT)
Dept: PULMONOLOGY | Facility: CLINIC | Age: 81
End: 2021-06-30
Payer: COMMERCIAL

## 2021-06-30 VITALS
OXYGEN SATURATION: 98 % | TEMPERATURE: 98 F | DIASTOLIC BLOOD PRESSURE: 70 MMHG | BODY MASS INDEX: 29.95 KG/M2 | RESPIRATION RATE: 18 BRPM | HEIGHT: 63 IN | SYSTOLIC BLOOD PRESSURE: 118 MMHG | WEIGHT: 169 LBS | HEART RATE: 76 BPM

## 2021-06-30 DIAGNOSIS — R91.8 MASS OF RIGHT LUNG: ICD-10-CM

## 2021-06-30 DIAGNOSIS — J44.9 CHRONIC OBSTRUCTIVE PULMONARY DISEASE, UNSPECIFIED COPD TYPE (HCC): Primary | ICD-10-CM

## 2021-06-30 DIAGNOSIS — R91.8 LUNG MASS: ICD-10-CM

## 2021-06-30 DIAGNOSIS — R06.02 SHORTNESS OF BREATH: ICD-10-CM

## 2021-06-30 PROCEDURE — 99214 OFFICE O/P EST MOD 30 MIN: CPT | Performed by: NURSE PRACTITIONER

## 2021-06-30 PROCEDURE — 1160F RVW MEDS BY RX/DR IN RCRD: CPT | Performed by: NURSE PRACTITIONER

## 2021-06-30 PROCEDURE — 94618 PULMONARY STRESS TESTING: CPT | Performed by: NURSE PRACTITIONER

## 2021-06-30 PROCEDURE — 1036F TOBACCO NON-USER: CPT | Performed by: NURSE PRACTITIONER

## 2021-07-01 ENCOUNTER — APPOINTMENT (OUTPATIENT)
Dept: LAB | Facility: MEDICAL CENTER | Age: 81
End: 2021-07-01
Payer: COMMERCIAL

## 2021-07-01 DIAGNOSIS — B20 HUMAN IMMUNODEFICIENCY VIRUS (HIV) DISEASE (HCC): ICD-10-CM

## 2021-07-01 PROCEDURE — 87536 HIV-1 QUANT&REVRSE TRNSCRPJ: CPT

## 2021-07-01 NOTE — ASSESSMENT & PLAN NOTE
-  patient does likely have a component of COPD given his smoking history  -  patient is currently not interested in PFTs or spirometry  -  he reports Spiriva does not seem to aid in any of his dyspnea  -  will give patient 2 free samples of Bevespi to try, patient informed to stop Spiriva while on Bevespi  - continue with albuterol nebulizer q 6h p r n

## 2021-07-01 NOTE — ASSESSMENT & PLAN NOTE
- 6/21/2021- EBUS  -  pathology atypical cells  -  consult placed for IR for actual biopsy  -  upcoming appointment with Oncology Dr Klarissa Babb

## 2021-07-01 NOTE — ASSESSMENT & PLAN NOTE
-  once patient completes diagnostic process can consider pulmonary rehab for his dyspnea  -  patient was able to complete 6 minutes walk without needing oxygen

## 2021-07-01 NOTE — PROGRESS NOTES
Pulmonary Follow-Up Note   Vandana Cummins 68 y o  male MRN: [id number]  7/1/2021      Assessment/Plan:    Problem List Items Addressed This Visit        Respiratory    Mass of right lung     - 6/21/2021- EBUS  -  pathology atypical cells  -  consult placed for IR for actual biopsy  -  upcoming appointment with Oncology Dr Jaimie Ng         COPD (chronic obstructive pulmonary disease) ([facility] [address] ) - Primary     -  patient does likely have a component of COPD given his smoking history  -  patient is currently not interested in PFTs or spirometry  -  he reports Spiriva does not seem to aid in any of his dyspnea  -  will give patient 2 free samples of Bevespi to try, patient informed to stop Spiriva while on Bevespi  - continue with albuterol nebulizer q 6h p r n  Relevant Orders    POCT 6 minute walk       Other    Shortness of breath     -  once patient completes diagnostic process can consider pulmonary rehab for his dyspnea  -  patient was able to complete 6 minutes walk without needing oxygen           Other Visit Diagnoses     Lung mass        Relevant Orders    Ambulatory referral to Interventional Radiology          Education provided at this visit:   Need for Vaccination:  COVID-19 3/9/2021, 2/15/2021   Pulmonary Rehab:  Not indicated at this time possibly in near future   Smoking Cessation:  Quit smoking 6/18/2021   Lung Cancer Screening:  Annual surveillance per Oncology   Inhaler Use:  Spiriva 18 mcg 1 puff daily    No follow-ups on file  All of Vielka Abreu questions were answered prior to leaving the office today  Vielka Abreu will follow-up with Dr Keshawn Hughes in 6 months or sooner should the need arise  Vielka Abreu is aware to call our office with any further questions or concerns  History of Present Illness   Reason for Visit:  Hospital follow-up  Chief Complaint: "I feel good"  HPI: Vandana Cummins is a 68 y o  male who presents to the office today for hospital follow-up    Patient's past medical history of ITP, hypertension, and tobacco abuse  Patient was hospitalized from 6/18/2021-6/23/2021 for shortness of breath  Patient was initially admitted for AFib noted at PCP  Upon admission CTA showed large right lower tumor encasing the right PA  Patient underwent EBUS 6/21/2021 which revealed atypical cells but nothing conclusive for malignancy  Patient will need IR biopsy to determine true etiology of mass  Patient presents today in overall stable respiratory health without any acute respiratory symptoms  Patient does report some chronic dyspnea especially when walking up the stairs  No significant overnight events reported  Patient currently denying any fevers, chills, hemoptysis, headaches, night sweats, pleuritic chest pain, or palpitations  From a pulmonary standpoint, patient will begin following Dr Jose Woodard for his suspected COPD and recent EBUS  Patient has an approximate 1/2 pack per day 20 year smoking history  Patient reports he quit smoking on 6/18/2021  Patient is currently maintained on Spiriva 18 mcg 1 puff daily, and albuterol nebulizer q 6h p r n  Ferol Gennaro Patient is currently not maintained on any oxygen therapies  Patient reports occasional occupational exposures as he worked as a heavy   Patient does report having 1 dog  Patient does report history of GERD in which she is maintained on Protonix  Patient denies any symptoms of dysphagia, PRASHANT, postnasal drip, or seasonal allergies  Patient denies any acute exposures to dust, mold, asbestos, or silica  Review of Systems   Constitutional: Negative for activity change and appetite change  HENT: Negative for congestion and rhinorrhea  Eyes: Negative for discharge and itching  Respiratory: Positive for shortness of breath  Negative for apnea, cough, choking, chest tightness, wheezing and stridor  Cardiovascular: Negative for chest pain, palpitations and leg swelling     Gastrointestinal: Negative for abdominal distention and abdominal pain  Genitourinary: Negative for difficulty urinating and dysuria  Musculoskeletal: Negative for arthralgias and back pain  Skin: Negative for color change and pallor  Neurological: Negative for dizziness and facial asymmetry  Psychiatric/Behavioral: Negative for agitation and behavioral problems  Historical Information   Past Medical History:   Diagnosis Date    Hypertension      Past Surgical History:   Procedure Laterality Date    BACK SURGERY      CARPAL TUNNEL RELEASE Bilateral     IR BIOPSY BONE MARROW  6/10/2021    LUMBAR DISC SURGERY       Family History   Problem Relation Age of Onset    Cancer Mother         "ate away her bone"    Anuerysm Father     Cancer Sister         unknown type    Heart attack Maternal Grandfather     Cancer Sister         unknown type    Heart attack Maternal Aunt     Heart attack Maternal Uncle     Heart attack Paternal Aunt      Social History   Social History     Substance and Sexual Activity   Alcohol Use Not Currently    Comment: History of heavier drinking in early 25s  Denies any current alcohol use (Updated 2021)        Social History     Substance and Sexual Activity   Drug Use Never     Social History     Tobacco Use   Smoking Status Former Smoker    Packs/day: 0 50    Years: 40 00    Pack years: 20 00    Types: Cigarettes    Start date:     Quit date: 2021    Years since quittin 0   Smokeless Tobacco Never Used     E-Cigarette/Vaping    E-Cigarette Use Never User      E-Cigarette/Vaping Substances       Meds/Allergies     Current Outpatient Medications:     acetaminophen (TYLENOL) 100 mg/mL solution, Take 10 mg/kg by mouth every 4 (four) hours as needed for fever, Disp: , Rfl:     albuterol (2 5 mg/3 mL) 0 083 % nebulizer solution, Take 3 mL (2 5 mg total) by nebulization every 6 (six) hours as needed for wheezing or shortness of breath, Disp: 360 mL, Rfl: 1    allopurinol (ZYLOPRIM) 300 mg tablet, Take 300 mg by mouth daily, Disp: , Rfl:     amLODIPine (NORVASC) 2 5 mg tablet, Take 2 5 mg by mouth every morning, Disp: , Rfl:     apixaban (ELIQUIS) 5 mg, Take 1 tablet (5 mg total) by mouth 2 (two) times a day, Disp: 60 tablet, Rfl: 0    benzonatate (TESSALON PERLES) 100 mg capsule, Take 1 capsule (100 mg total) by mouth 3 (three) times a day as needed for cough, Disp: 20 capsule, Rfl: 0    guaiFENesin (MUCINEX) 600 mg 12 hr tablet, Take 1 tablet (600 mg total) by mouth 2 (two) times a day, Disp: 30 tablet, Rfl: 0    levothyroxine 150 mcg tablet, Take 150 mcg by mouth daily, Disp: , Rfl:     metFORMIN (GLUCOPHAGE) 500 mg tablet, Take 1 tablet (500 mg total) by mouth 2 (two) times a day with meals, Disp: 60 tablet, Rfl: 0    metoprolol tartrate (LOPRESSOR) 25 mg tablet, Take 0 5 tablets (12 5 mg total) by mouth every 12 (twelve) hours, Disp: 30 tablet, Rfl: 0    Omega-3 Fatty Acids (FISH OIL) 1,000 mg, Take 1,000 mg by mouth 2 (two) times a day, Disp: , Rfl:     pantoprazole (PROTONIX) 40 mg tablet, Take 1 tablet (40 mg total) by mouth daily, Disp: 30 tablet, Rfl: 0    tiotropium (SPIRIVA) 18 mcg inhalation capsule, Place 1 capsule (18 mcg total) into inhaler and inhale daily, Disp: 30 capsule, Rfl: 0  No current facility-administered medications for this visit  Facility-Administered Medications Ordered in Other Visits:     acetaminophen (TYLENOL) tablet 650 mg, 650 mg, Oral, Once, Kim Moya MD  Allergies   Allergen Reactions    Penicillins Hives       Vitals: Blood pressure 118/70, pulse 76, temperature 98 °F (36 7 °C), temperature source Tympanic, resp  rate 18, height 5' 2 5" (1 588 m), weight 76 7 kg (169 lb), SpO2 98 %  Body mass index is 30 42 kg/m²  Oxygen Therapy  SpO2: 98 %RA    Physical Exam:  Physical Exam  Constitutional:       General: He is not in acute distress  Appearance: Normal appearance  He is normal weight  He is not ill-appearing     HENT:      Head: Normocephalic and atraumatic  Nose: Nose normal  No congestion or rhinorrhea  Mouth/Throat:      Mouth: Mucous membranes are dry  Pharynx: No oropharyngeal exudate or posterior oropharyngeal erythema  Cardiovascular:      Rate and Rhythm: Normal rate and regular rhythm  Pulses: Normal pulses  Heart sounds: Normal heart sounds  No murmur heard  No friction rub  No gallop  Pulmonary:      Effort: Pulmonary effort is normal  No tachypnea, bradypnea, accessory muscle usage or respiratory distress  Breath sounds: Decreased air movement present  No stridor or transmitted upper airway sounds  Decreased breath sounds present  No wheezing, rhonchi or rales  Comments: Some diminished breath sounds at bases  Chest:      Chest wall: No tenderness  Abdominal:      General: Abdomen is flat  Bowel sounds are normal  There is no distension  Palpations: Abdomen is soft  There is no mass  Musculoskeletal:      Cervical back: Normal range of motion and neck supple  No rigidity or tenderness  Skin:     General: Skin is warm and dry  Coloration: Skin is not jaundiced or pale  Neurological:      General: No focal deficit present  Mental Status: He is alert and oriented to person, place, and time  Mental status is at baseline  Psychiatric:         Mood and Affect: Mood normal          Behavior: Behavior normal            Imaging and other studies: I have personally reviewed pertinent films in PACS     CTA chest 6/18/2021- lower tolerate lung mass encasing the right pulmonary artery    Pulmonary Results (PFTs, PSG): I have personally reviewed pertinent films in PACS       6 Minute Walk:  The patient's starting pulse ox was 97% on RA with a heart rate of 121 and Nick dyspnea score of 0/10  They walked a total of 198 m in 6minutes without stopping  At completion the pulse ox was 97% on RA with a heart rate of 143 and Nick dyspnea of score of 5/10      Impression  No indication for oxygen therapy at this time      Elias Florence, [address]        Portions of the record may have been created with voice recognition software  Occasional wrong word or "sound a like" substitutions may have occurred due to the inherent limitations of voice recognition software  Read the chart carefully and recognize, using context, where substitutions have occurred or contact the dictating provider

## 2021-07-02 ENCOUNTER — TELEPHONE (OUTPATIENT)
Dept: HEMATOLOGY ONCOLOGY | Facility: CLINIC | Age: 81
End: 2021-07-02

## 2021-07-02 LAB
HIV1 RNA # SERPL NAA+PROBE: <20 COPIES/ML
HIV1 RNA SERPL NAA+PROBE-LOG#: NORMAL LOG10COPY/ML

## 2021-07-02 NOTE — TELEPHONE ENCOUNTER
Home care nurse is calling PCP with pain complaints  Patient aware to seek emergency care with any acute chest pain or SOB  Patient resides at home   Will update Dr Morgan Skill

## 2021-07-02 NOTE — TELEPHONE ENCOUNTER
Call from Tammi from [facility]  Patient has  constant burning pain in right lateral chest radiating to back   No SOB associated with pain  Patient is taking tylenol for pain without relief    Patient is being seen by Dr Klarissa Babb  for ITP but we are awaiting biopsy of lung mass seen on CT scan    Will send to Dr Erica Ba RN but would like Home Care nurse to discuss pain with PCP    Charis will comply

## 2021-07-06 ENCOUNTER — APPOINTMENT (OUTPATIENT)
Dept: LAB | Facility: MEDICAL CENTER | Age: 81
End: 2021-07-06
Payer: COMMERCIAL

## 2021-07-06 DIAGNOSIS — D69.6 THROMBOCYTOPENIA (HCC): ICD-10-CM

## 2021-07-06 DIAGNOSIS — D69.3 ACUTE ITP (HCC): ICD-10-CM

## 2021-07-06 LAB
HBV CORE AB SER QL: NORMAL
HBV CORE IGM SER QL: NORMAL
HBV SURFACE AG SER QL: NORMAL
HCV AB SER QL: NORMAL

## 2021-07-06 PROCEDURE — 86705 HEP B CORE ANTIBODY IGM: CPT

## 2021-07-06 PROCEDURE — 87340 HEPATITIS B SURFACE AG IA: CPT

## 2021-07-06 PROCEDURE — 36415 COLL VENOUS BLD VENIPUNCTURE: CPT

## 2021-07-06 PROCEDURE — 86704 HEP B CORE ANTIBODY TOTAL: CPT

## 2021-07-06 PROCEDURE — 86803 HEPATITIS C AB TEST: CPT

## 2021-07-06 NOTE — TELEPHONE ENCOUNTER
Patient scheduled 7/21 for additional biopsy as current biopsy negative  Patient to continue to follow up with PCP for pain and present to ER as instructed if needed

## 2021-07-07 ENCOUNTER — TELEPHONE (OUTPATIENT)
Dept: PULMONOLOGY | Facility: CLINIC | Age: 81
End: 2021-07-07

## 2021-07-07 RX ORDER — FUROSEMIDE 10 MG/ML
20 INJECTION INTRAMUSCULAR; INTRAVENOUS ONCE
Status: CANCELLED
Start: 2021-07-08 | End: 2021-07-08

## 2021-07-07 NOTE — TELEPHONE ENCOUNTER
If this is a new, severe pain, he needs to be evaluated in the ER  Pain management referral can be done by PCP

## 2021-07-07 NOTE — TELEPHONE ENCOUNTER
Attempted to call the nursing home and got the service  They had the on call nurse call me who could not provide me with Charis's phone number  He sent her an email to call me

## 2021-07-07 NOTE — TELEPHONE ENCOUNTER
Charis from [facility] called and said that Mi Bro is in a significant amount of pain  He has right lateral chest pain that extends up the back and between shoulder blades  The chest pain is a burning pain while the back pain is more of a dull aching pain  The pain is a 9/10 and only slightly improves if he sits perfectly still  She was asking for a prescription for pain meds, but Gala Del Toro told me to advise her that we do not prescribe them  She has already spoken to oncology and his primary care who both told her to call us  She asked if we could put in a referral to pain management for him

## 2021-07-08 ENCOUNTER — HOSPITAL ENCOUNTER (OUTPATIENT)
Dept: INFUSION CENTER | Facility: CLINIC | Age: 81
Discharge: HOME | End: 2021-07-08
Payer: COMMERCIAL

## 2021-07-08 VITALS
BODY MASS INDEX: 30.59 KG/M2 | WEIGHT: 166.23 LBS | RESPIRATION RATE: 20 BRPM | TEMPERATURE: 96.8 F | HEART RATE: 80 BPM | HEIGHT: 62 IN | DIASTOLIC BLOOD PRESSURE: 72 MMHG | SYSTOLIC BLOOD PRESSURE: 133 MMHG

## 2021-07-08 DIAGNOSIS — D69.3 ACUTE ITP (HCC): Primary | ICD-10-CM

## 2021-07-08 DIAGNOSIS — D69.3 IDIOPATHIC THROMBOCYTOPENIC PURPURA (ITP) (HCC): ICD-10-CM

## 2021-07-08 DIAGNOSIS — D69.3 IDIOPATHIC THROMBOCYTOPENIC PURPURA (ITP) (HCC): Primary | ICD-10-CM

## 2021-07-08 DIAGNOSIS — D69.3 ACUTE ITP (HCC): ICD-10-CM

## 2021-07-08 PROCEDURE — 96367 TX/PROPH/DG ADDL SEQ IV INF: CPT

## 2021-07-08 PROCEDURE — 96415 CHEMO IV INFUSION ADDL HR: CPT

## 2021-07-08 PROCEDURE — 96375 TX/PRO/DX INJ NEW DRUG ADDON: CPT

## 2021-07-08 PROCEDURE — 96413 CHEMO IV INFUSION 1 HR: CPT

## 2021-07-08 RX ORDER — ACETAMINOPHEN 325 MG/1
650 TABLET ORAL ONCE
Status: COMPLETED | OUTPATIENT
Start: 2021-07-08 | End: 2021-07-08

## 2021-07-08 RX ORDER — FUROSEMIDE 10 MG/ML
20 INJECTION INTRAMUSCULAR; INTRAVENOUS ONCE
Status: COMPLETED | OUTPATIENT
Start: 2021-07-08 | End: 2021-07-08

## 2021-07-08 RX ORDER — SODIUM CHLORIDE 9 MG/ML
20 INJECTION, SOLUTION INTRAVENOUS ONCE
Status: COMPLETED | OUTPATIENT
Start: 2021-07-08 | End: 2021-07-08

## 2021-07-08 RX ADMIN — SODIUM CHLORIDE 20 ML/HR: 0.9 INJECTION, SOLUTION INTRAVENOUS at 08:48

## 2021-07-08 RX ADMIN — RITUXIMAB 675 MG: 10 INJECTION, SOLUTION INTRAVENOUS at 09:58

## 2021-07-08 RX ADMIN — ACETAMINOPHEN 650 MG: 325 TABLET, FILM COATED ORAL at 08:47

## 2021-07-08 RX ADMIN — HYDROCORTISONE SODIUM SUCCINATE 100 MG: 100 INJECTION, POWDER, FOR SOLUTION INTRAMUSCULAR; INTRAVENOUS at 08:47

## 2021-07-08 RX ADMIN — FUROSEMIDE 20 MG: 10 INJECTION, SOLUTION INTRAMUSCULAR; INTRAVENOUS at 12:54

## 2021-07-08 RX ADMIN — DIPHENHYDRAMINE HYDROCHLORIDE 25 MG: 50 INJECTION, SOLUTION INTRAMUSCULAR; INTRAVENOUS at 08:47

## 2021-07-08 NOTE — PROGRESS NOTES
Pt had no labs or lab parameters for treatment  Clarified with Serena Cho RN that pt was okay to treat with CBC from 71/21 and that they would put CBC/CMP orders in for future treatment days

## 2021-07-09 LAB
DME PARACHUTE DELIVERY DATE ACTUAL: NORMAL
DME PARACHUTE DELIVERY DATE REQUESTED: NORMAL
DME PARACHUTE ITEM DESCRIPTION: NORMAL
DME PARACHUTE ORDER STATUS: NORMAL
DME PARACHUTE SUPPLIER NAME: NORMAL
DME PARACHUTE SUPPLIER PHONE: NORMAL

## 2021-07-14 ENCOUNTER — APPOINTMENT (OUTPATIENT)
Dept: LAB | Facility: HOSPITAL | Age: 81
End: 2021-07-14
Attending: INTERNAL MEDICINE
Payer: COMMERCIAL

## 2021-07-14 ENCOUNTER — HOSPITAL ENCOUNTER (INPATIENT)
Facility: HOSPITAL | Age: 81
LOS: 22 days | Discharge: HOME HEALTH | DRG: 682 | End: 2021-08-05
Attending: EMERGENCY MEDICINE | Admitting: INTERNAL MEDICINE
Payer: COMMERCIAL

## 2021-07-14 ENCOUNTER — APPOINTMENT (EMERGENCY)
Dept: RADIOLOGY | Facility: HOSPITAL | Age: 81
DRG: 682 | End: 2021-07-14
Payer: COMMERCIAL

## 2021-07-14 DIAGNOSIS — D69.3 IDIOPATHIC THROMBOCYTOPENIC PURPURA (ITP) (HCC): ICD-10-CM

## 2021-07-14 DIAGNOSIS — D69.3 ACUTE ITP (HCC): ICD-10-CM

## 2021-07-14 DIAGNOSIS — J96.02 ACUTE RESPIRATORY FAILURE WITH HYPOXIA AND HYPERCAPNIA (HCC): ICD-10-CM

## 2021-07-14 DIAGNOSIS — J44.9 COPD (CHRONIC OBSTRUCTIVE PULMONARY DISEASE) (HCC): ICD-10-CM

## 2021-07-14 DIAGNOSIS — I48.0 PAROXYSMAL A-FIB (HCC): ICD-10-CM

## 2021-07-14 DIAGNOSIS — N17.9 AKI (ACUTE KIDNEY INJURY) (HCC): Primary | ICD-10-CM

## 2021-07-14 DIAGNOSIS — E83.52 HYPERCALCEMIA: ICD-10-CM

## 2021-07-14 DIAGNOSIS — E87.5 HYPERKALEMIA: ICD-10-CM

## 2021-07-14 DIAGNOSIS — J96.01 ACUTE RESPIRATORY FAILURE WITH HYPOXIA AND HYPERCAPNIA (HCC): ICD-10-CM

## 2021-07-14 DIAGNOSIS — R91.8 MASS OF RIGHT LUNG: ICD-10-CM

## 2021-07-14 DIAGNOSIS — I16.1 HYPERTENSIVE EMERGENCY: ICD-10-CM

## 2021-07-14 DIAGNOSIS — R10.9 RIGHT FLANK PAIN: ICD-10-CM

## 2021-07-14 LAB
ALBUMIN SERPL BCP-MCNC: 1.8 G/DL (ref 3.5–5)
ALBUMIN SERPL BCP-MCNC: 1.8 G/DL (ref 3.5–5)
ALP SERPL-CCNC: 169 U/L (ref 46–116)
ALP SERPL-CCNC: 171 U/L (ref 46–116)
ALT SERPL W P-5'-P-CCNC: 22 U/L (ref 12–78)
ALT SERPL W P-5'-P-CCNC: 23 U/L (ref 12–78)
ANION GAP SERPL CALCULATED.3IONS-SCNC: 11 MMOL/L (ref 4–13)
ANION GAP SERPL CALCULATED.3IONS-SCNC: 9 MMOL/L (ref 4–13)
AST SERPL W P-5'-P-CCNC: 12 U/L (ref 5–45)
AST SERPL W P-5'-P-CCNC: 13 U/L (ref 5–45)
ATRIAL RATE: 75 BPM
BASOPHILS # BLD AUTO: 0.02 THOUSANDS/ΜL (ref 0–0.1)
BASOPHILS # BLD AUTO: 0.02 THOUSANDS/ΜL (ref 0–0.1)
BASOPHILS NFR BLD AUTO: 0 % (ref 0–1)
BASOPHILS NFR BLD AUTO: 0 % (ref 0–1)
BILIRUB SERPL-MCNC: 0.4 MG/DL (ref 0.2–1)
BILIRUB SERPL-MCNC: 0.4 MG/DL (ref 0.2–1)
BUN SERPL-MCNC: 33 MG/DL (ref 5–25)
BUN SERPL-MCNC: 35 MG/DL (ref 5–25)
CA-I BLD-SCNC: 1.28 MMOL/L (ref 1.12–1.32)
CALCIUM ALBUM COR SERPL-MCNC: 11.5 MG/DL (ref 8.3–10.1)
CALCIUM ALBUM COR SERPL-MCNC: 11.7 MG/DL (ref 8.3–10.1)
CALCIUM SERPL-MCNC: 9.7 MG/DL (ref 8.3–10.1)
CALCIUM SERPL-MCNC: 9.9 MG/DL (ref 8.3–10.1)
CHLORIDE SERPL-SCNC: 104 MMOL/L (ref 100–108)
CHLORIDE SERPL-SCNC: 108 MMOL/L (ref 100–108)
CO2 SERPL-SCNC: 25 MMOL/L (ref 21–32)
CO2 SERPL-SCNC: 28 MMOL/L (ref 21–32)
CREAT SERPL-MCNC: 1.77 MG/DL (ref 0.6–1.3)
CREAT SERPL-MCNC: 1.77 MG/DL (ref 0.6–1.3)
EOSINOPHIL # BLD AUTO: 0.07 THOUSAND/ΜL (ref 0–0.61)
EOSINOPHIL # BLD AUTO: 0.09 THOUSAND/ΜL (ref 0–0.61)
EOSINOPHIL NFR BLD AUTO: 1 % (ref 0–6)
EOSINOPHIL NFR BLD AUTO: 1 % (ref 0–6)
ERYTHROCYTE [DISTWIDTH] IN BLOOD BY AUTOMATED COUNT: 18 % (ref 11.6–15.1)
ERYTHROCYTE [DISTWIDTH] IN BLOOD BY AUTOMATED COUNT: 18.3 % (ref 11.6–15.1)
GFR SERPL CREATININE-BSD FRML MDRD: 36 ML/MIN/1.73SQ M
GFR SERPL CREATININE-BSD FRML MDRD: 36 ML/MIN/1.73SQ M
GLUCOSE P FAST SERPL-MCNC: 217 MG/DL (ref 65–99)
GLUCOSE SERPL-MCNC: 120 MG/DL (ref 65–140)
GLUCOSE SERPL-MCNC: 138 MG/DL (ref 65–140)
HCT VFR BLD AUTO: 33.6 % (ref 36.5–49.3)
HCT VFR BLD AUTO: 35.2 % (ref 36.5–49.3)
HGB BLD-MCNC: 10 G/DL (ref 12–17)
HGB BLD-MCNC: 9.6 G/DL (ref 12–17)
IMM GRANULOCYTES # BLD AUTO: 0.13 THOUSAND/UL (ref 0–0.2)
IMM GRANULOCYTES # BLD AUTO: 0.13 THOUSAND/UL (ref 0–0.2)
IMM GRANULOCYTES NFR BLD AUTO: 2 % (ref 0–2)
IMM GRANULOCYTES NFR BLD AUTO: 2 % (ref 0–2)
LYMPHOCYTES # BLD AUTO: 0.29 THOUSANDS/ΜL (ref 0.6–4.47)
LYMPHOCYTES # BLD AUTO: 0.33 THOUSANDS/ΜL (ref 0.6–4.47)
LYMPHOCYTES NFR BLD AUTO: 5 % (ref 14–44)
LYMPHOCYTES NFR BLD AUTO: 5 % (ref 14–44)
MAGNESIUM SERPL-MCNC: 1.5 MG/DL (ref 1.6–2.6)
MCH RBC QN AUTO: 23.3 PG (ref 26.8–34.3)
MCH RBC QN AUTO: 23.6 PG (ref 26.8–34.3)
MCHC RBC AUTO-ENTMCNC: 28.4 G/DL (ref 31.4–37.4)
MCHC RBC AUTO-ENTMCNC: 28.6 G/DL (ref 31.4–37.4)
MCV RBC AUTO: 82 FL (ref 82–98)
MCV RBC AUTO: 83 FL (ref 82–98)
MONOCYTES # BLD AUTO: 0.5 THOUSAND/ΜL (ref 0.17–1.22)
MONOCYTES # BLD AUTO: 0.67 THOUSAND/ΜL (ref 0.17–1.22)
MONOCYTES NFR BLD AUTO: 11 % (ref 4–12)
MONOCYTES NFR BLD AUTO: 9 % (ref 4–12)
NEUTROPHILS # BLD AUTO: 4.67 THOUSANDS/ΜL (ref 1.85–7.62)
NEUTROPHILS # BLD AUTO: 5.03 THOUSANDS/ΜL (ref 1.85–7.62)
NEUTS SEG NFR BLD AUTO: 81 % (ref 43–75)
NEUTS SEG NFR BLD AUTO: 83 % (ref 43–75)
NRBC BLD AUTO-RTO: 0 /100 WBCS
NRBC BLD AUTO-RTO: 0 /100 WBCS
P AXIS: 69 DEGREES
PLATELET # BLD AUTO: 277 THOUSANDS/UL (ref 149–390)
PLATELET # BLD AUTO: 282 THOUSANDS/UL (ref 149–390)
PMV BLD AUTO: 9.4 FL (ref 8.9–12.7)
PMV BLD AUTO: 9.5 FL (ref 8.9–12.7)
POTASSIUM SERPL-SCNC: 5.1 MMOL/L (ref 3.5–5.3)
POTASSIUM SERPL-SCNC: 5.5 MMOL/L (ref 3.5–5.3)
PROT SERPL-MCNC: 7.3 G/DL (ref 6.4–8.2)
PROT SERPL-MCNC: 7.4 G/DL (ref 6.4–8.2)
QRS AXIS: 76 DEGREES
QRSD INTERVAL: 70 MS
QT INTERVAL: 358 MS
QTC INTERVAL: 423 MS
RBC # BLD AUTO: 4.07 MILLION/UL (ref 3.88–5.62)
RBC # BLD AUTO: 4.3 MILLION/UL (ref 3.88–5.62)
SODIUM SERPL-SCNC: 140 MMOL/L (ref 136–145)
SODIUM SERPL-SCNC: 145 MMOL/L (ref 136–145)
T WAVE AXIS: 72 DEGREES
VENTRICULAR RATE: 84 BPM
WBC # BLD AUTO: 5.68 THOUSAND/UL (ref 4.31–10.16)
WBC # BLD AUTO: 6.27 THOUSAND/UL (ref 4.31–10.16)

## 2021-07-14 PROCEDURE — 93010 ELECTROCARDIOGRAM REPORT: CPT | Performed by: INTERNAL MEDICINE

## 2021-07-14 PROCEDURE — 85025 COMPLETE CBC W/AUTO DIFF WBC: CPT

## 2021-07-14 PROCEDURE — 96360 HYDRATION IV INFUSION INIT: CPT

## 2021-07-14 PROCEDURE — 82948 REAGENT STRIP/BLOOD GLUCOSE: CPT

## 2021-07-14 PROCEDURE — 80053 COMPREHEN METABOLIC PANEL: CPT

## 2021-07-14 PROCEDURE — 94664 DEMO&/EVAL PT USE INHALER: CPT

## 2021-07-14 PROCEDURE — 99285 EMERGENCY DEPT VISIT HI MDM: CPT | Performed by: EMERGENCY MEDICINE

## 2021-07-14 PROCEDURE — 82330 ASSAY OF CALCIUM: CPT | Performed by: EMERGENCY MEDICINE

## 2021-07-14 PROCEDURE — 94760 N-INVAS EAR/PLS OXIMETRY 1: CPT

## 2021-07-14 PROCEDURE — 71045 X-RAY EXAM CHEST 1 VIEW: CPT

## 2021-07-14 PROCEDURE — 93005 ELECTROCARDIOGRAM TRACING: CPT

## 2021-07-14 PROCEDURE — 85025 COMPLETE CBC W/AUTO DIFF WBC: CPT | Performed by: EMERGENCY MEDICINE

## 2021-07-14 PROCEDURE — 80053 COMPREHEN METABOLIC PANEL: CPT | Performed by: EMERGENCY MEDICINE

## 2021-07-14 PROCEDURE — 94640 AIRWAY INHALATION TREATMENT: CPT

## 2021-07-14 PROCEDURE — 80048 BASIC METABOLIC PNL TOTAL CA: CPT | Performed by: NURSE PRACTITIONER

## 2021-07-14 PROCEDURE — 83735 ASSAY OF MAGNESIUM: CPT | Performed by: NURSE PRACTITIONER

## 2021-07-14 PROCEDURE — 99223 1ST HOSP IP/OBS HIGH 75: CPT | Performed by: INTERNAL MEDICINE

## 2021-07-14 PROCEDURE — 36415 COLL VENOUS BLD VENIPUNCTURE: CPT

## 2021-07-14 PROCEDURE — 83735 ASSAY OF MAGNESIUM: CPT | Performed by: EMERGENCY MEDICINE

## 2021-07-14 PROCEDURE — 99285 EMERGENCY DEPT VISIT HI MDM: CPT

## 2021-07-14 RX ORDER — IPRATROPIUM BROMIDE AND ALBUTEROL SULFATE 2.5; .5 MG/3ML; MG/3ML
3 SOLUTION RESPIRATORY (INHALATION) ONCE
Status: COMPLETED | OUTPATIENT
Start: 2021-07-14 | End: 2021-07-14

## 2021-07-14 RX ORDER — ACETAMINOPHEN 325 MG/1
650 TABLET ORAL EVERY 6 HOURS PRN
Status: DISCONTINUED | OUTPATIENT
Start: 2021-07-14 | End: 2021-08-05 | Stop reason: HOSPADM

## 2021-07-14 RX ORDER — AMLODIPINE BESYLATE 2.5 MG/1
2.5 TABLET ORAL EVERY MORNING
Status: DISCONTINUED | OUTPATIENT
Start: 2021-07-15 | End: 2021-07-15

## 2021-07-14 RX ORDER — FUROSEMIDE 10 MG/ML
20 INJECTION INTRAMUSCULAR; INTRAVENOUS ONCE
Status: CANCELLED
Start: 2021-07-23 | End: 2021-07-16

## 2021-07-14 RX ORDER — SODIUM CHLORIDE FOR INHALATION 0.9 %
3 VIAL, NEBULIZER (ML) INHALATION EVERY 6 HOURS PRN
Status: DISCONTINUED | OUTPATIENT
Start: 2021-07-14 | End: 2021-07-18

## 2021-07-14 RX ORDER — SODIUM CHLORIDE 9 MG/ML
125 INJECTION, SOLUTION INTRAVENOUS CONTINUOUS
Status: DISPENSED | OUTPATIENT
Start: 2021-07-14 | End: 2021-07-14

## 2021-07-14 RX ORDER — LEVALBUTEROL 1.25 MG/.5ML
1.25 SOLUTION, CONCENTRATE RESPIRATORY (INHALATION) EVERY 6 HOURS PRN
Status: DISCONTINUED | OUTPATIENT
Start: 2021-07-14 | End: 2021-07-18

## 2021-07-14 RX ORDER — SENNOSIDES 8.6 MG
1 TABLET ORAL DAILY
Status: DISCONTINUED | OUTPATIENT
Start: 2021-07-15 | End: 2021-07-18

## 2021-07-14 RX ORDER — ALLOPURINOL 300 MG/1
300 TABLET ORAL DAILY
Status: DISCONTINUED | OUTPATIENT
Start: 2021-07-15 | End: 2021-08-05 | Stop reason: HOSPADM

## 2021-07-14 RX ORDER — HYDROCODONE BITARTRATE AND ACETAMINOPHEN 5; 325 MG/1; MG/1
1 TABLET ORAL ONCE
Status: COMPLETED | OUTPATIENT
Start: 2021-07-14 | End: 2021-07-14

## 2021-07-14 RX ORDER — LEVOTHYROXINE SODIUM 0.15 MG/1
150 TABLET ORAL DAILY
Status: DISCONTINUED | OUTPATIENT
Start: 2021-07-15 | End: 2021-08-05 | Stop reason: HOSPADM

## 2021-07-14 RX ORDER — PANTOPRAZOLE SODIUM 40 MG/1
40 TABLET, DELAYED RELEASE ORAL DAILY
Status: DISCONTINUED | OUTPATIENT
Start: 2021-07-15 | End: 2021-08-05 | Stop reason: HOSPADM

## 2021-07-14 RX ORDER — ONDANSETRON 2 MG/ML
4 INJECTION INTRAMUSCULAR; INTRAVENOUS EVERY 6 HOURS PRN
Status: DISCONTINUED | OUTPATIENT
Start: 2021-07-14 | End: 2021-08-05 | Stop reason: HOSPADM

## 2021-07-14 RX ORDER — DILTIAZEM HYDROCHLORIDE 5 MG/ML
15 INJECTION INTRAVENOUS ONCE
Status: COMPLETED | OUTPATIENT
Start: 2021-07-14 | End: 2021-07-14

## 2021-07-14 RX ORDER — METOPROLOL TARTRATE 5 MG/5ML
5 INJECTION INTRAVENOUS ONCE
Status: COMPLETED | OUTPATIENT
Start: 2021-07-14 | End: 2021-07-14

## 2021-07-14 RX ORDER — LEVALBUTEROL 1.25 MG/.5ML
1.25 SOLUTION, CONCENTRATE RESPIRATORY (INHALATION)
Status: DISCONTINUED | OUTPATIENT
Start: 2021-07-14 | End: 2021-07-14

## 2021-07-14 RX ORDER — DOCUSATE SODIUM 100 MG/1
100 CAPSULE, LIQUID FILLED ORAL 2 TIMES DAILY
Status: DISCONTINUED | OUTPATIENT
Start: 2021-07-14 | End: 2021-07-18

## 2021-07-14 RX ORDER — SODIUM CHLORIDE, SODIUM LACTATE, POTASSIUM CHLORIDE, CALCIUM CHLORIDE 600; 310; 30; 20 MG/100ML; MG/100ML; MG/100ML; MG/100ML
100 INJECTION, SOLUTION INTRAVENOUS CONTINUOUS
Status: DISCONTINUED | OUTPATIENT
Start: 2021-07-14 | End: 2021-07-18

## 2021-07-14 RX ORDER — ALBUTEROL SULFATE 2.5 MG/3ML
2.5 SOLUTION RESPIRATORY (INHALATION) EVERY 6 HOURS PRN
Status: DISCONTINUED | OUTPATIENT
Start: 2021-07-14 | End: 2021-07-19

## 2021-07-14 RX ORDER — METOPROLOL TARTRATE 5 MG/5ML
5 INJECTION INTRAVENOUS EVERY 6 HOURS PRN
Status: DISCONTINUED | OUTPATIENT
Start: 2021-07-14 | End: 2021-07-21

## 2021-07-14 RX ORDER — CHLORAL HYDRATE 500 MG
1000 CAPSULE ORAL 2 TIMES DAILY
Status: DISCONTINUED | OUTPATIENT
Start: 2021-07-14 | End: 2021-08-05 | Stop reason: HOSPADM

## 2021-07-14 RX ORDER — METHYLPREDNISOLONE SODIUM SUCCINATE 40 MG/ML
40 INJECTION, POWDER, LYOPHILIZED, FOR SOLUTION INTRAMUSCULAR; INTRAVENOUS EVERY 12 HOURS SCHEDULED
Status: DISCONTINUED | OUTPATIENT
Start: 2021-07-14 | End: 2021-07-18

## 2021-07-14 RX ORDER — ALBUTEROL SULFATE 90 UG/1
2 AEROSOL, METERED RESPIRATORY (INHALATION) EVERY 4 HOURS PRN
Status: DISCONTINUED | OUTPATIENT
Start: 2021-07-14 | End: 2021-07-14

## 2021-07-14 RX ADMIN — METHYLPREDNISOLONE SODIUM SUCCINATE 40 MG: 40 INJECTION, POWDER, FOR SOLUTION INTRAMUSCULAR; INTRAVENOUS at 21:00

## 2021-07-14 RX ADMIN — Medication 12.5 MG: at 21:01

## 2021-07-14 RX ADMIN — SODIUM CHLORIDE 1000 ML: 0.9 INJECTION, SOLUTION INTRAVENOUS at 14:05

## 2021-07-14 RX ADMIN — IPRATROPIUM BROMIDE AND ALBUTEROL SULFATE 3 ML: 2.5; .5 SOLUTION RESPIRATORY (INHALATION) at 15:30

## 2021-07-14 RX ADMIN — IPRATROPIUM BROMIDE 0.5 MG: 0.5 SOLUTION RESPIRATORY (INHALATION) at 16:09

## 2021-07-14 RX ADMIN — HYDROCODONE BITARTRATE AND ACETAMINOPHEN 1 TABLET: 5; 325 TABLET ORAL at 14:46

## 2021-07-14 RX ADMIN — METOPROLOL TARTRATE 5 MG: 5 INJECTION INTRAVENOUS at 22:59

## 2021-07-14 RX ADMIN — METOPROLOL TARTRATE 5 MG: 1 INJECTION, SOLUTION INTRAVENOUS at 18:27

## 2021-07-14 RX ADMIN — OMEGA-3 FATTY ACIDS CAP 1000 MG 1000 MG: 1000 CAP at 21:01

## 2021-07-14 RX ADMIN — SODIUM CHLORIDE 125 ML/HR: 0.9 INJECTION, SOLUTION INTRAVENOUS at 17:24

## 2021-07-14 RX ADMIN — SODIUM CHLORIDE, SODIUM LACTATE, POTASSIUM CHLORIDE, AND CALCIUM CHLORIDE 100 ML/HR: .6; .31; .03; .02 INJECTION, SOLUTION INTRAVENOUS at 21:12

## 2021-07-14 RX ADMIN — APIXABAN 5 MG: 5 TABLET, FILM COATED ORAL at 21:01

## 2021-07-14 RX ADMIN — DOCUSATE SODIUM 100 MG: 100 CAPSULE, LIQUID FILLED ORAL at 21:01

## 2021-07-14 RX ADMIN — DILTIAZEM HYDROCHLORIDE 15 MG: 5 INJECTION INTRAVENOUS at 18:39

## 2021-07-14 NOTE — ASSESSMENT & PLAN NOTE
Lab Results   Component Value Date    HGBA1C 6 6 (H) 06/20/2021       No results for input(s): POCGLU in the last 72 hours      Blood Sugar Average: Last 72 hrs:    Sliding Scale insulin  Monitor shows

## 2021-07-14 NOTE — ASSESSMENT & PLAN NOTE
Presenting a potassium of 5 5 in the setting of acute kidney   No EKG changes noted    Status post neb treatment in the  Monitor potassium

## 2021-07-14 NOTE — ED ATTENDING ATTESTATION
7/14/2021  I, Kavon Lowe MD, saw and evaluated the patient  I have discussed the patient with the resident/non-physician practitioner and agree with the resident's/non-physician practitioner's findings, Plan of Care, and MDM as documented in the resident's/non-physician practitioner's note, except where noted  All available labs and Radiology studies were reviewed  I was present for key portions of any procedure(s) performed by the resident/non-physician practitioner and I was immediately available to provide assistance  At this point I agree with the current assessment done in the Emergency Department  I have conducted an independent evaluation of this patient a history and physical is as follows:  Patient sent for evaluation after outpatient labs revealed calcium 11 7  Recent diagnosis of right lung mass, currently undergoing workup  Laboratory studies in the emergency department confirm hypercalcemia, acute kidney injury, hyperkalemia, hypomagnesemia  Patient reports diminished oral intake, recently started on Lasix  He was given hydration ER  He has diffuse wheezing on exam, history of COPD  Will give DuoNeb treatment  No PT waves on EKG, no acute hyperkalemia management indicated except for IV fluids which is given for treatment of hypercalcemia and hyperkalemia  Discussed with hospitalist who will admit patient      ED Course         Critical Care Time  ECG 12 Lead Documentation Only    Date/Time: 7/14/2021 2:17 PM  Performed by: Kavon Lowe MD  Authorized by: Kavon Lowe MD     Indications / Diagnosis:  Electrolyte abnormality  ECG reviewed by me, the ED Provider: yes    Patient location:  ED  Interpretation:     Interpretation: normal    Rate:     ECG rate:  84    ECG rate assessment: normal    Rhythm:     Rhythm: sinus rhythm    Ectopy:     Ectopy: aberrant and PVCs    QRS:     QRS axis:  Normal    QRS intervals:  Normal  Conduction:     Conduction: normal    ST segments: ST segments:  Normal  T waves:     T waves: normal

## 2021-07-14 NOTE — H&P
[facility]  H&P- Clarissa Brooks 1940, 68 y o  male MRN: [id number]  Unit/Bed#: ED 13 Encounter: [id number]  Primary Care Provider: Beryl Lanza DO   Date and time admitted to hospital: 7/14/2021  1:39 PM    Hyperkalemia  Assessment & Plan  Presenting a potassium of 5 5 in the setting of acute kidney   No EKG changes noted    Status post neb treatment in the  Monitor potassium    Hypercalcemia  Assessment & Plan  Presenting with hypercalcemia, corrected calcium 11 5  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    Continue IV fluid hydration  If no improvement consider Zometa    COPD (chronic obstructive pulmonary disease) ([facility] )  Assessment & Plan  With acute exacerbation  Patient presenting with worsening shortness of breath, productive cough and significant wheezing on exam    Initiate IV steroids, respiratory protocol schedule nebs    type 2 diabetes mellitus ([facility] )  Assessment & Plan  Lab Results   Component Value Date    HGBA1C 6 6 (H) 06/20/2021       No results for input(s): POCGLU in the last 72 hours  Blood Sugar Average: Last 72 hrs:    Sliding Scale insulin  Monitor shows    SULEMA (acute kidney injury) ([facility] )  Assessment & Plan  Presenting with creatinine of 1 77 baseline appears to be less than 1 1  Continue IV fluid hydration  Continue to monitor    Mass of right lung  Assessment & Plan  Currently being worked up as outpatient by PCP/pulmonology/Oncology  Had EBUS the pathology report showed atypical cells with no evidence of malignancy  He is scheduled to have IR guided biopsy as outpatient    Chest x-ray on admission  IMPRESSION:  Large right lung mass, slightly increased in size, consistent with malignancy   Persistent small right pleural effusion       Hypertension  Assessment & Plan  Blood pressure appears stable  Continue home medication    Paroxysmal A-fib (HCC)  Assessment & Plan  Rate controlled  Continue metoprolol and Eliquis    VTE Pharmacologic Prophylaxis: VTE Score: 5 High Risk (Score >/= 5) - Pharmacological DVT Prophylaxis Ordered: apixaban (Eliquis)  Sequential Compression Devices Ordered  Code Status:   Discussion with family:     Anticipated Length of Stay: Patient will be admitted on an inpatient basis with an anticipated length of stay of greater than 2 midnights secondary to electrolte abn  Total Time for Visit, including Counseling / Coordination of Care: 60 minutes Greater than 50% of this total time spent on direct patient counseling and coordination of care  Chief Complaint:     History of Present Illness:  Tia Chong is a 68 y o  male with a PMH of nicotine dependence, Chronic obstructive pulmonary disease, atrial fibrillation, recently diagnosed type 2 diabetes who presents with evaluation of abnormal labs  His PCP had routine labs performed that demonstrated hypercalcemia , acute kidney injury, hyperkalemia, referred to ER for further evaluation  Review of Systems:  Review of Systems   Constitutional: Positive for activity change  Negative for appetite change and fatigue  Respiratory: Positive for cough and shortness of breath  Negative for chest tightness  Cardiovascular: Negative for chest pain and leg swelling  Gastrointestinal: Negative for abdominal distention  Genitourinary: Negative for flank pain  Musculoskeletal: Negative for back pain  Neurological: Negative for dizziness  Past Medical and Surgical History:   Past Medical History:   Diagnosis Date    Hypertension        Past Surgical History:   Procedure Laterality Date    BACK SURGERY      CARPAL TUNNEL RELEASE Bilateral     IR BIOPSY BONE MARROW  6/10/2021    LUMBAR DISC SURGERY         Meds/Allergies:  Prior to Admission medications    Medication Sig Start Date End Date Taking?  Authorizing Provider   acetaminophen (TYLENOL) 100 mg/mL solution Take 10 mg/kg by mouth every 4 (four) hours as needed for fever   Yes Historical Provider, MD   albuterol (2 5 mg/3 mL) 0 083 % nebulizer solution Take 3 mL (2 5 mg total) by nebulization every 6 (six) hours as needed for wheezing or shortness of breath 6/23/21 7/23/21 Yes Berna Briggs MD   allopurinol (ZYLOPRIM) 300 mg tablet Take 300 mg by mouth daily 2/27/21  Yes Historical Provider, MD   amLODIPine (NORVASC) 2 5 mg tablet Take 2 5 mg by mouth every morning 6/23/21  Yes Historical Provider, MD   apixaban (ELIQUIS) 5 mg Take 1 tablet (5 mg total) by mouth 2 (two) times a day 7/14/21 8/13/21 Yes Kayli Garcia MD   benzonatate (TESSALON PERLES) 100 mg capsule Take 1 capsule (100 mg total) by mouth 3 (three) times a day as needed for cough 6/23/21  Yes Berna Briggs MD   guaiFENesin (MUCINEX) 600 mg 12 hr tablet Take 1 tablet (600 mg total) by mouth 2 (two) times a day 6/23/21  Yes Berna Briggs MD   levothyroxine 150 mcg tablet Take 150 mcg by mouth daily 5/10/21  Yes Historical Provider, MD   metFORMIN (GLUCOPHAGE) 500 mg tablet Take 1 tablet (500 mg total) by mouth 2 (two) times a day with meals 6/23/21  Yes Berna Briggs MD   metoprolol tartrate (LOPRESSOR) 25 mg tablet Take 0 5 tablets (12 5 mg total) by mouth every 12 (twelve) hours 7/14/21  Yes Kayli Garcia MD   Omega-3 Fatty Acids (FISH OIL) 1,000 mg Take 1,000 mg by mouth 2 (two) times a day   Yes Historical Provider, MD   pantoprazole (PROTONIX) 40 mg tablet Take 1 tablet (40 mg total) by mouth daily 6/17/21  Yes CHRIS Zhu   tiotropium [facility]) 18 mcg inhalation capsule Place 1 capsule (18 mcg total) into inhaler and inhale daily 6/24/21  Yes Berna Briggs MD   apixaban (ELIQUIS) 5 mg Take 1 tablet (5 mg total) by mouth 2 (two) times a day 6/23/21 7/14/21  Berna Briggs MD   metoprolol tartrate (LOPRESSOR) 25 mg tablet Take 0 5 tablets (12 5 mg total) by mouth every 12 (twelve) hours 6/23/21 7/14/21  Berna Briggs MD     I have reviewed home medications with patient personally  Allergies: Allergies   Allergen Reactions    Penicillins Hives       Social History:  Marital Status: /Civil Union   Occupation:   Patient Pre-hospital Living Situation:   Patient Pre-hospital Level of Mobility:   Patient Pre-hospital Diet Restrictions:   Substance Use History:   Social History     Substance and Sexual Activity   Alcohol Use Not Currently    Comment: History of heavier drinking in early 25s  Denies any current alcohol use (Updated 2021)  Social History     Tobacco Use   Smoking Status Former Smoker    Packs/day: 0 50    Years: 40 00    Pack years: 20 00    Types: Cigarettes    Start date:     Quit date: 2021    Years since quittin 0   Smokeless Tobacco Never Used     Social History     Substance and Sexual Activity   Drug Use Never       Family History:  Family History   Problem Relation Age of Onset    Cancer Mother         "ate away her bone"    Anuerysm Father     Cancer Sister         unknown type    Heart attack Maternal Grandfather     Cancer Sister         unknown type    Heart attack Maternal Aunt     Heart attack Maternal Uncle     Heart attack Paternal Aunt        Physical Exam:     Vitals:   Blood Pressure: 134/90 (21 1604)  Pulse: 86 (21 1604)  Temperature: 98 2 °F (36 8 °C) (21 1337)  Temp Source: Oral (21 1337)  Respirations: (!) 24 (21 1604)  Height: 5' 2" (157 5 cm) (21 1604)  Weight - Scale: 75 8 kg (167 lb) (21 1604)  SpO2: 94 % (21 1604)    Physical Exam  Vitals and nursing note reviewed  Constitutional:       Appearance: Normal appearance  He is not diaphoretic  HENT:      Head: Normocephalic and atraumatic  Cardiovascular:      Rate and Rhythm: Normal rate and regular rhythm  Pulses: Normal pulses  Heart sounds: Normal heart sounds  Pulmonary:      Effort: Pulmonary effort is normal  No respiratory distress        Breath sounds: Wheezing and rhonchi present  Chest:      Chest wall: No tenderness  Abdominal:      Palpations: Abdomen is soft  Musculoskeletal:         General: Normal range of motion  Cervical back: Normal range of motion and neck supple  Skin:     General: Skin is warm  Neurological:      General: No focal deficit present  Mental Status: He is alert and oriented to person, place, and time  Additional Data:     Lab Results:  Results from last 7 days   Lab Units 07/14/21  1413   WBC Thousand/uL 6 27   HEMOGLOBIN g/dL 9 6*   HEMATOCRIT % 33 6*   PLATELETS Thousands/uL 277   NEUTROS PCT % 81*   LYMPHS PCT % 5*   MONOS PCT % 11   EOS PCT % 1     Results from last 7 days   Lab Units 07/14/21  1413   SODIUM mmol/L 145   POTASSIUM mmol/L 5 5*   CHLORIDE mmol/L 108   CO2 mmol/L 28   BUN mg/dL 33*   CREATININE mg/dL 1 77*   ANION GAP mmol/L 9   CALCIUM mg/dL 9 7   ALBUMIN g/dL 1 8*   TOTAL BILIRUBIN mg/dL 0 40   ALK PHOS U/L 171*   ALT U/L 22   AST U/L 13   GLUCOSE RANDOM mg/dL 138       Imaging: Reviewed radiology reports from this admission including: chest xray and chest CT scan  XR chest 1 view portable   Final Result by Charanjit Serrano MD (07/14 5092)   Large right lung mass, slightly increased in size, consistent with malignancy  Persistent small right pleural effusion               Workstation performed: [id number]             EKG and Other Studies Reviewed on Admission:   · EKG:  Sinus rhythm with PVCs    ** Please Note: This note has been constructed using a voice recognition system   **

## 2021-07-14 NOTE — ED PROVIDER NOTES
History  Chief Complaint   Patient presents with    Abnormal Lab     pt sent to ed by pcp for abnormal calcium level; labs done this am      69 yo M was sent here by his oncologist for elevated calcium and abnormal labs  Pt was diagnosed with small cell lung cancer about 2 months ago  He received a bronchoscopy about 1 month ago and since then, he complains of constant, burning R sided pain that radiates to his back, which is overlying the location of the tumor  He reports a chronic dry cough and SOB  Pt reports decrease in PO intake and a chronic dry mouth  His corrected calcium was 10 6 earlier today  Prior to Admission Medications   Prescriptions Last Dose Informant Patient Reported? Taking?    Omega-3 Fatty Acids (FISH OIL) 1,000 mg 7/14/2021 at Unknown time Self Yes Yes   Sig: Take 1,000 mg by mouth 2 (two) times a day   acetaminophen (TYLENOL) 100 mg/mL solution 7/14/2021 at Unknown time Self Yes Yes   Sig: Take 10 mg/kg by mouth every 4 (four) hours as needed for fever   albuterol (2 5 mg/3 mL) 0 083 % nebulizer solution 7/14/2021 at Unknown time  No Yes   Sig: Take 3 mL (2 5 mg total) by nebulization every 6 (six) hours as needed for wheezing or shortness of breath   allopurinol (ZYLOPRIM) 300 mg tablet 7/14/2021 at Unknown time Self Yes Yes   Sig: Take 300 mg by mouth daily   amLODIPine (NORVASC) 2 5 mg tablet 7/14/2021 at Unknown time  Yes Yes   Sig: Take 2 5 mg by mouth every morning   apixaban (ELIQUIS) 5 mg 7/14/2021 at Unknown time  No Yes   Sig: Take 1 tablet (5 mg total) by mouth 2 (two) times a day   benzonatate (TESSALON PERLES) 100 mg capsule 7/14/2021 at Unknown time  No Yes   Sig: Take 1 capsule (100 mg total) by mouth 3 (three) times a day as needed for cough   guaiFENesin (MUCINEX) 600 mg 12 hr tablet 7/14/2021 at Unknown time  No Yes   Sig: Take 1 tablet (600 mg total) by mouth 2 (two) times a day   levothyroxine 150 mcg tablet 7/14/2021 at Unknown time Self Yes Yes   Sig: Take 150 mcg by mouth daily   metFORMIN (GLUCOPHAGE) 500 mg tablet 2021 at Unknown time  No Yes   Sig: Take 1 tablet (500 mg total) by mouth 2 (two) times a day with meals   metoprolol tartrate (LOPRESSOR) 25 mg tablet 2021 at Unknown time  No Yes   Sig: Take 0 5 tablets (12 5 mg total) by mouth every 12 (twelve) hours   pantoprazole (PROTONIX) 40 mg tablet 2021 at Unknown time  No Yes   Sig: Take 1 tablet (40 mg total) by mouth daily   tiotropium (SPIRIVA) 18 mcg inhalation capsule 2021 at Unknown time  No Yes   Sig: Place 1 capsule (18 mcg total) into inhaler and inhale daily      Facility-Administered Medications: None       Past Medical History:   Diagnosis Date    Hypertension        Past Surgical History:   Procedure Laterality Date    BACK SURGERY      CARPAL TUNNEL RELEASE Bilateral     IR BIOPSY BONE MARROW  6/10/2021    LUMBAR DISC SURGERY         Family History   Problem Relation Age of Onset    Cancer Mother         "ate away her bone"    Anuerysm Father     Cancer Sister         unknown type    Heart attack Maternal Grandfather     Cancer Sister         unknown type    Heart attack Maternal Aunt     Heart attack Maternal Uncle     Heart attack Paternal Aunt      I have reviewed and agree with the history as documented  E-Cigarette/Vaping    E-Cigarette Use Never User      E-Cigarette/Vaping Substances     Social History     Tobacco Use    Smoking status: Former Smoker     Packs/day: 0 50     Years: 40 00     Pack years: 20 00     Types: Cigarettes     Start date:      Quit date: 2021     Years since quittin 0    Smokeless tobacco: Never Used   Vaping Use    Vaping Use: Never used   Substance Use Topics    Alcohol use: Not Currently     Comment: History of heavier drinking in early 25s  Denies any current alcohol use (Updated 2021)   Drug use: Never        Review of Systems   Constitutional: Negative for chills and fever   Appetite change: decreased PO intake  HENT: Negative for ear pain and sore throat  Eyes: Negative for pain and visual disturbance  Respiratory: Positive for cough and shortness of breath  Cardiovascular: Negative for chest pain and palpitations  Gastrointestinal: Negative for abdominal pain and vomiting  Genitourinary: Negative for dysuria and hematuria  Musculoskeletal: Negative for arthralgias and back pain  Skin: Negative for color change and rash  Neurological: Negative for seizures and syncope  All other systems reviewed and are negative  Physical Exam  ED Triage Vitals   Temperature Pulse Respirations Blood Pressure SpO2   07/14/21 1337 07/14/21 1337 07/14/21 1337 07/14/21 1337 07/14/21 1337   98 2 °F (36 8 °C) 77 18 149/64 95 %      Temp Source Heart Rate Source Patient Position - Orthostatic VS BP Location FiO2 (%)   07/14/21 1337 07/14/21 1337 -- 07/14/21 1337 --   Oral Monitor  Left arm       Pain Score       07/14/21 1446       7             Orthostatic Vital Signs  Vitals:    07/14/21 1337   BP: 149/64   Pulse: 77       Physical Exam  Vitals and nursing note reviewed  Constitutional:       General: Distressed: mild distress  Appearance: He is well-developed  HENT:      Head: Normocephalic and atraumatic  Mouth/Throat:      Mouth: Mucous membranes are dry  Eyes:      Conjunctiva/sclera: Conjunctivae normal    Cardiovascular:      Rate and Rhythm: Normal rate and regular rhythm  Heart sounds: No murmur heard  Pulmonary:      Effort: Respiratory distress (mildly tachypneic on exam ) present  Breath sounds: Wheezing (R sided wheezing with decreased breath sounds) present  Abdominal:      Palpations: Abdomen is soft  Tenderness: There is no abdominal tenderness  Musculoskeletal:      Cervical back: Neck supple  Skin:     General: Skin is warm and dry  Neurological:      Mental Status: He is alert           ED Medications  Medications   sodium chloride 0 9 % bolus 1,000 mL (1,000 mL Intravenous New Bag 7/14/21 1405)   sodium chloride 0 9 % infusion (has no administration in time range)   ipratropium-albuterol (DUO-NEB) 0 5-2 5 mg/3 mL inhalation solution 3 mL (has no administration in time range)   HYDROcodone-acetaminophen (NORCO) 5-325 mg per tablet 1 tablet (1 tablet Oral Given 7/14/21 1446)       Diagnostic Studies  Results Reviewed     Procedure Component Value Units Date/Time    Comprehensive metabolic panel [[id number]]  (Abnormal) Collected: 07/14/21 1413    Lab Status: Final result Specimen: Blood from Arm, Right Updated: 07/14/21 1448     Sodium 145 mmol/L      Potassium 5 5 mmol/L      Chloride 108 mmol/L      CO2 28 mmol/L      ANION GAP 9 mmol/L      BUN 33 mg/dL      Creatinine 1 77 mg/dL      Glucose 138 mg/dL      Calcium 9 7 mg/dL      Corrected Calcium 11 5 mg/dL      AST 13 U/L      ALT 22 U/L      Alkaline Phosphatase 171 U/L      Total Protein 7 3 g/dL      Albumin 1 8 g/dL      Total Bilirubin 0 40 mg/dL      eGFR 36 ml/min/1 73sq m     Narrative:      [place] guidelines for Chronic Kidney Disease (CKD):     Stage 1 with normal or high GFR (GFR > 90 mL/min/1 73 square meters)    Stage 2 Mild CKD (GFR = 60-89 mL/min/1 73 square meters)    Stage 3A Moderate CKD (GFR = 45-59 mL/min/1 73 square meters)    Stage 3B Moderate CKD (GFR = 30-44 mL/min/1 73 square meters)    Stage 4 Severe CKD (GFR = 15-29 mL/min/1 73 square meters)    Stage 5 End Stage CKD (GFR <15 mL/min/1 73 square meters)  Note: GFR calculation is accurate only with a steady state creatinine    Magnesium [[id number]]  (Abnormal) Collected: 07/14/21 1413    Lab Status: Final result Specimen: Blood from Arm, Right Updated: 07/14/21 1448     Magnesium 1 5 mg/dL     Calcium, ionized [[id number]]  (Normal) Collected: 07/14/21 1413    Lab Status: Final result Specimen: Blood from Arm, Right Updated: 07/14/21 1430     Calcium, Ionized 1 28 mmol/L     CBC and differential [[id number]]  (Abnormal) Collected: 07/14/21 1413    Lab Status: Final result Specimen: Blood from Arm, Right Updated: 07/14/21 1420     WBC 6 27 Thousand/uL      RBC 4 07 Million/uL      Hemoglobin 9 6 g/dL      Hematocrit 33 6 %      MCV 83 fL      MCH 23 6 pg      MCHC 28 6 g/dL      RDW 18 0 %      MPV 9 4 fL      Platelets 286 Thousands/uL      nRBC 0 /100 WBCs      Neutrophils Relative 81 %      Immat GRANS % 2 %      Lymphocytes Relative 5 %      Monocytes Relative 11 %      Eosinophils Relative 1 %      Basophils Relative 0 %      Neutrophils Absolute 5 03 Thousands/µL      Immature Grans Absolute 0 13 Thousand/uL      Lymphocytes Absolute 0 33 Thousands/µL      Monocytes Absolute 0 67 Thousand/µL      Eosinophils Absolute 0 09 Thousand/µL      Basophils Absolute 0 02 Thousands/µL                  XR chest 1 view portable    (Results Pending)         Procedures  Procedures      ED Course                                       MDM  Number of Diagnoses or Management Options  SULEMA (acute kidney injury) ([facility] ): new and does not require workup  Hypercalcemia: new and does not require workup  Hyperkalemia: new and does not require workup  Diagnosis management comments: 69 yo M with small cell lung tumor that was diagnosed 2 months ago who was sent here today by his oncologist for abnormal labs  Labs repeated in ED  Corrected calcium 11 5, magnesium 1 5, K 5 5  Creatinine is elevated from baseline suggesting mild SULEMA, likely from dehydration  Pt given norco for pain and saline bolus in ED  Pt given duoneb for mild respiratory distress and wheezing on exam  CXR shows enlarging known lung mass, but no evidence of acute pneumothorax  EKG shows sinus rhythm with some PVCs  Spoke to Betito and pt will be admitted for obs for rehydration and management of electrolyte abnormalities          Amount and/or Complexity of Data Reviewed  Clinical lab tests: ordered and reviewed  Tests in the radiology section of CPT®: ordered and reviewed  Obtain history from someone other than the patient: yes  Review and summarize past medical records: yes    Risk of Complications, Morbidity, and/or Mortality  Presenting problems: moderate  Diagnostic procedures: moderate  Management options: moderate    Patient Progress  Patient progress: stable      Disposition  Final diagnoses:   SULEMA (acute kidney injury) ([facility] )   Hypercalcemia   Hyperkalemia     Time reflects when diagnosis was documented in both MDM as applicable and the Disposition within this note     Time User Action Codes Description Comment    7/14/2021  3:22 PM Erroll Sides Add [N17 9] SULEMA (acute kidney injury) ([facility] )     7/14/2021  3:22 PM Erroll Sides Add [E83 52] Hypercalcemia     7/14/2021  3:23 PM Erroll Sides Add [E87 5] Hyperkalemia       ED Disposition     ED Disposition Condition Date/Time Comment    Admit Stable Wed Jul 14, 2021  3:22 PM Case was discussed with TRAVIS and the patient's admission status was agreed to be Admission Status: inpatient status to the service of Dr Heck Going   Follow-up Information    None         Patient's Medications   Discharge Prescriptions    No medications on file     No discharge procedures on file  PDMP Review     None           ED Provider  Attending physically available and evaluated Mara Castellano I managed the patient along with the ED Attending      Electronically Signed by         Benjamin Jara MD  07/14/21 1582

## 2021-07-14 NOTE — ASSESSMENT & PLAN NOTE
Currently being worked up as outpatient by PCP/pulmonology/Oncology  Had EBUS the pathology report showed atypical cells with no evidence of malignancy  He is scheduled to have IR guided biopsy as outpatient    Chest x-ray on admission  IMPRESSION:  Large right lung mass, slightly increased in size, consistent with malignancy   Persistent small right pleural effusion

## 2021-07-14 NOTE — ASSESSMENT & PLAN NOTE
Presenting with hypercalcemia, corrected calcium 11 5  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    Continue IV fluid hydration  If no improvement consider Zometa

## 2021-07-14 NOTE — ASSESSMENT & PLAN NOTE
Presenting with creatinine of 1 77 baseline appears to be less than 1 1  Continue IV fluid hydration  Continue to monitor

## 2021-07-14 NOTE — ASSESSMENT & PLAN NOTE
With acute exacerbation  Patient presenting with worsening shortness of breath, productive cough and significant wheezing on exam    Initiate IV steroids, respiratory protocol schedule nebs

## 2021-07-15 LAB
ALBUMIN SERPL BCP-MCNC: 1.6 G/DL (ref 3.5–5)
ALP SERPL-CCNC: 174 U/L (ref 46–116)
ALT SERPL W P-5'-P-CCNC: 19 U/L (ref 12–78)
ANION GAP SERPL CALCULATED.3IONS-SCNC: 6 MMOL/L (ref 4–13)
ANION GAP SERPL CALCULATED.3IONS-SCNC: 8 MMOL/L (ref 4–13)
AST SERPL W P-5'-P-CCNC: 16 U/L (ref 5–45)
ATRIAL RATE: 156 BPM
ATRIAL RATE: 312 BPM
BILIRUB SERPL-MCNC: 0.37 MG/DL (ref 0.2–1)
BILIRUB UR QL STRIP: NEGATIVE
BUN SERPL-MCNC: 31 MG/DL (ref 5–25)
BUN SERPL-MCNC: 32 MG/DL (ref 5–25)
CALCIUM ALBUM COR SERPL-MCNC: 11.3 MG/DL (ref 8.3–10.1)
CALCIUM SERPL-MCNC: 9.3 MG/DL (ref 8.3–10.1)
CALCIUM SERPL-MCNC: 9.4 MG/DL (ref 8.3–10.1)
CHLORIDE SERPL-SCNC: 107 MMOL/L (ref 100–108)
CHLORIDE SERPL-SCNC: 109 MMOL/L (ref 100–108)
CLARITY UR: CLEAR
CO2 SERPL-SCNC: 25 MMOL/L (ref 21–32)
CO2 SERPL-SCNC: 27 MMOL/L (ref 21–32)
COLOR UR: YELLOW
CREAT SERPL-MCNC: 1.59 MG/DL (ref 0.6–1.3)
CREAT SERPL-MCNC: 1.68 MG/DL (ref 0.6–1.3)
ERYTHROCYTE [DISTWIDTH] IN BLOOD BY AUTOMATED COUNT: 18 % (ref 11.6–15.1)
GFR SERPL CREATININE-BSD FRML MDRD: 38 ML/MIN/1.73SQ M
GFR SERPL CREATININE-BSD FRML MDRD: 41 ML/MIN/1.73SQ M
GLUCOSE SERPL-MCNC: 149 MG/DL (ref 65–140)
GLUCOSE SERPL-MCNC: 179 MG/DL (ref 65–140)
GLUCOSE SERPL-MCNC: 184 MG/DL (ref 65–140)
GLUCOSE SERPL-MCNC: 247 MG/DL (ref 65–140)
GLUCOSE SERPL-MCNC: 281 MG/DL (ref 65–140)
GLUCOSE SERPL-MCNC: 289 MG/DL (ref 65–140)
GLUCOSE SERPL-MCNC: 292 MG/DL (ref 65–140)
GLUCOSE UR STRIP-MCNC: ABNORMAL MG/DL
HCT VFR BLD AUTO: 32.3 % (ref 36.5–49.3)
HGB BLD-MCNC: 8.7 G/DL (ref 12–17)
HGB UR QL STRIP.AUTO: NEGATIVE
KETONES UR STRIP-MCNC: NEGATIVE MG/DL
LEUKOCYTE ESTERASE UR QL STRIP: NEGATIVE
MAGNESIUM SERPL-MCNC: 1.3 MG/DL (ref 1.6–2.6)
MCH RBC QN AUTO: 22.6 PG (ref 26.8–34.3)
MCHC RBC AUTO-ENTMCNC: 26.9 G/DL (ref 31.4–37.4)
MCV RBC AUTO: 84 FL (ref 82–98)
NITRITE UR QL STRIP: NEGATIVE
P AXIS: 182 DEGREES
P AXIS: 256 DEGREES
PH UR STRIP.AUTO: 5.5 [PH]
PLATELET # BLD AUTO: 256 THOUSANDS/UL (ref 149–390)
PMV BLD AUTO: 9.7 FL (ref 8.9–12.7)
POTASSIUM SERPL-SCNC: 4.5 MMOL/L (ref 3.5–5.3)
POTASSIUM SERPL-SCNC: 5.8 MMOL/L (ref 3.5–5.3)
POTASSIUM SERPL-SCNC: 5.9 MMOL/L (ref 3.5–5.3)
PR INTERVAL: 112 MS
PROT SERPL-MCNC: 6.9 G/DL (ref 6.4–8.2)
PROT UR STRIP-MCNC: NEGATIVE MG/DL
QRS AXIS: 71 DEGREES
QRS AXIS: 73 DEGREES
QRSD INTERVAL: 66 MS
QRSD INTERVAL: 68 MS
QT INTERVAL: 258 MS
QT INTERVAL: 300 MS
QTC INTERVAL: 406 MS
QTC INTERVAL: 415 MS
RBC # BLD AUTO: 3.85 MILLION/UL (ref 3.88–5.62)
SODIUM SERPL-SCNC: 140 MMOL/L (ref 136–145)
SODIUM SERPL-SCNC: 142 MMOL/L (ref 136–145)
SP GR UR STRIP.AUTO: 1.02 (ref 1–1.03)
T WAVE AXIS: 200 DEGREES
T WAVE AXIS: 64 DEGREES
UROBILINOGEN UR QL STRIP.AUTO: 2 E.U./DL
VENTRICULAR RATE: 110 BPM
VENTRICULAR RATE: 156 BPM
WBC # BLD AUTO: 5.2 THOUSAND/UL (ref 4.31–10.16)

## 2021-07-15 PROCEDURE — 99232 SBSQ HOSP IP/OBS MODERATE 35: CPT | Performed by: HOSPITALIST

## 2021-07-15 PROCEDURE — 80053 COMPREHEN METABOLIC PANEL: CPT | Performed by: INTERNAL MEDICINE

## 2021-07-15 PROCEDURE — 82948 REAGENT STRIP/BLOOD GLUCOSE: CPT

## 2021-07-15 PROCEDURE — 84132 ASSAY OF SERUM POTASSIUM: CPT | Performed by: INTERNAL MEDICINE

## 2021-07-15 PROCEDURE — 85027 COMPLETE CBC AUTOMATED: CPT | Performed by: INTERNAL MEDICINE

## 2021-07-15 PROCEDURE — 93010 ELECTROCARDIOGRAM REPORT: CPT | Performed by: INTERNAL MEDICINE

## 2021-07-15 PROCEDURE — 94640 AIRWAY INHALATION TREATMENT: CPT

## 2021-07-15 PROCEDURE — 94760 N-INVAS EAR/PLS OXIMETRY 1: CPT

## 2021-07-15 PROCEDURE — 99222 1ST HOSP IP/OBS MODERATE 55: CPT | Performed by: NURSE PRACTITIONER

## 2021-07-15 PROCEDURE — 81003 URINALYSIS AUTO W/O SCOPE: CPT | Performed by: INTERNAL MEDICINE

## 2021-07-15 RX ORDER — DEXTROSE MONOHYDRATE 25 G/50ML
25 INJECTION, SOLUTION INTRAVENOUS ONCE
Status: COMPLETED | OUTPATIENT
Start: 2021-07-15 | End: 2021-07-15

## 2021-07-15 RX ORDER — DEXTROSE MONOHYDRATE 25 G/50ML
12.5 INJECTION, SOLUTION INTRAVENOUS ONCE
Status: COMPLETED | OUTPATIENT
Start: 2021-07-15 | End: 2021-07-15

## 2021-07-15 RX ORDER — SODIUM CHLORIDE FOR INHALATION 0.9 %
3 VIAL, NEBULIZER (ML) INHALATION ONCE
Status: DISCONTINUED | OUTPATIENT
Start: 2021-07-15 | End: 2021-07-15

## 2021-07-15 RX ORDER — CALCIUM GLUCONATE 20 MG/ML
1 INJECTION, SOLUTION INTRAVENOUS ONCE
Status: COMPLETED | OUTPATIENT
Start: 2021-07-15 | End: 2021-07-15

## 2021-07-15 RX ORDER — ALBUTEROL SULFATE 2.5 MG/3ML
10 SOLUTION RESPIRATORY (INHALATION) ONCE
Status: COMPLETED | OUTPATIENT
Start: 2021-07-15 | End: 2021-07-15

## 2021-07-15 RX ORDER — HYDROCODONE BITARTRATE AND ACETAMINOPHEN 5; 325 MG/1; MG/1
1 TABLET ORAL ONCE
Status: COMPLETED | OUTPATIENT
Start: 2021-07-15 | End: 2021-07-15

## 2021-07-15 RX ORDER — MAGNESIUM SULFATE HEPTAHYDRATE 40 MG/ML
2 INJECTION, SOLUTION INTRAVENOUS ONCE
Status: COMPLETED | OUTPATIENT
Start: 2021-07-15 | End: 2021-07-15

## 2021-07-15 RX ADMIN — INSULIN HUMAN 10 UNITS: 100 INJECTION, SOLUTION PARENTERAL at 09:01

## 2021-07-15 RX ADMIN — DOCUSATE SODIUM 100 MG: 100 CAPSULE, LIQUID FILLED ORAL at 08:55

## 2021-07-15 RX ADMIN — TIOTROPIUM BROMIDE 18 MCG: 18 CAPSULE ORAL; RESPIRATORY (INHALATION) at 08:52

## 2021-07-15 RX ADMIN — METOPROLOL TARTRATE 25 MG: 25 TABLET, FILM COATED ORAL at 11:10

## 2021-07-15 RX ADMIN — APIXABAN 5 MG: 5 TABLET, FILM COATED ORAL at 17:35

## 2021-07-15 RX ADMIN — INSULIN LISPRO 1 UNITS: 100 INJECTION, SOLUTION INTRAVENOUS; SUBCUTANEOUS at 21:51

## 2021-07-15 RX ADMIN — MAGNESIUM SULFATE HEPTAHYDRATE 2 G: 40 INJECTION, SOLUTION INTRAVENOUS at 01:55

## 2021-07-15 RX ADMIN — OMEGA-3 FATTY ACIDS CAP 1000 MG 1000 MG: 1000 CAP at 17:35

## 2021-07-15 RX ADMIN — INSULIN LISPRO 3 UNITS: 100 INJECTION, SOLUTION INTRAVENOUS; SUBCUTANEOUS at 09:01

## 2021-07-15 RX ADMIN — METHYLPREDNISOLONE SODIUM SUCCINATE 40 MG: 40 INJECTION, POWDER, FOR SOLUTION INTRAMUSCULAR; INTRAVENOUS at 09:02

## 2021-07-15 RX ADMIN — DEXTROSE MONOHYDRATE 25 ML: 500 INJECTION PARENTERAL at 01:55

## 2021-07-15 RX ADMIN — INSULIN HUMAN 10 UNITS: 100 INJECTION, SOLUTION PARENTERAL at 01:54

## 2021-07-15 RX ADMIN — METOPROLOL TARTRATE 25 MG: 25 TABLET, FILM COATED ORAL at 10:00

## 2021-07-15 RX ADMIN — HYDROCODONE BITARTRATE AND ACETAMINOPHEN 1 TABLET: 5; 325 TABLET ORAL at 10:55

## 2021-07-15 RX ADMIN — METOPROLOL TARTRATE 25 MG: 25 TABLET, FILM COATED ORAL at 21:51

## 2021-07-15 RX ADMIN — INSULIN LISPRO 3 UNITS: 100 INJECTION, SOLUTION INTRAVENOUS; SUBCUTANEOUS at 14:35

## 2021-07-15 RX ADMIN — INSULIN LISPRO 3 UNITS: 100 INJECTION, SOLUTION INTRAVENOUS; SUBCUTANEOUS at 17:52

## 2021-07-15 RX ADMIN — ALLOPURINOL 300 MG: 300 TABLET ORAL at 08:55

## 2021-07-15 RX ADMIN — APIXABAN 5 MG: 5 TABLET, FILM COATED ORAL at 08:55

## 2021-07-15 RX ADMIN — ALBUTEROL SULFATE 10 MG: 2.5 SOLUTION RESPIRATORY (INHALATION) at 06:48

## 2021-07-15 RX ADMIN — OMEGA-3 FATTY ACIDS CAP 1000 MG 1000 MG: 1000 CAP at 08:55

## 2021-07-15 RX ADMIN — METHYLPREDNISOLONE SODIUM SUCCINATE 40 MG: 40 INJECTION, POWDER, FOR SOLUTION INTRAMUSCULAR; INTRAVENOUS at 21:51

## 2021-07-15 RX ADMIN — LEVOTHYROXINE SODIUM 150 MCG: 150 TABLET ORAL at 08:55

## 2021-07-15 RX ADMIN — PANTOPRAZOLE SODIUM 40 MG: 40 TABLET, DELAYED RELEASE ORAL at 09:10

## 2021-07-15 RX ADMIN — CALCIUM GLUCONATE 1 G: 20 INJECTION, SOLUTION INTRAVENOUS at 09:00

## 2021-07-15 RX ADMIN — DEXTROSE MONOHYDRATE 13 ML: 25 INJECTION, SOLUTION INTRAVENOUS at 06:38

## 2021-07-15 NOTE — ASSESSMENT & PLAN NOTE
Currently being worked up as outpatient by PCP/pulmonology/Oncology    Had EBUS the pathology report showed atypical cells with no evidence of malignancy  He is scheduled to have IR guided biopsy as outpatient on 7/21

## 2021-07-15 NOTE — RESPIRATORY THERAPY NOTE
RT Protocol Note  Caro Angel 68 y o  male MRN: [id number]  Unit/Bed#: S -01 Encounter: [id number]    Assessment    Active Problems:    Paroxysmal A-fib ([place] [address] )    Hypertension    Mass of right lung    SULEMA (acute kidney injury) ([facility] )    type 2 diabetes mellitus ([place] [address] )    COPD (chronic obstructive pulmonary disease) ([facility])    Hypercalcemia    Hyperkalemia      Home Pulmonary Medications:  Albuteral PRN   Spiriva Daily         Past Medical History:   Diagnosis Date    Hypertension             Objective    Physical Exam:   Assessment Type: Assess only  General Appearance: Alert, Awake  Respiratory Pattern: Dyspnea at rest, Dyspnea with exertion  Chest Assessment: Chest expansion symmetrical  Bilateral Breath Sounds: Diminished  Cough: Dry, Non-productive  O2 Device: 3L    Vitals:  Blood pressure 124/73, pulse (!) 110, temperature 98 6 °F (37 °C), temperature source Oral, resp  rate 22, height 5' 2" (1 575 m), weight 75 8 kg (167 lb), SpO2 97 %  Imaging and other studies: I have personally reviewed pertinent reports  O2 Device: 3L     Plan    Respiratory Plan: Home Bronchodilator Patient pathway        Resp Comments: Pt assessed for Respiratory Protocol  BS dimished, SPO2 97% on 3L  PT C/O " feeling like throat is closing, causing me to feel SOB"  Pt had mutliple neb tx's in ED without relief  Will D/C Atrovent and change to Xopenex/NSS Q6PRN

## 2021-07-15 NOTE — ASSESSMENT & PLAN NOTE
Presenting with hypercalcemia, corrected calcium 11 5  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  Last calcium 9 2    Plan:   · Continue IV fluid hydration  · continue to trend until in a safer range (K less than 5 5; calcium less than 11)

## 2021-07-15 NOTE — PLAN OF CARE
Problem: CARDIOVASCULAR - ADULT  Goal: Absence of cardiac dysrhythmias or at baseline rhythm  Description: INTERVENTIONS:  - Continuous cardiac monitoring, vital signs, obtain 12 lead EKG if ordered  - Administer antiarrhythmic and heart rate control medications as ordered  - Monitor electrolytes and administer replacement therapy as ordered  Outcome: Progressing     Problem: CARDIOVASCULAR - ADULT  Goal: Maintains optimal cardiac output and hemodynamic stability  Description: INTERVENTIONS:  - Monitor I/O, vital signs and rhythm  - Monitor for S/S and trends of decreased cardiac output  - Administer and titrate ordered vasoactive medications to optimize hemodynamic stability  - Assess quality of pulses, skin color and temperature  - Assess for signs of decreased coronary artery perfusion  - Instruct patient to report change in severity of symptoms  Outcome: Progressing     Problem: METABOLIC, FLUID AND ELECTROLYTES - ADULT  Goal: Electrolytes maintained within normal limits  Description: INTERVENTIONS:  - Monitor labs and assess patient for signs and symptoms of electrolyte imbalances  - Administer electrolyte replacement as ordered  - Monitor response to electrolyte replacements, including repeat lab results as appropriate  - Instruct patient on fluid and nutrition as appropriate  Outcome: Progressing     Problem: METABOLIC, FLUID AND ELECTROLYTES - ADULT  Goal: Fluid balance maintained  Description: INTERVENTIONS:  - Monitor labs   - Monitor I/O and WT  - Instruct patient on fluid and nutrition as appropriate  - Assess for signs & symptoms of volume excess or deficit  Outcome: Progressing     Problem: METABOLIC, FLUID AND ELECTROLYTES - ADULT  Goal: Glucose maintained within target range  Description: INTERVENTIONS:  - Monitor Blood Glucose as ordered  - Assess for signs and symptoms of hyperglycemia and hypoglycemia  - Administer ordered medications to maintain glucose within target range  - Assess nutritional intake and initiate nutrition service referral as needed  Outcome: Progressing

## 2021-07-15 NOTE — ASSESSMENT & PLAN NOTE
Lab Results   Component Value Date    K 4 5 07/15/2021    K 5 9 (H) 07/15/2021    K 5 8 (H) 07/14/2021    K 5 5 (H) 07/14/2021    K 5 1 07/14/2021    K 4 8 06/23/2021    K 5 3 06/22/2021    K 5 8 (H) 06/22/2021     Presenting with hyperkalemia in the setting of acute kidney   No EKG changes noted  Calcium gluconate 1 g was given  Plan  · continue to trend until in a safer range (K less than 5 5; calcium less than 11)

## 2021-07-15 NOTE — CASE MANAGEMENT
LOS: 1  Readmission: yes - recent hospitalization 6/18-6/23  Bundle: none  Unplanned Readmission Risk: 19 (green)    Patient admitted due to COPD exacerbation, hyperkalemia, hypercalcemia  Patient previously hospitalized from 6/18-6/23 secondary to a fib, idiopathic thrombocytopenic purpura (ITP), lung mass  Met with patient at bedside to complete case management assessment  Patient presents as alert, oriented x4  Introduced self and explained role of case management  Patient reports he returned to hospital at the direction of his physician  Relays that he's been going for routine blood work order by his hem/onc MD - Dr Anamaria Dudley - who had called patient and instructed he go to the ED due to elevated calcium  Patient denies any issues obtaining medications at d/c and confirmed that he had been taking medication as directed  Denies any issues getting to follow-up appointments, stating that he either drives himself or has his son transport him  Patient reports that he lives at home with his spouse, Obed Kapadia, whom he is primary caregiver for  Patient states that spouse is able to walk short distances, but often remains in a wheelchair or in bed  Relays that she's able to transfer herself independently and that children are checking in on her while he's been hospitalized  Patient does have six children John Albarado, Shyanne Gracia, Rochelle Hernandez and Rakesh Ruff), however three are step-children  Spouse is a second-marriage; he has two children from his first marriage and they have one son together  Patient denies having any POA/Advanced Directives, but reports that spouse, Obed Kapadia, would be first decision-maker and step-son, Farrukh Lowe, would be secondary  Patient reports that home is a single level with two steps to enter  States that he's independent with ADLs and ambulation  Reports that DME he has is mostly for his spouse; wheelchair, walkers, commode, nebulizer, CPAP, home o2  States that he's independent with ambulation  Patient reports that he does use home o2 at night, but it's his spouse's machine and about 13years old  States that his spouse also uses o2 as needed and that he gets tubing/supplies from his sister-in-law who is also o2 dependent  Patient currently on 3L o2 via [place]; may benefit from home o2 eval at d/c to see if he qualifies for his own o2 set-up  Patient confirms that he's current with [facility]; states that they also are current with spouse  Freedom of choice discussed and patient confirms that he would like referral sent to [address] [place] [address] for services to be resumed at discharge  Patient denies any history of in-patient rehab  Patient is vaccinated; reports he received both shots in February/March  Patient reports that he's in the process of getting worked-up for cancer - has lung biopsy scheduled in a week or so  Has also been going to cancer center for routine transfusions every other week - next appointment is scheduled for tomorrow, 7/16  Patient confirms his PCP is Dr Filiberto Lawson; also follows with cardiologist, pulmonologist      Discharge plan anticipated for home with resumption of home care services - referral sent via [facility] to [facility]  Patient would benefit from home o2 eval if continuing to use o2, as he reports he's been using spouse's machine at home   Oma Cosme, to transport home at d/c      [place], [place]  7/15/2021  3:39 PM

## 2021-07-15 NOTE — ASSESSMENT & PLAN NOTE
Lab Results   Component Value Date    K 4 5 07/15/2021    K 5 9 (H) 07/15/2021    K 5 8 (H) 07/14/2021    K 5 5 (H) 07/14/2021    K 5 1 07/14/2021    K 4 8 06/23/2021    K 5 3 06/22/2021    K 5 8 (H) 06/22/2021     Presenting a potassium of 5 5 in the setting of acute kidney   No EKG changes noted  Plan  · Calcium gluconate 1g

## 2021-07-15 NOTE — UTILIZATION REVIEW
Initial Clinical Review    Admission: Date/Time/Statement:   Admission Orders (From admission, onward)     Ordered        07/14/21 1523  Inpatient Admission  Once                   Orders Placed This Encounter   Procedures    Inpatient Admission     Standing Status:   Standing     Number of Occurrences:   1     Order Specific Question:   Level of Care     Answer:   Med Surg [16]     Order Specific Question:   Estimated length of stay     Answer:   More than 2 Midnights     Order Specific Question:   Certification     Answer:   I certify that inpatient services are medically necessary for this patient for a duration of greater than two midnights  See H&P and MD Progress Notes for additional information about the patient's course of treatment  ED Arrival Information     Expected Arrival Acuity    - 7/14/2021 13:32 Urgent         Means of arrival Escorted by Service Admission type    [facility] Member Hospitalist Urgent         Arrival complaint    lab test result        Chief Complaint   Patient presents with    Abnormal Lab     pt sent to ed by pcp for abnormal calcium level; labs done this am        Initial Presentation:  this is a 68year old male from home to ED per oncology admitted inpatient due to Hyperkalemia/hypercalcemia/COPD exacerbation/SULEMA  Patient has known  Right lung mass, hpt, PAF and DM  Presented due to elevated calcium  Has ongoing burning right sided pain that radiates to back, dry cough and shortness of breath starting one month ago  Has poor intake  On exam respiratory distress  Tachypnea  Lungs wheezing  Mucous membranes dry  K 5 5  Bun 33, creatinine 1 77 with baseline of 1 1  Calcium 9 7 corrected to 11 5  albumin 1 8  Magnesium 1 5    H&H 9 6/33  6  CxR showed increased large right lung mass, right pleural effusion  EGD showed sinus initially and then went into atrial flutter  In the ED given 1 liter bolus of IVF, duo neb, metoprolol and Cardizem     To continue IVF, start steroids, scheduled nebulizer,   Trend BMP and treat hyperkalemia     Date: 7/15/2021  Day 2:  Has converted to sinus  On exam lungs wheezes  K 5 9  Creatinine 1 68  Bun 32  To continue iv hydration  Treat hyperkalemia  7/15/2021 per Cardiology - Patient has history of PAF, presented with afib RVR  EKG in the ER yesterday revealed SVT around 150 bpm which is most likely atrial flutter  Patient has converted to sinus, plan is increase Lopressor from 12 5 to 25, continue Eliquis     Hyperkalemia today treated with albuterol inhalation, calcium gluconate, D50, IV insulin, plan to repeat K       ED Triage Vitals   Temperature Pulse Respirations Blood Pressure SpO2   07/14/21 1337 07/14/21 1337 07/14/21 1337 07/14/21 1337 07/14/21 1337   98 2 °F (36 8 °C) 77 18 149/64 95 %      Temp Source Heart Rate Source Patient Position - Orthostatic VS BP Location FiO2 (%)   07/14/21 1337 07/14/21 1337 07/14/21 1604 07/14/21 1337 --   Oral Monitor Lying Left arm       Pain Score       07/14/21 1446       7          Wt Readings from Last 1 Encounters:   07/14/21 75 8 kg (167 lb)     Additional Vital Signs:   07/15/21 0609  98 2 °F (36 8 °C)  80  19  100/62  --  95 %  --  --  --  Lying   07/14/21 2222  98 3 °F (36 8 °C)  126Abnormal   22  114/55  75  96 %  --  --  Nasal cannula  Lying   07/14/21 2020  --  110Abnormal   22  --  --  97 %  32  3 L/min  Nasal cannula  --   07/14/21 1958  98 6 °F (37 °C)  135Abnormal   22  124/73  93  92 %  --  --  --  Sitting   07/14/21 1846  --  105  20  122/66  --  95 %  32  3 L/min  Nasal cannula  Lying   07/14/21 1838  --  146Abnormal   20  157/71  --  98 %  --  --  None (Room air)  Lying   07/14/21 1835  --  138Abnormal   20  143/77  --  98 %  28  2 L/min  Nasal cannula  Sitting   07/14/21 1830  --  156Abnormal   --  143/93  107  97 %  --  --  --  --   07/14/21 1818  --  160Abnormal   20  140/86  --  95 %  28  2 L/min  Nasal cannula  Lying   07/14/21 1815  --  158Abnormal   20  -- --  92 %  --  --  None (Room air)  --   07/14/21 1730  --  90  20  139/74  98  94 %  --  --  None (Room air)         Pertinent Labs/Diagnostic Test Results:   7/14/2021 CxR - Large right lung mass, slightly increased in size, consistent with malignancy   Persistent small right pleural effusion     7/14/2021 ECG Atrial flutter with variable A-V block   Abnormal ECG   When compared with ECG of 14-JUL-2021 18:10, (unconfirmed)   Atrial flutter has replaced Ectopic atrial rhythm     7/14/2021 ECG Supraventricular tachycardia   Abnormal ECG   When compared with ECG of 14-JUL-2021 18:10, (unconfirmed)   Supraventricular tachycardia is now present     7/14/2021 ECG Sinus rhythm with Premature supraventricular complexes with occasional and consecutive Premature ventricular complexes   Abnormal ECG   When compared with ECG of 18-JUN-2021 13:09,   Premature supraventricular complexes are now Present       Results from last 7 days   Lab Units 07/15/21  0439 07/14/21  1413 07/14/21  1039   WBC Thousand/uL 5 20 6 27 5 68   HEMOGLOBIN g/dL 8 7* 9 6* 10 0*   HEMATOCRIT % 32 3* 33 6* 35 2*   PLATELETS Thousands/uL 256 277 282   NEUTROS ABS Thousands/µL  --  5 03 4 67         Results from last 7 days   Lab Units 07/15/21  0438 07/14/21  2323 07/14/21  1413 07/14/21  1039   SODIUM mmol/L 140 142 145 140   POTASSIUM mmol/L 5 9* 5 8* 5 5* 5 1   CHLORIDE mmol/L 107 109* 108 104   CO2 mmol/L 25 27 28 25   ANION GAP mmol/L 8 6 9 11   BUN mg/dL 32* 31* 33* 35*   CREATININE mg/dL 1 68* 1 59* 1 77* 1 77*   EGFR ml/min/1 73sq m 38 41 36 36   CALCIUM mg/dL 9 4 9 3 9 7 9 9   CALCIUM, IONIZED mmol/L  --   --  1 28  --    MAGNESIUM mg/dL  --  1 3* 1 5*  --      Results from last 7 days   Lab Units 07/15/21  0438 07/14/21  1413 07/14/21  1039   AST U/L 16 13 12   ALT U/L 19 22 23   ALK PHOS U/L 174* 171* 169*   TOTAL PROTEIN g/dL 6 9 7 3 7 4   ALBUMIN g/dL 1 6* 1 8* 1 8*   TOTAL BILIRUBIN mg/dL 0 37 0 40 0 40     Results from last 7 days   Lab Units 07/15/21  1040 07/15/21  0633 07/15/21  0118 07/14/21  2123   POC GLUCOSE mg/dl 292* 281* 184* 120     Results from last 7 days   Lab Units 07/15/21  0438 07/14/21  2323 07/14/21  1413   GLUCOSE RANDOM mg/dL 247* 149* 138     Results from last 7 days   Lab Units 07/15/21  0655   CLARITY UA  Clear   COLOR UA  Yellow   SPEC GRAV UA  1 025   PH UA  5 5   GLUCOSE UA mg/dl 250 (1/4%)*   KETONES UA mg/dl Negative   BLOOD UA  Negative   PROTEIN UA mg/dl Negative   NITRITE UA  Negative   BILIRUBIN UA  Negative   UROBILINOGEN UA E U /dl 2 0*   LEUKOCYTES UA  Negative       ED Treatment:   Medication Administration from 07/14/2021 1332 to 07/14/2021 1940       Date/Time Order Dose Route Action Comments     07/14/2021 1405 sodium chloride 0 9 % bolus 1,000 mL 1,000 mL Intravenous New Bag      07/14/2021 1446 HYDROcodone-acetaminophen (NORCO) 5-325 mg per tablet 1 tablet 1 tablet Oral Given      07/14/2021 1724 sodium chloride 0 9 % infusion 125 mL/hr Intravenous New Bag      07/14/2021 1530 ipratropium-albuterol (DUO-NEB) 0 5-2 5 mg/3 mL inhalation solution 3 mL 3 mL Nebulization Given      07/14/2021 1609 ipratropium (ATROVENT) 0 02 % inhalation solution 0 5 mg 0 5 mg Nebulization Given HR 84     07/14/2021 1827 metoprolol (LOPRESSOR) injection 5 mg 5 mg Intravenous Given 143/93      07/14/2021 1839 diltiazem (CARDIZEM) injection 15 mg 15 mg Intravenous Given         Past Medical History:   Diagnosis Date    Hypertension      Present on Admission:   Paroxysmal A-fib ([facility] )   Mass of right lung   COPD (chronic obstructive pulmonary disease) with acute exacerbation (HCC)   SULEMA (acute kidney injury) ([facility] )   Hypertension   type 2 diabetes mellitus ([facility] )      Admitting Diagnosis: Hypercalcemia [E83 52]  Hyperkalemia [E87 5]  Abnormal laboratory test result [R89 9]  SULEMA (acute kidney injury) ([place] [address] ) [N17 9]  Age/Sex: 68 y o  male  Admission Orders:  7/14/2021 1523 inpatient   Scheduled Medications:  allopurinol, 300 mg, Oral, Daily  apixaban, 5 mg, Oral, BID  docusate sodium, 100 mg, Oral, BID  fish oil, 1,000 mg, Oral, BID  insulin lispro, 1-5 Units, Subcutaneous, TID AC  insulin lispro, 1-5 Units, Subcutaneous, HS  levothyroxine, 150 mcg, Oral, Daily  methylPREDNISolone sodium succinate, 40 mg, Intravenous, Q12H ZOHAIB  metoprolol tartrate, 25 mg, Oral, Q12H ZOHAIB  pantoprazole, 40 mg, Oral, Daily  senna, 1 tablet, Oral, Daily  tiotropium, 18 mcg, Inhalation, Daily    albuterol inhalation solution 10 mg   Dose: 10 mg  Freq: Once Route: NEBULIZATION  Start: 07/15/21 0645 End: 07/15/21 6909    calcium gluconate 1 g in sodium chloride 0 9% 50 mL (premix)   Dose: 1 g  Freq: Once Route: IV  Last Dose: 1 g (07/15/21 0900)  Start: 07/15/21 0630 End: 07/15/21 0930    insulin regular (HumuLIN R,NovoLIN R) injection 10 Units   Dose: 10 Units  Freq: Once Route: IV  Start: 07/15/21 0045 End: 07/15/21 0154    dextrose 50 % IV solution 13 mL   Dose: 13 mL  Freq: Once Route: IV  Start: 07/15/21 0630 End: 07/15/21 4384    magnesium sulfate 2 g/50 mL IVPB (premix) 2 g   Dose: 2 g  Freq: Once Route: IV  Last Dose: Stopped (07/15/21 7385)  Start: 07/15/21 0045 End: 07/15/21 0632    dextrose 50 % IV solution 25 mL   Dose: 25 mL  Freq: Once Route: IV  Start: 07/15/21 0045 End: 07/15/21 0155    insulin regular (HumuLIN R,NovoLIN R) injection 10 Units   Dose: 10 Units  Freq: Once Route: IV  Start: 07/15/21 0630 End: 07/15/21 0901    HYDROcodone-acetaminophen (NORCO) 5-325 mg per tablet 1 tablet - used x 1 7/14, x 1 7/15   Dose: 1 tablet  Freq:  Once Route: PO    Continuous IV Infusions:  lactated ringers, 100 mL/hr, Intravenous, Continuous      PRN Meds:  acetaminophen, 650 mg, Oral, Q6H PRN  albuterol, 2 5 mg, Nebulization, Q6H PRN  levalbuterol, 1 25 mg, Nebulization, Q6H PRN  metoprolol, 5 mg, Intravenous, Q6H PRN - sued x 1 7/14  ondansetron, 4 mg, Intravenous, Q6H PRN  sodium chloride, 3 mL, Nebulization, Q6H PRN        IP CONSULT TO CARDIOLOGY    Network Utilization Review Department  ATTENTION: Please call with any questions or concerns to [phone] and carefully listen to the prompts so that you are directed to the right person  All voicemails are confidential   Eben Havers all requests for admission clinical reviews, approved or denied determinations and any other requests to dedicated fax number below belonging to the campus where the patient is receiving treatment   List of dedicated fax numbers for the Facilities:  [address] DENIALS (Administrative/Medical Necessity) [phone]   [address] (Maternity/NICU/Pediatrics) [phone]   [address] [place] [address] [address] [place] [address] [address] Darren Arndt [address] [place] [address] [phone]

## 2021-07-15 NOTE — ASSESSMENT & PLAN NOTE
Lab Results   Component Value Date    HGBA1C 6 6 (H) 06/20/2021       Recent Labs     07/14/21  2123 07/15/21  0118 07/15/21  0633 07/15/21  1040   POCGLU 120 184* 281* 292*       Blood Sugar Average: Last 72 hrs:  (P) 219 25  Sliding Scale insulin  Monitor glucose

## 2021-07-15 NOTE — ASSESSMENT & PLAN NOTE
Creatinine   Date Value Ref Range Status   07/15/2021 1 68 (H) 0 60 - 1 30 mg/dL Final     Comment:     Standardized to IDMS reference method   07/14/2021 1 59 (H) 0 60 - 1 30 mg/dL Final     Comment:     Standardized to IDMS reference method   07/14/2021 1 77 (H) 0 60 - 1 30 mg/dL Final     Comment:     Standardized to IDMS reference method   07/14/2021 1 77 (H) 0 60 - 1 30 mg/dL Final     Comment:     Standardized to IDMS reference method     BUN   Date Value Ref Range Status   07/15/2021 32 (H) 5 - 25 mg/dL Final   07/14/2021 31 (H) 5 - 25 mg/dL Final   07/14/2021 33 (H) 5 - 25 mg/dL Final   07/14/2021 35 (H) 5 - 25 mg/dL Final     Presenting with creatinine of 1 77 baseline appears to be less than 1 1   Appears to be improving today 1 23    Plan:  · Continue IV fluid hydration  · Continue to monitor

## 2021-07-15 NOTE — ASSESSMENT & PLAN NOTE
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

## 2021-07-15 NOTE — ASSESSMENT & PLAN NOTE
Presented with AFib RVR, asymptomatic  His spontaneously converted to sinus rhythm overnight   Remains NSR    Plan:  Rate controlled   Continue metoprolol and Eliquis

## 2021-07-15 NOTE — ASSESSMENT & PLAN NOTE
Potassium   Date Value Ref Range Status   07/15/2021 4 5 3 5 - 5 3 mmol/L Final   07/15/2021 5 9 (H) 3 5 - 5 3 mmol/L Final   07/14/2021 5 8 (H) 3 5 - 5 3 mmol/L Final   07/14/2021 5 5 (H) 3 5 - 5 3 mmol/L Final     · With acute exacerbation  · Patient presenting with worsening shortness of breath, productive cough and significant wheezing on exam  · Initiated IV steroids, respiratory protocol schedule nebs    Requires home O2 on discharge

## 2021-07-15 NOTE — PROGRESS NOTES
Pt resting in bed  Vital signs stable  Call bell within reach  Bed low and locked  No requests at the time   Will continue to monitor    Dean Arenas RN

## 2021-07-15 NOTE — QUICK NOTE
[facility] Service Attending Physician Supervision Note - Progress Note    I have seen and examined Cony Rivera personally and have reviewed the medical record independently  I have reviewed all components of the note performed and documented by the resident physician  I personally performed all the required components for patient evaluation and examined the patient  I was the supervising / collaborating physician on 7/15/2021 in the morning   I agree with the resident's findings and plan of care with the following additions / exceptions:    Patient was admitted for abnormal labs  Potassium and calcium still mildly elevated  Will continue to trend until in a safer range (K less than 5 5; calcium less than 11)  Patient's brief hospital stay also complicated by an episode of atrial fibrillation  [address] Cardiology input  Would continue IV steroids as patient still sounds wheezy throughout left-sided lung fields  Right-sided diminished due to large lung mass  Education and Discussions with Family / Patient: patient     Time Spent for Care: 30 minutes  More than 50% of total time spent on counseling and coordination of care as described above  I attest that this information is true, accurate, and complete to the best of my knowledge      Gerda Joiner MD

## 2021-07-15 NOTE — ASSESSMENT & PLAN NOTE
Potassium   Date Value Ref Range Status   07/15/2021 4 5 3 5 - 5 3 mmol/L Final   07/15/2021 5 9 (H) 3 5 - 5 3 mmol/L Final   07/14/2021 5 8 (H) 3 5 - 5 3 mmol/L Final   07/14/2021 5 5 (H) 3 5 - 5 3 mmol/L Final     · With acute exacerbation  · Patient presenting with worsening shortness of breath, productive cough and significant wheezing on exam  · Initiate IV steroids, respiratory protocol schedule nebs

## 2021-07-15 NOTE — CONSULTS
Consultation - Cardiology   Lenin Angel 68 y o  male MRN: [id number]  Unit/Bed#: S -01 Encounter: [id number]    Assessment/Plan     Principal Problem:    COPD (chronic obstructive pulmonary disease) with acute exacerbation (HCC)  Active Problems:    Paroxysmal A-fib ([facility] [address] )    Hypertension    Mass of right lung    [facility] )    type 2 diabetes mellitus (HCC)    Hypercalcemia    Hyperkalemia      Assessment/Plan    1  PAF- known history of ,  presented with AFib RVR, asymptomatic  His spontaneously converted to sinus rhythm overnight  Remains NSR  On Lopressor 12 5 b i d , increased to 25 b i d  AC- on eliquis 5mg BID    2  Right lung mass  Status post EBUS 06/2021-pathology atypical cells  OP- Consulted IR for biopsy  Current chest x-ray-large right lung mass, slightly increased in size, consistent with malignancy  Persistent small right pleural effusion    3  AK I/hyperkalemia/hypercalcemia/hypo magnesemia  EKG- no peaked T-waves, QRS narrow, normal QTC  Telemetry-occasional PVC's  Managed by primary team  S/P liter NSS bolus  Creatinine baseline 1 1-1 3  This morning potassium 5 9  Creatinine 1 6  Corrected calcium 11 3  Treated this morning with albuterol inhalation, calcium gluconate, D50, IV insulin  Ongoing lactated Ringer's at 100 cc an hour  Repeating potassium    5  COPD-utilizes p r n  Oxygen  Presented with acute exacerbation, wheezing and productive cough  IV steroids initiated, nebulizer    History of Present Illness   Physician Requesting Consult: Silva Keys MD  Reason for Consult / Principal Problem:  Atrial fibrillation    HPI: Vandana Cummins is a 68y o  year old male with PAF, HTN, ITP, anemia, right lung mass, COPD, tobacco abuse  who was called to come into the emergency room due to outpatient abnormal blood work  Cardiology consult for PAF  Noted to be in AFib with RVR on presentation    He presented with potassium of 5 1, creatinine 1 7, corrected calcium 11 7   Denies palpitations  Some increased shortness of breath recently with increased cough and sputum production  No chest pain or pressure  No palpitations  Uses oxygen p r n  At home  Presented 95% on room air  Currently on 3 L satting 98%  He received an albuterol inhalation  He has lost a couple lbs  No edema  Recently seen by [facility] 06/2021 for new onset atrial fibrillation  This was noted incidentally on EKG when he was having an elective bone marrow biopsy  He was started on low-dose beta-blocker  Anticoagulation was not initiated due to thrombocytopenia and the fact that he had an upcoming biopsy  During that admission CTA showed no PE but large right lower tumor encasing the right pulmonary artery  Scattered bilateral lung nodules largest in the left upper lobe subpleural lobulated mass  He underwent EBUS which revealed atypical cells but nothing conclusive for malignancy  Plans for IR biopsy  TTE 06/2021-LVEF 60%  Mild pulmonary hypertension      Inpatient consult to Cardiology  Consult performed by: CHRIS Johansen  Consult ordered by: CHRIS Bunn          Review of Systems   Constitutional: Positive for activity change and fatigue  Negative for unexpected weight change  HENT: Positive for congestion  Eyes: Negative  Respiratory: Positive for cough, shortness of breath and wheezing  Cardiovascular: Negative for chest pain, palpitations and leg swelling  Gastrointestinal: Negative  Endocrine: Negative  Genitourinary: Negative  Musculoskeletal: Negative  Skin: Negative  Allergic/Immunologic: Negative  Neurological: Negative  Hematological: Negative  Psychiatric/Behavioral: Negative  All other systems reviewed and are negative        Historical Information   Past Medical History:   Diagnosis Date    Hypertension      Past Surgical History:   Procedure Laterality Date    BACK SURGERY      CARPAL TUNNEL RELEASE Bilateral     IR BIOPSY BONE MARROW  6/10/2021    LUMBAR DISC SURGERY       Social History     Substance and Sexual Activity   Alcohol Use Not Currently    Comment: History of heavier drinking in early 25s  Denies any current alcohol use (Updated 2021)        Social History     Substance and Sexual Activity   Drug Use Never     Social History     Tobacco Use   Smoking Status Former Smoker    Packs/day: 0 50    Years: 40 00    Pack years: 20 00    Types: Cigarettes    Start date: 12    Quit date: 2021    Years since quittin 0   Smokeless Tobacco Never Used     Family History:   Family History   Problem Relation Age of Onset    Cancer Mother         "ate away her bone"    Anuerysm Father     Cancer Sister         unknown type    Heart attack Maternal Grandfather     Cancer Sister         unknown type    Heart attack Maternal Aunt     Heart attack Maternal Uncle     Heart attack Paternal Aunt        Meds/Allergies   current meds:   Current Facility-Administered Medications   Medication Dose Route Frequency    acetaminophen (TYLENOL) tablet 650 mg  650 mg Oral Q6H PRN    albuterol inhalation solution 2 5 mg  2 5 mg Nebulization Q6H PRN    allopurinol (ZYLOPRIM) tablet 300 mg  300 mg Oral Daily    apixaban (ELIQUIS) tablet 5 mg  5 mg Oral BID    docusate sodium (COLACE) capsule 100 mg  100 mg Oral BID    fish oil capsule 1,000 mg  1,000 mg Oral BID    HYDROcodone-acetaminophen (NORCO) 5-325 mg per tablet 1 tablet  1 tablet Oral Once    insulin lispro (HumaLOG) 100 units/mL subcutaneous injection 1-5 Units  1-5 Units Subcutaneous TID AC    insulin lispro (HumaLOG) 100 units/mL subcutaneous injection 1-5 Units  1-5 Units Subcutaneous HS    lactated ringers infusion  100 mL/hr Intravenous Continuous    levalbuterol (XOPENEX) inhalation solution 1 25 mg  1 25 mg Nebulization Q6H PRN    levothyroxine tablet 150 mcg  150 mcg Oral Daily    methylPREDNISolone sodium succinate (Solu-MEDROL) injection 40 mg  40 mg Intravenous Q12H [address]    metoprolol (LOPRESSOR) injection 5 mg  5 mg Intravenous Q6H PRN    metoprolol tartrate (LOPRESSOR) tablet 25 mg  25 mg Oral Q12H [address]    ondansetron (ZOFRAN) injection 4 mg  4 mg Intravenous Q6H PRN    pantoprazole (PROTONIX) EC tablet 40 mg  40 mg Oral Daily    senna (SENOKOT) tablet 8 6 mg  1 tablet Oral Daily    sodium chloride 0 9 % inhalation solution 3 mL  3 mL Nebulization Q6H PRN    tiotropium (SPIRIVA) capsule for inhaler 18 mcg  18 mcg Inhalation Daily    and PTA meds:    Medications Prior to Admission   Medication    acetaminophen (TYLENOL) 100 mg/mL solution    albuterol (2 5 mg/3 mL) 0 083 % nebulizer solution    allopurinol (ZYLOPRIM) 300 mg tablet    amLODIPine (NORVASC) 2 5 mg tablet    apixaban (ELIQUIS) 5 mg    benzonatate (TESSALON PERLES) 100 mg capsule    guaiFENesin (MUCINEX) 600 mg 12 hr tablet    levothyroxine 150 mcg tablet    metFORMIN (GLUCOPHAGE) 500 mg tablet    metoprolol tartrate (LOPRESSOR) 25 mg tablet    Omega-3 Fatty Acids (FISH OIL) 1,000 mg    pantoprazole (PROTONIX) 40 mg tablet    tiotropium (SPIRIVA) 18 mcg inhalation capsule     Allergies   Allergen Reactions    Penicillins Hives       Objective   Vitals: Blood pressure 120/58, pulse 80, temperature 98 2 °F (36 8 °C), temperature source Oral, resp  rate 19, height 5' 2" (1 575 m), weight 75 8 kg (167 lb), SpO2 98 %    Orthostatic Blood Pressures      Most Recent Value   Blood Pressure  120/58 filed at 07/15/2021 0926   Patient Position - Orthostatic VS  Lying filed at 07/15/2021 0609            Intake/Output Summary (Last 24 hours) at 7/15/2021 1016  Last data filed at 7/15/2021 0901  Gross per 24 hour   Intake 2350 ml   Output 810 ml   Net 1540 ml       Invasive Devices     Peripheral Intravenous Line            Peripheral IV 06/21/21 Right Wrist 23 days    Peripheral IV 07/14/21 Left Antecubital <1 day    Peripheral IV 07/14/21 Right Antecubital <1 day                Physical Exam: /58   Pulse 80   Temp 98 2 °F (36 8 °C) (Oral)   Resp 19   Ht 5' 2" (1 575 m)   Wt 75 8 kg (167 lb)   SpO2 98%   BMI 30 54 kg/m²      General Appearance:    Alert, cooperative, no distress, appears stated age   Head:    Normocephalic, no scleral icterus   Eyes:    PERRL   Nose:   Nares normal, septum midline, mucosa normal, no drainage    Throat:   Lips, mucosa, and tongue normal   Neck:   Supple, symmetrical, trachea midline            Lungs:     Wheezes  to auscultation bilaterally, respirations unlabored   Chest Wall:    No tenderness or deformity    Heart:    Regular rate and rhythm, S1 and S2 normal, no murmur, rub   or gallop   Abdomen:     Soft, non-tender, bowel sounds active all four quadrants,     no masses, no organomegaly   Extremities:   Extremities normal, atraumatic, no cyanosis or edema   Pulses:   2+ and symmetric all extremities   Skin:   Skin color, texture, turgor normal, no rashes or lesions   Neurologic:   Alert and oriented to person place and time   No focal deficits       Lab Results:   Recent Results (from the past 72 hour(s))   CBC and differential    Collection Time: 07/14/21 10:39 AM   Result Value Ref Range    WBC 5 68 4 31 - 10 16 Thousand/uL    RBC 4 30 3 88 - 5 62 Million/uL    Hemoglobin 10 0 (L) 12 0 - 17 0 g/dL    Hematocrit 35 2 (L) 36 5 - 49 3 %    MCV 82 82 - 98 fL    MCH 23 3 (L) 26 8 - 34 3 pg    MCHC 28 4 (L) 31 4 - 37 4 g/dL    RDW 18 3 (H) 11 6 - 15 1 %    MPV 9 5 8 9 - 12 7 fL    Platelets 027 142 - 213 Thousands/uL    nRBC 0 /100 WBCs    Neutrophils Relative 83 (H) 43 - 75 %    Immat GRANS % 2 0 - 2 %    Lymphocytes Relative 5 (L) 14 - 44 %    Monocytes Relative 9 4 - 12 %    Eosinophils Relative 1 0 - 6 %    Basophils Relative 0 0 - 1 %    Neutrophils Absolute 4 67 1 85 - 7 62 Thousands/µL    Immature Grans Absolute 0 13 0 00 - 0 20 Thousand/uL    Lymphocytes Absolute 0 29 (L) 0 60 - 4 47 Thousands/µL    Monocytes Absolute 0 50 0 17 - 1 22 Thousand/µL    Eosinophils Absolute 0 07 0 00 - 0 61 Thousand/µL    Basophils Absolute 0 02 0 00 - 0 10 Thousands/µL   Comprehensive metabolic panel    Collection Time: 07/14/21 10:39 AM   Result Value Ref Range    Sodium 140 136 - 145 mmol/L    Potassium 5 1 3 5 - 5 3 mmol/L    Chloride 104 100 - 108 mmol/L    CO2 25 21 - 32 mmol/L    ANION GAP 11 4 - 13 mmol/L    BUN 35 (H) 5 - 25 mg/dL    Creatinine 1 77 (H) 0 60 - 1 30 mg/dL    Glucose, Fasting 217 (H) 65 - 99 mg/dL    Calcium 9 9 8 3 - 10 1 mg/dL    Corrected Calcium 11 7 (H) 8 3 - 10 1 mg/dL    AST 12 5 - 45 U/L    ALT 23 12 - 78 U/L    Alkaline Phosphatase 169 (H) 46 - 116 U/L    Total Protein 7 4 6 4 - 8 2 g/dL    Albumin 1 8 (L) 3 5 - 5 0 g/dL    Total Bilirubin 0 40 0 20 - 1 00 mg/dL    eGFR 36 ml/min/1 73sq m   CBC and differential    Collection Time: 07/14/21  2:13 PM   Result Value Ref Range    WBC 6 27 4 31 - 10 16 Thousand/uL    RBC 4 07 3 88 - 5 62 Million/uL    Hemoglobin 9 6 (L) 12 0 - 17 0 g/dL    Hematocrit 33 6 (L) 36 5 - 49 3 %    MCV 83 82 - 98 fL    MCH 23 6 (L) 26 8 - 34 3 pg    MCHC 28 6 (L) 31 4 - 37 4 g/dL    RDW 18 0 (H) 11 6 - 15 1 %    MPV 9 4 8 9 - 12 7 fL    Platelets 293 163 - 445 Thousands/uL    nRBC 0 /100 WBCs    Neutrophils Relative 81 (H) 43 - 75 %    Immat GRANS % 2 0 - 2 %    Lymphocytes Relative 5 (L) 14 - 44 %    Monocytes Relative 11 4 - 12 %    Eosinophils Relative 1 0 - 6 %    Basophils Relative 0 0 - 1 %    Neutrophils Absolute 5 03 1 85 - 7 62 Thousands/µL    Immature Grans Absolute 0 13 0 00 - 0 20 Thousand/uL    Lymphocytes Absolute 0 33 (L) 0 60 - 4 47 Thousands/µL    Monocytes Absolute 0 67 0 17 - 1 22 Thousand/µL    Eosinophils Absolute 0 09 0 00 - 0 61 Thousand/µL    Basophils Absolute 0 02 0 00 - 0 10 Thousands/µL   Comprehensive metabolic panel    Collection Time: 07/14/21  2:13 PM   Result Value Ref Range    Sodium 145 136 - 145 mmol/L    Potassium 5 5 (H) 3 5 - 5 3 mmol/L    Chloride 108 100 - 108 mmol/L    CO2 28 21 - 32 mmol/L    ANION GAP 9 4 - 13 mmol/L    BUN 33 (H) 5 - 25 mg/dL    Creatinine 1 77 (H) 0 60 - 1 30 mg/dL    Glucose 138 65 - 140 mg/dL    Calcium 9 7 8 3 - 10 1 mg/dL    Corrected Calcium 11 5 (H) 8 3 - 10 1 mg/dL    AST 13 5 - 45 U/L    ALT 22 12 - 78 U/L    Alkaline Phosphatase 171 (H) 46 - 116 U/L    Total Protein 7 3 6 4 - 8 2 g/dL    Albumin 1 8 (L) 3 5 - 5 0 g/dL    Total Bilirubin 0 40 0 20 - 1 00 mg/dL    eGFR 36 ml/min/1 73sq m   Magnesium    Collection Time: 07/14/21  2:13 PM   Result Value Ref Range    Magnesium 1 5 (L) 1 6 - 2 6 mg/dL   Calcium, ionized    Collection Time: 07/14/21  2:13 PM   Result Value Ref Range    Calcium, Ionized 1 28 1 12 - 1 32 mmol/L   ECG 12 lead    Collection Time: 07/14/21  2:17 PM   Result Value Ref Range    Ventricular Rate 84 BPM    Atrial Rate 75 BPM    UT Interval  ms    QRSD Interval 70 ms    QT Interval 358 ms    QTC Interval 423 ms    P [place] 69 degrees    QRS Axis 76 degrees    T Wave Axis 72 degrees   ECG 12 lead    Collection Time: 07/14/21  6:45 PM   Result Value Ref Range    Ventricular Rate 110 BPM    Atrial Rate 312 BPM    UT Interval  ms    QRSD Interval 68 ms    QT Interval 300 ms    QTC Interval 406 ms    P Axis 256 degrees    QRS Axis 71 degrees    T Wave Axis 64 degrees   Fingerstick Glucose (POCT)    Collection Time: 07/14/21  9:23 PM   Result Value Ref Range    POC Glucose 120 65 - 140 mg/dl   Basic metabolic panel    Collection Time: 07/14/21 11:23 PM   Result Value Ref Range    Sodium 142 136 - 145 mmol/L    Potassium 5 8 (H) 3 5 - 5 3 mmol/L    Chloride 109 (H) 100 - 108 mmol/L    CO2 27 21 - 32 mmol/L    ANION GAP 6 4 - 13 mmol/L    BUN 31 (H) 5 - 25 mg/dL    Creatinine 1 59 (H) 0 60 - 1 30 mg/dL    Glucose 149 (H) 65 - 140 mg/dL    Calcium 9 3 8 3 - 10 1 mg/dL    eGFR 41 ml/min/1 73sq m   Magnesium    Collection Time: 07/14/21 11:23 PM   Result Value Ref Range    Magnesium 1 3 (L) 1 6 - 2 6 mg/dL   Fingerstick Glucose (POCT)    Collection Time: 07/15/21  1:18 AM   Result Value Ref Range    POC Glucose 184 (H) 65 - 140 mg/dl   Comprehensive metabolic panel    Collection Time: 07/15/21  4:38 AM   Result Value Ref Range    Sodium 140 136 - 145 mmol/L    Potassium 5 9 (H) 3 5 - 5 3 mmol/L    Chloride 107 100 - 108 mmol/L    CO2 25 21 - 32 mmol/L    ANION GAP 8 4 - 13 mmol/L    BUN 32 (H) 5 - 25 mg/dL    Creatinine 1 68 (H) 0 60 - 1 30 mg/dL    Glucose 247 (H) 65 - 140 mg/dL    Calcium 9 4 8 3 - 10 1 mg/dL    Corrected Calcium 11 3 (H) 8 3 - 10 1 mg/dL    AST 16 5 - 45 U/L    ALT 19 12 - 78 U/L    Alkaline Phosphatase 174 (H) 46 - 116 U/L    Total Protein 6 9 6 4 - 8 2 g/dL    Albumin 1 6 (L) 3 5 - 5 0 g/dL    Total Bilirubin 0 37 0 20 - 1 00 mg/dL    eGFR 38 ml/min/1 73sq m   CBC (With Platelets)    Collection Time: 07/15/21  4:39 AM   Result Value Ref Range    WBC 5 20 4  31 - 10 16 Thousand/uL    RBC 3 85 (L) 3 88 - 5 62 Million/uL    Hemoglobin 8 7 (L) 12 0 - 17 0 g/dL    Hematocrit 32 3 (L) 36 5 - 49 3 %    MCV 84 82 - 98 fL    MCH 22 6 (L) 26 8 - 34 3 pg    MCHC 26 9 (L) 31 4 - 37 4 g/dL    RDW 18 0 (H) 11 6 - 15 1 %    Platelets 959 372 - 696 Thousands/uL    MPV 9 7 8 9 - 12 7 fL   Fingerstick Glucose (POCT)    Collection Time: 07/15/21  6:33 AM   Result Value Ref Range    POC Glucose 281 (H) 65 - 140 mg/dl   UA (URINE) with reflex to Scope    Collection Time: 07/15/21  6:55 AM   Result Value Ref Range    Color, UA Yellow     Clarity, UA Clear     Specific Gravity, UA 1 025 1 003 - 1 030    pH, UA 5 5 4 5, 5 0, 5 5, 6 0, 6 5, 7 0, 7 5, 8 0    Leukocytes, UA Negative Negative    Nitrite, UA Negative Negative    Protein, UA Negative Negative mg/dl    Glucose,  (1/4%) (A) Negative mg/dl    Ketones, UA Negative Negative mg/dl    Urobilinogen, UA 2 0 (A) 0 2, 1 0 E U /dl E U /dl    Bilirubin, UA Negative Negative    Blood, UA Negative Negative     [facility]  [address], [place] [address]  [phone]     Transthoracic Echocardiogram  2D, M-mode, Doppler, and Color Doppler     Study date:  2021     Patient: Romy Arrieta  MR number: [id number]  Account number: [de-identified]  : 1940  Age: 68 years  Gender: Male  Status: Inpatient  Location: Bedside  Height: 62 in  Weight: 173 6 lb  BP: 138/ 65 mmHg     Indications: A-fib      Diagnoses: I48 0 - Atrial fibrillation     Sonographer:  ROSALINA Ennis  Primary Physician:  Serena Hernandez DO  Referring Physician:  Armani Briseno PA-C  Group:  [id number]  Interpreting Physician:  Chadwick Fox MD     SUMMARY     LEFT VENTRICLE:  Systolic function was normal  Ejection fraction was estimated to be 60 %  There were no regional wall motion abnormalities      MITRAL VALVE:  There was mild regurgitation      TRICUSPID VALVE:  There was mild regurgitation  Estimated peak PA pressure was 42 mmHg  The findings suggest mild pulmonary hypertension      HISTORY: PRIOR HISTORY: PAF, HTN, SULEMA, SOB, lung mass, DM2, thrombocytopenia, current smoker      PROCEDURE: The procedure was performed at the bedside  This was a routine study  The transthoracic approach was used  The study included complete 2D imaging, M-mode, complete spectral Doppler, and color Doppler  The heart rate was 77 bpm,  at the start of the study  Images were obtained from the parasternal, apical, subcostal, and suprasternal notch acoustic windows  Image quality was adequate      LEFT VENTRICLE: Size was normal  Systolic function was normal  Ejection fraction was estimated to be 60 %  There were no regional wall motion abnormalities   Wall thickness was normal  DOPPLER: Left ventricular diastolic function parameters  were normal for the patient's age      RIGHT VENTRICLE: The size was normal  Systolic function was normal  Wall thickness was normal      LEFT ATRIUM: Size was normal      RIGHT ATRIUM: Size was normal      MITRAL VALVE: Valve structure was normal  There was normal leaflet separation  DOPPLER: The transmitral velocity was within the normal range  There was no evidence for stenosis  There was mild regurgitation      AORTIC VALVE: The valve was trileaflet  Leaflets exhibited normal thickness and normal cuspal separation  DOPPLER: Transaortic velocity was within the normal range  There was no evidence for stenosis  There was no significant  regurgitation      TRICUSPID VALVE: The valve structure was normal  There was normal leaflet separation  DOPPLER: The transtricuspid velocity was within the normal range  There was no evidence for stenosis  There was mild regurgitation  Estimated peak PA  pressure was 42 mmHg  The findings suggest mild pulmonary hypertension      PULMONIC VALVE: Leaflets exhibited normal thickness, no calcification, and normal cuspal separation  DOPPLER: The transpulmonic velocity was within the normal range  There was no significant regurgitation      PERICARDIUM: There was no pericardial effusion  The pericardium was normal in appearance      AORTA: The root exhibited normal size      SYSTEMIC VEINS: IVC: The inferior vena cava was normal in size  Respirophasic changes were normal      SYSTEM MEASUREMENT TABLES    Imaging: I have personally reviewed pertinent reports  EKG:  AFib with RVR/anasarca PVCs  VTE Prophylaxis: eliquis    Code Status: Level 1 - Full Code  Advance Directive and Living Will:      Power of :    POLST:      Counseling / Coordination of Care  Total floor / unit time spent today 60 minutes  Greater than 50% of total time was spent with the patient and / or family counseling and / or coordination of care

## 2021-07-15 NOTE — PROGRESS NOTES
[facility]  Progress Note - Anita Goldman 1940, 68 y o  male MRN: [id number]  Unit/Bed#: S -01 Encounter: [id number]  Primary Care Provider: Chayo Sebastian DO   Date and time admitted to hospital: 7/14/2021  1:39 PM    * COPD (chronic obstructive pulmonary disease) with acute exacerbation (HCC)  Assessment & Plan  Potassium   Date Value Ref Range Status   07/15/2021 4 5 3 5 - 5 3 mmol/L Final   07/15/2021 5 9 (H) 3 5 - 5 3 mmol/L Final   07/14/2021 5 8 (H) 3 5 - 5 3 mmol/L Final   07/14/2021 5 5 (H) 3 5 - 5 3 mmol/L Final     · With acute exacerbation  · Patient presenting with worsening shortness of breath, productive cough and significant wheezing on exam  · Initiate IV steroids, respiratory protocol schedule nebs    Hyperkalemia  Assessment & Plan  Lab Results   Component Value Date    K 4 5 07/15/2021    K 5 9 (H) 07/15/2021    K 5 8 (H) 07/14/2021    K 5 5 (H) 07/14/2021    K 5 1 07/14/2021    K 4 8 06/23/2021    K 5 3 06/22/2021    K 5 8 (H) 06/22/2021     Presenting a potassium of 5 5 in the setting of acute kidney   No EKG changes noted  Plan  · Calcium gluconate 1g      SULEMA (acute kidney injury) ([facility] [address] )  Assessment & Plan  Creatinine   Date Value Ref Range Status   07/15/2021 1 68 (H) 0 60 - 1 30 mg/dL Final     Comment:     Standardized to IDMS reference method   07/14/2021 1 59 (H) 0 60 - 1 30 mg/dL Final     Comment:     Standardized to IDMS reference method   07/14/2021 1 77 (H) 0 60 - 1 30 mg/dL Final     Comment:     Standardized to IDMS reference method   07/14/2021 1 77 (H) 0 60 - 1 30 mg/dL Final     Comment:     Standardized to IDMS reference method     BUN   Date Value Ref Range Status   07/15/2021 32 (H) 5 - 25 mg/dL Final   07/14/2021 31 (H) 5 - 25 mg/dL Final   07/14/2021 33 (H) 5 - 25 mg/dL Final   07/14/2021 35 (H) 5 - 25 mg/dL Final     Presenting with creatinine of 1 77 baseline appears to be less than 1 1   Appears to be improving today 1 68  Continue IV fluid hydration  Continue to monitor    Paroxysmal A-fib [facility])  Assessment & Plan  Rate controlled  Continue metoprolol and Eliquis    Hypercalcemia  Assessment & Plan  Presenting with hypercalcemia, corrected calcium 11 5  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  Continue IV fluid hydration  If no improvement consider Zometa    type 2 diabetes mellitus [facility])  Assessment & Plan  Lab Results   Component Value Date    HGBA1C 6 6 (H) 2021       Recent Labs     21  2123 07/15/21  0118 07/15/21  0633 07/15/21  1040   POCGLU 120 184* 281* 292*       Blood Sugar Average: Last 72 hrs:  (P) 219 25  Sliding Scale insulin  Monitor glucose    Mass of right lung  Assessment & Plan  Currently being worked up as outpatient by PCP/pulmonology/Oncology  Had EBUS the pathology report showed atypical cells with no evidence of malignancy  He is scheduled to have IR guided biopsy as outpatient on            Hypothyroidism  Assessment & Plan  Continue home treatment with levothyroxine  Hypertension  Assessment & Plan  Blood pressure appears stable  Continue home medication        VTE Pharmacologic Prophylaxis:   VTE Score: 5 High Risk (Score >/= 5) - Pharmacological DVT Prophylaxis Ordered: Apixaban (Eliquis)  Sequential Compression Devices Ordered  Mechanical VTE Prophylaxis in Place: Yes    Patient Centered Rounds: I have performed bedside rounds with nursing staff today  Discussions with Specialists or Other Care Team Provider: Nursing, Case Management    Education and Discussions with Family / Patient: Patient declined call to   Current Length of Stay: 1 day(s)    Current Patient Status: Inpatient     Discharge Plan / Estimated Discharge Date: Anticipate discharge in 48 hrs to discharge location to be determined pending rehab evaluations  Code Status: Level 1 - Full Code      Subjective:   No acute events overnight      Objective:     Vitals:   Temp (24hrs), Av 3 °F (36 8 °C), Min:98 2 °F (36 8 °C), Max:98 6 °F (37 °C)    Temp:  [98 2 °F (36 8 °C)-98 6 °F (37 °C)] 98 2 °F (36 8 °C)  HR:  [] 87  Resp:  [19-22] 19  BP: (100-157)/(55-93) 138/69  SpO2:  [92 %-98 %] 97 %  Body mass index is 30 54 kg/m²  Input and Output Summary (last 24 hours): Intake/Output Summary (Last 24 hours) at 7/15/2021 1722  Last data filed at 7/15/2021 1332  Gross per 24 hour   Intake 1590 ml   Output 1360 ml   Net 230 ml       Physical Exam:     Physical Exam  Vitals and nursing note reviewed  Constitutional:       Appearance: He is well-developed  HENT:      Head: Normocephalic and atraumatic  Eyes:      Conjunctiva/sclera: Conjunctivae normal    Cardiovascular:      Rate and Rhythm: Normal rate and regular rhythm  Heart sounds: No murmur heard  Pulmonary:      Effort: Pulmonary effort is normal  No respiratory distress  Breath sounds: Normal breath sounds  Abdominal:      Palpations: Abdomen is soft  Tenderness: There is no abdominal tenderness  Musculoskeletal:      Cervical back: Neck supple  No tenderness  Skin:     General: Skin is warm and dry  Neurological:      Mental Status: He is alert            Additional Data:     Labs:  Results from last 7 days   Lab Units 07/15/21  0439 07/14/21  1413   WBC Thousand/uL 5 20 6 27   HEMOGLOBIN g/dL 8 7* 9 6*   HEMATOCRIT % 32 3* 33 6*   PLATELETS Thousands/uL 256 277   NEUTROS PCT %  --  81*   LYMPHS PCT %  --  5*   MONOS PCT %  --  11   EOS PCT %  --  1     Results from last 7 days   Lab Units 07/15/21  1137 07/15/21  0438   SODIUM mmol/L  --  140   POTASSIUM mmol/L 4 5 5 9*   CHLORIDE mmol/L  --  107   CO2 mmol/L  --  25   BUN mg/dL  --  32*   CREATININE mg/dL  --  1 68*   ANION GAP mmol/L  --  8   CALCIUM mg/dL  --  9 4   ALBUMIN g/dL  --  1 6*   TOTAL BILIRUBIN mg/dL  --  0 37   ALK PHOS U/L  --  174*   ALT U/L  --  19   AST U/L  --  16   GLUCOSE RANDOM mg/dL  --  247*         Results from last 7 days   Lab Units 07/15/21  1040 07/15/21  0633 07/15/21  0118 07/14/21 2123   POC GLUCOSE mg/dl 292* 281* 184* 120               Imaging: Reviewed radiology reports from this admission including: chest xray      Recent Cultures (last 7 days):           Lines/Drains:  Invasive Devices     Peripheral Intravenous Line            Peripheral IV 06/21/21 Right Wrist 24 days    Peripheral IV 07/14/21 Right Antecubital 1 day    Peripheral IV 07/14/21 Left Antecubital <1 day                Telemetry:        Last 24 Hours Medication List:   Current Facility-Administered Medications   Medication Dose Route Frequency Provider Last Rate    acetaminophen  650 mg Oral Q6H PRN Sofi Verdin MD      albuterol  2 5 mg Nebulization Q6H PRN Sofi Verdin MD      allopurinol  300 mg Oral Daily Sofi Verdin MD      apixaban  5 mg Oral BID Sofi Verdin MD      docusate sodium  100 mg Oral BID Sofi Verdin MD      fish oil  1,000 mg Oral BID Sofi Verdin MD      insulin lispro  1-5 Units Subcutaneous TID AC Sofi Verdin MD      insulin lispro  1-5 Units Subcutaneous HS Sofi Verdin MD      lactated ringers  100 mL/hr Intravenous Continuous Sofi Verdin  mL/hr (07/14/21 2112)    levalbuterol  1 25 mg Nebulization Q6H PRN Sofi Verdin MD      levothyroxine  150 mcg Oral Daily Sofi Verdin MD      methylPREDNISolone sodium succinate  40 mg Intravenous Q12H Zeferino Grey MD      metoprolol  5 mg Intravenous Q6H PRN Sofi Verdin MD      metoprolol tartrate  25 mg Oral Q12H [facility] CHRIS Villaseñor      ondansetron  4 mg Intravenous Q6H PRN Sofi Verdin MD      pantoprazole  40 mg Oral Daily Sofi Verdin MD      senna  1 tablet Oral Daily Sofi Verdin MD      sodium chloride  3 mL Nebulization Q6H PRN Sofi Verdin MD      tiotropium  18 mcg Inhalation Daily Sofi Verdin MD          Today, Patient Was Seen By: Kita Trimble DO    ** Please Note: This note has been constructed using a voice recognition system   **

## 2021-07-15 NOTE — ASSESSMENT & PLAN NOTE
Creatinine   Date Value Ref Range Status   07/15/2021 1 68 (H) 0 60 - 1 30 mg/dL Final     Comment:     Standardized to IDMS reference method   07/14/2021 1 59 (H) 0 60 - 1 30 mg/dL Final     Comment:     Standardized to IDMS reference method   07/14/2021 1 77 (H) 0 60 - 1 30 mg/dL Final     Comment:     Standardized to IDMS reference method   07/14/2021 1 77 (H) 0 60 - 1 30 mg/dL Final     Comment:     Standardized to IDMS reference method     BUN   Date Value Ref Range Status   07/15/2021 32 (H) 5 - 25 mg/dL Final   07/14/2021 31 (H) 5 - 25 mg/dL Final   07/14/2021 33 (H) 5 - 25 mg/dL Final   07/14/2021 35 (H) 5 - 25 mg/dL Final     Presenting with creatinine of 1 77 baseline appears to be less than 1 1   Appears to be improving today 1 68  Continue IV fluid hydration  Continue to monitor

## 2021-07-16 ENCOUNTER — HOSPITAL ENCOUNTER (OUTPATIENT)
Dept: INFUSION CENTER | Facility: CLINIC | Age: 81
Discharge: HOME | End: 2021-07-16

## 2021-07-16 LAB
ALBUMIN SERPL BCP-MCNC: 1.6 G/DL (ref 3.5–5)
ALP SERPL-CCNC: 147 U/L (ref 46–116)
ALT SERPL W P-5'-P-CCNC: 17 U/L (ref 12–78)
ANION GAP SERPL CALCULATED.3IONS-SCNC: 10 MMOL/L (ref 4–13)
AST SERPL W P-5'-P-CCNC: 8 U/L (ref 5–45)
BILIRUB SERPL-MCNC: 0.18 MG/DL (ref 0.2–1)
BUN SERPL-MCNC: 32 MG/DL (ref 5–25)
CALCIUM ALBUM COR SERPL-MCNC: 11.1 MG/DL (ref 8.3–10.1)
CALCIUM SERPL-MCNC: 9.2 MG/DL (ref 8.3–10.1)
CHLORIDE SERPL-SCNC: 107 MMOL/L (ref 100–108)
CO2 SERPL-SCNC: 25 MMOL/L (ref 21–32)
CREAT SERPL-MCNC: 1.23 MG/DL (ref 0.6–1.3)
ERYTHROCYTE [DISTWIDTH] IN BLOOD BY AUTOMATED COUNT: 17.6 % (ref 11.6–15.1)
GFR SERPL CREATININE-BSD FRML MDRD: 56 ML/MIN/1.73SQ M
GLUCOSE SERPL-MCNC: 277 MG/DL (ref 65–140)
GLUCOSE SERPL-MCNC: 288 MG/DL (ref 65–140)
GLUCOSE SERPL-MCNC: 291 MG/DL (ref 65–140)
GLUCOSE SERPL-MCNC: 305 MG/DL (ref 65–140)
GLUCOSE SERPL-MCNC: 310 MG/DL (ref 65–140)
HCT VFR BLD AUTO: 28.2 % (ref 36.5–49.3)
HGB BLD-MCNC: 7.9 G/DL (ref 12–17)
MCH RBC QN AUTO: 23.2 PG (ref 26.8–34.3)
MCHC RBC AUTO-ENTMCNC: 28 G/DL (ref 31.4–37.4)
MCV RBC AUTO: 83 FL (ref 82–98)
PLATELET # BLD AUTO: 265 THOUSANDS/UL (ref 149–390)
PMV BLD AUTO: 9.7 FL (ref 8.9–12.7)
POTASSIUM SERPL-SCNC: 5.1 MMOL/L (ref 3.5–5.3)
PROT SERPL-MCNC: 6.7 G/DL (ref 6.4–8.2)
PTH-INTACT SERPL-MCNC: 18.9 PG/ML (ref 18.4–80.1)
RBC # BLD AUTO: 3.4 MILLION/UL (ref 3.88–5.62)
SODIUM SERPL-SCNC: 142 MMOL/L (ref 136–145)
WBC # BLD AUTO: 10.81 THOUSAND/UL (ref 4.31–10.16)

## 2021-07-16 PROCEDURE — 99232 SBSQ HOSP IP/OBS MODERATE 35: CPT | Performed by: INTERNAL MEDICINE

## 2021-07-16 PROCEDURE — 82948 REAGENT STRIP/BLOOD GLUCOSE: CPT

## 2021-07-16 PROCEDURE — 85027 COMPLETE CBC AUTOMATED: CPT | Performed by: INTERNAL MEDICINE

## 2021-07-16 PROCEDURE — 99232 SBSQ HOSP IP/OBS MODERATE 35: CPT | Performed by: HOSPITALIST

## 2021-07-16 PROCEDURE — 80053 COMPREHEN METABOLIC PANEL: CPT | Performed by: INTERNAL MEDICINE

## 2021-07-16 PROCEDURE — 93005 ELECTROCARDIOGRAM TRACING: CPT

## 2021-07-16 PROCEDURE — 83970 ASSAY OF PARATHORMONE: CPT | Performed by: HOSPITALIST

## 2021-07-16 PROCEDURE — 94760 N-INVAS EAR/PLS OXIMETRY 1: CPT

## 2021-07-16 PROCEDURE — 94640 AIRWAY INHALATION TREATMENT: CPT

## 2021-07-16 RX ORDER — METOPROLOL TARTRATE 5 MG/5ML
5 INJECTION INTRAVENOUS ONCE
Status: COMPLETED | OUTPATIENT
Start: 2021-07-16 | End: 2021-07-16

## 2021-07-16 RX ADMIN — INSULIN LISPRO 3 UNITS: 100 INJECTION, SOLUTION INTRAVENOUS; SUBCUTANEOUS at 13:09

## 2021-07-16 RX ADMIN — METOPROLOL TARTRATE 25 MG: 25 TABLET, FILM COATED ORAL at 20:21

## 2021-07-16 RX ADMIN — OMEGA-3 FATTY ACIDS CAP 1000 MG 1000 MG: 1000 CAP at 09:15

## 2021-07-16 RX ADMIN — TIOTROPIUM BROMIDE 18 MCG: 18 CAPSULE ORAL; RESPIRATORY (INHALATION) at 09:15

## 2021-07-16 RX ADMIN — METHYLPREDNISOLONE SODIUM SUCCINATE 40 MG: 40 INJECTION, POWDER, FOR SOLUTION INTRAMUSCULAR; INTRAVENOUS at 09:15

## 2021-07-16 RX ADMIN — INSULIN LISPRO 4 UNITS: 100 INJECTION, SOLUTION INTRAVENOUS; SUBCUTANEOUS at 20:20

## 2021-07-16 RX ADMIN — PANTOPRAZOLE SODIUM 40 MG: 40 TABLET, DELAYED RELEASE ORAL at 09:15

## 2021-07-16 RX ADMIN — ALLOPURINOL 300 MG: 300 TABLET ORAL at 09:15

## 2021-07-16 RX ADMIN — APIXABAN 5 MG: 5 TABLET, FILM COATED ORAL at 17:27

## 2021-07-16 RX ADMIN — METOPROLOL TARTRATE 25 MG: 25 TABLET, FILM COATED ORAL at 09:15

## 2021-07-16 RX ADMIN — METHYLPREDNISOLONE SODIUM SUCCINATE 40 MG: 40 INJECTION, POWDER, FOR SOLUTION INTRAMUSCULAR; INTRAVENOUS at 20:22

## 2021-07-16 RX ADMIN — ISODIUM CHLORIDE 3 ML: 0.03 SOLUTION RESPIRATORY (INHALATION) at 12:58

## 2021-07-16 RX ADMIN — LEVALBUTEROL HYDROCHLORIDE 1.25 MG: 1.25 SOLUTION, CONCENTRATE RESPIRATORY (INHALATION) at 08:03

## 2021-07-16 RX ADMIN — ALBUTEROL SULFATE 2.5 MG: 2.5 SOLUTION RESPIRATORY (INHALATION) at 20:33

## 2021-07-16 RX ADMIN — DOCUSATE SODIUM 100 MG: 100 CAPSULE, LIQUID FILLED ORAL at 09:15

## 2021-07-16 RX ADMIN — APIXABAN 5 MG: 5 TABLET, FILM COATED ORAL at 09:15

## 2021-07-16 RX ADMIN — SODIUM CHLORIDE, SODIUM LACTATE, POTASSIUM CHLORIDE, AND CALCIUM CHLORIDE 100 ML/HR: .6; .31; .03; .02 INJECTION, SOLUTION INTRAVENOUS at 00:53

## 2021-07-16 RX ADMIN — SENNOSIDES 8.6 MG: 8.6 TABLET, FILM COATED ORAL at 09:15

## 2021-07-16 RX ADMIN — OMEGA-3 FATTY ACIDS CAP 1000 MG 1000 MG: 1000 CAP at 17:27

## 2021-07-16 RX ADMIN — METOPROLOL TARTRATE 5 MG: 5 INJECTION, SOLUTION INTRAVENOUS at 23:03

## 2021-07-16 RX ADMIN — INSULIN LISPRO 4 UNITS: 100 INJECTION, SOLUTION INTRAVENOUS; SUBCUTANEOUS at 17:29

## 2021-07-16 RX ADMIN — INSULIN LISPRO 3 UNITS: 100 INJECTION, SOLUTION INTRAVENOUS; SUBCUTANEOUS at 09:15

## 2021-07-16 RX ADMIN — ISODIUM CHLORIDE 3 ML: 0.03 SOLUTION RESPIRATORY (INHALATION) at 08:03

## 2021-07-16 RX ADMIN — METOPROLOL TARTRATE 5 MG: 1 INJECTION, SOLUTION INTRAVENOUS at 20:29

## 2021-07-16 RX ADMIN — LEVALBUTEROL HYDROCHLORIDE 1.25 MG: 1.25 SOLUTION, CONCENTRATE RESPIRATORY (INHALATION) at 12:58

## 2021-07-16 RX ADMIN — LEVOTHYROXINE SODIUM 150 MCG: 150 TABLET ORAL at 09:15

## 2021-07-16 NOTE — PROGRESS NOTES
[facility]  Progress Note - Maxwell Arteaga 1940, 68 y o  male MRN: [id number]  Unit/Bed#: S -01 Encounter: [id number]  Primary Care Provider: Moisés Corral DO   Date and time admitted to hospital: 7/14/2021  1:39 PM    Shortness of breath  Assessment & Plan  Patient with a history of COPD using wife's home O2     Patient had an episode of shortness of breath and diffuse wheezing this morning with respiratory protocol     Smoking Status: yes   Chest x-ray:  XR chest 1 view portable    Result Date: 7/14/2021  Impression: Large right lung mass, slightly increased in size, consistent with malignancy  Persistent small right pleural effusion Workstation performed: [id number]     Plan:  o Continue steroids and albuterol p r n   o O2 as needed     * Hyperkalemia  Assessment & Plan  Lab Results   Component Value Date    K 4 5 07/15/2021    K 5 9 (H) 07/15/2021    K 5 8 (H) 07/14/2021    K 5 5 (H) 07/14/2021    K 5 1 07/14/2021    K 4 8 06/23/2021    K 5 3 06/22/2021    K 5 8 (H) 06/22/2021     Presenting with hyperkalemia in the setting of acute kidney   No EKG changes noted  Calcium gluconate 1 g was given  Plan  · continue to trend until in a safer range (K less than 5 5; calcium less than 11)  Hypercalcemia  Assessment & Plan  Presenting with hypercalcemia, corrected calcium 11 5  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  Last calcium 9 2    Plan:   · Continue IV fluid hydration  · continue to trend until in a safer range (K less than 5 5; calcium less than 11)        COPD (chronic obstructive pulmonary disease) with acute exacerbation [facility])  Assessment & Plan  Potassium   Date Value Ref Range Status   07/15/2021 4 5 3 5 - 5 3 mmol/L Final   07/15/2021 5 9 (H) 3 5 - 5 3 mmol/L Final   07/14/2021 5 8 (H) 3 5 - 5 3 mmol/L Final   07/14/2021 5 5 (H) 3 5 - 5 3 mmol/L Final     · With acute exacerbation  · Patient presenting with worsening shortness of breath, productive cough and significant wheezing on exam  · Initiated IV steroids, respiratory protocol schedule nebs    Requires home O2 on discharge    Paroxysmal A-fib [facility])  Assessment & Plan  Presented with AFib RVR, asymptomatic  His spontaneously converted to sinus rhythm overnight  Remains NSR    Plan:  Rate controlled   Continue metoprolol and Eliquis    SULEMA (acute kidney injury) [facility])  Assessment & Plan  Creatinine   Date Value Ref Range Status   07/15/2021 1 68 (H) 0 60 - 1 30 mg/dL Final     Comment:     Standardized to IDMS reference method   07/14/2021 1 59 (H) 0 60 - 1 30 mg/dL Final     Comment:     Standardized to IDMS reference method   07/14/2021 1 77 (H) 0 60 - 1 30 mg/dL Final     Comment:     Standardized to IDMS reference method   07/14/2021 1 77 (H) 0 60 - 1 30 mg/dL Final     Comment:     Standardized to IDMS reference method     BUN   Date Value Ref Range Status   07/15/2021 32 (H) 5 - 25 mg/dL Final   07/14/2021 31 (H) 5 - 25 mg/dL Final   07/14/2021 33 (H) 5 - 25 mg/dL Final   07/14/2021 35 (H) 5 - 25 mg/dL Final     Presenting with creatinine of 1 77 baseline appears to be less than 1 1  Appears to be improving today 1 23    Plan:  · Continue IV fluid hydration  · Continue to monitor    type 2 diabetes mellitus [facility])  Assessment & Plan  Lab Results   Component Value Date    HGBA1C 6 6 (H) 06/20/2021       Recent Labs     07/15/21  0118 07/15/21  0633 07/15/21  1040 07/15/21  1728   POCGLU 184* 281* 292* 289*       Blood Sugar Average: Last 72 hrs:  (P) 233 2  Sliding Scale insulin  Monitor glucose    Hypothyroidism  Assessment & Plan  Continue home treatment with levothyroxine      Mass of right lung  Assessment & Plan  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    He is scheduled to have IR guided biopsy as outpatient on 7/21           Hypertension  Assessment & Plan  Blood pressure appears stable  Continue home medication        VTE Pharmacologic Prophylaxis: VTE Score: 5 High Risk (Score >/= 5) - Pharmacological DVT Prophylaxis Ordered: apixaban (Eliquis)  Sequential Compression Devices Ordered  Patient Centered Rounds: I performed bedside rounds with nursing staff today  Discussions with Specialists or Other Care Team Provider: cardiology    Education and Discussions with Family / Patient: Patient has to call wife after 12 p m        Current Length of Stay: 2 day(s)  Current Patient Status: Inpatient   Discharge Plan: Anticipate discharge tomorrow to home with home services  Code Status: Level 1 - Full Code    Subjective:   Patient had episode of shortness of breath diffuse wheezing this morning  Objective:     Vitals:   Temp (24hrs), Av 1 °F (36 7 °C), Min:98 °F (36 7 °C), Max:98 2 °F (36 8 °C)    Temp:  [98 °F (36 7 °C)-98 2 °F (36 8 °C)] 98 2 °F (36 8 °C)  HR:  [81-87] 81  Resp:  [18-19] 19  BP: (130-138)/(55-69) 130/65  SpO2:  [95 %-97 %] 95 %  Body mass index is 30 54 kg/m²  Input and Output Summary (last 24 hours): Intake/Output Summary (Last 24 hours) at 2021 1042  Last data filed at 2021 0900  Gross per 24 hour   Intake 1770 ml   Output 2225 ml   Net -455 ml       Physical Exam:   Physical Exam  Vitals and nursing note reviewed  Constitutional:       General: He is not in acute distress  Appearance: Normal appearance  He is normal weight  He is not ill-appearing, toxic-appearing or diaphoretic  Cardiovascular:      Rate and Rhythm: Normal rate and regular rhythm  Pulses: Normal pulses  Heart sounds: Normal heart sounds  No murmur heard  No friction rub  Pulmonary:      Breath sounds: Wheezing (Diffuse expiratory wheeze) present  Abdominal:      Palpations: Abdomen is soft  Tenderness: There is no abdominal tenderness  Musculoskeletal:         General: No deformity  Right lower leg: No edema  Left lower leg: No edema  Skin:     General: Skin is warm        Coloration: Skin is not jaundiced or pale  Neurological:      Mental Status: He is alert     Psychiatric:         Mood and Affect: Mood normal          Behavior: Behavior normal           Additional Data:     Labs:  Results from last 7 days   Lab Units 07/16/21  0537 07/14/21  1413   WBC Thousand/uL 10 81* 6 27   HEMOGLOBIN g/dL 7 9* 9 6*   HEMATOCRIT % 28 2* 33 6*   PLATELETS Thousands/uL 265 277   NEUTROS PCT %  --  81*   LYMPHS PCT %  --  5*   MONOS PCT %  --  11   EOS PCT %  --  1     Results from last 7 days   Lab Units 07/16/21  0937   SODIUM mmol/L 142   POTASSIUM mmol/L 5 1   CHLORIDE mmol/L 107   CO2 mmol/L 25   BUN mg/dL 32*   CREATININE mg/dL 1 23   ANION GAP mmol/L 10   CALCIUM mg/dL 9 2   ALBUMIN g/dL 1 6*   TOTAL BILIRUBIN mg/dL 0 18*   ALK PHOS U/L 147*   ALT U/L 17   AST U/L 8   GLUCOSE RANDOM mg/dL 305*         Results from last 7 days   Lab Units 07/16/21  0635 07/15/21  2111 07/15/21  1728 07/15/21  1040 07/15/21  0633 07/15/21  0118 07/14/21  2123   POC GLUCOSE mg/dl 288* 179* 289* 292* 281* 184* 120               Lines/Drains:  Invasive Devices     Peripheral Intravenous Line            Peripheral IV 06/21/21 Right Wrist 24 days    Peripheral IV 07/14/21 Left Antecubital 1 day    Peripheral IV 07/14/21 Right Antecubital 1 day                      Imaging: Reviewed radiology reports from this admission including: chest xray    Recent Cultures (last 7 days):         Last 24 Hours Medication List:   Current Facility-Administered Medications   Medication Dose Route Frequency Provider Last Rate    acetaminophen  650 mg Oral Q6H PRN Heather Jane MD      albuterol  2 5 mg Nebulization Q6H PRN Heather Jane MD      allopurinol  300 mg Oral Daily Heather Jane MD      apixaban  5 mg Oral BID Heather Jane MD      docusate sodium  100 mg Oral BID Heather Jane MD      fish oil  1,000 mg Oral BID Heather Jane MD      insulin lispro  1-5 Units Subcutaneous TID AC Heather Jane MD      insulin lispro  1-5 Units Subcutaneous HS Shaun Esposito MD      lactated ringers  100 mL/hr Intravenous Continuous Shaun Esposito  mL/hr (07/16/21 0053)    levalbuterol  1 25 mg Nebulization Q6H PRN Shaun Esposito MD      levothyroxine  150 mcg Oral Daily Shaun Esposito MD      methylPREDNISolone sodium succinate  40 mg Intravenous Q12H Marilou Israel MD      metoprolol  5 mg Intravenous Q6H PRN Shaun Esposito, MD      metoprolol tartrate  25 mg Oral Q12H [facility] CHRIS Villaseñor      ondansetron  4 mg Intravenous Q6H PRN Shaun Esposito, MD      pantoprazole  40 mg Oral Daily Shaun Esposito, MD      senna  1 tablet Oral Daily Shaun Esposito, MD      sodium chloride  3 mL Nebulization Q6H PRN Shaun Esposito MD      tiotropium  18 mcg Inhalation Daily Shaun Esposito MD          Today, Patient Was Seen By: Jin Montana DO    **Please Note: This note may have been constructed using a voice recognition system  **

## 2021-07-16 NOTE — CASE MANAGEMENT
Call received from Joseph at [facility] confirming patient is current and inquiring about d/c date  Informed that patient is not going to be discharged today, but is a potential d/c for tomorrow, 7/17       BIPIN Figueroa  7/16/2021  1:06 PM

## 2021-07-16 NOTE — ASSESSMENT & PLAN NOTE
Patient with a history of COPD using wife's home O2     Patient had an episode of shortness of breath and diffuse wheezing this morning with respiratory protocol     Smoking Status: yes   Chest x-ray:  XR chest 1 view portable    Result Date: 7/14/2021  Impression: Large right lung mass, slightly increased in size, consistent with malignancy   Persistent small right pleural effusion Workstation performed: [id number]     Plan:  o Continue steroids and albuterol p r n   o O2 as needed

## 2021-07-16 NOTE — PROGRESS NOTES
Cardiology Progress Note - Foster Cap 68 y o  male MRN: [id number]    Unit/Bed#: S -01 Encounter: [id number]        Subjective:    No significant events overnight  Patient complains of shortness of breath and wheezing  Review of Systems   Constitutional: Positive for malaise/fatigue  Cardiovascular: Positive for dyspnea on exertion  Negative for chest pain  Respiratory: Positive for cough, shortness of breath and wheezing  Objective:   Vitals: Blood pressure 130/65, pulse 81, temperature 98 2 °F (36 8 °C), temperature source Oral, resp  rate 19, height 5' 2" (1 575 m), weight 75 8 kg (167 lb), SpO2 95 %  , Body mass index is 30 54 kg/m² ,   Orthostatic Blood Pressures      Most Recent Value   Blood Pressure  130/65 filed at 07/16/2021 0636   Patient Position - Orthostatic VS  Lying filed at 07/16/2021 5842         Systolic (63SZB), HGJ:946 , Min:130 , LPX:040     Diastolic (88NIB), UOK:32, Min:55, Max:69      Intake/Output Summary (Last 24 hours) at 7/16/2021 1002  Last data filed at 7/16/2021 0900  Gross per 24 hour   Intake 1770 ml   Output 2225 ml   Net -455 ml     Weight (last 2 days)     Date/Time   Weight    07/14/21 1604   75 8 (167)                Telemetry Review: No significant arrhythmias seen on telemetry review  NSR    Physical Exam  Vitals reviewed  Constitutional:       Appearance: Normal appearance  HENT:      Head: Normocephalic  Nose: Nose normal       Mouth/Throat:      Mouth: Mucous membranes are moist       Pharynx: Oropharynx is clear  Eyes:      General: No scleral icterus  Conjunctiva/sclera: Conjunctivae normal    Cardiovascular:      Rate and Rhythm: Normal rate and regular rhythm  Heart sounds: No murmur heard  No friction rub  No gallop  Pulmonary:      Effort: Pulmonary effort is normal  No respiratory distress  Breath sounds: Wheezing present  No rales  Abdominal:      General: Abdomen is flat   Bowel sounds are normal  There is no distension  Palpations: Abdomen is soft  Tenderness: There is no abdominal tenderness  There is no guarding  Musculoskeletal:      Cervical back: Normal range of motion and neck supple  Right lower leg: No edema  Left lower leg: No edema  Skin:     General: Skin is warm and dry  Neurological:      General: No focal deficit present  Mental Status: He is alert and oriented to person, place, and time  Psychiatric:         Mood and Affect: Mood normal          Behavior: Behavior normal            Laboratory Results:        CBC with diff:   Results from last 7 days   Lab Units 07/16/21  0537 07/15/21  0439 07/14/21  1413 07/14/21  1039   WBC Thousand/uL 10 81* 5 20 6 27 5 68   HEMOGLOBIN g/dL 7 9* 8 7* 9 6* 10 0*   HEMATOCRIT % 28 2* 32 3* 33 6* 35 2*   MCV fL 83 84 83 82   PLATELETS Thousands/uL 265 256 277 282   MCH pg 23 2* 22 6* 23 6* 23 3*   MCHC g/dL 28 0* 26 9* 28 6* 28 4*   RDW % 17 6* 18 0* 18 0* 18 3*   MPV fL 9 7 9 7 9 4 9 5   NRBC AUTO /100 WBCs  --   --  0 0         CMP:  Results from last 7 days   Lab Units 07/15/21  1137 07/15/21  0438 07/14/21  2323 07/14/21  1413 07/14/21  1039   POTASSIUM mmol/L 4 5 5 9* 5 8* 5 5* 5 1   CHLORIDE mmol/L  --  107 109* 108 104   CO2 mmol/L  --  25 27 28 25   BUN mg/dL  --  32* 31* 33* 35*   CREATININE mg/dL  --  1 68* 1 59* 1 77* 1 77*   CALCIUM mg/dL  --  9 4 9 3 9 7 9 9   AST U/L  --  16  --  13 12   ALT U/L  --  19  --  22 23   ALK PHOS U/L  --  174*  --  171* 169*   EGFR ml/min/1 73sq m  --  38 41 36 36         BMP:  Results from last 7 days   Lab Units 07/15/21  1137 07/15/21  0438 07/14/21 2323 07/14/21 1413 07/14/21  1039   POTASSIUM mmol/L 4 5 5 9* 5 8* 5 5* 5 1   CHLORIDE mmol/L  --  107 109* 108 104   CO2 mmol/L  --  25 27 28 25   BUN mg/dL  --  32* 31* 33* 35*   CREATININE mg/dL  --  1 68* 1 59* 1 77* 1 77*   CALCIUM mg/dL  --  9 4 9 3 9 7 9 9       BNP: No results for input(s): BNP in the last 72 hours      Magnesium:   Results from last 7 days   Lab Units 21  2323 21  1413   MAGNESIUM mg/dL 1 3* 1 5*       Coags:       TSH: No results found for: TSH    Hemoglobin A1C       Lipid Profile:       Cardiac testing:   Results for orders placed during the hospital encounter of 21    Echo complete with contrast if indicated    Narrative  [address] [facility]  [phone]    Transthoracic Echocardiogram  2D, M-mode, Doppler, and Color Doppler    Study date:  2021    Patient: Juno Tamez  MR number: [id number]  Account number: [de-identified]  : 1940  Age: 68 years  Gender: Male  Status: Inpatient  Location: Bedside  Height: 62 in  Weight: 173 6 lb  BP: 138/ 65 mmHg    Indications: A-fib  Diagnoses: I48 0 - Atrial fibrillation    Sonographer:  ROSALINA Guzman  Primary Physician:  Laci Sethi DO  Referring Physician:  IFTIKHAR FigueroaC  Group:  [facility]  Interpreting Physician:  Nicolasa Gibson MD    SUMMARY    LEFT VENTRICLE:  Systolic function was normal  Ejection fraction was estimated to be 60 %  There were no regional wall motion abnormalities  MITRAL VALVE:  There was mild regurgitation  TRICUSPID VALVE:  There was mild regurgitation  Estimated peak PA pressure was 42 mmHg  The findings suggest mild pulmonary hypertension  HISTORY: PRIOR HISTORY: PAF, HTN, SULEMA, SOB, lung mass, DM2, thrombocytopenia, current smoker  PROCEDURE: The procedure was performed at the bedside  This was a routine study  The transthoracic approach was used  The study included complete 2D imaging, M-mode, complete spectral Doppler, and color Doppler  The heart rate was 77 bpm,  at the start of the study  Images were obtained from the parasternal, apical, subcostal, and suprasternal notch acoustic windows  Image quality was adequate      LEFT VENTRICLE: Size was normal  Systolic function was normal  Ejection fraction was estimated to be 60 %  There were no regional wall motion abnormalities  Wall thickness was normal  DOPPLER: Left ventricular diastolic function parameters  were normal for the patient's age  RIGHT VENTRICLE: The size was normal  Systolic function was normal  Wall thickness was normal     LEFT ATRIUM: Size was normal     RIGHT ATRIUM: Size was normal     MITRAL VALVE: Valve structure was normal  There was normal leaflet separation  DOPPLER: The transmitral velocity was within the normal range  There was no evidence for stenosis  There was mild regurgitation  AORTIC VALVE: The valve was trileaflet  Leaflets exhibited normal thickness and normal cuspal separation  DOPPLER: Transaortic velocity was within the normal range  There was no evidence for stenosis  There was no significant  regurgitation  TRICUSPID VALVE: The valve structure was normal  There was normal leaflet separation  DOPPLER: The transtricuspid velocity was within the normal range  There was no evidence for stenosis  There was mild regurgitation  Estimated peak PA  pressure was 42 mmHg  The findings suggest mild pulmonary hypertension  PULMONIC VALVE: Leaflets exhibited normal thickness, no calcification, and normal cuspal separation  DOPPLER: The transpulmonic velocity was within the normal range  There was no significant regurgitation  PERICARDIUM: There was no pericardial effusion  The pericardium was normal in appearance  AORTA: The root exhibited normal size  SYSTEMIC VEINS: IVC: The inferior vena cava was normal in size   Respirophasic changes were normal     SYSTEM MEASUREMENT TABLES    2D  %FS: 30 02 %  Ao Diam: 3 54 cm  EDV(Teich): 123 19 ml  EF(Teich): 56 95 %  ESV(Teich): 53 03 ml  IVSd: 0 92 cm  LA Diam: 4 57 cm  LAAs A2C: 23 05 cm2  LAAs A4C: 22 52 cm2  LAESV A-L A2C: 77 35 ml  LAESV A-L A4C: 69 99 ml  LAESV Index (A-L): 41 99 ml/m2  LAESV MOD A2C: 73 99 ml  LAESV MOD A4C: 64 59 ml  LAESV(A-L): 75 58 ml  LAESV(MOD BP): 70 92 ml  LALs A2C: 5 83 cm  LALs A4C: 6 15 cm  LVIDd: 5 09 cm  LVIDs: 3 56 cm  LVPWd: 0 85 cm  RVIDd: 3 65 cm  SV(Teich): 70 16 ml    CW  AV Env  Ti: 281 64 ms  AV MaxP 92 mmHg  AV VTI: 22 53 cm  AV Vmax: 1 49 m/s  AV Vmean: 0 8 m/s  AV meanPG: 3 13 mmHg  TR MaxP 17 mmHg  TR Vmax: 3 13 m/s    MM  TAPSE: 3 01 cm    PW  E' Sept: 0 09 m/s  E/E' Sept: 11 25  LVOT Env  Ti: 342 53 ms  LVOT VTI: 21 45 cm  LVOT Vmax: 1 01 m/s  LVOT Vmean: 0 63 m/s  LVOT maxP 07 mmHg  LVOT meanP 9 mmHg  MV A Ge: 1 12 m/s  MV Dec [place]: 6 18 m/s2  MV DecT: 167 82 ms  MV E Eg: 1 04 m/s  MV E/A Ratio: 0 93  MV PHT: 48 67 ms  MVA By PHT: 4 52 cm2    [facility]    Prepared and electronically signed by    Elio Lake MD  Signed 2021 12:57:51    No results found for this or any previous visit  No results found for this or any previous visit  No results found for this or any previous visit        Meds/Allergies   current meds:   Current Facility-Administered Medications   Medication Dose Route Frequency    acetaminophen (TYLENOL) tablet 650 mg  650 mg Oral Q6H PRN    albuterol inhalation solution 2 5 mg  2 5 mg Nebulization Q6H PRN    allopurinol (ZYLOPRIM) tablet 300 mg  300 mg Oral Daily    apixaban (ELIQUIS) tablet 5 mg  5 mg Oral BID    docusate sodium (COLACE) capsule 100 mg  100 mg Oral BID    fish oil capsule 1,000 mg  1,000 mg Oral BID    insulin lispro (HumaLOG) 100 units/mL subcutaneous injection 1-5 Units  1-5 Units Subcutaneous TID AC    insulin lispro (HumaLOG) 100 units/mL subcutaneous injection 1-5 Units  1-5 Units Subcutaneous HS    lactated ringers infusion  100 mL/hr Intravenous Continuous    levalbuterol (XOPENEX) inhalation solution 1 25 mg  1 25 mg Nebulization Q6H PRN    levothyroxine tablet 150 mcg  150 mcg Oral Daily    methylPREDNISolone sodium succinate (Solu-MEDROL) injection 40 mg  40 mg Intravenous Q12H [address]    metoprolol (LOPRESSOR) injection 5 mg  5 mg Intravenous Q6H PRN    metoprolol tartrate (LOPRESSOR) tablet 25 mg  25 mg Oral Q12H [address]    ondansetron (ZOFRAN) injection 4 mg  4 mg Intravenous Q6H PRN    pantoprazole (PROTONIX) EC tablet 40 mg  40 mg Oral Daily    senna (SENOKOT) tablet 8 6 mg  1 tablet Oral Daily    sodium chloride 0 9 % inhalation solution 3 mL  3 mL Nebulization Q6H PRN    tiotropium (SPIRIVA) capsule for inhaler 18 mcg  18 mcg Inhalation Daily     Medications Prior to Admission   Medication    acetaminophen (TYLENOL) 100 mg/mL solution    albuterol (2 5 mg/3 mL) 0 083 % nebulizer solution    allopurinol (ZYLOPRIM) 300 mg tablet    amLODIPine (NORVASC) 2 5 mg tablet    apixaban (ELIQUIS) 5 mg    benzonatate (TESSALON PERLES) 100 mg capsule    guaiFENesin (MUCINEX) 600 mg 12 hr tablet    levothyroxine 150 mcg tablet    metFORMIN (GLUCOPHAGE) 500 mg tablet    metoprolol tartrate (LOPRESSOR) 25 mg tablet    Omega-3 Fatty Acids (FISH OIL) 1,000 mg    pantoprazole (PROTONIX) 40 mg tablet    tiotropium (SPIRIVA) 18 mcg inhalation capsule       lactated ringers, 100 mL/hr, Last Rate: 100 mL/hr (07/16/21 0053)      Assessment:  Principal Problem:    Hyperkalemia  Active Problems:    Paroxysmal A-fib (HCC)    Hypertension    Mass of right lung    Hypothyroidism    SULEMA (acute kidney injury) ([facility] [address] )    type 2 diabetes mellitus (HCC)    COPD (chronic obstructive pulmonary disease) with acute exacerbation (HCC)    Hypercalcemia    Plan:    PAF: He remains in normal sinus rhythm  Continue with metoprolol and Eliquis  He just received a neb treatment for wheezing  We will sign off  Call with questions  Counseling / Coordination of Care  Total floor / unit time spent today 25 minutes  Greater than 50% of total time was spent with the patient and / or family counseling and / or coordination of care  A description of the counseling / coordination of care

## 2021-07-16 NOTE — ASSESSMENT & PLAN NOTE
Lab Results   Component Value Date    HGBA1C 6 6 (H) 06/20/2021       Recent Labs     07/15/21  0118 07/15/21  0633 07/15/21  1040 07/15/21  1728   POCGLU 184* 281* 292* 289*       Blood Sugar Average: Last 72 hrs:  (P) 233 2  Sliding Scale insulin  Monitor glucose

## 2021-07-17 ENCOUNTER — APPOINTMENT (INPATIENT)
Dept: RADIOLOGY | Facility: HOSPITAL | Age: 81
DRG: 682 | End: 2021-07-17
Payer: COMMERCIAL

## 2021-07-17 LAB
ALBUMIN SERPL BCP-MCNC: 1.7 G/DL (ref 3.5–5)
ALP SERPL-CCNC: 132 U/L (ref 46–116)
ALT SERPL W P-5'-P-CCNC: 22 U/L (ref 12–78)
ANION GAP SERPL CALCULATED.3IONS-SCNC: 5 MMOL/L (ref 4–13)
AST SERPL W P-5'-P-CCNC: 14 U/L (ref 5–45)
BILIRUB SERPL-MCNC: 0.21 MG/DL (ref 0.2–1)
BUN SERPL-MCNC: 32 MG/DL (ref 5–25)
CALCIUM ALBUM COR SERPL-MCNC: 11.2 MG/DL (ref 8.3–10.1)
CALCIUM SERPL-MCNC: 9.4 MG/DL (ref 8.3–10.1)
CHLORIDE SERPL-SCNC: 105 MMOL/L (ref 100–108)
CO2 SERPL-SCNC: 29 MMOL/L (ref 21–32)
CREAT SERPL-MCNC: 1.14 MG/DL (ref 0.6–1.3)
GFR SERPL CREATININE-BSD FRML MDRD: 61 ML/MIN/1.73SQ M
GLUCOSE SERPL-MCNC: 199 MG/DL (ref 65–140)
GLUCOSE SERPL-MCNC: 203 MG/DL (ref 65–140)
GLUCOSE SERPL-MCNC: 234 MG/DL (ref 65–140)
GLUCOSE SERPL-MCNC: 263 MG/DL (ref 65–140)
GLUCOSE SERPL-MCNC: 275 MG/DL (ref 65–140)
POTASSIUM SERPL-SCNC: 4.9 MMOL/L (ref 3.5–5.3)
PROT SERPL-MCNC: 6.7 G/DL (ref 6.4–8.2)
SODIUM SERPL-SCNC: 139 MMOL/L (ref 136–145)
T4 FREE SERPL-MCNC: 1.19 NG/DL (ref 0.76–1.46)
TSH SERPL DL<=0.05 MIU/L-ACNC: 0.1 UIU/ML (ref 0.36–3.74)

## 2021-07-17 PROCEDURE — 80053 COMPREHEN METABOLIC PANEL: CPT | Performed by: INTERNAL MEDICINE

## 2021-07-17 PROCEDURE — 71045 X-RAY EXAM CHEST 1 VIEW: CPT

## 2021-07-17 PROCEDURE — 84439 ASSAY OF FREE THYROXINE: CPT

## 2021-07-17 PROCEDURE — 82948 REAGENT STRIP/BLOOD GLUCOSE: CPT

## 2021-07-17 PROCEDURE — 84443 ASSAY THYROID STIM HORMONE: CPT

## 2021-07-17 PROCEDURE — 99232 SBSQ HOSP IP/OBS MODERATE 35: CPT | Performed by: HOSPITALIST

## 2021-07-17 RX ORDER — BENZONATATE 100 MG/1
100 CAPSULE ORAL 3 TIMES DAILY
Status: DISCONTINUED | OUTPATIENT
Start: 2021-07-17 | End: 2021-08-05 | Stop reason: HOSPADM

## 2021-07-17 RX ORDER — GUAIFENESIN/DEXTROMETHORPHAN 100-10MG/5
10 SYRUP ORAL EVERY 4 HOURS PRN
Status: DISCONTINUED | OUTPATIENT
Start: 2021-07-17 | End: 2021-08-05 | Stop reason: HOSPADM

## 2021-07-17 RX ADMIN — BENZONATATE 100 MG: 100 CAPSULE ORAL at 21:55

## 2021-07-17 RX ADMIN — ACETAMINOPHEN 650 MG: 325 TABLET, FILM COATED ORAL at 08:33

## 2021-07-17 RX ADMIN — BENZONATATE 100 MG: 100 CAPSULE ORAL at 17:14

## 2021-07-17 RX ADMIN — LEVOTHYROXINE SODIUM 150 MCG: 150 TABLET ORAL at 08:33

## 2021-07-17 RX ADMIN — SODIUM CHLORIDE, SODIUM LACTATE, POTASSIUM CHLORIDE, AND CALCIUM CHLORIDE 100 ML/HR: .6; .31; .03; .02 INJECTION, SOLUTION INTRAVENOUS at 22:53

## 2021-07-17 RX ADMIN — METHYLPREDNISOLONE SODIUM SUCCINATE 40 MG: 40 INJECTION, POWDER, FOR SOLUTION INTRAMUSCULAR; INTRAVENOUS at 08:33

## 2021-07-17 RX ADMIN — INSULIN LISPRO 3 UNITS: 100 INJECTION, SOLUTION INTRAVENOUS; SUBCUTANEOUS at 17:16

## 2021-07-17 RX ADMIN — INSULIN LISPRO 2 UNITS: 100 INJECTION, SOLUTION INTRAVENOUS; SUBCUTANEOUS at 08:35

## 2021-07-17 RX ADMIN — SODIUM CHLORIDE, SODIUM LACTATE, POTASSIUM CHLORIDE, AND CALCIUM CHLORIDE 100 ML/HR: .6; .31; .03; .02 INJECTION, SOLUTION INTRAVENOUS at 08:39

## 2021-07-17 RX ADMIN — INSULIN LISPRO 2 UNITS: 100 INJECTION, SOLUTION INTRAVENOUS; SUBCUTANEOUS at 21:56

## 2021-07-17 RX ADMIN — BENZONATATE 100 MG: 100 CAPSULE ORAL at 12:08

## 2021-07-17 RX ADMIN — METOPROLOL TARTRATE 25 MG: 25 TABLET, FILM COATED ORAL at 21:56

## 2021-07-17 RX ADMIN — OMEGA-3 FATTY ACIDS CAP 1000 MG 1000 MG: 1000 CAP at 17:14

## 2021-07-17 RX ADMIN — METHYLPREDNISOLONE SODIUM SUCCINATE 40 MG: 40 INJECTION, POWDER, FOR SOLUTION INTRAMUSCULAR; INTRAVENOUS at 21:56

## 2021-07-17 RX ADMIN — METOPROLOL TARTRATE 5 MG: 1 INJECTION, SOLUTION INTRAVENOUS at 02:14

## 2021-07-17 RX ADMIN — APIXABAN 5 MG: 5 TABLET, FILM COATED ORAL at 17:14

## 2021-07-17 RX ADMIN — ALLOPURINOL 300 MG: 300 TABLET ORAL at 08:32

## 2021-07-17 RX ADMIN — TIOTROPIUM BROMIDE 18 MCG: 18 CAPSULE ORAL; RESPIRATORY (INHALATION) at 08:35

## 2021-07-17 RX ADMIN — OMEGA-3 FATTY ACIDS CAP 1000 MG 1000 MG: 1000 CAP at 08:32

## 2021-07-17 RX ADMIN — INSULIN LISPRO 3 UNITS: 100 INJECTION, SOLUTION INTRAVENOUS; SUBCUTANEOUS at 12:09

## 2021-07-17 RX ADMIN — METOPROLOL TARTRATE 25 MG: 25 TABLET, FILM COATED ORAL at 08:32

## 2021-07-17 RX ADMIN — PANTOPRAZOLE SODIUM 40 MG: 40 TABLET, DELAYED RELEASE ORAL at 08:33

## 2021-07-17 RX ADMIN — APIXABAN 5 MG: 5 TABLET, FILM COATED ORAL at 08:32

## 2021-07-17 NOTE — PROGRESS NOTES
[facility]  Progress Note - Maxwell Arteaga 1940, 68 y o  male MRN: [id number]  Unit/Bed#: S -01 Encounter: [id number]  Primary Care Provider: Moisés Corral DO   Date and time admitted to hospital: 7/14/2021  1:39 PM    COPD (chronic obstructive pulmonary disease) with acute exacerbation ([facility] [address] )  Assessment & Plan    · With acute exacerbation  · Patient presenting with worsening shortness of breath, productive cough and significant wheezing on exam  · Continue IV steroids and respiratory protocol  Requires home O2 on discharge    SULEMA (acute kidney injury) [facility])  Assessment & Plan  Creatinine   Date Value Ref Range Status   07/17/2021 1 14 0 60 - 1 30 mg/dL Final     Comment:     Standardized to IDMS reference method   07/16/2021 1 23 0 60 - 1 30 mg/dL Final     Comment:     Standardized to IDMS reference method   07/15/2021 1 68 (H) 0 60 - 1 30 mg/dL Final     Comment:     Standardized to IDMS reference method   07/14/2021 1 59 (H) 0 60 - 1 30 mg/dL Final     Comment:     Standardized to IDMS reference method     BUN   Date Value Ref Range Status   07/17/2021 32 (H) 5 - 25 mg/dL Final   07/16/2021 32 (H) 5 - 25 mg/dL Final   07/15/2021 32 (H) 5 - 25 mg/dL Final   07/14/2021 31 (H) 5 - 25 mg/dL Final     Presenting with creatinine of 1 77 baseline appears to be less than 1 1  Resolving today Cr 1 14    Plan:  · Continue IV fluid hydration  · Continue to monitor kidney function  · Avoid nephrotoxic agents  Paroxysmal A-fib [facility])  Assessment & Plan  Presented with AFib RVR, asymptomatic  Went into RVR overnight, was given prn IV lopressor x 2       Plan:  · Rate controlled now   · Continue routine metoprolol 25mg Q12H and Eliquis    * Hyperkalemia  Assessment & Plan  Lab Results   Component Value Date    K 4 9 07/17/2021    K 5 1 07/16/2021    K 4 5 07/15/2021    K 5 9 (H) 07/15/2021    K 5 8 (H) 07/14/2021    K 5 5 (H) 07/14/2021    K 5 1 07/14/2021    K 4 8 06/23/2021 Presenting with hyperkalemia in the setting of acute kidney   No EKG changes noted  Calcium gluconate 1 g was given  Plan  · continue to trend until in a safer range (K less than 5 5; calcium less than 11)  Hypercalcemia  Assessment & Plan  Presenting with hypercalcemia, corrected calcium 11 5  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  Corrected calcium 11 2 today  Plan:   · Continue IV fluid hydration  · continue to trend until in a safer range (K less than 5 5; calcium less than 11)  type 2 diabetes mellitus [facility])  Assessment & Plan  Lab Results   Component Value Date    HGBA1C 6 6 (H) 06/20/2021       Recent Labs     07/16/21  1043 07/16/21  1559 07/16/21 2020 07/17/21  0717   POCGLU 310* 291* 277* 199*       Blood Sugar Average: Last 72 hrs:  (P) 621 [id number]  Sliding Scale insulin  Monitor glucose    Mass of right lung  Assessment & Plan  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  He is scheduled to have IR guided biopsy as outpatient on 7/21           Shortness of breath  Assessment & Plan  Patient with a history of COPD using wife's home O2   History of Smoking   Chest x-ray:  XR chest 1 view portable  Result Date: 7/14/2021  Impression: Large right lung mass, slightly increased in size, consistent with malignancy  Persistent small right pleural effusion  Plan:  o Continue steroids and albuterol p r n   o O2 as needed   o Recheck chest x-ray - worsening respiratory status  Hypothyroidism  Assessment & Plan  TSH low, pending free T4  Patient is on levothyroxine 150 mcg daily  Hypertension  Assessment & Plan  Blood pressure appears stable  Continue home medication        VTE Pharmacologic Prophylaxis:   VTE Score: 5 High Risk (Score >/= 5) - Pharmacological DVT Prophylaxis Ordered: Apixaban (Eliquis)  Sequential Compression Devices Ordered      Mechanical VTE Prophylaxis in Place: Yes    Patient Centered Rounds: I have performed bedside rounds with nursing staff today  Discussions with Specialists or Other Care Team Provider:  Nursing and case management  Education and Discussions with Family / Patient: Attempted to update  (wife) via phone  Unable to contact  Current Length of Stay: 3 day(s)    Current Patient Status: Inpatient     Discharge Plan / Estimated Discharge Date: Anticipate discharge in > 72 hrs to home with home services  Code Status: Level 1 - Full Code      Subjective:   Overnight patient in rapid ventricular rate requiring p r n  Beta blocker  Patient rate controlled this morning  Objective:     Vitals:   Temp (24hrs), Av 3 °F (36 8 °C), Min:98 1 °F (36 7 °C), Max:98 7 °F (37 1 °C)    Temp:  [98 1 °F (36 7 °C)-98 7 °F (37 1 °C)] 98 1 °F (36 7 °C)  HR:  [] 87  Resp:  [18-20] 20  BP: (125-179)/(67-98) 151/71  SpO2:  [94 %-98 %] 98 %  Body mass index is 30 54 kg/m²  Input and Output Summary (last 24 hours): Intake/Output Summary (Last 24 hours) at 2021 1247  Last data filed at 2021 0901  Gross per 24 hour   Intake 530 ml   Output 2495 ml   Net -1965 ml       Physical Exam:     Physical Exam  Vitals and nursing note reviewed  Constitutional:       Appearance: He is well-developed  HENT:      Head: Normocephalic and atraumatic  Mouth/Throat:      Mouth: Mucous membranes are moist    Eyes:      Conjunctiva/sclera: Conjunctivae normal    Cardiovascular:      Rate and Rhythm: Normal rate and regular rhythm  Heart sounds: No murmur heard  Pulmonary:      Breath sounds: Wheezing present  Comments: Moist cough with clear sputum production  Abdominal:      Palpations: Abdomen is soft  Tenderness: There is no abdominal tenderness  Musculoskeletal:      Cervical back: Neck supple  Skin:     General: Skin is warm and dry  Neurological:      General: No focal deficit present        Mental Status: He is alert and oriented to person, place, and time     Psychiatric:         Mood and Affect: Mood normal          Behavior: Behavior normal          Additional Data:     Labs:  Results from last 7 days   Lab Units 07/16/21  0537 07/14/21  1413   WBC Thousand/uL 10 81* 6 27   HEMOGLOBIN g/dL 7 9* 9 6*   HEMATOCRIT % 28 2* 33 6*   PLATELETS Thousands/uL 265 277   NEUTROS PCT %  --  81*   LYMPHS PCT %  --  5*   MONOS PCT %  --  11   EOS PCT %  --  1     Results from last 7 days   Lab Units 07/17/21  0449   SODIUM mmol/L 139   POTASSIUM mmol/L 4 9   CHLORIDE mmol/L 105   CO2 mmol/L 29   BUN mg/dL 32*   CREATININE mg/dL 1 14   ANION GAP mmol/L 5   CALCIUM mg/dL 9 4   ALBUMIN g/dL 1 7*   TOTAL BILIRUBIN mg/dL 0 21   ALK PHOS U/L 132*   ALT U/L 22   AST U/L 14   GLUCOSE RANDOM mg/dL 275*         Results from last 7 days   Lab Units 07/17/21  1109 07/17/21  0717 07/16/21  2020 07/16/21  1559 07/16/21  1043 07/16/21  0635 07/15/21  2111 07/15/21  1728 07/15/21  1040 07/15/21  0633 07/15/21  0118 07/14/21  2123   POC GLUCOSE mg/dl 263* 199* 277* 291* 310* 288* 179* 289* 292* 281* 184* 120               Imaging: Reviewed radiology reports from this admission including: chest xray    Recent Cultures (last 7 days):           Lines/Drains:  Invasive Devices     Peripheral Intravenous Line            Peripheral IV 06/21/21 Right Wrist 26 days    Peripheral IV 07/14/21 Left Antecubital 2 days    Peripheral IV 07/14/21 Right Antecubital 2 days                Telemetry:   Telemetry Orders (From admission, onward)             48 Hour Telemetry Monitoring  Continuous x 48 hours     Question:  Reason for 48 Hour Telemetry  Answer:  Arrhythmias Requiring Medical Therapy (eg  SVT, Vtach/fib, Bradycardia, Uncontrolled A-fib)                    Last 24 Hours Medication List:   Current Facility-Administered Medications   Medication Dose Route Frequency Provider Last Rate    acetaminophen  650 mg Oral Q6H PRN Tenisha Jason MD      albuterol  2 5 mg Nebulization Q6H PRN Anupam Sapp MD      allopurinol  300 mg Oral Daily Anupam Sapp MD      apixaban  5 mg Oral BID Anupam Sapp MD      benzonatate  100 mg Oral TID Jen Church MD      dextromethorphan-guaiFENesin  10 mL Oral Q4H PRN Jen Church MD      docusate sodium  100 mg Oral BID Anupam Sapp MD      fish oil  1,000 mg Oral BID Anupam Sapp MD      insulin lispro  1-6 Units Subcutaneous 4x Daily (AC & HS) DONALD DELCID DO      lactated ringers  100 mL/hr Intravenous Continuous Anupam Sapp  mL/hr (07/17/21 0839)    levalbuterol  1 25 mg Nebulization Q6H PRN Anupam Sapp MD      levothyroxine  150 mcg Oral Daily Anupam Sapp MD      methylPREDNISolone sodium succinate  40 mg Intravenous Q12H Lucinda Alvares MD      metoprolol  5 mg Intravenous Q6H PRN Anupam Sapp MD      metoprolol tartrate  25 mg Oral Q12H [facility] CHRIS Villaseñor      ondansetron  4 mg Intravenous Q6H PRN Anupam Sapp MD      pantoprazole  40 mg Oral Daily Anupam Sapp MD      senna  1 tablet Oral Daily Anupam Sapp MD      sodium chloride  3 mL Nebulization Q6H PRN Anupam Sapp MD      tiotropium  18 mcg Inhalation Daily Anupam Sapp MD          Today, Patient Was Seen By: Darek Linder DO    ** Please Note: This note has been constructed using a voice recognition system   **

## 2021-07-17 NOTE — ASSESSMENT & PLAN NOTE
Creatinine   Date Value Ref Range Status   07/17/2021 1 14 0 60 - 1 30 mg/dL Final     Comment:     Standardized to IDMS reference method   07/16/2021 1 23 0 60 - 1 30 mg/dL Final     Comment:     Standardized to IDMS reference method   07/15/2021 1 68 (H) 0 60 - 1 30 mg/dL Final     Comment:     Standardized to IDMS reference method   07/14/2021 1 59 (H) 0 60 - 1 30 mg/dL Final     Comment:     Standardized to IDMS reference method     BUN   Date Value Ref Range Status   07/17/2021 32 (H) 5 - 25 mg/dL Final   07/16/2021 32 (H) 5 - 25 mg/dL Final   07/15/2021 32 (H) 5 - 25 mg/dL Final   07/14/2021 31 (H) 5 - 25 mg/dL Final     Presenting with creatinine of 1 77 baseline appears to be less than 1 1  Resolving today Cr 1 14    Plan:  · Continue IV fluid hydration  · Continue to monitor kidney function  · Avoid nephrotoxic agents

## 2021-07-17 NOTE — ASSESSMENT & PLAN NOTE
Lab Results   Component Value Date    K 4 9 07/17/2021    K 5 1 07/16/2021    K 4 5 07/15/2021    K 5 9 (H) 07/15/2021    K 5 8 (H) 07/14/2021    K 5 5 (H) 07/14/2021    K 5 1 07/14/2021    K 4 8 06/23/2021     Presenting with hyperkalemia in the setting of acute kidney   No EKG changes noted  Calcium gluconate 1 g was given  Plan  · continue to trend until in a safer range (K less than 5 5; calcium less than 11)

## 2021-07-17 NOTE — ASSESSMENT & PLAN NOTE
Patient with a history of COPD using wife's home O2   History of Smoking   Chest x-ray:  XR chest 1 view portable  Result Date: 7/14/2021  Impression: Large right lung mass, slightly increased in size, consistent with malignancy  Persistent small right pleural effusion  Plan:  o Continue steroids and albuterol p r n   o O2 as needed   o Recheck chest x-ray - worsening respiratory status

## 2021-07-17 NOTE — ASSESSMENT & PLAN NOTE
Presenting with hypercalcemia, corrected calcium 11 5  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  Corrected calcium 11 2 today  Plan:   · Continue IV fluid hydration  · continue to trend until in a safer range (K less than 5 5; calcium less than 11)

## 2021-07-17 NOTE — ASSESSMENT & PLAN NOTE
· With acute exacerbation  · Patient presenting with worsening shortness of breath, productive cough and significant wheezing on exam  · Continue IV steroids and respiratory protocol      Requires home O2 on discharge

## 2021-07-17 NOTE — ASSESSMENT & PLAN NOTE
Presented with AFib RVR, asymptomatic  Went into RVR overnight, was given prn IV lopressor x 2       Plan:  · Rate controlled now   · Continue routine metoprolol 25mg Q12H and Eliquis

## 2021-07-17 NOTE — ASSESSMENT & PLAN NOTE
Lab Results   Component Value Date    HGBA1C 6 6 (H) 06/20/2021       Recent Labs     07/16/21  1043 07/16/21  1559 07/16/21 2020 07/17/21  0717   POCGLU 310* 291* 277* 199*       Blood Sugar Average: Last 72 hrs:  (P) 785 [id number]  Sliding Scale insulin  Monitor glucose

## 2021-07-18 LAB
ANION GAP SERPL CALCULATED.3IONS-SCNC: 6 MMOL/L (ref 4–13)
ANISOCYTOSIS BLD QL SMEAR: PRESENT
ATRIAL RATE: 129 BPM
ATRIAL RATE: 136 BPM
BASOPHILS # BLD MANUAL: 0 THOUSAND/UL (ref 0–0.1)
BASOPHILS NFR MAR MANUAL: 0 % (ref 0–1)
BUN SERPL-MCNC: 32 MG/DL (ref 5–25)
CALCIUM SERPL-MCNC: 9.6 MG/DL (ref 8.3–10.1)
CHLORIDE SERPL-SCNC: 103 MMOL/L (ref 100–108)
CO2 SERPL-SCNC: 31 MMOL/L (ref 21–32)
CREAT SERPL-MCNC: 1.03 MG/DL (ref 0.6–1.3)
EOSINOPHIL # BLD MANUAL: 0 THOUSAND/UL (ref 0–0.4)
EOSINOPHIL NFR BLD MANUAL: 0 % (ref 0–6)
ERYTHROCYTE [DISTWIDTH] IN BLOOD BY AUTOMATED COUNT: 18 % (ref 11.6–15.1)
GFR SERPL CREATININE-BSD FRML MDRD: 69 ML/MIN/1.73SQ M
GLUCOSE SERPL-MCNC: 224 MG/DL (ref 65–140)
GLUCOSE SERPL-MCNC: 226 MG/DL (ref 65–140)
GLUCOSE SERPL-MCNC: 258 MG/DL (ref 65–140)
GLUCOSE SERPL-MCNC: 303 MG/DL (ref 65–140)
GLUCOSE SERPL-MCNC: 343 MG/DL (ref 65–140)
HCT VFR BLD AUTO: 31.8 % (ref 36.5–49.3)
HGB BLD-MCNC: 9 G/DL (ref 12–17)
LYMPHOCYTES # BLD AUTO: 0.12 THOUSAND/UL (ref 0.6–4.47)
LYMPHOCYTES # BLD AUTO: 1 % (ref 14–44)
MCH RBC QN AUTO: 23.8 PG (ref 26.8–34.3)
MCHC RBC AUTO-ENTMCNC: 28.3 G/DL (ref 31.4–37.4)
MCV RBC AUTO: 84 FL (ref 82–98)
METAMYELOCYTES NFR BLD MANUAL: 4 % (ref 0–1)
MONOCYTES # BLD AUTO: 0.82 THOUSAND/UL (ref 0–1.22)
MONOCYTES NFR BLD: 7 % (ref 4–12)
MYELOCYTES NFR BLD MANUAL: 1 % (ref 0–1)
NEUTROPHILS # BLD MANUAL: 10.18 THOUSAND/UL (ref 1.85–7.62)
NEUTS BAND NFR BLD MANUAL: 3 % (ref 0–8)
NEUTS SEG NFR BLD AUTO: 84 % (ref 43–75)
NRBC BLD AUTO-RTO: 0 /100 WBCS
PLATELET # BLD AUTO: 333 THOUSANDS/UL (ref 149–390)
PLATELET BLD QL SMEAR: ADEQUATE
PMV BLD AUTO: 10.4 FL (ref 8.9–12.7)
POTASSIUM SERPL-SCNC: 4.6 MMOL/L (ref 3.5–5.3)
QRS AXIS: 56 DEGREES
QRS AXIS: 57 DEGREES
QRSD INTERVAL: 68 MS
QRSD INTERVAL: 68 MS
QT INTERVAL: 240 MS
QT INTERVAL: 258 MS
QTC INTERVAL: 358 MS
QTC INTERVAL: 383 MS
RBC # BLD AUTO: 3.78 MILLION/UL (ref 3.88–5.62)
SODIUM SERPL-SCNC: 140 MMOL/L (ref 136–145)
T WAVE AXIS: 237 DEGREES
T WAVE AXIS: 243 DEGREES
TOTAL CELLS COUNTED SPEC: 100
VENTRICULAR RATE: 133 BPM
VENTRICULAR RATE: 134 BPM
WBC # BLD AUTO: 11.7 THOUSAND/UL (ref 4.31–10.16)

## 2021-07-18 PROCEDURE — 93010 ELECTROCARDIOGRAM REPORT: CPT | Performed by: INTERNAL MEDICINE

## 2021-07-18 PROCEDURE — 82948 REAGENT STRIP/BLOOD GLUCOSE: CPT

## 2021-07-18 PROCEDURE — 99232 SBSQ HOSP IP/OBS MODERATE 35: CPT | Performed by: HOSPITALIST

## 2021-07-18 PROCEDURE — 85007 BL SMEAR W/DIFF WBC COUNT: CPT | Performed by: INTERNAL MEDICINE

## 2021-07-18 PROCEDURE — 80048 BASIC METABOLIC PNL TOTAL CA: CPT | Performed by: INTERNAL MEDICINE

## 2021-07-18 PROCEDURE — 85027 COMPLETE CBC AUTOMATED: CPT | Performed by: INTERNAL MEDICINE

## 2021-07-18 RX ORDER — LIDOCAINE 50 MG/G
1 PATCH TOPICAL EVERY 24 HOURS
Status: DISCONTINUED | OUTPATIENT
Start: 2021-07-18 | End: 2021-08-05 | Stop reason: HOSPADM

## 2021-07-18 RX ORDER — METHYLPREDNISOLONE SODIUM SUCCINATE 40 MG/ML
40 INJECTION, POWDER, LYOPHILIZED, FOR SOLUTION INTRAMUSCULAR; INTRAVENOUS DAILY
Status: DISCONTINUED | OUTPATIENT
Start: 2021-07-19 | End: 2021-07-19

## 2021-07-18 RX ADMIN — GUAIFENESIN AND DEXTROMETHORPHAN 10 ML: 100; 10 SYRUP ORAL at 09:04

## 2021-07-18 RX ADMIN — INSULIN LISPRO 8 UNITS: 100 INJECTION, SOLUTION INTRAVENOUS; SUBCUTANEOUS at 21:46

## 2021-07-18 RX ADMIN — APIXABAN 5 MG: 5 TABLET, FILM COATED ORAL at 17:19

## 2021-07-18 RX ADMIN — INSULIN LISPRO 2 UNITS: 100 INJECTION, SOLUTION INTRAVENOUS; SUBCUTANEOUS at 13:41

## 2021-07-18 RX ADMIN — ACETAMINOPHEN 650 MG: 325 TABLET, FILM COATED ORAL at 09:03

## 2021-07-18 RX ADMIN — METOPROLOL TARTRATE 25 MG: 25 TABLET, FILM COATED ORAL at 21:44

## 2021-07-18 RX ADMIN — LEVOTHYROXINE SODIUM 150 MCG: 150 TABLET ORAL at 08:57

## 2021-07-18 RX ADMIN — PANTOPRAZOLE SODIUM 40 MG: 40 TABLET, DELAYED RELEASE ORAL at 08:57

## 2021-07-18 RX ADMIN — ACETAMINOPHEN 650 MG: 325 TABLET, FILM COATED ORAL at 17:19

## 2021-07-18 RX ADMIN — INSULIN LISPRO 8 UNITS: 100 INJECTION, SOLUTION INTRAVENOUS; SUBCUTANEOUS at 17:21

## 2021-07-18 RX ADMIN — TIOTROPIUM BROMIDE 18 MCG: 18 CAPSULE ORAL; RESPIRATORY (INHALATION) at 08:59

## 2021-07-18 RX ADMIN — INSULIN LISPRO 2 UNITS: 100 INJECTION, SOLUTION INTRAVENOUS; SUBCUTANEOUS at 08:59

## 2021-07-18 RX ADMIN — METHYLPREDNISOLONE SODIUM SUCCINATE 40 MG: 40 INJECTION, POWDER, FOR SOLUTION INTRAMUSCULAR; INTRAVENOUS at 08:58

## 2021-07-18 RX ADMIN — BENZONATATE 100 MG: 100 CAPSULE ORAL at 08:57

## 2021-07-18 RX ADMIN — OMEGA-3 FATTY ACIDS CAP 1000 MG 1000 MG: 1000 CAP at 08:57

## 2021-07-18 RX ADMIN — BENZONATATE 100 MG: 100 CAPSULE ORAL at 21:44

## 2021-07-18 RX ADMIN — OMEGA-3 FATTY ACIDS CAP 1000 MG 1000 MG: 1000 CAP at 17:19

## 2021-07-18 RX ADMIN — SODIUM CHLORIDE, SODIUM LACTATE, POTASSIUM CHLORIDE, AND CALCIUM CHLORIDE 100 ML/HR: .6; .31; .03; .02 INJECTION, SOLUTION INTRAVENOUS at 09:06

## 2021-07-18 RX ADMIN — ALLOPURINOL 300 MG: 300 TABLET ORAL at 08:57

## 2021-07-18 RX ADMIN — BENZONATATE 100 MG: 100 CAPSULE ORAL at 17:21

## 2021-07-18 RX ADMIN — LIDOCAINE 5% 1 PATCH: 700 PATCH TOPICAL at 13:40

## 2021-07-18 RX ADMIN — APIXABAN 5 MG: 5 TABLET, FILM COATED ORAL at 08:58

## 2021-07-18 RX ADMIN — METOPROLOL TARTRATE 25 MG: 25 TABLET, FILM COATED ORAL at 08:57

## 2021-07-18 NOTE — ASSESSMENT & PLAN NOTE
Lab Results   Component Value Date    K 4 6 07/18/2021    K 4 9 07/17/2021    K 5 1 07/16/2021    K 4 5 07/15/2021    K 5 9 (H) 07/15/2021    K 5 8 (H) 07/14/2021    K 5 5 (H) 07/14/2021    K 5 1 07/14/2021     Presenting with hyperkalemia in the setting of acute kidney   No EKG changes noted  Calcium gluconate 1 g was given  Plan  · continue to trend until in a safer range (K less than 5 5; calcium less than 11)

## 2021-07-18 NOTE — ASSESSMENT & PLAN NOTE
Presented with AFib RVR, asymptomatic  Went into RVR overnight, was given prn IV lopressor x 2  Plan:  · Rate controlled now   · Continue routine metoprolol 25mg Q12H  · Eliquis put on hold X 48 hours prior to planned needle biopsy by IR on 7/21

## 2021-07-18 NOTE — ASSESSMENT & PLAN NOTE
Creatinine   Date Value Ref Range Status   07/18/2021 1 03 0 60 - 1 30 mg/dL Final     Comment:     Standardized to IDMS reference method   07/17/2021 1 14 0 60 - 1 30 mg/dL Final     Comment:     Standardized to IDMS reference method   07/16/2021 1 23 0 60 - 1 30 mg/dL Final     Comment:     Standardized to IDMS reference method   07/15/2021 1 68 (H) 0 60 - 1 30 mg/dL Final     Comment:     Standardized to IDMS reference method     BUN   Date Value Ref Range Status   07/18/2021 32 (H) 5 - 25 mg/dL Final   07/17/2021 32 (H) 5 - 25 mg/dL Final   07/16/2021 32 (H) 5 - 25 mg/dL Final   07/15/2021 32 (H) 5 - 25 mg/dL Final     Presenting with creatinine of 1 77 baseline appears to be less than 1 1  Resolving today Cr 1 14    Plan:  · Continue IV fluid hydration  · Continue to monitor kidney function  · Avoid nephrotoxic agents

## 2021-07-18 NOTE — PROGRESS NOTES
[facility]  Progress Note - Stephen Whitfield 1940, 68 y o  male MRN: [id number]  Unit/Bed#: S -01 Encounter: [id number]  Primary Care Provider: Jordyn Caruso DO   Date and time admitted to hospital: 7/14/2021  1:39 PM    COPD (chronic obstructive pulmonary disease) with acute exacerbation ([facility] [address] )  Assessment & Plan  With acute exacerbation  Patient presenting with worsening shortness of breath, productive cough and wheezing  When seen today patient reports improvement decreased dyspnea, decreased cough and sputum  On exam continue to have wheezing  Plan  · Taper down IV steroids, switch to oral   · continue respiratory protocol  · Requires home O2 on discharge    SULEMA (acute kidney injury) [facility])  Assessment & Plan  Creatinine   Date Value Ref Range Status   07/18/2021 1 03 0 60 - 1 30 mg/dL Final     Comment:     Standardized to IDMS reference method   07/17/2021 1 14 0 60 - 1 30 mg/dL Final     Comment:     Standardized to IDMS reference method   07/16/2021 1 23 0 60 - 1 30 mg/dL Final     Comment:     Standardized to IDMS reference method   07/15/2021 1 68 (H) 0 60 - 1 30 mg/dL Final     Comment:     Standardized to IDMS reference method     BUN   Date Value Ref Range Status   07/18/2021 32 (H) 5 - 25 mg/dL Final   07/17/2021 32 (H) 5 - 25 mg/dL Final   07/16/2021 32 (H) 5 - 25 mg/dL Final   07/15/2021 32 (H) 5 - 25 mg/dL Final     Presenting with creatinine of 1 77 baseline appears to be less than 1 1  Resolving today Cr 1 14    Plan:  · Continue IV fluid hydration  · Continue to monitor kidney function  · Avoid nephrotoxic agents  Paroxysmal A-fib [facility])  Assessment & Plan  Presented with AFib RVR, asymptomatic  Went into RVR overnight, was given prn IV lopressor x 2  Plan:  · Rate controlled now   · Continue routine metoprolol 25mg Q12H  · Eliquis put on hold X 48 hours prior to planned needle biopsy by IR on 7/21      * Hyperkalemia  Assessment & Plan  Lab Results   Component Value Date    K 4 6 07/18/2021    K 4 9 07/17/2021    K 5 1 07/16/2021    K 4 5 07/15/2021    K 5 9 (H) 07/15/2021    K 5 8 (H) 07/14/2021    K 5 5 (H) 07/14/2021    K 5 1 07/14/2021     Presenting with hyperkalemia in the setting of acute kidney   No EKG changes noted  Calcium gluconate 1 g was given  Plan  · continue to trend until in a safer range (K less than 5 5; calcium less than 11)  Hypercalcemia  Assessment & Plan  Presenting with hypercalcemia, corrected calcium 11 5  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  Corrected calcium 11 2 today  Plan:   · Continue IV fluid hydration  · continue to trend until in a safer range (K less than 5 5; calcium less than 11)  type 2 diabetes mellitus [facility])  Assessment & Plan  Lab Results   Component Value Date    HGBA1C 6 6 (H) 06/20/2021       Recent Labs     07/17/21  1109 07/17/21  1607 07/17/21  2119 07/18/21  0714   POCGLU 263* 234* 203* 226*       Blood Sugar Average: Last 72 hrs:  (P) 251 [id number]  Sliding Scale insulin  Monitor glucose    Mass of right lung  Assessment & Plan  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  He is scheduled to have IR guided biopsy as outpatient on 7/21  Eliquis on hold 48 hours prior to procedure          Shortness of breath  Assessment & Plan  Patient with a history of COPD using wife's home O2   History of Smoking   Chest x-ray:  · CXR 7/14 -  Large right lung mass, slightly increased in size, consistent with malignancy  Persistent small right pleural effusion  · CXR 7/17 - No change in large right lung mass with associated postobstructive atelectasis or pneumonia right middle lobe and small right pleural effusion  Left lung nodule  Plan:  o Taper down steroids and continue nebulizer treatments    o O2 as needed    o Planned IR right lung mass biopsy on 7/21        Hypothyroidism  Assessment & Plan  · TSH low, free T 4 normal range    · Continue levothyroxine 150 mcg daily  Hypertension  Assessment & Plan  Blood pressure appears stable  Continue home medication        VTE Pharmacologic Prophylaxis:   VTE Score: 5 High Risk (Score >/= 5) - Pharmacological DVT Prophylaxis Ordered: Apixaban (Eliquis)  Sequential Compression Devices Ordered  Mechanical VTE Prophylaxis in Place: Yes    Patient Centered Rounds: I have performed bedside rounds with nursing staff today  Discussions with Specialists or Other Care Team Provider: IR, Nursing, Case management  Education and Discussions with Family / Patient: Updated  (daughter) via phone  Amelia Gomes  Current Length of Stay: 4 day(s)    Current Patient Status: Inpatient     Discharge Plan / Estimated Discharge Date: Anticipate discharge in 48 hrs to home with home services  Code Status: Level 1 - Full Code      Subjective:   No acute events overnight  Objective:     Vitals:   Temp (24hrs), Av °F (36 7 °C), Min:97 6 °F (36 4 °C), Max:98 4 °F (36 9 °C)    Temp:  [97 6 °F (36 4 °C)-98 4 °F (36 9 °C)] 97 6 °F (36 4 °C)  HR:  [77-82] 78  Resp:  [18-20] 18  BP: (119-144)/(63-69) 144/69  SpO2:  [97 %-98 %] 98 %  Body mass index is 30 54 kg/m²  Input and Output Summary (last 24 hours): Intake/Output Summary (Last 24 hours) at 2021 1159  Last data filed at 2021 0908  Gross per 24 hour   Intake 540 ml   Output 2425 ml   Net -1885 ml       Physical Exam:     Physical Exam  Vitals and nursing note reviewed  Constitutional:       General: He is in acute distress  Appearance: He is well-developed  HENT:      Head: Normocephalic and atraumatic  Mouth/Throat:      Mouth: Mucous membranes are moist    Eyes:      Conjunctiva/sclera: Conjunctivae normal    Cardiovascular:      Rate and Rhythm: Normal rate and regular rhythm  Heart sounds: No murmur heard  Pulmonary:      Breath sounds: Wheezing present        Comments: Diminished lung sounds on the right  Abdominal:      General: Bowel sounds are normal       Palpations: Abdomen is soft  Tenderness: There is no abdominal tenderness  There is no guarding or rebound  Musculoskeletal:         General: Normal range of motion  Cervical back: Neck supple  Right lower leg: No edema  Left lower leg: No edema  Skin:     General: Skin is warm and dry  Capillary Refill: Capillary refill takes less than 2 seconds  Neurological:      General: No focal deficit present  Mental Status: He is alert     Psychiatric:         Mood and Affect: Mood normal          Behavior: Behavior normal           Additional Data:     Labs:  Results from last 7 days   Lab Units 07/18/21  0537 07/14/21  1413   WBC Thousand/uL 11 70* 6 27   HEMOGLOBIN g/dL 9 0* 9 6*   HEMATOCRIT % 31 8* 33 6*   PLATELETS Thousands/uL 333 277   BANDS PCT % 3  --    NEUTROS PCT %  --  81*   LYMPHS PCT %  --  5*   LYMPHO PCT % 1*  --    MONOS PCT %  --  11   MONO PCT % 7  --    EOS PCT % 0 1     Results from last 7 days   Lab Units 07/18/21  0537 07/17/21  0449   SODIUM mmol/L 140 139   POTASSIUM mmol/L 4 6 4 9   CHLORIDE mmol/L 103 105   CO2 mmol/L 31 29   BUN mg/dL 32* 32*   CREATININE mg/dL 1 03 1 14   ANION GAP mmol/L 6 5   CALCIUM mg/dL 9 6 9 4   ALBUMIN g/dL  --  1 7*   TOTAL BILIRUBIN mg/dL  --  0 21   ALK PHOS U/L  --  132*   ALT U/L  --  22   AST U/L  --  14   GLUCOSE RANDOM mg/dL 258* 275*         Results from last 7 days   Lab Units 07/18/21  1138 07/18/21  0714 07/17/21  2119 07/17/21  1607 07/17/21  1109 07/17/21  0717 07/16/21  2020 07/16/21  1559 07/16/21  1043 07/16/21  0635 07/15/21  2111 07/15/21  1728   POC GLUCOSE mg/dl 224* 226* 203* 234* 263* 199* 277* 291* 310* 288* 179* 289*               Imaging: Reviewed radiology reports from this admission including: chest xray    Recent Cultures (last 7 days):           Lines/Drains:  Invasive Devices     Peripheral Intravenous Line Peripheral IV 06/21/21 Right Wrist 27 days    Peripheral IV 07/14/21 Left Antecubital 3 days    Peripheral IV 07/14/21 Right Antecubital 3 days                Telemetry:   Telemetry Orders (From admission, onward)             48 Hour Telemetry Monitoring  Continuous x 48 hours     Question:  Reason for 48 Hour Telemetry  Answer:  Arrhythmias Requiring Medical Therapy (eg  SVT, Vtach/fib, Bradycardia, Uncontrolled A-fib)                    Last 24 Hours Medication List:   Current Facility-Administered Medications   Medication Dose Route Frequency Provider Last Rate    acetaminophen  650 mg Oral Q6H PRN Sarah Calix MD      albuterol  2 5 mg Nebulization Q6H PRN Sarah Calix MD      allopurinol  300 mg Oral Daily Sarah Calix MD      apixaban  5 mg Oral BID Kwan Yeh DO      benzonatate  100 mg Oral TID Yamilet Edwards MD      dextromethorphan-guaiFENesin  10 mL Oral Q4H PRN Yamilet Edwards MD      docusate sodium  100 mg Oral BID Sarah Calix MD      fish oil  1,000 mg Oral BID Sarah Calix MD      insulin lispro  1-6 Units Subcutaneous 4x Daily (AC & HS) Shae Calvert DO      lactated ringers  100 mL/hr Intravenous Continuous Sarah Calix  mL/hr (07/18/21 0906)    levothyroxine  150 mcg Oral Daily Sarah Calix MD      lidocaine  1 patch Topical Q24H Yamilet Edwards MD      [START ON 7/19/2021] methylPREDNISolone sodium succinate  40 mg Intravenous Daily Kwan Yeh DO      metoprolol  5 mg Intravenous Q6H PRN Sarah Calix MD      metoprolol tartrate  25 mg Oral Q12H [facility] CHRIS Villaseñor      ondansetron  4 mg Intravenous Q6H PRN Sarah Calix MD      pantoprazole  40 mg Oral Daily Sarah Calix MD      tiotropium  18 mcg Inhalation Daily Sarah Calix MD          Today, Patient Was Seen By: Odell Sutton DO    ** Please Note: This note has been constructed using a voice recognition system   **

## 2021-07-18 NOTE — ASSESSMENT & PLAN NOTE
Lab Results   Component Value Date    HGBA1C 6 6 (H) 06/20/2021       Recent Labs     07/17/21  1109 07/17/21  1607 07/17/21  2119 07/18/21  0714   POCGLU 263* 234* 203* 226*       Blood Sugar Average: Last 72 hrs:  (P) 251 [id number]  Sliding Scale insulin  Monitor glucose

## 2021-07-18 NOTE — ASSESSMENT & PLAN NOTE
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  He is scheduled to have IR guided biopsy as outpatient on 7/21      [facility] on hold 48 hours prior to procedure       Leg weakness

## 2021-07-18 NOTE — ASSESSMENT & PLAN NOTE
Patient with a history of COPD using wife's home O2   History of Smoking   Chest x-ray:  · CXR 7/14 -  Large right lung mass, slightly increased in size, consistent with malignancy  Persistent small right pleural effusion  · CXR 7/17 - No change in large right lung mass with associated postobstructive atelectasis or pneumonia right middle lobe and small right pleural effusion  Left lung nodule  Plan:  o Taper down steroids and continue nebulizer treatments    o O2 as needed    o Planned IR right lung mass biopsy on 7/21

## 2021-07-18 NOTE — ASSESSMENT & PLAN NOTE
With acute exacerbation  Patient presenting with worsening shortness of breath, productive cough and wheezing  When seen today patient reports improvement decreased dyspnea, decreased cough and sputum  On exam continue to have wheezing  Plan  · Taper down IV steroids, switch to oral   · continue respiratory protocol    · Requires home O2 on discharge

## 2021-07-19 ENCOUNTER — APPOINTMENT (INPATIENT)
Dept: RADIOLOGY | Facility: HOSPITAL | Age: 81
DRG: 682 | End: 2021-07-19
Payer: COMMERCIAL

## 2021-07-19 PROBLEM — E87.5 HYPERKALEMIA: Status: RESOLVED | Noted: 2021-07-14 | Resolved: 2021-07-19

## 2021-07-19 PROBLEM — I16.1 HYPERTENSIVE EMERGENCY: Status: RESOLVED | Noted: 2021-06-18 | Resolved: 2021-07-19

## 2021-07-19 PROBLEM — I16.1 HYPERTENSIVE EMERGENCY: Status: ACTIVE | Noted: 2021-06-18

## 2021-07-19 PROBLEM — J96.01 ACUTE RESPIRATORY FAILURE WITH HYPOXIA AND HYPERCAPNIA (HCC): Status: ACTIVE | Noted: 2021-07-19

## 2021-07-19 PROBLEM — J96.02 ACUTE RESPIRATORY FAILURE WITH HYPOXIA AND HYPERCAPNIA (HCC): Status: ACTIVE | Noted: 2021-07-19

## 2021-07-19 PROBLEM — N17.9 AKI (ACUTE KIDNEY INJURY) (HCC): Status: RESOLVED | Noted: 2021-06-18 | Resolved: 2021-07-19

## 2021-07-19 LAB
ANION GAP SERPL CALCULATED.3IONS-SCNC: 4 MMOL/L (ref 4–13)
APTT PPP: 23 SECONDS (ref 23–37)
APTT PPP: 35 SECONDS (ref 23–37)
ARTERIAL PATENCY WRIST A: 13
ATRIAL RATE: 101 BPM
ATRIAL RATE: 105 BPM
ATRIAL RATE: 98 BPM
BASE EXCESS BLDA CALC-SCNC: 5 MMOL/L (ref -2–3)
BUN SERPL-MCNC: 32 MG/DL (ref 5–25)
CALCIUM SERPL-MCNC: 9.6 MG/DL (ref 8.3–10.1)
CHLORIDE SERPL-SCNC: 105 MMOL/L (ref 100–108)
CO2 SERPL-SCNC: 34 MMOL/L (ref 21–32)
CREAT SERPL-MCNC: 1.11 MG/DL (ref 0.6–1.3)
ERYTHROCYTE [DISTWIDTH] IN BLOOD BY AUTOMATED COUNT: 17.9 % (ref 11.6–15.1)
FIO2 GAS DIL.REBREATH: 100 L
GFR SERPL CREATININE-BSD FRML MDRD: 63 ML/MIN/1.73SQ M
GLUCOSE SERPL-MCNC: 119 MG/DL (ref 65–140)
GLUCOSE SERPL-MCNC: 151 MG/DL (ref 65–140)
GLUCOSE SERPL-MCNC: 154 MG/DL (ref 65–140)
GLUCOSE SERPL-MCNC: 199 MG/DL (ref 65–140)
GLUCOSE SERPL-MCNC: 272 MG/DL (ref 65–140)
GLUCOSE SERPL-MCNC: 334 MG/DL (ref 65–140)
GLUCOSE SERPL-MCNC: 407 MG/DL (ref 65–140)
HCO3 BLDA-SCNC: 33.3 MMOL/L (ref 22–28)
HCT VFR BLD AUTO: 35.9 % (ref 36.5–49.3)
HCT VFR BLD CALC: 34 % (ref 36.5–49.3)
HGB BLD-MCNC: 9.9 G/DL (ref 12–17)
HGB BLDA-MCNC: 11.6 G/DL (ref 12–17)
INR PPP: 1.27 (ref 0.84–1.19)
MCH RBC QN AUTO: 23.2 PG (ref 26.8–34.3)
MCHC RBC AUTO-ENTMCNC: 27.6 G/DL (ref 31.4–37.4)
MCV RBC AUTO: 84 FL (ref 82–98)
NT-PROBNP SERPL-MCNC: 5084 PG/ML
P AXIS: 65 DEGREES
P AXIS: 73 DEGREES
P AXIS: 80 DEGREES
PCO2 BLD: 35 MMOL/L (ref 21–32)
PCO2 BLD: 66 MM HG (ref 36–44)
PH BLD: 7.31 [PH] (ref 7.35–7.45)
PLATELET # BLD AUTO: 356 THOUSANDS/UL (ref 149–390)
PMV BLD AUTO: 9.8 FL (ref 8.9–12.7)
PO2 BLD: 390 MM HG (ref 75–129)
POTASSIUM BLD-SCNC: 4.5 MMOL/L (ref 3.5–5.3)
POTASSIUM SERPL-SCNC: 4.8 MMOL/L (ref 3.5–5.3)
PR INTERVAL: 134 MS
PR INTERVAL: 148 MS
PR INTERVAL: 182 MS
PROTHROMBIN TIME: 16 SECONDS (ref 11.6–14.5)
QRS AXIS: 57 DEGREES
QRS AXIS: 61 DEGREES
QRS AXIS: 76 DEGREES
QRSD INTERVAL: 58 MS
QRSD INTERVAL: 64 MS
QRSD INTERVAL: 72 MS
QT INTERVAL: 278 MS
QT INTERVAL: 300 MS
QT INTERVAL: 302 MS
QTC INTERVAL: 367 MS
QTC INTERVAL: 385 MS
QTC INTERVAL: 461 MS
RBC # BLD AUTO: 4.27 MILLION/UL (ref 3.88–5.62)
SAO2 % BLD FROM PO2: 100 % (ref 60–85)
SODIUM BLD-SCNC: 142 MMOL/L (ref 136–145)
SODIUM SERPL-SCNC: 143 MMOL/L (ref 136–145)
SPECIMEN SOURCE: ABNORMAL
T WAVE AXIS: 34 DEGREES
T WAVE AXIS: 67 DEGREES
T WAVE AXIS: 81 DEGREES
TROPONIN I SERPL-MCNC: 0.02 NG/ML
TROPONIN I SERPL-MCNC: <0.02 NG/ML
VENTRICULAR RATE: 105 BPM
VENTRICULAR RATE: 142 BPM
VENTRICULAR RATE: 98 BPM
WBC # BLD AUTO: 11.47 THOUSAND/UL (ref 4.31–10.16)

## 2021-07-19 PROCEDURE — 84132 ASSAY OF SERUM POTASSIUM: CPT

## 2021-07-19 PROCEDURE — 83880 ASSAY OF NATRIURETIC PEPTIDE: CPT | Performed by: INTERNAL MEDICINE

## 2021-07-19 PROCEDURE — 84295 ASSAY OF SERUM SODIUM: CPT

## 2021-07-19 PROCEDURE — 71045 X-RAY EXAM CHEST 1 VIEW: CPT

## 2021-07-19 PROCEDURE — 85610 PROTHROMBIN TIME: CPT | Performed by: NURSE PRACTITIONER

## 2021-07-19 PROCEDURE — 85027 COMPLETE CBC AUTOMATED: CPT

## 2021-07-19 PROCEDURE — 82948 REAGENT STRIP/BLOOD GLUCOSE: CPT

## 2021-07-19 PROCEDURE — 85014 HEMATOCRIT: CPT

## 2021-07-19 PROCEDURE — 94002 VENT MGMT INPAT INIT DAY: CPT

## 2021-07-19 PROCEDURE — 94640 AIRWAY INHALATION TREATMENT: CPT

## 2021-07-19 PROCEDURE — NC001 PR NO CHARGE: Performed by: NURSE PRACTITIONER

## 2021-07-19 PROCEDURE — 82947 ASSAY GLUCOSE BLOOD QUANT: CPT

## 2021-07-19 PROCEDURE — 84484 ASSAY OF TROPONIN QUANT: CPT | Performed by: NURSE PRACTITIONER

## 2021-07-19 PROCEDURE — 94664 DEMO&/EVAL PT USE INHALER: CPT

## 2021-07-19 PROCEDURE — 99232 SBSQ HOSP IP/OBS MODERATE 35: CPT | Performed by: HOSPITALIST

## 2021-07-19 PROCEDURE — 84484 ASSAY OF TROPONIN QUANT: CPT | Performed by: INTERNAL MEDICINE

## 2021-07-19 PROCEDURE — 85730 THROMBOPLASTIN TIME PARTIAL: CPT | Performed by: NURSE PRACTITIONER

## 2021-07-19 PROCEDURE — 85730 THROMBOPLASTIN TIME PARTIAL: CPT | Performed by: INTERNAL MEDICINE

## 2021-07-19 PROCEDURE — 80048 BASIC METABOLIC PNL TOTAL CA: CPT

## 2021-07-19 PROCEDURE — 93010 ELECTROCARDIOGRAM REPORT: CPT | Performed by: INTERNAL MEDICINE

## 2021-07-19 PROCEDURE — 99232 SBSQ HOSP IP/OBS MODERATE 35: CPT | Performed by: INTERNAL MEDICINE

## 2021-07-19 PROCEDURE — 94760 N-INVAS EAR/PLS OXIMETRY 1: CPT

## 2021-07-19 PROCEDURE — 36600 WITHDRAWAL OF ARTERIAL BLOOD: CPT

## 2021-07-19 PROCEDURE — 82803 BLOOD GASES ANY COMBINATION: CPT

## 2021-07-19 PROCEDURE — 99291 CRITICAL CARE FIRST HOUR: CPT | Performed by: INTERNAL MEDICINE

## 2021-07-19 PROCEDURE — 93005 ELECTROCARDIOGRAM TRACING: CPT

## 2021-07-19 RX ORDER — LEVALBUTEROL 1.25 MG/.5ML
1.25 SOLUTION, CONCENTRATE RESPIRATORY (INHALATION)
Status: DISCONTINUED | OUTPATIENT
Start: 2021-07-19 | End: 2021-07-28

## 2021-07-19 RX ORDER — AMIODARONE HYDROCHLORIDE 200 MG/1
200 TABLET ORAL
Status: DISCONTINUED | OUTPATIENT
Start: 2021-07-19 | End: 2021-08-02

## 2021-07-19 RX ORDER — METHYLPREDNISOLONE SODIUM SUCCINATE 40 MG/ML
40 INJECTION, POWDER, LYOPHILIZED, FOR SOLUTION INTRAMUSCULAR; INTRAVENOUS EVERY 8 HOURS SCHEDULED
Status: DISCONTINUED | OUTPATIENT
Start: 2021-07-19 | End: 2021-07-20

## 2021-07-19 RX ORDER — METOPROLOL TARTRATE 5 MG/5ML
5 INJECTION INTRAVENOUS ONCE
Status: COMPLETED | OUTPATIENT
Start: 2021-07-19 | End: 2021-07-19

## 2021-07-19 RX ORDER — HEPARIN SODIUM 10000 [USP'U]/100ML
3-20 INJECTION, SOLUTION INTRAVENOUS
Status: DISCONTINUED | OUTPATIENT
Start: 2021-07-19 | End: 2021-07-21

## 2021-07-19 RX ORDER — LABETALOL 20 MG/4 ML (5 MG/ML) INTRAVENOUS SYRINGE
CODE/TRAUMA/SEDATION MEDICATION
Status: COMPLETED | OUTPATIENT
Start: 2021-07-19 | End: 2021-07-19

## 2021-07-19 RX ORDER — METOPROLOL TARTRATE 5 MG/5ML
INJECTION INTRAVENOUS CODE/TRAUMA/SEDATION MEDICATION
Status: COMPLETED | OUTPATIENT
Start: 2021-07-19 | End: 2021-07-19

## 2021-07-19 RX ORDER — LEVALBUTEROL 1.25 MG/.5ML
1.25 SOLUTION, CONCENTRATE RESPIRATORY (INHALATION) EVERY 6 HOURS
Status: DISCONTINUED | OUTPATIENT
Start: 2021-07-19 | End: 2021-07-19

## 2021-07-19 RX ORDER — METOPROLOL TARTRATE 50 MG/1
50 TABLET, FILM COATED ORAL EVERY 12 HOURS SCHEDULED
Status: DISCONTINUED | OUTPATIENT
Start: 2021-07-19 | End: 2021-07-20

## 2021-07-19 RX ORDER — FUROSEMIDE 10 MG/ML
40 INJECTION INTRAMUSCULAR; INTRAVENOUS ONCE
Status: COMPLETED | OUTPATIENT
Start: 2021-07-19 | End: 2021-07-19

## 2021-07-19 RX ORDER — HEPARIN SODIUM 1000 [USP'U]/ML
4000 INJECTION, SOLUTION INTRAVENOUS; SUBCUTANEOUS
Status: DISCONTINUED | OUTPATIENT
Start: 2021-07-19 | End: 2021-07-21

## 2021-07-19 RX ORDER — HEPARIN SODIUM 1000 [USP'U]/ML
2000 INJECTION, SOLUTION INTRAVENOUS; SUBCUTANEOUS
Status: DISCONTINUED | OUTPATIENT
Start: 2021-07-19 | End: 2021-07-21

## 2021-07-19 RX ORDER — LABETALOL 20 MG/4 ML (5 MG/ML) INTRAVENOUS SYRINGE
20 ONCE
Status: COMPLETED | OUTPATIENT
Start: 2021-07-19 | End: 2021-07-19

## 2021-07-19 RX ADMIN — HEPARIN SODIUM 12 UNITS/KG/HR: 10000 INJECTION, SOLUTION INTRAVENOUS at 12:25

## 2021-07-19 RX ADMIN — ALBUTEROL SULFATE 2.5 MG: 2.5 SOLUTION RESPIRATORY (INHALATION) at 00:55

## 2021-07-19 RX ADMIN — SODIUM CHLORIDE 9 UNITS/HR: 9 INJECTION, SOLUTION INTRAVENOUS at 22:15

## 2021-07-19 RX ADMIN — METHYLPREDNISOLONE SODIUM SUCCINATE 40 MG: 40 INJECTION, POWDER, FOR SOLUTION INTRAMUSCULAR; INTRAVENOUS at 21:25

## 2021-07-19 RX ADMIN — METOPROLOL TARTRATE 5 MG: 5 INJECTION, SOLUTION INTRAVENOUS at 09:35

## 2021-07-19 RX ADMIN — AMIODARONE HYDROCHLORIDE 200 MG: 200 TABLET ORAL at 16:36

## 2021-07-19 RX ADMIN — LABETALOL 20 MG/4 ML (5 MG/ML) INTRAVENOUS SYRINGE 20 MG: at 09:39

## 2021-07-19 RX ADMIN — METHYLPREDNISOLONE SODIUM SUCCINATE 40 MG: 40 INJECTION, POWDER, FOR SOLUTION INTRAMUSCULAR; INTRAVENOUS at 08:31

## 2021-07-19 RX ADMIN — TIOTROPIUM BROMIDE 18 MCG: 18 CAPSULE ORAL; RESPIRATORY (INHALATION) at 08:32

## 2021-07-19 RX ADMIN — LEVALBUTEROL HYDROCHLORIDE 1.25 MG: 1.25 SOLUTION, CONCENTRATE RESPIRATORY (INHALATION) at 13:03

## 2021-07-19 RX ADMIN — LEVOTHYROXINE SODIUM 150 MCG: 150 TABLET ORAL at 08:28

## 2021-07-19 RX ADMIN — AMIODARONE HYDROCHLORIDE 1 MG/MIN: 50 INJECTION, SOLUTION INTRAVENOUS at 15:49

## 2021-07-19 RX ADMIN — ALLOPURINOL 300 MG: 300 TABLET ORAL at 08:28

## 2021-07-19 RX ADMIN — METHYLPREDNISOLONE SODIUM SUCCINATE 40 MG: 40 INJECTION, POWDER, FOR SOLUTION INTRAMUSCULAR; INTRAVENOUS at 15:07

## 2021-07-19 RX ADMIN — OMEGA-3 FATTY ACIDS CAP 1000 MG 1000 MG: 1000 CAP at 18:42

## 2021-07-19 RX ADMIN — AMIODARONE HYDROCHLORIDE 0.5 MG/MIN: 50 INJECTION, SOLUTION INTRAVENOUS at 21:25

## 2021-07-19 RX ADMIN — LEVALBUTEROL HYDROCHLORIDE 1.25 MG: 1.25 SOLUTION, CONCENTRATE RESPIRATORY (INHALATION) at 19:17

## 2021-07-19 RX ADMIN — GUAIFENESIN AND DEXTROMETHORPHAN 10 ML: 100; 10 SYRUP ORAL at 08:31

## 2021-07-19 RX ADMIN — INSULIN LISPRO 8 UNITS: 100 INJECTION, SOLUTION INTRAVENOUS; SUBCUTANEOUS at 17:03

## 2021-07-19 RX ADMIN — ALBUTEROL SULFATE 2.5 MG: 2.5 SOLUTION RESPIRATORY (INHALATION) at 09:30

## 2021-07-19 RX ADMIN — PANTOPRAZOLE SODIUM 40 MG: 40 TABLET, DELAYED RELEASE ORAL at 08:28

## 2021-07-19 RX ADMIN — AMIODARONE HYDROCHLORIDE 150 MG: 50 INJECTION, SOLUTION INTRAVENOUS at 15:30

## 2021-07-19 RX ADMIN — BENZONATATE 100 MG: 100 CAPSULE ORAL at 08:28

## 2021-07-19 RX ADMIN — INSULIN LISPRO 2 UNITS: 100 INJECTION, SOLUTION INTRAVENOUS; SUBCUTANEOUS at 08:31

## 2021-07-19 RX ADMIN — BENZONATATE 100 MG: 100 CAPSULE ORAL at 21:25

## 2021-07-19 RX ADMIN — METOPROLOL TARTRATE 5 MG: 5 INJECTION, SOLUTION INTRAVENOUS at 09:32

## 2021-07-19 RX ADMIN — LEVALBUTEROL HYDROCHLORIDE 1.25 MG: 1.25 SOLUTION, CONCENTRATE RESPIRATORY (INHALATION) at 10:09

## 2021-07-19 RX ADMIN — IPRATROPIUM BROMIDE 0.5 MG: 0.5 SOLUTION RESPIRATORY (INHALATION) at 10:09

## 2021-07-19 RX ADMIN — OMEGA-3 FATTY ACIDS CAP 1000 MG 1000 MG: 1000 CAP at 08:28

## 2021-07-19 RX ADMIN — LABETALOL 20 MG/4 ML (5 MG/ML) INTRAVENOUS SYRINGE 20 MG: at 09:45

## 2021-07-19 RX ADMIN — IPRATROPIUM BROMIDE 0.5 MG: 0.5 SOLUTION RESPIRATORY (INHALATION) at 19:17

## 2021-07-19 RX ADMIN — IPRATROPIUM BROMIDE 0.5 MG: 0.5 SOLUTION RESPIRATORY (INHALATION) at 13:03

## 2021-07-19 RX ADMIN — INSULIN LISPRO 6 UNITS: 100 INJECTION, SOLUTION INTRAVENOUS; SUBCUTANEOUS at 12:09

## 2021-07-19 RX ADMIN — BENZONATATE 100 MG: 100 CAPSULE ORAL at 15:06

## 2021-07-19 RX ADMIN — METOPROLOL TARTRATE 50 MG: 50 TABLET, FILM COATED ORAL at 21:25

## 2021-07-19 RX ADMIN — LIDOCAINE 5% 1 PATCH: 700 PATCH TOPICAL at 12:13

## 2021-07-19 RX ADMIN — FUROSEMIDE 40 MG: 10 INJECTION, SOLUTION INTRAMUSCULAR; INTRAVENOUS at 12:18

## 2021-07-19 RX ADMIN — METOPROLOL TARTRATE 25 MG: 25 TABLET, FILM COATED ORAL at 08:28

## 2021-07-19 NOTE — CONSULTS
ICU Acceptance Note - [address],[place] [address] 68 y o  male MRN: [id number]  Unit/Bed#: ICU 06 Encounter: [id number]      -------------------------------------------------------------------------------------------------------------  Chief Complaint: Acute respiratory failure     History of Present Illness   HX and PE limited by: acute respiratory distress  Andres Burciaga is a 68 y o  male who presents with Acute respiratory failure with hypoxia and hypercapnia  He has PMH COPD, newly found R lung mass currently undergoing workup, paroxysmal afib on eliquis, hypothyroid, HTN, GERD  Of note, he also follows with 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 and platelet transfusion  He was admitted to internal medicine on 7/14 from PCP office after being found to have hypercalcemia, hyperkalemia, and SULEMA on routine blood work  He underwent IVF hydration and temporizing measures for hyperkalemia and hypercalcemia with improvement  He has also been receiving steroids for COPD exacerbation while inpatient  This AM RR was called for increased work of breathing, hypoxia, tachycardia, and hypertensive emergency  He was found to be in rapid afib, and received IV metoprolol followed by IV labetalol with improvement  He was placed on bipap and transferred to ICU for further workup and management  History obtained from chart review    -------------------------------------------------------------------------------------------------------------  Assessment and Plan:    Neuro:    Diagnosis: No acute issues  o Regulate sleep/wake  o Delirium precautions  o Daily CAM-ICU       CV:    Diagnosis: Hypertensive Emergency, possibly in the setting of respiratory failure   o Plan: s/p IV metoprolol and labetalol with improvement   o Will continue home metoprolol, consider increased dose if unable to maintain SBP<160  o Send troponin    Diagnosis: Paroxysmal Afib/RVR  o Plan: Continue metoprolol  o Eliquis on hold in the setting of planned IR biopsy  o Consider starting heparin gtt       Pulm:   Diagnosis: Acute hypoxic and acute on chronic hypercapnic respiratory failure, unclear etiology, possibly mucous plugging vs pulmonary edema   o Plan: Continue bipap 18/6 40%, adjust for patient comfort, titrate FiO2 for SpO2>88  o Consider dose of lasix  o Start scheduled xopenex and atrovent   o Encourage cough, deep breathing, IS    Diagnosis: COPD with acute exacerbation   o Plan: Continue 40mg solumedrol IV daily  o Continue nebs as above  o PRN guaifenesin and tessalon    Diagnosis: R lung mass s/p EBUS  o Plan: Plan for IR biopsy this week   o EBUS pathology shows atypical cells inconclusive for malignancy       GI:    Diagnosis: GERD   o Plan: continue home PPI       :    Diagnosis: SULEMA, POA, now improving s/p gentle hydration, with likely some component of CKD  o Plan: Monitor I/O and renal indices   o Baseline Cr appears around 1 0   o       F/E/N:    Plan: Hypercalcemia with concern for malignancy, continue to trend  NPO for now given bipap         Heme/Onc:    Diagnosis: R lung mass  o Plan: IR biopsy this week  o OP follow up with heme onc   Diagnosis: Hx ITP   o Plan: Trend platelets, currently stable  · Consider heparin infusion while holding eliquis       Endo:    Diagnosis: DM2 with hyperglycemia in the setting of steroids   o Plan: Continue SSI/Q6H glucose checks while NPO, goal glucose 140-180      ID:    Diagnosis: Leukocytosis, possibly reactive, without fever or obvious source for infection   o Plan: Monitor off abx  o Monitor WBC, fever curve      MSK/Skin:    Frequent turn/repo to prevent skin breakdown     Disposition: Transfer to Critical Care   Code Status: Level 1 - Full Code  --------------------------------------------------------------------------------------------------------------  Review of Systems    Review of systems was unable to be performed secondary to acute respiratory distress    Physical Exam  Constitutional:       General: He is in acute distress  Appearance: He is ill-appearing  HENT:      Head: Normocephalic and atraumatic  Mouth/Throat:      Mouth: Mucous membranes are moist    Eyes:      Pupils: Pupils are equal, round, and reactive to light  Cardiovascular:      Rate and Rhythm: Tachycardia present  Rhythm irregular  Pulses: Normal pulses  Heart sounds: No murmur heard  No friction rub  No gallop  Pulmonary:      Effort: Respiratory distress present  Breath sounds: Wheezing present  No rhonchi or rales  Abdominal:      General: There is distension  Palpations: Abdomen is soft  Tenderness: There is no abdominal tenderness  Musculoskeletal:         General: Swelling present  Normal range of motion  Cervical back: Neck supple  Right lower leg: Edema present  Left lower leg: Edema present  Skin:     General: Skin is warm and dry  Capillary Refill: Capillary refill takes less than 2 seconds  Neurological:      Mental Status: He is alert  Comments: Opens eyes to voice, follows commands in all extremitites with equal strength        --------------------------------------------------------------------------------------------------------------  Vitals:   Vitals:    07/18/21 2221 07/19/21 0747 07/19/21 0935 07/19/21 1009   BP: 144/73 129/62     BP Location: Right arm Left arm     Pulse: 78 73     Resp: 18 18     Temp: 97 7 °F (36 5 °C) (!) 97 4 °F (36 3 °C)     TempSrc: Oral Oral     SpO2: 97% 92% (!) 88% 96%   Weight:       Height:         Temp  Min: 97 4 °F (36 3 °C)  Max: 98 7 °F (37 1 °C)  IBW (Ideal Body Weight): 54 6 kg  Height: 5' 2" (157 5 cm)  Body mass index is 30 54 kg/m²    N/A    Laboratory and Diagnostics:  Results from last 7 days   Lab Units 07/19/21  0946 07/19/21  0546 07/18/21  0537 07/16/21  0537 07/15/21  0439 07/14/21  1413 07/14/21  1039   WBC Thousand/uL  --  11 47* 11 70* 10 81* 5 20 6  27 5 68   HEMOGLOBIN g/dL  --  9 9* 9 0* 7 9* 8 7* 9 6* 10 0*   I STAT HEMOGLOBIN g/dl 11 6*  --   --   --   --   --   --    HEMATOCRIT %  --  35 9* 31 8* 28 2* 32 3* 33 6* 35 2*   HEMATOCRIT, ISTAT % 34*  --   --   --   --   --   --    PLATELETS Thousands/uL  --  356 333 265 256 277 282   NEUTROS PCT %  --   --   --   --   --  81* 83*   BANDS PCT %  --   --  3  --   --   --   --    MONOS PCT %  --   --   --   --   --  11 9   MONO PCT %  --   --  7  --   --   --   --      Results from last 7 days   Lab Units 07/19/21  0946 07/19/21  0546 07/18/21  0537 07/17/21  0449 07/16/21  0937 07/15/21  1137 07/15/21  0438 07/14/21  2323 07/14/21  1413 07/14/21  1039   SODIUM mmol/L  --  143 140 139 142  --  140 142 145 140   POTASSIUM mmol/L  --  4 8 4 6 4 9 5 1 4 5 5 9* 5 8* 5 5* 5 1   CHLORIDE mmol/L  --  105 103 105 107  --  107 109* 108 104   CO2 mmol/L  --  34* 31 29 25  --  25 27 28 25   CO2, I-STAT mmol/L 35*  --   --   --   --   --   --   --   --   --    ANION GAP mmol/L  --  4 6 5 10  --  8 6 9 11   BUN mg/dL  --  32* 32* 32* 32*  --  32* 31* 33* 35*   CREATININE mg/dL  --  1 11 1 03 1 14 1 23  --  1 68* 1 59* 1 77* 1 77*   CALCIUM mg/dL  --  9 6 9 6 9 4 9 2  --  9 4 9 3 9 7 9 9   GLUCOSE RANDOM mg/dL  --  199* 258* 275* 305*  --  247* 149* 138  --    ALT U/L  --   --   --  22 17  --  19  --  22 23   AST U/L  --   --   --  14 8  --  16  --  13 12   ALK PHOS U/L  --   --   --  132* 147*  --  174*  --  171* 169*   ALBUMIN g/dL  --   --   --  1 7* 1 6*  --  1 6*  --  1 8* 1 8*   TOTAL BILIRUBIN mg/dL  --   --   --  0 21 0 18*  --  0 37  --  0 40 0 40     Results from last 7 days   Lab Units 07/14/21  2323 07/14/21  1413   MAGNESIUM mg/dL 1 3* 1 5*           Results from last 7 days   Lab Units 07/19/21  0942   TROPONIN I ng/mL <0 02         ABG:    VBG:          Micro:        EKG: Afib rate 120  Imaging: I have personally reviewed pertinent reports     and I have personally reviewed pertinent films in PACS    Historical Information   Past Medical History:   Diagnosis Date    Hypertension      Past Surgical History:   Procedure Laterality Date    BACK SURGERY      CARPAL TUNNEL RELEASE Bilateral     IR BIOPSY BONE MARROW  6/10/2021    LUMBAR DISC SURGERY       Social History   Social History     Substance and Sexual Activity   Alcohol Use Not Currently    Comment: History of heavier drinking in early 25s  Denies any current alcohol use (Updated 2021)        Social History     Substance and Sexual Activity   Drug Use Never     Social History     Tobacco Use   Smoking Status Former Smoker    Packs/day: 0 50    Years: 40 00    Pack years: 20 00    Types: Cigarettes    Start date: 12    Quit date: 2021    Years since quittin 0   Smokeless Tobacco Never Used       Family History:   Family History   Problem Relation Age of Onset    Cancer Mother         "ate away her bone"    Anuerysm Father     Cancer Sister         unknown type    Heart attack Maternal Grandfather     Cancer Sister         unknown type    Heart attack Maternal Aunt     Heart attack Maternal Uncle     Heart attack Paternal Aunt            Medications:  Current Facility-Administered Medications   Medication Dose Route Frequency    acetaminophen (TYLENOL) tablet 650 mg  650 mg Oral Q6H PRN    allopurinol (ZYLOPRIM) tablet 300 mg  300 mg Oral Daily    benzonatate (TESSALON PERLES) capsule 100 mg  100 mg Oral TID    dextromethorphan-guaiFENesin (ROBITUSSIN DM) oral syrup 10 mL  10 mL Oral Q4H PRN    fish oil capsule 1,000 mg  1,000 mg Oral BID    insulin lispro (HumaLOG) 100 units/mL subcutaneous injection 2-12 Units  2-12 Units Subcutaneous 4x Daily (AC & HS)    ipratropium (ATROVENT) 0 02 % inhalation solution 0 5 mg  0 5 mg Nebulization Q6H    levalbuterol (XOPENEX) inhalation solution 1 25 mg  1 25 mg Nebulization Q6H    levothyroxine tablet 150 mcg  150 mcg Oral Daily    lidocaine (LIDODERM) 5 % patch 1 patch  1 patch Topical Q24H    methylPREDNISolone sodium succinate (Solu-MEDROL) injection 40 mg  40 mg Intravenous Daily    metoprolol (LOPRESSOR) injection 5 mg  5 mg Intravenous Q6H PRN    metoprolol tartrate (LOPRESSOR) tablet 25 mg  25 mg Oral Q12H [facility]    ondansetron (ZOFRAN) injection 4 mg  4 mg Intravenous Q6H PRN    pantoprazole (PROTONIX) EC tablet 40 mg  40 mg Oral Daily     Home medications:  Prior to Admission Medications   Prescriptions Last Dose Informant Patient Reported? Taking?    Omega-3 Fatty Acids (FISH OIL) 1,000 mg 7/14/2021 at Unknown time Self Yes Yes   Sig: Take 1,000 mg by mouth 2 (two) times a day   acetaminophen (TYLENOL) 100 mg/mL solution 7/14/2021 at Unknown time Self Yes Yes   Sig: Take 10 mg/kg by mouth every 4 (four) hours as needed for fever   albuterol (2 5 mg/3 mL) 0 083 % nebulizer solution 7/14/2021 at Unknown time  No Yes   Sig: Take 3 mL (2 5 mg total) by nebulization every 6 (six) hours as needed for wheezing or shortness of breath   allopurinol (ZYLOPRIM) 300 mg tablet 7/14/2021 at Unknown time Self Yes Yes   Sig: Take 300 mg by mouth daily   amLODIPine (NORVASC) 2 5 mg tablet 7/14/2021 at Unknown time  Yes Yes   Sig: Take 2 5 mg by mouth every morning   apixaban (ELIQUIS) 5 mg 7/14/2021 at Unknown time  No Yes   Sig: Take 1 tablet (5 mg total) by mouth 2 (two) times a day   benzonatate (TESSALON PERLES) 100 mg capsule 7/14/2021 at Unknown time  No Yes   Sig: Take 1 capsule (100 mg total) by mouth 3 (three) times a day as needed for cough   guaiFENesin (MUCINEX) 600 mg 12 hr tablet 7/14/2021 at Unknown time  No Yes   Sig: Take 1 tablet (600 mg total) by mouth 2 (two) times a day   levothyroxine 150 mcg tablet 7/14/2021 at Unknown time Self Yes Yes   Sig: Take 150 mcg by mouth daily   metFORMIN (GLUCOPHAGE) 500 mg tablet 7/14/2021 at Unknown time  No Yes   Sig: Take 1 tablet (500 mg total) by mouth 2 (two) times a day with meals   metoprolol tartrate (LOPRESSOR) 25 mg tablet 7/14/2021 at Unknown time  No Yes   Sig: Take 0 5 tablets (12 5 mg total) by mouth every 12 (twelve) hours   pantoprazole (PROTONIX) 40 mg tablet 7/14/2021 at Unknown time  No Yes   Sig: Take 1 tablet (40 mg total) by mouth daily   tiotropium (SPIRIVA) 18 mcg inhalation capsule 7/13/2021 at Unknown time  No Yes   Sig: Place 1 capsule (18 mcg total) into inhaler and inhale daily      Facility-Administered Medications: None     Allergies: Allergies   Allergen Reactions    Penicillins Hives     ------------------------------------------------------------------------------------------------------------  Advance Directive and Living Will:      Power of :    POLST:    ------------------------------------------------------------------------------------------------------------  Anticipated Length of Stay is > 2 midnights    Care Time Delivered:   Upon my evaluation, this patient had a high probability of imminent or life-threatening deterioration due to respiratory failure, which required my direct attention, intervention, and personal management  I have personally provided 40 minutes (1055 to 1135) of critical care time, exclusive of procedures, teaching, family meetings, and any prior time recorded by providers other than myself  CHRIS Hadley        Portions of the record may have been created with voice recognition software  Occasional wrong word or "sound a like" substitutions may have occurred due to the inherent limitations of voice recognition software    Read the chart carefully and recognize, using context, where substitutions have occurred

## 2021-07-19 NOTE — PROGRESS NOTES
General Cardiology   Progress Note -  Team One   Caro Angel 68 y o  male MRN: [id number]  Unit/Bed#: ICU 06 Encounter: [id number]    Assessment/ Plan    1  PAF- known history of,  presented with AFib RVR  Presented in afib with RVR on admission in setting of multiple electrolyte derangements, spontaneously converted to NSR overnight prior to cardiology consultation  Beta blocker was increased and he was continued on anticoagulation with Eliquis 5 mg BID  Was maintaining NSR until this morning when he developed sudden dry mouth, diaphoresis, and severe dyspnea like he has had with previous episodes of afib  Rapid response was called and he received 5 mg IV Lopressor and 20 mg IV labetalol with conversion to NSR  ECG at the time shows artifact and repeat taken shortly after shows patient already back in NSR  Tele not available for review as patient was transferred to the ICU for further care  Maintaining NSR with PACs on telemetry in ICU  Metoprolol increased to 50 mg BID  Add IV and oral amiodarone to help maintain NSR  Normal LV function on recent echo      2  Right lung mass  Status post EBUS 06/2021-pathology atypical cells  OP- Consulted IR for biopsy  Current chest x-ray-large right lung mass, slightly increased in size, consistent with malignancy  Persistent small right pleural effusion  For IR biopsy on Wednesday 7/21/21  Eliquis on hold for 48 hours      3  COPD  utilizes p r n  Oxygen at home  Presented with acute exacerbation-IV steroids initiated, nebulizer treatments per primary team    4  HTN- generally controlled but significantly elevated during rapid response this am 233/171  Now 120/57  Subjective  Feeling much better than this morning  No cardiac complaints currently  Review of Systems   Constitutional: Positive for chills and diaphoresis  Negative for malaise/fatigue and weight gain  Cardiovascular: Negative for chest pain, leg swelling, orthopnea, palpitations and syncope  Respiratory: Positive for cough, shortness of breath and sputum production  Negative for sleep disturbances due to breathing  Gastrointestinal: Negative for bloating, nausea and vomiting  Neurological: Negative for dizziness, light-headedness and weakness  Psychiatric/Behavioral: Negative for altered mental status  All other systems reviewed and are negative  Objective:   Vitals: Blood pressure 120/57, pulse 88, temperature 98 1 °F (36 7 °C), temperature source Axillary, resp  rate (!) 30, height 5' 2" (1 575 m), weight 75 8 kg (167 lb), SpO2 92 %  , Body mass index is 30 54 kg/m²  ,     Systolic (16BDN), XBM:100 , Min:120 , MARYSE:164     Diastolic (57OJV), WEY:07, Min:57, Max:171    Intake/Output Summary (Last 24 hours) at 7/19/2021 1449  Last data filed at 7/19/2021 1340  Gross per 24 hour   Intake 180 ml   Output 625 ml   Net -445 ml     Wt Readings from Last 3 Encounters:   07/14/21 75 8 kg (167 lb)   07/08/21 75 4 kg (166 lb 3 6 oz)   06/30/21 76 7 kg (169 lb)     Telemetry Review: No significant arrhythmias seen on telemetry review  NSR with PACs    EKG personally reviewed by Dirk Castleman, CRNP  NSR with PACs, PVCs    Physical Exam  Vitals reviewed  Constitutional:       General: He is not in acute distress  Neck:      Vascular: No hepatojugular reflux or JVD  Cardiovascular:      Rate and Rhythm: Normal rate  Rhythm irregular  Pulses: Normal pulses  Heart sounds: Normal heart sounds  No murmur heard  No friction rub  No gallop  Pulmonary:      Effort: Tachypnea present  No respiratory distress  Breath sounds: Wheezing present  No rales  Abdominal:      General: Bowel sounds are normal  There is no distension  Palpations: Abdomen is soft  Tenderness: There is no abdominal tenderness  Musculoskeletal:         General: Normal range of motion  Cervical back: Neck supple  Right lower leg: No edema  Left lower leg: No edema     Skin:     General: Skin is warm and dry  Capillary Refill: Capillary refill takes less than 2 seconds  Findings: No erythema  Neurological:      Mental Status: He is alert and oriented to person, place, and time     Psychiatric:         Mood and Affect: Mood normal      LABORATORY RESULTS  Results from last 7 days   Lab Units 07/19/21  1158 07/19/21  0942   TROPONIN I ng/mL 0 02 <0 02     CBC with diff:   Results from last 7 days   Lab Units 07/19/21  0946 07/19/21  0546 07/18/21  0537 07/16/21  0537 07/15/21  0439 07/14/21  1413 07/14/21  1039   WBC Thousand/uL  --  11 47* 11 70* 10 81* 5 20 6 27 5 68   HEMOGLOBIN g/dL  --  9 9* 9 0* 7 9* 8 7* 9 6* 10 0*   I STAT HEMOGLOBIN g/dl 11 6*  --   --   --   --   --   --    HEMATOCRIT %  --  35 9* 31 8* 28 2* 32 3* 33 6* 35 2*   HEMATOCRIT, ISTAT % 34*  --   --   --   --   --   --    MCV fL  --  84 84 83 84 83 82   PLATELETS Thousands/uL  --  356 333 265 256 277 282   MCH pg  --  23 2* 23 8* 23 2* 22 6* 23 6* 23 3*   MCHC g/dL  --  27 6* 28 3* 28 0* 26 9* 28 6* 28 4*   RDW %  --  17 9* 18 0* 17 6* 18 0* 18 0* 18 3*   MPV fL  --  9 8 10 4 9 7 9 7 9 4 9 5   NRBC AUTO /100 WBCs  --   --  0  --   --  0 0       CMP:  Results from last 7 days   Lab Units 07/19/21  0946 07/19/21  0546 07/18/21  0537 07/17/21  0449 07/16/21  0937 07/15/21  1137 07/15/21  0438 07/14/21  2323 07/14/21  1413 07/14/21  1039   POTASSIUM mmol/L  --  4 8 4 6 4 9 5 1 4 5 5 9* 5 8* 5 5* 5 1   CHLORIDE mmol/L  --  105 103 105 107  --  107 109* 108 104   CO2 mmol/L  --  34* 31 29 25  --  25 27 28 25   CO2, I-STAT mmol/L 35*  --   --   --   --   --   --   --   --   --    BUN mg/dL  --  32* 32* 32* 32*  --  32* 31* 33* 35*   CREATININE mg/dL  --  1 11 1 03 1 14 1 23  --  1 68* 1 59* 1 77* 1 77*   GLUCOSE, ISTAT mg/dl 154*  --   --   --   --   --   --   --   --   --    CALCIUM mg/dL  --  9 6 9 6 9 4 9 2  --  9 4 9 3 9 7 9 9   AST U/L  --   --   --  14 8  --  16  --  13 12   ALT U/L  --   --   --  22 17  --  19  --  22 23 ALK PHOS U/L  --   --   --  132* 147*  --  174*  --  171* 169*   EGFR ml/min/1 73sq m  --  63 69 61 56  --  38 41 36 36       BMP:  Results from last 7 days   Lab Units 07/19/21  0946 07/19/21  0546 07/18/21  0537 07/17/21  0449 07/16/21  0937 07/15/21  1137 07/15/21  0438 07/14/21  2323 07/14/21  1413   POTASSIUM mmol/L  --  4 8 4 6 4 9 5 1 4 5 5 9* 5 8* 5 5*   CHLORIDE mmol/L  --  105 103 105 107  --  107 109* 108   CO2 mmol/L  --  34* 31 29 25  --  25 27 28   CO2, I-STAT mmol/L 35*  --   --   --   --   --   --   --   --    BUN mg/dL  --  32* 32* 32* 32*  --  32* 31* 33*   CREATININE mg/dL  --  1 11 1 03 1 14 1 23  --  1 68* 1 59* 1 77*   GLUCOSE, ISTAT mg/dl 154*  --   --   --   --   --   --   --   --    CALCIUM mg/dL  --  9 6 9 6 9 4 9 2  --  9 4 9 3 9 7       Lab Results   Component Value Date    CREATININE 1 11 07/19/2021    CREATININE 1 03 07/18/2021    CREATININE 1 14 07/17/2021       Lab Results   Component Value Date    NTBNP 5,084 (H) 07/19/2021    NTBNP 373 06/18/2021           Results from last 7 days   Lab Units 07/14/21  2323 07/14/21  1413   MAGNESIUM mg/dL 1 3* 1 5*                 Results from last 7 days   Lab Units 07/17/21  0449   TSH 3RD GENERATON uIU/mL 0 103*   FREE T4 ng/dL 1 19       Results from last 7 days   Lab Units 07/19/21  1210   INR  1 27*       Lipid Profile:   No results found for: CHOL  Lab Results   Component Value Date    HDL 28 (L) 05/28/2021    HDL 25 (L) 07/15/2019     Lab Results   Component Value Date    LDLCALC 69 05/28/2021    LDLCALC 60 07/15/2019     Lab Results   Component Value Date    TRIG 105 05/28/2021    TRIG 218 (H) 07/15/2019       Cardiac testing:   Results for orders placed during the hospital encounter of 06/18/21    Echo complete with contrast if indicated    Narrative  [place] [address], [address] [facility]  [phone]    Transthoracic Echocardiogram  2D, M-mode, Doppler, and Color Doppler    Study date:  22-Jun-2021    Patient: Karey Johnson  MR number: [id number]  Account number: [de-identified]  : 1940  Age: 68 years  Gender: Male  Status: Inpatient  Location: Bedside  Height: 62 in  Weight: 173 6 lb  BP: 138/ 65 mmHg    Indications: A-fib  Diagnoses: I48 0 - Atrial fibrillation    Sonographer:  ROSALINA Nguyen  Primary Physician:  Cyndi Lopez DO  Referring Physician:  IFTIKHAR ReidC  Group:  [facility]  Interpreting Physician:  Argentina Lazo MD    SUMMARY    LEFT VENTRICLE:  Systolic function was normal  Ejection fraction was estimated to be 60 %  There were no regional wall motion abnormalities  MITRAL VALVE:  There was mild regurgitation  TRICUSPID VALVE:  There was mild regurgitation  Estimated peak PA pressure was 42 mmHg  The findings suggest mild pulmonary hypertension  HISTORY: PRIOR HISTORY: PAF, HTN, SULEMA, SOB, lung mass, DM2, thrombocytopenia, current smoker  PROCEDURE: The procedure was performed at the bedside  This was a routine study  The transthoracic approach was used  The study included complete 2D imaging, M-mode, complete spectral Doppler, and color Doppler  The heart rate was 77 bpm,  at the start of the study  Images were obtained from the parasternal, apical, subcostal, and suprasternal notch acoustic windows  Image quality was adequate  LEFT VENTRICLE: Size was normal  Systolic function was normal  Ejection fraction was estimated to be 60 %  There were no regional wall motion abnormalities  Wall thickness was normal  DOPPLER: Left ventricular diastolic function parameters  were normal for the patient's age  RIGHT VENTRICLE: The size was normal  Systolic function was normal  Wall thickness was normal     LEFT ATRIUM: Size was normal     RIGHT ATRIUM: Size was normal     MITRAL VALVE: Valve structure was normal  There was normal leaflet separation  DOPPLER: The transmitral velocity was within the normal range  There was no evidence for stenosis  There was mild regurgitation  AORTIC VALVE: The valve was trileaflet  Leaflets exhibited normal thickness and normal cuspal separation  DOPPLER: Transaortic velocity was within the normal range  There was no evidence for stenosis  There was no significant  regurgitation  TRICUSPID VALVE: The valve structure was normal  There was normal leaflet separation  DOPPLER: The transtricuspid velocity was within the normal range  There was no evidence for stenosis  There was mild regurgitation  Estimated peak PA  pressure was 42 mmHg  The findings suggest mild pulmonary hypertension  PULMONIC VALVE: Leaflets exhibited normal thickness, no calcification, and normal cuspal separation  DOPPLER: The transpulmonic velocity was within the normal range  There was no significant regurgitation  PERICARDIUM: There was no pericardial effusion  The pericardium was normal in appearance  AORTA: The root exhibited normal size  SYSTEMIC VEINS: IVC: The inferior vena cava was normal in size  Respirophasic changes were normal     SYSTEM MEASUREMENT TABLES    2D  %FS: 30 02 %  Ao Diam: 3 54 cm  EDV(Teich): 123 19 ml  EF(Teich): 56 95 %  ESV(Teich): 53 03 ml  IVSd: 0 92 cm  LA Diam: 4 57 cm  LAAs A2C: 23 05 cm2  LAAs A4C: 22 52 cm2  LAESV A-L A2C: 77 35 ml  LAESV A-L A4C: 69 99 ml  LAESV Index (A-L): 41 99 ml/m2  LAESV MOD A2C: 73 99 ml  LAESV MOD A4C: 64 59 ml  LAESV(A-L): 75 58 ml  LAESV(MOD BP): 70 92 ml  LALs A2C: 5 83 cm  LALs A4C: 6 15 cm  LVIDd: 5 09 cm  LVIDs: 3 56 cm  LVPWd: 0 85 cm  RVIDd: 3 65 cm  SV(Teich): 70 16 ml    CW  AV Env  Ti: 281 64 ms  AV MaxP 92 mmHg  AV VTI: 22 53 cm  AV Vmax: 1 49 m/s  AV Vmean: 0 8 m/s  AV meanPG: 3 13 mmHg  TR MaxP 17 mmHg  TR Vmax: 3 13 m/s    MM  TAPSE: 3 01 cm    PW  E' Sept: 0 09 m/s  E/E' Sept: 11 25  LVOT Env  Ti: 342 53 ms  LVOT VTI: 21 45 cm  LVOT Vmax: 1 01 m/s  LVOT Vmean: 0 63 m/s  LVOT maxP 07 mmHg  LVOT meanP 9 mmHg  MV A Eg: 1 12 m/s  MV Dec Camp: 6 18 m/s2  MV DecT: 167 82 ms  MV E Ge: 1 04 m/s  MV E/A Ratio: 0 93  MV PHT: 48 67 ms  MVA By PHT: 4 52 cm2    [facility] Accredited [facility]chocardi[facility]    Prepared and electronically signed by    Elena Lloyd MD  Signed 22-Jun-2021 12:57:51    Meds/Allergies   all current active meds have been reviewed and current meds:   Current Facility-Administered Medications   Medication Dose Route Frequency    acetaminophen (TYLENOL) tablet 650 mg  650 mg Oral Q6H PRN    allopurinol (ZYLOPRIM) tablet 300 mg  300 mg Oral Daily    amiodarone (CORDARONE) 900 mg in dextrose 5 % 500 mL infusion  1 mg/min Intravenous Continuous    Followed by   [address] [place] [address] amiodarone (CORDARONE) 900 mg in dextrose 5 % 500 mL infusion  0 5 mg/min Intravenous Continuous    amiodarone 150 mg in dextrose 5 % 100 mL IV bolus  150 mg Intravenous Once    amiodarone tablet 200 mg  200 mg Oral TID With Meals    benzonatate (TESSALON PERLES) capsule 100 mg  100 mg Oral TID    dextromethorphan-guaiFENesin (ROBITUSSIN DM) oral syrup 10 mL  10 mL Oral Q4H PRN    fish oil capsule 1,000 mg  1,000 mg Oral BID    heparin (porcine) 25,000 units in 0 45% NaCl 250 mL infusion (premix)  3-20 Units/kg/hr (Order-Specific) Intravenous Titrated    heparin (porcine) injection 2,000 Units  2,000 Units Intravenous Q1H PRN    heparin (porcine) injection 4,000 Units  4,000 Units Intravenous Q1H PRN    insulin lispro (HumaLOG) 100 units/mL subcutaneous injection 2-12 Units  2-12 Units Subcutaneous 4x Daily (AC & HS)    ipratropium (ATROVENT) 0 02 % inhalation solution 0 5 mg  0 5 mg Nebulization Q6H    levalbuterol (XOPENEX) inhalation solution 1 25 mg  1 25 mg Nebulization Q6H    levothyroxine tablet 150 mcg  150 mcg Oral Daily    lidocaine (LIDODERM) 5 % patch 1 patch  1 patch Topical Q24H    methylPREDNISolone sodium succinate (Solu-MEDROL) injection 40 mg  40 mg Intravenous Q8H [address]    metoprolol (LOPRESSOR) injection 5 mg  5 mg Intravenous Q6H PRN    metoprolol tartrate (LOPRESSOR) tablet 50 mg  50 mg Oral Q12H [address]    ondansetron (ZOFRAN) injection 4 mg  4 mg Intravenous Q6H PRN    pantoprazole (PROTONIX) EC tablet 40 mg  40 mg Oral Daily     Medications Prior to Admission   Medication    acetaminophen (TYLENOL) 100 mg/mL solution    albuterol (2 5 mg/3 mL) 0 083 % nebulizer solution    allopurinol (ZYLOPRIM) 300 mg tablet    amLODIPine (NORVASC) 2 5 mg tablet    apixaban (ELIQUIS) 5 mg    benzonatate (TESSALON PERLES) 100 mg capsule    guaiFENesin (MUCINEX) 600 mg 12 hr tablet    levothyroxine 150 mcg tablet    metFORMIN (GLUCOPHAGE) 500 mg tablet    metoprolol tartrate (LOPRESSOR) 25 mg tablet    Omega-3 Fatty Acids (FISH OIL) 1,000 mg    pantoprazole (PROTONIX) 40 mg tablet    tiotropium (SPIRIVA) 18 mcg inhalation capsule     amiodarone, 1 mg/min   Followed by  amiodarone, 0 5 mg/min  heparin (porcine), 3-20 Units/kg/hr (Order-Specific), Last Rate: 12 Units/kg/hr (07/19/21 1225)    Counseling / Coordination of Care  Total floor / unit time spent today 20 minutes  Greater than 50% of total time was spent with the patient and / or family counseling and / or coordination of care  ** Please Note: Dragon 360 Dictation voice to text software may have been used in the creation of this document   **

## 2021-07-19 NOTE — ASSESSMENT & PLAN NOTE
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  He is scheduled to have IR guided biopsy as outpatient on 7/21      [facility] on hold 48 hours prior to procedure

## 2021-07-19 NOTE — UTILIZATION REVIEW
Continued Stay Review    Date: 7/19                          Current Patient Class: inpatient  Current Level of Care: transferred to critical care on 7/19    HPI:80 y o  male w/hx a fib, dm, R lung mass, copd initially admitted on 7/14 to med surg as inpatient due to hyperkalemia/calcemia, copd exac, rapid a flutter, lung mass, and SULEMA  IV steroids, IVF, IV cardizem, nebs, serial labs were in progress  Cardio following  Assessment/Plan: 7/19: rapid response team called, became hypoxic with episode labored breathing, found in rapid a fib and hypertensive, unimproved with IV lopressor  IV labetalol then given with improvement  Lungs with wheezing, abd distended, BLE edema  Placed on bipap  CXR not significantly changed, vessels slightly more congested  Rhythm improved to nsr, rate 80, occ pvc's  Remains on bipap, steroids increased to q8h, nebx in progress q6h, giving IV diuretic due to acute edema  Cardio added IV amio overlapping with PO in attempt to maintain nsr  Holding eliquis as he needs IR guided lung gx for his large mass, which is scheduled for 7/21        Vital Signs:   Date/Time Temp  Pulse Resp BP MAP (mmHg)  SpO2 FiO2 (%)  Calculated FIO2 (%) - Nasal Cannula  Nasal Cannula O2 Flow Rate (L/min)  O2 Device     07/19/21 1500 98 6 °F (37 °C)  84 26Abnormal  110/55 74  94 % --  36  4 L/min  Nasal cannula     07/19/21 1303 --  -- -- -- --  92 % --  36  4 L/min  Nasal cannula     07/19/21 1100 98 1 °F (36 7 °C)  88 -- 120/57 82  94 % --  --  --  BiPAP     07/19/21 1009 --  -- -- -- --  96 % 40  --  --  BiPAP     07/19/21 0937 --  134Abnormal  30Abnormal  -- --  -- --  --  --  --     07/19/21 0935 96 7 °F (35 9 °C)Abnormal   138Abnormal  30Abnormal  233/171Abnormal  --  88 %Abnormal  --  52  8 L/min  Nasal cannula     07/19/21 0747 97 4 °F (36 3 °C)Abnormal   73 18 129/62 89  92 % --  --  --  None (Room air)     07/19/21 0057 --  -- -- -- --  -- --  32  3 L/min  Nasal cannula     07/18/21 2221 97 7 °F (36 5 °C)  78 18 144/73 102  97 % --  32  3 L/min  Nasal cannula     07/18/21 1549 97 5 °F (36 4 °C)  89 17 127/66 91  96 % --  32  3 L/min  Nasal cannula     07/18/21 0700 97 6 °F (36 4 °C)  78 18 144/69 97  98 % --  --  --  Nasal cannula     07/17/21 2117 98 °F (36 7 °C)  77 20 119/63 84  97 % --  32  3 L/min  Nasal cannula     07/17/21 1500 98 4 °F (36 9 °C)  82 20 139/65 94  98 % --  32  3 L/min  Nasal cannula     07/17/21 0715 98 1 °F (36 7 °C)  87 20 151/71 102  98 % --  32  3 L/min  Nasal cannula           Pertinent Labs/Diagnostic Results:       Results from last 7 days   Lab Units 07/19/21  0946 07/19/21  0546 07/18/21  0537 07/16/21  0537 07/15/21  0439 07/14/21  1413 07/14/21  1039   WBC Thousand/uL  --  11 47* 11 70* 10 81* 5 20 6 27 5 68   HEMOGLOBIN g/dL  --  9 9* 9 0* 7 9* 8 7* 9 6* 10 0*   I STAT HEMOGLOBIN g/dl 11 6*  --   --   --   --   --   --    HEMATOCRIT %  --  35 9* 31 8* 28 2* 32 3* 33 6* 35 2*   HEMATOCRIT, ISTAT % 34*  --   --   --   --   --   --    PLATELETS Thousands/uL  --  356 333 265 256 277 282   NEUTROS ABS Thousands/µL  --   --   --   --   --  5 03 4 67   BANDS PCT %  --   --  3  --   --   --   --          Results from last 7 days   Lab Units 07/19/21  0946 07/19/21  0546 07/18/21  0537 07/17/21  0449 07/16/21  0937 07/15/21  1137 07/15/21  0438 07/14/21  2323 07/14/21  1413   SODIUM mmol/L  --  143 140 139 142  --  140 142 145   POTASSIUM mmol/L  --  4 8 4 6 4 9 5 1 4 5 5 9* 5 8* 5 5*   CHLORIDE mmol/L  --  105 103 105 107  --  107 109* 108   CO2 mmol/L  --  34* 31 29 25  --  25 27 28   CO2, I-STAT mmol/L 35*  --   --   --   --   --   --   --   --    ANION GAP mmol/L  --  4 6 5 10  --  8 6 9   BUN mg/dL  --  32* 32* 32* 32*  --  32* 31* 33*   CREATININE mg/dL  --  1 11 1 03 1 14 1 23  --  1 68* 1 59* 1 77*   EGFR ml/min/1 73sq m  --  63 69 61 56  --  38 41 36   CALCIUM mg/dL  --  9 6 9 6 9 4 9 2  --  9 4 9 3 9 7   CALCIUM, IONIZED mmol/L  --   --   --   --   --   --   --   --  1 28 MAGNESIUM mg/dL  --   --   --   --   --   --   --  1 3* 1 5*     Results from last 7 days   Lab Units 07/17/21  0449 07/16/21  0937 07/15/21  0438 07/14/21  1413 07/14/21  1039   AST U/L 14 8 16 13 12   ALT U/L 22 17 19 22 23   ALK PHOS U/L 132* 147* 174* 171* 169*   TOTAL PROTEIN g/dL 6 7 6 7 6 9 7 3 7 4   ALBUMIN g/dL 1 7* 1 6* 1 6* 1 8* 1 8*   TOTAL BILIRUBIN mg/dL 0 21 0 18* 0 37 0 40 0 40     Results from last 7 days   Lab Units 07/19/21  1658 07/19/21  1129 07/19/21  0930 07/19/21  0748 07/18/21  2142 07/18/21  1610 07/18/21  1138 07/18/21  0714 07/17/21  2119 07/17/21  1607 07/17/21  1109 07/17/21  0717   POC GLUCOSE mg/dl 334* 272* 119 151* 343* 303* 224* 226* 203* 234* 263* 199*     Results from last 7 days   Lab Units 07/19/21  0546 07/18/21  0537 07/17/21  0449 07/16/21  0937 07/15/21  0438 07/14/21  2323 07/14/21  1413   GLUCOSE RANDOM mg/dL 199* 258* 275* 305* 247* 149* 138       Results from last 7 days   Lab Units 07/19/21  0946   I STAT BASE EXC mmol/L 5*   I STAT O2 SAT % 100*   ISTAT PH ART  7 310*   I STAT ART PCO2 mm HG 66 0*   I STAT ART PO2 mm  0*   I STAT ART HCO3 mmol/L 33 3*         Results from last 7 days   Lab Units 07/19/21  1158 07/19/21  0942   TROPONIN I ng/mL 0 02 <0 02         Results from last 7 days   Lab Units 07/19/21  1210   PROTIME seconds 16 0*   INR  1 27*   PTT seconds 23     Results from last 7 days   Lab Units 07/17/21  0449   TSH 3RD GENERATON uIU/mL 0 103*       Results from last 7 days   Lab Units 07/19/21  0941   NT-PRO BNP pg/mL 5,084*     Results from last 7 days   Lab Units 07/15/21  0655   CLARITY UA  Clear   COLOR UA  Yellow   SPEC GRAV UA  1 025   PH UA  5 5   GLUCOSE UA mg/dl 250 (1/4%)*   KETONES UA mg/dl Negative   BLOOD UA  Negative   PROTEIN UA mg/dl Negative   NITRITE UA  Negative   BILIRUBIN UA  Negative   UROBILINOGEN UA E U /dl 2 0*   LEUKOCYTES UA  Negative     Results from last 7 days   Lab Units 07/18/21  0537   TOTAL COUNTED  100 Medications:   Scheduled Medications:  allopurinol, 300 mg, Oral, Daily  amiodarone, 200 mg, Oral, TID With Meals  benzonatate, 100 mg, Oral, TID  fish oil, 1,000 mg, Oral, BID  insulin lispro, 2-12 Units, Subcutaneous, 4x Daily (AC & HS)  ipratropium, 0 5 mg, Nebulization, Q6H  levalbuterol, 1 25 mg, Nebulization, Q6H  levothyroxine, 150 mcg, Oral, Daily  lidocaine, 1 patch, Topical, Q24H  methylPREDNISolone sodium succinate, 40 mg, Intravenous, Q8H ZOHAIB  metoprolol tartrate, 50 mg, Oral, Q12H ZOHAIB  pantoprazole, 40 mg, Oral, Daily    IV lasix x 1 7/19 @1218  IV labetalol x 2 7/19 @4562, 0945  IV lopressor [email or web address]    amiodarone 150 mg in dextrose 5 % 100 mL IV bolus   Dose: 150 mg  Freq: Once Route: IV  Last Dose: Stopped (07/19/21 1548)  Start: 07/19/21 1500 End: 07/19/21 1548    Continuous IV Infusions:  amiodarone, 1 mg/min, Intravenous, Continuous   Followed by  amiodarone, 0 5 mg/min, Intravenous, Continuous  heparin (porcine), 3-20 Units/kg/hr (Order-Specific), Intravenous, Titrated  lactated ringers infusion   Rate: 100 mL/hr Dose: 100 mL/hr  Freq: Continuous Route: IV  Last Dose: Stopped (07/18/21 1300)  Start: 07/14/21 2045 End: 07/18/21 1207    PRN Meds:  acetaminophen, 650 mg, Oral, Q6H PRN x 2 7/18  dextromethorphan-guaiFENesin, 10 mL, Oral, Q4H PRN [email or web address]  heparin (porcine), 2,000 Units, Intravenous, Q1H PRN  heparin (porcine), 4,000 Units, Intravenous, Q1H PRN  metoprolol, 5 mg, Intravenous, Q6H PRN  ondansetron, 4 mg, Intravenous, Q6H PRN  Albuterol neb 7/19 @9078, 0930      Discharge Plan: [facility]    Network Utilization Review Department  ATTENTION: Please call with any questions or concerns to [phone] and carefully listen to the prompts so that you are directed to the right person   All voicemails are confidential   Seth Ashton all requests for admission clinical reviews, approved or denied determinations and any other requests to dedicated fax number below belonging to the campus where the patient is receiving treatment   List of dedicated fax numbers for the Facilities:  [address] [place] [address] DENIALS (Administrative/Medical Necessity) [phone]   [address]  (Maternity/NICU/Pediatrics) [phone]   [address] [place] [address] Dr Fred Parks [phone] [id number] [place] [address] Shana Darren De Oliveirado [phone] [facility] [phone]

## 2021-07-19 NOTE — ASSESSMENT & PLAN NOTE
RR called due to patient being found with increased work of breathing, tachypnea and tachycardia  Patient found to be in Afib with RVR, IV metoprolol, IV labetalol given with improvement  Patient placed on BiPAP and transferred to ICU for further management

## 2021-07-19 NOTE — ASSESSMENT & PLAN NOTE
Creatinine   Date Value Ref Range Status   07/19/2021 1 11 0 60 - 1 30 mg/dL Final     Comment:     Standardized to IDMS reference method   07/18/2021 1 03 0 60 - 1 30 mg/dL Final     Comment:     Standardized to IDMS reference method   07/17/2021 1 14 0 60 - 1 30 mg/dL Final     Comment:     Standardized to IDMS reference method   07/16/2021 1 23 0 60 - 1 30 mg/dL Final     Comment:     Standardized to IDMS reference method     BUN   Date Value Ref Range Status   07/19/2021 32 (H) 5 - 25 mg/dL Final   07/18/2021 32 (H) 5 - 25 mg/dL Final   07/17/2021 32 (H) 5 - 25 mg/dL Final   07/16/2021 32 (H) 5 - 25 mg/dL Final     Presenting with creatinine of 1 77 baseline appears to be less than 1 1  Resolving today Cr 1 14    Plan:  · Continue IV fluid hydration  · Continue to monitor kidney function  · Avoid nephrotoxic agents

## 2021-07-19 NOTE — PROGRESS NOTES
[facility]  Progress Note - Master Rocha 1940, 68 y o  male MRN: [id number]  Unit/Bed#: ICU 06 Encounter: [id number]  Primary Care Provider: Dara Mcfadden DO   Date and time admitted to hospital: 7/14/2021  1:39 PM    COPD (chronic obstructive pulmonary disease) with acute exacerbation ([facility] [address] )  Assessment & Plan  With acute exacerbation  Patient presenting with worsening shortness of breath, productive cough and wheezing  When seen today patient reports improvement decreased dyspnea, decreased cough and sputum  On exam continue to have wheezing  Plan  · Taper down IV steroids, switch to oral   · continue respiratory protocol  · Requires home O2 on discharge    Hyperkalemia  Assessment & Plan  Lab Results   Component Value Date    K 4 8 07/19/2021    K 4 6 07/18/2021    K 4 9 07/17/2021    K 5 1 07/16/2021    K 4 5 07/15/2021    K 5 9 (H) 07/15/2021    K 5 8 (H) 07/14/2021    K 5 5 (H) 07/14/2021     Presenting with hyperkalemia in the setting of acute kidney   No EKG changes noted  Calcium gluconate 1 g was given  Plan  · continue to trend until in a safer range (K less than 5 5; calcium less than 11)  SULEMA (acute kidney injury) [facility])  Assessment & Plan  Creatinine   Date Value Ref Range Status   07/19/2021 1 11 0 60 - 1 30 mg/dL Final     Comment:     Standardized to IDMS reference method   07/18/2021 1 03 0 60 - 1 30 mg/dL Final     Comment:     Standardized to IDMS reference method   07/17/2021 1 14 0 60 - 1 30 mg/dL Final     Comment:     Standardized to IDMS reference method   07/16/2021 1 23 0 60 - 1 30 mg/dL Final     Comment:     Standardized to IDMS reference method     BUN   Date Value Ref Range Status   07/19/2021 32 (H) 5 - 25 mg/dL Final   07/18/2021 32 (H) 5 - 25 mg/dL Final   07/17/2021 32 (H) 5 - 25 mg/dL Final   07/16/2021 32 (H) 5 - 25 mg/dL Final     Presenting with creatinine of 1 77 baseline appears to be less than 1 1   Resolving today Cr 1 14    Plan:  · Continue IV fluid hydration  · Continue to monitor kidney function  · Avoid nephrotoxic agents  Paroxysmal A-fib with RVR  Assessment & Plan  Presented with AFib RVR,     Plan:  · Rate controlled now   · Continue routine metoprolol 25mg Q12H  · Eliquis put on hold X 48 hours prior to planned needle biopsy by IR on 7/21  Hypercalcemia  Assessment & Plan  Presenting with hypercalcemia, corrected calcium 11 5  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  Corrected calcium 11 2 today  Plan:   · Continue IV fluid hydration  · continue to trend until in a safer range (K less than 5 5; calcium less than 11)  type 2 diabetes mellitus [facility])  Assessment & Plan  Lab Results   Component Value Date    HGBA1C 6 6 (H) 06/20/2021       Recent Labs     07/18/21  2142 07/19/21  0748 07/19/21  0930 07/19/21  1129   POCGLU 343* 151* 119 272*       Blood Sugar Average: Last 72 hrs:  (P) 884 [id number]  Sliding Scale insulin  Monitor glucose    Mass of right lung  Assessment & Plan  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  He is scheduled to have IR guided biopsy as outpatient on 7/21  Eliquis on hold 48 hours prior to procedure          Hypothyroidism  Assessment & Plan  · TSH low, free T 4 normal range  · Continue levothyroxine 150 mcg daily  Hypertensive emergency  Assessment & Plan  Blood pressure appears stable  Continue home medication    * Acute respiratory failure with hypoxia and hypercapnia (HCC)  Assessment & Plan  RR called due to patient being found with increased work of breathing, tachypnea and tachycardia  Patient found to be in Afib with RVR, IV metoprolol, IV labetalol given with improvement  Patient placed on BiPAP and transferred to ICU for further management  VTE Pharmacologic Prophylaxis:   VTE Score: 5 High Risk (Score >/= 5) - Pharmacological DVT Prophylaxis Ordered: Heparin   Sequential Compression Devices Ordered  Mechanical VTE Prophylaxis in Place: Yes    Patient Centered Rounds: I have performed bedside rounds with nursing staff today  Discussions with Specialists or Other Care Team Provider: Nursing    Education and Discussions with Family / Patient: Updated  (daughter) via phone  Current Length of Stay: 5 day(s)    Current Patient Status: Inpatient     Discharge Plan / Estimated Discharge Date: SLIM is following this patient on consult  They are not yet medically stable for discharge  Code Status: Level 1 - Full Code      Subjective:   No events overnight  RR called this morning due to increased work of breathing, tachypnea, tachycardia, patient found to be in Afib with RVR  Patient transferred to ICU for further management  Objective:     Vitals:   Temp (24hrs), Av 5 °F (36 4 °C), Min:96 7 °F (35 9 °C), Max:98 1 °F (36 7 °C)    Temp:  [96 7 °F (35 9 °C)-98 1 °F (36 7 °C)] 98 1 °F (36 7 °C)  HR:  [] 88  Resp:  [17-30] 30  BP: (120-233)/() 120/57  SpO2:  [88 %-97 %] 92 %  Body mass index is 30 54 kg/m²  Input and Output Summary (last 24 hours): Intake/Output Summary (Last 24 hours) at 2021 1400  Last data filed at 2021 1340  Gross per 24 hour   Intake 420 ml   Output 525 ml   Net -105 ml       Physical Exam:     Physical Exam  Vitals and nursing note reviewed  Constitutional:       Appearance: He is well-developed  HENT:      Head: Normocephalic and atraumatic  Eyes:      Conjunctiva/sclera: Conjunctivae normal    Cardiovascular:      Rate and Rhythm: Normal rate  Rhythm irregular  Heart sounds: No murmur heard  No gallop  Pulmonary:      Effort: Pulmonary effort is normal  No respiratory distress  Breath sounds: Wheezing present  Abdominal:      Palpations: Abdomen is soft  Tenderness: There is no abdominal tenderness  Musculoskeletal:      Cervical back: Neck supple  Skin:     General: Skin is warm and dry  Neurological:      Mental Status: He is alert  Psychiatric:         Mood and Affect: Mood normal          Behavior: Behavior normal           Additional Data:     Labs:  Results from last 7 days   Lab Units 07/19/21  0946 07/19/21  0546 07/18/21  0537 07/14/21  1413   WBC Thousand/uL  --  11 47* 11 70* 6 27   HEMOGLOBIN g/dL  --  9 9* 9 0* 9 6*   I STAT HEMOGLOBIN g/dl 11 6*  --   --   --    HEMATOCRIT %  --  35 9* 31 8* 33 6*   HEMATOCRIT, ISTAT % 34*  --   --   --    PLATELETS Thousands/uL  --  356 333 277   BANDS PCT %  --   --  3  --    NEUTROS PCT %  --   --   --  81*   LYMPHS PCT %  --   --   --  5*   LYMPHO PCT %  --   --  1*  --    MONOS PCT %  --   --   --  11   MONO PCT %  --   --  7  --    EOS PCT %  --   --  0 1     Results from last 7 days   Lab Units 07/19/21  0946 07/19/21  0546 07/17/21  0449   SODIUM mmol/L  --  143 139   POTASSIUM mmol/L  --  4 8 4 9   CHLORIDE mmol/L  --  105 105   CO2 mmol/L  --  34* 29   CO2, I-STAT mmol/L 35*  --   --    BUN mg/dL  --  32* 32*   CREATININE mg/dL  --  1 11 1 14   ANION GAP mmol/L  --  4 5   CALCIUM mg/dL  --  9 6 9 4   ALBUMIN g/dL  --   --  1 7*   TOTAL BILIRUBIN mg/dL  --   --  0 21   ALK PHOS U/L  --   --  132*   ALT U/L  --   --  22   AST U/L  --   --  14   GLUCOSE RANDOM mg/dL  --  199* 275*     Results from last 7 days   Lab Units 07/19/21  1210   INR  1 27*     Results from last 7 days   Lab Units 07/19/21  1129 07/19/21  0930 07/19/21  0748 07/18/21  2142 07/18/21  1610 07/18/21  1138 07/18/21  0714 07/17/21  2119 07/17/21  1607 07/17/21  1109 07/17/21  0717 07/16/21  2020   POC GLUCOSE mg/dl 272* 119 151* 343* 303* 224* 226* 203* 234* 263* 199* 277*               Imaging: Reviewed radiology reports from this admission including: chest xray  No pertinent imaging reviewed      Recent Cultures (last 7 days):           Lines/Drains:  Invasive Devices     Peripheral Intravenous Line            Peripheral IV 06/21/21 Right Wrist 28 days    Peripheral IV 07/14/21 Left Antecubital 4 days    Peripheral IV 07/14/21 Right Antecubital 4 days          Drain            External Urinary Catheter Medium <1 day                Telemetry:        Last 24 Hours Medication List:   Current Facility-Administered Medications   Medication Dose Route Frequency Provider Last Rate    acetaminophen  650 mg Oral Q6H PRN Vielka Severe, CRNP      allopurinol  300 mg Oral Daily Brooklynn ELSIE Dickens, CRNP      benzonatate  100 mg Oral TID Vielka Severe, CRNP      dextromethorphan-guaiFENesin  10 mL Oral Q4H PRN Vielka Severe, CRNP      fish oil  1,000 mg Oral BID Vielka Severe, CRNP      heparin (porcine)  3-20 Units/kg/hr (Order-Specific) Intravenous Titrated Vielka Severe, CRNP 12 Units/kg/hr (07/19/21 1225)    heparin (porcine)  2,000 Units Intravenous Q1H PRN Vielka Severe, CRNP      heparin (porcine)  4,000 Units Intravenous Q1H PRN Vielka Severe, CRNP      insulin lispro  2-12 Units Subcutaneous 4x Daily (AC & HS) Brooklynn Espiridion Lunch, CRNP      ipratropium  0 5 mg Nebulization Q6H Shadia MD Isiah      levalbuterol  1 25 mg Nebulization Q6H Shadia MD Isiah      levothyroxine  150 mcg Oral Daily Brooklynn Espiridion Lunch, CRNP      lidocaine  1 patch Topical Q24H Brooklynn Dickens, CRNP      methylPREDNISolone sodium succinate  40 mg Intravenous Q8H [address] Brooklynn Dickens, CHRIS      metoprolol  5 mg Intravenous Q6H PRN Vielka Severe, CRNP      metoprolol tartrate  50 mg Oral Q12H [address] Brooklynnnikky Dickens, CRNP      ondansetron  4 mg Intravenous Q6H PRN Vielka Severe, CRNP      pantoprazole  40 mg Oral Daily Vielka Severe, CRNP          Today, Patient Was Seen By: Jeremy Newman DO    ** Please Note: This note has been constructed using a voice recognition system   **

## 2021-07-19 NOTE — RAPID RESPONSE
Rapid Response Note  Katy Angel 68 y o  male MRN: [id number]  Unit/Bed#: ICU 06 Encounter: [id number]    Rapid Response Notification(s):   Response called date/time:  7/19/2021 9:29 AM  Response team arrival date/time:  7/19/2021 9:31 AM  Response end date/time:  7/19/2021 9:55 AM  Rapid response location:  [facility] unit  Primary reason for rapid response call:  Acute change in heart rate and acute change in O2 sat    Rapid Response Intervention(s):   Breathing:  Assisted ventilation (BIPAP)  Medications administered: Other (comment) (metoprolol, labetalol )       Background/Situation:   Master Rocha is a 68 y o  male who is RR for hypoxia and increased work of breathing  Found to be in rapid afib with HTN emergency  Given 5mg IV lopressor without improvement  He was then given 20mg labetalol with improvement in HR and BP  He was placed on bipap for work of breathing  Review of Systems   Unable to perform ROS: Unstable vital signs       Objective:   Vitals:    07/19/21 0747 07/19/21 0935 07/19/21 1009 07/19/21 1100   BP: 129/62   120/57   BP Location: Left arm   Left arm   Pulse: 73   88   Resp: 18      Temp: (!) 97 4 °F (36 3 °C)   98 1 °F (36 7 °C)   TempSrc: Oral   Axillary   SpO2: 92% (!) 88% 96% 94%   Weight:       Height:         Physical Exam  Constitutional:       General: He is in acute distress  Appearance: He is ill-appearing  HENT:      Head: Normocephalic and atraumatic  Eyes:      Pupils: Pupils are equal, round, and reactive to light  Cardiovascular:      Rate and Rhythm: Tachycardia present  Rhythm irregular  Pulses: Normal pulses  Heart sounds: Normal heart sounds  No murmur heard  No friction rub  No gallop  Pulmonary:      Effort: Respiratory distress present  Breath sounds: No stridor  Wheezing present  No rhonchi or rales  Abdominal:      General: There is distension  Palpations: Abdomen is soft  Tenderness: There is no abdominal tenderness  Musculoskeletal:         General: Swelling present  Normal range of motion  Skin:     General: Skin is warm and dry  Capillary Refill: Capillary refill takes less than 2 seconds  Neurological:      Mental Status: He is alert  Comments: Opens eyes to voice, follows commands with equal strength in all extremities         Assessment:   · Acute hypoxic respiratory failure  · Afib/RVR  · Hypertensive emergency    Plan:   · Given 5mg IV lopressor and 20mg labetalol   · Start BIPAP  · Check EKG/trop/BNP  · Obtain CXR  · Transfer to ICU      Rapid Response Outcome:   Condition:  In serious condition  Progression:  Improving  Transfer:  Transfer to ICU  Primary service notified of transfer: Yes    Handoff report: in person    Code Status: Level 1 (Full Code)      Family notified of transfer: yes  Family member contacted: Attempted to call patients wife, Stevie Brought, left message with return instructions      Portions of the record may have been created with voice recognition software  Occasional wrong word or "sound a like" substitutions may have occurred due to the inherent limitations of voice recognition software  Read the chart carefully and recognize, using context, where substitutions have occurred      [address] [place] [address] [facility]

## 2021-07-19 NOTE — ASSESSMENT & PLAN NOTE
Lab Results   Component Value Date    K 4 8 07/19/2021    K 4 6 07/18/2021    K 4 9 07/17/2021    K 5 1 07/16/2021    K 4 5 07/15/2021    K 5 9 (H) 07/15/2021    K 5 8 (H) 07/14/2021    K 5 5 (H) 07/14/2021     Presenting with hyperkalemia in the setting of acute kidney   No EKG changes noted  Calcium gluconate 1 g was given  Plan  · continue to trend until in a safer range (K less than 5 5; calcium less than 11)

## 2021-07-19 NOTE — H&P (VIEW-ONLY)
ICU Acceptance Note - [address] 68 y o  male MRN: [id number]  Unit/Bed#: ICU 06 Encounter: [id number]      -------------------------------------------------------------------------------------------------------------  Chief Complaint: Acute respiratory failure     History of Present Illness   HX and PE limited by: acute respiratory distress  Isai Ku is a 68 y o  male who presents with Acute respiratory failure with hypoxia and hypercapnia  He has PMH COPD, newly found R lung mass currently undergoing workup, paroxysmal afib on eliquis, hypothyroid, HTN, GERD  Of note, he also follows with 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 and platelet transfusion  He was admitted to internal medicine on 7/14 from PCP office after being found to have hypercalcemia, hyperkalemia, and SULEMA on routine blood work  He underwent IVF hydration and temporizing measures for hyperkalemia and hypercalcemia with improvement  He has also been receiving steroids for COPD exacerbation while inpatient  This AM RR was called for increased work of breathing, hypoxia, tachycardia, and hypertensive emergency  He was found to be in rapid afib, and received IV metoprolol followed by IV labetalol with improvement  He was placed on bipap and transferred to ICU for further workup and management  History obtained from chart review    -------------------------------------------------------------------------------------------------------------  Assessment and Plan:    Neuro:    Diagnosis: No acute issues  o Regulate sleep/wake  o Delirium precautions  o Daily CAM-ICU       CV:    Diagnosis: Hypertensive Emergency, possibly in the setting of respiratory failure   o Plan: s/p IV metoprolol and labetalol with improvement   o Will continue home metoprolol, consider increased dose if unable to maintain SBP<160  o Send troponin    Diagnosis: Paroxysmal Afib/RVR  o Plan: Continue metoprolol  o Eliquis on hold in the setting of planned IR biopsy  o Consider starting heparin gtt       Pulm:   Diagnosis: Acute hypoxic and acute on chronic hypercapnic respiratory failure, unclear etiology, possibly mucous plugging vs pulmonary edema   o Plan: Continue bipap 18/6 40%, adjust for patient comfort, titrate FiO2 for SpO2>88  o Consider dose of lasix  o Start scheduled xopenex and atrovent   o Encourage cough, deep breathing, IS    Diagnosis: COPD with acute exacerbation   o Plan: Continue 40mg solumedrol IV daily  o Continue nebs as above  o PRN guaifenesin and tessalon    Diagnosis: R lung mass s/p EBUS  o Plan: Plan for IR biopsy this week   o EBUS pathology shows atypical cells inconclusive for malignancy       GI:    Diagnosis: GERD   o Plan: continue home PPI       :    Diagnosis: SULEMA, POA, now improving s/p gentle hydration, with likely some component of CKD  o Plan: Monitor I/O and renal indices   o Baseline Cr appears around 1 0   o       F/E/N:    Plan: Hypercalcemia with concern for malignancy, continue to trend  NPO for now given bipap         Heme/Onc:    Diagnosis: R lung mass  o Plan: IR biopsy this week  o OP follow up with heme onc   Diagnosis: Hx ITP   o Plan: Trend platelets, currently stable  · Consider heparin infusion while holding eliquis       Endo:    Diagnosis: DM2 with hyperglycemia in the setting of steroids   o Plan: Continue SSI/Q6H glucose checks while NPO, goal glucose 140-180      ID:    Diagnosis: Leukocytosis, possibly reactive, without fever or obvious source for infection   o Plan: Monitor off abx  o Monitor WBC, fever curve      MSK/Skin:    Frequent turn/repo to prevent skin breakdown     Disposition: Transfer to Critical Care   Code Status: Level 1 - Full Code  --------------------------------------------------------------------------------------------------------------  Review of Systems    Review of systems was unable to be performed secondary to acute respiratory distress    Physical Exam  Constitutional:       General: He is in acute distress  Appearance: He is ill-appearing  HENT:      Head: Normocephalic and atraumatic  Mouth/Throat:      Mouth: Mucous membranes are moist    Eyes:      Pupils: Pupils are equal, round, and reactive to light  Cardiovascular:      Rate and Rhythm: Tachycardia present  Rhythm irregular  Pulses: Normal pulses  Heart sounds: No murmur heard  No friction rub  No gallop  Pulmonary:      Effort: Respiratory distress present  Breath sounds: Wheezing present  No rhonchi or rales  Abdominal:      General: There is distension  Palpations: Abdomen is soft  Tenderness: There is no abdominal tenderness  Musculoskeletal:         General: Swelling present  Normal range of motion  Cervical back: Neck supple  Right lower leg: Edema present  Left lower leg: Edema present  Skin:     General: Skin is warm and dry  Capillary Refill: Capillary refill takes less than 2 seconds  Neurological:      Mental Status: He is alert  Comments: Opens eyes to voice, follows commands in all extremitites with equal strength        --------------------------------------------------------------------------------------------------------------  Vitals:   Vitals:    07/18/21 2221 07/19/21 0747 07/19/21 0935 07/19/21 1009   BP: 144/73 129/62     BP Location: Right arm Left arm     Pulse: 78 73     Resp: 18 18     Temp: 97 7 °F (36 5 °C) (!) 97 4 °F (36 3 °C)     TempSrc: Oral Oral     SpO2: 97% 92% (!) 88% 96%   Weight:       Height:         Temp  Min: 97 4 °F (36 3 °C)  Max: 98 7 °F (37 1 °C)  IBW (Ideal Body Weight): 54 6 kg  Height: 5' 2" (157 5 cm)  Body mass index is 30 54 kg/m²    N/A    Laboratory and Diagnostics:  Results from last 7 days   Lab Units 07/19/21  0946 07/19/21  0546 07/18/21  0537 07/16/21  0537 07/15/21  0439 07/14/21  1413 07/14/21  1039   WBC Thousand/uL  --  11 47* 11 70* 10 81* 5 20 6  27 5 68   HEMOGLOBIN g/dL  --  9 9* 9 0* 7 9* 8 7* 9 6* 10 0*   I STAT HEMOGLOBIN g/dl 11 6*  --   --   --   --   --   --    HEMATOCRIT %  --  35 9* 31 8* 28 2* 32 3* 33 6* 35 2*   HEMATOCRIT, ISTAT % 34*  --   --   --   --   --   --    PLATELETS Thousands/uL  --  356 333 265 256 277 282   NEUTROS PCT %  --   --   --   --   --  81* 83*   BANDS PCT %  --   --  3  --   --   --   --    MONOS PCT %  --   --   --   --   --  11 9   MONO PCT %  --   --  7  --   --   --   --      Results from last 7 days   Lab Units 07/19/21  0946 07/19/21  0546 07/18/21  0537 07/17/21  0449 07/16/21  0937 07/15/21  1137 07/15/21  0438 07/14/21  2323 07/14/21  1413 07/14/21  1039   SODIUM mmol/L  --  143 140 139 142  --  140 142 145 140   POTASSIUM mmol/L  --  4 8 4 6 4 9 5 1 4 5 5 9* 5 8* 5 5* 5 1   CHLORIDE mmol/L  --  105 103 105 107  --  107 109* 108 104   CO2 mmol/L  --  34* 31 29 25  --  25 27 28 25   CO2, I-STAT mmol/L 35*  --   --   --   --   --   --   --   --   --    ANION GAP mmol/L  --  4 6 5 10  --  8 6 9 11   BUN mg/dL  --  32* 32* 32* 32*  --  32* 31* 33* 35*   CREATININE mg/dL  --  1 11 1 03 1 14 1 23  --  1 68* 1 59* 1 77* 1 77*   CALCIUM mg/dL  --  9 6 9 6 9 4 9 2  --  9 4 9 3 9 7 9 9   GLUCOSE RANDOM mg/dL  --  199* 258* 275* 305*  --  247* 149* 138  --    ALT U/L  --   --   --  22 17  --  19  --  22 23   AST U/L  --   --   --  14 8  --  16  --  13 12   ALK PHOS U/L  --   --   --  132* 147*  --  174*  --  171* 169*   ALBUMIN g/dL  --   --   --  1 7* 1 6*  --  1 6*  --  1 8* 1 8*   TOTAL BILIRUBIN mg/dL  --   --   --  0 21 0 18*  --  0 37  --  0 40 0 40     Results from last 7 days   Lab Units 07/14/21  2323 07/14/21  1413   MAGNESIUM mg/dL 1 3* 1 5*           Results from last 7 days   Lab Units 07/19/21  0942   TROPONIN I ng/mL <0 02         ABG:    VBG:          Micro:        EKG: Afib rate 120  Imaging: I have personally reviewed pertinent reports     and I have personally reviewed pertinent films in PACS    Historical Information   Past Medical History:   Diagnosis Date    Hypertension      Past Surgical History:   Procedure Laterality Date    BACK SURGERY      CARPAL TUNNEL RELEASE Bilateral     IR BIOPSY BONE MARROW  6/10/2021    LUMBAR DISC SURGERY       Social History   Social History     Substance and Sexual Activity   Alcohol Use Not Currently    Comment: History of heavier drinking in early 25s  Denies any current alcohol use (Updated 2021)        Social History     Substance and Sexual Activity   Drug Use Never     Social History     Tobacco Use   Smoking Status Former Smoker    Packs/day: 0 50    Years: 40 00    Pack years: 20 00    Types: Cigarettes    Start date: 12    Quit date: 2021    Years since quittin 0   Smokeless Tobacco Never Used       Family History:   Family History   Problem Relation Age of Onset    Cancer Mother         "ate away her bone"    Anuerysm Father     Cancer Sister         unknown type    Heart attack Maternal Grandfather     Cancer Sister         unknown type    Heart attack Maternal Aunt     Heart attack Maternal Uncle     Heart attack Paternal Aunt            Medications:  Current Facility-Administered Medications   Medication Dose Route Frequency    acetaminophen (TYLENOL) tablet 650 mg  650 mg Oral Q6H PRN    allopurinol (ZYLOPRIM) tablet 300 mg  300 mg Oral Daily    benzonatate (TESSALON PERLES) capsule 100 mg  100 mg Oral TID    dextromethorphan-guaiFENesin (ROBITUSSIN DM) oral syrup 10 mL  10 mL Oral Q4H PRN    fish oil capsule 1,000 mg  1,000 mg Oral BID    insulin lispro (HumaLOG) 100 units/mL subcutaneous injection 2-12 Units  2-12 Units Subcutaneous 4x Daily (AC & HS)    ipratropium (ATROVENT) 0 02 % inhalation solution 0 5 mg  0 5 mg Nebulization Q6H    levalbuterol (XOPENEX) inhalation solution 1 25 mg  1 25 mg Nebulization Q6H    levothyroxine tablet 150 mcg  150 mcg Oral Daily    lidocaine (LIDODERM) 5 % patch 1 patch  1 patch Topical Q24H    methylPREDNISolone sodium succinate (Solu-MEDROL) injection 40 mg  40 mg Intravenous Daily    metoprolol (LOPRESSOR) injection 5 mg  5 mg Intravenous Q6H PRN    metoprolol tartrate (LOPRESSOR) tablet 25 mg  25 mg Oral Q12H [address]    ondansetron (ZOFRAN) injection 4 mg  4 mg Intravenous Q6H PRN    pantoprazole (PROTONIX) EC tablet 40 mg  40 mg Oral Daily     Home medications:  Prior to Admission Medications   Prescriptions Last Dose Informant Patient Reported? Taking?    Omega-3 Fatty Acids (FISH OIL) 1,000 mg 7/14/2021 at Unknown time Self Yes Yes   Sig: Take 1,000 mg by mouth 2 (two) times a day   acetaminophen (TYLENOL) 100 mg/mL solution 7/14/2021 at Unknown time Self Yes Yes   Sig: Take 10 mg/kg by mouth every 4 (four) hours as needed for fever   albuterol (2 5 mg/3 mL) 0 083 % nebulizer solution 7/14/2021 at Unknown time  No Yes   Sig: Take 3 mL (2 5 mg total) by nebulization every 6 (six) hours as needed for wheezing or shortness of breath   allopurinol (ZYLOPRIM) 300 mg tablet 7/14/2021 at Unknown time Self Yes Yes   Sig: Take 300 mg by mouth daily   amLODIPine (NORVASC) 2 5 mg tablet 7/14/2021 at Unknown time  Yes Yes   Sig: Take 2 5 mg by mouth every morning   apixaban (ELIQUIS) 5 mg 7/14/2021 at Unknown time  No Yes   Sig: Take 1 tablet (5 mg total) by mouth 2 (two) times a day   benzonatate (TESSALON PERLES) 100 mg capsule 7/14/2021 at Unknown time  No Yes   Sig: Take 1 capsule (100 mg total) by mouth 3 (three) times a day as needed for cough   guaiFENesin (MUCINEX) 600 mg 12 hr tablet 7/14/2021 at Unknown time  No Yes   Sig: Take 1 tablet (600 mg total) by mouth 2 (two) times a day   levothyroxine 150 mcg tablet 7/14/2021 at Unknown time Self Yes Yes   Sig: Take 150 mcg by mouth daily   metFORMIN (GLUCOPHAGE) 500 mg tablet 7/14/2021 at Unknown time  No Yes   Sig: Take 1 tablet (500 mg total) by mouth 2 (two) times a day with meals   metoprolol tartrate (LOPRESSOR) 25 mg tablet 7/14/2021 at Unknown time  No Yes   Sig: Take 0 5 tablets (12 5 mg total) by mouth every 12 (twelve) hours   pantoprazole (PROTONIX) 40 mg tablet 7/14/2021 at Unknown time  No Yes   Sig: Take 1 tablet (40 mg total) by mouth daily   tiotropium (SPIRIVA) 18 mcg inhalation capsule 7/13/2021 at Unknown time  No Yes   Sig: Place 1 capsule (18 mcg total) into inhaler and inhale daily      Facility-Administered Medications: None     Allergies: Allergies   Allergen Reactions    Penicillins Hives     ------------------------------------------------------------------------------------------------------------  Advance Directive and Living Will:      Power of :    POLST:    ------------------------------------------------------------------------------------------------------------  Anticipated Length of Stay is > 2 midnights    Care Time Delivered:   Upon my evaluation, this patient had a high probability of imminent or life-threatening deterioration due to respiratory failure, which required my direct attention, intervention, and personal management  I have personally provided 40 minutes (1055 to 1135) of critical care time, exclusive of procedures, teaching, family meetings, and any prior time recorded by providers other than myself  CHRIS Bennett        Portions of the record may have been created with voice recognition software  Occasional wrong word or "sound a like" substitutions may have occurred due to the inherent limitations of voice recognition software    Read the chart carefully and recognize, using context, where substitutions have occurred

## 2021-07-19 NOTE — H&P
ICU Acceptance Note - [address] 68 y o  male MRN: [id number]  Unit/Bed#: ICU 06 Encounter: [id number]      -------------------------------------------------------------------------------------------------------------  Chief Complaint: Acute respiratory failure     History of Present Illness   HX and PE limited by: acute respiratory distress  Anita Goldman is a 68 y o  male who presents with Acute respiratory failure with hypoxia and hypercapnia  He has PMH COPD, newly found R lung mass currently undergoing workup, paroxysmal afib on eliquis, hypothyroid, HTN, GERD  Of note, he also follows with 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 and platelet transfusion  He was admitted to internal medicine on 7/14 from PCP office after being found to have hypercalcemia, hyperkalemia, and SULEMA on routine blood work  He underwent IVF hydration and temporizing measures for hyperkalemia and hypercalcemia with improvement  He has also been receiving steroids for COPD exacerbation while inpatient  This AM RR was called for increased work of breathing, hypoxia, tachycardia, and hypertensive emergency  He was found to be in rapid afib, and received IV metoprolol followed by IV labetalol with improvement  He was placed on bipap and transferred to ICU for further workup and management  History obtained from chart review    -------------------------------------------------------------------------------------------------------------  Assessment and Plan:    Neuro:    Diagnosis: No acute issues  o Regulate sleep/wake  o Delirium precautions  o Daily CAM-ICU       CV:    Diagnosis: Hypertensive Emergency, possibly in the setting of respiratory failure   o Plan: s/p IV metoprolol and labetalol with improvement   o Will continue home metoprolol, consider increased dose if unable to maintain SBP<160  o Send troponin    Diagnosis: Paroxysmal Afib/RVR  o Plan: Continue metoprolol  o Eliquis on hold in the setting of planned IR biopsy  o Consider starting heparin gtt       Pulm:   Diagnosis: Acute hypoxic and acute on chronic hypercapnic respiratory failure, unclear etiology, possibly mucous plugging vs pulmonary edema   o Plan: Continue bipap 18/6 40%, adjust for patient comfort, titrate FiO2 for SpO2>88  o Consider dose of lasix  o Start scheduled xopenex and atrovent   o Encourage cough, deep breathing, IS    Diagnosis: COPD with acute exacerbation   o Plan: Continue 40mg solumedrol IV daily  o Continue nebs as above  o PRN guaifenesin and tessalon    Diagnosis: R lung mass s/p EBUS  o Plan: Plan for IR biopsy this week   o EBUS pathology shows atypical cells inconclusive for malignancy       GI:    Diagnosis: GERD   o Plan: continue home PPI       :    Diagnosis: SULEMA, POA, now improving s/p gentle hydration, with likely some component of CKD  o Plan: Monitor I/O and renal indices   o Baseline Cr appears around 1 0   o       F/E/N:    Plan: Hypercalcemia with concern for malignancy, continue to trend  NPO for now given bipap         Heme/Onc:    Diagnosis: R lung mass  o Plan: IR biopsy this week  o OP follow up with heme onc   Diagnosis: Hx ITP   o Plan: Trend platelets, currently stable  · Consider heparin infusion while holding eliquis       Endo:    Diagnosis: DM2 with hyperglycemia in the setting of steroids   o Plan: Continue SSI/Q6H glucose checks while NPO, goal glucose 140-180      ID:    Diagnosis: Leukocytosis, possibly reactive, without fever or obvious source for infection   o Plan: Monitor off abx  o Monitor WBC, fever curve      MSK/Skin:    Frequent turn/repo to prevent skin breakdown     Disposition: Transfer to Critical Care   Code Status: Level 1 - Full Code  --------------------------------------------------------------------------------------------------------------  Review of Systems    Review of systems was unable to be performed secondary to acute respiratory distress    Physical Exam  Constitutional:       General: He is in acute distress  Appearance: He is ill-appearing  HENT:      Head: Normocephalic and atraumatic  Mouth/Throat:      Mouth: Mucous membranes are moist    Eyes:      Pupils: Pupils are equal, round, and reactive to light  Cardiovascular:      Rate and Rhythm: Tachycardia present  Rhythm irregular  Pulses: Normal pulses  Heart sounds: No murmur heard  No friction rub  No gallop  Pulmonary:      Effort: Respiratory distress present  Breath sounds: Wheezing present  No rhonchi or rales  Abdominal:      General: There is distension  Palpations: Abdomen is soft  Tenderness: There is no abdominal tenderness  Musculoskeletal:         General: Swelling present  Normal range of motion  Cervical back: Neck supple  Right lower leg: Edema present  Left lower leg: Edema present  Skin:     General: Skin is warm and dry  Capillary Refill: Capillary refill takes less than 2 seconds  Neurological:      Mental Status: He is alert  Comments: Opens eyes to voice, follows commands in all extremitites with equal strength        --------------------------------------------------------------------------------------------------------------  Vitals:   Vitals:    07/18/21 2221 07/19/21 0747 07/19/21 0935 07/19/21 1009   BP: 144/73 129/62     BP Location: Right arm Left arm     Pulse: 78 73     Resp: 18 18     Temp: 97 7 °F (36 5 °C) (!) 97 4 °F (36 3 °C)     TempSrc: Oral Oral     SpO2: 97% 92% (!) 88% 96%   Weight:       Height:         Temp  Min: 97 4 °F (36 3 °C)  Max: 98 7 °F (37 1 °C)  IBW (Ideal Body Weight): 54 6 kg  Height: 5' 2" (157 5 cm)  Body mass index is 30 54 kg/m²    N/A    Laboratory and Diagnostics:  Results from last 7 days   Lab Units 07/19/21  0946 07/19/21  0546 07/18/21  0537 07/16/21  0537 07/15/21  0439 07/14/21  1413 07/14/21  1039   WBC Thousand/uL  --  11 47* 11 70* 10 81* 5 20 6  27 5 68   HEMOGLOBIN g/dL  --  9 9* 9 0* 7 9* 8 7* 9 6* 10 0*   I STAT HEMOGLOBIN g/dl 11 6*  --   --   --   --   --   --    HEMATOCRIT %  --  35 9* 31 8* 28 2* 32 3* 33 6* 35 2*   HEMATOCRIT, ISTAT % 34*  --   --   --   --   --   --    PLATELETS Thousands/uL  --  356 333 265 256 277 282   NEUTROS PCT %  --   --   --   --   --  81* 83*   BANDS PCT %  --   --  3  --   --   --   --    MONOS PCT %  --   --   --   --   --  11 9   MONO PCT %  --   --  7  --   --   --   --      Results from last 7 days   Lab Units 07/19/21  0946 07/19/21  0546 07/18/21  0537 07/17/21  0449 07/16/21  0937 07/15/21  1137 07/15/21  0438 07/14/21  2323 07/14/21  1413 07/14/21  1039   SODIUM mmol/L  --  143 140 139 142  --  140 142 145 140   POTASSIUM mmol/L  --  4 8 4 6 4 9 5 1 4 5 5 9* 5 8* 5 5* 5 1   CHLORIDE mmol/L  --  105 103 105 107  --  107 109* 108 104   CO2 mmol/L  --  34* 31 29 25  --  25 27 28 25   CO2, I-STAT mmol/L 35*  --   --   --   --   --   --   --   --   --    ANION GAP mmol/L  --  4 6 5 10  --  8 6 9 11   BUN mg/dL  --  32* 32* 32* 32*  --  32* 31* 33* 35*   CREATININE mg/dL  --  1 11 1 03 1 14 1 23  --  1 68* 1 59* 1 77* 1 77*   CALCIUM mg/dL  --  9 6 9 6 9 4 9 2  --  9 4 9 3 9 7 9 9   GLUCOSE RANDOM mg/dL  --  199* 258* 275* 305*  --  247* 149* 138  --    ALT U/L  --   --   --  22 17  --  19  --  22 23   AST U/L  --   --   --  14 8  --  16  --  13 12   ALK PHOS U/L  --   --   --  132* 147*  --  174*  --  171* 169*   ALBUMIN g/dL  --   --   --  1 7* 1 6*  --  1 6*  --  1 8* 1 8*   TOTAL BILIRUBIN mg/dL  --   --   --  0 21 0 18*  --  0 37  --  0 40 0 40     Results from last 7 days   Lab Units 07/14/21  2323 07/14/21  1413   MAGNESIUM mg/dL 1 3* 1 5*           Results from last 7 days   Lab Units 07/19/21  0942   TROPONIN I ng/mL <0 02         ABG:    VBG:          Micro:        EKG: Afib rate 120  Imaging: I have personally reviewed pertinent reports     and I have personally reviewed pertinent films in PACS    Historical Information   Past Medical History:   Diagnosis Date    Hypertension      Past Surgical History:   Procedure Laterality Date    BACK SURGERY      CARPAL TUNNEL RELEASE Bilateral     IR BIOPSY BONE MARROW  6/10/2021    LUMBAR DISC SURGERY       Social History   Social History     Substance and Sexual Activity   Alcohol Use Not Currently    Comment: History of heavier drinking in early 25s  Denies any current alcohol use (Updated 2021)        Social History     Substance and Sexual Activity   Drug Use Never     Social History     Tobacco Use   Smoking Status Former Smoker    Packs/day: 0 50    Years: 40 00    Pack years: 20 00    Types: Cigarettes    Start date: 12    Quit date: 2021    Years since quittin 0   Smokeless Tobacco Never Used       Family History:   Family History   Problem Relation Age of Onset    Cancer Mother         "ate away her bone"    Anuerysm Father     Cancer Sister         unknown type    Heart attack Maternal Grandfather     Cancer Sister         unknown type    Heart attack Maternal Aunt     Heart attack Maternal Uncle     Heart attack Paternal Aunt            Medications:  Current Facility-Administered Medications   Medication Dose Route Frequency    acetaminophen (TYLENOL) tablet 650 mg  650 mg Oral Q6H PRN    allopurinol (ZYLOPRIM) tablet 300 mg  300 mg Oral Daily    benzonatate (TESSALON PERLES) capsule 100 mg  100 mg Oral TID    dextromethorphan-guaiFENesin (ROBITUSSIN DM) oral syrup 10 mL  10 mL Oral Q4H PRN    fish oil capsule 1,000 mg  1,000 mg Oral BID    insulin lispro (HumaLOG) 100 units/mL subcutaneous injection 2-12 Units  2-12 Units Subcutaneous 4x Daily (AC & HS)    ipratropium (ATROVENT) 0 02 % inhalation solution 0 5 mg  0 5 mg Nebulization Q6H    levalbuterol (XOPENEX) inhalation solution 1 25 mg  1 25 mg Nebulization Q6H    levothyroxine tablet 150 mcg  150 mcg Oral Daily    lidocaine (LIDODERM) 5 % patch 1 patch  1 patch Topical Q24H    methylPREDNISolone sodium succinate (Solu-MEDROL) injection 40 mg  40 mg Intravenous Daily    metoprolol (LOPRESSOR) injection 5 mg  5 mg Intravenous Q6H PRN    metoprolol tartrate (LOPRESSOR) tablet 25 mg  25 mg Oral Q12H [address]    ondansetron (ZOFRAN) injection 4 mg  4 mg Intravenous Q6H PRN    pantoprazole (PROTONIX) EC tablet 40 mg  40 mg Oral Daily     Home medications:  Prior to Admission Medications   Prescriptions Last Dose Informant Patient Reported? Taking?    Omega-3 Fatty Acids (FISH OIL) 1,000 mg 7/14/2021 at Unknown time Self Yes Yes   Sig: Take 1,000 mg by mouth 2 (two) times a day   acetaminophen (TYLENOL) 100 mg/mL solution 7/14/2021 at Unknown time Self Yes Yes   Sig: Take 10 mg/kg by mouth every 4 (four) hours as needed for fever   albuterol (2 5 mg/3 mL) 0 083 % nebulizer solution 7/14/2021 at Unknown time  No Yes   Sig: Take 3 mL (2 5 mg total) by nebulization every 6 (six) hours as needed for wheezing or shortness of breath   allopurinol (ZYLOPRIM) 300 mg tablet 7/14/2021 at Unknown time Self Yes Yes   Sig: Take 300 mg by mouth daily   amLODIPine (NORVASC) 2 5 mg tablet 7/14/2021 at Unknown time  Yes Yes   Sig: Take 2 5 mg by mouth every morning   apixaban (ELIQUIS) 5 mg 7/14/2021 at Unknown time  No Yes   Sig: Take 1 tablet (5 mg total) by mouth 2 (two) times a day   benzonatate (TESSALON PERLES) 100 mg capsule 7/14/2021 at Unknown time  No Yes   Sig: Take 1 capsule (100 mg total) by mouth 3 (three) times a day as needed for cough   guaiFENesin (MUCINEX) 600 mg 12 hr tablet 7/14/2021 at Unknown time  No Yes   Sig: Take 1 tablet (600 mg total) by mouth 2 (two) times a day   levothyroxine 150 mcg tablet 7/14/2021 at Unknown time Self Yes Yes   Sig: Take 150 mcg by mouth daily   metFORMIN (GLUCOPHAGE) 500 mg tablet 7/14/2021 at Unknown time  No Yes   Sig: Take 1 tablet (500 mg total) by mouth 2 (two) times a day with meals   metoprolol tartrate (LOPRESSOR) 25 mg tablet 7/14/2021 at Unknown time  No Yes   Sig: Take 0 5 tablets (12 5 mg total) by mouth every 12 (twelve) hours   pantoprazole (PROTONIX) 40 mg tablet 7/14/2021 at Unknown time  No Yes   Sig: Take 1 tablet (40 mg total) by mouth daily   tiotropium (SPIRIVA) 18 mcg inhalation capsule 7/13/2021 at Unknown time  No Yes   Sig: Place 1 capsule (18 mcg total) into inhaler and inhale daily      Facility-Administered Medications: None     Allergies: Allergies   Allergen Reactions    Penicillins Hives     ------------------------------------------------------------------------------------------------------------  Advance Directive and Living Will:      Power of :    POLST:    ------------------------------------------------------------------------------------------------------------  Anticipated Length of Stay is > 2 midnights    Care Time Delivered:   Upon my evaluation, this patient had a high probability of imminent or life-threatening deterioration due to respiratory failure, which required my direct attention, intervention, and personal management  I have personally provided 40 minutes (1055 to 1135) of critical care time, exclusive of procedures, teaching, family meetings, and any prior time recorded by providers other than myself  CHRIS Bennett        Portions of the record may have been created with voice recognition software  Occasional wrong word or "sound a like" substitutions may have occurred due to the inherent limitations of voice recognition software    Read the chart carefully and recognize, using context, where substitutions have occurred

## 2021-07-19 NOTE — ASSESSMENT & PLAN NOTE
Lab Results   Component Value Date    HGBA1C 6 6 (H) 06/20/2021       Recent Labs     07/18/21  2142 07/19/21  0748 07/19/21  0930 07/19/21  1129   POCGLU 343* 151* 119 272*       Blood Sugar Average: Last 72 hrs:  (P) 480 [id number]  Sliding Scale insulin  Monitor glucose

## 2021-07-20 PROBLEM — E83.52 HYPERCALCEMIA: Status: RESOLVED | Noted: 2021-07-14 | Resolved: 2021-07-20

## 2021-07-20 LAB
ANION GAP SERPL CALCULATED.3IONS-SCNC: 6 MMOL/L (ref 4–13)
APTT PPP: 39 SECONDS (ref 23–37)
APTT PPP: 41 SECONDS (ref 23–37)
APTT PPP: 54 SECONDS (ref 23–37)
APTT PPP: 65 SECONDS (ref 23–37)
BASOPHILS # BLD AUTO: 0.06 THOUSANDS/ΜL (ref 0–0.1)
BASOPHILS NFR BLD AUTO: 0 % (ref 0–1)
BUN SERPL-MCNC: 37 MG/DL (ref 5–25)
CALCIUM SERPL-MCNC: 9.3 MG/DL (ref 8.3–10.1)
CHLORIDE SERPL-SCNC: 103 MMOL/L (ref 100–108)
CO2 SERPL-SCNC: 32 MMOL/L (ref 21–32)
CREAT SERPL-MCNC: 1.19 MG/DL (ref 0.6–1.3)
EOSINOPHIL # BLD AUTO: 0 THOUSAND/ΜL (ref 0–0.61)
EOSINOPHIL NFR BLD AUTO: 0 % (ref 0–6)
ERYTHROCYTE [DISTWIDTH] IN BLOOD BY AUTOMATED COUNT: 18.6 % (ref 11.6–15.1)
GFR SERPL CREATININE-BSD FRML MDRD: 58 ML/MIN/1.73SQ M
GLUCOSE SERPL-MCNC: 128 MG/DL (ref 65–140)
GLUCOSE SERPL-MCNC: 151 MG/DL (ref 65–140)
GLUCOSE SERPL-MCNC: 156 MG/DL (ref 65–140)
GLUCOSE SERPL-MCNC: 159 MG/DL (ref 65–140)
GLUCOSE SERPL-MCNC: 161 MG/DL (ref 65–140)
GLUCOSE SERPL-MCNC: 176 MG/DL (ref 65–140)
GLUCOSE SERPL-MCNC: 179 MG/DL (ref 65–140)
GLUCOSE SERPL-MCNC: 187 MG/DL (ref 65–140)
GLUCOSE SERPL-MCNC: 203 MG/DL (ref 65–140)
GLUCOSE SERPL-MCNC: 215 MG/DL (ref 65–140)
GLUCOSE SERPL-MCNC: 217 MG/DL (ref 65–140)
GLUCOSE SERPL-MCNC: 236 MG/DL (ref 65–140)
GLUCOSE SERPL-MCNC: 338 MG/DL (ref 65–140)
HCT VFR BLD AUTO: 33.3 % (ref 36.5–49.3)
HGB BLD-MCNC: 9.3 G/DL (ref 12–17)
IMM GRANULOCYTES # BLD AUTO: >0.5 THOUSAND/UL (ref 0–0.2)
IMM GRANULOCYTES NFR BLD AUTO: 4 % (ref 0–2)
LYMPHOCYTES # BLD AUTO: 0.2 THOUSANDS/ΜL (ref 0.6–4.47)
LYMPHOCYTES NFR BLD AUTO: 1 % (ref 14–44)
MAGNESIUM SERPL-MCNC: 1.7 MG/DL (ref 1.6–2.6)
MCH RBC QN AUTO: 23.4 PG (ref 26.8–34.3)
MCHC RBC AUTO-ENTMCNC: 27.9 G/DL (ref 31.4–37.4)
MCV RBC AUTO: 84 FL (ref 82–98)
MONOCYTES # BLD AUTO: 0.97 THOUSAND/ΜL (ref 0.17–1.22)
MONOCYTES NFR BLD AUTO: 5 % (ref 4–12)
NEUTROPHILS # BLD AUTO: 15.88 THOUSANDS/ΜL (ref 1.85–7.62)
NEUTS SEG NFR BLD AUTO: 90 % (ref 43–75)
NRBC BLD AUTO-RTO: 0 /100 WBCS
PHOSPHATE SERPL-MCNC: 3.3 MG/DL (ref 2.3–4.1)
PLATELET # BLD AUTO: 300 THOUSANDS/UL (ref 149–390)
PMV BLD AUTO: 10.6 FL (ref 8.9–12.7)
POTASSIUM SERPL-SCNC: 4.1 MMOL/L (ref 3.5–5.3)
RBC # BLD AUTO: 3.97 MILLION/UL (ref 3.88–5.62)
SODIUM SERPL-SCNC: 141 MMOL/L (ref 136–145)
WBC # BLD AUTO: 17.83 THOUSAND/UL (ref 4.31–10.16)

## 2021-07-20 PROCEDURE — 94640 AIRWAY INHALATION TREATMENT: CPT

## 2021-07-20 PROCEDURE — 85730 THROMBOPLASTIN TIME PARTIAL: CPT | Performed by: INTERNAL MEDICINE

## 2021-07-20 PROCEDURE — 83735 ASSAY OF MAGNESIUM: CPT | Performed by: NURSE PRACTITIONER

## 2021-07-20 PROCEDURE — 85027 COMPLETE CBC AUTOMATED: CPT | Performed by: NURSE PRACTITIONER

## 2021-07-20 PROCEDURE — 85730 THROMBOPLASTIN TIME PARTIAL: CPT | Performed by: PHYSICIAN ASSISTANT

## 2021-07-20 PROCEDURE — 94760 N-INVAS EAR/PLS OXIMETRY 1: CPT

## 2021-07-20 PROCEDURE — 99232 SBSQ HOSP IP/OBS MODERATE 35: CPT | Performed by: INTERNAL MEDICINE

## 2021-07-20 PROCEDURE — 80048 BASIC METABOLIC PNL TOTAL CA: CPT | Performed by: NURSE PRACTITIONER

## 2021-07-20 PROCEDURE — 84100 ASSAY OF PHOSPHORUS: CPT | Performed by: NURSE PRACTITIONER

## 2021-07-20 PROCEDURE — 94668 MNPJ CHEST WALL SBSQ: CPT

## 2021-07-20 PROCEDURE — 82948 REAGENT STRIP/BLOOD GLUCOSE: CPT

## 2021-07-20 RX ORDER — MAGNESIUM SULFATE HEPTAHYDRATE 40 MG/ML
4 INJECTION, SOLUTION INTRAVENOUS ONCE
Status: COMPLETED | OUTPATIENT
Start: 2021-07-20 | End: 2021-07-20

## 2021-07-20 RX ORDER — METHYLPREDNISOLONE SODIUM SUCCINATE 40 MG/ML
40 INJECTION, POWDER, LYOPHILIZED, FOR SOLUTION INTRAMUSCULAR; INTRAVENOUS DAILY
Status: DISCONTINUED | OUTPATIENT
Start: 2021-07-21 | End: 2021-07-22

## 2021-07-20 RX ORDER — MAGNESIUM SULFATE 1 G/100ML
1 INJECTION INTRAVENOUS ONCE
Status: DISCONTINUED | OUTPATIENT
Start: 2021-07-20 | End: 2021-07-20

## 2021-07-20 RX ADMIN — HEPARIN SODIUM 15 UNITS/KG/HR: 10000 INJECTION, SOLUTION INTRAVENOUS at 13:06

## 2021-07-20 RX ADMIN — OMEGA-3 FATTY ACIDS CAP 1000 MG 1000 MG: 1000 CAP at 17:03

## 2021-07-20 RX ADMIN — IPRATROPIUM BROMIDE 0.5 MG: 0.5 SOLUTION RESPIRATORY (INHALATION) at 01:02

## 2021-07-20 RX ADMIN — METOPROLOL TARTRATE 50 MG: 50 TABLET, FILM COATED ORAL at 09:00

## 2021-07-20 RX ADMIN — ALLOPURINOL 300 MG: 300 TABLET ORAL at 09:01

## 2021-07-20 RX ADMIN — MAGNESIUM SULFATE HEPTAHYDRATE 4 G: 40 INJECTION, SOLUTION INTRAVENOUS at 07:45

## 2021-07-20 RX ADMIN — LEVALBUTEROL HYDROCHLORIDE 1.25 MG: 1.25 SOLUTION, CONCENTRATE RESPIRATORY (INHALATION) at 13:51

## 2021-07-20 RX ADMIN — HEPARIN SODIUM 2000 UNITS: 1000 INJECTION INTRAVENOUS; SUBCUTANEOUS at 05:33

## 2021-07-20 RX ADMIN — LIDOCAINE 5% 1 PATCH: 700 PATCH TOPICAL at 12:00

## 2021-07-20 RX ADMIN — IPRATROPIUM BROMIDE 0.5 MG: 0.5 SOLUTION RESPIRATORY (INHALATION) at 13:51

## 2021-07-20 RX ADMIN — AMIODARONE HYDROCHLORIDE 200 MG: 200 TABLET ORAL at 16:53

## 2021-07-20 RX ADMIN — HEPARIN SODIUM 4000 UNITS: 1000 INJECTION INTRAVENOUS; SUBCUTANEOUS at 00:36

## 2021-07-20 RX ADMIN — SODIUM CHLORIDE 5 UNITS/HR: 9 INJECTION, SOLUTION INTRAVENOUS at 16:50

## 2021-07-20 RX ADMIN — LEVOTHYROXINE SODIUM 150 MCG: 150 TABLET ORAL at 07:43

## 2021-07-20 RX ADMIN — PANTOPRAZOLE SODIUM 40 MG: 40 TABLET, DELAYED RELEASE ORAL at 09:01

## 2021-07-20 RX ADMIN — BENZONATATE 100 MG: 100 CAPSULE ORAL at 16:53

## 2021-07-20 RX ADMIN — AMIODARONE HYDROCHLORIDE 200 MG: 200 TABLET ORAL at 07:44

## 2021-07-20 RX ADMIN — LEVALBUTEROL HYDROCHLORIDE 1.25 MG: 1.25 SOLUTION, CONCENTRATE RESPIRATORY (INHALATION) at 01:02

## 2021-07-20 RX ADMIN — BENZONATATE 100 MG: 100 CAPSULE ORAL at 09:00

## 2021-07-20 RX ADMIN — METHYLPREDNISOLONE SODIUM SUCCINATE 40 MG: 40 INJECTION, POWDER, FOR SOLUTION INTRAMUSCULAR; INTRAVENOUS at 05:37

## 2021-07-20 RX ADMIN — LEVALBUTEROL HYDROCHLORIDE 1.25 MG: 1.25 SOLUTION, CONCENTRATE RESPIRATORY (INHALATION) at 07:29

## 2021-07-20 RX ADMIN — IPRATROPIUM BROMIDE 0.5 MG: 0.5 SOLUTION RESPIRATORY (INHALATION) at 07:29

## 2021-07-20 RX ADMIN — METOPROLOL TARTRATE 25 MG: 25 TABLET, FILM COATED ORAL at 21:21

## 2021-07-20 RX ADMIN — OMEGA-3 FATTY ACIDS CAP 1000 MG 1000 MG: 1000 CAP at 09:00

## 2021-07-20 RX ADMIN — BENZONATATE 100 MG: 100 CAPSULE ORAL at 21:21

## 2021-07-20 RX ADMIN — IPRATROPIUM BROMIDE 0.5 MG: 0.5 SOLUTION RESPIRATORY (INHALATION) at 19:17

## 2021-07-20 RX ADMIN — LEVALBUTEROL HYDROCHLORIDE 1.25 MG: 1.25 SOLUTION, CONCENTRATE RESPIRATORY (INHALATION) at 19:17

## 2021-07-20 RX ADMIN — HEPARIN SODIUM 4000 UNITS: 1000 INJECTION INTRAVENOUS; SUBCUTANEOUS at 18:54

## 2021-07-20 NOTE — ASSESSMENT & PLAN NOTE
· Continue oral metoprolol  · Plan to complete IV amiodarone after lung biopsy and continue overlapping oral load  · Heparin held at 3 AM, discuss with IR when to resume  · Appreciate cardiology recommendations

## 2021-07-20 NOTE — PROGRESS NOTES
[facility]  Progress Note - Sean Garcia 1940, 68 y o  male MRN: [id number]  Unit/Bed#: ICU 06 Encounter: [id number]  Primary Care Provider: Louisa Quintana DO   Date and time admitted to hospital: 7/14/2021  1:39 PM    * Acute respiratory failure with hypoxia and hypercapnia ([facility] [address] )  Assessment & Plan  · Significantly improved  · Encourage good pulmonary hygiene  · Wean oxygen for SpO2>88%  · Respiratory protocol    Paroxysmal A-fib with RVR  Assessment & Plan  · Continue oral metoprolol  · Continue IV amiodarone with overlapping oral load  · Continue heparin infusion pending further attempts to obtain biopsy  · Appreciate cardiology recommendations    Mass of right lung  Assessment & Plan  · Good pulmonary hygiene  · Planning biopsy    Hypercalcemia  Assessment & Plan  · Potentially related to malignancy  · Follow on chemistry as needed    COPD (chronic obstructive pulmonary disease) with acute exacerbation ([facility] )  Assessment & Plan  · Continue scheduled nebulizers  · Consider wean of IV steroids versus transition to oral  · Wean oxygen for SpO2>88%    type 2 diabetes mellitus [facility])  Assessment & Plan  Lab Results   Component Value Date    HGBA1C 6 6 (H) 06/20/2021       Recent Labs     07/19/21  0930 07/19/21  1129 07/19/21  1658 07/19/21 2051   POCGLU 119 272* 334* 407*     · Continue IV insulin until 24 hour dose available and convert to subcutaneous  · Escalated insulin doses likely due to steroid use, follow closely when taper starts    Blood Sugar Average: Last 72 hrs:  (P) 903 [id number]    Hypothyroidism  Assessment & Plan  · Continue home levothyroxine      ----------------------------------------------------------------------------------------  HPI/24hr events: Patient's respiratory status improved significantly over the course of the day, started on IV heparin, started insulin infusion overnight, awaiting scheduling with IR for biopsy    Disposition: Improved  Code Status: Level 1 - Full Code  ---------------------------------------------------------------------------------------  SUBJECTIVE  "I'm doing well"    Review of Systems   Constitutional: Positive for fatigue  HENT: Negative for trouble swallowing  Respiratory: Positive for shortness of breath (improved)  Cardiovascular: Positive for chest pain  Gastrointestinal: Negative for abdominal pain and nausea  Genitourinary: Negative for difficulty urinating  Musculoskeletal: Negative for arthralgias  Neurological: Negative for weakness  ---------------------------------------------------------------------------------------  OBJECTIVE    Vitals   Vitals:    21 1500 21 1920 21 2306 21 0102   BP: 110/55 114/69 132/69    BP Location: Right arm Right arm Right arm    Pulse: 84 90 83    Resp: (!) 26 (!) 27 (!) 24    Temp: 98 6 °F (37 °C) 98 5 °F (36 9 °C) 97 9 °F (36 6 °C)    TempSrc: Oral Oral Oral    SpO2: 94% 95% 95% 97%   Weight:       Height:         Temp (24hrs), Av 9 °F (36 6 °C), Min:96 7 °F (35 9 °C), Max:98 6 °F (37 °C)  Current: Temperature: 97 9 °F (36 6 °C)          Respiratory:  SpO2: SpO2: 97 %, SpO2 Activity: SpO2 Activity: At Rest, SpO2 Device: O2 Device: Nasal cannula  Nasal Cannula O2 Flow Rate (L/min): 1 L/min    Invasive/non-invasive ventilation settings   Respiratory    Lab Data (Last 4 hours)    None         O2/Vent Data (Last 4 hours)    None                Physical Exam  Constitutional:       General: He is not in acute distress  Appearance: Normal appearance  He is ill-appearing  Interventions: Face mask in place  HENT:      Head: Normocephalic and atraumatic  Mouth/Throat:      Mouth: Mucous membranes are dry  Eyes:      Pupils: Pupils are equal, round, and reactive to light  Cardiovascular:      Rate and Rhythm: Normal rate and regular rhythm  Pulses: Normal pulses     Pulmonary:      Effort: Pulmonary effort is normal  Breath sounds: Wheezing present  Abdominal:      General: Abdomen is flat  Bowel sounds are normal  There is no distension  Palpations: Abdomen is soft  Tenderness: There is no abdominal tenderness  Musculoskeletal:         General: No tenderness or signs of injury  Right lower leg: No edema  Left lower leg: No edema  Skin:     General: Skin is warm and dry  Neurological:      General: No focal deficit present  Mental Status: He is alert  GCS: GCS eye subscore is 4  GCS verbal subscore is 5  GCS motor subscore is 6           Laboratory and Diagnostics:  Results from last 7 days   Lab Units 07/19/21  0946 07/19/21  0546 07/18/21  0537 07/16/21  0537 07/15/21  0439 07/14/21  1413 07/14/21  1039   WBC Thousand/uL  --  11 47* 11 70* 10 81* 5 20 6 27 5 68   HEMOGLOBIN g/dL  --  9 9* 9 0* 7 9* 8 7* 9 6* 10 0*   I STAT HEMOGLOBIN g/dl 11 6*  --   --   --   --   --   --    HEMATOCRIT %  --  35 9* 31 8* 28 2* 32 3* 33 6* 35 2*   HEMATOCRIT, ISTAT % 34*  --   --   --   --   --   --    PLATELETS Thousands/uL  --  356 333 265 256 277 282   NEUTROS PCT %  --   --   --   --   --  81* 83*   BANDS PCT %  --   --  3  --   --   --   --    MONOS PCT %  --   --   --   --   --  11 9   MONO PCT %  --   --  7  --   --   --   --      Results from last 7 days   Lab Units 07/19/21  0946 07/19/21  0546 07/18/21  0537 07/17/21  0449 07/16/21  0937 07/15/21  1137 07/15/21  0438 07/14/21  2323 07/14/21  1413 07/14/21  1039   SODIUM mmol/L  --  143 140 139 142  --  140 142 145 140   POTASSIUM mmol/L  --  4 8 4 6 4 9 5 1 4 5 5 9* 5 8* 5 5* 5 1   CHLORIDE mmol/L  --  105 103 105 107  --  107 109* 108 104   CO2 mmol/L  --  34* 31 29 25  --  25 27 28 25   CO2, I-STAT mmol/L 35*  --   --   --   --   --   --   --   --   --    ANION GAP mmol/L  --  4 6 5 10  --  8 6 9 11   BUN mg/dL  --  32* 32* 32* 32*  --  32* 31* 33* 35*   CREATININE mg/dL  --  1 11 1 03 1 14 1 23  --  1 68* 1 59* 1 77* 1 77*   CALCIUM mg/dL  -- 9 6 9 6 9 4 9 2  --  9 4 9 3 9 7 9 9   GLUCOSE RANDOM mg/dL  --  199* 258* 275* 305*  --  247* 149* 138  --    ALT U/L  --   --   --  22 17  --  19  --  22 23   AST U/L  --   --   --  14 8  --  16  --  13 12   ALK PHOS U/L  --   --   --  132* 147*  --  174*  --  171* 169*   ALBUMIN g/dL  --   --   --  1 7* 1 6*  --  1 6*  --  1 8* 1 8*   TOTAL BILIRUBIN mg/dL  --   --   --  0 21 0 18*  --  0 37  --  0 40 0 40     Results from last 7 days   Lab Units 07/14/21  2323 07/14/21  1413   MAGNESIUM mg/dL 1 3* 1 5*      Results from last 7 days   Lab Units 07/19/21  2347 07/19/21  1844 07/19/21  1210   INR   --   --  1 27*   PTT seconds 39* 35 23      Results from last 7 days   Lab Units 07/19/21  1158 07/19/21  0942   TROPONIN I ng/mL 0 02 <0 02         ABG:    VBG:          Micro        EKG: Review of telemetry demonstrates sinus rhythm  Imaging: I have personally reviewed pertinent reports  and I have personally reviewed pertinent films in PACS    Intake and Output  I/O       07/18 0701 - 07/19 0700 07/19 0701 - 07/20 0700    P  O  540     I V  (mL/kg) 450 (5 9) 313 3 (4 1)    IV Piggyback  100    Total Intake(mL/kg) 990 (13 1) 413 3 (5 5)    Urine (mL/kg/hr) 750 (0 4) 1660 (0 9)    Total Output 750 1660    Net +240 -1246 7          Unmeasured Urine Occurrence  1 x          Height and Weights   Height: 5' 2" (157 5 cm)  IBW (Ideal Body Weight): 54 6 kg  Body mass index is 30 54 kg/m²  Weight (last 2 days)     None            Nutrition       Diet Orders   (From admission, onward)             Start     Ordered    07/19/21 1329  Diet Efra/CHO Controlled; Consistent Carbohydrate Diet Level 3 (6 carb servings/90 grams CHO/meal)  Diet effective now     Question Answer Comment   Diet Type Efra/CHO Controlled    Efra/CHO Controlled Consistent Carbohydrate Diet Level 3 (6 carb servings/90 grams CHO/meal)    RD to adjust diet per protocol?  Yes        07/19/21 1328    07/14/21 1943  Room Service  Once     Question:  Type of Service Answer:  Room Service - Appropriate with Assistance    07/14/21 1943                  Active Medications  Scheduled Meds:  Current Facility-Administered Medications   Medication Dose Route Frequency Provider Last Rate    acetaminophen  650 mg Oral Q6H PRN CHRIS Moran      allopurinol  300 mg Oral Daily CHRIS Santos      amiodarone  0 5 mg/min Intravenous Continuous CHRIS Cool 0 5 mg/min (07/20/21 0000)    amiodarone  200 mg Oral TID With Meals CHRIS Cool      benzonatate  100 mg Oral TID CHRIS Moran      dextromethorphan-guaiFENesin  10 mL Oral Q4H PRN CHRIS Moran      fish oil  1,000 mg Oral BID CHRIS Moran      heparin (porcine)  3-20 Units/kg/hr (Order-Specific) Intravenous Titrated CHRIS Moran 13 Units/kg/hr (07/20/21 0034)    heparin (porcine)  2,000 Units Intravenous Q1H PRN CHRIS Moran      heparin (porcine)  4,000 Units Intravenous Q1H PRN CHRIS Moran      insulin regular (HumuLIN R,NovoLIN R) infusion  0 3-21 Units/hr Intravenous Titrated CHRIS Tripathi 7 Units/hr (07/20/21 0000)    ipratropium  0 5 mg Nebulization Q6H Royden Holter, MD      levalbuterol  1 25 mg Nebulization Q6H Royden Holter, MD      levothyroxine  150 mcg Oral Daily CHRIS Pradhan      lidocaine  1 patch Topical Q24H CHRIS Pradhan      methylPREDNISolone sodium succinate  40 mg Intravenous Q8H [address] CHRIS Santos      metoprolol  5 mg Intravenous Q6H PRN CHRIS Moran      metoprolol tartrate  50 mg Oral Q12H [address] CHRIS Santos      ondansetron  4 mg Intravenous Q6H PRN CHRIS Moran      pantoprazole  40 mg Oral Daily CHRIS Santos       Continuous Infusions:  amiodarone, 0 5 mg/min, Last Rate: 0 5 mg/min (07/20/21 0000)  heparin (porcine), 3-20 Units/kg/hr (Order-Specific), Last Rate: 13 Units/kg/hr (07/20/21 0034)  insulin regular (HumuLIN R,NovoLIN R) infusion, 0 3-21 Units/hr, Last Rate: 7 Units/hr (07/20/21 0000)      PRN Meds:   acetaminophen, 650 mg, Q6H PRN  dextromethorphan-guaiFENesin, 10 mL, Q4H PRN  heparin (porcine), 2,000 Units, Q1H PRN  heparin (porcine), 4,000 Units, Q1H PRN  metoprolol, 5 mg, Q6H PRN  ondansetron, 4 mg, Q6H PRN        Invasive Devices Review  Invasive Devices     Peripheral Intravenous Line            Peripheral IV 06/21/21 Right Wrist 28 days    Peripheral IV 07/14/21 Right Antecubital 5 days    Peripheral IV 07/19/21 Left;Ventral (anterior) Forearm <1 day                Rationale for remaining devices: IV access  ---------------------------------------------------------------------------------------  Advance Directive and Living Will:      Power of :    POLST:    ---------------------------------------------------------------------------------------  Care Time Delivered:   No Critical Care time spent       [facility]      Portions of the record may have been created with voice recognition software  Occasional wrong word or "sound a like" substitutions may have occurred due to the inherent limitations of voice recognition software    Read the chart carefully and recognize, using context, where substitutions have occurred

## 2021-07-20 NOTE — ASSESSMENT & PLAN NOTE
· Significantly improved  · Encourage good pulmonary hygiene  · Wean oxygen for SpO2>88%  · Respiratory protocol

## 2021-07-20 NOTE — ASSESSMENT & PLAN NOTE
· Continue oral metoprolol  · Continue IV amiodarone with overlapping oral load  · Continue heparin infusion pending further attempts to obtain biopsy  · Appreciate cardiology recommendations

## 2021-07-20 NOTE — ASSESSMENT & PLAN NOTE
Lab Results   Component Value Date    HGBA1C 6 6 (H) 06/20/2021       Recent Labs     07/19/21  0930 07/19/21  1129 07/19/21  1658 07/19/21 2051   POCGLU 119 272* 334* 407*     · Continue IV insulin until 24 hour dose available and convert to subcutaneous  · Escalated insulin doses likely due to steroid use, follow closely when taper starts    Blood Sugar Average: Last 72 hrs:  (P) 622 [id number]

## 2021-07-20 NOTE — ASSESSMENT & PLAN NOTE
Lab Results   Component Value Date    HGBA1C 6 6 (H) 06/20/2021       Recent Labs     07/20/21  1205 07/20/21  1403 07/20/21  1556 07/20/21  1749   POCGLU 215* 176* 236* 128     · Would continue IV insulin through biopsy, consider transition to subcutaneous tonight or tomorrow    Blood Sugar Average: Last 72 hrs:  (P) 224 5

## 2021-07-20 NOTE — ASSESSMENT & PLAN NOTE
· Continues to resolve  · Encourage good pulmonary hygiene  · Wean oxygen for SpO2>88%  · Respiratory protocol

## 2021-07-20 NOTE — PROGRESS NOTES
General Cardiology   Progress Note -  Team One   Claudia Shana Angel 68 y o  male MRN: [id number]  Unit/Bed#: ICU 06 Encounter: [id number]    Assessment/ Plan    1  PAF- known history of,  presented with AFib RVR  Presented in afib with RVR on admission in setting of multiple electrolyte derangements, spontaneously converted to NSR  Recurrent symptomatic afib with RVR on 7/19/21  Back in NSR after IV labetalol and IV Lopressor  Metoprolol increased to 50 mg BID  Added IV and oral amiodarone yesterday to help maintain NSR  Maintaining NSR overnight with occasional sinus bradycardia and one brief run of atrial tach  Continue IV amiodarone until after lung biopsy completed tomorrow  Then can d/c and continue oral load  Normal LV function on recent echo  On IV heparin for anticoagulation, resume Eliquis post biopsy      2  Right lung mass  Status post EBUS 06/2021-pathology atypical cells  OP- Consulted IR for biopsy  Current chest x-ray-large right lung mass, slightly increased in size, consistent with malignancy  Persistent small right pleural effusion  For IR biopsy on Wednesday 7/21/21  Eliquis on hold for 48 hours- on IV heparin      3  COPD  utilizes p r n  Oxygen at home  Presented with acute exacerbation-IV steroids initiated, nebulizer treatments per primary team    4  HTN- controlled, average /66    Subjective  Feeling much better than this morning  No cardiac complaints currently  Review of Systems   Constitutional: Negative for diaphoresis and malaise/fatigue  Cardiovascular: Negative for chest pain, leg swelling, orthopnea, palpitations and syncope  Respiratory: Positive for cough and sputum production  Negative for shortness of breath and sleep disturbances due to breathing  Gastrointestinal: Negative for bloating, nausea and vomiting  Neurological: Negative for dizziness, light-headedness and weakness  Psychiatric/Behavioral: Negative for altered mental status     All other systems reviewed and are negative  Objective:   Vitals: Blood pressure 121/83, pulse 69, temperature 97 7 °F (36 5 °C), temperature source Oral, resp  rate 20, height 5' 2" (1 575 m), weight 75 8 kg (167 lb), SpO2 97 %  , Body mass index is 30 54 kg/m²  ,     Systolic (70KCG), I , Min:110 , BT     Diastolic (10ALP), RXM:59, Min:55, Max:83      Intake/Output Summary (Last 24 hours) at 2021 1040  Last data filed at 2021 1000  Gross per 24 hour   Intake 946 04 ml   Output 2260 ml   Net -1313 96 ml     Wt Readings from Last 3 Encounters:   21 75 8 kg (167 lb)   21 75 4 kg (166 lb 3 6 oz)   21 76 7 kg (169 lb)     Telemetry Review: No significant arrhythmias seen on telemetry review  NSR with PACs    EKG personally reviewed by CHRIS Hernandez  NSR with PACs, PVCs    Physical Exam  Vitals reviewed  Constitutional:       General: He is not in acute distress  Neck:      Vascular: No hepatojugular reflux or JVD  Cardiovascular:      Rate and Rhythm: Normal rate  Rhythm irregular  Pulses: Normal pulses  Heart sounds: Normal heart sounds  No murmur heard  No friction rub  No gallop  Pulmonary:      Effort: Tachypnea present  No respiratory distress  Breath sounds: Wheezing present  No rales  Abdominal:      General: Bowel sounds are normal  There is no distension  Palpations: Abdomen is soft  Tenderness: There is no abdominal tenderness  Musculoskeletal:         General: Normal range of motion  Cervical back: Neck supple  Right lower leg: No edema  Left lower leg: No edema  Skin:     General: Skin is warm and dry  Capillary Refill: Capillary refill takes less than 2 seconds  Findings: No erythema  Neurological:      Mental Status: He is alert and oriented to person, place, and time     Psychiatric:         Mood and Affect: Mood normal      LABORATORY RESULTS  Results from last 7 days   Lab Units 21  1158 21  2175 TROPONIN I ng/mL 0 02 <0 02     CBC with diff:   Results from last 7 days   Lab Units 07/20/21  0459 07/19/21  0946 07/19/21  0546 07/18/21  0537 07/16/21  0537 07/15/21  0439 07/14/21  1413 07/14/21  1039   WBC Thousand/uL 17 83*  --  11 47* 11 70* 10 81* 5 20 6 27 5 68   HEMOGLOBIN g/dL 9 3*  --  9 9* 9 0* 7 9* 8 7* 9 6* 10 0*   I STAT HEMOGLOBIN g/dl  --  11 6*  --   --   --   --   --   --    HEMATOCRIT % 33 3*  --  35 9* 31 8* 28 2* 32 3* 33 6* 35 2*   HEMATOCRIT, ISTAT %  --  34*  --   --   --   --   --   --    MCV fL 84  --  84 84 83 84 83 82   PLATELETS Thousands/uL 300  --  356 333 265 256 277 282   MCH pg 23 4*  --  23 2* 23 8* 23 2* 22 6* 23 6* 23 3*   MCHC g/dL 27 9*  --  27 6* 28 3* 28 0* 26 9* 28 6* 28 4*   RDW % 18 6*  --  17 9* 18 0* 17 6* 18 0* 18 0* 18 3*   MPV fL 10 6  --  9 8 10 4 9 7 9 7 9 4 9 5   NRBC AUTO /100 WBCs 0  --   --  0  --   --  0 0       CMP:  Results from last 7 days   Lab Units 07/20/21  0459 07/19/21  0946 07/19/21  0546 07/18/21  0537 07/17/21  0449 07/16/21  0937 07/15/21  1137 07/15/21  0438 07/14/21  2323 07/14/21  1413 07/14/21  1039   POTASSIUM mmol/L 4 1  --  4 8 4 6 4 9 5 1 4 5 5 9* 5 8* 5 5* 5 1   CHLORIDE mmol/L 103  --  105 103 105 107  --  107 109* 108 104   CO2 mmol/L 32  --  34* 31 29 25  --  25 27 28 25   CO2, I-STAT mmol/L  --  35*  --   --   --   --   --   --   --   --   --    BUN mg/dL 37*  --  32* 32* 32* 32*  --  32* 31* 33* 35*   CREATININE mg/dL 1 19  --  1 11 1 03 1 14 1 23  --  1 68* 1 59* 1 77* 1 77*   GLUCOSE, ISTAT mg/dl  --  154*  --   --   --   --   --   --   --   --   --    CALCIUM mg/dL 9 3  --  9 6 9 6 9 4 9 2  --  9 4 9 3 9 7 9 9   AST U/L  --   --   --   --  14 8  --  16  --  13 12   ALT U/L  --   --   --   --  22 17  --  19  --  22 23   ALK PHOS U/L  --   --   --   --  132* 147*  --  174*  --  171* 169*   EGFR ml/min/1 73sq m 58  --  63 69 61 56  --  38 41 36 36       BMP:  Results from last 7 days   Lab Units 07/20/21  0459 07/19/21  0946 21  0546 21  0537 21  0449 21  0937 07/15/21  1137 07/15/21  0438 21  2323   POTASSIUM mmol/L 4 1  --  4 8 4 6 4 9 5 1 4 5 5 9* 5 8*   CHLORIDE mmol/L 103  --  105 103 105 107  --  107 109*   CO2 mmol/L 32  --  34* 31 29 25  --  25 27   CO2, I-STAT mmol/L  --  35*  --   --   --   --   --   --   --    BUN mg/dL 37*  --  32* 32* 32* 32*  --  32* 31*   CREATININE mg/dL 1 19  --  1 11 1 03 1 14 1 23  --  1 68* 1 59*   GLUCOSE, ISTAT mg/dl  --  154*  --   --   --   --   --   --   --    CALCIUM mg/dL 9 3  --  9 6 9 6 9 4 9 2  --  9 4 9 3       Lab Results   Component Value Date    CREATININE 1 19 2021    CREATININE 1 11 2021    CREATININE 1 03 2021       Lab Results   Component Value Date    NTBNP 5,084 (H) 2021    NTBNP 373 2021           Results from last 7 days   Lab Units 21  0459 21  2323 21  1413   MAGNESIUM mg/dL 1 7 1 3* 1 5*                 Results from last 7 days   Lab Units 21  0449   TSH 3RD GENERATON uIU/mL 0 103*   FREE T4 ng/dL 1 19       Results from last 7 days   Lab Units 21  1210   INR  1 27*       Lipid Profile:   No results found for: CHOL  Lab Results   Component Value Date    HDL 28 (L) 2021    HDL 25 (L) 07/15/2019     Lab Results   Component Value Date    LDLCALC 69 2021    LDLCALC 60 07/15/2019     Lab Results   Component Value Date    TRIG 105 2021    TRIG 218 (H) 07/15/2019       Cardiac testing:   Results for orders placed during the hospital encounter of 21    Echo complete with contrast if indicated    Narrative  [facility] [address] [facility]  [phone]    Transthoracic Echocardiogram  2D, M-mode, Doppler, and Color Doppler    Study date:  2021    Patient: German Nicolas  MR number: [id number]  Account number: [de-identified]  : 1940  Age: 68 years  Gender: Male  Status: Inpatient  Location: Bedside  Height: 62 in  Weight: 173 6 lb  BP: 138/ 65 mmHg    Indications: A-fib  Diagnoses: I48 0 - Atrial fibrillation    Sonographer:  ROSALINA Lora  Primary Physician:  Elias Reyes DO  Referring Physician:  Guille Boo PA-C  Group:  [facility]  Interpreting Physician:  Samantha Reid MD    SUMMARY    LEFT VENTRICLE:  Systolic function was normal  Ejection fraction was estimated to be 60 %  There were no regional wall motion abnormalities  MITRAL VALVE:  There was mild regurgitation  TRICUSPID VALVE:  There was mild regurgitation  Estimated peak PA pressure was 42 mmHg  The findings suggest mild pulmonary hypertension  HISTORY: PRIOR HISTORY: PAF, HTN, SULEMA, SOB, lung mass, DM2, thrombocytopenia, current smoker  PROCEDURE: The procedure was performed at the bedside  This was a routine study  The transthoracic approach was used  The study included complete 2D imaging, M-mode, complete spectral Doppler, and color Doppler  The heart rate was 77 bpm,  at the start of the study  Images were obtained from the parasternal, apical, subcostal, and suprasternal notch acoustic windows  Image quality was adequate  LEFT VENTRICLE: Size was normal  Systolic function was normal  Ejection fraction was estimated to be 60 %  There were no regional wall motion abnormalities  Wall thickness was normal  DOPPLER: Left ventricular diastolic function parameters  were normal for the patient's age  RIGHT VENTRICLE: The size was normal  Systolic function was normal  Wall thickness was normal     LEFT ATRIUM: Size was normal     RIGHT ATRIUM: Size was normal     MITRAL VALVE: Valve structure was normal  There was normal leaflet separation  DOPPLER: The transmitral velocity was within the normal range  There was no evidence for stenosis  There was mild regurgitation  AORTIC VALVE: The valve was trileaflet  Leaflets exhibited normal thickness and normal cuspal separation   DOPPLER: Transaortic velocity was within the normal range  There was no evidence for stenosis  There was no significant  regurgitation  TRICUSPID VALVE: The valve structure was normal  There was normal leaflet separation  DOPPLER: The transtricuspid velocity was within the normal range  There was no evidence for stenosis  There was mild regurgitation  Estimated peak PA  pressure was 42 mmHg  The findings suggest mild pulmonary hypertension  PULMONIC VALVE: Leaflets exhibited normal thickness, no calcification, and normal cuspal separation  DOPPLER: The transpulmonic velocity was within the normal range  There was no significant regurgitation  PERICARDIUM: There was no pericardial effusion  The pericardium was normal in appearance  AORTA: The root exhibited normal size  SYSTEMIC VEINS: IVC: The inferior vena cava was normal in size  Respirophasic changes were normal     SYSTEM MEASUREMENT TABLES    2D  %FS: 30 02 %  Ao Diam: 3 54 cm  EDV(Teich): 123 19 ml  EF(Teich): 56 95 %  ESV(Teich): 53 03 ml  IVSd: 0 92 cm  LA Diam: 4 57 cm  LAAs A2C: 23 05 cm2  LAAs A4C: 22 52 cm2  LAESV A-L A2C: 77 35 ml  LAESV A-L A4C: 69 99 ml  LAESV Index (A-L): 41 99 ml/m2  LAESV MOD A2C: 73 99 ml  LAESV MOD A4C: 64 59 ml  LAESV(A-L): 75 58 ml  LAESV(MOD BP): 70 92 ml  LALs A2C: 5 83 cm  LALs A4C: 6 15 cm  LVIDd: 5 09 cm  LVIDs: 3 56 cm  LVPWd: 0 85 cm  RVIDd: 3 65 cm  SV(Teich): 70 16 ml    CW  AV Env  Ti: 281 64 ms  AV MaxP 92 mmHg  AV VTI: 22 53 cm  AV Vmax: 1 49 m/s  AV Vmean: 0 8 m/s  AV meanPG: 3 13 mmHg  TR MaxP 17 mmHg  TR Vmax: 3 13 m/s    MM  TAPSE: 3 01 cm    PW  E' Sept: 0 09 m/s  E/E' Sept: 11 25  LVOT Env  Ti: 342 53 ms  LVOT VTI: 21 45 cm  LVOT Vmax: 1 01 m/s  LVOT Vmean: 0 63 m/s  LVOT maxP 07 mmHg  LVOT meanP 9 mmHg  MV A Ge: 1 12 m/s  MV Dec Menard: 6 18 m/s2  MV DecT: 167 82 ms  MV E Ge: 1 04 m/s  MV E/A Ratio: 0 93  MV PHT: 48 67 ms  MVA By PHT: 4 52 cm2    [employer] Accredited Echocardiography Laboratory    Prepared and electronically signed by    Klarissa Falcon MD  Signed 22-Jun-2021 12:57:51    Meds/Allergies   all current active meds have been reviewed and current meds:   Current Facility-Administered Medications   Medication Dose Route Frequency    acetaminophen (TYLENOL) tablet 650 mg  650 mg Oral Q6H PRN    allopurinol (ZYLOPRIM) tablet 300 mg  300 mg Oral Daily    amiodarone (CORDARONE) 900 mg in dextrose 5 % 500 mL infusion  0 5 mg/min Intravenous Continuous    amiodarone tablet 200 mg  200 mg Oral TID With Meals    benzonatate (TESSALON PERLES) capsule 100 mg  100 mg Oral TID    dextromethorphan-guaiFENesin (ROBITUSSIN DM) oral syrup 10 mL  10 mL Oral Q4H PRN    fish oil capsule 1,000 mg  1,000 mg Oral BID    heparin (porcine) 25,000 units in 0 45% NaCl 250 mL infusion (premix)  3-20 Units/kg/hr (Order-Specific) Intravenous Titrated    heparin (porcine) injection 2,000 Units  2,000 Units Intravenous Q1H PRN    heparin (porcine) injection 4,000 Units  4,000 Units Intravenous Q1H PRN    insulin regular (HumuLIN R,NovoLIN R) 1 Units/mL in sodium chloride 0 9 % 100 mL infusion  0 3-21 Units/hr Intravenous Titrated    ipratropium (ATROVENT) 0 02 % inhalation solution 0 5 mg  0 5 mg Nebulization Q6H    levalbuterol (XOPENEX) inhalation solution 1 25 mg  1 25 mg Nebulization Q6H    levothyroxine tablet 150 mcg  150 mcg Oral Daily    lidocaine (LIDODERM) 5 % patch 1 patch  1 patch Topical Q24H    magnesium sulfate 4 g/100 mL IVPB (premix) 4 g  4 g Intravenous Once    [START ON 7/21/2021] methylPREDNISolone sodium succinate (Solu-MEDROL) injection 40 mg  40 mg Intravenous Daily    metoprolol (LOPRESSOR) injection 5 mg  5 mg Intravenous Q6H PRN    metoprolol tartrate (LOPRESSOR) tablet 50 mg  50 mg Oral Q12H ZOHAIB    ondansetron (ZOFRAN) injection 4 mg  4 mg Intravenous Q6H PRN    pantoprazole (PROTONIX) EC tablet 40 mg  40 mg Oral Daily     Medications Prior to Admission   Medication    acetaminophen (TYLENOL) 100 mg/mL solution    albuterol (2 5 mg/3 mL) 0 083 % nebulizer solution    allopurinol (ZYLOPRIM) 300 mg tablet    amLODIPine (NORVASC) 2 5 mg tablet    apixaban (ELIQUIS) 5 mg    benzonatate (TESSALON PERLES) 100 mg capsule    guaiFENesin (MUCINEX) 600 mg 12 hr tablet    levothyroxine 150 mcg tablet    metFORMIN (GLUCOPHAGE) 500 mg tablet    metoprolol tartrate (LOPRESSOR) 25 mg tablet    Omega-3 Fatty Acids (FISH OIL) 1,000 mg    pantoprazole (PROTONIX) 40 mg tablet    tiotropium (SPIRIVA) 18 mcg inhalation capsule     amiodarone, 0 5 mg/min, Last Rate: 0 5 mg/min (07/20/21 0000)  heparin (porcine), 3-20 Units/kg/hr (Order-Specific), Last Rate: 15 Units/kg/hr (07/20/21 0530)  insulin regular (HumuLIN R,NovoLIN R) infusion, 0 3-21 Units/hr, Last Rate: 2 Units/hr (07/20/21 1000)    Counseling / Coordination of Care  Total floor / unit time spent today 20 minutes  Greater than 50% of total time was spent with the patient and / or family counseling and / or coordination of care  ** Please Note: Dragon 360 Dictation voice to text software may have been used in the creation of this document   **

## 2021-07-20 NOTE — ASSESSMENT & PLAN NOTE
· Continue scheduled nebulizers  · Continue IV Solumedrol through today, consider transition to oral tomorrow  · Wean oxygen for SpO2>88%

## 2021-07-21 ENCOUNTER — APPOINTMENT (INPATIENT)
Dept: CT IMAGING | Facility: HOSPITAL | Age: 81
DRG: 682 | End: 2021-07-21
Attending: RADIOLOGY
Payer: COMMERCIAL

## 2021-07-21 ENCOUNTER — DOCUMENTATION (OUTPATIENT)
Dept: HEMATOLOGY ONCOLOGY | Facility: CLINIC | Age: 81
End: 2021-07-21

## 2021-07-21 ENCOUNTER — HOSPITAL ENCOUNTER (OUTPATIENT)
Dept: CT IMAGING | Facility: HOSPITAL | Age: 81
Discharge: HOME | End: 2021-07-21
Attending: RADIOLOGY

## 2021-07-21 LAB
ANION GAP SERPL CALCULATED.3IONS-SCNC: 5 MMOL/L (ref 4–13)
APTT PPP: 45 SECONDS (ref 23–37)
BASOPHILS # BLD MANUAL: 0 THOUSAND/UL (ref 0–0.1)
BASOPHILS NFR MAR MANUAL: 0 % (ref 0–1)
BUN SERPL-MCNC: 37 MG/DL (ref 5–25)
CALCIUM SERPL-MCNC: 9.1 MG/DL (ref 8.3–10.1)
CHLORIDE SERPL-SCNC: 98 MMOL/L (ref 100–108)
CO2 SERPL-SCNC: 33 MMOL/L (ref 21–32)
CREAT SERPL-MCNC: 1.09 MG/DL (ref 0.6–1.3)
EOSINOPHIL # BLD MANUAL: 0 THOUSAND/UL (ref 0–0.4)
EOSINOPHIL NFR BLD MANUAL: 0 % (ref 0–6)
ERYTHROCYTE [DISTWIDTH] IN BLOOD BY AUTOMATED COUNT: 18.9 % (ref 11.6–15.1)
GFR SERPL CREATININE-BSD FRML MDRD: 65 ML/MIN/1.73SQ M
GLUCOSE SERPL-MCNC: 181 MG/DL (ref 65–140)
GLUCOSE SERPL-MCNC: 182 MG/DL (ref 65–140)
GLUCOSE SERPL-MCNC: 235 MG/DL (ref 65–140)
GLUCOSE SERPL-MCNC: 255 MG/DL (ref 65–140)
GLUCOSE SERPL-MCNC: 320 MG/DL (ref 65–140)
GLUCOSE SERPL-MCNC: 415 MG/DL (ref 65–140)
GLUCOSE SERPL-MCNC: 446 MG/DL (ref 65–140)
HCT VFR BLD AUTO: 33.7 % (ref 36.5–49.3)
HGB BLD-MCNC: 9.5 G/DL (ref 12–17)
LYMPHOCYTES # BLD AUTO: 0.14 THOUSAND/UL (ref 0.6–4.47)
LYMPHOCYTES # BLD AUTO: 1 % (ref 14–44)
MAGNESIUM SERPL-MCNC: 1.8 MG/DL (ref 1.6–2.6)
MCH RBC QN AUTO: 24.1 PG (ref 26.8–34.3)
MCHC RBC AUTO-ENTMCNC: 28.2 G/DL (ref 31.4–37.4)
MCV RBC AUTO: 85 FL (ref 82–98)
METAMYELOCYTES NFR BLD MANUAL: 4 % (ref 0–1)
MONOCYTES # BLD AUTO: 0.69 THOUSAND/UL (ref 0–1.22)
MONOCYTES NFR BLD: 5 % (ref 4–12)
MYELOCYTES NFR BLD MANUAL: 1 % (ref 0–1)
NEUTROPHILS # BLD MANUAL: 12.08 THOUSAND/UL (ref 1.85–7.62)
NEUTS SEG NFR BLD AUTO: 87 % (ref 43–75)
NRBC BLD AUTO-RTO: 0 /100 WBCS
PHOSPHATE SERPL-MCNC: 2.7 MG/DL (ref 2.3–4.1)
PLATELET # BLD AUTO: 292 THOUSANDS/UL (ref 149–390)
PLATELET BLD QL SMEAR: ADEQUATE
PMV BLD AUTO: 10.9 FL (ref 8.9–12.7)
POTASSIUM SERPL-SCNC: 4.8 MMOL/L (ref 3.5–5.3)
RBC # BLD AUTO: 3.95 MILLION/UL (ref 3.88–5.62)
RBC MORPH BLD: NORMAL
SODIUM SERPL-SCNC: 136 MMOL/L (ref 136–145)
TOTAL CELLS COUNTED SPEC: 100
VARIANT LYMPHS # BLD AUTO: 2 %
WBC # BLD AUTO: 13.88 THOUSAND/UL (ref 4.31–10.16)

## 2021-07-21 PROCEDURE — 88305 TISSUE EXAM BY PATHOLOGIST: CPT | Performed by: PATHOLOGY

## 2021-07-21 PROCEDURE — 94760 N-INVAS EAR/PLS OXIMETRY 1: CPT

## 2021-07-21 PROCEDURE — 94669 MECHANICAL CHEST WALL OSCILL: CPT

## 2021-07-21 PROCEDURE — 82746 ASSAY OF FOLIC ACID SERUM: CPT | Performed by: PHYSICIAN ASSISTANT

## 2021-07-21 PROCEDURE — 99152 MOD SED SAME PHYS/QHP 5/>YRS: CPT

## 2021-07-21 PROCEDURE — 85007 BL SMEAR W/DIFF WBC COUNT: CPT | Performed by: INTERNAL MEDICINE

## 2021-07-21 PROCEDURE — 99232 SBSQ HOSP IP/OBS MODERATE 35: CPT | Performed by: INTERNAL MEDICINE

## 2021-07-21 PROCEDURE — 94640 AIRWAY INHALATION TREATMENT: CPT

## 2021-07-21 PROCEDURE — 83735 ASSAY OF MAGNESIUM: CPT | Performed by: INTERNAL MEDICINE

## 2021-07-21 PROCEDURE — 84100 ASSAY OF PHOSPHORUS: CPT | Performed by: INTERNAL MEDICINE

## 2021-07-21 PROCEDURE — 80048 BASIC METABOLIC PNL TOTAL CA: CPT | Performed by: INTERNAL MEDICINE

## 2021-07-21 PROCEDURE — 0BBK3ZX EXCISION OF RIGHT LUNG, PERCUTANEOUS APPROACH, DIAGNOSTIC: ICD-10-PCS | Performed by: RADIOLOGY

## 2021-07-21 PROCEDURE — 77012 CT SCAN FOR NEEDLE BIOPSY: CPT

## 2021-07-21 PROCEDURE — 94668 MNPJ CHEST WALL SBSQ: CPT

## 2021-07-21 PROCEDURE — 83550 IRON BINDING TEST: CPT | Performed by: PHYSICIAN ASSISTANT

## 2021-07-21 PROCEDURE — 82947 ASSAY GLUCOSE BLOOD QUANT: CPT | Performed by: STUDENT IN AN ORGANIZED HEALTH CARE EDUCATION/TRAINING PROGRAM

## 2021-07-21 PROCEDURE — 88333 PATH CONSLTJ SURG CYTO XM 1: CPT | Performed by: PATHOLOGY

## 2021-07-21 PROCEDURE — 88334 PATH CONSLTJ SURG CYTO XM EA: CPT | Performed by: PATHOLOGY

## 2021-07-21 PROCEDURE — 83540 ASSAY OF IRON: CPT | Performed by: PHYSICIAN ASSISTANT

## 2021-07-21 PROCEDURE — 94664 DEMO&/EVAL PT USE INHALER: CPT

## 2021-07-21 PROCEDURE — 99152 MOD SED SAME PHYS/QHP 5/>YRS: CPT | Performed by: RADIOLOGY

## 2021-07-21 PROCEDURE — 32400 NEEDLE BIOPSY CHEST LINING: CPT

## 2021-07-21 PROCEDURE — 99153 MOD SED SAME PHYS/QHP EA: CPT

## 2021-07-21 PROCEDURE — 99222 1ST HOSP IP/OBS MODERATE 55: CPT | Performed by: PHYSICIAN ASSISTANT

## 2021-07-21 PROCEDURE — 82607 VITAMIN B-12: CPT | Performed by: PHYSICIAN ASSISTANT

## 2021-07-21 PROCEDURE — 85027 COMPLETE CBC AUTOMATED: CPT | Performed by: INTERNAL MEDICINE

## 2021-07-21 PROCEDURE — 85730 THROMBOPLASTIN TIME PARTIAL: CPT | Performed by: PHYSICIAN ASSISTANT

## 2021-07-21 PROCEDURE — 97163 PT EVAL HIGH COMPLEX 45 MIN: CPT

## 2021-07-21 PROCEDURE — 97116 GAIT TRAINING THERAPY: CPT

## 2021-07-21 PROCEDURE — 82948 REAGENT STRIP/BLOOD GLUCOSE: CPT

## 2021-07-21 PROCEDURE — 82728 ASSAY OF FERRITIN: CPT | Performed by: PHYSICIAN ASSISTANT

## 2021-07-21 PROCEDURE — 32408 CORE NDL BX LNG/MED PERQ: CPT | Performed by: RADIOLOGY

## 2021-07-21 RX ORDER — LIDOCAINE WITH 8.4% SOD BICARB 0.9%(10ML)
SYRINGE (ML) INJECTION CODE/TRAUMA/SEDATION MEDICATION
Status: COMPLETED | OUTPATIENT
Start: 2021-07-21 | End: 2021-07-21

## 2021-07-21 RX ORDER — HEPARIN SODIUM 10000 [USP'U]/100ML
3-20 INJECTION, SOLUTION INTRAVENOUS
Status: DISCONTINUED | OUTPATIENT
Start: 2021-07-21 | End: 2021-07-26

## 2021-07-21 RX ORDER — INSULIN GLARGINE 100 [IU]/ML
7 INJECTION, SOLUTION SUBCUTANEOUS
Status: DISCONTINUED | OUTPATIENT
Start: 2021-07-21 | End: 2021-07-22

## 2021-07-21 RX ORDER — MIDAZOLAM HYDROCHLORIDE 2 MG/2ML
INJECTION, SOLUTION INTRAMUSCULAR; INTRAVENOUS CODE/TRAUMA/SEDATION MEDICATION
Status: COMPLETED | OUTPATIENT
Start: 2021-07-21 | End: 2021-07-21

## 2021-07-21 RX ORDER — FENTANYL CITRATE 50 UG/ML
INJECTION, SOLUTION INTRAMUSCULAR; INTRAVENOUS CODE/TRAUMA/SEDATION MEDICATION
Status: COMPLETED | OUTPATIENT
Start: 2021-07-21 | End: 2021-07-21

## 2021-07-21 RX ORDER — GUAIFENESIN 600 MG
600 TABLET, EXTENDED RELEASE 12 HR ORAL EVERY 12 HOURS SCHEDULED
Status: DISCONTINUED | OUTPATIENT
Start: 2021-07-21 | End: 2021-08-05 | Stop reason: HOSPADM

## 2021-07-21 RX ORDER — SODIUM CHLORIDE FOR INHALATION 0.9 %
3 VIAL, NEBULIZER (ML) INHALATION
Status: DISCONTINUED | OUTPATIENT
Start: 2021-07-21 | End: 2021-07-28

## 2021-07-21 RX ADMIN — IPRATROPIUM BROMIDE 0.5 MG: 0.5 SOLUTION RESPIRATORY (INHALATION) at 07:02

## 2021-07-21 RX ADMIN — AMIODARONE HYDROCHLORIDE 200 MG: 200 TABLET ORAL at 08:30

## 2021-07-21 RX ADMIN — LEVALBUTEROL HYDROCHLORIDE 1.25 MG: 1.25 SOLUTION, CONCENTRATE RESPIRATORY (INHALATION) at 20:11

## 2021-07-21 RX ADMIN — LEVALBUTEROL HYDROCHLORIDE 1.25 MG: 1.25 SOLUTION, CONCENTRATE RESPIRATORY (INHALATION) at 07:02

## 2021-07-21 RX ADMIN — AMIODARONE HYDROCHLORIDE 200 MG: 200 TABLET ORAL at 16:24

## 2021-07-21 RX ADMIN — ALLOPURINOL 300 MG: 300 TABLET ORAL at 10:11

## 2021-07-21 RX ADMIN — INSULIN LISPRO 12 UNITS: 100 INJECTION, SOLUTION INTRAVENOUS; SUBCUTANEOUS at 17:44

## 2021-07-21 RX ADMIN — ISODIUM CHLORIDE 3 ML: 0.03 SOLUTION RESPIRATORY (INHALATION) at 20:11

## 2021-07-21 RX ADMIN — IPRATROPIUM BROMIDE 0.5 MG: 0.5 SOLUTION RESPIRATORY (INHALATION) at 01:07

## 2021-07-21 RX ADMIN — BENZONATATE 100 MG: 100 CAPSULE ORAL at 16:24

## 2021-07-21 RX ADMIN — OMEGA-3 FATTY ACIDS CAP 1000 MG 1000 MG: 1000 CAP at 17:06

## 2021-07-21 RX ADMIN — INSULIN LISPRO 2 UNITS: 100 INJECTION, SOLUTION INTRAVENOUS; SUBCUTANEOUS at 12:01

## 2021-07-21 RX ADMIN — TIOTROPIUM BROMIDE 18 MCG: 18 CAPSULE ORAL; RESPIRATORY (INHALATION) at 15:23

## 2021-07-21 RX ADMIN — AMIODARONE HYDROCHLORIDE 200 MG: 200 TABLET ORAL at 12:00

## 2021-07-21 RX ADMIN — PANTOPRAZOLE SODIUM 40 MG: 40 TABLET, DELAYED RELEASE ORAL at 10:11

## 2021-07-21 RX ADMIN — INSULIN LISPRO 3 UNITS: 100 INJECTION, SOLUTION INTRAVENOUS; SUBCUTANEOUS at 21:20

## 2021-07-21 RX ADMIN — INSULIN GLARGINE 7 UNITS: 100 INJECTION, SOLUTION SUBCUTANEOUS at 21:17

## 2021-07-21 RX ADMIN — LEVALBUTEROL HYDROCHLORIDE 1.25 MG: 1.25 SOLUTION, CONCENTRATE RESPIRATORY (INHALATION) at 01:07

## 2021-07-21 RX ADMIN — GUAIFENESIN 600 MG: 600 TABLET, EXTENDED RELEASE ORAL at 12:01

## 2021-07-21 RX ADMIN — MIDAZOLAM HYDROCHLORIDE 1 MG: 1 INJECTION, SOLUTION INTRAMUSCULAR; INTRAVENOUS at 09:19

## 2021-07-21 RX ADMIN — FENTANYL CITRATE 50 MCG: 50 INJECTION, SOLUTION INTRAMUSCULAR; INTRAVENOUS at 09:19

## 2021-07-21 RX ADMIN — METOPROLOL TARTRATE 25 MG: 25 TABLET, FILM COATED ORAL at 21:17

## 2021-07-21 RX ADMIN — LEVALBUTEROL HYDROCHLORIDE 1.25 MG: 1.25 SOLUTION, CONCENTRATE RESPIRATORY (INHALATION) at 14:35

## 2021-07-21 RX ADMIN — ISODIUM CHLORIDE 3 ML: 0.03 SOLUTION RESPIRATORY (INHALATION) at 14:35

## 2021-07-21 RX ADMIN — BENZONATATE 100 MG: 100 CAPSULE ORAL at 10:11

## 2021-07-21 RX ADMIN — METHYLPREDNISOLONE SODIUM SUCCINATE 40 MG: 40 INJECTION, POWDER, FOR SOLUTION INTRAMUSCULAR; INTRAVENOUS at 08:31

## 2021-07-21 RX ADMIN — HEPARIN SODIUM 19 UNITS/KG/HR: 10000 INJECTION, SOLUTION INTRAVENOUS at 20:17

## 2021-07-21 RX ADMIN — GUAIFENESIN 600 MG: 600 TABLET, EXTENDED RELEASE ORAL at 21:16

## 2021-07-21 RX ADMIN — OMEGA-3 FATTY ACIDS CAP 1000 MG 1000 MG: 1000 CAP at 10:12

## 2021-07-21 RX ADMIN — Medication 6 ML: at 09:19

## 2021-07-21 RX ADMIN — METOPROLOL TARTRATE 25 MG: 25 TABLET, FILM COATED ORAL at 08:30

## 2021-07-21 RX ADMIN — BENZONATATE 100 MG: 100 CAPSULE ORAL at 21:16

## 2021-07-21 RX ADMIN — INSULIN LISPRO 12 UNITS: 100 INJECTION, SOLUTION INTRAVENOUS; SUBCUTANEOUS at 18:16

## 2021-07-21 RX ADMIN — INSULIN LISPRO 5 UNITS: 100 INJECTION, SOLUTION INTRAVENOUS; SUBCUTANEOUS at 17:07

## 2021-07-21 RX ADMIN — LEVOTHYROXINE SODIUM 150 MCG: 150 TABLET ORAL at 05:56

## 2021-07-21 NOTE — PLAN OF CARE
Problem: PHYSICAL THERAPY ADULT  Goal: Performs mobility at highest level of function for planned discharge setting  See evaluation for individualized goals  Description: Treatment/Interventions: Functional transfer training, LE strengthening/ROM, Therapeutic exercise, Endurance training, Elevations, Patient/family training, Equipment eval/education, Bed mobility, Gait training          See flowsheet documentation for full assessment, interventions and recommendations  Outcome: Progressing  Note: Prognosis: Good  Problem List: Decreased strength, Decreased endurance, Impaired balance, Decreased mobility, Decreased safety awareness, Impaired sensation  Assessment: Therapist introduced roller walker use w/ mobility to address impairments noted during evaluation  Pt was noted to have improvement in mobility status w/ use of walker w/ decreased level of assistance  Pt continues to require standby assist and verbal cues w/ mobility for proper technique/safety  Pt is at risk for falling  continued inpatient PT tx is indicated to reduce fall risk factors  PT Discharge Recommendation: Home with home health rehabilitation          See flowsheet documentation for full assessment

## 2021-07-21 NOTE — PHYSICAL THERAPY NOTE
PHYSICAL THERAPY TREATMENT NOTE    Patient Name: Radha Young  NPDSB'V Date: 7/21/2021 07/21/21 1520   PT Last Visit   PT Visit Date 07/21/21   Pain Assessment   Pain Assessment Tool Pain Assessment not indicated - pt denies pain   Restrictions/Precautions   Other Precautions Chair Alarm; Bed Alarm;Telemetry;O2;Fall Risk   General   Chart Reviewed Yes   Family/Caregiver Present Yes   Cognition   Arousal/Participation Alert   Attention Attends with cues to redirect   Orientation Level Oriented to person; Other (Comment)  (pt was identified w/ full name, birth date)   Following Commands Follows one step commands with increased time or repetition   Subjective   Subjective pt agreed to participate in PT intervention  Transfers   Sit to Stand 5  Supervision   Additional items Increased time required;Verbal cues  (for hand placement)   Stand to Sit 5  Supervision   Additional items Verbal cues  (for body positioning)   Ambulation/Elevation   Gait pattern Foward flexed   Gait Assistance 5  Supervision   Additional items Verbal cues  (for breathing technique, walker placement)   Assistive Device Rolling walker   Distance 15, 25 feet w/ seated rest break  (additional not possible due to fatigue)   Stair Management Assistance Not tested   Balance   Static Sitting Fair +   Static Standing Fair   Ambulatory Fair -  (w/ roller walker)   Activity Tolerance   Activity Tolerance Patient limited by fatigue   Nurse Made Aware spoke to [address] [place] [address]   Assessment   Prognosis Good   Problem List Decreased strength;Decreased endurance; Impaired balance;Decreased mobility; Decreased safety awareness; Impaired sensation   Assessment Therapist introduced roller walker use w/ mobility to address impairments noted during evaluation  Pt was noted to have improvement in mobility status w/ use of walker w/ decreased level of assistance   Pt continues to require standby assist and verbal cues w/ mobility for proper technique/safety  Pt is at risk for falling  continued inpatient PT tx is indicated to reduce fall risk factors  Goals   Patient Goals go home  go back to work  STG Expiration Date 07/31/21   Short Term Goal #1 pt will: Increase bilateral LE strength 1/2 grade to facilitate independent mobility, Perform all bed mobility tasks independently to decrease fall risk factors, Perform all transfers independently to improve independence, Ambulate 200 ft  with least restrictive assistive device independently w/o LOB to expedite safe return home, Navigate 2 stairs independently with unilateral handrail to facilitate return to previous living environment, Increase ambulatory balance 1 grade to decrease risk for falls, Complete exercise program independently to improve strength and endurance, Tolerate 3 hr OOB to faciliate upright tolerance, Improve Barthel Index score to 85 or greater to facilitate independence, and Complete Timed Up and Go or Comfortable Gait Speed to further assess mobility and monitor progression   PT Treatment Day 1   Plan   Treatment/Interventions Functional transfer training;LE strengthening/ROM; Therapeutic exercise; Endurance training;Elevations; Patient/family training;Equipment eval/education; Bed mobility;Gait training   Progress Progressing toward goals   PT Frequency 5x/wk   Recommendation   PT Discharge Recommendation Home with home health rehabilitation   Additional Comments recommend roller walker use w/ mobility     Additional Comments 2 pt would benefit from OT consult to address safety awareness and energy conservation   AM-PAC Basic Mobility Inpatient   Turning in Bed Without Bedrails 4   Lying on Back to Sitting on Edge of Flat Bed 3   Moving Bed to Chair 3   Standing Up From Chair 3   Walk in Room 3   Climb 3-5 Stairs 2   Basic Mobility Inpatient Raw Score 18   Basic Mobility Standardized Score 41 05     Skilled inpatient PT recommended while in hospital to progress pt toward treatment goals      Rigoberto Peng PT

## 2021-07-21 NOTE — BRIEF OP NOTE (RAD/CATH)
INTERVENTIONAL RADIOLOGY PROCEDURE NOTE    Date: 7/21/2021    Procedure: IR LUNG BIOPSY    Preoperative diagnosis:   1  SULEMA (acute kidney injury) ([address] )    2  Hypercalcemia    3  Hyperkalemia    4  Paroxysmal A-fib (HCC)         Postoperative diagnosis: Same  Surgeon: Adam Gallagher MD     Assistant: None  No qualified resident was available  Blood loss:  Minimal    Specimens:     Touch preps, flow, 18 gauge core x9     Findings:   Right lung mass biopsy    Since prior imaging there has been interval growth of the mass with development of accompanying small effusion    Recommend restarting heparin drip without bolus in 4 hours if there is no sign of bleeding    Complications: None immediate      Anesthesia: conscious sedation

## 2021-07-21 NOTE — PHYSICAL THERAPY NOTE
PHYSICAL THERAPY EVALUATION NOTE    Patient Name: Shazia Lee  [id number] Date: 7/21/2021  AGE:   68 y o  Mrn:   [id number]  ADMIT DX:  Hypercalcemia [E83 52]  Hyperkalemia [E87 5]  Abnormal laboratory test result [R89 9]  SULEMA (acute kidney injury) ([facility] [address] ) [N17 9]    Past Medical History:   Diagnosis Date    Hypertension      Length Of Stay: 7  PHYSICAL THERAPY EVALUATION :   07/21/21 1510   PT Last Visit   PT Visit Date 07/21/21   Pain Assessment   Pain Assessment Tool Pain Assessment not indicated - pt denies pain   Home Living   Type of [address] One level; Other (Comment)  (2 NICK)   Additional Comments lives w/ spouse  ambulates w/o assistive device  independent w/ ADLs and driving  no falls in last 6 months  pt occasionally uses spouse's oxygen  Prior Function   Comments pt seen supine in bed w/ spouse and family present  agreed to PT session  denied pain or dizziness  Restrictions/Precautions   Other Precautions Chair Alarm; Bed Alarm;Telemetry;O2;Fall Risk   General   Additional Pertinent History 7/21/21 at 11:07, glucose was 255  Family/Caregiver Present Yes   Cognition   Arousal/Participation Cooperative   Orientation Level Oriented to person; Other (Comment)  (pt was identified w/ full name, birth date)   Following Commands Follows one step commands with increased time or repetition   Comments 3L oxygen via nasal cannula  resting pulse ox 94% and 68 BPM, active 93% and 74 BPM  supine blood pressure 114/57  RUE Assessment   RUE Assessment WFL  (3+ to 4-/5)   LUE Assessment   LUE Assessment WFL  (3+ to 4-/5)   RLE Assessment   RLE Assessment WFL  (3+ to 4-/5)   LLE Assessment   LLE Assessment WFL  (3+ to 4-/5)   Coordination   Sensation X  (light touch impaired B feet and B 1st - 3rd fingers)   Bed Mobility   Supine to Sit 5  Supervision   Additional items HOB elevated; Increased time required;Verbal Index: 60/100  pt needed input for mobility technique  pt is at risk for falls due to physical and safety awareness deficits  pt's clinical presentation is unstable/unpredictable (evident in poor blood pressure control, need for assist w/ all phases of mobility when usually mobilizing independently, tolerance to only 6 feet of ambulation, need for supplemental oxygen in order to maintain oxygen saturation and need for input for mobility technique/safety)  pt needs inpatient PT tx to improve mobility deficits and progress mobility training as appropriate  discharge recommendation is for home w/ family support and home PT to reduce fall risk and maximize level of functional independence  Goals   Patient Goals go home  go back to work  STG Expiration Date 07/31/21   Short Term Goal #1 pt will: Increase bilateral LE strength 1/2 grade to facilitate independent mobility, Perform all bed mobility tasks independently to decrease fall risk factors, Perform all transfers independently to improve independence, Ambulate 200 ft  with least restrictive assistive device independently w/o LOB to expedite safe return home, Navigate 2 stairs independently with unilateral handrail to facilitate return to previous living environment, Increase ambulatory balance 1 grade to decrease risk for falls, Complete exercise program independently to improve strength and endurance, Tolerate 3 hr OOB to faciliate upright tolerance, Improve Barthel Index score to 85 or greater to facilitate independence, and Complete Timed Up and Go or Comfortable Gait Speed to further assess mobility and monitor progression   Plan   Treatment/Interventions Functional transfer training;LE strengthening/ROM; Therapeutic exercise; Endurance training;Elevations; Patient/family training;Equipment eval/education; Bed mobility;Gait training   PT Frequency 5x/wk   Recommendation   PT Discharge Recommendation Home with home health rehabilitation   [facility]  Inpatient   Turning in Bed Without Bedrails 4   Lying on Back to Sitting on Edge of Flat Bed 3   Moving Bed to Chair 3   Standing Up From Chair 3   Walk in Room 3   Climb 3-5 Stairs 2   Basic Mobility Inpatient Raw Score 18   Basic Mobility Standardized Score 41 05   Barthel Index   Feeding 10   Bathing 0   Grooming Score 5   Dressing Score 5   Bladder Score 10   Bowels Score 10   Toilet Use Score 10   Transfers (Bed/Chair) Score 10   Mobility (Level Surface) Score 0   Stairs Score 0   Barthel Index Score 60     The patient's AM-PAC Basic Mobility Inpatient Short Form Raw Score is 18, Standardized Score is 41 05  A standardized score less than 42 9 suggests the patient may benefit from discharge to home  Please also refer to the recommendation of the Physical Therapist for safe discharge planning  As pt has 1st floor setup available, pt is recommended for return home w/ family support and home PT  Skilled PT recommended while in hospital and upon DC to progress pt toward treatment goals       Yoselin Ocampo, PT

## 2021-07-21 NOTE — PROGRESS NOTES
General Cardiology   Progress Note -  Team One   Allyssa Angel 68 y o  male MRN: [id number]  Unit/Bed#: ICU 06 Encounter: [id number]    Assessment/ Plan    1  PAF- known history of,  presented with AFib RVR  Presented in afib with RVR on admission in setting of multiple electrolyte derangements, spontaneously converted to NSR  Recurrent symptomatic afib with RVR on 7/19/21 now back in NSR  IV amiodarone d/c'd after lung biopsy today  Continue oral amiodarone load- 200 mg TID  Some bradycardia into 40s yesterday so metoprolol dose reduced to 25 mg BID  Currently NSR in the 70s  Normal LV function on recent echo  On IV heparin for anticoagulation, resume Eliquis when safe to do so post biopsy      2  Right lung mass  Status post EBUS 06/2021-pathology atypical cells  OP- Consulted IR for biopsy  Current chest x-ray-large right lung mass, slightly increased in size, consistent with malignancy  Persistent small right pleural effusion  S/p IR biopsy today      3  COPD  utilizes p r n  Oxygen at home  Presented with acute exacerbation-IV steroids initiated, nebulizer treatments per primary team    4  HTN- controlled, average /69    Subjective  No cardiac complaints currently  Hungry since he wasn't able to eat breakfast   Review of Systems   Constitutional: Negative for diaphoresis and malaise/fatigue  Cardiovascular: Negative for chest pain, leg swelling, orthopnea, palpitations and syncope  Respiratory: Positive for cough and sputum production  Negative for shortness of breath and sleep disturbances due to breathing  Gastrointestinal: Negative for bloating, nausea and vomiting  Neurological: Negative for dizziness, light-headedness and weakness  Psychiatric/Behavioral: Negative for altered mental status  All other systems reviewed and are negative  Objective:   Vitals: Blood pressure 119/66, pulse 65, temperature 98 2 °F (36 8 °C), temperature source Oral, resp   rate 22, height 5' 2" (1 575 m), weight 75 8 kg (167 lb), SpO2 97 %  , Body mass index is 30 54 kg/m²  ,     Systolic (70VGP), XKE:528 , Min:114 , BZX:480     Diastolic (36DWS), RRA:55, Min:58, Max:85      Intake/Output Summary (Last 24 hours) at 7/21/2021 1227  Last data filed at 7/21/2021 1132  Gross per 24 hour   Intake 873 05 ml   Output 1760 ml   Net -886 95 ml     Wt Readings from Last 3 Encounters:   07/14/21 75 8 kg (167 lb)   07/08/21 75 4 kg (166 lb 3 6 oz)   06/30/21 76 7 kg (169 lb)     Telemetry Review: Sinus bradycardia yesterday  Currently NSR  Physical Exam  Vitals reviewed  Constitutional:       General: He is not in acute distress  Neck:      Vascular: No hepatojugular reflux or JVD  Cardiovascular:      Rate and Rhythm: Normal rate  Rhythm irregular  Pulses: Normal pulses  Heart sounds: Normal heart sounds  No murmur heard  No friction rub  No gallop  Pulmonary:      Effort: Tachypnea present  No respiratory distress  Breath sounds: No wheezing or rales  Abdominal:      General: Bowel sounds are normal  There is no distension  Palpations: Abdomen is soft  Tenderness: There is no abdominal tenderness  Musculoskeletal:         General: Normal range of motion  Cervical back: Neck supple  Right lower leg: No edema  Left lower leg: No edema  Skin:     General: Skin is warm and dry  Capillary Refill: Capillary refill takes less than 2 seconds  Findings: No erythema  Neurological:      Mental Status: He is alert and oriented to person, place, and time     Psychiatric:         Mood and Affect: Mood normal      LABORATORY RESULTS  Results from last 7 days   Lab Units 07/19/21  1158 07/19/21  0942   TROPONIN I ng/mL 0 02 <0 02     CBC with diff:   Results from last 7 days   Lab Units 07/21/21  0556 07/20/21  0459 07/19/21  0946 07/19/21  0546 07/18/21  0537 07/16/21  0537 07/15/21  0439 07/14/21  1413   WBC Thousand/uL 13 88* 17 83*  --  11 47* 11 70* 10 81* 5  20 6 27   HEMOGLOBIN g/dL 9 5* 9 3*  --  9 9* 9 0* 7 9* 8 7* 9 6*   I STAT HEMOGLOBIN g/dl  --   --  11 6*  --   --   --   --   --    HEMATOCRIT % 33 7* 33 3*  --  35 9* 31 8* 28 2* 32 3* 33 6*   HEMATOCRIT, ISTAT %  --   --  34*  --   --   --   --   --    MCV fL 85 84  --  84 84 83 84 83   PLATELETS Thousands/uL 292 300  --  356 333 265 256 277   MCH pg 24 1* 23 4*  --  23 2* 23 8* 23 2* 22 6* 23 6*   MCHC g/dL 28 2* 27 9*  --  27 6* 28 3* 28 0* 26 9* 28 6*   RDW % 18 9* 18 6*  --  17 9* 18 0* 17 6* 18 0* 18 0*   MPV fL 10 9 10 6  --  9 8 10 4 9 7 9 7 9 4   NRBC AUTO /100 WBCs  --  0  --   --  0  --   --  0       CMP:  Results from last 7 days   Lab Units 07/21/21  0556 07/20/21  0459 07/19/21  0946 07/19/21  0546 07/18/21  0537 07/17/21  0449 07/16/21  0937 07/15/21  1137 07/15/21  0438 07/14/21  1413   POTASSIUM mmol/L 4 8 4 1  --  4 8 4 6 4 9 5 1 4 5 5 9* 5 5*   CHLORIDE mmol/L 98* 103  --  105 103 105 107  --  107 108   CO2 mmol/L 33* 32  --  34* 31 29 25  --  25 28   CO2, I-STAT mmol/L  --   --  35*  --   --   --   --   --   --   --    BUN mg/dL 37* 37*  --  32* 32* 32* 32*  --  32* 33*   CREATININE mg/dL 1 09 1 19  --  1 11 1 03 1 14 1 23  --  1 68* 1 77*   GLUCOSE, ISTAT mg/dl  --   --  154*  --   --   --   --   --   --   --    CALCIUM mg/dL 9 1 9 3  --  9 6 9 6 9 4 9 2  --  9 4 9 7   AST U/L  --   --   --   --   --  14 8  --  16 13   ALT U/L  --   --   --   --   --  22 17  --  19 22   ALK PHOS U/L  --   --   --   --   --  132* 147*  --  174* 171*   EGFR ml/min/1 73sq m 65 58  --  63 69 61 56  --  38 36       BMP:  Results from last 7 days   Lab Units 07/21/21  0556 07/20/21  0459 07/19/21  0946 07/19/21  0546 07/18/21  0537 07/17/21  0449 07/16/21  0937 07/15/21  1137 07/15/21  0438   POTASSIUM mmol/L 4 8 4 1  --  4 8 4 6 4 9 5 1 4 5 5 9*   CHLORIDE mmol/L 98* 103  --  105 103 105 107  --  107   CO2 mmol/L 33* 32  --  34* 31 29 25  --  25   CO2, I-STAT mmol/L  --   --  35*  --   --   --   --   --   -- BUN mg/dL 37* 37*  --  32* 32* 32* 32*  --  32*   CREATININE mg/dL 1 09 1 19  --  1 11 1 03 1 14 1 23  --  1 68*   GLUCOSE, ISTAT mg/dl  --   --  154*  --   --   --   --   --   --    CALCIUM mg/dL 9 1 9 3  --  9 6 9 6 9 4 9 2  --  9 4       Lab Results   Component Value Date    CREATININE 1 09 2021    CREATININE 1 19 2021    CREATININE 1 11 2021       Lab Results   Component Value Date    NTBNP 5,084 (H) 2021    NTBNP 373 2021           Results from last 7 days   Lab Units 21  0556 21  0459 21  2323 21  1413   MAGNESIUM mg/dL 1 8 1 7 1 3* 1 5*                 Results from last 7 days   Lab Units 21  0449   TSH 3RD GENERATON uIU/mL 0 103*   FREE T4 ng/dL 1 19       Results from last 7 days   Lab Units 21  1210   INR  1 27*       Lipid Profile:   No results found for: CHOL  Lab Results   Component Value Date    HDL 28 (L) 2021    HDL 25 (L) 07/15/2019     Lab Results   Component Value Date    LDLCALC 69 2021    LDLCALC 60 07/15/2019     Lab Results   Component Value Date    TRIG 105 2021    TRIG 218 (H) 07/15/2019       Cardiac testing:   Results for orders placed during the hospital encounter of 21    Echo complete with contrast if indicated    Narrative  [place] [address], [address] [employer]  [phone]    Transthoracic Echocardiogram  2D, M-mode, Doppler, and Color Doppler    Study date:  2021    Patient: Venkatesh Prince  MR number: [id number]  Account number: [de-identified]  : 1940  Age: 68 years  Gender: Male  Status: Inpatient  Location: Bedside  Height: 62 in  Weight: 173 6 lb  BP: 138/ 65 mmHg    Indications: A-fib      Diagnoses: I48 0 - Atrial fibrillation    Sonographer:  ROSALINA Berkowitz  Primary Physician:  Chayo Sebastian DO  Referring Physician:  Calli Chowdhury PA-C  Group:  [facility]  Interpreting Physician:  Rama Rosario, MD    SUMMARY    LEFT VENTRICLE:  Systolic function was normal  Ejection fraction was estimated to be 60 %  There were no regional wall motion abnormalities  MITRAL VALVE:  There was mild regurgitation  TRICUSPID VALVE:  There was mild regurgitation  Estimated peak PA pressure was 42 mmHg  The findings suggest mild pulmonary hypertension  HISTORY: PRIOR HISTORY: PAF, HTN, SULEMA, SOB, lung mass, DM2, thrombocytopenia, current smoker  PROCEDURE: The procedure was performed at the bedside  This was a routine study  The transthoracic approach was used  The study included complete 2D imaging, M-mode, complete spectral Doppler, and color Doppler  The heart rate was 77 bpm,  at the start of the study  Images were obtained from the parasternal, apical, subcostal, and suprasternal notch acoustic windows  Image quality was adequate  LEFT VENTRICLE: Size was normal  Systolic function was normal  Ejection fraction was estimated to be 60 %  There were no regional wall motion abnormalities  Wall thickness was normal  DOPPLER: Left ventricular diastolic function parameters  were normal for the patient's age  RIGHT VENTRICLE: The size was normal  Systolic function was normal  Wall thickness was normal     LEFT ATRIUM: Size was normal     RIGHT ATRIUM: Size was normal     MITRAL VALVE: Valve structure was normal  There was normal leaflet separation  DOPPLER: The transmitral velocity was within the normal range  There was no evidence for stenosis  There was mild regurgitation  AORTIC VALVE: The valve was trileaflet  Leaflets exhibited normal thickness and normal cuspal separation  DOPPLER: Transaortic velocity was within the normal range  There was no evidence for stenosis  There was no significant  regurgitation  TRICUSPID VALVE: The valve structure was normal  There was normal leaflet separation  DOPPLER: The transtricuspid velocity was within the normal range  There was no evidence for stenosis   There was mild regurgitation  Estimated peak PA  pressure was 42 mmHg  The findings suggest mild pulmonary hypertension  PULMONIC VALVE: Leaflets exhibited normal thickness, no calcification, and normal cuspal separation  DOPPLER: The transpulmonic velocity was within the normal range  There was no significant regurgitation  PERICARDIUM: There was no pericardial effusion  The pericardium was normal in appearance  AORTA: The root exhibited normal size  SYSTEMIC VEINS: IVC: The inferior vena cava was normal in size  Respirophasic changes were normal     SYSTEM MEASUREMENT TABLES    2D  %FS: 30 02 %  Ao Diam: 3 54 cm  EDV(Teich): 123 19 ml  EF(Teich): 56 95 %  ESV(Teich): 53 03 ml  IVSd: 0 92 cm  LA Diam: 4 57 cm  LAAs A2C: 23 05 cm2  LAAs A4C: 22 52 cm2  LAESV A-L A2C: 77 35 ml  LAESV A-L A4C: 69 99 ml  LAESV Index (A-L): 41 99 ml/m2  LAESV MOD A2C: 73 99 ml  LAESV MOD A4C: 64 59 ml  LAESV(A-L): 75 58 ml  LAESV(MOD BP): 70 92 ml  LALs A2C: 5 83 cm  LALs A4C: 6 15 cm  LVIDd: 5 09 cm  LVIDs: 3 56 cm  LVPWd: 0 85 cm  RVIDd: 3 65 cm  SV(Teich): 70 16 ml    CW  AV Env  Ti: 281 64 ms  AV MaxP 92 mmHg  AV VTI: 22 53 cm  AV Vmax: 1 49 m/s  AV Vmean: 0 8 m/s  AV meanPG: 3 13 mmHg  TR MaxP 17 mmHg  TR Vmax: 3 13 m/s    MM  TAPSE: 3 01 cm    PW  E' Sept: 0 09 m/s  E/E' Sept: 11 25  LVOT Env  Ti: 342 53 ms  LVOT VTI: 21 45 cm  LVOT Vmax: 1 01 m/s  LVOT Vmean: 0 63 m/s  LVOT maxP 07 mmHg  LVOT meanP 9 mmHg  MV A Ge: 1 12 m/s  MV Dec [place]: 6 18 m/s2  MV DecT: 167 82 ms  MV E Ge: 1 04 m/s  MV E/A Ratio: 0 93  MV PHT: 48 67 ms  MVA By PHT: 4 52 cm2    [employer] Accredited Echocardiography Laboratory    Prepared and electronically signed by    Nicolasa Gibson MD  Signed 2021 12:57:51    Meds/Allergies   all current active meds have been reviewed and current meds:   Current Facility-Administered Medications   Medication Dose Route Frequency    acetaminophen (TYLENOL) tablet 650 mg  650 mg Oral Q6H PRN    allopurinol (ZYLOPRIM) tablet 300 mg  300 mg Oral Daily    amiodarone tablet 200 mg  200 mg Oral TID With Meals    benzonatate (TESSALON PERLES) capsule 100 mg  100 mg Oral TID    dextromethorphan-guaiFENesin (ROBITUSSIN DM) oral syrup 10 mL  10 mL Oral Q4H PRN    fish oil capsule 1,000 mg  1,000 mg Oral BID    guaiFENesin (MUCINEX) 12 hr tablet 600 mg  600 mg Oral Q12H [address]    heparin (porcine) 25,000 units in 0 45% NaCl 250 mL infusion (premix)  3-20 Units/kg/hr (Order-Specific) Intravenous Titrated    insulin lispro (HumaLOG) 100 units/mL subcutaneous injection 1-5 Units  1-5 Units Subcutaneous TID AC    insulin lispro (HumaLOG) 100 units/mL subcutaneous injection 1-5 Units  1-5 Units Subcutaneous HS    levalbuterol (XOPENEX) inhalation solution 1 25 mg  1 25 mg Nebulization Q6H    levothyroxine tablet 150 mcg  150 mcg Oral Daily    lidocaine (LIDODERM) 5 % patch 1 patch  1 patch Topical Q24H    methylPREDNISolone sodium succinate (Solu-MEDROL) injection 40 mg  40 mg Intravenous Daily    metoprolol (LOPRESSOR) injection 5 mg  5 mg Intravenous Q6H PRN    metoprolol tartrate (LOPRESSOR) tablet 25 mg  25 mg Oral Q12H ZOHAIB    ondansetron (ZOFRAN) injection 4 mg  4 mg Intravenous Q6H PRN    pantoprazole (PROTONIX) EC tablet 40 mg  40 mg Oral Daily    sodium chloride 0 9 % inhalation solution 3 mL  3 mL Nebulization Q6H    tiotropium (SPIRIVA) capsule for inhaler 18 mcg  18 mcg Inhalation Daily     Medications Prior to Admission   Medication    acetaminophen (TYLENOL) 100 mg/mL solution    albuterol (2 5 mg/3 mL) 0 083 % nebulizer solution    allopurinol (ZYLOPRIM) 300 mg tablet    amLODIPine (NORVASC) 2 5 mg tablet    apixaban (ELIQUIS) 5 mg    benzonatate (TESSALON PERLES) 100 mg capsule    guaiFENesin (MUCINEX) 600 mg 12 hr tablet    levothyroxine 150 mcg tablet    metFORMIN (GLUCOPHAGE) 500 mg tablet    metoprolol tartrate (LOPRESSOR) 25 mg tablet    Omega-3 Fatty Acids (FISH OIL) 1,000 mg    pantoprazole (PROTONIX) 40 mg tablet    tiotropium (SPIRIVA) 18 mcg inhalation capsule     heparin (porcine), 3-20 Units/kg/hr (Order-Specific)    Counseling / Coordination of Care  Total floor / unit time spent today 20 minutes  Greater than 50% of total time was spent with the patient and / or family counseling and / or coordination of care  ** Please Note: Dragon 360 Dictation voice to text software may have been used in the creation of this document   **

## 2021-07-21 NOTE — SEDATION DOCUMENTATION
Right lung bx successfully completed  Band aid to site  Patient tolerated well with minimal IV conscious sedation  Transferred back to ICU via bed on monitor  Report and care assumed by primary RN

## 2021-07-21 NOTE — PROGRESS NOTES
[facility]  Progress Note - Tani Medley 1940, 68 y o  male MRN: [id number]  Unit/Bed#: ICU 06 Encounter: [id number]  Primary Care Provider: Mary Lou Woo DO   Date and time admitted to hospital: 7/14/2021  1:39 PM    * [facility] with [facility] and [facility]cap[facility]  Assessment & Plan  · Continues to resolve  · Encourage good pulmonary hygiene  · Wean oxygen for SpO2>88%  · Respiratory protocol    Paroxysmal A-fib with RVR  Assessment & Plan  · Continue oral metoprolol  · Plan to complete IV amiodarone after lung biopsy and continue overlapping oral load  · Heparin held at 3 AM, discuss with IR when to resume  · Appreciate cardiology recommendations    Mass of right lung  Assessment & Plan  · Good pulmonary hygiene  · Planning biopsy for today    COPD (chronic obstructive pulmonary disease) with acute exacerbation ([facility] )  Assessment & Plan  · Continue scheduled nebulizers  · Continue IV Solumedrol through today, consider transition to oral tomorrow  · Wean oxygen for SpO2>88%    type 2 diabetes mellitus [facility])  Assessment & Plan  Lab Results   Component Value Date    HGBA1C 6 6 (H) 06/20/2021       Recent Labs     07/20/21  1205 07/20/21  1403 07/20/21  1556 07/20/21  1749   POCGLU 215* 176* 236* 128     · Would continue IV insulin through biopsy, consider transition to subcutaneous tonight or tomorrow    Blood Sugar Average: Last 72 hrs:  (P) 224 5    Hypothyroidism  Assessment & Plan  · Continue home levothyroxine    Hypercalcemia-resolved as of 7/20/2021  Assessment & Plan  · Potentially related to malignancy  · Follow on chemistry as needed      ----------------------------------------------------------------------------------------  HPI/24hr events: Patient's steroids weaned to daily, blood sugars improved, NPO at midnight in anticipation of lung biopsy today, heparin off at 3 AM    Disposition: Improving  Code Status: Level 1 - Full Code  ---------------------------------------------------------------------------------------  SUBJECTIVE  "I'm trying to cough all this mucous out"    Review of Systems   Constitutional: Positive for fatigue  Negative for fever  HENT: Negative for trouble swallowing  Respiratory: Positive for cough and shortness of breath  Cardiovascular: Negative for chest pain  Gastrointestinal: Negative for nausea and vomiting  Genitourinary: Negative for difficulty urinating  Musculoskeletal: Negative for arthralgias        ---------------------------------------------------------------------------------------  OBJECTIVE    Vitals   Vitals:    21 1900 21 1918 21 2121 21 2300   BP: 144/67  145/63 162/74   BP Location: Left arm   Left arm   Pulse: 70  75 64   Resp: (!) 27   18   Temp: 97 8 °F (36 6 °C)   97 5 °F (36 4 °C)   TempSrc: Oral   Oral   SpO2: 95% 94%  97%   Weight:       Height:         Temp (24hrs), Av 7 °F (36 5 °C), Min:97 5 °F (36 4 °C), Max:97 8 °F (36 6 °C)  Current: Temperature: 97 5 °F (36 4 °C)          Respiratory:  SpO2: SpO2: 97 %, SpO2 Activity: SpO2 Activity: At Rest, SpO2 Device: O2 Device: Nasal cannula  Nasal Cannula O2 Flow Rate (L/min): 4 L/min    Invasive/non-invasive ventilation settings   Respiratory    Lab Data (Last 4 hours)    None         O2/Vent Data (Last 4 hours)    None                Physical Exam  Constitutional:       General: He is not in acute distress  Appearance: Normal appearance  Interventions: Nasal cannula in place  HENT:      Head: Normocephalic and atraumatic  Mouth/Throat:      Mouth: Mucous membranes are moist    Eyes:      Pupils: Pupils are equal, round, and reactive to light  Cardiovascular:      Rate and Rhythm: Normal rate and regular rhythm  Pulses: Normal pulses  Pulmonary:      Effort: Pulmonary effort is normal  No respiratory distress  Breath sounds: Wheezing present     Abdominal:      General: Abdomen is flat  Bowel sounds are normal  There is no distension  Palpations: Abdomen is soft  Tenderness: There is no abdominal tenderness  Musculoskeletal:         General: No signs of injury  Right lower leg: No edema  Left lower leg: No edema  Skin:     General: Skin is warm and dry  Neurological:      Mental Status: He is alert  GCS: GCS eye subscore is 4  GCS verbal subscore is 5  GCS motor subscore is 6           Laboratory and Diagnostics:  Results from last 7 days   Lab Units 07/20/21  0459 07/19/21  0946 07/19/21  0546 07/18/21  0537 07/16/21  0537 07/15/21  0439 07/14/21  1413 07/14/21  1039   WBC Thousand/uL 17 83*  --  11 47* 11 70* 10 81* 5 20 6 27 5 68   HEMOGLOBIN g/dL 9 3*  --  9 9* 9 0* 7 9* 8 7* 9 6* 10 0*   I STAT HEMOGLOBIN g/dl  --  11 6*  --   --   --   --   --   --    HEMATOCRIT % 33 3*  --  35 9* 31 8* 28 2* 32 3* 33 6* 35 2*   HEMATOCRIT, ISTAT %  --  34*  --   --   --   --   --   --    PLATELETS Thousands/uL 300  --  356 333 265 256 277 282   NEUTROS PCT % 90*  --   --   --   --   --  81* 83*   BANDS PCT %  --   --   --  3  --   --   --   --    MONOS PCT % 5  --   --   --   --   --  11 9   MONO PCT %  --   --   --  7  --   --   --   --      Results from last 7 days   Lab Units 07/20/21  0459 07/19/21  0946 07/19/21  0546 07/18/21  0537 07/17/21  0449 07/16/21  0937 07/15/21  1137 07/15/21  0438 07/14/21  2323 07/14/21  1413 07/14/21  1039 07/14/21  1039   SODIUM mmol/L 141  --  143 140 139 142  --  140 142 145  --  140   POTASSIUM mmol/L 4 1  --  4 8 4 6 4 9 5 1 4 5 5 9* 5 8* 5 5*  --  5 1   CHLORIDE mmol/L 103  --  105 103 105 107  --  107 109* 108  --  104   CO2 mmol/L 32  --  34* 31 29 25  --  25 27 28  --  25   CO2, I-STAT mmol/L  --  35*  --   --   --   --   --   --   --   --   --   --    ANION GAP mmol/L 6  --  4 6 5 10  --  8 6 9  --  11   BUN mg/dL 37*  --  32* 32* 32* 32*  --  32* 31* 33*  --  35*   CREATININE mg/dL 1 19  --  1 11 1 03 1 14 1 23  -- 1 68* 1 59* 1 77*  --  1 77*   CALCIUM mg/dL 9 3  --  9 6 9 6 9 4 9 2  --  9 4 9 3 9 7  --  9 9   GLUCOSE RANDOM mg/dL 179*  --  199* 258* 275* 305*  --  247* 149* 138   < >  --    ALT U/L  --   --   --   --  22 17  --  19  --  22  --  23   AST U/L  --   --   --   --  14 8  --  16  --  13  --  12   ALK PHOS U/L  --   --   --   --  132* 147*  --  174*  --  171*  --  169*   ALBUMIN g/dL  --   --   --   --  1 7* 1 6*  --  1 6*  --  1 8*  --  1 8*   TOTAL BILIRUBIN mg/dL  --   --   --   --  0 21 0 18*  --  0 37  --  0 40  --  0 40    < > = values in this interval not displayed  Results from last 7 days   Lab Units 07/20/21  0459 07/14/21  2323 07/14/21  1413   MAGNESIUM mg/dL 1 7 1 3* 1 5*   PHOSPHORUS mg/dL 3 3  --   --       Results from last 7 days   Lab Units 07/20/21  1703 07/20/21  1110 07/20/21  0459 07/19/21  2347 07/19/21  1844 07/19/21  1210   INR   --   --   --   --   --  1 27*   PTT seconds 41* 65* 54* 39* 35 23      Results from last 7 days   Lab Units 07/19/21  1158 07/19/21  0942   TROPONIN I ng/mL 0 02 <0 02         ABG:    VBG:          Micro        EKG: Review of telemetry demonstrates sinus rhythm  Imaging: I have personally reviewed pertinent reports  Intake and Output  I/O       07/19 0701 - 07/20 0700 07/20 0701 - 07/21 0700    P  O   410    I V  (mL/kg) 313 3 (4 1) 639 1 (8 4)    IV Piggyback 100     Total Intake(mL/kg) 413 3 (5 5) 1049 1 (13 9)    Urine (mL/kg/hr) 2260 (1 2) 635 (0 3)    Stool  0    Total Output 2260 635    Net -1846 7 +414 1          Unmeasured Urine Occurrence 1 x     Unmeasured Stool Occurrence  1 x          Height and Weights   Height: 5' 2" (157 5 cm)  IBW (Ideal Body Weight): 54 6 kg  Body mass index is 30 54 kg/m²    Weight (last 2 days)     None            Nutrition       Diet Orders   (From admission, onward)             Start     Ordered    07/21/21 0001  Diet NPO; Sips with meds  Diet effective midnight     Question Answer Comment   Diet Type NPO    NPO Except: Sips with meds    RD to adjust diet per protocol?  Yes        07/20/21 1036    07/14/21 1943  Room Service  Once     Question:  Type of Service  Answer:  Room Service - Appropriate with Assistance    07/14/21 1943                  Active Medications  Scheduled Meds:  Current Facility-Administered Medications   Medication Dose Route Frequency Provider Last Rate    acetaminophen  650 mg Oral Q6H PRN Red Pride, CRNP      allopurinol  300 mg Oral Daily Brooklynn Renee Fossa, CRNP      amiodarone  0 5 mg/min Intravenous Continuous Lova Blase, CRNP 0 5 mg/min (07/20/21 2000)    amiodarone  200 mg Oral TID With Meals Lova Blase, CRNP      benzonatate  100 mg Oral TID Red Pride, CRNP      dextromethorphan-guaiFENesin  10 mL Oral Q4H PRN Red Pride, CRNP      fish oil  1,000 mg Oral BID Red Pride, CRNP      heparin (porcine)  3-20 Units/kg/hr (Order-Specific) Intravenous Titrated Loreta Orellana MD 19 Units/kg/hr (07/20/21 2000)    heparin (porcine)  2,000 Units Intravenous Q1H PRN Red Pride, CRNP      heparin (porcine)  4,000 Units Intravenous Q1H PRN Red Pride, CRNP      insulin regular (HumuLIN R,NovoLIN R) infusion  0 3-21 Units/hr Intravenous Titrated [employer], CRNP 3 Units/hr (07/20/21 2212)    ipratropium  0 5 mg Nebulization Q6H Zeke Sauceda MD      levalbuterol  1 25 mg Nebulization Q6H Zeke Sauceda MD      levothyroxine  150 mcg Oral Daily Brooklynn Reneecynthia Barajas, CRNP      lidocaine  1 patch Topical Q24H CHRIS Santos      methylPREDNISolone sodium succinate  40 mg Intravenous Daily Loreta Orellana MD      metoprolol  5 mg Intravenous Q6H PRN Red Pride, CRNP      metoprolol tartrate  25 mg Oral Q12H [address] Loreta Orellana MD      ondansetron  4 mg Intravenous Q6H PRN Red Pride, CRNP      pantoprazole  40 mg Oral Daily BrooklynnCHRIS Montgomery       Continuous Infusions:  amiodarone, 0 5 mg/min, Last Rate: 0 5 mg/min (07/20/21 2000)  heparin (porcine), 3-20 Units/kg/hr (Order-Specific), Last Rate: 19 Units/kg/hr (07/20/21 2000)  insulin regular (HumuLIN R,NovoLIN R) infusion, 0 3-21 Units/hr, Last Rate: 3 Units/hr (07/20/21 2212)      PRN Meds:   acetaminophen, 650 mg, Q6H PRN  dextromethorphan-guaiFENesin, 10 mL, Q4H PRN  heparin (porcine), 2,000 Units, Q1H PRN  heparin (porcine), 4,000 Units, Q1H PRN  metoprolol, 5 mg, Q6H PRN  ondansetron, 4 mg, Q6H PRN        Invasive Devices Review  Invasive Devices     Peripheral Intravenous Line            Peripheral IV 07/19/21 Distal;Right;Ventral (anterior) Wrist 1 day    Peripheral IV 07/19/21 Left;Ventral (anterior) Forearm 1 day                Rationale for remaining devices: IV access  ---------------------------------------------------------------------------------------  Advance Directive and Living Will:      Power of :    POLST:    ---------------------------------------------------------------------------------------  Care Time Delivered:   No Critical Care time spent       [facility]      Portions of the record may have been created with voice recognition software  Occasional wrong word or "sound a like" substitutions may have occurred due to the inherent limitations of voice recognition software    Read the chart carefully and recognize, using context, where substitutions have occurred

## 2021-07-21 NOTE — CONSULTS
Medical Oncology/Hematology Consult Note  Mara Castellano, male, 68 y o , 1940,  ICU 06/ICU 06, [id number]     Reason for admission: Acute respiratory failure with hypoxia and hypercapnia  Reason for consultation: Mass of right lung      ASSESSMENT AND PLAN:     1  Mass of right lung   Patient presents with worsening SOB/SADLER   IR biopsy lung CT demonstrated increase in left upper lung mass 2 1cm --> 3 7cm from study done on June 18 2021 with small right pleural effusion  Right base presumed metastatic nodule increased 9mm -->14mm   Previous EBUS done on 6/21 with pathology indicating atypical cells, negative for malignancy (see imaging below)   CTA on 6/18 concerning for encasement of right pulmonary artery, encasement with narrowing and occlusion of anterior RUL and RML with obstructive atelectasis    MRI brain on 6/20/21 negative for metastatic disease or other acute pathology   Plan on last admission outpatient oncology follow up with PET CT    Plan:    CT CAP w/contrast pending   Pathology reports from today's biopsy pending   Discussed case with Dr Sanchez Tinoco with interventional radiology who preformed the bx and stated he is unsure if pathology will result with evidence of malignancy   If this bx is negative, next steps may include attempted biopsy of contralateral lung nodule   Will place consult to Radiation Oncology for evaluation of patient as mass is growing encasing bronchi and pertinent vasculature    2  ITP   Managed by Dr Klarissa Babb as outpatient   Currently on prednisone and scheduled for rituxan infusion on 7/23 - will notify Dr Erica Ba team of cancellation for this appointment in infusion center   Platelet level currently 292,000    3  Normocytic Anemia  · Likely multifactorial, will order iron panel, B12, folate    4   Leukocytosis, neutrophilia  · WBC 13 88, ANC 12 08 likely reactive process          Mass; RUL mass     Lymph Nodes, Lymph Node 7     Lymph Nodes, Lymph Node 7     Lymph Nodes, Lymph Node 10R; RUL Mass     Mass; RUL mass   Anatomical Diagram        Patient understands and is in agreement with this plan  Thank you for the opportunity to participate in this patient's care  Interval History: Patient feeling short of breath and fatigued  History of present illness: Patient is a 69 yo male with pmh significant for nicotine dependence, COPD, AFIB, T2DM who presented after being sent by PCP for hypercalcemia, hyperkalemia, SULEMA as seen on labs  Former smoker, 0 5ppd for 40 years  Review of Systems:   Review of Systems   Constitutional: Positive for activity change, appetite change and fatigue  Negative for chills, fever and unexpected weight change  HENT: Negative for ear pain and sore throat  Eyes: Negative for pain and visual disturbance  Respiratory: Positive for cough, chest tightness and shortness of breath  Cardiovascular: Negative for chest pain, palpitations and leg swelling  Gastrointestinal: Negative for abdominal pain, constipation, nausea and vomiting  Genitourinary: Negative for dysuria and hematuria  Musculoskeletal: Negative for arthralgias and back pain  Skin: Negative for color change and rash  Neurological: Negative for seizures and syncope  All other systems reviewed and are negative  PHYSICAL EXAM:    /57 (BP Location: Left arm)   Pulse 72   Temp (!) 97 3 °F (36 3 °C) (Oral)   Resp 20   Ht 5' 2" (1 575 m)   Wt 75 8 kg (167 lb)   SpO2 94%   BMI 30 54 kg/m²     Physical Exam  Vitals and nursing note reviewed  Constitutional:       General: He is in acute distress  Appearance: Normal appearance  He is not ill-appearing  HENT:      Head: Normocephalic and atraumatic  Eyes:      General: No scleral icterus  Cardiovascular:      Rate and Rhythm: Normal rate and regular rhythm  Pulses: Normal pulses  Heart sounds: Normal heart sounds  No murmur heard       Pulmonary:      Effort: Pulmonary effort is normal  Breath sounds: Rhonchi present  No wheezing  Comments: Decreased breath sounds  Chest:      Chest wall: No tenderness  Abdominal:      General: Bowel sounds are normal  There is no distension  Palpations: Abdomen is soft  There is no mass  Tenderness: There is no abdominal tenderness  Musculoskeletal:      Right lower leg: No edema  Left lower leg: No edema  Lymphadenopathy:      Cervical: No cervical adenopathy  Skin:     General: Skin is warm and dry  Coloration: Skin is pale  Neurological:      Mental Status: He is alert and oriented to person, place, and time  Psychiatric:         Thought Content:  Thought content normal          LABS:     Recent Results (from the past 48 hour(s))   Fingerstick Glucose (POCT)    Collection Time: 07/19/21  8:51 PM   Result Value Ref Range    POC Glucose 407 (H) 65 - 140 mg/dl   Fingerstick Glucose (POCT)    Collection Time: 07/19/21 11:46 PM   Result Value Ref Range    POC Glucose 338 (H) 65 - 140 mg/dl   APTT    Collection Time: 07/19/21 11:47 PM   Result Value Ref Range    PTT 39 (H) 23 - 37 seconds   Fingerstick Glucose (POCT)    Collection Time: 07/20/21  2:16 AM   Result Value Ref Range    POC Glucose 161 (H) 65 - 140 mg/dl   Fingerstick Glucose (POCT)    Collection Time: 07/20/21  2:25 AM   Result Value Ref Range    POC Glucose 151 (H) 65 - 140 mg/dl   Fingerstick Glucose (POCT)    Collection Time: 07/20/21  4:04 AM   Result Value Ref Range    POC Glucose 156 (H) 65 - 140 mg/dl   CBC and differential    Collection Time: 07/20/21  4:59 AM   Result Value Ref Range    WBC 17 83 (H) 4 31 - 10 16 Thousand/uL    RBC 3 97 3 88 - 5 62 Million/uL    Hemoglobin 9 3 (L) 12 0 - 17 0 g/dL    Hematocrit 33 3 (L) 36 5 - 49 3 %    MCV 84 82 - 98 fL    MCH 23 4 (L) 26 8 - 34 3 pg    MCHC 27 9 (L) 31 4 - 37 4 g/dL    RDW 18 6 (H) 11 6 - 15 1 %    MPV 10 6 8 9 - 12 7 fL    Platelets 883 341 - 057 Thousands/uL    nRBC 0 /100 WBCs    Neutrophils Relative 90 (H) 43 - 75 %    Immat GRANS % 4 (H) 0 - 2 %    Lymphocytes Relative 1 (L) 14 - 44 %    Monocytes Relative 5 4 - 12 %    Eosinophils Relative 0 0 - 6 %    Basophils Relative 0 0 - 1 %    Neutrophils Absolute 15 88 (H) 1 85 - 7 62 Thousands/µL    Immature Grans Absolute >0 50 (H) 0 00 - 0 20 Thousand/uL    Lymphocytes Absolute 0 20 (L) 0 60 - 4 47 Thousands/µL    Monocytes Absolute 0 97 0 17 - 1 22 Thousand/µL    Eosinophils Absolute 0 00 0 00 - 0 61 Thousand/µL    Basophils Absolute 0 06 0 00 - 0 10 Thousands/µL   Basic metabolic panel    Collection Time: 07/20/21  4:59 AM   Result Value Ref Range    Sodium 141 136 - 145 mmol/L    Potassium 4 1 3 5 - 5 3 mmol/L    Chloride 103 100 - 108 mmol/L    CO2 32 21 - 32 mmol/L    ANION GAP 6 4 - 13 mmol/L    BUN 37 (H) 5 - 25 mg/dL    Creatinine 1 19 0 60 - 1 30 mg/dL    Glucose 179 (H) 65 - 140 mg/dL    Calcium 9 3 8 3 - 10 1 mg/dL    eGFR 58 ml/min/1 73sq m   Magnesium    Collection Time: 07/20/21  4:59 AM   Result Value Ref Range    Magnesium 1 7 1 6 - 2 6 mg/dL   Phosphorus    Collection Time: 07/20/21  4:59 AM   Result Value Ref Range    Phosphorus 3 3 2 3 - 4 1 mg/dL   APTT    Collection Time: 07/20/21  4:59 AM   Result Value Ref Range    PTT 54 (H) 23 - 37 seconds   Fingerstick Glucose (POCT)    Collection Time: 07/20/21  5:59 AM   Result Value Ref Range    POC Glucose 187 (H) 65 - 140 mg/dl   Fingerstick Glucose (POCT)    Collection Time: 07/20/21  7:39 AM   Result Value Ref Range    POC Glucose 159 (H) 65 - 140 mg/dl   Fingerstick Glucose (POCT)    Collection Time: 07/20/21  9:59 AM   Result Value Ref Range    POC Glucose 203 (H) 65 - 140 mg/dl   APTT    Collection Time: 07/20/21 11:10 AM   Result Value Ref Range    PTT 65 (H) 23 - 37 seconds   Fingerstick Glucose (POCT)    Collection Time: 07/20/21 12:05 PM   Result Value Ref Range    POC Glucose 215 (H) 65 - 140 mg/dl   Fingerstick Glucose (POCT)    Collection Time: 07/20/21  2:03 PM   Result Value Ref Range POC Glucose 176 (H) 65 - 140 mg/dl   Fingerstick Glucose (POCT)    Collection Time: 07/20/21  3:56 PM   Result Value Ref Range    POC Glucose 236 (H) 65 - 140 mg/dl   APTT    Collection Time: 07/20/21  5:03 PM   Result Value Ref Range    PTT 41 (H) 23 - 37 seconds   Fingerstick Glucose (POCT)    Collection Time: 07/20/21  5:49 PM   Result Value Ref Range    POC Glucose 128 65 - 140 mg/dl   Fingerstick Glucose (POCT)    Collection Time: 07/20/21 10:04 PM   Result Value Ref Range    POC Glucose 217 (H) 65 - 140 mg/dl   Fingerstick Glucose (POCT)    Collection Time: 07/21/21 12:00 AM   Result Value Ref Range    POC Glucose 182 (H) 65 - 140 mg/dl   CBC and differential    Collection Time: 07/21/21  5:56 AM   Result Value Ref Range    WBC 13 88 (H) 4 31 - 10 16 Thousand/uL    RBC 3 95 3 88 - 5 62 Million/uL    Hemoglobin 9 5 (L) 12 0 - 17 0 g/dL    Hematocrit 33 7 (L) 36 5 - 49 3 %    MCV 85 82 - 98 fL    MCH 24 1 (L) 26 8 - 34 3 pg    MCHC 28 2 (L) 31 4 - 37 4 g/dL    RDW 18 9 (H) 11 6 - 15 1 %    MPV 10 9 8 9 - 12 7 fL    Platelets 299 746 - 062 Thousands/uL    nRBC 0 /100 WBCs   Basic metabolic panel    Collection Time: 07/21/21  5:56 AM   Result Value Ref Range    Sodium 136 136 - 145 mmol/L    Potassium 4 8 3 5 - 5 3 mmol/L    Chloride 98 (L) 100 - 108 mmol/L    CO2 33 (H) 21 - 32 mmol/L    ANION GAP 5 4 - 13 mmol/L    BUN 37 (H) 5 - 25 mg/dL    Creatinine 1 09 0 60 - 1 30 mg/dL    Glucose 235 (H) 65 - 140 mg/dL    Calcium 9 1 8 3 - 10 1 mg/dL    eGFR 65 ml/min/1 73sq m   Magnesium    Collection Time: 07/21/21  5:56 AM   Result Value Ref Range    Magnesium 1 8 1 6 - 2 6 mg/dL   Phosphorus    Collection Time: 07/21/21  5:56 AM   Result Value Ref Range    Phosphorus 2 7 2 3 - 4 1 mg/dL   Manual Differential(PHLEBS Do Not Order)    Collection Time: 07/21/21  5:56 AM   Result Value Ref Range    Segmented % 87 (H) 43 - 75 %    Lymphocytes % 1 (L) 14 - 44 %    Monocytes % 5 4 - 12 %    Eosinophils, % 0 0 - 6 % Basophils % 0 0 - 1 %    Metamyelocytes% 4 (H) 0 - 1 %    Myelocytes % 1 0 - 1 %    Atypical Lymphocytes % 2 (H) <=0 %    Absolute Neutrophils 12 08 (H) 1 85 - 7 62 Thousand/uL    Lymphocytes Absolute 0 14 (L) 0 60 - 4 47 Thousand/uL    Monocytes Absolute 0 69 0 00 - 1 22 Thousand/uL    Eosinophils Absolute 0 00 0 00 - 0 40 Thousand/uL    Basophils Absolute 0 00 0 00 - 0 10 Thousand/uL    Total Counted 100     RBC Morphology Normal     Platelet Estimate Adequate Adequate   Fingerstick Glucose (POCT)    Collection Time: 21  6:15 AM   Result Value Ref Range    POC Glucose 181 (H) 65 - 140 mg/dl   Fingerstick Glucose (POCT)    Collection Time: 21 11:07 AM   Result Value Ref Range    POC Glucose 255 (H) 65 - 140 mg/dl   Glucose, random    Collection Time: 21  5:11 PM   Result Value Ref Range    Glucose 446 (H) 65 - 140 mg/dL   Fingerstick Glucose (POCT)    Collection Time: 21  5:43 PM   Result Value Ref Range    POC Glucose 415 (H) 65 - 140 mg/dl       Echo complete with contrast if indicated    Result Date: 2021  Narrative: [address] [place] [address], [address] [place] [address] [phone] Transthoracic Echocardiogram 2D, M-mode, Doppler, and Color Doppler Study date:  2021 Patient: Richard Ardon MR number: [id number] Account number: [de-identified] : 1940 Age: 68 years Gender: Male Status: Inpatient Location: Bedside Height: 62 in Weight: 173 6 lb BP: 138/ 65 mmHg Indications: A-fib  Diagnoses: I48 0 - Atrial fibrillation Sonographer:  ROSALINA Andres Primary Physician:  Alfred Marina DO Referring Physician:  Roxy Colon PA-C Group:  [facility] Interpreting Physician:  Kayli Garcia MD SUMMARY LEFT VENTRICLE: Systolic function was normal  Ejection fraction was estimated to be 60 %  There were no regional wall motion abnormalities  MITRAL VALVE: There was mild regurgitation  TRICUSPID VALVE: There was mild regurgitation   Estimated peak PA pressure was 42 mmHg  The findings suggest mild pulmonary hypertension  HISTORY: PRIOR HISTORY: PAF, HTN, SULEMA, SOB, lung mass, DM2, thrombocytopenia, current smoker  PROCEDURE: The procedure was performed at the bedside  This was a routine study  The transthoracic approach was used  The study included complete 2D imaging, M-mode, complete spectral Doppler, and color Doppler  The heart rate was 77 bpm, at the start of the study  Images were obtained from the parasternal, apical, subcostal, and suprasternal notch acoustic windows  Image quality was adequate  LEFT VENTRICLE: Size was normal  Systolic function was normal  Ejection fraction was estimated to be 60 %  There were no regional wall motion abnormalities  Wall thickness was normal  DOPPLER: Left ventricular diastolic function parameters were normal for the patient's age  RIGHT VENTRICLE: The size was normal  Systolic function was normal  Wall thickness was normal  LEFT ATRIUM: Size was normal  RIGHT ATRIUM: Size was normal  MITRAL VALVE: Valve structure was normal  There was normal leaflet separation  DOPPLER: The transmitral velocity was within the normal range  There was no evidence for stenosis  There was mild regurgitation  AORTIC VALVE: The valve was trileaflet  Leaflets exhibited normal thickness and normal cuspal separation  DOPPLER: Transaortic velocity was within the normal range  There was no evidence for stenosis  There was no significant regurgitation  TRICUSPID VALVE: The valve structure was normal  There was normal leaflet separation  DOPPLER: The transtricuspid velocity was within the normal range  There was no evidence for stenosis  There was mild regurgitation  Estimated peak PA pressure was 42 mmHg  The findings suggest mild pulmonary hypertension  PULMONIC VALVE: Leaflets exhibited normal thickness, no calcification, and normal cuspal separation  DOPPLER: The transpulmonic velocity was within the normal range   There was no significant regurgitation  PERICARDIUM: There was no pericardial effusion  The pericardium was normal in appearance  AORTA: The root exhibited normal size  SYSTEMIC VEINS: IVC: The inferior vena cava was normal in size  Respirophasic changes were normal  SYSTEM MEASUREMENT TABLES 2D %FS: 30 02 % Ao Diam: 3 54 cm EDV(Teich): 123 19 ml EF(Teich): 56 95 % ESV(Teich): 53 03 ml IVSd: 0 92 cm LA Diam: 4 57 cm LAAs A2C: 23 05 cm2 LAAs A4C: 22 52 cm2 LAESV A-L A2C: 77 35 ml LAESV A-L A4C: 69 99 ml LAESV Index (A-L): 41 99 ml/m2 LAESV MOD A2C: 73 99 ml LAESV MOD A4C: 64 59 ml LAESV(A-L): 75 58 ml LAESV(MOD BP): 70 92 ml LALs A2C: 5 83 cm LALs A4C: 6 15 cm LVIDd: 5 09 cm LVIDs: 3 56 cm LVPWd: 0 85 cm RVIDd: 3 65 cm SV(Teich): 70 16 ml CW AV Env  Ti: 281 64 ms AV MaxP 92 mmHg AV VTI: 22 53 cm AV Vmax: 1 49 m/s AV Vmean: 0 8 m/s AV meanPG: 3 13 mmHg TR MaxP 17 mmHg TR Vmax: 3 13 m/s MM TAPSE: 3 01 cm PW E' Sept: 0 09 m/s E/E' Sept: 11 25 LVOT Env  Ti: 342 53 ms LVOT VTI: 21 45 cm LVOT Vmax: 1 01 m/s LVOT Vmean: 0 63 m/s LVOT maxP 07 mmHg LVOT meanP 9 mmHg MV A Ge: 1 12 m/s MV Dec [place]: 6 18 m/s2 MV DecT: 167 82 ms MV E Ge: 1 04 m/s MV E/A Ratio: 0 93 MV PHT: 48 67 ms MVA By PHT: 4 52 cm2 [employer] Accredited Echocardiography Laboratory Prepared and electronically signed by Braxton Rm MD Signed 2021 12:57:51     XR chest portable    Result Date: 2021  Narrative: CHEST INDICATION:   Shortness of breath  No fibrillation  Wheezing  COMPARISON:  2021 EXAM PERFORMED/VIEWS:  XR CHEST PORTABLE FINDINGS:  Trace case pacer pads noted  Large mass over the right lung is unchanged  Left lung is clear  Blunting in the right costophrenic angle suggests small effusion  No pneumothorax  Heart shadow is unremarkable  Osseous structures appear within normal limits for patient age  Impression: No interval change from 2021   Workstation performed: [id number]     XR chest portable    Result Date: 7/18/2021  Narrative: CHEST INDICATION:  Shortness of breath, wheezing  COMPARISON:  7/14/2021, CTA chest 6/18/2021 EXAM PERFORMED/VIEWS:  XR CHEST PORTABLE FINDINGS: Heart shadow is obscured on the right by adjacent opacity  The trachea is midline  Large mass involving the right mid to upper lung is again identified with obscuration of the right heart border compatible with element of right middle lobe postobstructive atelectasis and/or pneumonia  There is flattening of the right diaphragm suggesting subpulmonic pleural effusion  Faint nodular opacity peripheral left midlung projecting over the inferior border of the scapula in keeping with known nodule on CT  Paravertebral ossifications thoracic spine  Impression: No change in large right lung mass with associated postobstructive atelectasis or pneumonia right middle lobe and small right pleural effusion  Left lung nodule  Workstation performed: [id number]     XR chest 1 view portable    Result Date: 7/14/2021  Narrative: CHEST INDICATION:   known lung mass, with cough  COMPARISON:  6/18/2021 EXAM PERFORMED/VIEWS:  XR CHEST PORTABLE Single view FINDINGS: Large right upper lobe mass consistent with malignancy with mild interval enlargement  small right pleural effusion again evident Cardiomediastinal silhouette appears unremarkable  No pneumothorax Osseous structures appear within normal limits for patient age  Impression: Large right lung mass, slightly increased in size, consistent with malignancy  Persistent small right pleural effusion Workstation performed: [id number]     IR biopsy lung    Result Date: 7/21/2021  Narrative: CT-guided lung mass biopsy Clinical History: Lung mass suspicious for malignant process  Prior endobronchial ultrasound without malignant cells identified  Respiratory failure  Atrial fibrillation on anticoagulation   Moderate sedation time: 35 minutes Procedure: After explaining the risks and benefits of the procedure to the patient, informed consent was obtained  CT was used to localize the lung mass   Radiation dose length product (DLP) for this visit:  1204 mGy   This examination, like all CT scans performed in the [facility], was performed utilizing techniques to minimize radiation dose exposure, including the use of iterative reconstruction and automated exposure control  The overlying skin was prepped and draped in the usual sterile fashion  Local anesthesia was obtained with a 1% lidocaine solution  Using CT guidance, a 17-gauge coaxial needle was advanced  9 passes with an 18g gauge core biopsy needle were performed  The tissue was given to pathology  The needle was removed and final imaging performed  Patient tolerated the procedure without immediate complication Findings: 40 5 cm right lung masslike density  There is architectural distortion and it is difficult to discern what may be intrinsic mass and what may in fact be atelectatic compressed lung  Needle within the mass  Postbiopsy changes without hematoma  Since prior CT of June 18 there has been interval increase in size of the mass and development of a small right pleural effusion  A left upper lung 3 7 cm mass is also present which has increased in size from 2 1 cm on prior study  A right base presumed metastatic nodule is also increased measuring 14 mm, previously 9 mm  Specimens: Flow cytometry, touch prep x4, 18-gauge core x9 The patient tolerated the procedure well and left the department in stable condition  Impression: Impression: CT-guided biopsy of a large right lung mass Since interval CT approximately one month ago there has been growth of the mass and development of accompanying right pleural effusion  Consider thoracentesis if there is persistent respiratory failure  Additional presumed metastatic nodules have also increased in size   Workstation performed: [id number]         HISTORY:    Past Medical History:   Diagnosis Date    Hypertension        Past Surgical History:   Procedure Laterality Date    BACK SURGERY      CARPAL TUNNEL RELEASE Bilateral     IR BIOPSY BONE MARROW  6/10/2021    IR BIOPSY LUNG  2021    LUMBAR DISC SURGERY         Family History   Problem Relation Age of Onset   Justin Hero Cancer Mother         "ate away her bone"    Anuerysm Father     Cancer Sister         unknown type    Heart attack Maternal Grandfather     Cancer Sister         unknown type    Heart attack Maternal Aunt     Heart attack Maternal Uncle     Heart attack Paternal Aunt        Social History     Socioeconomic History    Marital status: /Civil Union     Spouse name: Lisy Siegel Number of children: 10    Years of education: None    Highest education level: None   Occupational History    None   Tobacco Use    Smoking status: Former Smoker     Packs/day: 0 50     Years: 40 00     Pack years: 20 00     Types: Cigarettes     Start date:      Quit date: 2021     Years since quittin 0    Smokeless tobacco: Never Used   Vaping Use    Vaping Use: Never used   Substance and Sexual Activity    Alcohol use: Not Currently     Comment: History of heavier drinking in early 25s  Denies any current alcohol use (Updated 2021)   Drug use: Never    Sexual activity: None   Other Topics Concern    None   Social History Narrative    Previously worked as heavy machinery technician  Lives with his wife who is essentially bed bound  Social Determinants of Health     Financial Resource Strain: High Risk    Difficulty of Paying Living Expenses: Hard   Food Insecurity: Food Insecurity Present    Worried About Running Out of Food in the Last Year: Sometimes true    [place] of Food in the Last Year: Not on file   Transportation Needs:     Lack of Transportation (Medical):      Lack of Transportation (Non-Medical):    Physical Activity:     Days of Exercise per Week:     Minutes of Exercise per Session:    Stress:     Feeling of Stress :    Social Connections:     Frequency of Communication with Friends and Family:     Frequency of Social Gatherings with Friends and Family:     Attends Voodoo Services:     Active Member of Clubs or Organizations:     Attends Club or Organization Meetings:     Marital Status:    Intimate Partner Violence:     Fear of Current or Ex-Partner:     Emotionally Abused:     Physically Abused:     Sexually Abused:          Current Facility-Administered Medications:     acetaminophen (TYLENOL) tablet 650 mg, 650 mg, Oral, Q6H PRN, Afshan Del Toro MD, 650 mg at 07/18/21 1719    allopurinol (ZYLOPRIM) tablet 300 mg, 300 mg, Oral, Daily, Afshan Del Toro MD, 300 mg at 07/21/21 1011    amiodarone tablet 200 mg, 200 mg, Oral, TID With Meals, Afshan Del Toro MD, 200 mg at 07/21/21 1624    benzonatate (TESSALON PERLES) capsule 100 mg, 100 mg, Oral, TID, Afshan Del Toro MD, 100 mg at 07/21/21 1624    dextromethorphan-guaiFENesin (ROBITUSSIN DM) oral syrup 10 mL, 10 mL, Oral, Q4H PRN, Afshan Del Toro MD, 10 mL at 07/19/21 0831    fish oil capsule 1,000 mg, 1,000 mg, Oral, BID, Afshan Del Toro MD, 1,000 mg at 07/21/21 1706    guaiFENesin (MUCINEX) 12 hr tablet 600 mg, 600 mg, Oral, Q12H [facility], Afshan Del Toro MD, 600 mg at 07/21/21 1201    heparin (porcine) 25,000 units in 0 45% NaCl 250 mL infusion (premix), 3-20 Units/kg/hr (Order-Specific), Intravenous, Titrated, Afshan Del Toro MD, Last Rate: 14 3 mL/hr at 07/21/21 1407, 19 Units/kg/hr at 07/21/21 1407    insulin glargine (LANTUS) subcutaneous injection 7 Units 0 07 mL, 7 Units, Subcutaneous, HS, Chai Jackman MD    insulin lispro (HumaLOG) 100 units/mL subcutaneous injection 1-5 Units, 1-5 Units, Subcutaneous, HS, Afshan Del Toro MD    insulin lispro (HumaLOG) 100 units/mL subcutaneous injection 2-12 Units, 2-12 Units, Subcutaneous, TID AC, 12 Units at 07/21/21 1744 **AND** [START ON 7/22/2021] Fingerstick Glucose (POCT), , , TID Aditya Abernathy MD    levalbuterol [place]) inhalation solution 1 25 mg, 1 25 mg, Nebulization, Q6H, Sabino Garrison MD, 1 25 mg at 07/21/21 1435    levothyroxine tablet 150 mcg, 150 mcg, Oral, Daily, Sabino Garrison MD, 150 mcg at 07/21/21 0556    lidocaine (LIDODERM) 5 % patch 1 patch, 1 patch, Topical, Q24H, Sabino Garrison MD, 1 patch at 07/20/21 1200    methylPREDNISolone sodium succinate (Solu-MEDROL) injection 40 mg, 40 mg, Intravenous, Daily, Sabino Garrison MD, 40 mg at 07/21/21 0831    metoprolol tartrate (LOPRESSOR) tablet 25 mg, 25 mg, Oral, Q12H [facility], Sabino Garrison MD, 25 mg at 07/21/21 0830    ondansetron (ZOFRAN) injection 4 mg, 4 mg, Intravenous, Q6H PRN, Sabino Garrison MD    pantoprazole (PROTONIX) EC tablet 40 mg, 40 mg, Oral, Daily, Sabino Garrison MD, 40 mg at 07/21/21 1011    sodium chloride 0 9 % inhalation solution 3 mL, 3 mL, Nebulization, Q6H, Sabino Garrison MD, 3 mL at 07/21/21 1435    tiotropium (SPIRIVA) capsule for inhaler 18 mcg, 18 mcg, Inhalation, Daily, Amy Escobedo MD, 18 mcg at 07/21/21 1523    Medications Prior to Admission   Medication    acetaminophen (TYLENOL) 100 mg/mL solution    albuterol (2 5 mg/3 mL) 0 083 % nebulizer solution    allopurinol (ZYLOPRIM) 300 mg tablet    amLODIPine (NORVASC) 2 5 mg tablet    apixaban (ELIQUIS) 5 mg    benzonatate (TESSALON PERLES) 100 mg capsule    guaiFENesin (MUCINEX) 600 mg 12 hr tablet    levothyroxine 150 mcg tablet    metFORMIN (GLUCOPHAGE) 500 mg tablet    metoprolol tartrate (LOPRESSOR) 25 mg tablet    Omega-3 Fatty Acids (FISH OIL) 1,000 mg    pantoprazole (PROTONIX) 40 mg tablet    tiotropium (SPIRIVA) 18 mcg inhalation capsule       Allergies   Allergen Reactions    Penicillins Hives       Labs and pertinent reports reviewed  This note has been generated by voice recognition software system  Therefore, there may be spelling, grammar, and or syntax errors  Please contact if questions arise

## 2021-07-22 ENCOUNTER — APPOINTMENT (INPATIENT)
Dept: CT IMAGING | Facility: HOSPITAL | Age: 81
DRG: 682 | End: 2021-07-22
Payer: COMMERCIAL

## 2021-07-22 LAB
APTT PPP: 72 SECONDS (ref 23–37)
APTT PPP: 73 SECONDS (ref 23–37)
FERRITIN SERPL-MCNC: 2470 NG/ML (ref 8–388)
FOLATE SERPL-MCNC: 12.3 NG/ML (ref 3.1–17.5)
GLUCOSE SERPL-MCNC: 198 MG/DL (ref 65–140)
GLUCOSE SERPL-MCNC: 315 MG/DL (ref 65–140)
GLUCOSE SERPL-MCNC: 323 MG/DL (ref 65–140)
GLUCOSE SERPL-MCNC: 343 MG/DL (ref 65–140)
IRON SATN MFR SERPL: 23 %
IRON SERPL-MCNC: 41 UG/DL (ref 65–175)
TIBC SERPL-MCNC: 182 UG/DL (ref 250–450)
VIT B12 SERPL-MCNC: 724 PG/ML (ref 100–900)

## 2021-07-22 PROCEDURE — 97167 OT EVAL HIGH COMPLEX 60 MIN: CPT

## 2021-07-22 PROCEDURE — 74177 CT ABD & PELVIS W/CONTRAST: CPT

## 2021-07-22 PROCEDURE — 94640 AIRWAY INHALATION TREATMENT: CPT

## 2021-07-22 PROCEDURE — 99222 1ST HOSP IP/OBS MODERATE 55: CPT | Performed by: STUDENT IN AN ORGANIZED HEALTH CARE EDUCATION/TRAINING PROGRAM

## 2021-07-22 PROCEDURE — 99232 SBSQ HOSP IP/OBS MODERATE 35: CPT | Performed by: PHYSICIAN ASSISTANT

## 2021-07-22 PROCEDURE — 94760 N-INVAS EAR/PLS OXIMETRY 1: CPT

## 2021-07-22 PROCEDURE — 82948 REAGENT STRIP/BLOOD GLUCOSE: CPT

## 2021-07-22 PROCEDURE — 94669 MECHANICAL CHEST WALL OSCILL: CPT

## 2021-07-22 PROCEDURE — 99232 SBSQ HOSP IP/OBS MODERATE 35: CPT | Performed by: INTERNAL MEDICINE

## 2021-07-22 PROCEDURE — 71260 CT THORAX DX C+: CPT

## 2021-07-22 PROCEDURE — 85730 THROMBOPLASTIN TIME PARTIAL: CPT | Performed by: STUDENT IN AN ORGANIZED HEALTH CARE EDUCATION/TRAINING PROGRAM

## 2021-07-22 PROCEDURE — 85730 THROMBOPLASTIN TIME PARTIAL: CPT | Performed by: INTERNAL MEDICINE

## 2021-07-22 PROCEDURE — 94668 MNPJ CHEST WALL SBSQ: CPT

## 2021-07-22 RX ORDER — INSULIN GLARGINE 100 [IU]/ML
10 INJECTION, SOLUTION SUBCUTANEOUS
Status: DISCONTINUED | OUTPATIENT
Start: 2021-07-22 | End: 2021-07-31

## 2021-07-22 RX ORDER — METHYLPREDNISOLONE SODIUM SUCCINATE 40 MG/ML
20 INJECTION, POWDER, LYOPHILIZED, FOR SOLUTION INTRAMUSCULAR; INTRAVENOUS DAILY
Status: DISCONTINUED | OUTPATIENT
Start: 2021-07-23 | End: 2021-07-26

## 2021-07-22 RX ADMIN — HEPARIN SODIUM 21 UNITS/KG/HR: 10000 INJECTION, SOLUTION INTRAVENOUS at 12:07

## 2021-07-22 RX ADMIN — INSULIN LISPRO 4 UNITS: 100 INJECTION, SOLUTION INTRAVENOUS; SUBCUTANEOUS at 21:31

## 2021-07-22 RX ADMIN — INSULIN LISPRO 8 UNITS: 100 INJECTION, SOLUTION INTRAVENOUS; SUBCUTANEOUS at 17:19

## 2021-07-22 RX ADMIN — AMIODARONE HYDROCHLORIDE 200 MG: 200 TABLET ORAL at 12:02

## 2021-07-22 RX ADMIN — INSULIN GLARGINE 10 UNITS: 100 INJECTION, SOLUTION SUBCUTANEOUS at 21:31

## 2021-07-22 RX ADMIN — METOPROLOL TARTRATE 25 MG: 25 TABLET, FILM COATED ORAL at 21:31

## 2021-07-22 RX ADMIN — INSULIN LISPRO 2 UNITS: 100 INJECTION, SOLUTION INTRAVENOUS; SUBCUTANEOUS at 06:34

## 2021-07-22 RX ADMIN — BENZONATATE 100 MG: 100 CAPSULE ORAL at 17:19

## 2021-07-22 RX ADMIN — ALLOPURINOL 300 MG: 300 TABLET ORAL at 08:03

## 2021-07-22 RX ADMIN — ISODIUM CHLORIDE 3 ML: 0.03 SOLUTION RESPIRATORY (INHALATION) at 01:18

## 2021-07-22 RX ADMIN — AMIODARONE HYDROCHLORIDE 200 MG: 200 TABLET ORAL at 17:19

## 2021-07-22 RX ADMIN — METHYLPREDNISOLONE SODIUM SUCCINATE 40 MG: 40 INJECTION, POWDER, FOR SOLUTION INTRAMUSCULAR; INTRAVENOUS at 08:04

## 2021-07-22 RX ADMIN — OMEGA-3 FATTY ACIDS CAP 1000 MG 1000 MG: 1000 CAP at 17:19

## 2021-07-22 RX ADMIN — GUAIFENESIN 600 MG: 600 TABLET, EXTENDED RELEASE ORAL at 21:31

## 2021-07-22 RX ADMIN — ISODIUM CHLORIDE 3 ML: 0.03 SOLUTION RESPIRATORY (INHALATION) at 07:41

## 2021-07-22 RX ADMIN — BENZONATATE 100 MG: 100 CAPSULE ORAL at 21:31

## 2021-07-22 RX ADMIN — IOHEXOL 100 ML: 350 INJECTION, SOLUTION INTRAVENOUS at 12:57

## 2021-07-22 RX ADMIN — LEVOTHYROXINE SODIUM 150 MCG: 150 TABLET ORAL at 05:45

## 2021-07-22 RX ADMIN — LEVALBUTEROL HYDROCHLORIDE 1.25 MG: 1.25 SOLUTION, CONCENTRATE RESPIRATORY (INHALATION) at 14:50

## 2021-07-22 RX ADMIN — GUAIFENESIN 600 MG: 600 TABLET, EXTENDED RELEASE ORAL at 08:02

## 2021-07-22 RX ADMIN — BENZONATATE 100 MG: 100 CAPSULE ORAL at 08:04

## 2021-07-22 RX ADMIN — ISODIUM CHLORIDE 3 ML: 0.03 SOLUTION RESPIRATORY (INHALATION) at 14:50

## 2021-07-22 RX ADMIN — LEVALBUTEROL HYDROCHLORIDE 1.25 MG: 1.25 SOLUTION, CONCENTRATE RESPIRATORY (INHALATION) at 07:41

## 2021-07-22 RX ADMIN — ISODIUM CHLORIDE 3 ML: 0.03 SOLUTION RESPIRATORY (INHALATION) at 19:12

## 2021-07-22 RX ADMIN — LEVALBUTEROL HYDROCHLORIDE 1.25 MG: 1.25 SOLUTION, CONCENTRATE RESPIRATORY (INHALATION) at 19:12

## 2021-07-22 RX ADMIN — PANTOPRAZOLE SODIUM 40 MG: 40 TABLET, DELAYED RELEASE ORAL at 08:04

## 2021-07-22 RX ADMIN — LIDOCAINE 5% 1 PATCH: 700 PATCH TOPICAL at 12:03

## 2021-07-22 RX ADMIN — OMEGA-3 FATTY ACIDS CAP 1000 MG 1000 MG: 1000 CAP at 08:03

## 2021-07-22 RX ADMIN — INSULIN LISPRO 8 UNITS: 100 INJECTION, SOLUTION INTRAVENOUS; SUBCUTANEOUS at 13:31

## 2021-07-22 RX ADMIN — TIOTROPIUM BROMIDE 18 MCG: 18 CAPSULE ORAL; RESPIRATORY (INHALATION) at 08:03

## 2021-07-22 RX ADMIN — METOPROLOL TARTRATE 25 MG: 25 TABLET, FILM COATED ORAL at 08:03

## 2021-07-22 RX ADMIN — AMIODARONE HYDROCHLORIDE 200 MG: 200 TABLET ORAL at 08:04

## 2021-07-22 RX ADMIN — LEVALBUTEROL HYDROCHLORIDE 1.25 MG: 1.25 SOLUTION, CONCENTRATE RESPIRATORY (INHALATION) at 01:18

## 2021-07-22 NOTE — ASSESSMENT & PLAN NOTE
Lab Results   Component Value Date    HGBA1C 6 6 (H) 06/20/2021       Recent Labs     07/21/21  1107 07/21/21  1743 07/21/21  2100 07/22/21  0544   POCGLU 255* 415* 320* 198*       Blood Sugar Average: Last 72 hrs:  (P) 224 [id number]  A1C 6 6   · Sliding Scale insulin  · Briefly required insulin gtt in ICU 2/2 steroids   Now on basal/bolus insulin, adjust PRN  · Monitor glucose

## 2021-07-22 NOTE — ASSESSMENT & PLAN NOTE
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

## 2021-07-22 NOTE — PROGRESS NOTES
Medical Oncology/Hematology Progress Note  Isai Ku, male, 68 y o , 1940,  ICU 06/ICU 06, [id number]     Reason for admission: Acute respiratory failure with hypoxia and hypercapnia  Reason for consultation: Mass of right lung      ASSESSMENT AND PLAN:     1  Mass of right lung   Patient presents with worsening SOB/SADLER   IR biopsy lung CT demonstrated increase in left upper lung mass 2 1cm --> 3 7cm from study done on June 18 2021 with small right pleural effusion  Right base presumed metastatic nodule increased 9mm -->14mm   Previous EBUS done on 6/21 with pathology indicating atypical cells, negative for malignancy (see imaging below)   CTA on 6/18 concerning for encasement of right pulmonary artery, encasement with narrowing and occlusion of anterior RUL and RML with obstructive atelectasis    MRI brain on 6/20/21 negative for metastatic disease or other acute pathology   Plan on last admission outpatient oncology follow up with PET CT    Recent bx mass of right lung demonstrated necrotic tissue and could not be sent for ancillary testing    Plan:    CT CAP w/contrast pending results   Plan to choose next best biopsy site after reviewing CT CAP and determine need for any chemo/RT based on bx results    2  ITP   Managed by Dr Vini Vernon as outpatient   Currently on prednisone and scheduled for rituxan infusion on 7/23 - will notify Dr Beau Hairston team of cancellation for this appointment in infusion center   Platelet level 294,135 yesterday   No plan for inpatient rituxan     3  Normocytic Anemia  · CBC today pending, iron panel B12 and folate WNL    4  Leukocytosis, neutrophilia  · WBC 13 88, ANC 12 08 likely reactive process          Mass; RUL mass     Lymph Nodes, Lymph Node 7     Lymph Nodes, Lymph Node 7     Lymph Nodes, Lymph Node 10R; RUL Mass     Mass; RUL mass   Anatomical Diagram        Patient understands and is in agreement with this plan   Thank you for the opportunity to participate in this patient's care  Interval History: Patient feeling short of breath and fatigued  History of present illness: Patient is a 69 yo male with pmh significant for nicotine dependence, COPD, AFIB, T2DM who presented after being sent by PCP for hypercalcemia, hyperkalemia, SULEMA as seen on labs  Former smoker, 0 5ppd for 40 years  Review of Systems:   Review of Systems   Constitutional: Positive for activity change, appetite change and fatigue  Negative for chills, fever and unexpected weight change  HENT: Negative for ear pain and sore throat  Eyes: Negative for pain and visual disturbance  Respiratory: Positive for cough, chest tightness and shortness of breath  Cardiovascular: Negative for chest pain, palpitations and leg swelling  Gastrointestinal: Negative for abdominal pain, constipation, nausea and vomiting  Genitourinary: Negative for dysuria and hematuria  Musculoskeletal: Negative for arthralgias and back pain  Skin: Negative for color change and rash  Neurological: Negative for seizures and syncope  All other systems reviewed and are negative  PHYSICAL EXAM:    /79   Pulse 67   Temp 97 6 °F (36 4 °C) (Oral)   Resp 22   Ht 5' 2" (1 575 m)   Wt 75 8 kg (167 lb)   SpO2 96%   BMI 30 54 kg/m²     Physical Exam  Vitals and nursing note reviewed  Constitutional:       General: He is in acute distress  Appearance: Normal appearance  He is not ill-appearing  HENT:      Head: Normocephalic and atraumatic  Eyes:      General: No scleral icterus  Cardiovascular:      Rate and Rhythm: Normal rate and regular rhythm  Pulses: Normal pulses  Heart sounds: Normal heart sounds  No murmur heard  Pulmonary:      Effort: Pulmonary effort is normal       Breath sounds: Rhonchi present  No wheezing  Comments: Decreased breath sounds  Chest:      Chest wall: No tenderness  Abdominal:      General: Bowel sounds are normal  There is no distension  Palpations: Abdomen is soft  There is no mass  Tenderness: There is no abdominal tenderness  Musculoskeletal:      Right lower leg: No edema  Left lower leg: No edema  Lymphadenopathy:      Cervical: No cervical adenopathy  Skin:     General: Skin is warm and dry  Coloration: Skin is pale  Neurological:      Mental Status: He is alert and oriented to person, place, and time  Psychiatric:         Thought Content:  Thought content normal          LABS:     Recent Results (from the past 48 hour(s))   Fingerstick Glucose (POCT)    Collection Time: 07/20/21  3:56 PM   Result Value Ref Range    POC Glucose 236 (H) 65 - 140 mg/dl   APTT    Collection Time: 07/20/21  5:03 PM   Result Value Ref Range    PTT 41 (H) 23 - 37 seconds   Fingerstick Glucose (POCT)    Collection Time: 07/20/21  5:49 PM   Result Value Ref Range    POC Glucose 128 65 - 140 mg/dl   Fingerstick Glucose (POCT)    Collection Time: 07/20/21 10:04 PM   Result Value Ref Range    POC Glucose 217 (H) 65 - 140 mg/dl   Fingerstick Glucose (POCT)    Collection Time: 07/21/21 12:00 AM   Result Value Ref Range    POC Glucose 182 (H) 65 - 140 mg/dl   CBC and differential    Collection Time: 07/21/21  5:56 AM   Result Value Ref Range    WBC 13 88 (H) 4 31 - 10 16 Thousand/uL    RBC 3 95 3 88 - 5 62 Million/uL    Hemoglobin 9 5 (L) 12 0 - 17 0 g/dL    Hematocrit 33 7 (L) 36 5 - 49 3 %    MCV 85 82 - 98 fL    MCH 24 1 (L) 26 8 - 34 3 pg    MCHC 28 2 (L) 31 4 - 37 4 g/dL    RDW 18 9 (H) 11 6 - 15 1 %    MPV 10 9 8 9 - 12 7 fL    Platelets 198 011 - 305 Thousands/uL    nRBC 0 /100 WBCs   Basic metabolic panel    Collection Time: 07/21/21  5:56 AM   Result Value Ref Range    Sodium 136 136 - 145 mmol/L    Potassium 4 8 3 5 - 5 3 mmol/L    Chloride 98 (L) 100 - 108 mmol/L    CO2 33 (H) 21 - 32 mmol/L    ANION GAP 5 4 - 13 mmol/L    BUN 37 (H) 5 - 25 mg/dL    Creatinine 1 09 0 60 - 1 30 mg/dL    Glucose 235 (H) 65 - 140 mg/dL    Calcium 9 1 8 3 - 10 1 mg/dL    eGFR 65 ml/min/1 73sq m   Magnesium    Collection Time: 07/21/21  5:56 AM   Result Value Ref Range    Magnesium 1 8 1 6 - 2 6 mg/dL   Phosphorus    Collection Time: 07/21/21  5:56 AM   Result Value Ref Range    Phosphorus 2 7 2 3 - 4 1 mg/dL   Manual Differential(PHLEBS Do Not Order)    Collection Time: 07/21/21  5:56 AM   Result Value Ref Range    Segmented % 87 (H) 43 - 75 %    Lymphocytes % 1 (L) 14 - 44 %    Monocytes % 5 4 - 12 %    Eosinophils, % 0 0 - 6 %    Basophils % 0 0 - 1 %    Metamyelocytes% 4 (H) 0 - 1 %    Myelocytes % 1 0 - 1 %    Atypical Lymphocytes % 2 (H) <=0 %    Absolute Neutrophils 12 08 (H) 1 85 - 7 62 Thousand/uL    Lymphocytes Absolute 0 14 (L) 0 60 - 4 47 Thousand/uL    Monocytes Absolute 0 69 0 00 - 1 22 Thousand/uL    Eosinophils Absolute 0 00 0 00 - 0 40 Thousand/uL    Basophils Absolute 0 00 0 00 - 0 10 Thousand/uL    Total Counted 100     RBC Morphology Normal     Platelet Estimate Adequate Adequate   Fingerstick Glucose (POCT)    Collection Time: 07/21/21  6:15 AM   Result Value Ref Range    POC Glucose 181 (H) 65 - 140 mg/dl   Tissue Exam    Collection Time: 07/21/21  9:28 AM   Result Value Ref Range    Case Report       Surgical Pathology Report                         Case: [id number]                                   Authorizing Provider:  Reva Carlson MD       Collected:           07/21/2021 0928              Ordering Location:     [facility]        Received:            07/21/2021 [facility]                                                                             Unit                                                                         Pathologist:           Kacy Mckeon MD                                                   Specimen:    Lung                                                                                       Final Diagnosis       A   Lung, right, bx:  - Primarily necrotic debris with scattered blood vessels, histiocytes and fibroblasts  See comment   - No viable tumor identified for ancillary studies  Comment: Flow cytometry is pending, though likely to have insufficient viability  See separate lab entry for full report  Additional Information       All reported additional testing was performed with appropriately reactive controls  These tests were developed and their performance characteristics determined by [facility] or appropriate performing facility, though some tests may be performed on tissues which have not been validated for performance characteristics (such as staining performed on alcohol exposed cell blocks and decalcified tissues)  Results should be interpreted with caution and in the context of the patients clinical condition  These tests may not be cleared or approved by the [place]  Food and Drug Administration, though the FDA has determined that such clearance or approval is not necessary  These tests are used for clinical purposes and they should not be regarded as investigational or for research  This laboratory has been approved by CLIA 88, designated as a high-complexity laboratory and is qualified to perform these tests     Interpretation performed at [place] [address]      Intraoperative Consultation          Touch prep x4:  Initial: cores grossly adequate, non-diagnostic on touch  Subsequent: scant tissue, necrotic debris, requested flow  CVytlacil  Interpretation performed at [id number] [facility], [address] [place] [address]        Gross Description          A  The specimen is received in formalin, labeled with the patient's name and hospital number, and is designated "lung    It consists of multiple 0 6-0 9 cm soft, tan-pink tissue cores with an average diameter of <0 1 cm  Entirely submitted  Four cassettes  Between sponges      Note: The estimated total formalin fixation time based upon information provided by the submitting clinician and the standard processing schedule is under 72 hours  Penny        Clinical Information Right lung mass    Fingerstick Glucose (POCT)    Collection Time: 07/21/21 11:07 AM   Result Value Ref Range    POC Glucose 255 (H) 65 - 140 mg/dl   Glucose, random    Collection Time: 07/21/21  5:11 PM   Result Value Ref Range    Glucose 446 (H) 65 - 140 mg/dL   Vitamin B12    Collection Time: 07/21/21  5:11 PM   Result Value Ref Range    Vitamin B-12 724 100 - 900 pg/mL   Folate    Collection Time: 07/21/21  5:11 PM   Result Value Ref Range    Folate 12 3 3 1 - 17 5 ng/mL   Iron Saturation %    Collection Time: 07/21/21  5:11 PM   Result Value Ref Range    Iron Saturation 23 %    TIBC 182 (L) 250 - 450 ug/dL    Iron 41 (L) 65 - 175 ug/dL   Ferritin    Collection Time: 07/21/21  5:11 PM   Result Value Ref Range    Ferritin 2,470 (H) 8 - 388 ng/mL   Fingerstick Glucose (POCT)    Collection Time: 07/21/21  5:43 PM   Result Value Ref Range    POC Glucose 415 (H) 65 - 140 mg/dl   Fingerstick Glucose (POCT)    Collection Time: 07/21/21  9:00 PM   Result Value Ref Range    POC Glucose 320 (H) 65 - 140 mg/dl   APTT six (6) hours after Heparin bolus/drip initiation or dosing change    Collection Time: 07/21/21  9:08 PM   Result Value Ref Range    PTT 45 (H) 23 - 37 seconds   APTT    Collection Time: 07/22/21  4:21 AM   Result Value Ref Range    PTT 73 (H) 23 - 37 seconds   Fingerstick Glucose (POCT)    Collection Time: 07/22/21  5:44 AM   Result Value Ref Range    POC Glucose 198 (H) 65 - 140 mg/dl   Fingerstick Glucose (POCT)    Collection Time: 07/22/21 11:04 AM   Result Value Ref Range    POC Glucose 323 (H) 65 - 140 mg/dl   APTT    Collection Time: 07/22/21 11:58 AM   Result Value Ref Range    PTT 72 (H) 23 - 37 seconds       Echo complete with contrast if indicated    Result Date: 6/22/2021  Narrative:  [place], [place] [address] [phone] Transthoracic Echocardiogram 2D, M-mode, Doppler, and Color Doppler Study date:  2021 Patient: Adrian Blackwell MR number: [id number] Account number: [de-identified] : 1940 Age: 68 years Gender: Male Status: Inpatient Location: Bedside Height: 62 in Weight: 173 6 lb BP: 138/ 65 mmHg Indications: A-fib  Diagnoses: I48 0 - Atrial fibrillation Sonographer:  ROSALINA Carpio Primary Physician:  Ary Skaggs DO Referring Physician:  Isaac Meehan PA-C Group:  [id number] Interpreting Physician:  Richelle Varner MD SUMMARY LEFT VENTRICLE: Systolic function was normal  Ejection fraction was estimated to be 60 %  There were no regional wall motion abnormalities  MITRAL VALVE: There was mild regurgitation  TRICUSPID VALVE: There was mild regurgitation  Estimated peak PA pressure was 42 mmHg  The findings suggest mild pulmonary hypertension  HISTORY: PRIOR HISTORY: PAF, HTN, SULEMA, SOB, lung mass, DM2, thrombocytopenia, current smoker  PROCEDURE: The procedure was performed at the bedside  This was a routine study  The transthoracic approach was used  The study included complete 2D imaging, M-mode, complete spectral Doppler, and color Doppler  The heart rate was 77 bpm, at the start of the study  Images were obtained from the parasternal, apical, subcostal, and suprasternal notch acoustic windows  Image quality was adequate  LEFT VENTRICLE: Size was normal  Systolic function was normal  Ejection fraction was estimated to be 60 %  There were no regional wall motion abnormalities  Wall thickness was normal  DOPPLER: Left ventricular diastolic function parameters were normal for the patient's age  RIGHT VENTRICLE: The size was normal  Systolic function was normal  Wall thickness was normal  LEFT ATRIUM: Size was normal  RIGHT ATRIUM: Size was normal  MITRAL VALVE: Valve structure was normal  There was normal leaflet separation   DOPPLER: The transmitral velocity was within the normal range  There was no evidence for stenosis  There was mild regurgitation  AORTIC VALVE: The valve was trileaflet  Leaflets exhibited normal thickness and normal cuspal separation  DOPPLER: Transaortic velocity was within the normal range  There was no evidence for stenosis  There was no significant regurgitation  TRICUSPID VALVE: The valve structure was normal  There was normal leaflet separation  DOPPLER: The transtricuspid velocity was within the normal range  There was no evidence for stenosis  There was mild regurgitation  Estimated peak PA pressure was 42 mmHg  The findings suggest mild pulmonary hypertension  PULMONIC VALVE: Leaflets exhibited normal thickness, no calcification, and normal cuspal separation  DOPPLER: The transpulmonic velocity was within the normal range  There was no significant regurgitation  PERICARDIUM: There was no pericardial effusion  The pericardium was normal in appearance  AORTA: The root exhibited normal size  SYSTEMIC VEINS: IVC: The inferior vena cava was normal in size  Respirophasic changes were normal  SYSTEM MEASUREMENT TABLES 2D %FS: 30 02 % Ao Diam: 3 54 cm EDV(Teich): 123 19 ml EF(Teich): 56 95 % ESV(Teich): 53 03 ml IVSd: 0 92 cm LA Diam: 4 57 cm LAAs A2C: 23 05 cm2 LAAs A4C: 22 52 cm2 LAESV A-L A2C: 77 35 ml LAESV A-L A4C: 69 99 ml LAESV Index (A-L): 41 99 ml/m2 LAESV MOD A2C: 73 99 ml LAESV MOD A4C: 64 59 ml LAESV(A-L): 75 58 ml LAESV(MOD BP): 70 92 ml LALs A2C: 5 83 cm LALs A4C: 6 15 cm LVIDd: 5 09 cm LVIDs: 3 56 cm LVPWd: 0 85 cm RVIDd: 3 65 cm SV(Teich): 70 16 ml CW AV Env  Ti: 281 64 ms AV MaxP 92 mmHg AV VTI: 22 53 cm AV Vmax: 1 49 m/s AV Vmean: 0 8 m/s AV meanPG: 3 13 mmHg TR MaxP 17 mmHg TR Vmax: 3 13 m/s MM TAPSE: 3 01 cm PW E' Sept: 0 09 m/s E/E' Sept: 11 25 LVOT Env  Ti: 342 53 ms LVOT VTI: 21 45 cm LVOT Vmax: 1 01 m/s LVOT Vmean: 0 63 m/s LVOT maxP 07 mmHg LVOT meanP 9 mmHg MV A Ge: 1 12 m/s MV Dec Sac: 6 18 m/s2 MV DecT: 167 82 ms MV E Ge: 1 04 m/s MV E/A Ratio: 0 93 MV PHT: 48 67 ms MVA By PHT: 4 52 cm2 [facility] Accredited Echocardiography Laboratory Prepared and electronically signed by Kasey Coughlin MD Signed 22-Jun-2021 12:57:51     XR chest portable    Result Date: 7/19/2021  Narrative: CHEST INDICATION:   Shortness of breath  No fibrillation  Wheezing  COMPARISON:  July 17, 2021 EXAM PERFORMED/VIEWS:  XR CHEST PORTABLE FINDINGS:  Trace case pacer pads noted  Large mass over the right lung is unchanged  Left lung is clear  Blunting in the right costophrenic angle suggests small effusion  No pneumothorax  Heart shadow is unremarkable  Osseous structures appear within normal limits for patient age  Impression: No interval change from July 17, 2021  Workstation performed: [id number]     XR chest portable    Result Date: 7/18/2021  Narrative: CHEST INDICATION:  Shortness of breath, wheezing  COMPARISON:  7/14/2021, CTA chest 6/18/2021 EXAM PERFORMED/VIEWS:  XR CHEST PORTABLE FINDINGS: Heart shadow is obscured on the right by adjacent opacity  The trachea is midline  Large mass involving the right mid to upper lung is again identified with obscuration of the right heart border compatible with element of right middle lobe postobstructive atelectasis and/or pneumonia  There is flattening of the right diaphragm suggesting subpulmonic pleural effusion  Faint nodular opacity peripheral left midlung projecting over the inferior border of the scapula in keeping with known nodule on CT  Paravertebral ossifications thoracic spine  Impression: No change in large right lung mass with associated postobstructive atelectasis or pneumonia right middle lobe and small right pleural effusion  Left lung nodule  Workstation performed: [id number]     XR chest 1 view portable    Result Date: 7/14/2021  Narrative: CHEST INDICATION:   known lung mass, with cough   COMPARISON:  6/18/2021 EXAM PERFORMED/VIEWS:  XR CHEST PORTABLE Single view FINDINGS: Large right upper lobe mass consistent with malignancy with mild interval enlargement  small right pleural effusion again evident Cardiomediastinal silhouette appears unremarkable  No pneumothorax Osseous structures appear within normal limits for patient age  Impression: Large right lung mass, slightly increased in size, consistent with malignancy  Persistent small right pleural effusion Workstation performed: [id number]     IR biopsy lung    Result Date: 7/21/2021  Narrative: CT-guided lung mass biopsy Clinical History: Lung mass suspicious for malignant process  Prior endobronchial ultrasound without malignant cells identified  Respiratory failure  Atrial fibrillation on anticoagulation  Moderate sedation time: 35 minutes Procedure: After explaining the risks and benefits of the procedure to the patient, informed consent was obtained  CT was used to localize the lung mass   Radiation dose length product (DLP) for this visit:  1204 mGy   This examination, like all CT scans performed in the [facility], was performed utilizing techniques to minimize radiation dose exposure, including the use of iterative reconstruction and automated exposure control  The overlying skin was prepped and draped in the usual sterile fashion  Local anesthesia was obtained with a 1% lidocaine solution  Using CT guidance, a 17-gauge coaxial needle was advanced  9 passes with an 18g gauge core biopsy needle were performed  The tissue was given to pathology  The needle was removed and final imaging performed  Patient tolerated the procedure without immediate complication Findings: 76 0 cm right lung masslike density  There is architectural distortion and it is difficult to discern what may be intrinsic mass and what may in fact be atelectatic compressed lung  Needle within the mass  Postbiopsy changes without hematoma   Since prior CT of June 18 there has been interval increase in size of the mass and development of a small right pleural effusion  A left upper lung 3 7 cm mass is also present which has increased in size from 2 1 cm on prior study  A right base presumed metastatic nodule is also increased measuring 14 mm, previously 9 mm  Specimens: Flow cytometry, touch prep x4, 18-gauge core x9 The patient tolerated the procedure well and left the department in stable condition  Impression: Impression: CT-guided biopsy of a large right lung mass Since interval CT approximately one month ago there has been growth of the mass and development of accompanying right pleural effusion  Consider thoracentesis if there is persistent respiratory failure  Additional presumed metastatic nodules have also increased in size   Workstation performed: [id number]         HISTORY:    Past Medical History:   Diagnosis Date    Hypertension        Past Surgical History:   Procedure Laterality Date    BACK SURGERY      CARPAL TUNNEL RELEASE Bilateral     IR BIOPSY BONE MARROW  6/10/2021    IR BIOPSY LUNG  2021    LUMBAR DISC SURGERY         Family History   Problem Relation Age of Onset   Rojelio Griggs Cancer Mother         "ate away her bone"    Anuerysm Father     Cancer Sister         unknown type    Heart attack Maternal Grandfather     Cancer Sister         unknown type    Heart attack Maternal Aunt     Heart attack Maternal Uncle     Heart attack Paternal Aunt        Social History     Socioeconomic History    Marital status: /Civil Union     Spouse name: Violeta Morillo Number of children: 10    Years of education: None    Highest education level: None   Occupational History    None   Tobacco Use    Smoking status: Former Smoker     Packs/day: 0 50     Years: 40 00     Pack years: 20 00     Types: Cigarettes     Start date: 14     Quit date: 2021     Years since quittin 0    Smokeless tobacco: Never Used   Vaping Use    Vaping Use: Never used   Substance and Sexual Activity    Alcohol use: Not Currently     Comment: History of heavier drinking in early 25s  Denies any current alcohol use (Updated 06/19/2021)   Drug use: Never    Sexual activity: None   Other Topics Concern    None   Social History Narrative    Previously worked as heavy machinery technician  Lives with his wife who is essentially bed bound  Social Determinants of Health     Financial Resource Strain: High Risk    Difficulty of Paying Living Expenses: Hard   Food Insecurity: Food Insecurity Present    Worried About Running Out of Food in the Last Year: Sometimes true    [place] of Food in the Last Year: Not on file   Transportation Needs:     Lack of Transportation (Medical):      Lack of Transportation (Non-Medical):    Physical Activity:     Days of Exercise per Week:     Minutes of Exercise per Session:    Stress:     Feeling of Stress :    Social Connections:     Frequency of Communication with Friends and Family:     Frequency of Social Gatherings with Friends and Family:     Attends [employer] Services:     Active Member of Clubs or Organizations:     Attends Club or Organization Meetings:     Marital Status:    Intimate Partner Violence:     Fear of Current or Ex-Partner:     Emotionally Abused:     Physically Abused:     Sexually Abused:          Current Facility-Administered Medications:     acetaminophen (TYLENOL) tablet 650 mg, 650 mg, Oral, Q6H PRN, Rupert Gonzalez MD, 650 mg at 07/18/21 1719    allopurinol (ZYLOPRIM) tablet 300 mg, 300 mg, Oral, Daily, Rupert Gonzalez MD, 300 mg at 07/22/21 0803    amiodarone tablet 200 mg, 200 mg, Oral, TID With Meals, Rupert Gonzalez MD, 200 mg at 07/22/21 1202    benzonatate (TESSALON PERLES) capsule 100 mg, 100 mg, Oral, TID, Rupert Gonzalez MD, 100 mg at 07/22/21 0804    dextromethorphan-guaiFENesin (ROBITUSSIN DM) oral syrup 10 mL, 10 mL, Oral, Q4H PRN, Rupert Gonzalez MD, 10 mL at 07/19/21 0831    fish oil capsule 1,000 mg, 1,000 mg, Oral, BID, Britton Malone MD, 1,000 mg at 07/22/21 0803    guaiFENesin (MUCINEX) 12 hr tablet 600 mg, 600 mg, Oral, Q12H [address], Britton Malone MD, 600 mg at 07/22/21 0802    heparin (porcine) 25,000 units in 0 45% NaCl 250 mL infusion (premix), 3-20 Units/kg/hr (Order-Specific), Intravenous, Titrated, Britton Malone MD, Last Rate: 15 8 mL/hr at 07/22/21 1207, 21 Units/kg/hr at 07/22/21 1207    insulin glargine (LANTUS) subcutaneous injection 10 Units 0 1 mL, 10 Units, Subcutaneous, HS, Dora Golden PA-C    insulin lispro (HumaLOG) 100 units/mL subcutaneous injection 1-5 Units, 1-5 Units, Subcutaneous, HS, Ayala Caicedo MD, 3 Units at 07/21/21 2120    insulin lispro (HumaLOG) 100 units/mL subcutaneous injection 2-12 Units, 2-12 Units, Subcutaneous, TID AC, 8 Units at 07/22/21 1331 **AND** Fingerstick Glucose (POCT), , , TID AC, Raydell Alpers, MD    levalbuterol [place]io [place]) inhalation solution 1 25 mg, 1 25 mg, Nebulization, Q6H, Ayala Caicedo MD, 1 25 mg at 07/22/21 1450    levothyroxine tablet 150 mcg, 150 mcg, Oral, Daily, Britton Malone MD, 150 mcg at 07/22/21 0545    lidocaine (LIDODERM) 5 % patch 1 patch, 1 patch, Topical, Q24H, Britton Malone MD, 1 patch at 07/22/21 1203    [START ON 7/23/2021] methylPREDNISolone sodium succinate (Solu-MEDROL) injection 20 mg, 20 mg, Intravenous, Daily, Fabio Griffin MD    metoprolol tartrate (LOPRESSOR) tablet 25 mg, 25 mg, Oral, Q12H [address], Britton Malone MD, 25 mg at 07/22/21 0803    ondansetron (ZOFRAN) injection 4 mg, 4 mg, Intravenous, Q6H PRN, Britton Malone MD    pantoprazole (PROTONIX) EC tablet 40 mg, 40 mg, Oral, Daily, Britton Malone MD, 40 mg at 07/22/21 0804    sodium chloride 0 9 % inhalation solution 3 mL, 3 mL, Nebulization, Q6H, Ayala Caicedo MD, 3 mL at 07/22/21 1450    tiotropium (SPIRIVA) capsule for inhaler 18 mcg, 18 mcg, Inhalation, Daily, Raydell Alpers, MD, 18 mcg at 07/22/21 0803    Medications Prior to Admission   Medication    acetaminophen (TYLENOL) 100 mg/mL solution    albuterol (2 5 mg/3 mL) 0 083 % nebulizer solution    allopurinol (ZYLOPRIM) 300 mg tablet    amLODIPine (NORVASC) 2 5 mg tablet    apixaban (ELIQUIS) 5 mg    benzonatate (TESSALON PERLES) 100 mg capsule    guaiFENesin (MUCINEX) 600 mg 12 hr tablet    levothyroxine 150 mcg tablet    metFORMIN (GLUCOPHAGE) 500 mg tablet    metoprolol tartrate (LOPRESSOR) 25 mg tablet    Omega-3 Fatty Acids (FISH OIL) 1,000 mg    pantoprazole (PROTONIX) 40 mg tablet    tiotropium (SPIRIVA) 18 mcg inhalation capsule       Allergies   Allergen Reactions    Penicillins Hives       Labs and pertinent reports reviewed  This note has been generated by voice recognition software system  Therefore, there may be spelling, grammar, and or syntax errors  Please contact if questions arise

## 2021-07-22 NOTE — PLAN OF CARE
Problem: PAIN - ADULT  Goal: Verbalizes/displays adequate comfort level or baseline comfort level  Description: Interventions:  - Encourage patient to monitor pain and request assistance  - Assess pain using appropriate pain scale (e g  0-10)  - Administer analgesics based on type and severity of pain and evaluate response  - Implement non-pharmacological measures as appropriate and evaluate response  - Consider cultural and social influences on pain and pain management  - Notify physician/advanced practitioner if interventions unsuccessful or patient reports new pain  Outcome: Progressing     Problem: INFECTION - ADULT  Goal: Absence or prevention of progression during hospitalization  Description: INTERVENTIONS:  - Assess and monitor for signs and symptoms of infection  - Monitor lab/diagnostic results  - Monitor all insertion sites, i e  IV site, bx site  - [place] appropriate cooling/warming therapies per order  - Administer medications as ordered  - Instruct and encourage patient and family to use good hand hygiene technique  - Identify and instruct in appropriate isolation precautions for identified infection/condition  Outcome: Progressing  Goal: Absence of fever/infection during neutropenic period  Description: INTERVENTIONS:  - Monitor WBC and ANC  - Implement neutropenic precautions if/when pt becomes neutropenic    Outcome: Progressing     Problem: SAFETY ADULT  Goal: Patient will remain free of falls  Description: INTERVENTIONS:  - Educate patient/family on patient safety including physical limitations  - Instruct patient to call for assistance with activity   - Consult OT/PT to assist with strengthening/mobility   - Keep Call bell within reach  - Keep bed low and locked with side rails adjusted as appropriate  - Keep care items and personal belongings within reach  - Initiate and maintain comfort rounds  - Make Fall Risk Sign visible to staff  - Offer Toileting every 2 hours, in advance of need  - Maintain chair alarm  - Obtain necessary fall risk management equipment: chair alarm  - Apply yellow socks and bracelet for high fall risk patients  - Consider moving patient to room near nurses station  Outcome: Progressing  Goal: Maintain or return to baseline ADL function  Description: INTERVENTIONS:  -  Assess patient's ability to carry out ADLs; assess patient's baseline for ADL function and identify physical deficits which impact ability to perform ADLs (bathing, care of mouth/teeth, toileting, grooming, dressing, etc )  - Assess/evaluate cause of self-care deficits   - Assess range of motion  - Assess patient's mobility; develop plan if impaired  - Assess patient's need for assistive devices and provide as appropriate  - Encourage maximum independence but intervene and supervise when necessary  - Involve family in performance of ADLs  - Assess for home care needs following discharge   - Consider OT consult to assist with ADL evaluation and planning for discharge  - Provide patient education as appropriate  Outcome: Progressing  Goal: Maintains/Returns to pre admission functional level  Description: INTERVENTIONS:  - Perform BMAT or MOVE assessment daily    - Set and communicate daily mobility goal to care team and patient/family/caregiver     - Collaborate with rehabilitation services on mobility goals if consulted  - Out of bed to chair 3 times a day   - Out of bed for meals 3 times a day  - Out of bed for toileting  - Record patient progress and toleration of activity level   Outcome: Progressing     Problem: DISCHARGE PLANNING  Goal: Discharge to home or other facility with appropriate resources  Description: INTERVENTIONS:  - Identify barriers to discharge w/patient and caregiver  - Arrange for needed discharge resources and transportation as appropriate  - Identify discharge learning needs (meds, wound care, etc )  - Arrange for interpretive services to assist at discharge as needed  - Refer to Case Management Department for coordinating discharge planning if the patient needs post-hospital services based on physician/advanced practitioner order or complex needs related to functional status, cognitive ability, or social support system  Outcome: Progressing     Problem: Knowledge Deficit  Goal: Patient/family/caregiver demonstrates understanding of disease process, treatment plan, medications, and discharge instructions  Description: Complete learning assessment and assess knowledge base    Interventions:  - Provide teaching at level of understanding  - Provide teaching via preferred learning methods  Outcome: Progressing     Problem: CARDIOVASCULAR - ADULT  Goal: Maintains optimal cardiac output and hemodynamic stability  Description: INTERVENTIONS:  - Monitor I/O, vital signs and rhythm  - Monitor for S/S and trends of decreased cardiac output  - Administer and titrate ordered vasoactive medications to optimize hemodynamic stability  - Assess quality of pulses, skin color and temperature  - Assess for signs of decreased coronary artery perfusion  - Instruct patient to report change in severity of symptoms  Outcome: Progressing  Goal: Absence of cardiac dysrhythmias or at baseline rhythm  Description: INTERVENTIONS:  - Continuous cardiac monitoring, vital signs, obtain 12 lead EKG if ordered  - Administer antiarrhythmic and heart rate control medications as ordered  - Monitor electrolytes and administer replacement therapy as ordered  Outcome: Progressing     Problem: METABOLIC, FLUID AND ELECTROLYTES - ADULT  Goal: Electrolytes maintained within normal limits  Description: INTERVENTIONS:  - Monitor labs and assess patient for signs and symptoms of electrolyte imbalances  - Administer electrolyte replacement as ordered  - Monitor response to electrolyte replacements, including repeat lab results as appropriate  - Instruct patient on fluid and nutrition as appropriate  Outcome: Progressing  Goal: Fluid balance maintained  Description: INTERVENTIONS:  - Monitor labs   - Monitor I/O and WT  - Instruct patient on fluid and nutrition as appropriate  - Assess for signs & symptoms of volume excess or deficit  Outcome: Progressing  Goal: Glucose maintained within target range  Description: INTERVENTIONS:  - Monitor Blood Glucose as ordered and provide SSI/lantus as ordered  - Assess for signs and symptoms of hyperglycemia and hypoglycemia  - Administer ordered medications to maintain glucose within target range  - Assess nutritional intake and initiate nutrition service referral as needed  Outcome: Progressing     Problem: MOBILITY - ADULT  Goal: Maintain or return to baseline ADL function  Description: INTERVENTIONS:  -  Assess patient's ability to carry out ADLs; assess patient's baseline for ADL function and identify physical deficits which impact ability to perform ADLs (bathing, care of mouth/teeth, toileting, grooming, dressing, etc )  - Assess/evaluate cause of self-care deficits   - Assess range of motion  - Assess patient's mobility; develop plan if impaired  - Assess patient's need for assistive devices and provide as appropriate  - Encourage maximum independence but intervene and supervise when necessary  - Involve family in performance of ADLs  - Assess for home care needs following discharge   - Consider OT consult to assist with ADL evaluation and planning for discharge  - Provide patient education as appropriate  Outcome: Progressing  Goal: Maintains/Returns to pre admission functional level  Description: INTERVENTIONS:  - Perform BMAT or MOVE assessment daily    - Set and communicate daily mobility goal to care team and patient/family/caregiver     - Collaborate with rehabilitation services on mobility goals if consulted  - Out of bed to chair 3 times a day   - Out of bed for meals 3 times a day  - Out of bed for toileting  - Record patient progress and toleration of activity level   Outcome: Progressing Problem: Potential for Falls  Goal: Patient will remain free of falls  Description: INTERVENTIONS:  - Educate patient/family on patient safety including physical limitations  - Instruct patient to call for assistance with activity   - Consult OT/PT to assist with strengthening/mobility   - Keep Call bell within reach  - Keep bed low and locked with side rails adjusted as appropriate  - Keep care items and personal belongings within reach  - Initiate and maintain comfort rounds  - Make Fall Risk Sign visible to staff  - Offer Toileting every 2 hours, in advance of need  - Maintain chair alarm  - Obtain necessary fall risk management equipment: chair alarm  - Apply yellow socks and bracelet for high fall risk patients  - Consider moving patient to room near nurses station  Outcome: Progressing     Problem: Prexisting or High Potential for Compromised Skin Integrity  Goal: Skin integrity is maintained or improved  Description: INTERVENTIONS:  - Identify patients at risk for skin breakdown  - Assess and monitor skin integrity  - Assess and monitor nutrition and hydration status  - Monitor labs   - Assess for incontinence   - Turn and reposition patient  - Assist with mobility/ambulation  - Relieve pressure over bony prominences  - Avoid friction and shearing  - Provide appropriate hygiene as needed including keeping skin clean and dry  - Collaborate with interdisciplinary team   - Patient/family teaching  - Consider wound care consult   Outcome: Progressing

## 2021-07-22 NOTE — QUICK NOTE
IR quick note    Biopsy from several days ago has resulted in necrotic tissue without viable tumor  Unfortunately the patient still does not have a tissue diagnosis despite imaging evidence of tumors and metastatic disease, this is despite EBUS and needle biopsy of the large right mass  Repeat tissue sampling is indicated, pending identification of the best target    The left lung nodule is accessible and under the pleura  Patient is at higher risk of complications if there is left lung collapse, pneumothorax, from a left-sided biopsy, but this may be appropriate as an inpatient where it can be managed rapidly  A few days may also allow time for his respiratory status to improve  Recommendations  - await final interpretation of CT chest abdomen pelvis  - add on for image guided biopsy Monday morning  - NPO after midnight Sunday night, sips of meds okay  - stop heparin drip 2:00 a m   Uriah morning  - tentative target would be left lung mass, alternate target pending on CT findings    D/w AMARJIT Casillas, Dr Omega Galvan, Dr Mary Mazariegos 130

## 2021-07-22 NOTE — PROGRESS NOTES
General Cardiology   Progress Note -  Team One   Miguel Angel 68 y o  male MRN: [id number]  Unit/Bed#: ICU 06 Encounter: [id number]    Assessment/ Plan    1  PAF- known history of, presented with AFib RVR  Presented in afib with RVR on admission in setting of multiple electrolyte derangements, spontaneously converted to NSR  Recurrent symptomatic afib with RVR on 7/19/21 now maintaining NSR on amiodarone and metoprolol  Continue oral amiodarone load- 200 mg TID x 1 week, BID x 1 week, then daily  Continue metoprolol tartrate 25 mg BID  Normal LV function on recent echo  On IV heparin for anticoagulation due to possible need for further invasive testing  Resume Eliquis when able      2  Right lung mass  Status post EBUS 06/2021-pathology atypical cells  Current chest x-ray-large right lung mass, slightly increased in size, consistent with malignancy  Persistent small right pleural effusion  S/p IR biopsy yesterday  Plan for outpatient follow up with oncology     3  COPD  utilizes p r n  Oxygen at home  Presented with acute exacerbation-IV steroids initiated, nebulizer treatments per primary team    4  HTN- controlled, average /66    Subjective  No cardiac complaints  Dry mouth, cough, and lots of phlegm  Hungry, feels he has been waiting a long time for his breakfast   Review of Systems   Constitutional: Negative for diaphoresis and malaise/fatigue  Cardiovascular: Negative for chest pain, leg swelling, orthopnea, palpitations and syncope  Respiratory: Positive for cough and sputum production  Negative for shortness of breath and sleep disturbances due to breathing  Gastrointestinal: Negative for bloating, nausea and vomiting  Neurological: Negative for dizziness, light-headedness and weakness  Psychiatric/Behavioral: Negative for altered mental status  All other systems reviewed and are negative      Objective:   Vitals: Blood pressure 120/79, pulse 67, temperature 97 6 °F (36 4 °C), temperature source Oral, resp  rate 22, height 5' 2" (1 575 m), weight 75 8 kg (167 lb), SpO2 94 %  , Body mass index is 30 54 kg/m²  ,     Systolic (66SAS), AYX:867 , Min:104 , XFC:495     Diastolic (37VIH), TFU:83, Min:53, Max:85      Intake/Output Summary (Last 24 hours) at 7/22/2021 0756  Last data filed at 7/22/2021 0601  Gross per 24 hour   Intake 704 68 ml   Output 1925 ml   Net -1220 32 ml     Wt Readings from Last 3 Encounters:   07/14/21 75 8 kg (167 lb)   07/08/21 75 4 kg (166 lb 3 6 oz)   06/30/21 76 7 kg (169 lb)     Telemetry Review: NSR    Physical Exam  Vitals reviewed  Constitutional:       General: He is not in acute distress  Neck:      Vascular: No hepatojugular reflux or JVD  Cardiovascular:      Rate and Rhythm: Normal rate and regular rhythm  Pulses: Normal pulses  Heart sounds: Normal heart sounds  No murmur heard  No friction rub  No gallop  Pulmonary:      Effort: Tachypnea present  No respiratory distress  Breath sounds: Decreased breath sounds present  No wheezing or rales  Comments: 2LNC  Abdominal:      General: Bowel sounds are normal  There is no distension  Palpations: Abdomen is soft  Tenderness: There is no abdominal tenderness  Musculoskeletal:         General: Normal range of motion  Cervical back: Neck supple  Right lower leg: No edema  Left lower leg: No edema  Skin:     General: Skin is warm and dry  Capillary Refill: Capillary refill takes less than 2 seconds  Findings: No erythema  Neurological:      Mental Status: He is alert and oriented to person, place, and time     Psychiatric:         Mood and Affect: Mood normal      LABORATORY RESULTS  Results from last 7 days   Lab Units 07/19/21  1158 07/19/21  0942   TROPONIN I ng/mL 0 02 <0 02     CBC with diff:   Results from last 7 days   Lab Units 07/21/21  0556 07/20/21  0459 07/19/21  0946 07/19/21  0546 07/18/21  0537 07/16/21  0537   WBC Thousand/uL 13 88* 17 83*  --  11 47* 11 70* 10 81*   HEMOGLOBIN g/dL 9 5* 9 3*  --  9 9* 9 0* 7 9*   I STAT HEMOGLOBIN g/dl  --   --  11 6*  --   --   --    HEMATOCRIT % 33 7* 33 3*  --  35 9* 31 8* 28 2*   HEMATOCRIT, ISTAT %  --   --  34*  --   --   --    MCV fL 85 84  --  84 84 83   PLATELETS Thousands/uL 292 300  --  356 333 265   MCH pg 24 1* 23 4*  --  23 2* 23 8* 23 2*   MCHC g/dL 28 2* 27 9*  --  27 6* 28 3* 28 0*   RDW % 18 9* 18 6*  --  17 9* 18 0* 17 6*   MPV fL 10 9 10 6  --  9 8 10 4 9 7   NRBC AUTO /100 WBCs 0 0  --   --  0  --        CMP:  Results from last 7 days   Lab Units 07/21/21  0556 07/20/21  0459 07/19/21  0946 07/19/21  0546 07/18/21  0537 07/17/21  0449 07/16/21  0937 07/15/21  1137   POTASSIUM mmol/L 4 8 4 1  --  4 8 4 6 4 9 5 1 4 5   CHLORIDE mmol/L 98* 103  --  105 103 105 107  --    CO2 mmol/L 33* 32  --  34* 31 29 25  --    CO2, I-STAT mmol/L  --   --  35*  --   --   --   --   --    BUN mg/dL 37* 37*  --  32* 32* 32* 32*  --    CREATININE mg/dL 1 09 1 19  --  1 11 1 03 1 14 1 23  --    GLUCOSE, ISTAT mg/dl  --   --  154*  --   --   --   --   --    CALCIUM mg/dL 9 1 9 3  --  9 6 9 6 9 4 9 2  --    AST U/L  --   --   --   --   --  14 8  --    ALT U/L  --   --   --   --   --  22 17  --    ALK PHOS U/L  --   --   --   --   --  132* 147*  --    EGFR ml/min/1 73sq m 65 58  --  63 69 61 56  --        BMP:  Results from last 7 days   Lab Units 07/21/21  0556 07/20/21  0459 07/19/21  0946 07/19/21  0546 07/18/21  0537 07/17/21  0449 07/16/21  0937 07/15/21  1137   POTASSIUM mmol/L 4 8 4 1  --  4 8 4 6 4 9 5 1 4 5   CHLORIDE mmol/L 98* 103  --  105 103 105 107  --    CO2 mmol/L 33* 32  --  34* 31 29 25  --    CO2, I-STAT mmol/L  --   --  35*  --   --   --   --   --    BUN mg/dL 37* 37*  --  32* 32* 32* 32*  --    CREATININE mg/dL 1 09 1 19  --  1 11 1 03 1 14 1 23  --    GLUCOSE, ISTAT mg/dl  --   --  154*  --   --   --   --   --    CALCIUM mg/dL 9 1 9 3  --  9 6 9 6 9 4 9 2  --        Lab Results   Component Value Date    CREATININE 1 09 2021    CREATININE 1 19 2021    CREATININE 1 11 2021       Lab Results   Component Value Date    NTBNP 5,084 (H) 2021    NTBNP 373 2021           Results from last 7 days   Lab Units 21  0556 21  0459   MAGNESIUM mg/dL 1 8 1 7                 Results from last 7 days   Lab Units 21  0449   TSH 3RD GENERATON uIU/mL 0 103*   FREE T4 ng/dL 1 19       Results from last 7 days   Lab Units 21  1210   INR  1 27*       Lipid Profile:   No results found for: CHOL  Lab Results   Component Value Date    HDL 28 (L) 2021    HDL 25 (L) 07/15/2019     Lab Results   Component Value Date    LDLCALC 69 2021    LDLCALC 60 07/15/2019     Lab Results   Component Value Date    TRIG 105 2021    TRIG 218 (H) 07/15/2019       Cardiac testing:   Results for orders placed during the hospital encounter of 21    Echo complete with contrast if indicated    Narrative  [place] [address], [address] [place] [address]  [phone]    Transthoracic Echocardiogram  2D, M-mode, Doppler, and Color Doppler    Study date:  2021    Patient: Key Grimaldo  MR number: [id number]  Account number: [de-identified]  : 1940  Age: 68 years  Gender: Male  Status: Inpatient  Location: Bedside  Height: 62 in  Weight: 173 6 lb  BP: 138/ 65 mmHg    Indications: A-fib  Diagnoses: I48 0 - Atrial fibrillation    Sonographer:  ROSALINA Allan  Primary Physician:  Aissatou Munoz DO  Referring Physician:  Janice Chun PA-C  Group:  [facility]  Interpreting Physician:  David Allred MD    SUMMARY    LEFT VENTRICLE:  Systolic function was normal  Ejection fraction was estimated to be 60 %  There were no regional wall motion abnormalities  MITRAL VALVE:  There was mild regurgitation  TRICUSPID VALVE:  There was mild regurgitation  Estimated peak PA pressure was 42 mmHg    The findings suggest mild pulmonary hypertension  HISTORY: PRIOR HISTORY: PAF, HTN, SULEMA, SOB, lung mass, DM2, thrombocytopenia, current smoker  PROCEDURE: The procedure was performed at the bedside  This was a routine study  The transthoracic approach was used  The study included complete 2D imaging, M-mode, complete spectral Doppler, and color Doppler  The heart rate was 77 bpm,  at the start of the study  Images were obtained from the parasternal, apical, subcostal, and suprasternal notch acoustic windows  Image quality was adequate  LEFT VENTRICLE: Size was normal  Systolic function was normal  Ejection fraction was estimated to be 60 %  There were no regional wall motion abnormalities  Wall thickness was normal  DOPPLER: Left ventricular diastolic function parameters  were normal for the patient's age  RIGHT VENTRICLE: The size was normal  Systolic function was normal  Wall thickness was normal     LEFT ATRIUM: Size was normal     RIGHT ATRIUM: Size was normal     MITRAL VALVE: Valve structure was normal  There was normal leaflet separation  DOPPLER: The transmitral velocity was within the normal range  There was no evidence for stenosis  There was mild regurgitation  AORTIC VALVE: The valve was trileaflet  Leaflets exhibited normal thickness and normal cuspal separation  DOPPLER: Transaortic velocity was within the normal range  There was no evidence for stenosis  There was no significant  regurgitation  TRICUSPID VALVE: The valve structure was normal  There was normal leaflet separation  DOPPLER: The transtricuspid velocity was within the normal range  There was no evidence for stenosis  There was mild regurgitation  Estimated peak PA  pressure was 42 mmHg  The findings suggest mild pulmonary hypertension  PULMONIC VALVE: Leaflets exhibited normal thickness, no calcification, and normal cuspal separation  DOPPLER: The transpulmonic velocity was within the normal range   There was no significant regurgitation  PERICARDIUM: There was no pericardial effusion  The pericardium was normal in appearance  AORTA: The root exhibited normal size  SYSTEMIC VEINS: IVC: The inferior vena cava was normal in size  Respirophasic changes were normal     SYSTEM MEASUREMENT TABLES    2D  %FS: 30 02 %  Ao Diam: 3 54 cm  EDV(Teich): 123 19 ml  EF(Teich): 56 95 %  ESV(Teich): 53 03 ml  IVSd: 0 92 cm  LA Diam: 4 57 cm  LAAs A2C: 23 05 cm2  LAAs A4C: 22 52 cm2  LAESV A-L A2C: 77 35 ml  LAESV A-L A4C: 69 99 ml  LAESV Index (A-L): 41 99 ml/m2  LAESV MOD A2C: 73 99 ml  LAESV MOD A4C: 64 59 ml  LAESV(A-L): 75 58 ml  LAESV(MOD BP): 70 92 ml  LALs A2C: 5 83 cm  LALs A4C: 6 15 cm  LVIDd: 5 09 cm  LVIDs: 3 56 cm  LVPWd: 0 85 cm  RVIDd: 3 65 cm  SV(Teich): 70 16 ml    CW  AV Env  Ti: 281 64 ms  AV MaxP 92 mmHg  AV VTI: 22 53 cm  AV Vmax: 1 49 m/s  AV Vmean: 0 8 m/s  AV meanPG: 3 13 mmHg  TR MaxP 17 mmHg  TR Vmax: 3 13 m/s    MM  TAPSE: 3 01 cm    PW  E' Sept: 0 09 m/s  E/E' Sept: 11 25  LVOT Env  Ti: 342 53 ms  LVOT VTI: 21 45 cm  LVOT Vmax: 1 01 m/s  LVOT Vmean: 0 63 m/s  LVOT maxP 07 mmHg  LVOT meanP 9 mmHg  MV A Ge: 1 12 m/s  MV Dec [place]: 6 18 m/s2  MV DecT: 167 82 ms  MV E Ge: 1 04 m/s  MV E/A Ratio: 0 93  MV PHT: 48 67 ms  MVA By PHT: 4 52 cm2    [address] Accredited Ech[facility]ography [facility]    Prepared and electronically signed by    Rama Rosario MD  Signed 2021 12:57:51    Meds/Allergies   all current active meds have been reviewed and current meds:   Current Facility-Administered Medications   Medication Dose Route Frequency    acetaminophen (TYLENOL) tablet 650 mg  650 mg Oral Q6H PRN    allopurinol (ZYLOPRIM) tablet 300 mg  300 mg Oral Daily    amiodarone tablet 200 mg  200 mg Oral TID With Meals    benzonatate (TESSALON PERLES) capsule 100 mg  100 mg Oral TID    dextromethorphan-guaiFENesin (ROBITUSSIN DM) oral syrup 10 mL  10 mL Oral Q4H PRN    fish oil capsule 1,000 mg  1,000 mg Oral BID    guaiFENesin (MUCINEX) 12 hr tablet 600 mg  600 mg Oral Q12H [address]    heparin (porcine) 25,000 units in 0 45% NaCl 250 mL infusion (premix)  3-20 Units/kg/hr (Order-Specific) Intravenous Titrated    insulin glargine (LANTUS) subcutaneous injection 7 Units 0 07 mL  7 Units Subcutaneous HS    insulin lispro (HumaLOG) 100 units/mL subcutaneous injection 1-5 Units  1-5 Units Subcutaneous HS    insulin lispro (HumaLOG) 100 units/mL subcutaneous injection 2-12 Units  2-12 Units Subcutaneous TID AC    levalbuterol (XOPENEX) inhalation solution 1 25 mg  1 25 mg Nebulization Q6H    levothyroxine tablet 150 mcg  150 mcg Oral Daily    lidocaine (LIDODERM) 5 % patch 1 patch  1 patch Topical Q24H    methylPREDNISolone sodium succinate (Solu-MEDROL) injection 40 mg  40 mg Intravenous Daily    metoprolol tartrate (LOPRESSOR) tablet 25 mg  25 mg Oral Q12H ZOHAIB    ondansetron (ZOFRAN) injection 4 mg  4 mg Intravenous Q6H PRN    pantoprazole (PROTONIX) EC tablet 40 mg  40 mg Oral Daily    sodium chloride 0 9 % inhalation solution 3 mL  3 mL Nebulization Q6H    tiotropium (SPIRIVA) capsule for inhaler 18 mcg  18 mcg Inhalation Daily     Medications Prior to Admission   Medication    acetaminophen (TYLENOL) 100 mg/mL solution    albuterol (2 5 mg/3 mL) 0 083 % nebulizer solution    allopurinol (ZYLOPRIM) 300 mg tablet    amLODIPine (NORVASC) 2 5 mg tablet    apixaban (ELIQUIS) 5 mg    benzonatate (TESSALON PERLES) 100 mg capsule    guaiFENesin (MUCINEX) 600 mg 12 hr tablet    levothyroxine 150 mcg tablet    metFORMIN (GLUCOPHAGE) 500 mg tablet    metoprolol tartrate (LOPRESSOR) 25 mg tablet    Omega-3 Fatty Acids (FISH OIL) 1,000 mg    pantoprazole (PROTONIX) 40 mg tablet    tiotropium (SPIRIVA) 18 mcg inhalation capsule     heparin (porcine), 3-20 Units/kg/hr (Order-Specific), Last Rate: 21 Units/kg/hr (07/21/21 2228)    Counseling / Coordination of Care  Total floor / unit time spent today 20 minutes  Greater than 50% of total time was spent with the patient and / or family counseling and / or coordination of care  ** Please Note: Dragon 360 Dictation voice to text software may have been used in the creation of this document   **

## 2021-07-22 NOTE — PLAN OF CARE
Problem: OCCUPATIONAL THERAPY ADULT  Goal: Performs self-care activities at highest level of function for planned discharge setting  See evaluation for individualized goals  Description: Treatment Interventions: ADL retraining, Functional transfer training, UE strengthening/ROM, Endurance training, Patient/family training, Equipment evaluation/education, Continued evaluation, Energy conservation, Activityengagement  Equipment Recommended: Bedside commode, Shower/Tub chair with back ($), Reacher ($), Sock aid ($)       See flowsheet documentation for full assessment, interventions and recommendations  7/22/2021 1536 by Chago Cunningham OT  Note: Limitation: Decreased UE ROM, Decreased ADL status, Decreased UE strength, Decreased Safe judgement during ADL, Decreased endurance, Decreased high-level ADLs     Assessment: Pt is a 68 y o  male  Pt [facility] on 7/[facility]/2021 d/t Acute respiratory failure with hypoxia and hypercapnia ([facility] [address] )  OT and up w/ A orders are documented  Rapid response called on 7/19/2021 d/t acute change in heart rate/O2 sat  Pt s/p IR (7/21/21) for R lung mass biopsy  The pt's PMH impacting occupational performance includes HTN, back surgery, B carpal tunnel  PTA pt lived in [facility] w/ wife  Pt has tub/shower unit w/ gbs, sc and standard toilet  Pt I w/ ADLs, IADLs, functional mobility, +   Deficit areas limiting the patient currently includes decreased standing tolerance/endurance, generalized weakness, decreased safety awareness, decreased standing balance and increased pain  Personal factors impacting pt currently includes increased pain, advanced age and wife's primary caretaker  Currently the pt is engaging in eating tasks w/ mod I, LBD w/ total A  Pt demonstrated STS w/ supervision  Pt engaged in functional mobility w/ min A  Pt is currently functioning below baseline level of I and would benefit from occupational therapy services while admitted in acute care   When pt is medically stable, recommendation for discharge is home w/ home health services  OT will follow while in acute care  The patient's raw score on the AM-PAC Daily Activity inpatient short form is 21, standardized score is 44 27, greater than 39 4  Patients at this level are likely to benefit from discharge to home  Please refer to the recommendation of the Occupational Therapist for safe discharge planning  OT Discharge Recommendation: Home with home health rehabilitation       7/22/2021 1536 by Aman Mazariegos OT  Note: Limitation: Decreased UE ROM, Decreased ADL status, Decreased UE strength, Decreased Safe judgement during ADL, Decreased endurance, Decreased high-level ADLs     Assessment: Pt is a 68 y o  male  Pt admitted to [facility] on 7/14/2021 d/t Acute respiratory failure with hypoxia and hypercapnia ([facility] [address] )  OT and up w/ A orders are documented  Rapid response called on 7/19/2021 d/t acute change in heart rate/O2 sat  Pt s/p IR (7/21/21) for R lung mass biopsy  The pt's PMH impacting occupational performance includes HTN, back surgery, B carpal tunnel  PTA pt lived in [facility] w/ wife  Pt has tub/shower unit w/ gbs, sc and standard toilet  Pt I w/ ADLs, IADLs, functional mobility, +   Deficit areas limiting the patient currently includes decreased standing tolerance/endurance, generalized weakness, decreased safety awareness, decreased standing balance and increased pain  Personal factors impacting pt currently includes increased pain, advanced age and wife's primary caretaker  Currently the pt is engaging in eating tasks w/ mod I, LBD w/ total A  Pt demonstrated STS w/ supervision  Pt engaged in functional mobility w/ min A  Pt is currently functioning below baseline level of I and would benefit from occupational therapy services while admitted in acute care  When pt is medically stable, recommendation for discharge is home w/ home health services  OT will follow while in acute care   The patient's raw score on the AM-PAC Daily Activity inpatient short form is 21, standardized score is 44 27, greater than 39 4  Patients at this level are likely to benefit from discharge to home  Please refer to the recommendation of the Occupational Therapist for safe discharge planning       OT Discharge Recommendation: Home with home health rehabilitation

## 2021-07-22 NOTE — ASSESSMENT & PLAN NOTE
With acute exacerbation  Patient presenting with worsening shortness of breath, productive cough and wheezing  When seen today patient reports improvement decreased dyspnea, decreased cough and sputum  On exam continue to have wheezing      Plan  · Has been tapered off IV steroids to oral  · continue respiratory protocol  · Will require home oxygen eval upon discharge

## 2021-07-22 NOTE — PROGRESS NOTES
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  2  Unstaged COPD  3  ITP  4  PAF with RVR  Plan:  Discussed with IR, Medical Oncology, primary team - will plan on biopsy of left lung lesion pending read on CT C/A/P (no other targets noted on my prelim review)  Continue heparin gtt through the weekend  Continue 3% saline nebs / xopenex / spiriva  Reduce steroids to 20mg daily given improvement in exam   Monitor platelets daily  Chief Complaint:   None  Subjective:   He was updated on the plan to repeat biopsy on Monday of the left lung  He feels well and like the vest therapy and nebulizers  Still coughing up green mucus  No dyspnea  Objective:     Vitals: Blood pressure 120/79, pulse 67, temperature 97 6 °F (36 4 °C), temperature source Oral, resp  rate 22, height 5' 2" (1 575 m), weight 75 8 kg (167 lb), SpO2 94 %  ,Body mass index is 30 54 kg/m²  Intake/Output Summary (Last 24 hours) at 7/22/2021 1351  Last data filed at 7/22/2021 1201  Gross per 24 hour   Intake 1133 5 ml   Output 2300 ml   Net -1166 5 ml       Invasive Devices     Peripheral Intravenous Line            Peripheral IV 07/19/21 Distal;Right;Ventral (anterior) Wrist 2 days    Peripheral IV 07/19/21 Left;Ventral (anterior) Forearm 2 days              Physical Exam:   General:  No acute distress  Alert and oriented x 3  HEENT:  PERRL   MMM  Chest:  Diminished on right still but no adventitious sounds  Cardiovascular:  S1 + S2, RRR, no M/R/G  Abdomen:  Soft, nontender, nondistended, no palpable masses or organomegaly  Extremities:  No significant edema  Neuro:  No focal deficits, grossly intact  Labs: I have personally reviewed pertinent lab results    Imaging and other studies: I have personally reviewed pertinent films in PACS

## 2021-07-22 NOTE — ASSESSMENT & PLAN NOTE
RR called due to patient being found with increased work of breathing, tachypnea and tachycardia  Patient found to be in Afib with RVR, IV metoprolol, IV labetalol given with improvement     · Patient placed on BiPAP and transferred to ICU for further management  · See plan for various issues as below  · Monitor oxygen status closely

## 2021-07-22 NOTE — OCCUPATIONAL THERAPY NOTE
Occupational Therapy Evaluation     Patient Name: Maxwell Arteaga  [id number] Date: 7/22/2021  Problem List  Principal Problem:    Acute respiratory failure with hypoxia and hypercapnia (HCC)  Active Problems:    Paroxysmal A-fib with RVR    Mass of right lung    Hypothyroidism    type 2 diabetes mellitus (HCC)    COPD (chronic obstructive pulmonary disease) with acute exacerbation (HCC)    Past Medical History  Past Medical History:   Diagnosis Date    Hypertension      Past Surgical History  Past Surgical History:   Procedure Laterality Date    BACK SURGERY      CARPAL TUNNEL RELEASE Bilateral     IR BIOPSY BONE MARROW  6/10/2021    IR BIOPSY LUNG  7/21/2021    LUMBAR One [address]ran SURGERY           07/22/21 1410   OT Last Visit   OT Visit Date 07/22/21  (Thursday)   Note Type   Note type Evaluation   Restrictions/Precautions   Weight Bearing Precautions Per Order No   Other Precautions Chair Alarm; Bed Alarm;Telemetry;Multiple lines;O2;Fall Risk;Hard of hearing; Impulsive  (1L of O2 via NC)   Pain Assessment   Pain Assessment Tool 0-10   Pain Score 7   Pain Location/Orientation Orientation: Upper;Orientation: Right;Location: Chest   Effect of Pain on Daily Activities limits ADL task participation   Hospital Pain Intervention(s) Repositioned; Ambulation/increased activity; Emotional support   Home Living   Type of [address] [place] [address] Two level;Performs ADLs on one level; Able to live on main level with bedroom/bathroom   Bathroom Shower/Tub Tub/shower unit   Bathroom Toilet Standard   Bathroom Equipment Grab bars in shower; Shower chair;Grab bars around toilet   [address] Walker;Grab bars; Sock aid;Reacher  (wife has RW)   Additional Comments Pt lives in one level home w/ wife  Pt states they do have gbs, showerchair in bathroom   PTA pt reports he did not use DME, AD   Prior Function   Level of [place] Independent with ADLs and functional mobility   Lives With Spouse Receives Help From Family   ADL Assistance Independent   IADLs Independent   Falls in the last 6 months 0   Vocational Retired   Comments Pt stated PTA he was I w/ ADLs, IADLs, +   Pt reported he is the caretaker for his wife  His children live nearby and stop if they he or his wife need A  Lifestyle   Autonomy Pt I w/ ADLs, IADLs, functional mobility, +    Reciprocal Relationships Pt lives w/ spouse  Reports he has 6 children   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    Intrinsic Gratification Pt enjoys being active,    Psychosocial   Psychosocial (WDL) WDL   Patient Behaviors/Mood Cooperative;Calm   Subjective   Subjective "I stand up after sitting for a while"   ADL   Where Assessed Chair   Eating Assistance 6  Modified independent   Eating Deficit Setup; Increased time to complete   Grooming Assistance   (Anticiapte mod I after set up )   UB Bathing Assistance Unable to assess   LB Bathing Assistance Unable to assess   UB Dressing Assistance   (Anticipate min A d/t pain, ROM deficits)   LB Dressing Assistance 1  Total Assistance   LB Dressing Deficit Don/doff R sock; Don/doff L sock   Toileting Assistance    (Denied need to void)   Additional Comments Pt finishing lunch upon arrival w/ mod I  Pt required total A for donning/doffing socks  Pt reports he uses sock aid, reacher at home to complete LBD  Bed Mobility   Supine to Sit Unable to assess   Sit to Supine Unable to assess   Additional Comments Upon entering, pt seated EOB  End session, pt seated OOB in chair w/ callbell, phone within reach, chair alarm activated, needs met   Transfers   Sit to Stand 5  Supervision   Additional items Armrests   Stand to Sit 5  Supervision   Additional items Armrests   Toilet transfer Unable to assess   Functional Mobility   Functional Mobility 4  Minimal assistance   Additional Comments Pt ambulated short distances in hosptial room w/o AD   Pt did attempt to reach for IV pole for UE support, although was able to demonstrate mobility without IV support w/ RW  Additional items   (no AD)   Balance   Static Sitting Good   Static Standing Fair   Ambulatory Fair -  (w/ no AD)   Activity Tolerance   Activity Tolerance Patient limited by fatigue   Nurse Made Aware per RNManjeet pt appropriate to see   RUE Assessment   RUE Assessment X   RUE Overall AROM   R Shoulder Flexion deficits d/t incision pain   R Elbow Flexion WFL   R Elbow Extension WFL   RUE Strength   RUE Overall Strength Within Functional Limits - able to perform ADL tasks with strength   LUE Assessment   LUE Assessment WFL   LUE Overall AROM   L Shoulder Flexion WFL   L Elbow Flexion WFL   L Elbow Extension WFL   LUE Strength   LUE Overall Strength Within Functional Limits - able to perform ADL tasks with strength   Hand Function   Gross Motor Coordination Functional   Fine Motor Coordination Functional  (+ time for opposition)   Sensation   Light Touch No apparent deficits  (pt reports numbness in B hands although can report sensation)   Cognition   Overall Cognitive Status WFL   Arousal/Participation Alert; Cooperative   Attention Attends with cues to redirect   Orientation Level Oriented to person;Oriented to situation   Memory Within functional limits   Following Commands Follows one step commands with increased time or repetition   Comments Pt identified w/ full name,   Pt agreeable to session  Pt able to report/confirm social history,home set up  Pt was able to attend to directions although required redirection for tasks  Pt did have limited insight to deficits and demonstrate impulsivity  Assessment   Limitation Decreased UE ROM; Decreased ADL status; Decreased UE strength;Decreased Safe judgement during ADL;Decreased endurance;Decreased high-level ADLs   Assessment Pt is a 68 y o  male  Pt [facility] on 2021 d/t Acute respiratory failure with hypoxia and hypercapnia ([facility] [address] )  OT and up w/ A orders are documented   Rapid response called on 7/19/2021 d/t acute change in heart rate/O2 sat  Pt s/p IR (7/21/21) for R lung mass biopsy  The pt's PMH impacting occupational performance includes HTN, back surgery, B carpal tunnel  PTA pt lived in [facility] w/ wife  Pt has tub/shower unit w/ gbs, sc and standard toilet  Pt I w/ ADLs, IADLs, functional mobility, +   Deficit areas limiting the patient currently includes decreased standing tolerance/endurance, generalized weakness, decreased safety awareness, decreased standing balance and increased pain  Personal factors impacting pt currently includes increased pain, advanced age and wife's primary caretaker  Currently the pt is engaging in eating tasks w/ mod I, LBD w/ total A  Pt demonstrated STS w/ supervision  Pt engaged in functional mobility w/ min A  Pt is currently functioning below baseline level of I and would benefit from occupational therapy services while admitted in acute care  When pt is medically stable, recommendation for discharge is home w/ home health services  OT will follow while in acute care  The patient's raw score on the AM-PAC Daily Activity inpatient short form is 21, standardized score is 44 27, greater than 39 4  Patients at this level are likely to benefit from discharge to home  Please refer to the recommendation of the Occupational Therapist for safe discharge planning  Goals   Patient Goals to go home   Plan   Treatment Interventions ADL retraining;Functional transfer training;UE strengthening/ROM; Endurance training;Patient/family training;Equipment evaluation/education;Continued evaluation; Energy conservation; Activityengagement   Goal Expiration Date 07/29/21   OT Frequency 2-3x/wk   Recommendation   OT Discharge Recommendation Home with home health rehabilitation   Equipment Recommended Bedside commode; Shower/Tub chair with back ($);Reacher ($);Sock aid ($)   Commode Type Standard   AM-PAC Daily Activity Inpatient   Lower Body Dressing 3   Bathing 3   Toileting 4 Upper Body Dressing 3   Grooming 4   Eating 4   Daily Activity Raw Score 21   Daily Activity Standardized Score (Calc for Raw Score >=11) 44 27   AM-PAC Applied Cognition Inpatient   Following a Speech/Presentation 3   Understanding Ordinary Conversation 4   Taking Medications 3   Remembering Where Things Are Placed or Put Away 3   Remembering List of 4-5 Errands 3   Taking Care of Complicated Tasks 3   Applied Cognition Raw Score 19   Applied Cognition Standardized Score 39 77   Barthel Index   Feeding 10   Bathing 0   Grooming Score 5   Dressing Score 5   Bladder Score 10   Bowels Score 10   Toilet Use Score 10   Transfers (Bed/Chair) Score 15   Mobility (Level Surface) Score 0   Stairs Score 0   Barthel Index Score 65         Goals:  Bed mobility:    -Pt will demonstrate bed mobility supine <> sit w/ mod I to return home    Functional Mobility/Transfers:    -Pt will engage in functional mobility of household distances using least restrictive device w/ mod I to return home, max I w/ ADLs    -Pt will actively engage and initiate functional transfers to chair, bed, and toilet w/ mod I to reduce caregiver burden    -Pt will actively attend and engage in education of pacing and energy conservation w/ 2 verbal prompts or cues to increase occupational performance    -Pt will demonstrate increase activity tolerance and endurance, actively engaging for 15 minutes w/ no breaks while completing functional ADL task    ADL tasks:    -Pt will demonstrate grooming tasks standing at sink w/ mod I to return home and max I w/ ADLs    -Pt will participate in UBD w/ mod I seated EOB vs chair to max I w/ ADLs    -Pt will engage in LBD w/ mod I using LHAE as needed to increase occuaptional performance    -Pt will increase standing balance to fair+ and standing tolerance to 6-10 minutes while engaged in ADL task    -Pt will complete toileting including perineal hygiene w/ mod I to max I w/ ADLs    Cognition:    -Pt will consistently follow and accurately follow two step directions w/o additional vcs or prompts to increase occupational performance        Nona Perdue , OTS

## 2021-07-22 NOTE — PROGRESS NOTES
[facility]  Progress Note - Dash Panchal 1940, 68 y o  male MRN: [id number]  Unit/Bed#: ICU 06 Encounter: [id number]  Primary Care Provider: Juan Francisco Lopez DO   Date and time admitted to hospital: 7/14/2021  1:39 PM    * Acute respiratory failure with hypoxia and hypercapnia (HCC)  Assessment & Plan  RR called due to patient being found with increased work of breathing, tachypnea and tachycardia  Patient found to be in Afib with RVR, IV metoprolol, IV labetalol given with improvement  · Patient placed on BiPAP and transferred to ICU for further management  · See plan for various issues as below  · Monitor oxygen status closely     Paroxysmal A-fib with RVR  Assessment & Plan  Presented with AFib RVR    Plan:  · Rate controlled now   · Continue oral amiodarone load- 200 mg TID x 1 week, BID x 1 week, then daily  · Continue routine metoprolol 25mg BID  · On IV heparin in place of usual PO eliquis given lung mass bx on 7/21 and potential need for attempted biopsy of contralateral lung nodule if 7/21 biopsy unyielding   · Resume eliquis when no further procedures are planned     COPD (chronic obstructive pulmonary disease) with acute exacerbation ([facility] [address] )  Assessment & Plan  With acute exacerbation  Patient presenting with worsening shortness of breath, productive cough and wheezing  When seen today patient reports improvement decreased dyspnea, decreased cough and sputum  On exam continue to have wheezing      Plan  · Has been tapered off IV steroids to oral  · continue respiratory protocol  · Will require home oxygen eval upon discharge     type 2 diabetes mellitus [facility])  Assessment & Plan  Lab Results   Component Value Date    HGBA1C 6 6 (H) 06/20/2021       Recent Labs     07/21/21  1107 07/21/21  1743 07/21/21  2100 07/22/21  0544   POCGLU 255* 415* 320* 198*       Blood Sugar Average: Last 72 hrs:  (P) 224 [id number]  A1C 6 6   · Sliding Scale insulin  · Briefly required insulin gtt in ICU 2/2 steroids  Now on basal/bolus insulin, adjust PRN  · Monitor glucose    Hypothyroidism  Assessment & Plan  TSH low, free T 4 normal range  · Continue levothyroxine 150 mcg daily  Mass of right lung  Assessment & Plan  Presented with worsening shortness of breath/dyspnea on exertion  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  Was scheduled to have IR guided biopsy as outpatient on  however was done inpatient on  with IR  · Eliquis on hold and on IV heparin until no further procedures are planned   · CT C/A/P pending  · Medical oncology and radiation oncology following  · Per last oncology note: "Discussed case with Dr Debbie Monroy with interventional radiology who preformed the bx and stated he is unsure if pathology will result with evidence of malignancy  If this bx is negative, next steps may include attempted biopsy of contralateral lung nodule"             VTE Pharmacologic Prophylaxis: VTE Score: 5 Moderate Risk (Score 3-4) - Pharmacological DVT Prophylaxis Ordered: heparin drip  Patient Centered Rounds: I performed bedside rounds with nursing staff today  Discussions with Specialists or Other Care Team Provider: d/w cards, hematology/oncology    Education and Discussions with Family / Patient: Updated  (wife) via phone  Time Spent for Care: 30 minutes  More than 50% of total time spent on counseling and coordination of care as described above  Current Length of Stay: 8 day(s)  Current Patient Status: Inpatient   Certification Statement: The patient will continue to require additional inpatient hospital stay due to ongoing plans from oncology  IR  Discharge Plan: Anticipate discharge in >72 hrs to home with home services  Code Status: Level 1 - Full Code    Subjective:   Doing ok  Breathing slowly improving  HR better  Some pain at biopsy site       Objective:     Vitals:   Temp (24hrs), Av °F (36 7 °C), Min:97 3 °F (36 3 °C), Max:98 9 °F (37 2 °C)    Temp:  [97 3 °F (36 3 °C)-98 9 °F (37 2 °C)] 97 6 °F (36 4 °C)  HR:  [65-85] 67  Resp:  [19-27] 22  BP: (104-149)/(53-79) 120/79  SpO2:  [93 %-97 %] 94 %  Body mass index is 30 54 kg/m²  Input and Output Summary (last 24 hours): Intake/Output Summary (Last 24 hours) at 7/22/2021 1155  Last data filed at 7/22/2021 1026  Gross per 24 hour   Intake 1072 93 ml   Output 2100 ml   Net -1027 07 ml       Physical Exam:   Physical Exam  Vitals and nursing note reviewed  Constitutional:       General: He is not in acute distress  Comments: On 2L via NC    Cardiovascular:      Rate and Rhythm: Normal rate and regular rhythm  Pulmonary:      Effort: No respiratory distress  Comments: Dec breath sounds   Abdominal:      General: There is no distension  Tenderness: There is no abdominal tenderness  Musculoskeletal:      Right lower leg: No edema  Left lower leg: No edema  Skin:     Coloration: Skin is pale  Additional Data:     Labs:  Results from last 7 days   Lab Units 07/21/21  0556 07/20/21  0459 07/19/21  0546 07/18/21  0537 07/18/21  0537   WBC Thousand/uL 13 88* 17 83*  --    < > 11 70*   HEMOGLOBIN g/dL 9 5* 9 3*  --    < > 9 0*   I STAT HEMOGLOBIN   --   --    < >  --   --    HEMATOCRIT % 33 7* 33 3*  --    < > 31 8*   HEMATOCRIT, ISTAT   --   --    < >  --   --    PLATELETS Thousands/uL 292 300  --    < > 333   BANDS PCT %  --   --   --   --  3   NEUTROS PCT %  --  90*  --   --   --    LYMPHS PCT %  --  1*  --   --   --    LYMPHO PCT % 1*  --   --   --  1*   MONOS PCT %  --  5  --   --   --    MONO PCT % 5  --   --   --  7   EOS PCT % 0 0  --   --  0    < > = values in this interval not displayed       Results from last 7 days   Lab Units 07/21/21  1711 07/21/21  0556 07/18/21  0537 07/17/21  0449   SODIUM mmol/L  --  136  --  139   POTASSIUM mmol/L  --  4 8  --  4 9   CHLORIDE mmol/L  --  98*  --  105   CO2 mmol/L  --  33* --  29   CO2, I-STAT   --   --    < >  --    BUN mg/dL  --  37*  --  32*   CREATININE mg/dL  --  1 09  --  1 14   ANION GAP mmol/L  --  5  --  5   CALCIUM mg/dL  --  9 1  --  9 4   ALBUMIN g/dL  --   --   --  1 7*   TOTAL BILIRUBIN mg/dL  --   --   --  0 21   ALK PHOS U/L  --   --   --  132*   ALT U/L  --   --   --  22   AST U/L  --   --   --  14   GLUCOSE RANDOM mg/dL 446* 235*  --  275*    < > = values in this interval not displayed       Results from last 7 days   Lab Units 07/19/21  1210   INR  1 27*     Results from last 7 days   Lab Units 07/22/21  1104 07/22/21  0544 07/21/21  2100 07/21/21  1743 07/21/21  1107 07/21/21  0615 07/21/21  0000 07/20/21  2204 07/20/21  1749 07/20/21  1556 07/20/21  1403 07/20/21  1205   POC GLUCOSE mg/dl 323* 198* 320* 415* 255* 181* 182* 217* 128 236* 176* 215*               Lines/Drains:  Invasive Devices     Peripheral Intravenous Line            Peripheral IV 07/19/21 Distal;Right;Ventral (anterior) Wrist 2 days    Peripheral IV 07/19/21 Left;Ventral (anterior) Forearm 2 days                  Telemetry:  Telemetry Orders (From admission, onward)             48 Hour Telemetry Monitoring  Continuous x 48 hours     Question:  Reason for 48 Hour Telemetry  Answer:  Arrhythmias Requiring Medical Therapy (eg  SVT, Vtach/fib, Bradycardia, Uncontrolled A-fib)                 Telemetry Reviewed: Normal Sinus Rhythm  Indication for Continued Telemetry Use: Arrthymias requiring medical therapy           Imaging: Reviewed radiology reports from this admission including: chest xray and chest CT scan    Recent Cultures (last 7 days):         Last 24 Hours Medication List:   Current Facility-Administered Medications   Medication Dose Route Frequency Provider Last Rate    acetaminophen  650 mg Oral Q6H PRN Rupert Gonzalez MD      allopurinol  300 mg Oral Daily Rupert Gonzalez MD      amiodarone  200 mg Oral TID With Meals Rupert Gonzalez MD      benzonatate  100 mg Oral TID Rupert Gonzalez MD  dextromethorphan-guaiFENesin  10 mL Oral Q4H PRN Binta Ervin MD      fish oil  1,000 mg Oral BID Binta Ervin MD      guaiFENesin  600 mg Oral Q12H [address] Binta Ervin MD      heparin (porcine)  3-20 Units/kg/hr (Order-Specific) Intravenous Titrated Binta Ervin MD 21 Units/kg/hr (07/21/21 2228)    insulin glargine  10 Units Subcutaneous HS Dora Golden PA-C      insulin lispro  1-5 Units Subcutaneous HS Binta Ervin MD      insulin lispro  2-12 Units Subcutaneous TID [facility] Mery Rebollar MD      insulin lispro  5 Units Subcutaneous Once Clemente Lopez PA-C      levalbuterol  1 25 mg Nebulization Q6H Binta Ervin MD      levothyroxine  150 mcg Oral Daily Binta Ervin MD      lidocaine  1 patch Topical Q24H Binta Ervin MD      methylPREDNISolone sodium succinate  40 mg Intravenous Daily Binta Ervin MD      metoprolol tartrate  25 mg Oral Q12H Liz Bustos MD      ondansetron  4 mg Intravenous Q6H PRN Binta Ervin MD      pantoprazole  40 mg Oral Daily Binta Ervin MD      sodium chloride  3 mL Nebulization Q6H Binta Ervin MD      tiotropium  18 mcg Inhalation Daily Mery Rebollar MD          Today, Patient Was Seen By: Clemente Lopez PA-C    **Please Note: This note may have been constructed using a voice recognition system  **

## 2021-07-22 NOTE — CONSULTS
Consultation - Radiation Oncology   Miguel Ángel Angel 68 y o  male MRN: [id number]  Unit/Bed#: ICU 06 Encounter: [id number]        History of Present Illness   Physician Requesting Consult: Hillary Bhakta MD  Reason for Consult / Principal Problem: Lung mass  Hx and PE limited by: None  HPI: Stephen Whitfield is a 68y o  year old male with a PMHx tobacco use (smoking 1 ppd x 40 yrs; quit 1 month ago), COPD, Afib, DM, and ITP who was in his usual state of health until routine labs demonstrated hypercalcemia  CTA Chest (done in the context of SOB) (6/18/21) demonstrated a large 12 3cm mass in the RUL with mass effect on the RUL and RML bronchial branches; additionally noted were a 2 2cm NICHELLE mass, smaller left sided nodules, and mediastinal adenopathy (R paratracheal LN masuring 2 3cm, subcarinal LN measuring 1 7cm)  MRI Brain (6/20/21) was without evidence of metastatic disease  Bronchoscopy (6/21/21) demonstrated extrinsic compression of the RLL and RML bronchi; 4R, level 7, and right hilar adenopathy was seen  FNA yielded atypical cells (mass, lvl 7 LN); no definitive malignancy  He was scheduled for outpatient IR guided biopsy, but was ultimately readmitted on 7/14/21  IR guided biopsy of the right lung mass was performed (7/21/21); results are pending  Currently the patient is doing fair overall  He has mild SOB at rest and cough without hemoptysis  He has mild right sided chest discomfort at the site of his biopsy, but otherwise no pain, no weight loss, no fevers/chills, no headaches, and no anorexia  Review of Systems   A 14 point ROS was performed and negative except as above  Historical Information    Oncology History    No history exists         Past Medical History:   Diagnosis Date    Hypertension      Past Surgical History:   Procedure Laterality Date    BACK SURGERY      CARPAL TUNNEL RELEASE Bilateral     IR BIOPSY BONE MARROW  6/10/2021    IR BIOPSY LUNG  7/21/2021  LUMBAR DISC SURGERY       Family History   Problem Relation Age of Onset   [facility] Cancer Mother         "ate away her bone"    Anuerysm Father     Cancer Sister         unknown type    Heart attack Maternal Grandfather     Cancer Sister         unknown type    Heart attack Maternal Aunt     Heart attack Maternal Uncle     Heart attack Paternal Aunt      Social History   Social History     Substance and Sexual Activity   Alcohol Use Not Currently    Comment: History of heavier drinking in early   Denies any current alcohol use (Updated 2021)  Social History     Substance and Sexual Activity   Drug Use Never     Social History     Tobacco Use   Smoking Status Former Smoker    Packs/day: 0 50    Years: 40 00    Pack years: 20 00    Types: Cigarettes    Start date:     Quit date: 2021    Years since quittin 0   Smokeless Tobacco Never Used       Meds/Allergies   all current active meds have been reviewed    Allergies   Allergen Reactions    Penicillins Hives       Objective     Intake/Output Summary (Last 24 hours) at 2021 0847  Last data filed at 2021 0955  Gross per 24 hour   Intake 916 91 ml   Output 2175 ml   Net -1258 09 ml     Invasive Devices     Peripheral Intravenous Line            Peripheral IV 21 Distal;Right;Ventral (anterior) Wrist 2 days    Peripheral IV 21 Left;Ventral (anterior) Forearm 2 days              Physical Exam   Gen: Well appearing  NAD on NC    HEENT: No scleral icterus  No cervical adenopathy  LYMPH: No cervical or SCLV adenopathy appreciated  CV: RRR  No murmurs  Pulm: Decreased breath sounds throughout RUL without crackles or wheezing  No increased work of breathing  Abd: Soft, NT, ND  Ext: No LE edema  Neuro: Grossly intact  Skin: No rashes appreciated  Imaging Studies: I have personally reviewed pertinent films in PACS  EKG, Pathology, and Other Studies: Reviewed       Assessment/Plan     Mr Shira Louis is a 68year old man with a prolonged prior smoking history who was recently found to have a large RUL mass resulting on extrinsic airway compression with associated mediastinal adenopathy on CTA Chest, highly suspicious for malignancy  He additionally has contralateral nodules including a concerning 2 2cm NICHELLE mass concerning for contralateral lung metastasis  Initial FNA demonstrated atypical cells only without definitive carcinoma  Repeat biopsy is pending  He is likewise pending staging work-up with CT C/A/P  We discussed that while the patient's recent medical history is highly concerning for malignancy, final diagnosis and treatment recommendations will await conclusive biopsy results  Given the current spread of his disease with an apparent contralateral lung nodule (M1a), we discussed that chemotherapy +/- immunotherapy may likely serve as the primary treatment modality with RT being used palliatively to alleviate his SOB  We would coordinate timing of RT with the rest of his care team, which could be delivered before or after initiation of systemic therapy pending pathology  As the patient is currently stable and work-up remains pending, I would not advocate for emergent/urgent RT at this moment  We discussed the acute side effects associated with RT to his area including fatigue, erythema of the irradiated skin, dysphagia, pain, esophagitis  We also discussed the small risk for a radiation pneumonitis in the months following RT  The patient was given the opportunity to ask questions, all of which were answered to his satisfaction  Plan:   - Await biopsy results and completion of staging    - Will discuss oncology plan with the rest of care team   - Should palliative RT be indicated, would anticipate 30 Gy in 10 fractions       Code Status: Level 1 - Full Code  Advance Directive and Living Will:      Power of :    POLST:      Counseling / Coordination of Care  Total floor / unit time spent today 45 minutes  Greater than 50% of total time was spent with the patient and / or family counseling and / or coordination of care   A description of the counseling / coordination of care: As above

## 2021-07-22 NOTE — UTILIZATION REVIEW
Continued Stay Review    Date: 7/21/21                       Current Patient Class: IP  Current Level of Care: MS    HPI:80 y o  male initially admitted on 7/14/21 with COPD exacerbation, lyte abnormalities, rapid a flutter, lung mass and SULEMA  Pt had radid response 7/19 and needed to be placed on Bi Pap, in A fib with RVR and hypertensive  Plan included IV diuretics, steroid increase, nebs, cardiology added IV Amiodarone with po overlap  Eliquis on Hold for scheduled lung mass bx 7/21  Assessment/Plan:   7/20 pt in NSR with IV and po amiodarone, continue IV load for another 24 hours and discontinue after lung biopsy  Continue beta-blocker as prescribed  Pt remains on IV heparin-will initiate Eliquis after biopsy when appropriate  Pt required insulin drip due to steroid induced hyperglycemia, CXR 7/19 showed no major change from prior  Steroids decreased to once /day  Leukocytosis suspected related to steroids  On nc O2  NPO at MN  Pt downgraded to stepdown 2   7/21  IR 7/21 /21 @0952  Procedure: IR LUNG BIOPSY with conscious sedation  Findings: Right lung mass   Since prior imaging there has been interval growth of the mass with development of accompanying small effusion  Recommend restarting heparin drip without bolus in 4 hours if there is no sign of bleeding  Cardiology-Continues to maintain normal sinus rhythm  Discontinue IV amiodarone and continue oral loading  Beta-blocker dose reduced to 25 mg twice daily due to sinus bradycardia  Remains on IV heparin in anticipation of potential need for further procedures  [facility] oncology consult-increased size of L upper lung mass and R base presumed metastatic nodulenoted  Plan- f/u with pathology reports, if bx neg, next steps may include attempted biopsy of contralateral lung nodule  Plan- consult radiation Oncology, obtain CT chest, abdomen and pelvis w/ contrast  Critical care-Pt required increase in O2 overnight, tolerated lung biopsy today   Now on O2 @4 L with sats mid 90's  Lungs Clear on left, R base heavily diminished, R apex wheezy but good air movement  Plan - keep steroids at 40 mg today due to wheezing post biopsy, Continue atrovent/xopenex  pt with hyperglycemia off insulin gtt  Blood glucoses 180-230  Resume ISS  Pt to be downgraded to telemetry med surg                    Vital Signs:   Date/Time  Temp  Pulse  Resp  BP  MAP (mmHg)  SpO2  Calculated FIO2 (%) - Nasal Cannula  Nasal Cannula O2 Flow Rate (L/min)  O2 Device  Patient Position - Orthostatic VS   07/21/21 1500  97 3 °F (36 3 °C)Abnormal   72  20  114/57  82  94 %  32  3 L/min  Nasal cannula  Sitting   07/21/21 1300  --  67  24Abnormal   134/63  91  96 %  32  3 L/min  Nasal cannula  --   07/21/21 1200  --  68  22  131/59  84  94 %  32  3 L/min  --  --   07/21/21 1130  --  67  24Abnormal   142/67  96  95 %  32  3 L/min  Nasal cannula  --   07/21/21 1100  98 2 °F (36 8 °C)  65  22  119/66  88  97 %  36  4 L/min  Nasal cannula  Lying   07/21/21 1048  --  59  26Abnormal   119/66  88  96 %  36  4 L/min  Nasal cannula  --   07/21/21 1031  --  --  --  --  --  97 %  36  4 L/min  Nasal cannula  --   07/21/21 1020  --  85  24Abnormal   144/71  100  93 %  36  4 L/min  Nasal cannula  --         Pertinent Labs/Diagnostic Results:   7/19   CXR-No interval change from July 17, 2021 7/21 IR lung [id number] biopsy of a large right lung mass   Since interval CT approximately one month ago there has been growth of the mass and development of accompanying right pleural effusion  Consider thoracentesis if there is persistent respiratory failure  Additional presumed metastatic nodules have also ncreased in size  A left upper lung 3 7 cm mass is also present which has increased in size from 2 1 cm on prior study  A right base presumed metastatic nodule is also increased measuring 14 mm, previously 9 mm        Results from last 7 days   Lab Units 07/21/21  0556 07/20/21  0459 07/19/21  0946 07/19/21  0546 07/18/21  0537   WBC Thousand/uL 13 88* 17 83*  --  11 47* 11 70*   HEMOGLOBIN g/dL 9 5* 9 3*  --  9 9* 9 0*   I STAT HEMOGLOBIN g/dl  --   --  11 6*  --   --    HEMATOCRIT % 33 7* 33 3*  --  35 9* 31 8*   HEMATOCRIT, ISTAT %  --   --  34*  --   --    PLATELETS Thousands/uL 292 300  --  356 333   NEUTROS ABS Thousands/µL  --  15 88*  --   --   --    BANDS PCT %  --   --   --   --  3         Results from last 7 days   Lab Units 07/21/21  0556 07/20/21  0459 07/19/21  0946 07/19/21  0546 07/18/21  0537 07/17/21  0449   SODIUM mmol/L 136 141  --  143 140 139   POTASSIUM mmol/L 4 8 4 1  --  4 8 4 6 4 9   CHLORIDE mmol/L 98* 103  --  105 103 105   CO2 mmol/L 33* 32  --  34* 31 29   CO2, I-STAT mmol/L  --   --  35*  --   --   --    ANION GAP mmol/L 5 6  --  4 6 5   BUN mg/dL 37* 37*  --  32* 32* 32*   CREATININE mg/dL 1 09 1 19  --  1 11 1 03 1 14   EGFR ml/min/1 73sq m 65 58  --  63 69 61   CALCIUM mg/dL 9 1 9 3  --  9 6 9 6 9 4   MAGNESIUM mg/dL 1 8 1 7  --   --   --   --    PHOSPHORUS mg/dL 2 7 3 3  --   --   --   --      Results from last 7 days   Lab Units 07/17/21  0449 07/16/21  0937   AST U/L 14 8   ALT U/L 22 17   ALK PHOS U/L 132* 147*   TOTAL PROTEIN g/dL 6 7 6 7   ALBUMIN g/dL 1 7* 1 6*   TOTAL BILIRUBIN mg/dL 0 21 0 18*     Results from last 7 days   Lab Units 07/21/21  2100 07/21/21  1743 07/21/21  1107 07/21/21  0615 07/21/21  0000 07/20/21  2204 07/20/21  1749 07/20/21  1556 07/20/21  1403 07/20/21  1205   POC GLUCOSE mg/dl 320* 415* 255* 181* 182* 217* 128 236* 176* 215*     Results from last 7 days   Lab Units 07/21/21  1711 07/21/21  0556 07/20/21  0459 07/19/21  0546 07/18/21  0537 07/17/21  0449 07/16/21  0937   GLUCOSE RANDOM mg/dL 446* 235* 179* 199* 258* 275* 305*           Results from last 7 days   Lab Units 07/19/21  0946   I STAT BASE EXC mmol/L 5*   I STAT O2 SAT % 100*   ISTAT PH ART  7 310*   I STAT ART PCO2 mm HG 66 0*   I STAT ART PO2 mm  0*   I STAT ART HCO3 mmol/L 33 3* Results from last 7 days   Lab Units 07/19/21  1158 07/19/21  0942   TROPONIN I ng/mL 0 02 <0 02         Results from last 7 days   Lab Units 07/22/21  0421 07/21/21  2108 07/19/21  1210   PROTIME seconds  --   --  16 0*   INR   --   --  1 27*   PTT seconds 73* 45* 23     Results from last 7 days   Lab Units 07/17/21  0449   TSH 3RD GENERATON uIU/mL 0 103*                     Results from last 7 days   Lab Units 07/19/21  0941   NT-PRO BNP pg/mL 5,084*     Results from last 7 days   Lab Units 07/21/21  1711   FERRITIN ng/mL 2,470*               Results from last 7 days   Lab Units 07/21/21  0556 07/18/21  0537   TOTAL COUNTED  100 100             Medications:   Scheduled Medications:  allopurinol, 300 mg, Oral, Daily  amiodarone, 200 mg, Oral, TID With Meals  benzonatate, 100 mg, Oral, TID  fish oil, 1,000 mg, Oral, BID  guaiFENesin, 600 mg, Oral, Q12H ZOHAIB  insulin glargine, 10 Units, Subcutaneous, HS  insulin lispro, 1-5 Units, Subcutaneous, HS  insulin lispro, 2-12 Units, Subcutaneous, TID AC  levalbuterol, 1 25 mg, Nebulization, Q6H  levothyroxine, 150 mcg, Oral, Daily  lidocaine, 1 patch, Topical, Q24H  metoprolol tartrate, 25 mg, Oral, Q12H ZOHAIB  pantoprazole, 40 mg, Oral, Daily  sodium chloride, 3 mL, Nebulization, Q6H  tiotropium, 18 mcg, Inhalation, Daily  methylPREDNISolone sodium succinate (Solu-MEDROL) injection 40 mg   Dose: 40 mg  Freq: Daily Route: IV  Start: 07/21/21 0900 End: 07/22/21 1355    Continuous IV Infusions:  heparin (porcine), 3-20 Units/kg/hr (Order-Specific), Intravenous, Titrated  insulin regular (HumuLIN R,NovoLIN R) 1 Units/mL in sodium chloride 0 9 % 100 mL infusion   Rate: 0 3-21 mL/hr Dose: 0 3-21 Units/hr  Freq: Titrated Route: IV  Last Dose: Stopped (07/21/21 0020)  amiodarone (CORDARONE) 900 mg in dextrose 5 % 500 mL infusion   Rate: 16 7 mL/hr Dose: 0 5 mg/min  Freq: Continuous Route: IV  Last Dose: Stopped (07/21/21 1115)    PRN Meds:  acetaminophen, 650 mg, Oral, Q6H PRN  dextromethorphan-guaiFENesin, 10 mL, Oral, Q4H PRN  ondansetron, 4 mg, Intravenous, Q6H PRN        Discharge Plan: TBD    Network Utilization Review Department  ATTENTION: Please call with any questions or concerns to [phone] and carefully listen to the prompts so that you are directed to the right person  All voicemails are confidential   Marce Wahl all requests for admission clinical reviews, approved or denied determinations and any other requests to dedicated fax number below belonging to the campus where the patient is receiving treatment   List of dedicated fax numbers for the Facilities:  [address] DENIALS (Administrative/Medical Necessity) [phone]   [address] (Maternity/NICU/Pediatrics) [phone]   [address] [place] [address] Dr Fred Parks [id number] [address] [place] [address] Shana Darren Arndt [address] [place], [place] [address] [phone]

## 2021-07-22 NOTE — ASSESSMENT & PLAN NOTE
Presented with AFib RVR    Plan:  · Rate controlled now   · Continue oral amiodarone load- 200 mg TID x 1 week, BID x 1 week, then daily  · Continue routine metoprolol 25mg BID  · On IV heparin in place of usual PO eliquis given lung mass bx on 7/21 and potential need for attempted biopsy of contralateral lung nodule if 7/21 biopsy unyielding   · Resume eliquis when no further procedures are planned

## 2021-07-23 ENCOUNTER — HOSPITAL ENCOUNTER (OUTPATIENT)
Dept: INFUSION CENTER | Facility: CLINIC | Age: 81
End: 2021-07-23

## 2021-07-23 LAB
ANION GAP SERPL CALCULATED.3IONS-SCNC: 6 MMOL/L (ref 4–13)
APTT PPP: 74 SECONDS (ref 23–37)
BUN SERPL-MCNC: 35 MG/DL (ref 5–25)
CALCIUM SERPL-MCNC: 9.2 MG/DL (ref 8.3–10.1)
CHLORIDE SERPL-SCNC: 101 MMOL/L (ref 100–108)
CO2 SERPL-SCNC: 30 MMOL/L (ref 21–32)
CREAT SERPL-MCNC: 1.06 MG/DL (ref 0.6–1.3)
ERYTHROCYTE [DISTWIDTH] IN BLOOD BY AUTOMATED COUNT: 18.9 % (ref 11.6–15.1)
GFR SERPL CREATININE-BSD FRML MDRD: 67 ML/MIN/1.73SQ M
GLUCOSE SERPL-MCNC: 125 MG/DL (ref 65–140)
GLUCOSE SERPL-MCNC: 248 MG/DL (ref 65–140)
GLUCOSE SERPL-MCNC: 257 MG/DL (ref 65–140)
GLUCOSE SERPL-MCNC: 264 MG/DL (ref 65–140)
GLUCOSE SERPL-MCNC: 347 MG/DL (ref 65–140)
GLUCOSE SERPL-MCNC: 352 MG/DL (ref 65–140)
HCT VFR BLD AUTO: 33.6 % (ref 36.5–49.3)
HGB BLD-MCNC: 9.6 G/DL (ref 12–17)
IMM GRANULOCYTES # BLD AUTO: >0.5 THOUSAND/UL (ref 0–0.2)
MCH RBC QN AUTO: 23.6 PG (ref 26.8–34.3)
MCHC RBC AUTO-ENTMCNC: 28.6 G/DL (ref 31.4–37.4)
MCV RBC AUTO: 83 FL (ref 82–98)
NRBC BLD AUTO-RTO: 0 /100 WBCS
PLATELET # BLD AUTO: 233 THOUSANDS/UL (ref 149–390)
PMV BLD AUTO: 10.7 FL (ref 8.9–12.7)
POTASSIUM SERPL-SCNC: 4.5 MMOL/L (ref 3.5–5.3)
RBC # BLD AUTO: 4.06 MILLION/UL (ref 3.88–5.62)
SODIUM SERPL-SCNC: 137 MMOL/L (ref 136–145)
WBC # BLD AUTO: 13.2 THOUSAND/UL (ref 4.31–10.16)

## 2021-07-23 PROCEDURE — 85730 THROMBOPLASTIN TIME PARTIAL: CPT | Performed by: INTERNAL MEDICINE

## 2021-07-23 PROCEDURE — 99232 SBSQ HOSP IP/OBS MODERATE 35: CPT | Performed by: PHYSICIAN ASSISTANT

## 2021-07-23 PROCEDURE — 80048 BASIC METABOLIC PNL TOTAL CA: CPT | Performed by: INTERNAL MEDICINE

## 2021-07-23 PROCEDURE — 99232 SBSQ HOSP IP/OBS MODERATE 35: CPT | Performed by: INTERNAL MEDICINE

## 2021-07-23 PROCEDURE — 94760 N-INVAS EAR/PLS OXIMETRY 1: CPT

## 2021-07-23 PROCEDURE — 94640 AIRWAY INHALATION TREATMENT: CPT

## 2021-07-23 PROCEDURE — 82948 REAGENT STRIP/BLOOD GLUCOSE: CPT

## 2021-07-23 PROCEDURE — 94669 MECHANICAL CHEST WALL OSCILL: CPT

## 2021-07-23 PROCEDURE — 97110 THERAPEUTIC EXERCISES: CPT

## 2021-07-23 PROCEDURE — 85027 COMPLETE CBC AUTOMATED: CPT | Performed by: INTERNAL MEDICINE

## 2021-07-23 PROCEDURE — 97116 GAIT TRAINING THERAPY: CPT

## 2021-07-23 PROCEDURE — 97530 THERAPEUTIC ACTIVITIES: CPT

## 2021-07-23 RX ADMIN — GUAIFENESIN 600 MG: 600 TABLET, EXTENDED RELEASE ORAL at 21:02

## 2021-07-23 RX ADMIN — AMIODARONE HYDROCHLORIDE 200 MG: 200 TABLET ORAL at 12:31

## 2021-07-23 RX ADMIN — AMIODARONE HYDROCHLORIDE 200 MG: 200 TABLET ORAL at 16:32

## 2021-07-23 RX ADMIN — BENZONATATE 100 MG: 100 CAPSULE ORAL at 09:42

## 2021-07-23 RX ADMIN — LIDOCAINE 5% 1 PATCH: 700 PATCH TOPICAL at 10:41

## 2021-07-23 RX ADMIN — ALLOPURINOL 300 MG: 300 TABLET ORAL at 09:42

## 2021-07-23 RX ADMIN — METHYLPREDNISOLONE SODIUM SUCCINATE 20 MG: 40 INJECTION, POWDER, FOR SOLUTION INTRAMUSCULAR; INTRAVENOUS at 10:43

## 2021-07-23 RX ADMIN — BENZONATATE 100 MG: 100 CAPSULE ORAL at 21:02

## 2021-07-23 RX ADMIN — OMEGA-3 FATTY ACIDS CAP 1000 MG 1000 MG: 1000 CAP at 16:33

## 2021-07-23 RX ADMIN — PANTOPRAZOLE SODIUM 40 MG: 40 TABLET, DELAYED RELEASE ORAL at 09:42

## 2021-07-23 RX ADMIN — GUAIFENESIN 600 MG: 600 TABLET, EXTENDED RELEASE ORAL at 09:42

## 2021-07-23 RX ADMIN — INSULIN LISPRO 4 UNITS: 100 INJECTION, SOLUTION INTRAVENOUS; SUBCUTANEOUS at 06:09

## 2021-07-23 RX ADMIN — ISODIUM CHLORIDE 3 ML: 0.03 SOLUTION RESPIRATORY (INHALATION) at 02:32

## 2021-07-23 RX ADMIN — LEVALBUTEROL HYDROCHLORIDE 1.25 MG: 1.25 SOLUTION, CONCENTRATE RESPIRATORY (INHALATION) at 13:53

## 2021-07-23 RX ADMIN — INSULIN LISPRO 6 UNITS: 100 INJECTION, SOLUTION INTRAVENOUS; SUBCUTANEOUS at 12:29

## 2021-07-23 RX ADMIN — ISODIUM CHLORIDE 3 ML: 0.03 SOLUTION RESPIRATORY (INHALATION) at 13:53

## 2021-07-23 RX ADMIN — ISODIUM CHLORIDE 3 ML: 0.03 SOLUTION RESPIRATORY (INHALATION) at 19:28

## 2021-07-23 RX ADMIN — ISODIUM CHLORIDE 3 ML: 0.03 SOLUTION RESPIRATORY (INHALATION) at 08:12

## 2021-07-23 RX ADMIN — METOPROLOL TARTRATE 25 MG: 25 TABLET, FILM COATED ORAL at 09:42

## 2021-07-23 RX ADMIN — BENZONATATE 100 MG: 100 CAPSULE ORAL at 16:31

## 2021-07-23 RX ADMIN — INSULIN LISPRO 8 UNITS: 100 INJECTION, SOLUTION INTRAVENOUS; SUBCUTANEOUS at 16:32

## 2021-07-23 RX ADMIN — LEVALBUTEROL HYDROCHLORIDE 1.25 MG: 1.25 SOLUTION, CONCENTRATE RESPIRATORY (INHALATION) at 08:12

## 2021-07-23 RX ADMIN — METOPROLOL TARTRATE 25 MG: 25 TABLET, FILM COATED ORAL at 21:07

## 2021-07-23 RX ADMIN — LEVALBUTEROL HYDROCHLORIDE 1.25 MG: 1.25 SOLUTION, CONCENTRATE RESPIRATORY (INHALATION) at 19:27

## 2021-07-23 RX ADMIN — AMIODARONE HYDROCHLORIDE 200 MG: 200 TABLET ORAL at 09:43

## 2021-07-23 RX ADMIN — HEPARIN SODIUM 21 UNITS/KG/HR: 10000 INJECTION, SOLUTION INTRAVENOUS at 05:06

## 2021-07-23 RX ADMIN — HEPARIN SODIUM 21 UNITS/KG/HR: 10000 INJECTION, SOLUTION INTRAVENOUS at 21:02

## 2021-07-23 RX ADMIN — TIOTROPIUM BROMIDE 18 MCG: 18 CAPSULE ORAL; RESPIRATORY (INHALATION) at 10:42

## 2021-07-23 RX ADMIN — LEVALBUTEROL HYDROCHLORIDE 1.25 MG: 1.25 SOLUTION, CONCENTRATE RESPIRATORY (INHALATION) at 02:32

## 2021-07-23 RX ADMIN — OMEGA-3 FATTY ACIDS CAP 1000 MG 1000 MG: 1000 CAP at 09:42

## 2021-07-23 RX ADMIN — INSULIN GLARGINE 10 UNITS: 100 INJECTION, SOLUTION SUBCUTANEOUS at 21:02

## 2021-07-23 RX ADMIN — ACETAMINOPHEN 650 MG: 325 TABLET, FILM COATED ORAL at 10:51

## 2021-07-23 RX ADMIN — INSULIN LISPRO 4 UNITS: 100 INJECTION, SOLUTION INTRAVENOUS; SUBCUTANEOUS at 21:07

## 2021-07-23 RX ADMIN — LEVOTHYROXINE SODIUM 150 MCG: 150 TABLET ORAL at 05:25

## 2021-07-23 NOTE — PROGRESS NOTES
General Cardiology   Progress Note -  Team One   Katy Tyler Angel 68 y o  male MRN: [id number]  Unit/Bed#: S -01 Encounter: [id number]    Assessment/ Plan    1  PAF- known history of, presented with AFib RVR  Presented in afib with RVR on admission in setting of multiple electrolyte derangements, spontaneously converted to NSR  Recurrent symptomatic afib with RVR on 7/19/21 now maintaining NSR on amiodarone and metoprolol  Continue oral amiodarone load- 200 mg TID x 1 week, BID x 1 week, then daily  Continue metoprolol tartrate 25 mg BID  Normal LV function on recent echo  On IV heparin for anticoagulation due to need for further invasive testing  Resume Eliquis when able      2  Right lung mass  Status post EBUS 06/2021-pathology atypical cells  S/p IR biopsy with necrotic tissue without viable tumor  Plans for biopsy of left lung mass Monday      3  COPD  utilizes p r n  Oxygen at home  Presented with acute exacerbation-IV steroids initiated, nebulizer treatments per primary team    4  HTN- stable, average /65    Cardiology will be available, please call with questions  Subjective  Noticed some leg swelling today but no other cardiac complaints  Still having a lot of phlegm  Review of Systems   Constitutional: Negative for diaphoresis and malaise/fatigue  Cardiovascular: Positive for leg swelling  Negative for chest pain, orthopnea, palpitations and syncope  Respiratory: Positive for cough and sputum production  Negative for shortness of breath and sleep disturbances due to breathing  Gastrointestinal: Negative for bloating, nausea and vomiting  Neurological: Negative for dizziness, light-headedness and weakness  Psychiatric/Behavioral: Negative for altered mental status  All other systems reviewed and are negative  Objective:   Vitals: Blood pressure 145/70, pulse 70, temperature 98 1 °F (36 7 °C), temperature source Oral, resp   rate 16, height 5' 2" (1 575 m), weight 75 8 kg (167 lb), SpO2 95 %  , Body mass index is 30 54 kg/m²  ,     Systolic (74ABM), MNB:698 , Min:131 , MFP:518     Diastolic (13XFU), JDQ:69, Min:62, Max:70      Intake/Output Summary (Last 24 hours) at 2021 0952  Last data filed at 2021 0946  Gross per 24 hour   Intake 260 57 ml   Output 2200 ml   Net -1939 43 ml     Wt Readings from Last 3 Encounters:   21 75 8 kg (167 lb)   21 75 4 kg (166 lb 3 6 oz)   21 76 7 kg (169 lb)     Telemetry Review: NSR with PACs and PVCs    Physical Exam  Vitals reviewed  Constitutional:       General: He is not in acute distress  Neck:      Vascular: No hepatojugular reflux or JVD  Cardiovascular:      Rate and Rhythm: Normal rate and regular rhythm  Pulses: Normal pulses  Heart sounds: Normal heart sounds  No murmur heard  No friction rub  No gallop  Pulmonary:      Effort: Tachypnea present  No respiratory distress  Breath sounds: Decreased breath sounds present  No wheezing or rales  Comments: 2LNC  Abdominal:      General: Bowel sounds are normal  There is no distension  Palpations: Abdomen is soft  Tenderness: There is no abdominal tenderness  Musculoskeletal:         General: Normal range of motion  Cervical back: Neck supple  Right lower le+ Pitting Edema present  Left lower le+ Pitting Edema present  Skin:     General: Skin is warm and dry  Capillary Refill: Capillary refill takes less than 2 seconds  Coloration: Skin is pale  Findings: No erythema  Neurological:      Mental Status: He is alert and oriented to person, place, and time     Psychiatric:         Mood and Affect: Mood normal      LABORATORY RESULTS  Results from last 7 days   Lab Units 21  1158 21  0942   TROPONIN I ng/mL 0 02 <0 02     CBC with diff:   Results from last 7 days   Lab Units 21  0523 21  0556 21  0459 21  0946 21  0546 21  0537   WBC Thousand/uL 13 20* 13 88* 17 83*  --  11 47* 11 70*   HEMOGLOBIN g/dL 9 6* 9 5* 9 3*  --  9 9* 9 0*   I STAT HEMOGLOBIN g/dl  --   --   --  11 6*  --   --    HEMATOCRIT % 33 6* 33 7* 33 3*  --  35 9* 31 8*   HEMATOCRIT, ISTAT %  --   --   --  34*  --   --    MCV fL 83 85 84  --  84 84   PLATELETS Thousands/uL 233 292 300  --  356 333   MCH pg 23 6* 24 1* 23 4*  --  23 2* 23 8*   MCHC g/dL 28 6* 28 2* 27 9*  --  27 6* 28 3*   RDW % 18 9* 18 9* 18 6*  --  17 9* 18 0*   MPV fL 10 7 10 9 10 6  --  9 8 10 4   NRBC AUTO /100 WBCs 0 0 0  --   --  0       CMP:  Results from last 7 days   Lab Units 07/23/21 0523 07/21/21  0556 07/20/21 0459 07/19/21  0946 07/19/21  0546 07/18/21  0537 07/17/21  0449   POTASSIUM mmol/L 4 5 4 8 4 1  --  4 8 4 6 4 9   CHLORIDE mmol/L 101 98* 103  --  105 103 105   CO2 mmol/L 30 33* 32  --  34* 31 29   CO2, I-STAT mmol/L  --   --   --  35*  --   --   --    BUN mg/dL 35* 37* 37*  --  32* 32* 32*   CREATININE mg/dL 1 06 1 09 1 19  --  1 11 1 03 1 14   GLUCOSE, ISTAT mg/dl  --   --   --  154*  --   --   --    CALCIUM mg/dL 9 2 9 1 9 3  --  9 6 9 6 9 4   AST U/L  --   --   --   --   --   --  14   ALT U/L  --   --   --   --   --   --  22   ALK PHOS U/L  --   --   --   --   --   --  132*   EGFR ml/min/1 73sq m 67 65 58  --  63 69 61       BMP:  Results from last 7 days   Lab Units 07/23/21 0523 07/21/21  0556 07/20/21  0459 07/19/21  0946 07/19/21  0546 07/18/21  0537 07/17/21  0449   POTASSIUM mmol/L 4 5 4 8 4 1  --  4 8 4 6 4 9   CHLORIDE mmol/L 101 98* 103  --  105 103 105   CO2 mmol/L 30 33* 32  --  34* 31 29   CO2, I-STAT mmol/L  --   --   --  35*  --   --   --    BUN mg/dL 35* 37* 37*  --  32* 32* 32*   CREATININE mg/dL 1 06 1 09 1 19  --  1 11 1 03 1 14   GLUCOSE, ISTAT mg/dl  --   --   --  154*  --   --   --    CALCIUM mg/dL 9 2 9 1 9 3  --  9 6 9 6 9 4       Lab Results   Component Value Date    CREATININE 1 06 07/23/2021    CREATININE 1 09 07/21/2021    CREATININE 1 19 07/20/2021       Lab Results Component Value Date    NTBNP 5,084 (H) 2021    NTBNP 373 2021           Results from last 7 days   Lab Units 21  0556 21  0459   MAGNESIUM mg/dL 1 8 1 7                 Results from last 7 days   Lab Units 21  0449   TSH 3RD GENERATON uIU/mL 0 103*   FREE T4 ng/dL 1 19       Results from last 7 days   Lab Units 21  1210   INR  1 27*       Lipid Profile:   No results found for: CHOL  Lab Results   Component Value Date    HDL 28 (L) 2021    HDL 25 (L) 07/15/2019     Lab Results   Component Value Date    LDLCALC 69 2021    LDLCALC 60 07/15/2019     Lab Results   Component Value Date    TRIG 105 2021    TRIG 218 (H) 07/15/2019       Cardiac testing:   Results for orders placed during the hospital encounter of 21    Echo complete with contrast if indicated    Narrative  [place] [address] [address] [place] [address]  [phone]    Transthoracic Echocardiogram  2D, M-mode, Doppler, and Color Doppler    Study date:  2021    Patient: Jonh Dietz  MR number: [id number]  Account number: [de-identified]  : 1940  Age: 68 years  Gender: Male  Status: Inpatient  Location: Bedside  Height: 62 in  Weight: 173 6 lb  BP: 138/ 65 mmHg    Indications: A-fib  Diagnoses: I48 0 - Atrial fibrillation    Sonographer:  ROSALINA Lutz  Primary Physician:  Sabi Yadav DO  Referring Physician:  Matt Allen PA-C  Group:  [facility]  Interpreting Physician:  Yael Sams MD    SUMMARY    LEFT VENTRICLE:  Systolic function was normal  Ejection fraction was estimated to be 60 %  There were no regional wall motion abnormalities  MITRAL VALVE:  There was mild regurgitation  TRICUSPID VALVE:  There was mild regurgitation  Estimated peak PA pressure was 42 mmHg  The findings suggest mild pulmonary hypertension      HISTORY: PRIOR HISTORY: PAF, HTN, SULEMA, SOB, lung mass, DM2, thrombocytopenia, current smoker  PROCEDURE: The procedure was performed at the bedside  This was a routine study  The transthoracic approach was used  The study included complete 2D imaging, M-mode, complete spectral Doppler, and color Doppler  The heart rate was 77 bpm,  at the start of the study  Images were obtained from the parasternal, apical, subcostal, and suprasternal notch acoustic windows  Image quality was adequate  LEFT VENTRICLE: Size was normal  Systolic function was normal  Ejection fraction was estimated to be 60 %  There were no regional wall motion abnormalities  Wall thickness was normal  DOPPLER: Left ventricular diastolic function parameters  were normal for the patient's age  RIGHT VENTRICLE: The size was normal  Systolic function was normal  Wall thickness was normal     LEFT ATRIUM: Size was normal     RIGHT ATRIUM: Size was normal     MITRAL VALVE: Valve structure was normal  There was normal leaflet separation  DOPPLER: The transmitral velocity was within the normal range  There was no evidence for stenosis  There was mild regurgitation  AORTIC VALVE: The valve was trileaflet  Leaflets exhibited normal thickness and normal cuspal separation  DOPPLER: Transaortic velocity was within the normal range  There was no evidence for stenosis  There was no significant  regurgitation  TRICUSPID VALVE: The valve structure was normal  There was normal leaflet separation  DOPPLER: The transtricuspid velocity was within the normal range  There was no evidence for stenosis  There was mild regurgitation  Estimated peak PA  pressure was 42 mmHg  The findings suggest mild pulmonary hypertension  PULMONIC VALVE: Leaflets exhibited normal thickness, no calcification, and normal cuspal separation  DOPPLER: The transpulmonic velocity was within the normal range  There was no significant regurgitation  PERICARDIUM: There was no pericardial effusion  The pericardium was normal in appearance      AORTA: The root exhibited normal size  SYSTEMIC VEINS: IVC: The inferior vena cava was normal in size  Respirophasic changes were normal     SYSTEM MEASUREMENT TABLES    2D  %FS: 30 02 %  Ao Diam: 3 54 cm  EDV(Teich): 123 19 ml  EF(Teich): 56 95 %  ESV(Teich): 53 03 ml  IVSd: 0 92 cm  LA Diam: 4 57 cm  LAAs A2C: 23 05 cm2  LAAs A4C: 22 52 cm2  LAESV A-L A2C: 77 35 ml  LAESV A-L A4C: 69 99 ml  LAESV Index (A-L): 41 99 ml/m2  LAESV MOD A2C: 73 99 ml  LAESV MOD A4C: 64 59 ml  LAESV(A-L): 75 58 ml  LAESV(MOD BP): 70 92 ml  LALs A2C: 5 83 cm  LALs A4C: 6 15 cm  LVIDd: 5 09 cm  LVIDs: 3 56 cm  LVPWd: 0 85 cm  RVIDd: 3 65 cm  SV(Teich): 70 16 ml    CW  AV Env  Ti: 281 64 ms  AV MaxP 92 mmHg  AV VTI: 22 53 cm  AV Vmax: 1 49 m/s  AV Vmean: 0 8 m/s  AV meanPG: 3 13 mmHg  TR MaxP 17 mmHg  TR Vmax: 3 13 m/s    MM  TAPSE: 3 01 cm    PW  E' Sept: 0 09 m/s  E/E' Sept: 11 25  LVOT Env  Ti: 342 53 ms  LVOT VTI: 21 45 cm  LVOT Vmax: 1 01 m/s  LVOT Vmean: 0 63 m/s  LVOT maxP 07 mmHg  LVOT meanP 9 mmHg  MV A Ge: 1 12 m/s  MV Dec Preble: 6 18 m/s2  MV DecT: 167 82 ms  MV E Ge: 1 04 m/s  MV E/A Ratio: 0 93  MV PHT: 48 67 ms  MVA By PHT: 4 52 cm2    [employer] Accredited Echocardiography Laboratory    Prepared and electronically signed by    Romayne Holter, MD  Signed 2021 12:57:51    Meds/Allergies   all current active meds have been reviewed and current meds:   Current Facility-Administered Medications   Medication Dose Route Frequency    acetaminophen (TYLENOL) tablet 650 mg  650 mg Oral Q6H PRN    allopurinol (ZYLOPRIM) tablet 300 mg  300 mg Oral Daily    amiodarone tablet 200 mg  200 mg Oral TID With Meals    benzonatate (TESSALON PERLES) capsule 100 mg  100 mg Oral TID    dextromethorphan-guaiFENesin (ROBITUSSIN DM) oral syrup 10 mL  10 mL Oral Q4H PRN    fish oil capsule 1,000 mg  1,000 mg Oral BID    guaiFENesin (MUCINEX) 12 hr tablet 600 mg  600 mg Oral Q12H [address]    heparin (porcine) 25,000 units in 0 45% NaCl 250 mL infusion (premix)  3-20 Units/kg/hr (Order-Specific) Intravenous Titrated    [MAR Hold] insulin glargine (LANTUS) subcutaneous injection 10 Units 0 1 mL  10 Units Subcutaneous HS    insulin lispro (HumaLOG) 100 units/mL subcutaneous injection 1-5 Units  1-5 Units Subcutaneous HS    [MAR Hold] insulin lispro (HumaLOG) 100 units/mL subcutaneous injection 2-12 Units  2-12 Units Subcutaneous TID AC    levalbuterol (XOPENEX) inhalation solution 1 25 mg  1 25 mg Nebulization Q6H    levothyroxine tablet 150 mcg  150 mcg Oral Daily    lidocaine (LIDODERM) 5 % patch 1 patch  1 patch Topical Q24H    [MAR Hold] methylPREDNISolone sodium succinate (Solu-MEDROL) injection 20 mg  20 mg Intravenous Daily    metoprolol tartrate (LOPRESSOR) tablet 25 mg  25 mg Oral Q12H [address]    ondansetron (ZOFRAN) injection 4 mg  4 mg Intravenous Q6H PRN    pantoprazole (PROTONIX) EC tablet 40 mg  40 mg Oral Daily    sodium chloride 0 9 % inhalation solution 3 mL  3 mL Nebulization Q6H    [MAR Hold] tiotropium (SPIRIVA) capsule for inhaler 18 mcg  18 mcg Inhalation Daily     Medications Prior to Admission   Medication    acetaminophen (TYLENOL) 100 mg/mL solution    albuterol (2 5 mg/3 mL) 0 083 % nebulizer solution    allopurinol (ZYLOPRIM) 300 mg tablet    amLODIPine (NORVASC) 2 5 mg tablet    apixaban (ELIQUIS) 5 mg    benzonatate (TESSALON PERLES) 100 mg capsule    guaiFENesin (MUCINEX) 600 mg 12 hr tablet    levothyroxine 150 mcg tablet    metFORMIN (GLUCOPHAGE) 500 mg tablet    metoprolol tartrate (LOPRESSOR) 25 mg tablet    Omega-3 Fatty Acids (FISH OIL) 1,000 mg    pantoprazole (PROTONIX) 40 mg tablet    tiotropium (SPIRIVA) 18 mcg inhalation capsule     heparin (porcine), 3-20 Units/kg/hr (Order-Specific), Last Rate: 21 Units/kg/hr (07/23/21 0506)    Counseling / Coordination of Care  Total floor / unit time spent today 20 minutes    Greater than 50% of total time was spent with the patient and / or family counseling and / or coordination of care  ** Please Note: Dragon 360 Dictation voice to text software may have been used in the creation of this document   **

## 2021-07-23 NOTE — ASSESSMENT & PLAN NOTE
Presented with worsening shortness of breath/dyspnea on exertion  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  Was scheduled to have IR guided biopsy as outpatient on 7/21 however was done inpatient on 7/21 with IR  Patient is currently scheduled for repeat lung biopsy on 07/26 Monday morning since the biopsy from several days ago resulted in necrotic tissue without viable tumor  · NPO at midnight prior to biopsy Monday morning  · Eliquis on hold and on IV heparin until no further procedures are planned  Resume Eliquis after biopsy on Monday  · CT C/A/P pending- tentative target is left lung mass pending CT findings  · Medical oncology and radiation oncology following  · Per last oncology note: "Discussed case with Dr Chago Nguyen with interventional radiology who preformed the bx and stated he is unsure if pathology will result with evidence of malignancy   If this bx is negative, next steps may include attempted biopsy of contralateral lung nodule"

## 2021-07-23 NOTE — ASSESSMENT & PLAN NOTE
With acute exacerbation  Patient presenting with worsening shortness of breath, productive cough and wheezing  When seen today patient reports improvement decreased dyspnea, decreased cough and sputum  On exam continue to have wheezing      · Pulmonology consulted appreciate input  · Patient is usually on 3 L nasal cannula at home  · Has been tapered down to Solu-Medrol 20mg daily   · Continue Xopenex and Spiriva and 3% nebulizers  · continue respiratory protocol  · Will require home oxygen eval upon discharge   · Outpatient Pulmonary follow-up at discharge, discharge on Bevespi with albuterol PRN home meds

## 2021-07-23 NOTE — UTILIZATION REVIEW
Continued Stay Review    Date: 7/23/21                          Current Patient Class: IP  Current Level of Care: MS/telemetry    HPI:80 y o  male initially admitted on 7/14/21 with COPD exacerbation, lyte abnormalities, rapid a flutter, lung mass and SULEMA  Pt had radid response 7/19 and needed to be placed on Bi Pap, in A fib with RVR and hypertensive  Plan included IV diuretics, steroid increase, nebs, cardiology added IV Amiodarone with po overlap  Eliquis on Hold for scheduled lung mass bx 7/21 with patient on heparin drip         Assessment/Plan:   7/22  On IV heparin in place of usual PO eliquis given lung mass bx on 7/21 and potential need for attempted biopsy of contralateral lung nodule if 7/21 biopsy unyielding   Resume eliquis when no further procedures are planned   Per pulmonology, plan is to repeat biopsy L lung on Mon 7/26   Rigoberto Pierre Pt had CT A/P ordered to determine site of biopsy today  Pts IV steroids being tapered with blood sugars being monitored and platelets monitored daily   Pt remains on supplemental O2 and will need home O2 eval Lungs with decreased breath sounds and pt coughing up green mucous    Continue 3% saline nebs / xopenex / spiriva  Vest therapy  Pt remains on Amiodarone an beta blocker , currently in NSR on telemetry  7/23  Plan is to hold heparin drip for 6 hrs prior to repeat lung biopsy scheduled for 7/26 since the biopsy from 7/21 resulted in necrotic tissue without viable tumor  Resume Eliquis after biopsy on Monday  Pt tapered to 20 mg IV Solumedrol daily today from 40 Mg Per pulmonology-continue Fer Gone will likely be able to wean 3% nebs and vest   Patient normally is on 3 L nasal cannula at home, Titrate to maintain SpO2 greater than or equal to 89%  Pt currently on O2 @2L  Pt remains in sinus rhythm  Pt continues with cough and feels SOB  Lungs with wheezing and rales, ready to get respiratory tx  Platelets stable      Vital Signs:  Date/Time  Temp  Pulse  Resp  BP MAP (mmHg)  SpO2  Calculated FIO2 (%) - Nasal Cannula  Nasal Cannula O2 Flow Rate (L/min)  O2 Device                          07/23/21 1501  98 °F (36 7 °C)  68  20  124/61  85  94 %  28  2 L/min  Nasal cannula       Pertinent Labs/Diagnostic Results:   7/22 CT chest abdomen-Reidentification of pulmonary masses highly suspicious for bronchogenic malignancy  The dominant lesion in the perihilar right upper lobe now demonstrates central necrotic cavitation  There is slight increasing size of freely layering right pleural   effusion    As on prior examination there is prominence of the central pulmonary arterial tree suggesting some element of pulmonary artery hypertension    No tumor mass is identified in the abdomen or pelvis            Results from last 7 days   Lab Units 07/23/21  0523 07/21/21  0556 07/20/21  0459 07/19/21  0946 07/19/21  0546 07/18/21  0537   WBC Thousand/uL 13 20* 13 88* 17 83*  --  11 47* 11 70*   HEMOGLOBIN g/dL 9 6* 9 5* 9 3*  --  9 9* 9 0*   I STAT HEMOGLOBIN g/dl  --   --   --  11 6*  --   --    HEMATOCRIT % 33 6* 33 7* 33 3*  --  35 9* 31 8*   HEMATOCRIT, ISTAT %  --   --   --  34*  --   --    PLATELETS Thousands/uL 233 292 300  --  356 333   NEUTROS ABS Thousands/µL  --   --  15 88*  --   --   --    BANDS PCT %  --   --   --   --   --  3         Results from last 7 days   Lab Units 07/23/21  0523 07/21/21  0556 07/20/21 0459 07/19/21  0946 07/19/21  0546 07/18/21  0537   SODIUM mmol/L 137 136 141  --  143 140   POTASSIUM mmol/L 4 5 4 8 4 1  --  4 8 4 6   CHLORIDE mmol/L 101 98* 103  --  105 103   CO2 mmol/L 30 33* 32  --  34* 31   CO2, I-STAT mmol/L  --   --   --  35*  --   --    ANION GAP mmol/L 6 5 6  --  4 6   BUN mg/dL 35* 37* 37*  --  32* 32*   CREATININE mg/dL 1 06 1 09 1 19  --  1 11 1 03   EGFR ml/min/1 73sq m 67 65 58  --  63 69   CALCIUM mg/dL 9 2 9 1 9 3  --  9 6 9 6   MAGNESIUM mg/dL  --  1 8 1 7  --   --   --    PHOSPHORUS mg/dL  --  2 7 3 3  --   --   --      Results from last 7 days   Lab Units 07/17/21  0449   AST U/L 14   ALT U/L 22   ALK PHOS U/L 132*   TOTAL PROTEIN g/dL 6 7   ALBUMIN g/dL 1 7*   TOTAL BILIRUBIN mg/dL 0 21     Results from last 7 days   Lab Units 07/23/21  1631 07/23/21  1101 07/23/21  0725 07/23/21  0522 07/22/21  2111 07/22/21  1718 07/22/21  1104 07/22/21  0544 07/21/21  2100 07/21/21  1743 07/21/21  1107 07/21/21  0615   POC GLUCOSE mg/dl 347* 264* 125 248* 343* 315* 323* 198* 320* 415* 255* 181*     Results from last 7 days   Lab Units 07/23/21  0523 07/21/21  1711 07/21/21  0556 07/20/21  0459 07/19/21  0546 07/18/21  0537 07/17/21  0449   GLUCOSE RANDOM mg/dL 257* 446* 235* 179* 199* 258* 275*           Results from last 7 days   Lab Units 07/19/21  0946   I STAT BASE EXC mmol/L 5*   I STAT O2 SAT % 100*   ISTAT PH ART  7 310*   I STAT ART PCO2 mm HG 66 0*   I STAT ART PO2 mm  0*   I STAT ART HCO3 mmol/L 33 3*         Results from last 7 days   Lab Units 07/19/21  1158 07/19/21  0942   TROPONIN I ng/mL 0 02 <0 02         Results from last 7 days   Lab Units 07/23/21  0523 07/22/21  1158 07/22/21  0421 07/19/21  1210   PROTIME seconds  --   --   --  16 0*   INR   --   --   --  1 27*   PTT seconds 74* 72* 73* 23     Results from last 7 days   Lab Units 07/17/21  0449   TSH 3RD GENERATON uIU/mL 0 103*           Results from last 7 days   Lab Units 07/19/21  0941   NT-PRO BNP pg/mL 5,084*     Results from last 7 days   Lab Units 07/21/21  1711   FERRITIN ng/mL 2,470*               Results from last 7 days   Lab Units 07/21/21  0556 07/18/21  0537   TOTAL COUNTED  100 100             Medications:   Scheduled Medications:  allopurinol, 300 mg, Oral, Daily  amiodarone, 200 mg, Oral, TID With Meals  benzonatate, 100 mg, Oral, TID  fish oil, 1,000 mg, Oral, BID  guaiFENesin, 600 mg, Oral, Q12H [address]  insulin glargine, 10 Units, Subcutaneous, HS  insulin lispro, 1-5 Units, Subcutaneous, HS  insulin lispro, 2-12 Units, Subcutaneous, TID AC  levalbuterol, 1 25 mg, Nebulization, Q6H  levothyroxine, 150 mcg, Oral, Daily  lidocaine, 1 patch, Topical, Q24H  methylPREDNISolone sodium succinate, 20 mg, Intravenous, Daily  metoprolol tartrate, 25 mg, Oral, Q12H ZOHAIB  pantoprazole, 40 mg, Oral, Daily  sodium chloride, 3 mL, Nebulization, Q6H  tiotropium, 18 mcg, Inhalation, Daily      Continuous IV Infusions:  heparin (porcine), 3-20 Units/kg/hr (Order-Specific), Intravenous, Titrated      PRN Meds:  acetaminophen, 650 mg, Oral, Q6H PRN x1 7/23  dextromethorphan-guaiFENesin, 10 mL, Oral, Q4H PRN  ondansetron, 4 mg, Intravenous, Q6H PRN        Discharge Plan: D    Network Utilization Review Department  ATTENTION: Please call with any questions or concerns to [phone] and carefully listen to the prompts so that you are directed to the right person  All voicemails are confidential   Marval Shaper all requests for admission clinical reviews, approved or denied determinations and any other requests to dedicated fax number below belonging to the campus where the patient is receiving treatment   List of dedicated fax numbers for the Facilities:  [address] DENIALS (Administrative/Medical Necessity) [phone]   [address] (Maternity/NICU/Pediatrics) [phone]   [address] Fred Parks [address] [id number] [place] [address] Shana Arndt [address] [facility] [place] [address] [phone]

## 2021-07-23 NOTE — CASE MANAGEMENT
CM informed pt is for biopsy on Monday  Anticipate d/c Monday or Tuesday  [address] able to resume services at d/c  CM contacted them at [phone] to inform them of anticipated d/c on Monday/Tuesday  Left message

## 2021-07-23 NOTE — ASSESSMENT & PLAN NOTE
· Initially presented with AFib RVR  · Rate controlled now   · Continue oral amiodarone load- 200 mg TID x 1 week, BID x 1 week, then daily  · Continue routine metoprolol 25mg BID  · Cardiology is on board  · On IV heparin in place of usual PO eliquis given lung mass bx on 7/21    Patient will be having repeat lung biopsy scheduled for 07/26 Monday morning  · Hold heparin drip 6 hours prior to scheduled lung biopsy  · Resume eliquis when no further procedures are planned

## 2021-07-23 NOTE — PROGRESS NOTES
Progress Note - Pulmonary   Claudia Angel 68 y o  male MRN: [id number]  Unit/Bed#: S -01 Encounter: [id number]      Assessment/Plan:  1  Abnormal chest CT   1  with right  Lung mass encasing the right pulmonary artery with occlusion of the anterior right upper lobe and middle lobe with obstructive atelectasis with left sided mets  2  EBUS completed 6/21/21 with atypical cells  3   underwent IR lung biopsy that resulted in necrotic tissue without viable tumor  4  Repeat Chest abdomen CT pending final results to determine site of biopsy  5  IR plans to Biopsy Monday  2  Suspected COPD without acute exacerbation  1  Continue on Bevespi at home with with albuterol PRN  2  While hospitalized continue xopenex and spiriva and 3 % nebs  3  Outpatient pulmonary follow up per discharge  3  Chronic hypoxic respiratory failrue  1  Maintains on 3 L NC at home  2  Titrate to maintain SpO2>/=89%  3  Pulmonary toilet  4  ITP  1  Daily platelets-stable today at 18 9  2  Prednisone 20 mg started today  5  PAF with RVR  1  Likely secondary to electrolyte abnormalities at admission  2  No in NSR   3  Medication regimen including heparin per cardiology and IM        Subjective:     Marco Hobbs was seen in bed upon entering the room  Denies acute overnight events  Breathing is stable and he denies complaints    Objective:         Vitals: Blood pressure 145/70, pulse 70, temperature 98 1 °F (36 7 °C), temperature source Oral, resp  rate 16, height 5' 2" (1 575 m), weight 75 8 kg (167 lb), SpO2 95 %  , 3L NC, Body mass index is 30 54 kg/m²        Intake/Output Summary (Last 24 hours) at 7/23/2021 1006  Last data filed at 7/23/2021 0946  Gross per 24 hour   Intake 260 57 ml   Output 2200 ml   Net -1939 43 ml         Physical Exam  Gen: Awake, alert, oriented x 3, no acute distress  HEENT: Mucous membranes moist, no oral lesions, no thrush, oxygen via NC  NECK: no accessory muscle use, JVP not elevated  Cardiac: Regular, single S1, single S2, no murmurs, no rubs, no gallops  Lungs: clear breath sounds  Abdomen: obese, normoactive bowel sounds, soft nontender, nondistended, no rebound or rigidity, no guarding  Extremities: no cyanosis, no clubbing, lower extremity nonpitting edema    Labs: I have personally reviewed pertinent lab results  , CBC:   Lab Results   Component Value Date    WBC 13 20 (H) 07/23/2021    HGB 9 6 (L) 07/23/2021    HCT 33 6 (L) 07/23/2021    MCV 83 07/23/2021     07/23/2021    MCH 23 6 (L) 07/23/2021    MCHC 28 6 (L) 07/23/2021    RDW 18 9 (H) 07/23/2021    MPV 10 7 07/23/2021    NRBC 0 07/23/2021   , CMP:   Lab Results   Component Value Date    SODIUM 137 07/23/2021    K 4 5 07/23/2021     07/23/2021    CO2 30 07/23/2021    BUN 35 (H) 07/23/2021    CREATININE 1 06 07/23/2021    CALCIUM 9 2 07/23/2021    EGFR 67 07/23/2021     Imaging and other studies: Chest/abdomen pelvis-final read pending      CHRIS Swanson

## 2021-07-23 NOTE — PLAN OF CARE
Problem: PHYSICAL THERAPY ADULT  Goal: Performs mobility at highest level of function for planned discharge setting  See evaluation for individualized goals  Description: Treatment/Interventions: Functional transfer training, LE strengthening/ROM, Therapeutic exercise, Endurance training, Elevations, Patient/family training, Equipment eval/education, Bed mobility, Gait training          See flowsheet documentation for full assessment, interventions and recommendations  Outcome: Progressing  Note: Prognosis: Good  Problem List: Decreased strength, Decreased endurance, Impaired balance, Decreased mobility, Decreased safety awareness, Impaired sensation  Assessment: Pt agrees to PT treatment with flat affect throughout session  Progress noted as pt is able to tolerate increased ambulation distance without AD  Pt is impulsive and demonstrates decreased safety awareness with line management  Emotional support provided as pt explains medical worries while sitting EOB  Able to tolerate seated exercises at EOB  Will continue to benefit from ongoing skilled PT to maximize his functional mobility and increase his level of independence  Anticipating pt will be able to go home with HHPT and family support at time of discharge  PT Discharge Recommendation: Home with home health rehabilitation          See flowsheet documentation for full assessment

## 2021-07-23 NOTE — ASSESSMENT & PLAN NOTE
Lab Results   Component Value Date    HGBA1C 6 6 (H) 06/20/2021       Recent Labs     07/22/21  2111 07/23/21  0522 07/23/21  0725 07/23/21  1101   POCGLU 343* 248* 125 264*       Blood Sugar Average: Last 72 hrs:  (P) 224 [id number]  A1C 6 6   · Sliding Scale insulin  · Briefly required insulin gtt in ICU 2/2 steroids   Now on basal/bolus insulin, adjust PRN  · Monitor glucose

## 2021-07-23 NOTE — ASSESSMENT & PLAN NOTE
RR called due to patient being found with increased work of breathing, tachypnea and tachycardia  Patient found to be in Afib with RVR, IV metoprolol, IV labetalol given with improvement     · Patient placed on BiPAP and transferred to ICU for further management- patient is now back to med/surg   · See plan for various issues as below  · Monitor oxygen status closely   · Patient normally is on 3 L nasal cannula at home  · Titrate to maintain SpO2 greater than or equal to 89%

## 2021-07-23 NOTE — PROGRESS NOTES
[facility]  Progress Note - [facility] 1940, 68 y o  male MRN: [id number]  Unit/Bed#: S -42 Encounter: [id number]  Primary Care Provider: Jad Payne,    Date and time admitted to hospital: 7/14/2021  1:39 PM    * Acute respiratory failure with hypoxia and hypercapnia (HCC)  Assessment & Plan  RR called due to patient being found with increased work of breathing, tachypnea and tachycardia  Patient found to be in Afib with RVR, IV metoprolol, IV labetalol given with improvement  · Patient placed on BiPAP and transferred to ICU for further management- patient is now back to med/surg   · See plan for various issues as below  · Monitor oxygen status closely   · Patient normally is on 3 L nasal cannula at home  · Titrate to maintain SpO2 greater than or equal to 89%    Paroxysmal A-fib with RVR  Assessment & Plan  · Initially presented with AFib RVR  · Rate controlled now   · Continue oral amiodarone load- 200 mg TID x 1 week, BID x 1 week, then daily  · Continue routine metoprolol 25mg BID  · Cardiology is on board  · On IV heparin in place of usual PO eliquis given lung mass bx on 7/21  Patient will be having repeat lung biopsy scheduled for 07/26 Monday morning  · Hold heparin drip 6 hours prior to scheduled lung biopsy  · Resume eliquis when no further procedures are planned     Mass of right lung  Assessment & Plan  Presented with worsening shortness of breath/dyspnea on exertion  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  Was scheduled to have IR guided biopsy as outpatient on 7/21 however was done inpatient on 7/21 with IR  Patient is currently scheduled for repeat lung biopsy on 07/26 Monday morning since the biopsy from several days ago resulted in necrotic tissue without viable tumor  · NPO at midnight prior to biopsy Monday morning  · Eliquis on hold and on IV heparin until no further procedures are planned  Resume Eliquis after biopsy on Monday  · CT C/A/P pending- tentative target is left lung mass pending CT findings  · Medical oncology and radiation oncology following  · Per last oncology note: "Discussed case with Dr Lauren Ramos with interventional radiology who preformed the bx and stated he is unsure if pathology will result with evidence of malignancy  If this bx is negative, next steps may include attempted biopsy of contralateral lung nodule"         type 2 diabetes mellitus [facility])  Assessment & Plan  Lab Results   Component Value Date    HGBA1C 6 6 (H) 06/20/2021       Recent Labs     07/22/21  2111 07/23/21  0522 07/23/21  0725 07/23/21  1101   POCGLU 343* 248* 125 264*       Blood Sugar Average: Last 72 hrs:  (P) 224 [id number]  A1C 6 6   · Sliding Scale insulin  · Briefly required insulin gtt in ICU 2/2 steroids  Now on basal/bolus insulin, adjust PRN  · Monitor glucose    Hypothyroidism  Assessment & Plan  TSH low, free T 4 normal range  · Continue levothyroxine 150 mcg daily  COPD (chronic obstructive pulmonary disease) with acute exacerbation (HCC)  Assessment & Plan  With acute exacerbation  Patient presenting with worsening shortness of breath, productive cough and wheezing  When seen today patient reports improvement decreased dyspnea, decreased cough and sputum  On exam continue to have wheezing  · Pulmonology consulted appreciate input  · Patient is usually on 3 L nasal cannula at home  · Has been tapered down to Solu-Medrol 20mg daily   · Continue Xopenex and Spiriva and 3% nebulizers  · continue respiratory protocol  · Will require home oxygen eval upon discharge   · Outpatient Pulmonary follow-up at discharge, discharge on Bevespi with albuterol PRN home meds         VTE Pharmacologic Prophylaxis: VTE Score: 5 High Risk (Score >/= 5) - Pharmacological DVT Prophylaxis Ordered: heparin drip  Sequential Compression Devices Ordered      Patient Centered Rounds: I performed bedside rounds with nursing staff today  Discussions with Specialists or Other Care Team Provider: none    Education and Discussions with Family / Patient: Updated  (son) via phone  Time Spent for Care: 30 minutes  More than 50% of total time spent on counseling and coordination of care as described above  Current Length of Stay: 9 day(s)  Current Patient Status: Inpatient   Certification Statement: The patient will continue to require additional inpatient hospital stay due to pending IR lung biopsy   Discharge Plan: Anticipate discharge in >72 hrs to home with home services  Code Status: Level 1 - Full Code    Subjective:   Patient was sitting up in bed and coughing  Was about to get respiratory treatment  Patient states that he does feel short of breath but denies any chest pain  Patient is aware of the plan for repeat IR lung biopsy on Monday and is agreeable  Objective:     Vitals:   Temp (24hrs), Av 3 °F (36 8 °C), Min:98 °F (36 7 °C), Max:98 5 °F (36 9 °C)    Temp:  [98 °F (36 7 °C)-98 5 °F (36 9 °C)] 98 1 °F (36 7 °C)  HR:  [66-88] 70  Resp:  [16-24] 16  BP: (131-148)/(62-70) 145/70  SpO2:  [94 %-98 %] 95 %  Body mass index is 30 54 kg/m²  Input and Output Summary (last 24 hours): Intake/Output Summary (Last 24 hours) at 2021 1231  Last data filed at 2021 1056  Gross per 24 hour   Intake --   Output 2200 ml   Net -2200 ml       Physical Exam:   Physical Exam  Constitutional:       Appearance: He is ill-appearing  HENT:      Head: Normocephalic and atraumatic  Mouth/Throat:      Mouth: Mucous membranes are moist    Eyes:      General: No scleral icterus  Extraocular Movements: Extraocular movements intact  Cardiovascular:      Rate and Rhythm: Normal rate and regular rhythm  Pulses: Normal pulses  Heart sounds: Normal heart sounds  Pulmonary:      Breath sounds: Wheezing and rales present  Abdominal:      General: Abdomen is flat  Bowel sounds are normal       Palpations: Abdomen is soft  Tenderness: There is no abdominal tenderness  Musculoskeletal:      Right lower leg: No edema  Left lower leg: No edema  Skin:     General: Skin is warm and dry  Capillary Refill: Capillary refill takes less than 2 seconds  Neurological:      General: No focal deficit present  Mental Status: He is alert and oriented to person, place, and time  Additional Data:     Labs:  Results from last 7 days   Lab Units 07/23/21  0523 07/21/21  0556 07/20/21  0459 07/19/21  0546 07/18/21  0537 07/18/21  0537   WBC Thousand/uL 13 20* 13 88* 17 83*  --    < > 11 70*   HEMOGLOBIN g/dL 9 6* 9 5* 9 3*  --    < > 9 0*   I STAT HEMOGLOBIN   --   --   --    < >  --   --    HEMATOCRIT % 33 6* 33 7* 33 3*  --    < > 31 8*   HEMATOCRIT, ISTAT   --   --   --    < >  --   --    PLATELETS Thousands/uL 233 292 300  --    < > 333   BANDS PCT %  --   --   --   --   --  3   NEUTROS PCT %  --   --  90*  --   --   --    LYMPHS PCT %  --   --  1*  --   --   --    LYMPHO PCT %  --  1*  --   --   --  1*   MONOS PCT %  --   --  5  --   --   --    MONO PCT %  --  5  --   --   --  7   EOS PCT %  --  0 0  --   --  0    < > = values in this interval not displayed  Results from last 7 days   Lab Units 07/23/21  0523 07/18/21  0537 07/17/21  0449   SODIUM mmol/L 137  --  139   POTASSIUM mmol/L 4 5  --  4 9   CHLORIDE mmol/L 101  --  105   CO2 mmol/L 30  --  29   CO2, I-STAT   --    < >  --    BUN mg/dL 35*  --  32*   CREATININE mg/dL 1 06  --  1 14   ANION GAP mmol/L 6  --  5   CALCIUM mg/dL 9 2  --  9 4   ALBUMIN g/dL  --   --  1 7*   TOTAL BILIRUBIN mg/dL  --   --  0 21   ALK PHOS U/L  --   --  132*   ALT U/L  --   --  22   AST U/L  --   --  14   GLUCOSE RANDOM mg/dL 257*  --  275*    < > = values in this interval not displayed       Results from last 7 days   Lab Units 07/19/21  1210   INR  1 27*     Results from last 7 days   Lab Units 07/23/21  1101 07/23/21  0725 07/23/21  0522 07/22/21  2111 07/22/21  1718 07/22/21  1104 07/22/21  0544 07/21/21  2100 07/21/21  1743 07/21/21  1107 07/21/21  0615 07/21/21  0000   POC GLUCOSE mg/dl 264* 125 248* 343* 315* 323* 198* 320* 415* 255* 181* 182*               Lines/Drains:  Invasive Devices     Peripheral Intravenous Line            Peripheral IV 07/19/21 Distal;Right;Ventral (anterior) Wrist 3 days    Peripheral IV 07/19/21 Left;Ventral (anterior) Forearm 3 days                  Telemetry:  Telemetry Orders (From admission, onward)             48 Hour Telemetry Monitoring  Continuous x 48 hours     Expiring   Question:  Reason for 48 Hour Telemetry  Answer:  Arrhythmias Requiring Medical Therapy (eg  SVT, Vtach/fib, Bradycardia, Uncontrolled A-fib)                 Telemetry Reviewed: NSR with PVCs HR: 68  Indication for Continued Telemetry Use: Arrthymias requiring medical therapy           Imaging: No pertinent imaging reviewed      Recent Cultures (last 7 days):         Last 24 Hours Medication List:   Current Facility-Administered Medications   Medication Dose Route Frequency Provider Last Rate    acetaminophen  650 mg Oral Q6H PRN Jill Locke PA-C      allopurinol  300 mg Oral Daily Jill Locke PA-C      amiodarone  200 mg Oral TID With Meals AMARJIT Escobar-ANNI      benzonatate  100 mg Oral TID Gerson Jackson PA-C      dextromethorphan-guaiFENesin  10 mL Oral Q4H PRN Jill Locke PA-C      fish oil  1,000 mg Oral BID AMARJIT Escobar-ANNI      guaiFENesin  600 mg Oral Q12H [address] AMARJIT Escobar-ANNI      heparin (porcine)  3-20 Units/kg/hr (Order-Specific) Intravenous Titrated Jill Locke PA-C 21 Units/kg/hr (07/23/21 0506)    insulin glargine  10 Units Subcutaneous HS AMARJIT Escobar-ANNI      insulin lispro  1-5 Units Subcutaneous HS AMARJIT Escobar-ANNI      insulin lispro  2-12 Units Subcutaneous TID AC Jill Locke PA-C      levalbuterol  1 25 mg Nebulization Q6H Jill Locke PA-C      levothyroxine 150 mcg Oral Daily Dimtriy Luz PA-C      lidocaine  1 patch Topical Q24H Jill Locke PA-C      methylPREDNISolone sodium succinate  20 mg Intravenous Daily Jill Locke PA-C      metoprolol tartrate  25 mg Oral Q12H [address] Jill Locke PA-C      ondansetron  4 mg Intravenous Q6H PRN Jill Locke PA-C      pantoprazole  40 mg Oral Daily Jill Locke PA-C      sodium chloride  3 mL Nebulization Q6H Jill Locke PA-C      tiotropium  18 mcg Inhalation Daily Dmitriy Luz PA-C          Today, Patient Was Seen By: Marcio Thomas PA-C    **Please Note: This note may have been constructed using a voice recognition system  **

## 2021-07-23 NOTE — PHYSICAL THERAPY NOTE
PHYSICAL THERAPY TREATMENT    Patient Name: Guille Smart  HHIMW'Y Date: 21 1440   PT Last Visit   PT Visit Date 21   Note Type   Note Type Treatment   Pain Assessment   Pain Assessment Tool 0-10   Pain Score 8   Pain Location/Orientation Location: Back   Hospital Pain Intervention(s) Ambulation/increased activity;Repositioned   Restrictions/Precautions   Weight Bearing Precautions Per Order No   Other Precautions Chair Alarm; Bed Alarm;Telemetry;Multiple lines;O2;Fall Risk;Pain;Hard of hearing  (2 L O2 NC)   General   Chart Reviewed Yes   Additional Pertinent History During session, pt pulls O2 tubing off of face and reports, "I don't need this "   Response to Previous Treatment Patient with no complaints from previous session  Family/Caregiver Present Yes   Cognition   Overall Cognitive Status WFL   Arousal/Participation Alert; Cooperative   Attention Attends with cues to redirect   Comments Pt identified by name and   Subjective   Subjective Agrees to PT treatment and is cooperative throughout session  Bed Mobility   Supine to Sit Unable to assess  (sitting EOB pre/post session)   Transfers   Sit to Stand 5  Supervision   Additional items Impulsive;Verbal cues   Stand to Sit 5  Supervision   Additional items Impulsive;Verbal cues   Additional Comments Pt able to maintain static stance for ~6` while checking pulse ox as well as talking to MD/RN  Ambulation/Elevation   Gait pattern Forward Flexion; Short stride   Gait Assistance 5  Supervision   Additional items Verbal cues  (for safety)   Assistive Device None; Other (Comment)  (declines AD use)   Distance 60` x2 and 40` x1   Balance   Static Sitting Good   Static Standing Fair +   Ambulatory Fair   Endurance Deficit   Endurance Deficit Yes   Endurance Deficit Description limited ambulation distance, fatigue  (SpO2 decreased to 88% on RA )   Activity Tolerance   Activity Tolerance Patient limited by fatigue Nurse Made Aware Per RN, pt appropriate to treat   Exercises   Knee AROM Long Arc Quad Sitting;15 reps;AROM; Bilateral  (x2)   Ankle Pumps Sitting;15 reps;AROM; Bilateral  (x2)   Marching Sitting;10 reps;AROM; Bilateral  (x2)   Assessment   Prognosis Good   Problem List Decreased strength;Decreased endurance; Impaired balance;Decreased mobility; Decreased safety awareness; Impaired sensation   Assessment Pt agrees to PT treatment with flat affect throughout session  Progress noted as pt is able to tolerate increased ambulation distance without AD  Pt is impulsive and demonstrates decreased safety awareness with line management  Emotional support provided as pt explains medical worries while sitting EOB  Able to tolerate seated exercises at EOB  Will continue to benefit from ongoing skilled PT to maximize his functional mobility and increase his level of independence  Anticipating pt will be able to go home with HHPT and family support at time of discharge  Goals   Patient Goals to go home   STG Expiration Date 07/31/21   PT Treatment Day 2   Plan   Treatment/Interventions Functional transfer training;LE strengthening/ROM; Therapeutic exercise; Endurance training;Patient/family training;Equipment eval/education; Bed mobility;Gait training   Progress Progressing toward goals   PT Frequency 5x/wk   Recommendation   PT Discharge Recommendation Home with home health rehabilitation   [address] [place] [address] Mobility Inpatient   Turning in Bed Without Bedrails 4   Lying on Back to Sitting on Edge of Flat Bed 3   Moving Bed to Chair 3   Standing Up From Chair 3   Walk in Room 3   Climb 3-5 Stairs 2   Basic Mobility Inpatient Raw Score 18   Basic Mobility Standardized Score 41 05   The patient's AM-PAC Basic Mobility Inpatient Short Form Raw Score is 18, Standardized Score is 41 05  A standardized score less than 42 9 suggests the patient may benefit from discharge to post-acute rehabilitation services   Please also refer to the recommendation of the Physical Therapist for safe discharge planning  PT anticipating continued progress with mobility     Burnice Bebo, PT,DPT

## 2021-07-24 LAB
ANION GAP SERPL CALCULATED.3IONS-SCNC: 5 MMOL/L (ref 4–13)
APTT PPP: 79 SECONDS (ref 23–37)
BASOPHILS # BLD MANUAL: 0 THOUSAND/UL (ref 0–0.1)
BASOPHILS NFR MAR MANUAL: 0 % (ref 0–1)
BUN SERPL-MCNC: 33 MG/DL (ref 5–25)
CALCIUM SERPL-MCNC: 9.4 MG/DL (ref 8.3–10.1)
CHLORIDE SERPL-SCNC: 98 MMOL/L (ref 100–108)
CO2 SERPL-SCNC: 33 MMOL/L (ref 21–32)
CREAT SERPL-MCNC: 1.01 MG/DL (ref 0.6–1.3)
EOSINOPHIL # BLD MANUAL: 0 THOUSAND/UL (ref 0–0.4)
EOSINOPHIL NFR BLD MANUAL: 0 % (ref 0–6)
ERYTHROCYTE [DISTWIDTH] IN BLOOD BY AUTOMATED COUNT: 19.3 % (ref 11.6–15.1)
GFR SERPL CREATININE-BSD FRML MDRD: 71 ML/MIN/1.73SQ M
GLUCOSE SERPL-MCNC: 145 MG/DL (ref 65–140)
GLUCOSE SERPL-MCNC: 164 MG/DL (ref 65–140)
GLUCOSE SERPL-MCNC: 318 MG/DL (ref 65–140)
GLUCOSE SERPL-MCNC: 323 MG/DL (ref 65–140)
GLUCOSE SERPL-MCNC: 327 MG/DL (ref 65–140)
HCT VFR BLD AUTO: 34.6 % (ref 36.5–49.3)
HGB BLD-MCNC: 9.6 G/DL (ref 12–17)
LYMPHOCYTES # BLD AUTO: 0 % (ref 14–44)
LYMPHOCYTES # BLD AUTO: 0 THOUSAND/UL (ref 0.6–4.47)
MCH RBC QN AUTO: 23.1 PG (ref 26.8–34.3)
MCHC RBC AUTO-ENTMCNC: 27.7 G/DL (ref 31.4–37.4)
MCV RBC AUTO: 83 FL (ref 82–98)
METAMYELOCYTES NFR BLD MANUAL: 10 % (ref 0–1)
MONOCYTES # BLD AUTO: 1.33 THOUSAND/UL (ref 0–1.22)
MONOCYTES NFR BLD: 8 % (ref 4–12)
NEUTROPHILS # BLD MANUAL: 13.62 THOUSAND/UL (ref 1.85–7.62)
NEUTS SEG NFR BLD AUTO: 82 % (ref 43–75)
NRBC BLD AUTO-RTO: 0 /100 WBCS
PLATELET # BLD AUTO: 223 THOUSANDS/UL (ref 149–390)
PLATELET BLD QL SMEAR: ADEQUATE
PMV BLD AUTO: 10.4 FL (ref 8.9–12.7)
POTASSIUM SERPL-SCNC: 4.6 MMOL/L (ref 3.5–5.3)
RBC # BLD AUTO: 4.16 MILLION/UL (ref 3.88–5.62)
SODIUM SERPL-SCNC: 136 MMOL/L (ref 136–145)
TOTAL CELLS COUNTED SPEC: 100
WBC # BLD AUTO: 16.61 THOUSAND/UL (ref 4.31–10.16)

## 2021-07-24 PROCEDURE — 85007 BL SMEAR W/DIFF WBC COUNT: CPT | Performed by: PHYSICIAN ASSISTANT

## 2021-07-24 PROCEDURE — 85027 COMPLETE CBC AUTOMATED: CPT | Performed by: PHYSICIAN ASSISTANT

## 2021-07-24 PROCEDURE — 94669 MECHANICAL CHEST WALL OSCILL: CPT

## 2021-07-24 PROCEDURE — 99232 SBSQ HOSP IP/OBS MODERATE 35: CPT | Performed by: INTERNAL MEDICINE

## 2021-07-24 PROCEDURE — 85730 THROMBOPLASTIN TIME PARTIAL: CPT | Performed by: PHYSICIAN ASSISTANT

## 2021-07-24 PROCEDURE — 82948 REAGENT STRIP/BLOOD GLUCOSE: CPT

## 2021-07-24 PROCEDURE — 94760 N-INVAS EAR/PLS OXIMETRY 1: CPT

## 2021-07-24 PROCEDURE — 94640 AIRWAY INHALATION TREATMENT: CPT

## 2021-07-24 PROCEDURE — 94668 MNPJ CHEST WALL SBSQ: CPT

## 2021-07-24 PROCEDURE — 80048 BASIC METABOLIC PNL TOTAL CA: CPT | Performed by: PHYSICIAN ASSISTANT

## 2021-07-24 RX ADMIN — OMEGA-3 FATTY ACIDS CAP 1000 MG 1000 MG: 1000 CAP at 08:13

## 2021-07-24 RX ADMIN — LEVALBUTEROL HYDROCHLORIDE 1.25 MG: 1.25 SOLUTION, CONCENTRATE RESPIRATORY (INHALATION) at 02:13

## 2021-07-24 RX ADMIN — INSULIN GLARGINE 10 UNITS: 100 INJECTION, SOLUTION SUBCUTANEOUS at 21:49

## 2021-07-24 RX ADMIN — INSULIN LISPRO 8 UNITS: 100 INJECTION, SOLUTION INTRAVENOUS; SUBCUTANEOUS at 16:23

## 2021-07-24 RX ADMIN — ISODIUM CHLORIDE 3 ML: 0.03 SOLUTION RESPIRATORY (INHALATION) at 02:13

## 2021-07-24 RX ADMIN — GUAIFENESIN 600 MG: 600 TABLET, EXTENDED RELEASE ORAL at 08:13

## 2021-07-24 RX ADMIN — METOPROLOL TARTRATE 25 MG: 25 TABLET, FILM COATED ORAL at 21:48

## 2021-07-24 RX ADMIN — ISODIUM CHLORIDE 3 ML: 0.03 SOLUTION RESPIRATORY (INHALATION) at 07:11

## 2021-07-24 RX ADMIN — ISODIUM CHLORIDE 3 ML: 0.03 SOLUTION RESPIRATORY (INHALATION) at 12:56

## 2021-07-24 RX ADMIN — GUAIFENESIN 600 MG: 600 TABLET, EXTENDED RELEASE ORAL at 21:48

## 2021-07-24 RX ADMIN — AMIODARONE HYDROCHLORIDE 200 MG: 200 TABLET ORAL at 08:13

## 2021-07-24 RX ADMIN — HEPARIN SODIUM 21 UNITS/KG/HR: 10000 INJECTION, SOLUTION INTRAVENOUS at 11:09

## 2021-07-24 RX ADMIN — INSULIN LISPRO 8 UNITS: 100 INJECTION, SOLUTION INTRAVENOUS; SUBCUTANEOUS at 11:12

## 2021-07-24 RX ADMIN — AMIODARONE HYDROCHLORIDE 200 MG: 200 TABLET ORAL at 11:03

## 2021-07-24 RX ADMIN — LEVALBUTEROL HYDROCHLORIDE 1.25 MG: 1.25 SOLUTION, CONCENTRATE RESPIRATORY (INHALATION) at 19:43

## 2021-07-24 RX ADMIN — ISODIUM CHLORIDE 3 ML: 0.03 SOLUTION RESPIRATORY (INHALATION) at 19:43

## 2021-07-24 RX ADMIN — LEVALBUTEROL HYDROCHLORIDE 1.25 MG: 1.25 SOLUTION, CONCENTRATE RESPIRATORY (INHALATION) at 12:56

## 2021-07-24 RX ADMIN — BENZONATATE 100 MG: 100 CAPSULE ORAL at 16:28

## 2021-07-24 RX ADMIN — TIOTROPIUM BROMIDE 18 MCG: 18 CAPSULE ORAL; RESPIRATORY (INHALATION) at 08:14

## 2021-07-24 RX ADMIN — PANTOPRAZOLE SODIUM 40 MG: 40 TABLET, DELAYED RELEASE ORAL at 08:13

## 2021-07-24 RX ADMIN — AMIODARONE HYDROCHLORIDE 200 MG: 200 TABLET ORAL at 16:28

## 2021-07-24 RX ADMIN — INSULIN LISPRO 3 UNITS: 100 INJECTION, SOLUTION INTRAVENOUS; SUBCUTANEOUS at 21:49

## 2021-07-24 RX ADMIN — LEVALBUTEROL HYDROCHLORIDE 1.25 MG: 1.25 SOLUTION, CONCENTRATE RESPIRATORY (INHALATION) at 07:11

## 2021-07-24 RX ADMIN — ACETAMINOPHEN 650 MG: 325 TABLET, FILM COATED ORAL at 05:26

## 2021-07-24 RX ADMIN — METOPROLOL TARTRATE 25 MG: 25 TABLET, FILM COATED ORAL at 08:13

## 2021-07-24 RX ADMIN — OMEGA-3 FATTY ACIDS CAP 1000 MG 1000 MG: 1000 CAP at 17:20

## 2021-07-24 RX ADMIN — ALLOPURINOL 300 MG: 300 TABLET ORAL at 08:13

## 2021-07-24 RX ADMIN — LEVOTHYROXINE SODIUM 150 MCG: 150 TABLET ORAL at 05:23

## 2021-07-24 RX ADMIN — METHYLPREDNISOLONE SODIUM SUCCINATE 20 MG: 40 INJECTION, POWDER, FOR SOLUTION INTRAMUSCULAR; INTRAVENOUS at 08:12

## 2021-07-24 RX ADMIN — BENZONATATE 100 MG: 100 CAPSULE ORAL at 21:48

## 2021-07-24 RX ADMIN — BENZONATATE 100 MG: 100 CAPSULE ORAL at 08:13

## 2021-07-24 NOTE — ASSESSMENT & PLAN NOTE
Presented with worsening shortness of breath/dyspnea on exertion  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  Was scheduled to have IR guided biopsy as outpatient on 7/21 however was done inpatient on 7/21 with IR  Patient is currently scheduled for repeat lung biopsy on 07/26 Monday morning since the biopsy from several days ago resulted in necrotic tissue without viable tumor  · NPO at midnight prior to biopsy Monday morning  · Eliquis on hold and on IV heparin until no further procedures are planned  Resume Eliquis after biopsy on Monday  CT C/A/P IMPRESSION:Reidentification of pulmonary masses highly suspicious for bronchogenic malignancy  The dominant lesion in the perihilar right upper lobe now demonstrates central necrotic cavitation  There is slight increasing size of freely layering right pleural effusion  · Medical oncology and radiation oncology following  · Per last oncology note: "Discussed case with Dr Lara Lynn with interventional radiology who preformed the bx and stated he is unsure if pathology will result with evidence of malignancy   If this bx is negative, next steps may include attempted biopsy of contralateral lung nodule"

## 2021-07-24 NOTE — ASSESSMENT & PLAN NOTE
Improving  RR called due to patient being found with increased work of breathing early in admission with tachypnea and tachycardia  Patient found to be in Afib with RVR, IV metoprolol, IV labetalol given with improvement  Patient placed on BiPAP and transferred to ICU briefly    · Monitor oxygen status closely   · Patient normally is on 3 L nasal cannula at home  · Titrate to maintain SpO2 greater than or equal to 89%

## 2021-07-24 NOTE — ASSESSMENT & PLAN NOTE
With acute exacerbation on admission- presenting with worsening shortness of breath, productive cough and wheezing       · Pulmonology consulted appreciate input  · Patient is usually on 3 L nasal cannula at home  · Has been tapered down to Solu-Medrol 20mg daily   · Continue Xopenex and Spiriva and 3% nebulizers  · continue respiratory protocol  · Will require home oxygen eval upon discharge   · Outpatient Pulmonary follow-up at discharge, discharge on Bevespi with albuterol PRN home meds

## 2021-07-24 NOTE — ASSESSMENT & PLAN NOTE
Lab Results   Component Value Date    HGBA1C 6 6 (H) 06/20/2021       Recent Labs     07/23/21  1101 07/23/21  1631 07/23/21  2100 07/24/21  0729   POCGLU 264* 347* 352* 145*       Blood Sugar Average: Last 72 hrs:  (P) 498 [id number]  A1C 6 6   · Sliding Scale insulin  · Briefly required insulin gtt in ICU 2/2 steroids   Now on basal/bolus insulin, adjust PRN  · Monitor glucose

## 2021-07-24 NOTE — PROGRESS NOTES
[facility]  Progress Note - Master Rocha 1940, 68 y o  male MRN: [id number]  Unit/Bed#: S -22 Encounter: [id number]  Primary Care Provider: Dara Mcfadden DO   Date and time admitted to hospital: 7/14/2021  1:39 PM    Mass of right lung  Assessment & Plan  Presented with worsening shortness of breath/dyspnea on exertion  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  Was scheduled to have IR guided biopsy as outpatient on 7/21 however was done inpatient on 7/21 with IR  Patient is currently scheduled for repeat lung biopsy on 07/26 Monday morning since the biopsy from several days ago resulted in necrotic tissue without viable tumor  · NPO at midnight prior to biopsy Monday morning  · Eliquis on hold and on IV heparin until no further procedures are planned  Resume Eliquis after biopsy on Monday  CT C/A/P IMPRESSION:Reidentification of pulmonary masses highly suspicious for bronchogenic malignancy  The dominant lesion in the perihilar right upper lobe now demonstrates central necrotic cavitation  There is slight increasing size of freely layering right pleural effusion  · Medical oncology and radiation oncology following  · Per last oncology note: "Discussed case with Dr Amaris Velasco with interventional radiology who preformed the bx and stated he is unsure if pathology will result with evidence of malignancy  If this bx is negative, next steps may include attempted biopsy of contralateral lung nodule"         COPD (chronic obstructive pulmonary disease) with acute exacerbation ([facility] [address] )  Assessment & Plan  With acute exacerbation on admission- presenting with worsening shortness of breath, productive cough and wheezing       · Pulmonology consulted appreciate input  · Patient is usually on 3 L nasal cannula at home  · Has been tapered down to Solu-Medrol 20mg daily   · Continue Xopenex and Spiriva and 3% nebulizers  · continue respiratory protocol  · Will require home oxygen eval upon discharge   · Outpatient Pulmonary follow-up at discharge, discharge on Bevespi with albuterol PRN home meds     type 2 diabetes mellitus [facility])  Assessment & Plan  Lab Results   Component Value Date    HGBA1C 6 6 (H) 06/20/2021       Recent Labs     07/23/21  1101 07/23/21  1631 07/23/21  2100 07/24/21  0729   POCGLU 264* 347* 352* 145*       Blood Sugar Average: Last 72 hrs:  (P) 884 [id number]  A1C 6 6   · Sliding Scale insulin  · Briefly required insulin gtt in ICU 2/2 steroids  Now on basal/bolus insulin, adjust PRN  · Monitor glucose    Hypothyroidism  Assessment & Plan  TSH low, free T 4 normal range  · Continue levothyroxine 150 mcg daily  Paroxysmal A-fib with RVR  Assessment & Plan  · Initially presented with AFib RVR, Rate controlled now   · Continue oral amiodarone load- 200 mg TID x 1 week, BID x 1 week, then daily  · Continue routine metoprolol 25mg BID  · Cardiology is on board  · On IV heparin in place of usual PO eliquis given lung mass bx on 7/21  Patient will be having repeat lung biopsy scheduled for 07/26 Monday morning  · Hold heparin drip 6 hours prior to scheduled lung biopsy  · Resume eliquis when no further procedures are planned     * Acute respiratory failure with hypoxia and hypercapnia ([facility] [address] )  Assessment & Plan  Improving  RR called due to patient being found with increased work of breathing early in admission with tachypnea and tachycardia  Patient found to be in Afib with RVR, IV metoprolol, IV labetalol given with improvement  Patient placed on BiPAP and transferred to ICU briefly    · Monitor oxygen status closely   · Patient normally is on 3 L nasal cannula at home  · Titrate to maintain SpO2 greater than or equal to 89%      VTE Pharmacologic Prophylaxis:   Pharmacologic: Heparin Drip  Mechanical VTE Prophylaxis in Place: No    Education and Discussions with Family / Patient: His wife was updated by phone, at the bedside    Time Spent for Care: 30 minutes  More than 50% of total time spent on counseling and coordination of care as described above  Current Length of Stay: 10 day(s)    Current Patient Status: Inpatient   Certification Statement: The patient will continue to require additional inpatient hospital stay due to respiratory monitoring    Discharge Plan: pending clinical resolution    Code Status: Level 1 - Full Code      Subjective:   "I hope it's the last one" he notes when we talk about plans for the biopsy on Monday  He denies any fever/chills o/n  He feels his breathing is better  He denies any cough  He has no abd pain, diarrhea  Objective:     Vitals:   Temp (24hrs), Av 1 °F (36 7 °C), Min:97 7 °F (36 5 °C), Max:98 5 °F (36 9 °C)    Temp:  [97 7 °F (36 5 °C)-98 5 °F (36 9 °C)] 97 7 °F (36 5 °C)  HR:  [64-75] 64  Resp:  [18-20] 18  BP: (124-139)/(60-70) 133/65  SpO2:  [93 %-97 %] 93 %  Body mass index is 30 54 kg/m²  Input and Output Summary (last 24 hours): Intake/Output Summary (Last 24 hours) at 2021 0852  Last data filed at 2021 0801  Gross per 24 hour   Intake --   Output 2250 ml   Net -2250 ml       Physical Exam:     Physical Exam  Vitals and nursing note reviewed  Constitutional:       Appearance: Normal appearance  He is not ill-appearing or diaphoretic  HENT:      Head: Normocephalic  Nose: Nose normal  No congestion  Mouth/Throat:      Mouth: Mucous membranes are moist    Eyes:      General: No scleral icterus  Conjunctiva/sclera: Conjunctivae normal    Pulmonary:      Effort: Pulmonary effort is normal  No respiratory distress  Breath sounds: No wheezing  Abdominal:      General: Bowel sounds are normal  There is no distension  Palpations: Abdomen is soft  Tenderness: There is no abdominal tenderness  Genitourinary:     Comments: No kearney  Musculoskeletal:      Cervical back: Normal range of motion and neck supple  Neurological:      Mental Status: He is alert and oriented to person, place, and time  Psychiatric:         Mood and Affect: Mood normal          Thought Content: Thought content normal            Additional Data:     Labs:    Results from last 7 days   Lab Units 07/24/21  0442 07/20/21  0459 07/19/21  0546 07/18/21  0537   WBC Thousand/uL 16 61* 17 83*  --  11 70*   HEMOGLOBIN g/dL 9 6* 9 3*  --  9 0*   I STAT HEMOGLOBIN   --   --    < >  --    HEMATOCRIT % 34 6* 33 3*  --  31 8*   HEMATOCRIT, ISTAT   --   --    < >  --    PLATELETS Thousands/uL 223 300  --  333   BANDS PCT %  --   --   --  3   NEUTROS PCT %  --  90*  --   --    LYMPHS PCT %  --  1*  --   --    LYMPHO PCT % 0*  --    < > 1*   MONOS PCT %  --  5  --   --    MONO PCT % 8  --    < > 7   EOS PCT % 0 0   < > 0    < > = values in this interval not displayed  Results from last 7 days   Lab Units 07/24/21  0442   SODIUM mmol/L 136   POTASSIUM mmol/L 4 6   CHLORIDE mmol/L 98*   CO2 mmol/L 33*   BUN mg/dL 33*   CREATININE mg/dL 1 01   ANION GAP mmol/L 5   CALCIUM mg/dL 9 4   GLUCOSE RANDOM mg/dL 164*     Results from last 7 days   Lab Units 07/19/21  1210   INR  1 27*     Results from last 7 days   Lab Units 07/24/21  0729 07/23/21  2100 07/23/21  1631 07/23/21  1101 07/23/21  0725 07/23/21  0522 07/22/21  2111 07/22/21  1718 07/22/21  1104 07/22/21  0544 07/21/21  2100 07/21/21  1743   POC GLUCOSE mg/dl 145* 352* 347* 264* 125 248* 343* 315* 323* 198* 320* 415*                   * I Have Reviewed All Lab Data Listed Above  * Additional Pertinent Lab Tests Reviewed: All Labs For Current Hospital Admission Reviewed    Imaging:    Imaging Personally Reviewed by Myself Includes:      7/22 CT t/a/p  IMPRESSION:     Reidentification of pulmonary masses highly suspicious for bronchogenic malignancy  The dominant lesion in the perihilar right upper lobe now demonstrates central necrotic cavitation    There is slight increasing size of freely layering right pleural effusion      Recent Cultures (last 7 days):           Last 24 Hours Medication List:   Current Facility-Administered Medications   Medication Dose Route Frequency Provider Last Rate    acetaminophen  650 mg Oral Q6H PRN Deb Merchant PA-C      allopurinol  300 mg Oral Daily Jill DiaBOYD eastman      amiodarone  200 mg Oral TID With Meals Jill Locke PA-C      benzonatate  100 mg Oral TID Deb Merchant PA-C      dextromethorphan-guaiFENesin  10 mL Oral Q4H PRN Jill DiaBOYD eastman      fish oil  1,000 mg Oral BID Jill Locke PA-C      guaiFENesin  600 mg Oral Q12H [facility] Jill Locke PA-C      heparin (porcine)  3-20 Units/kg/hr (Order-Specific) Intravenous Titrated Jill Locke PA-C 21 Units/kg/hr (07/23/21 2102)    insulin glargine  10 Units Subcutaneous HS AMARJIT Escobar-ANNI      insulin lispro  1-5 Units Subcutaneous HS Jill DiavernelloBOYD      insulin lispro  2-12 Units Subcutaneous TID AC Jill Locke PA-C      levalbuterol  1 25 mg Nebulization Q6H Jill Locke PA-C      levothyroxine  150 mcg Oral Daily Jill DiasioBOYD      lidocaine  1 patch Topical Q24H AMARJIT Escobar-ANNI      methylPREDNISolone sodium succinate  20 mg Intravenous Daily Jill DiavernelloBOYD      metoprolol tartrate  25 mg Oral Q12H [facility] Jill Locke PA-C      ondansetron  4 mg Intravenous Q6H PRN Jill Diasio, BOYD      pantoprazole  40 mg Oral Daily Jill DiavernelloBOYD      sodium chloride  3 mL Nebulization Q6H Jill Diavernello, BOYD      tiotropium  18 mcg Inhalation Daily Jill Locke PA-C          Today, Patient Was Seen By: Jan Boateng MD

## 2021-07-24 NOTE — ASSESSMENT & PLAN NOTE
· Initially presented with AFib RVR, Rate controlled now   · Continue oral amiodarone load- 200 mg TID x 1 week, BID x 1 week, then daily  · Continue routine metoprolol 25mg BID  · Cardiology is on board  · On IV heparin in place of usual PO eliquis given lung mass bx on 7/21    Patient will be having repeat lung biopsy scheduled for 07/26 Monday morning  · Hold heparin drip 6 hours prior to scheduled lung biopsy  · Resume eliquis when no further procedures are planned

## 2021-07-25 LAB
APTT PPP: 69 SECONDS (ref 23–37)
GLUCOSE SERPL-MCNC: 144 MG/DL (ref 65–140)
GLUCOSE SERPL-MCNC: 196 MG/DL (ref 65–140)
GLUCOSE SERPL-MCNC: 248 MG/DL (ref 65–140)
GLUCOSE SERPL-MCNC: 314 MG/DL (ref 65–140)

## 2021-07-25 PROCEDURE — 85730 THROMBOPLASTIN TIME PARTIAL: CPT | Performed by: PHYSICIAN ASSISTANT

## 2021-07-25 PROCEDURE — 94669 MECHANICAL CHEST WALL OSCILL: CPT

## 2021-07-25 PROCEDURE — 82948 REAGENT STRIP/BLOOD GLUCOSE: CPT

## 2021-07-25 PROCEDURE — 94760 N-INVAS EAR/PLS OXIMETRY 1: CPT

## 2021-07-25 PROCEDURE — 99232 SBSQ HOSP IP/OBS MODERATE 35: CPT | Performed by: INTERNAL MEDICINE

## 2021-07-25 PROCEDURE — 94640 AIRWAY INHALATION TREATMENT: CPT

## 2021-07-25 PROCEDURE — 94668 MNPJ CHEST WALL SBSQ: CPT

## 2021-07-25 RX ADMIN — METHYLPREDNISOLONE SODIUM SUCCINATE 20 MG: 40 INJECTION, POWDER, FOR SOLUTION INTRAMUSCULAR; INTRAVENOUS at 08:26

## 2021-07-25 RX ADMIN — ISODIUM CHLORIDE 3 ML: 0.03 SOLUTION RESPIRATORY (INHALATION) at 01:32

## 2021-07-25 RX ADMIN — AMIODARONE HYDROCHLORIDE 200 MG: 200 TABLET ORAL at 16:29

## 2021-07-25 RX ADMIN — LIDOCAINE 5% 1 PATCH: 700 PATCH TOPICAL at 08:32

## 2021-07-25 RX ADMIN — GUAIFENESIN 600 MG: 600 TABLET, EXTENDED RELEASE ORAL at 08:26

## 2021-07-25 RX ADMIN — BENZONATATE 100 MG: 100 CAPSULE ORAL at 08:25

## 2021-07-25 RX ADMIN — INSULIN LISPRO 1 UNITS: 100 INJECTION, SOLUTION INTRAVENOUS; SUBCUTANEOUS at 22:16

## 2021-07-25 RX ADMIN — LEVOTHYROXINE SODIUM 150 MCG: 150 TABLET ORAL at 05:41

## 2021-07-25 RX ADMIN — ISODIUM CHLORIDE 3 ML: 0.03 SOLUTION RESPIRATORY (INHALATION) at 19:22

## 2021-07-25 RX ADMIN — LEVALBUTEROL HYDROCHLORIDE 1.25 MG: 1.25 SOLUTION, CONCENTRATE RESPIRATORY (INHALATION) at 19:22

## 2021-07-25 RX ADMIN — LEVALBUTEROL HYDROCHLORIDE 1.25 MG: 1.25 SOLUTION, CONCENTRATE RESPIRATORY (INHALATION) at 07:11

## 2021-07-25 RX ADMIN — PANTOPRAZOLE SODIUM 40 MG: 40 TABLET, DELAYED RELEASE ORAL at 08:26

## 2021-07-25 RX ADMIN — BENZONATATE 100 MG: 100 CAPSULE ORAL at 16:29

## 2021-07-25 RX ADMIN — HEPARIN SODIUM 21 UNITS/KG/HR: 10000 INJECTION, SOLUTION INTRAVENOUS at 20:26

## 2021-07-25 RX ADMIN — BENZONATATE 100 MG: 100 CAPSULE ORAL at 22:16

## 2021-07-25 RX ADMIN — AMIODARONE HYDROCHLORIDE 200 MG: 200 TABLET ORAL at 08:26

## 2021-07-25 RX ADMIN — GUAIFENESIN 600 MG: 600 TABLET, EXTENDED RELEASE ORAL at 22:16

## 2021-07-25 RX ADMIN — ACETAMINOPHEN 650 MG: 325 TABLET, FILM COATED ORAL at 05:41

## 2021-07-25 RX ADMIN — LEVALBUTEROL HYDROCHLORIDE 1.25 MG: 1.25 SOLUTION, CONCENTRATE RESPIRATORY (INHALATION) at 01:32

## 2021-07-25 RX ADMIN — AMIODARONE HYDROCHLORIDE 200 MG: 200 TABLET ORAL at 11:38

## 2021-07-25 RX ADMIN — INSULIN LISPRO 8 UNITS: 100 INJECTION, SOLUTION INTRAVENOUS; SUBCUTANEOUS at 11:32

## 2021-07-25 RX ADMIN — OMEGA-3 FATTY ACIDS CAP 1000 MG 1000 MG: 1000 CAP at 17:19

## 2021-07-25 RX ADMIN — METOPROLOL TARTRATE 25 MG: 25 TABLET, FILM COATED ORAL at 22:17

## 2021-07-25 RX ADMIN — ALLOPURINOL 300 MG: 300 TABLET ORAL at 08:25

## 2021-07-25 RX ADMIN — OMEGA-3 FATTY ACIDS CAP 1000 MG 1000 MG: 1000 CAP at 08:26

## 2021-07-25 RX ADMIN — INSULIN LISPRO 4 UNITS: 100 INJECTION, SOLUTION INTRAVENOUS; SUBCUTANEOUS at 16:29

## 2021-07-25 RX ADMIN — METOPROLOL TARTRATE 25 MG: 25 TABLET, FILM COATED ORAL at 08:25

## 2021-07-25 RX ADMIN — HEPARIN SODIUM 21 UNITS/KG/HR: 10000 INJECTION, SOLUTION INTRAVENOUS at 05:35

## 2021-07-25 RX ADMIN — TIOTROPIUM BROMIDE 18 MCG: 18 CAPSULE ORAL; RESPIRATORY (INHALATION) at 08:27

## 2021-07-25 RX ADMIN — LEVALBUTEROL HYDROCHLORIDE 1.25 MG: 1.25 SOLUTION, CONCENTRATE RESPIRATORY (INHALATION) at 13:03

## 2021-07-25 RX ADMIN — ISODIUM CHLORIDE 3 ML: 0.03 SOLUTION RESPIRATORY (INHALATION) at 07:11

## 2021-07-25 RX ADMIN — ISODIUM CHLORIDE 3 ML: 0.03 SOLUTION RESPIRATORY (INHALATION) at 13:03

## 2021-07-25 RX ADMIN — INSULIN GLARGINE 10 UNITS: 100 INJECTION, SOLUTION SUBCUTANEOUS at 22:16

## 2021-07-25 NOTE — ASSESSMENT & PLAN NOTE
Presented with worsening shortness of breath/dyspnea on exertion  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  Was scheduled to have IR guided biopsy as outpatient on 7/21 however was done inpatient on 7/21 with IR  Patient is currently scheduled for repeat lung biopsy on 07/26 Monday morning since the biopsy from several days ago resulted in necrotic tissue without viable tumor- target possibly left lung  · NPO at midnight prior to biopsy Monday morning  · Eliquis on hold and on IV heparin until no further procedures are planned  Resume Eliquis after biopsy on Monday  CT C/A/P IMPRESSION:Reidentification of pulmonary masses highly suspicious for bronchogenic malignancy  The dominant lesion in the perihilar right upper lobe now demonstrates central necrotic cavitation  There is slight increasing size of freely layering right pleural effusion  · Medical oncology and radiation oncology following  · Per last oncology note: "Discussed case with Dr Demetrice Mae with interventional radiology who preformed the bx and stated he is unsure if pathology will result with evidence of malignancy   If this bx is negative, next steps may include attempted biopsy of contralateral lung nodule"

## 2021-07-25 NOTE — PROGRESS NOTES
[facility]  Progress Note - Willian Chopra 1940, 68 y o  male MRN: [id number]  Unit/Bed#: S -41 Encounter: [id number]  Primary Care Provider: Wilber Bhat DO   Date and time admitted to hospital: 7/14/2021  1:39 PM    Mass of right lung  Assessment & Plan  Presented with worsening shortness of breath/dyspnea on exertion  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  Was scheduled to have IR guided biopsy as outpatient on 7/21 however was done inpatient on 7/21 with IR  Patient is currently scheduled for repeat lung biopsy on 07/26 Monday morning since the biopsy from several days ago resulted in necrotic tissue without viable tumor- target possibly left lung  · NPO at midnight prior to biopsy Monday morning  · Eliquis on hold and on IV heparin until no further procedures are planned  Resume Eliquis after biopsy on Monday  CT C/A/P IMPRESSION:Reidentification of pulmonary masses highly suspicious for bronchogenic malignancy  The dominant lesion in the perihilar right upper lobe now demonstrates central necrotic cavitation  There is slight increasing size of freely layering right pleural effusion  · Medical oncology and radiation oncology following  · Per last oncology note: "Discussed case with Dr Indira Paulino with interventional radiology who preformed the bx and stated he is unsure if pathology will result with evidence of malignancy  If this bx is negative, next steps may include attempted biopsy of contralateral lung nodule"         COPD (chronic obstructive pulmonary disease) with acute exacerbation ([facility] [address] )  Assessment & Plan  With acute exacerbation on admission- presenting with worsening shortness of breath, productive cough and wheezing       · Pulmonology consulted appreciate input  · Patient is usually on 3 L nasal cannula at home  · Has been tapered down to Solu-Medrol 20mg daily   · Continue Xopenex and Spiriva and 3% nebulizers  · continue respiratory protocol  · Will require home oxygen eval upon discharge   · Outpatient Pulmonary follow-up at discharge, discharge on Bevespi with albuterol PRN home meds     type 2 diabetes mellitus [facility])  Assessment & Plan  Lab Results   Component Value Date    HGBA1C 6 6 (H) 06/20/2021       Recent Labs     07/24/21  1110 07/24/21  1621 07/24/21  2119 07/25/21  0733   POCGLU 323* 327* 318* 144*       Blood Sugar Average: Last 72 hrs:  (P) 269 [id number]  A1C 6 6   · Sliding Scale insulin  · Briefly required insulin gtt in ICU 2/2 steroids  Now on basal/bolus insulin, adjust PRN  · Monitor glucose    Hypothyroidism  Assessment & Plan  TSH low, free T 4 normal range  · Continue levothyroxine 150 mcg daily  Paroxysmal A-fib with RVR  Assessment & Plan  · Initially presented with AFib RVR, Rate controlled now   · Continue oral amiodarone load- 200 mg TID x 1 week, BID x 1 week, then daily  · Continue routine metoprolol 25mg BID  · Cardiology is on board  · On IV heparin in place of usual PO eliquis given lung mass bx on 7/21  Patient will be having repeat lung biopsy scheduled for 07/26 Monday morning  · Hold heparin drip 6 hours prior to scheduled lung biopsy  · Resume eliquis when no further procedures are planned     * Acute respiratory failure with hypoxia and hypercapnia ([facility] [address] )  Assessment & Plan  Improving  RR called due to patient being found with increased work of breathing early in admission with tachypnea and tachycardia  Patient found to be in Afib with RVR, IV metoprolol, IV labetalol given with improvement  Patient placed on BiPAP and transferred to ICU briefly  · Monitor oxygen status closely   · Patient normally is on 3 L nasal cannula at home  · Titrate to maintain SpO2 greater than or equal to 89%        VTE Pharmacologic Prophylaxis:   Pharmacologic: Heparin Drip  Mechanical VTE Prophylaxis in Place: No        Time Spent for Care: 30 minutes    More than 50% of total time spent on counseling and coordination of care as described above  Current Length of Stay: 11 day(s)    Current Patient Status: Inpatient   Certification Statement: The patient will continue to require additional inpatient hospital stay due to acute on chronic hypoxic resp failure, lung mass investigation    Discharge Plan: home    Code Status: Level 1 - Full Code      Subjective:   Has cough at night, some improvement with cough suppresant  No fever/chills  Feels some dyspnea with activity but otherwise ok at rest   Some "sore" chest complaints due to coughing  No pleuritic chest pain  No abdominal pain  No nausea/vomiting  No diarrhea  Objective:     Vitals:   Temp (24hrs), Av 6 °F (36 4 °C), Min:97 6 °F (36 4 °C), Max:97 7 °F (36 5 °C)    Temp:  [97 6 °F (36 4 °C)-97 7 °F (36 5 °C)] 97 6 °F (36 4 °C)  HR:  [62-81] 69  Resp:  [20-22] 20  BP: (111-128)/(55-82) 128/63  SpO2:  [90 %-98 %] 90 %  Body mass index is 30 54 kg/m²  Input and Output Summary (last 24 hours): Intake/Output Summary (Last 24 hours) at 2021 0819  Last data filed at 2021 0734  Gross per 24 hour   Intake 480 ml   Output 1800 ml   Net -1320 ml       Physical Exam:     Physical Exam  Vitals and nursing note reviewed  Constitutional:       Appearance: Normal appearance  He is not ill-appearing or diaphoretic  HENT:      Head: Normocephalic  Nose: No congestion  Comments: Nasal cannula at 3 liters     Mouth/Throat:      Mouth: Mucous membranes are moist       Pharynx: No oropharyngeal exudate  Eyes:      General: No scleral icterus  Conjunctiva/sclera: Conjunctivae normal    Pulmonary:      Effort: Pulmonary effort is normal  No respiratory distress  Abdominal:      General: Bowel sounds are normal  There is no distension  Palpations: Abdomen is soft  Genitourinary:     Comments: No kearney  Musculoskeletal:      Cervical back: Normal range of motion and neck supple     Skin: General: Skin is warm  Coloration: Skin is not jaundiced  Neurological:      Mental Status: He is alert and oriented to person, place, and time  Psychiatric:         Mood and Affect: Mood normal          Thought Content: Thought content normal            Additional Data:     Labs:    Results from last 7 days   Lab Units 07/24/21  0442 07/20/21  0459   WBC Thousand/uL 16 61* 17 83*   HEMOGLOBIN g/dL 9 6* 9 3*   HEMATOCRIT % 34 6* 33 3*   PLATELETS Thousands/uL 223 300   NEUTROS PCT %  --  90*   LYMPHS PCT %  --  1*   LYMPHO PCT % 0*  --    MONOS PCT %  --  5   MONO PCT % 8  --    EOS PCT % 0 0     Results from last 7 days   Lab Units 07/24/21  0442   SODIUM mmol/L 136   POTASSIUM mmol/L 4 6   CHLORIDE mmol/L 98*   CO2 mmol/L 33*   BUN mg/dL 33*   CREATININE mg/dL 1 01   ANION GAP mmol/L 5   CALCIUM mg/dL 9 4   GLUCOSE RANDOM mg/dL 164*     Results from last 7 days   Lab Units 07/19/21  1210   INR  1 27*     Results from last 7 days   Lab Units 07/25/21  0733 07/24/21  2119 07/24/21  1621 07/24/21  1110 07/24/21  0729 07/23/21  2100 07/23/21  1631 07/23/21  1101 07/23/21  0725 07/23/21  0522 07/22/21  2111 07/22/21  1718   POC GLUCOSE mg/dl 144* 318* 327* 323* 145* 352* 347* 264* 125 248* 343* 315*                   * I Have Reviewed All Lab Data Listed Above  * Additional Pertinent Lab Tests Reviewed:  All [facility] Admission Reviewed      Recent Cultures (last 7 days):           Last 24 Hours Medication List:   Current Facility-Administered Medications   Medication Dose Route Frequency Provider Last Rate    acetaminophen  650 mg Oral Q6H PRN Jill Locke PA-C      allopurinol  300 mg Oral Daily Jill Locke PA-C      amiodarone  200 mg Oral TID With Meals Jill Locke PA-C      benzonatate  100 mg Oral TID Merrick Vizcaino PA-C      dextromethorphan-guaiFENesin  10 mL Oral Q4H PRN Jill Locke PA-C      fish oil  1,000 mg Oral BID BOYD EscobaraiFENesin  600 mg Oral Q12H [address] Jill Locke PA-C      heparin (porcine)  3-20 Units/kg/hr (Order-Specific) Intravenous Titrated Jill Locke PA-C 21 Units/kg/hr (07/25/21 0535)    insulin glargine  10 Units Subcutaneous HS AMARJIT Escobar-ANNI      insulin lispro  1-5 Units Subcutaneous HS AMARJIT Escobar-ANIN      insulin lispro  2-12 Units Subcutaneous TID AC Jill Locke PA-C      levalbuterol  1 25 mg Nebulization Q6H AMARJIT Escobar-C      levothyroxine  150 mcg Oral Daily IFTIKHAR EscobarC      lidocaine  1 patch Topical Q24H Jill Locke PA-C      methylPREDNISolone sodium succinate  20 mg Intravenous Daily IFTIKHAR EscobarC      metoprolol tartrate  25 mg Oral Q12H [address] IFTIKHAR EscobarC      ondansetron  4 mg Intravenous Q6H PRN Jill Locke PA-C      pantoprazole  40 mg Oral Daily AMARJIT Escobar-C      sodium chloride  3 mL Nebulization Q6H Jill Locke PA-C      tiotropium  18 mcg Inhalation Daily Jill Locke PA-C          Today, Patient Was Seen By: Amelia Mobley MD

## 2021-07-25 NOTE — ASSESSMENT & PLAN NOTE
Lab Results   Component Value Date    HGBA1C 6 6 (H) 06/20/2021       Recent Labs     07/24/21  1110 07/24/21  1621 07/24/21  2119 07/25/21  0733   POCGLU 323* 327* 318* 144*       Blood Sugar Average: Last 72 hrs:  (P) 269 [id number]  A1C 6 6   · Sliding Scale insulin  · Briefly required insulin gtt in ICU 2/2 steroids   Now on basal/bolus insulin, adjust PRN  · Monitor glucose

## 2021-07-26 LAB
APTT PPP: 79 SECONDS (ref 23–37)
FUNGUS SPEC CULT: NORMAL
GLUCOSE SERPL-MCNC: 151 MG/DL (ref 65–140)
GLUCOSE SERPL-MCNC: 167 MG/DL (ref 65–140)
GLUCOSE SERPL-MCNC: 252 MG/DL (ref 65–140)
GLUCOSE SERPL-MCNC: 418 MG/DL (ref 65–140)

## 2021-07-26 PROCEDURE — 94640 AIRWAY INHALATION TREATMENT: CPT

## 2021-07-26 PROCEDURE — 99232 SBSQ HOSP IP/OBS MODERATE 35: CPT | Performed by: INTERNAL MEDICINE

## 2021-07-26 PROCEDURE — 94760 N-INVAS EAR/PLS OXIMETRY 1: CPT

## 2021-07-26 PROCEDURE — 94668 MNPJ CHEST WALL SBSQ: CPT

## 2021-07-26 PROCEDURE — 99232 SBSQ HOSP IP/OBS MODERATE 35: CPT | Performed by: PHYSICIAN ASSISTANT

## 2021-07-26 PROCEDURE — 85730 THROMBOPLASTIN TIME PARTIAL: CPT | Performed by: PHYSICIAN ASSISTANT

## 2021-07-26 PROCEDURE — 94669 MECHANICAL CHEST WALL OSCILL: CPT

## 2021-07-26 PROCEDURE — 82948 REAGENT STRIP/BLOOD GLUCOSE: CPT

## 2021-07-26 RX ORDER — HEPARIN SODIUM 1000 [USP'U]/ML
4000 INJECTION, SOLUTION INTRAVENOUS; SUBCUTANEOUS
Status: DISCONTINUED | OUTPATIENT
Start: 2021-07-26 | End: 2021-08-05 | Stop reason: HOSPADM

## 2021-07-26 RX ORDER — HEPARIN SODIUM 10000 [USP'U]/100ML
3-20 INJECTION, SOLUTION INTRAVENOUS
Status: DISPENSED | OUTPATIENT
Start: 2021-07-26 | End: 2021-07-28

## 2021-07-26 RX ORDER — PREDNISONE 20 MG/1
20 TABLET ORAL DAILY
Status: DISCONTINUED | OUTPATIENT
Start: 2021-07-27 | End: 2021-07-29

## 2021-07-26 RX ORDER — HEPARIN SODIUM 1000 [USP'U]/ML
2000 INJECTION, SOLUTION INTRAVENOUS; SUBCUTANEOUS
Status: DISCONTINUED | OUTPATIENT
Start: 2021-07-26 | End: 2021-08-05 | Stop reason: HOSPADM

## 2021-07-26 RX ADMIN — GUAIFENESIN 600 MG: 600 TABLET, EXTENDED RELEASE ORAL at 09:24

## 2021-07-26 RX ADMIN — AMIODARONE HYDROCHLORIDE 200 MG: 200 TABLET ORAL at 09:24

## 2021-07-26 RX ADMIN — HEPARIN SODIUM 21 UNITS/KG/HR: 10000 INJECTION, SOLUTION INTRAVENOUS at 15:22

## 2021-07-26 RX ADMIN — METOPROLOL TARTRATE 25 MG: 25 TABLET, FILM COATED ORAL at 21:40

## 2021-07-26 RX ADMIN — ALLOPURINOL 300 MG: 300 TABLET ORAL at 09:24

## 2021-07-26 RX ADMIN — BENZONATATE 100 MG: 100 CAPSULE ORAL at 17:22

## 2021-07-26 RX ADMIN — LEVALBUTEROL HYDROCHLORIDE 1.25 MG: 1.25 SOLUTION, CONCENTRATE RESPIRATORY (INHALATION) at 07:24

## 2021-07-26 RX ADMIN — PANTOPRAZOLE SODIUM 40 MG: 40 TABLET, DELAYED RELEASE ORAL at 09:25

## 2021-07-26 RX ADMIN — GUAIFENESIN 600 MG: 600 TABLET, EXTENDED RELEASE ORAL at 21:40

## 2021-07-26 RX ADMIN — ISODIUM CHLORIDE 3 ML: 0.03 SOLUTION RESPIRATORY (INHALATION) at 13:47

## 2021-07-26 RX ADMIN — INSULIN LISPRO 6 UNITS: 100 INJECTION, SOLUTION INTRAVENOUS; SUBCUTANEOUS at 17:22

## 2021-07-26 RX ADMIN — AMIODARONE HYDROCHLORIDE 200 MG: 200 TABLET ORAL at 17:22

## 2021-07-26 RX ADMIN — METOPROLOL TARTRATE 25 MG: 25 TABLET, FILM COATED ORAL at 09:24

## 2021-07-26 RX ADMIN — TIOTROPIUM BROMIDE 18 MCG: 18 CAPSULE ORAL; RESPIRATORY (INHALATION) at 09:34

## 2021-07-26 RX ADMIN — BENZONATATE 100 MG: 100 CAPSULE ORAL at 09:25

## 2021-07-26 RX ADMIN — ISODIUM CHLORIDE 3 ML: 0.03 SOLUTION RESPIRATORY (INHALATION) at 07:24

## 2021-07-26 RX ADMIN — LEVOTHYROXINE SODIUM 150 MCG: 150 TABLET ORAL at 05:48

## 2021-07-26 RX ADMIN — ISODIUM CHLORIDE 3 ML: 0.03 SOLUTION RESPIRATORY (INHALATION) at 01:17

## 2021-07-26 RX ADMIN — OMEGA-3 FATTY ACIDS CAP 1000 MG 1000 MG: 1000 CAP at 17:22

## 2021-07-26 RX ADMIN — LIDOCAINE 5% 1 PATCH: 700 PATCH TOPICAL at 10:37

## 2021-07-26 RX ADMIN — INSULIN LISPRO 5 UNITS: 100 INJECTION, SOLUTION INTRAVENOUS; SUBCUTANEOUS at 21:43

## 2021-07-26 RX ADMIN — ISODIUM CHLORIDE 3 ML: 0.03 SOLUTION RESPIRATORY (INHALATION) at 19:33

## 2021-07-26 RX ADMIN — OMEGA-3 FATTY ACIDS CAP 1000 MG 1000 MG: 1000 CAP at 09:24

## 2021-07-26 RX ADMIN — LEVALBUTEROL HYDROCHLORIDE 1.25 MG: 1.25 SOLUTION, CONCENTRATE RESPIRATORY (INHALATION) at 13:48

## 2021-07-26 RX ADMIN — INSULIN GLARGINE 10 UNITS: 100 INJECTION, SOLUTION SUBCUTANEOUS at 21:42

## 2021-07-26 RX ADMIN — METHYLPREDNISOLONE SODIUM SUCCINATE 20 MG: 40 INJECTION, POWDER, FOR SOLUTION INTRAMUSCULAR; INTRAVENOUS at 09:25

## 2021-07-26 RX ADMIN — BENZONATATE 100 MG: 100 CAPSULE ORAL at 21:40

## 2021-07-26 RX ADMIN — AMIODARONE HYDROCHLORIDE 200 MG: 200 TABLET ORAL at 12:55

## 2021-07-26 RX ADMIN — LEVALBUTEROL HYDROCHLORIDE 1.25 MG: 1.25 SOLUTION, CONCENTRATE RESPIRATORY (INHALATION) at 01:17

## 2021-07-26 RX ADMIN — LEVALBUTEROL HYDROCHLORIDE 1.25 MG: 1.25 SOLUTION, CONCENTRATE RESPIRATORY (INHALATION) at 19:33

## 2021-07-26 NOTE — PROGRESS NOTES
[facility]  Progress Note - [place] 1940, 68 y o  male MRN: [id number]  Unit/Bed#: S -01 Encounter: [id number]  Primary Care Provider: Mark Shipley DO   Date and time admitted to hospital: 7/14/2021  1:39 PM    COPD (chronic obstructive pulmonary disease) with acute exacerbation ([facility] [address] )  Assessment & Plan  With acute exacerbation on admission- presenting with worsening shortness of breath, productive cough and wheezing  · Pulmonology consulted appreciate input  · Patient is usually on 3 L nasal cannula at home  · Continue Xopenex and Spiriva and 3% nebulizers  · continue respiratory protocol  · Will require home oxygen eval upon discharge   · Outpatient Pulmonary follow-up at discharge, discharge on Bevespi with albuterol PRN home meds     type 2 diabetes mellitus [facility])  Assessment & Plan  Lab Results   Component Value Date    HGBA1C 6 6 (H) 06/20/2021       Recent Labs     07/25/21  1608 07/25/21  2105 07/26/21  0716 07/26/21  1055   POCGLU 248* 196* 151* 167*       Blood Sugar Average: Last 72 hrs:  (P) 244 6  A1C 6 6   · Sliding Scale insulin  · Briefly required insulin gtt in ICU 2/2 steroids  Now on basal/bolus insulin, adjust PRN  · Monitor glucose    Hypothyroidism  Assessment & Plan  TSH low, free T 4 normal range  · Continue levothyroxine 150 mcg daily  Mass of right lung  Assessment & Plan  Presented with worsening shortness of breath/dyspnea on exertion  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  Was scheduled to have IR guided biopsy as outpatient on 7/21 however was done inpatient on 7/21 with IR      · Patient is currently scheduled for repeat lung biopsy on 07/26 Monday morning since the biopsy from several days ago resulted in necrotic tissue without viable tumor- target possibly left lung  · Eliquis on hold and on IV heparin until no further procedures are planned-heparin GTT currently on hold in the setting of lung biopsy today  · Resume Eliquis after biopsy   · CT C/A/P IMPRESSION: Reidentification of pulmonary masses highly suspicious for bronchogenic malignancy  The dominant lesion in the perihilar right upper lobe now demonstrates central necrotic cavitation  There is slight increasing size of freely layering right pleural effusion  · Medical oncology and radiation oncology following  · Per last oncology note: "Discussed case with Dr Evin Alexandra with interventional radiology who preformed the bx and stated he is unsure if pathology will result with evidence of malignancy  If this bx is negative, next steps may include attempted biopsy of contralateral lung nodule"         Paroxysmal A-fib with RVR  Assessment & Plan  · Initially presented with AFib RVR, Rate controlled now   · Continue oral amiodarone load- 200 mg TID x 1 week, BID x 1 week, then daily  · Continue routine metoprolol 25mg BID  · Cardiology is on board  · On IV heparin in place of usual PO eliquis given lung mass bx on 7/21  · Patient will be having repeat lung biopsy scheduled for 07/26 Monday morning  · Currently holding heparin GTT in the setting of lung biopsy today  · Resume eliquis when no further procedures are planned     Idiopathic thrombocytopenic purpura (ITP) ([facility] [address] )  Assessment & Plan  · Was previously receiving IV Solu-Medrol, will transition to p o  Prednisone 20 mg on 07/27  · Platelets remain stable    * Acute respiratory failure with hypoxia and hypercapnia (HCC)  Assessment & Plan  Improving  RR called due to patient being found with increased work of breathing early in admission with tachypnea and tachycardia  Patient found to be in Afib with RVR, IV metoprolol, IV labetalol given with improvement  Patient placed on BiPAP and transferred to ICU briefly    · Monitor oxygen status closely   · Patient normally is on 3 L nasal cannula at home  · Titrate to maintain SpO2 greater than or equal to 89%    Hyperkalemia-resolved as of 7/19/2021  Assessment & Plan  Lab Results   Component Value Date    K 4 6 07/24/2021    K 4 5 07/23/2021    K 4 8 07/21/2021    K 4 1 07/20/2021    K 4 8 07/19/2021    K 4 6 07/18/2021    K 4 9 07/17/2021    K 5 1 07/16/2021     Presenting with hyperkalemia in the setting of acute kidney   No EKG changes noted  Calcium gluconate 1 g was given  Plan  · continue to trend until in a safer range (K less than 5 5; calcium less than 11)  Hypercalcemia-resolved as of 7/20/2021  Assessment & Plan  Presenting with hypercalcemia, corrected calcium 11 5  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  Corrected calcium 11 2 today  Plan:   · Continue IV fluid hydration  · continue to trend until in a safer range (K less than 5 5; calcium less than 11)  Hypertensive emergency-resolved as of 7/19/2021  Assessment & Plan  · Blood pressure appears stable  · Continue home medication      VTE Pharmacologic Prophylaxis:   Pharmacologic: Heparin GTT discontinued at this time for lung biopsy  Mechanical VTE Prophylaxis in Place: Yes    Patient Centered Rounds: I have performed bedside rounds with nursing staff today  Discussions with Specialists or Other Care Team Provider:  Pulmonology    Education and Discussions with Family / Patient:  Discussed plan of care with patient, answered any questions  Attempted to call patient's daughter without answer, left message at 12:45 p m       Time Spent for Care: 20 minutes  More than 50% of total time spent on counseling and coordination of care as described above  Current Length of Stay: 12 day(s)    Current Patient Status: Inpatient   Certification Statement: The patient will continue to require additional inpatient hospital stay due to Lung biopsy    Discharge Plan:  PT/OT recommending home, pending clinical improvement    Code Status: Level 1 - Full Code      Subjective: The patient states that he is feeling okay today    He denies any increased shortness of breath, chest pain, nausea/vomiting, diarrhea, constipation, increasing leg swelling  States that he is a little nervous but the procedure later on this afternoon  Objective:     Vitals:   Temp (24hrs), Av 4 °F (37 4 °C), Min:98 3 °F (36 8 °C), Max:100 7 °F (38 2 °C)    Temp:  [98 3 °F (36 8 °C)-100 7 °F (38 2 °C)] 98 3 °F (36 8 °C)  HR:  [61-97] 72  Resp:  [20] 20  BP: (111-144)/(58-70) 127/58  SpO2:  [91 %-96 %] 94 %  Body mass index is 30 54 kg/m²  Input and Output Summary (last 24 hours): Intake/Output Summary (Last 24 hours) at 2021 1245  Last data filed at 2021 0556  Gross per 24 hour   Intake 240 ml   Output 800 ml   Net -560 ml       Physical Exam:     Physical Exam  Vitals and nursing note reviewed  Constitutional:       General: He is not in acute distress  Appearance: Normal appearance  He is well-developed  He is not ill-appearing, toxic-appearing or diaphoretic  Comments: Nasal cannula   HENT:      Head: Normocephalic and atraumatic  Eyes:      General: No scleral icterus  Conjunctiva/sclera: Conjunctivae normal    Cardiovascular:      Rate and Rhythm: Normal rate and regular rhythm  Heart sounds: No murmur heard  No friction rub  No gallop  Pulmonary:      Effort: Pulmonary effort is normal  No respiratory distress  Breath sounds: Normal breath sounds  No stridor  No wheezing, rhonchi or rales  Chest:      Chest wall: No tenderness  Abdominal:      General: Abdomen is flat  Bowel sounds are normal       Palpations: Abdomen is soft  Tenderness: There is no abdominal tenderness  Musculoskeletal:      Cervical back: Neck supple  Right lower leg: No edema  Left lower leg: No edema  Skin:     General: Skin is warm and dry  Capillary Refill: Capillary refill takes less than 2 seconds  Coloration: Skin is not jaundiced or pale  Findings: No erythema     Neurological:      General: No focal deficit present  Mental Status: He is alert and oriented to person, place, and time  Mental status is at baseline  Additional Data:     Labs:    Results from last 7 days   Lab Units 07/24/21  0442 07/20/21  0459   WBC Thousand/uL 16 61* 17 83*   HEMOGLOBIN g/dL 9 6* 9 3*   HEMATOCRIT % 34 6* 33 3*   PLATELETS Thousands/uL 223 300   NEUTROS PCT %  --  90*   LYMPHS PCT %  --  1*   LYMPHO PCT % 0*  --    MONOS PCT %  --  5   MONO PCT % 8  --    EOS PCT % 0 0     Results from last 7 days   Lab Units 07/24/21  0442   SODIUM mmol/L 136   POTASSIUM mmol/L 4 6   CHLORIDE mmol/L 98*   CO2 mmol/L 33*   BUN mg/dL 33*   CREATININE mg/dL 1 01   ANION GAP mmol/L 5   CALCIUM mg/dL 9 4   GLUCOSE RANDOM mg/dL 164*         Results from last 7 days   Lab Units 07/26/21  1055 07/26/21  0716 07/25/21  2105 07/25/21  1608 07/25/21  1031 07/25/21  0733 07/24/21  2119 07/24/21  1621 07/24/21  1110 07/24/21  0729 07/23/21  2100 07/23/21  1631   POC GLUCOSE mg/dl 167* 151* 196* 248* 314* 144* 318* 327* 323* 145* 352* 347*                   * I Have Reviewed All Lab Data Listed Above  * Additional Pertinent Lab Tests Reviewed:  [facility] 68 Admission Reviewed    Imaging:    Imaging Reports Reviewed Today Include: none  Imaging Personally Reviewed by Myself Includes:  none    Recent Cultures (last 7 days):           Last 24 Hours Medication List:   Current Facility-Administered Medications   Medication Dose Route Frequency Provider Last Rate    acetaminophen  650 mg Oral Q6H PRN Jill Locke PA-C      allopurinol  300 mg Oral Daily Jill Locke PA-C      amiodarone  200 mg Oral TID With Meals Jill Locke PA-C      benzonatate  100 mg Oral TID Merrick Vizcaino PA-C      dextromethorphan-guaiFENesin  10 mL Oral Q4H PRN Jill Locke PA-C      fish oil  1,000 mg Oral BID Jill Locke PA-C      guaiFENesin  600 mg Oral Q12H [address] Jill Locke PA-C      insulin glargine  10 Units Subcutaneous [facility] BOYD Locke      insulin lispro  1-5 Units Subcutaneous HS Jill Locke PA-C      insulin lispro  2-12 Units Subcutaneous TID AC Jill Locke PA-C      levalbuterol  1 25 mg Nebulization Q6H Jill Locke PA-C      levothyroxine  150 mcg Oral Daily Jill Locke PA-C      lidocaine  1 patch Topical Q24H Jill Locke PA-C      metoprolol tartrate  25 mg Oral Q12H [address] Jill Locke PA-C      ondansetron  4 mg Intravenous Q6H PRN Jill Locke PA-C      pantoprazole  40 mg Oral Daily Jill Locke PA-C      [START ON 7/27/2021] predniSONE  20 mg Oral Daily Rad Corbin PA-C      sodium chloride  3 mL Nebulization Q6H Jill Locke PA-C      tiotropium  18 mcg Inhalation Daily Merrick Vizcaino PA-C          Today, Patient Was Seen By: Rad Corbin PA-C    ** Please Note: Dictation voice to text software may have been used in the creation of this document   **

## 2021-07-26 NOTE — ASSESSMENT & PLAN NOTE
· Initially presented with AFib RVR, Rate controlled now   · Continue oral amiodarone load- 200 mg TID x 1 week, BID x 1 week, then daily  · Continue routine metoprolol 25mg BID  · Cardiology is on board  · On IV heparin in place of usual PO eliquis given lung mass bx on 7/21      · Patient will be having repeat lung biopsy scheduled for 07/26 Monday morning  · Currently holding heparin GTT in the setting of lung biopsy today  · Resume eliquis when no further procedures are planned

## 2021-07-26 NOTE — ASSESSMENT & PLAN NOTE
Presented with worsening shortness of breath/dyspnea on exertion  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  Was scheduled to have IR guided biopsy as outpatient on 7/21 however was done inpatient on 7/21 with IR  · Patient is currently scheduled for repeat lung biopsy on 07/26 Monday morning since the biopsy from several days ago resulted in necrotic tissue without viable tumor- target possibly left lung  · Eliquis on hold and on IV heparin until no further procedures are planned-heparin GTT currently on hold in the setting of lung biopsy today  · Resume Eliquis after biopsy   · CT C/A/P IMPRESSION: Reidentification of pulmonary masses highly suspicious for bronchogenic malignancy  The dominant lesion in the perihilar right upper lobe now demonstrates central necrotic cavitation  There is slight increasing size of freely layering right pleural effusion  · Medical oncology and radiation oncology following  · Per last oncology note: "Discussed case with Dr Sanchez Carcamo with interventional radiology who preformed the bx and stated he is unsure if pathology will result with evidence of malignancy   If this bx is negative, next steps may include attempted biopsy of contralateral lung nodule"

## 2021-07-26 NOTE — PROGRESS NOTES
Progress Note - Pulmonary   Liat Rosmery Angel 68 y o  male MRN: [id number]  Unit/Bed#: S -01 Encounter: [id number]    Assessment/Plan:    1  Acute pulmonary insufficiency on chronic hypoxic respiratory failure likely multifaceted as listed below       -  currently maintained on home O2 requirement of 3 liters-94 percent       -  maintain saturations greater than 89 percent       -  pulmonary toileting:  Deep breathing cough, OOB as tolerated, IS Q 1 hr       -  may need repeat ambulatory pulse ox prior to discharge    2  Right lung mass highly suspicious for malignancy       -  6/21/2021- EBUS- atypical cells       -  7/21/2021- FNA of RUL- necrotic tissue       -  7/26/2021- scheduled for repeat biopsy of contralateral lung       -  radiation oncology following for consideration of palliative RT    3  COPD of unknown severity without  acute exacerbation       -  Inpatient: Spiriva and nebulizer,   no indication for steroid       -  home regimen: Bevespi 120 milligram 1 puff b i d        -  will need close pulmonary follow-up    4  ITP      -  platelet count remaining stable, would recommend updating CBC      -  currently on IV Solu-Medrol 20 milligram-would recommend transitioning to prednisone 20 milligram    5  Proximal AFib with RVR      -  cardiology following- likely secondary to electrolyte abnormality      -  currently maintained on heparin GTT      -  will continue Eliquis after biopsy have been completed        Chief Complaint:    "I am hungary"    Subjective:    Ponce Sagastume was comfortably sitting in his bed  He reports that he currently is hungry and would like to know when the procedure will occur  No significant overnight events reported  Patient currently denying any fevers, chills, hemoptysis, headaches, night sweats, pleuritic chest pain, or palpitations  Objective:    Vitals: Blood pressure 127/58, pulse 72, temperature 98 3 °F (36 8 °C), temperature source Oral, resp   rate 20, height 5' 2" (1 575 m), weight 75 8 kg (167 lb), SpO2 94 %  3L,Body mass index is 30 54 kg/m²  Intake/Output Summary (Last 24 hours) at 7/26/2021 0945  Last data filed at 7/26/2021 0556  Gross per 24 hour   Intake 240 ml   Output 800 ml   Net -560 ml       Invasive Devices     Peripheral Intravenous Line            Peripheral IV 07/24/21 Right Forearm 1 day                Physical Exam:   Physical Exam  Constitutional:       General: He is not in acute distress  Appearance: Normal appearance  He is normal weight  He is not ill-appearing  HENT:      Head: Normocephalic and atraumatic  Nose: Nose normal  No congestion or rhinorrhea  Mouth/Throat:      Mouth: Mucous membranes are dry  Pharynx: No oropharyngeal exudate or posterior oropharyngeal erythema  Cardiovascular:      Rate and Rhythm: Normal rate and regular rhythm  Pulses: Normal pulses  Heart sounds: Normal heart sounds  No murmur heard  No friction rub  No gallop  Pulmonary:      Effort: Pulmonary effort is normal  No tachypnea, bradypnea, accessory muscle usage or respiratory distress  Breath sounds: No stridor, decreased air movement or transmitted upper airway sounds  Decreased breath sounds and wheezing present  No rhonchi or rales  Comments: Scattered wheezing diminished breath sounds  Chest:      Chest wall: No tenderness  Abdominal:      General: Abdomen is flat  Bowel sounds are normal  There is no distension  Palpations: Abdomen is soft  There is no mass  Musculoskeletal:         General: No swelling or tenderness  Normal range of motion  Cervical back: Normal range of motion and neck supple  No rigidity or tenderness  Skin:     General: Skin is warm and dry  Coloration: Skin is not jaundiced or pale  Neurological:      General: No focal deficit present  Mental Status: He is alert and oriented to person, place, and time  Mental status is at baseline     Psychiatric:         Mood and Affect: Mood normal          Behavior: Behavior normal          Labs:  I have personally reviewed pertinent lab results 7/25/2021    Imaging and other studies: I have personally reviewed pertinent films in PACS     Chest CT- 7/22/2021- very large right lung mass in peripheral upper lobe

## 2021-07-26 NOTE — ASSESSMENT & PLAN NOTE
Lab Results   Component Value Date    HGBA1C 6 6 (H) 06/20/2021       Recent Labs     07/25/21  1608 07/25/21  2105 07/26/21  0716 07/26/21  1055   POCGLU 248* 196* 151* 167*       Blood Sugar Average: Last 72 hrs:  (P) 244 6  A1C 6 6   · Sliding Scale insulin  · Briefly required insulin gtt in ICU 2/2 steroids   Now on basal/bolus insulin, adjust PRN  · Monitor glucose

## 2021-07-26 NOTE — PHYSICAL THERAPY NOTE
Physical Therapy Cancellation Note       07/26/21 0802   PT Last Visit   PT Visit Date 07/26/21   Note Type   Cancel Reasons Other  (declined participation)   Assessment   Assessment Attempt made to see patient for physical therapy session however patient declined secondary to no sleep since yesterday  Will continue to follow as appropriate and able         Rachel De La Garza, PTA

## 2021-07-26 NOTE — ASSESSMENT & PLAN NOTE
With acute exacerbation on admission- presenting with worsening shortness of breath, productive cough and wheezing       · Pulmonology consulted appreciate input  · Patient is usually on 3 L nasal cannula at home  · Continue Xopenex and Spiriva and 3% nebulizers  · continue respiratory protocol  · Will require home oxygen eval upon discharge   · Outpatient Pulmonary follow-up at discharge, discharge on Bevespi with albuterol PRN home meds

## 2021-07-26 NOTE — PLAN OF CARE
Problem: PAIN - ADULT  Goal: Verbalizes/displays adequate comfort level or baseline comfort level  Description: Interventions:  - Encourage patient to monitor pain and request assistance  - Assess pain using appropriate pain scale (e g  0-10)  - Administer analgesics based on type and severity of pain and evaluate response  - Implement non-pharmacological measures as appropriate and evaluate response  - Consider cultural and social influences on pain and pain management  - Notify physician/advanced practitioner if interventions unsuccessful or patient reports new pain  Outcome: Progressing     Problem: INFECTION - ADULT  Goal: Absence or prevention of progression during hospitalization  Description: INTERVENTIONS:  - Assess and monitor for signs and symptoms of infection  - Monitor lab/diagnostic results  - Monitor all insertion sites, i e  IV site, bx site  - Thornton appropriate cooling/warming therapies per order  - Administer medications as ordered  - Instruct and encourage patient and family to use good hand hygiene technique  - Identify and instruct in appropriate isolation precautions for identified infection/condition  Outcome: Progressing  Goal: Absence of fever/infection during neutropenic period  Description: INTERVENTIONS:  - Monitor WBC and ANC  - Implement neutropenic precautions if/when pt becomes neutropenic    Outcome: Progressing     Problem: SAFETY ADULT  Goal: Patient will remain free of falls  Description: INTERVENTIONS:  - Educate patient/family on patient safety including physical limitations  - Instruct patient to call for assistance with activity   - Consult OT/PT to assist with strengthening/mobility   - Keep Call bell within reach  - Keep bed low and locked with side rails adjusted as appropriate  - Keep care items and personal belongings within reach  - Initiate and maintain comfort rounds  - Make Fall Risk Sign visible to staff  - Offer Toileting every 2 hours, in advance of need  - Maintain chair alarm  - Obtain necessary fall risk management equipment: chair alarm  - Apply yellow socks and bracelet for high fall risk patients  - Consider moving patient to room near nurses station  Outcome: Progressing  Goal: Maintain or return to baseline ADL function  Description: INTERVENTIONS:  -  Assess patient's ability to carry out ADLs; assess patient's baseline for ADL function and identify physical deficits which impact ability to perform ADLs (bathing, care of mouth/teeth, toileting, grooming, dressing, etc )  - Assess/evaluate cause of self-care deficits   - Assess range of motion  - Assess patient's mobility; develop plan if impaired  - Assess patient's need for assistive devices and provide as appropriate  - Encourage maximum independence but intervene and supervise when necessary  - Involve family in performance of ADLs  - Assess for home care needs following discharge   - Consider OT consult to assist with ADL evaluation and planning for discharge  - Provide patient education as appropriate  Outcome: Progressing  Goal: Maintains/Returns to pre admission functional level  Description: INTERVENTIONS:  - Perform BMAT or MOVE assessment daily    - Set and communicate daily mobility goal to care team and patient/family/caregiver     - Collaborate with rehabilitation services on mobility goals if consulted  - Out of bed to chair 3 times a day   - Out of bed for meals 3 times a day  - Out of bed for toileting  - Record patient progress and toleration of activity level   Outcome: Progressing     Problem: DISCHARGE PLANNING  Goal: Discharge to home or other facility with appropriate resources  Description: INTERVENTIONS:  - Identify barriers to discharge w/patient and caregiver  - Arrange for needed discharge resources and transportation as appropriate  - Identify discharge learning needs (meds, wound care, etc )  - Arrange for interpretive services to assist at discharge as needed  - Refer to Case Management Department for coordinating discharge planning if the patient needs post-hospital services based on physician/advanced practitioner order or complex needs related to functional status, cognitive ability, or social support system  Outcome: Progressing     Problem: Knowledge Deficit  Goal: Patient/family/caregiver demonstrates understanding of disease process, treatment plan, medications, and discharge instructions  Description: Complete learning assessment and assess knowledge base    Interventions:  - Provide teaching at level of understanding  - Provide teaching via preferred learning methods  Outcome: Progressing     Problem: CARDIOVASCULAR - ADULT  Goal: Maintains optimal cardiac output and hemodynamic stability  Description: INTERVENTIONS:  - Monitor I/O, vital signs and rhythm  - Monitor for S/S and trends of decreased cardiac output  - Administer and titrate ordered vasoactive medications to optimize hemodynamic stability  - Assess quality of pulses, skin color and temperature  - Assess for signs of decreased coronary artery perfusion  - Instruct patient to report change in severity of symptoms  Outcome: Progressing  Goal: Absence of cardiac dysrhythmias or at baseline rhythm  Description: INTERVENTIONS:  - Continuous cardiac monitoring, vital signs, obtain 12 lead EKG if ordered  - Administer antiarrhythmic and heart rate control medications as ordered  - Monitor electrolytes and administer replacement therapy as ordered  Outcome: Progressing     Problem: METABOLIC, FLUID AND ELECTROLYTES - ADULT  Goal: Electrolytes maintained within normal limits  Description: INTERVENTIONS:  - Monitor labs and assess patient for signs and symptoms of electrolyte imbalances  - Administer electrolyte replacement as ordered  - Monitor response to electrolyte replacements, including repeat lab results as appropriate  - Instruct patient on fluid and nutrition as appropriate  Outcome: Progressing  Goal: Fluid balance maintained  Description: INTERVENTIONS:  - Monitor labs   - Monitor I/O and WT  - Instruct patient on fluid and nutrition as appropriate  - Assess for signs & symptoms of volume excess or deficit  Outcome: Progressing  Goal: Glucose maintained within target range  Description: INTERVENTIONS:  - Monitor Blood Glucose as ordered and provide SSI/lantus as ordered  - Assess for signs and symptoms of hyperglycemia and hypoglycemia  - Administer ordered medications to maintain glucose within target range  - Assess nutritional intake and initiate nutrition service referral as needed  Outcome: Progressing     Problem: MOBILITY - ADULT  Goal: Maintain or return to baseline ADL function  Description: INTERVENTIONS:  -  Assess patient's ability to carry out ADLs; assess patient's baseline for ADL function and identify physical deficits which impact ability to perform ADLs (bathing, care of mouth/teeth, toileting, grooming, dressing, etc )  - Assess/evaluate cause of self-care deficits   - Assess range of motion  - Assess patient's mobility; develop plan if impaired  - Assess patient's need for assistive devices and provide as appropriate  - Encourage maximum independence but intervene and supervise when necessary  - Involve family in performance of ADLs  - Assess for home care needs following discharge   - Consider OT consult to assist with ADL evaluation and planning for discharge  - Provide patient education as appropriate  Outcome: Progressing  Goal: Maintains/Returns to pre admission functional level  Description: INTERVENTIONS:  - Perform BMAT or MOVE assessment daily    - Set and communicate daily mobility goal to care team and patient/family/caregiver     - Collaborate with rehabilitation services on mobility goals if consulted  - Out of bed to chair 3 times a day   - Out of bed for meals 3 times a day  - Out of bed for toileting  - Record patient progress and toleration of activity level   Outcome: Progressing Problem: Potential for Falls  Goal: Patient will remain free of falls  Description: INTERVENTIONS:  - Educate patient/family on patient safety including physical limitations  - Instruct patient to call for assistance with activity   - Consult OT/PT to assist with strengthening/mobility   - Keep Call bell within reach  - Keep bed low and locked with side rails adjusted as appropriate  - Keep care items and personal belongings within reach  - Initiate and maintain comfort rounds  - Make Fall Risk Sign visible to staff  - Offer Toileting every 2 hours, in advance of need  - Maintain chair alarm  - Obtain necessary fall risk management equipment: chair alarm  - Apply yellow socks and bracelet for high fall risk patients  - Consider moving patient to room near nurses station  Outcome: Progressing     Problem: Prexisting or High Potential for Compromised Skin Integrity  Goal: Skin integrity is maintained or improved  Description: INTERVENTIONS:  - Identify patients at risk for skin breakdown  - Assess and monitor skin integrity  - Assess and monitor nutrition and hydration status  - Monitor labs   - Assess for incontinence   - Turn and reposition patient  - Assist with mobility/ambulation  - Relieve pressure over bony prominences  - Avoid friction and shearing  - Provide appropriate hygiene as needed including keeping skin clean and dry  - Collaborate with interdisciplinary team   - Patient/family teaching  - Consider wound care consult   Outcome: Progressing

## 2021-07-26 NOTE — ASSESSMENT & PLAN NOTE
Lab Results   Component Value Date    K 4 6 07/24/2021    K 4 5 07/23/2021    K 4 8 07/21/2021    K 4 1 07/20/2021    K 4 8 07/19/2021    K 4 6 07/18/2021    K 4 9 07/17/2021    K 5 1 07/16/2021     Presenting with hyperkalemia in the setting of acute kidney   No EKG changes noted  Calcium gluconate 1 g was given  Plan  · continue to trend until in a safer range (K less than 5 5; calcium less than 11)

## 2021-07-26 NOTE — ASSESSMENT & PLAN NOTE
· Was previously receiving IV Solu-Medrol, will transition to p o   Prednisone 20 mg on 07/27  · Platelets remain stable

## 2021-07-27 ENCOUNTER — APPOINTMENT (INPATIENT)
Dept: RADIOLOGY | Facility: HOSPITAL | Age: 81
DRG: 682 | End: 2021-07-27
Payer: COMMERCIAL

## 2021-07-27 ENCOUNTER — APPOINTMENT (INPATIENT)
Dept: CT IMAGING | Facility: HOSPITAL | Age: 81
DRG: 682 | End: 2021-07-27
Payer: COMMERCIAL

## 2021-07-27 PROBLEM — E87.1 HYPONATREMIA: Status: ACTIVE | Noted: 2021-07-27

## 2021-07-27 PROBLEM — E11.9 NEW ONSET TYPE 2 DIABETES MELLITUS (HCC): Chronic | Status: ACTIVE | Noted: 2021-06-20

## 2021-07-27 LAB
ANION GAP SERPL CALCULATED.3IONS-SCNC: 4 MMOL/L (ref 4–13)
APTT PPP: 58 SECONDS (ref 23–37)
APTT PPP: 97 SECONDS (ref 23–37)
BUN SERPL-MCNC: 31 MG/DL (ref 5–25)
CALCIUM SERPL-MCNC: 9.1 MG/DL (ref 8.3–10.1)
CHLORIDE SERPL-SCNC: 98 MMOL/L (ref 100–108)
CO2 SERPL-SCNC: 29 MMOL/L (ref 21–32)
CREAT SERPL-MCNC: 1.21 MG/DL (ref 0.6–1.3)
ERYTHROCYTE [DISTWIDTH] IN BLOOD BY AUTOMATED COUNT: 20 % (ref 11.6–15.1)
GFR SERPL CREATININE-BSD FRML MDRD: 57 ML/MIN/1.73SQ M
GLUCOSE SERPL-MCNC: 193 MG/DL (ref 65–140)
GLUCOSE SERPL-MCNC: 194 MG/DL (ref 65–140)
GLUCOSE SERPL-MCNC: 269 MG/DL (ref 65–140)
GLUCOSE SERPL-MCNC: 301 MG/DL (ref 65–140)
GLUCOSE SERPL-MCNC: 397 MG/DL (ref 65–140)
HCT VFR BLD AUTO: 35.3 % (ref 36.5–49.3)
HGB BLD-MCNC: 10.1 G/DL (ref 12–17)
MCH RBC QN AUTO: 23.9 PG (ref 26.8–34.3)
MCHC RBC AUTO-ENTMCNC: 28.6 G/DL (ref 31.4–37.4)
MCV RBC AUTO: 84 FL (ref 82–98)
PLATELET # BLD AUTO: 187 THOUSANDS/UL (ref 149–390)
PMV BLD AUTO: 11.2 FL (ref 8.9–12.7)
POTASSIUM SERPL-SCNC: 4.8 MMOL/L (ref 3.5–5.3)
RBC # BLD AUTO: 4.22 MILLION/UL (ref 3.88–5.62)
SODIUM SERPL-SCNC: 131 MMOL/L (ref 136–145)
WBC # BLD AUTO: 17.91 THOUSAND/UL (ref 4.31–10.16)

## 2021-07-27 PROCEDURE — 88305 TISSUE EXAM BY PATHOLOGIST: CPT | Performed by: PATHOLOGY

## 2021-07-27 PROCEDURE — 85730 THROMBOPLASTIN TIME PARTIAL: CPT | Performed by: PHYSICIAN ASSISTANT

## 2021-07-27 PROCEDURE — 94640 AIRWAY INHALATION TREATMENT: CPT

## 2021-07-27 PROCEDURE — 85730 THROMBOPLASTIN TIME PARTIAL: CPT | Performed by: INTERNAL MEDICINE

## 2021-07-27 PROCEDURE — 77012 CT SCAN FOR NEEDLE BIOPSY: CPT

## 2021-07-27 PROCEDURE — 94760 N-INVAS EAR/PLS OXIMETRY 1: CPT

## 2021-07-27 PROCEDURE — 99152 MOD SED SAME PHYS/QHP 5/>YRS: CPT | Performed by: RADIOLOGY

## 2021-07-27 PROCEDURE — 99232 SBSQ HOSP IP/OBS MODERATE 35: CPT | Performed by: NURSE PRACTITIONER

## 2021-07-27 PROCEDURE — 80048 BASIC METABOLIC PNL TOTAL CA: CPT | Performed by: INTERNAL MEDICINE

## 2021-07-27 PROCEDURE — 88333 PATH CONSLTJ SURG CYTO XM 1: CPT | Performed by: PATHOLOGY

## 2021-07-27 PROCEDURE — 99232 SBSQ HOSP IP/OBS MODERATE 35: CPT | Performed by: PHYSICIAN ASSISTANT

## 2021-07-27 PROCEDURE — 99153 MOD SED SAME PHYS/QHP EA: CPT

## 2021-07-27 PROCEDURE — 88341 IMHCHEM/IMCYTCHM EA ADD ANTB: CPT | Performed by: PATHOLOGY

## 2021-07-27 PROCEDURE — 0BBL3ZX EXCISION OF LEFT LUNG, PERCUTANEOUS APPROACH, DIAGNOSTIC: ICD-10-PCS | Performed by: RADIOLOGY

## 2021-07-27 PROCEDURE — 32408 CORE NDL BX LNG/MED PERQ: CPT

## 2021-07-27 PROCEDURE — 88334 PATH CONSLTJ SURG CYTO XM EA: CPT | Performed by: PATHOLOGY

## 2021-07-27 PROCEDURE — 88342 IMHCHEM/IMCYTCHM 1ST ANTB: CPT | Performed by: PATHOLOGY

## 2021-07-27 PROCEDURE — 71045 X-RAY EXAM CHEST 1 VIEW: CPT

## 2021-07-27 PROCEDURE — 32408 CORE NDL BX LNG/MED PERQ: CPT | Performed by: RADIOLOGY

## 2021-07-27 PROCEDURE — 82948 REAGENT STRIP/BLOOD GLUCOSE: CPT

## 2021-07-27 PROCEDURE — 85027 COMPLETE CBC AUTOMATED: CPT | Performed by: INTERNAL MEDICINE

## 2021-07-27 PROCEDURE — 99152 MOD SED SAME PHYS/QHP 5/>YRS: CPT

## 2021-07-27 RX ORDER — LIDOCAINE WITH 8.4% SOD BICARB 0.9%(10ML)
SYRINGE (ML) INJECTION CODE/TRAUMA/SEDATION MEDICATION
Status: COMPLETED | OUTPATIENT
Start: 2021-07-27 | End: 2021-07-27

## 2021-07-27 RX ORDER — MIDAZOLAM HYDROCHLORIDE 2 MG/2ML
INJECTION, SOLUTION INTRAMUSCULAR; INTRAVENOUS CODE/TRAUMA/SEDATION MEDICATION
Status: COMPLETED | OUTPATIENT
Start: 2021-07-27 | End: 2021-07-27

## 2021-07-27 RX ORDER — FENTANYL CITRATE 50 UG/ML
INJECTION, SOLUTION INTRAMUSCULAR; INTRAVENOUS CODE/TRAUMA/SEDATION MEDICATION
Status: COMPLETED | OUTPATIENT
Start: 2021-07-27 | End: 2021-07-27

## 2021-07-27 RX ADMIN — TIOTROPIUM BROMIDE 18 MCG: 18 CAPSULE ORAL; RESPIRATORY (INHALATION) at 09:41

## 2021-07-27 RX ADMIN — INSULIN LISPRO 5 UNITS: 100 INJECTION, SOLUTION INTRAVENOUS; SUBCUTANEOUS at 21:20

## 2021-07-27 RX ADMIN — BENZONATATE 100 MG: 100 CAPSULE ORAL at 09:36

## 2021-07-27 RX ADMIN — LEVALBUTEROL HYDROCHLORIDE 1.25 MG: 1.25 SOLUTION, CONCENTRATE RESPIRATORY (INHALATION) at 19:08

## 2021-07-27 RX ADMIN — GUAIFENESIN 600 MG: 600 TABLET, EXTENDED RELEASE ORAL at 21:17

## 2021-07-27 RX ADMIN — AMIODARONE HYDROCHLORIDE 200 MG: 200 TABLET ORAL at 11:20

## 2021-07-27 RX ADMIN — OMEGA-3 FATTY ACIDS CAP 1000 MG 1000 MG: 1000 CAP at 17:00

## 2021-07-27 RX ADMIN — BENZONATATE 100 MG: 100 CAPSULE ORAL at 21:17

## 2021-07-27 RX ADMIN — ISODIUM CHLORIDE 3 ML: 0.03 SOLUTION RESPIRATORY (INHALATION) at 02:29

## 2021-07-27 RX ADMIN — ISODIUM CHLORIDE 3 ML: 0.03 SOLUTION RESPIRATORY (INHALATION) at 19:08

## 2021-07-27 RX ADMIN — GUAIFENESIN 600 MG: 600 TABLET, EXTENDED RELEASE ORAL at 09:36

## 2021-07-27 RX ADMIN — METOPROLOL TARTRATE 25 MG: 25 TABLET, FILM COATED ORAL at 21:17

## 2021-07-27 RX ADMIN — LEVALBUTEROL HYDROCHLORIDE 1.25 MG: 1.25 SOLUTION, CONCENTRATE RESPIRATORY (INHALATION) at 02:29

## 2021-07-27 RX ADMIN — LEVOTHYROXINE SODIUM 150 MCG: 150 TABLET ORAL at 06:03

## 2021-07-27 RX ADMIN — AMIODARONE HYDROCHLORIDE 200 MG: 200 TABLET ORAL at 16:58

## 2021-07-27 RX ADMIN — METOPROLOL TARTRATE 25 MG: 25 TABLET, FILM COATED ORAL at 09:36

## 2021-07-27 RX ADMIN — FENTANYL CITRATE 25 MCG: 50 INJECTION, SOLUTION INTRAMUSCULAR; INTRAVENOUS at 13:46

## 2021-07-27 RX ADMIN — PANTOPRAZOLE SODIUM 40 MG: 40 TABLET, DELAYED RELEASE ORAL at 09:36

## 2021-07-27 RX ADMIN — Medication 6 ML: at 13:49

## 2021-07-27 RX ADMIN — ALLOPURINOL 300 MG: 300 TABLET ORAL at 09:36

## 2021-07-27 RX ADMIN — LEVALBUTEROL HYDROCHLORIDE 1.25 MG: 1.25 SOLUTION, CONCENTRATE RESPIRATORY (INHALATION) at 15:34

## 2021-07-27 RX ADMIN — BENZONATATE 100 MG: 100 CAPSULE ORAL at 16:58

## 2021-07-27 RX ADMIN — OMEGA-3 FATTY ACIDS CAP 1000 MG 1000 MG: 1000 CAP at 09:36

## 2021-07-27 RX ADMIN — LEVALBUTEROL HYDROCHLORIDE 1.25 MG: 1.25 SOLUTION, CONCENTRATE RESPIRATORY (INHALATION) at 07:11

## 2021-07-27 RX ADMIN — ISODIUM CHLORIDE 3 ML: 0.03 SOLUTION RESPIRATORY (INHALATION) at 15:34

## 2021-07-27 RX ADMIN — HEPARIN SODIUM 19 UNITS/KG/HR: 10000 INJECTION, SOLUTION INTRAVENOUS at 15:54

## 2021-07-27 RX ADMIN — INSULIN LISPRO 8 UNITS: 100 INJECTION, SOLUTION INTRAVENOUS; SUBCUTANEOUS at 16:58

## 2021-07-27 RX ADMIN — ISODIUM CHLORIDE 3 ML: 0.03 SOLUTION RESPIRATORY (INHALATION) at 07:11

## 2021-07-27 RX ADMIN — MIDAZOLAM HYDROCHLORIDE 0.5 MG: 1 INJECTION, SOLUTION INTRAMUSCULAR; INTRAVENOUS at 13:46

## 2021-07-27 RX ADMIN — PREDNISONE 20 MG: 20 TABLET ORAL at 09:37

## 2021-07-27 RX ADMIN — AMIODARONE HYDROCHLORIDE 200 MG: 200 TABLET ORAL at 09:36

## 2021-07-27 RX ADMIN — INSULIN GLARGINE 10 UNITS: 100 INJECTION, SOLUTION SUBCUTANEOUS at 21:17

## 2021-07-27 NOTE — PHYSICAL THERAPY NOTE
Physical Therapy Cancellation Note           07/27/21 1441   PT Last Visit   PT Visit Date 07/27/21   Note Type   Note Type Treatment   Assessment   Assessment pt is refusing PT intervention stating he is tired and just got back from a smaill procedure  pt was educated on the importance of participating in PT intyervention daily but pt continues to refuse        Jorgito Mcclendon, PTA

## 2021-07-27 NOTE — UTILIZATION REVIEW
Continued Stay Review    Date: 7/27                          Current Patient Class: inpatient  Current Level of Care: med surg    HPI:80 y o  male w/hx a fib, dm, R lung mass, copd on 3li home o2 initially admitted on 7/14 to med surg as inpatient due to hyperkalemia/calcemia, copd exac, rapid a flutter, lung mass, and SULEMA  Assessment/Plan: 7/27: dry mm, diminished breath sounds  enlarging lung mass suspicious for malignancy discovered, waiting on biopsy, which was to be done yesterday, but did not occur  Prior biopsy done on 7/21 showed necrotic tissue  On 2 5li o2  Heparin gtt in progress  PM Update: To KELVIN [email or web address]  Procedure: CT left lung mass biopsy  Specimens: 3, 18 G cores of left lung mass    Anesthesia: conscious sedation  Findings: Pleural based left lung mass, 3 18 g cores taken       Persistent R sided pain and cough  Resume eliquis tomorrow, continue heparin gtt for now       Vital Signs:   Date/Time  Temp  Pulse  Resp  BP  MAP (mmHg)  SpO2   Nasal Cannula O2 Flow Rate (L/min)  O2 Device    07/27/21 1613  98 1 °F (36 7 °C)  70  18  107/57  79  93 %   --  None (Room air)     07/27/21 1535  --  --  --  --  --  92 %   2 L/min  Nasal cannula    07/27/21 1408  --  52Abnormal   18  103/51  --  94 %   --  --    07/27/21 1403  --  59  18  104/54  --  95 %   --  --    07/27/21 1358  --  53Abnormal   16  110/52  --  96 %   --  --    07/27/21 1353  --  58  18  97/50  --  95 %   --  --    07/27/21 1348  --  55  20  109/56  --  98 %   --  --    07/27/21 1343  --  73  18  109/55  --  99 %   --  --    07/27/21 1338  --  59  18  112/56  --  98 %             Date/Time Temp  Pulse  Resp  BP  MAP (mmHg)  SpO2   Nasal Cannula O2 Flow Rate (L/min)  O2 Device    07/27/21 0734 98 5 °F (36 9 °C)  86  18  118/58  83  93 %   2 5 L/min  Nasal cannula    07/27/21 0711 --  --  --  --  --  93 %   2 5 L/min  Nasal cannula    07/27/21 0445 --  --  --  --  --  --   --  Nasal cannula    07/27/21 0230 --  --  --  --  --  94 %   -- --    07/26/21 2100 98 °F (36 7 °C)  77  --  117/56  --  94 %   --  --    07/26/21 1936 --  --  --  --  --  92 %   --  --    07/26/21 1549 98 1 °F (36 7 °C)  56  20  122/56  --  96 %   2 5 L/min  Nasal cannula    07/26/21 1348 --  --  --  --  --  94 %   2 5 L/min  Nasal cannula    07/26/21 0900 --  --  --  --  --  93 %   --  Nasal cannula    07/26/21 0724 --  --  --  --  --  94 %   --  None (Room air)    07/26/21 0700 98 3 °F (36 8 °C)  72  20  127/58  --  96 %   2 5 L/min  Nasal cannula    07/26/21 0133 99 2 °F (37 3 °C)  --  --  --  --  --   --  --    07/26/21 0117 --  --  --  --  --  96 %   2 5 L/min  Nasal cannula    07/25/21 2246 100 7 °F (38 2 °C)Abnormal   97  20  136/70  97  91 %   2 5 L/min  Nasal cannula    07/25/21 2217 --  90  --  144/68  --  --   --  --    07/25/21 1922 --  --  --  --  --  95 %   2 5 L/min  Nasal cannula    07/25/21 1526 99 4 °F (37 4 °C)  61  20  111/58  81  96 %   2 5 L/min  Nasal cannula    07/25/21 1303 --  --  --  --  --  92 %   --  --    07/25/21 0734 97 6 °F (36 4 °C)  69  20  128/63  90  90 %   --  Nasal cannula          Pertinent Labs/Diagnostic Results:     7/27 CT guided lung bx Impression: Successful percutaneous core biopsy of left lung mass  A full pathology report will follow    Results from last 7 days   Lab Units 07/27/21  0350 07/24/21  0442 07/23/21  0523 07/21/21  0556   WBC Thousand/uL 17 91* 16 61* 13 20* 13 88*   HEMOGLOBIN g/dL 10 1* 9 6* 9 6* 9 5*   HEMATOCRIT % 35 3* 34 6* 33 6* 33 7*   PLATELETS Thousands/uL 187 223 233 292         Results from last 7 days   Lab Units 07/27/21  0355 07/24/21  0442 07/23/21  0523 07/21/21  0556   SODIUM mmol/L 131* 136 137 136   POTASSIUM mmol/L 4 8 4 6 4 5 4 8   CHLORIDE mmol/L 98* 98* 101 98*   CO2 mmol/L 29 33* 30 33*   ANION GAP mmol/L 4 5 6 5   BUN mg/dL 31* 33* 35* 37*   CREATININE mg/dL 1 21 1 01 1 06 1 09   EGFR ml/min/1 73sq m 57 71 67 65   CALCIUM mg/dL 9 1 9 4 9 2 9 1   MAGNESIUM mg/dL  --   --   --  1 8   PHOSPHORUS mg/dL  --   --   --  2 7         Results from last 7 days   Lab Units 07/27/21  1040 07/27/21  0724 07/26/21  2143 07/26/21  1548 07/26/21  1055 07/26/21  0716 07/25/21  2105 07/25/21  1608 07/25/21  1031 07/25/21  0733 07/24/21  2119 07/24/21  1621   POC GLUCOSE mg/dl 193* 194* 418* 252* 167* 151* 196* 248* 314* 144* 318* 327*     Results from last 7 days   Lab Units 07/27/21  0355 07/24/21  0442 07/23/21  0523 07/21/21  1711 07/21/21  0556   GLUCOSE RANDOM mg/dL 269* 164* 257* 446* 235*       Results from last 7 days   Lab Units 07/27/21  0331 07/26/21  2141 07/25/21  0637   PTT seconds 97* 79* 69*       Results from last 7 days   Lab Units 07/21/21  1711   FERRITIN ng/mL 2,470*       Results from last 7 days   Lab Units 07/24/21  0442 07/21/21  0556   TOTAL COUNTED  100 100             Medications:   Scheduled Medications:  allopurinol, 300 mg, Oral, Daily  amiodarone, 200 mg, Oral, TID With Meals  benzonatate, 100 mg, Oral, TID  fish oil, 1,000 mg, Oral, BID  guaiFENesin, 600 mg, Oral, Q12H [address]  insulin glargine, 10 Units, Subcutaneous, HS  insulin lispro, 1-5 Units, Subcutaneous, HS  insulin lispro, 2-12 Units, Subcutaneous, TID AC  levalbuterol, 1 25 mg, Nebulization, Q6H  levothyroxine, 150 mcg, Oral, Daily  lidocaine, 1 patch, Topical, Q24H  metoprolol tartrate, 25 mg, Oral, Q12H ZOHAIB  pantoprazole, 40 mg, Oral, Daily  predniSONE, 20 mg, Oral, Daily  sodium chloride, 3 mL, Nebulization, Q6H  tiotropium, 18 mcg, Inhalation, Daily      Continuous IV Infusions:  heparin (porcine), 3-20 Units/kg/hr (Order-Specific), Intravenous, Titrated      PRN Meds:  acetaminophen, 650 mg, Oral, Q6H PRN  dextromethorphan-guaiFENesin, 10 mL, Oral, Q4H PRN  heparin (porcine), 2,000 Units, Intravenous, Q1H PRN  heparin (porcine), 4,000 Units, Intravenous, Q1H PRN  ondansetron, 4 mg, Intravenous, Q6H PRN    Discharge Plan: D    Network Utilization Review Department  ATTENTION: Please call with any questions or concerns to [phone] and carefully listen to the prompts so that you are directed to the right person  All voicemails are confidential   Elnor Raider all requests for admission clinical reviews, approved or denied determinations and any other requests to dedicated fax number below belonging to the campus where the patient is receiving treatment   List of dedicated fax numbers for the Facilities:  [address] DENIALS (Administrative/Medical Necessity) [phone]   [address] (Maternity/NICU/Pediatrics) [phone] [address] [place] [address] Dr Fred Parks [id number] [place] [address] Shana Arndt 1481 [address] [place] [address] [phone]

## 2021-07-27 NOTE — PROGRESS NOTES
Progress Note - Pulmonary   Danielle Stu Angel 68 y o  male MRN: [id number]  Unit/Bed#: S -01 Encounter: [id number]    Assessment/Plan:    1  Acute pulmonary insufficiency on chronic hypoxic respiratory failure likely multifaceted as listed below       -    Currently on home O2 requirements of 3 L- 93%       -   Maintain saturations greater than 89%       -   Pulmonary toileting:  Deep breathing cough, OOB  As tolerated, IS Q 1 hr         2  Right lung mass highly suspicious for malignancy       -  6/21/2021- EBUS- atypical cells       -  7/21/2021- FNA of RUL- necrotic tissue       -  7/27/2021-  Scheduled for repeat biopsy contralateral lung       -   Radiation oncology following for consideration of palliative RT    3  COPD of unknown severity without acute exacerbation        -   Inpatient:  Spiriva and nebulized treatment        -   No indication for steroid        -  home regimen: Bevespi 120 milligram 1 puff b i d         -   Will need close pulmonary follow-up    4  ITP       -   Platelet count remains stable       -   Currently on 20 mg p o  prednisone       -    If platelet count is stable will consider decreasing prednisone vs DC    5  Proximal AFib with RVR      -   Cardiology following- likely secondary to electrolyte abnormality      -   Currently maintained on heparin GTT      -   Will continue Eliquis after biopsy has been completed      -  Outpatient follow-up per discharge instructions    Chief Complaint:    "I am rather frustrated"    Subjective:    Keith Harvey  Was sitting in his chair  He was frustrated that the biopsy was not performed yesterday  No significant overnight events reported  Patient does report pulmonary vest does help expectorates sputum  Patient currently denies any fever, chills, hemoptysis, headaches, night sweats, pleuritic chest pain, or palpitations  Objective:    Vitals: Blood pressure 118/58, pulse 86, temperature 98 5 °F (36 9 °C), temperature source Oral, resp   rate 18, height 5' 2" (1 575 m), weight 75 8 kg (167 lb), SpO2 93 %  3L,Body mass index is 30 54 kg/m²  Intake/Output Summary (Last 24 hours) at 7/27/2021 1129  Last data filed at 7/27/2021 0445  Gross per 24 hour   Intake --   Output 2210 ml   Net -2210 ml       Invasive Devices     Peripheral Intravenous Line            Peripheral IV 07/24/21 Right Forearm 2 days                Physical Exam:   Physical Exam  Constitutional:       General: He is not in acute distress  Appearance: Normal appearance  He is normal weight  He is not ill-appearing  HENT:      Head: Normocephalic and atraumatic  Nose: Nose normal  No congestion or rhinorrhea  Mouth/Throat:      Mouth: Mucous membranes are dry  Pharynx: No oropharyngeal exudate or posterior oropharyngeal erythema  Cardiovascular:      Rate and Rhythm: Normal rate and regular rhythm  Pulses: Normal pulses  Heart sounds: Normal heart sounds  No murmur heard  No friction rub  No gallop  Pulmonary:      Effort: Pulmonary effort is normal  No tachypnea, bradypnea, accessory muscle usage or respiratory distress  Breath sounds: Decreased air movement present  No stridor or transmitted upper airway sounds  Decreased breath sounds present  No wheezing, rhonchi or rales  Comments: Diminished breath sounds at bases  Chest:      Chest wall: No tenderness  Abdominal:      General: Abdomen is flat  Bowel sounds are normal  There is no distension  Palpations: Abdomen is soft  There is no mass  Musculoskeletal:         General: No swelling or tenderness  Normal range of motion  Cervical back: Normal range of motion and neck supple  No rigidity or tenderness  Skin:     General: Skin is warm and dry  Coloration: Skin is not jaundiced or pale  Neurological:      General: No focal deficit present  Mental Status: He is alert and oriented to person, place, and time  Mental status is at baseline     Psychiatric: Mood and Affect: Mood normal          Behavior: Behavior normal          Labs:    I have personally reviewed pertinent lab results CBC:   Lab Results   Component Value Date    WBC 17 91 (H) 07/27/2021    HGB 10 1 (L) 07/27/2021    HCT 35 3 (L) 07/27/2021    MCV 84 07/27/2021     07/27/2021    MCH 23 9 (L) 07/27/2021    MCHC 28 6 (L) 07/27/2021    RDW 20 0 (H) 07/27/2021    MPV 11 2 07/27/2021   , CMP:   Lab Results   Component Value Date    SODIUM 131 (L) 07/27/2021    K 4 8 07/27/2021    CL 98 (L) 07/27/2021    CO2 29 07/27/2021    BUN 31 (H) 07/27/2021    CREATININE 1 21 07/27/2021    CALCIUM 9 1 07/27/2021    EGFR 57 07/27/2021       Imaging and other studies: I have personally reviewed pertinent films in PACS     Chest CT- 7/22/2021- very large right lung mass in peripheral upper lobe

## 2021-07-27 NOTE — ASSESSMENT & PLAN NOTE
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  Was scheduled to have IR guided biopsy as outpatient on 7/21 however was done inpatient on 7/21 with IR  · Patient is currently status post for repeat lung biopsy on 07/27 since the biopsy from several days ago resulted in necrotic tissue without viable tumor - target possibly left lung  · Eliquis on hold and on IV heparin until no further procedures are planned-heparin GTT currently on hold in the setting of lung biopsy today  · Resume Eliquis after biopsy tomorrow on 7/28  · Continue heparin drip for now   · Per last oncology note: "Discussed case with Dr Og Mcmahon with interventional radiology who preformed the bx and stated he is unsure if pathology will result with evidence of malignancy   If this bx is negative, next steps may include attempted biopsy of contralateral lung nodule"

## 2021-07-27 NOTE — PLAN OF CARE
Problem: PAIN - ADULT  Goal: Verbalizes/displays adequate comfort level or baseline comfort level  Description: Interventions:  - Encourage patient to monitor pain and request assistance  - Assess pain using appropriate pain scale (e g  0-10)  - Administer analgesics based on type and severity of pain and evaluate response  - Implement non-pharmacological measures as appropriate and evaluate response  - Consider cultural and social influences on pain and pain management  - Notify physician/advanced practitioner if interventions unsuccessful or patient reports new pain  7/27/2021 0453 by Agustin Gannon RN  Outcome: Progressing  7/27/2021 0453 by Agustin Gannon RN  Outcome: Progressing     Problem: INFECTION - ADULT  Goal: Absence or prevention of progression during hospitalization  Description: INTERVENTIONS:  - Assess and monitor for signs and symptoms of infection  - Monitor lab/diagnostic results  - Monitor all insertion sites, i e  IV site, bx site  - [place] appropriate cooling/warming therapies per order  - Administer medications as ordered  - Instruct and encourage patient and family to use good hand hygiene technique  - Identify and instruct in appropriate isolation precautions for identified infection/condition  7/27/2021 0453 by Agustin Gannon RN  Outcome: Progressing  7/27/2021 0453 by Agustin Gannon RN  Outcome: Progressing  Goal: Absence of fever/infection during neutropenic period  Description: INTERVENTIONS:  - Monitor WBC and ANC  - Implement neutropenic precautions if/when pt becomes neutropenic    7/27/2021 0453 by Agustin Gannon RN  Outcome: Progressing  7/27/2021 0453 by Agustin Gannon RN  Outcome: Progressing     Problem: SAFETY ADULT  Goal: Patient will remain free of falls  Description: INTERVENTIONS:  - Educate patient/family on patient safety including physical limitations  - Instruct patient to call for assistance with activity   - Consult OT/PT to assist with strengthening/mobility   - Keep Call bell within reach  - Keep bed low and locked with side rails adjusted as appropriate  - Keep care items and personal belongings within reach  - Initiate and maintain comfort rounds  - Make Fall Risk Sign visible to staff  - Offer Toileting every 2 hours, in advance of need  - Maintain chair alarm  - Obtain necessary fall risk management equipment: chair alarm  - Apply yellow socks and bracelet for high fall risk patients  - Consider moving patient to room near nurses station  7/27/2021 0453 by Clare Jaime RN  Outcome: Progressing  7/27/2021 0453 by Clare Jaime RN  Outcome: Progressing  Goal: Maintain or return to baseline ADL function  Description: INTERVENTIONS:  -  Assess patient's ability to carry out ADLs; assess patient's baseline for ADL function and identify physical deficits which impact ability to perform ADLs (bathing, care of mouth/teeth, toileting, grooming, dressing, etc )  - Assess/evaluate cause of self-care deficits   - Assess range of motion  - Assess patient's mobility; develop plan if impaired  - Assess patient's need for assistive devices and provide as appropriate  - Encourage maximum independence but intervene and supervise when necessary  - Involve family in performance of ADLs  - Assess for home care needs following discharge   - Consider OT consult to assist with ADL evaluation and planning for discharge  - Provide patient education as appropriate  7/27/2021 0453 by Clare Jaime RN  Outcome: Progressing  7/27/2021 0453 by Clare Jaime RN  Outcome: Progressing  Goal: Maintains/Returns to pre admission functional level  Description: INTERVENTIONS:  - Perform BMAT or MOVE assessment daily    - Set and communicate daily mobility goal to care team and patient/family/caregiver     - Collaborate with rehabilitation services on mobility goals if consulted  - Out of bed to chair 3 times a day   - Out of bed for meals 3 times a day  - Out of bed for toileting  - Record patient progress and toleration of activity level   7/27/2021 0453 by Kari Medrano RN  Outcome: Progressing  7/27/2021 0453 by Kari Medrano RN  Outcome: Progressing     Problem: DISCHARGE PLANNING  Goal: Discharge to home or other facility with appropriate resources  Description: INTERVENTIONS:  - Identify barriers to discharge w/patient and caregiver  - Arrange for needed discharge resources and transportation as appropriate  - Identify discharge learning needs (meds, wound care, etc )  - Arrange for interpretive services to assist at discharge as needed  - Refer to Case Management Department for coordinating discharge planning if the patient needs post-hospital services based on physician/advanced practitioner order or complex needs related to functional status, cognitive ability, or social support system  7/27/2021 0453 by Kari Medrano RN  Outcome: Progressing  7/27/2021 0453 by Kari Medrano RN  Outcome: Progressing     Problem: Knowledge Deficit  Goal: Patient/family/caregiver demonstrates understanding of disease process, treatment plan, medications, and discharge instructions  Description: Complete learning assessment and assess knowledge base    Interventions:  - Provide teaching at level of understanding  - Provide teaching via preferred learning methods  7/27/2021 0453 by Kari Medrano RN  Outcome: Progressing  7/27/2021 0453 by Kari Medrano RN  Outcome: Progressing     Problem: CARDIOVASCULAR - ADULT  Goal: Maintains optimal cardiac output and hemodynamic stability  Description: INTERVENTIONS:  - Monitor I/O, vital signs and rhythm  - Monitor for S/S and trends of decreased cardiac output  - Administer and titrate ordered vasoactive medications to optimize hemodynamic stability  - Assess quality of pulses, skin color and temperature  - Assess for signs of decreased coronary artery perfusion  - Instruct patient to report change in severity of symptoms  7/27/2021 0453 by Nga Davis RN  Outcome: Progressing  7/27/2021 0453 by Nga Davis RN  Outcome: Progressing  Goal: Absence of cardiac dysrhythmias or at baseline rhythm  Description: INTERVENTIONS:  - Continuous cardiac monitoring, vital signs, obtain 12 lead EKG if ordered  - Administer antiarrhythmic and heart rate control medications as ordered  - Monitor electrolytes and administer replacement therapy as ordered  7/27/2021 0453 by Nga Davis RN  Outcome: Progressing  7/27/2021 0453 by Nga Davis RN  Outcome: Progressing     Problem: METABOLIC, FLUID AND ELECTROLYTES - ADULT  Goal: Electrolytes maintained within normal limits  Description: INTERVENTIONS:  - Monitor labs and assess patient for signs and symptoms of electrolyte imbalances  - Administer electrolyte replacement as ordered  - Monitor response to electrolyte replacements, including repeat lab results as appropriate  - Instruct patient on fluid and nutrition as appropriate  7/27/2021 0453 by Nga Davis RN  Outcome: Progressing  7/27/2021 0453 by Nga Davis RN  Outcome: Progressing  Goal: Fluid balance maintained  Description: INTERVENTIONS:  - Monitor labs   - Monitor I/O and WT  - Instruct patient on fluid and nutrition as appropriate  - Assess for signs & symptoms of volume excess or deficit  7/27/2021 0453 by Nga Davis RN  Outcome: Progressing  7/27/2021 0453 by Nga Davis RN  Outcome: Progressing  Goal: Glucose maintained within target range  Description: INTERVENTIONS:  - Monitor Blood Glucose as ordered and provide SSI/lantus as ordered  - Assess for signs and symptoms of hyperglycemia and hypoglycemia  - Administer ordered medications to maintain glucose within target range  - Assess nutritional intake and initiate nutrition service referral as needed  7/27/2021 0453 by Nga Davis RN  Outcome: Progressing  7/27/2021 0453 by Nga Davis RN  Outcome: Progressing Problem: MOBILITY - ADULT  Goal: Maintain or return to baseline ADL function  Description: INTERVENTIONS:  -  Assess patient's ability to carry out ADLs; assess patient's baseline for ADL function and identify physical deficits which impact ability to perform ADLs (bathing, care of mouth/teeth, toileting, grooming, dressing, etc )  - Assess/evaluate cause of self-care deficits   - Assess range of motion  - Assess patient's mobility; develop plan if impaired  - Assess patient's need for assistive devices and provide as appropriate  - Encourage maximum independence but intervene and supervise when necessary  - Involve family in performance of ADLs  - Assess for home care needs following discharge   - Consider OT consult to assist with ADL evaluation and planning for discharge  - Provide patient education as appropriate  7/27/2021 0453 by Genny Goltz, RN  Outcome: Progressing  7/27/2021 0453 by Genny Goltz, RN  Outcome: Progressing  Goal: Maintains/Returns to pre admission functional level  Description: INTERVENTIONS:  - Perform BMAT or MOVE assessment daily    - Set and communicate daily mobility goal to care team and patient/family/caregiver     - Collaborate with rehabilitation services on mobility goals if consulted  - Out of bed to chair 3 times a day   - Out of bed for meals 3 times a day  - Out of bed for toileting  - Record patient progress and toleration of activity level   7/27/2021 0453 by Genny Goltz, RN  Outcome: Progressing  7/27/2021 0453 by Genny Goltz, RN  Outcome: Progressing     Problem: Potential for Falls  Goal: Patient will remain free of falls  Description: INTERVENTIONS:  - Educate patient/family on patient safety including physical limitations  - Instruct patient to call for assistance with activity   - Consult OT/PT to assist with strengthening/mobility   - Keep Call bell within reach  - Keep bed low and locked with side rails adjusted as appropriate  - Keep care items and personal belongings within reach  - Initiate and maintain comfort rounds  - Make Fall Risk Sign visible to staff  - Offer Toileting every 2 hours, in advance of need  - Maintain chair alarm  - Obtain necessary fall risk management equipment: chair alarm  - Apply yellow socks and bracelet for high fall risk patients  - Consider moving patient to room near nurses station  7/27/2021 0453 by Audrey Padilla RN  Outcome: Progressing  7/27/2021 0453 by Audrey Padilla RN  Outcome: Progressing     Problem: Prexisting or High Potential for Compromised Skin Integrity  Goal: Skin integrity is maintained or improved  Description: INTERVENTIONS:  - Identify patients at risk for skin breakdown  - Assess and monitor skin integrity  - Assess and monitor nutrition and hydration status  - Monitor labs   - Assess for incontinence   - Turn and reposition patient  - Assist with mobility/ambulation  - Relieve pressure over bony prominences  - Avoid friction and shearing  - Provide appropriate hygiene as needed including keeping skin clean and dry  - Collaborate with interdisciplinary team   - Patient/family teaching  - Consider wound care consult   7/27/2021 0453 by Audrey Padilla RN  Outcome: Progressing  7/27/2021 0453 by Audrey Padilla RN  Outcome: Progressing

## 2021-07-27 NOTE — ASSESSMENT & PLAN NOTE
· Improving  RR called due to patient being found with increased work of breathing early in admission with tachypnea and tachycardia  Patient found to be in Afib with RVR, IV metoprolol, IV labetalol given with improvement  Patient placed on BiPAP and transferred to ICU briefly    · Monitor oxygen status closely   · Patient normally is on 3 L nasal cannula at home  · Titrate to maintain SpO2 greater than or equal to 89%  · He will need a home O2 eval at discharge   · Pulmonary disease following   · Recommendations appreciated

## 2021-07-27 NOTE — ASSESSMENT & PLAN NOTE
· Initially presented with AFib RVR, Rate controlled now   · Continue oral amiodarone load - 200 mg TID x 1 week, BID x 1 week, then daily  · Continue routine metoprolol 25mg BID  · Cardiology is on board  · Appreciate input   · On IV heparin in place of usual PO eliquis given lung mass bx on 7/21      · Patient will be having repeat lung biopsy scheduled for 07/27   · Currently holding heparin GTT in the setting of lung biopsy today  · Resume eliquis 7/28

## 2021-07-27 NOTE — ASSESSMENT & PLAN NOTE
· With acute exacerbation on admission - presenting with worsening shortness of breath, productive cough and wheezing     · Pulmonology consulted appreciate input  · Patient is usually on 3 L nasal cannula at home  · Continue Xopenex and Spiriva and 3% nebulizers  · Continue respiratory protocol  · Will require home oxygen eval upon discharge   · Outpatient Pulmonary follow-up at discharge  · Discharge on Bevespi with albuterol PRN home meds   · Follow up on recommendations for steroid taper

## 2021-07-27 NOTE — SEDATION DOCUMENTATION
Lung biopsy completed by Dr Radha Carmen  Bandaid to site  Patient tolerated well and returned to inpatient room via bed   Report and care assumed by primary RN

## 2021-07-27 NOTE — PROGRESS NOTES
[facility]  Progress Note - Alfrieda Salvage 1940, 68 y o  male MRN: [id number]  Unit/Bed#: S -93 Encounter: [id number]  Primary Care Provider: Yahir Bourgeois DO   Date and time admitted to hospital: 7/14/2021  1:39 PM    * Acute respiratory failure with hypoxia and hypercapnia (HCC)  Assessment & Plan  · Improving  RR called due to patient being found with increased work of breathing early in admission with tachypnea and tachycardia  Patient found to be in Afib with RVR, IV metoprolol, IV labetalol given with improvement  Patient placed on BiPAP and transferred to ICU briefly  · Monitor oxygen status closely   · Patient normally is on 3 L nasal cannula at home  · Titrate to maintain SpO2 greater than or equal to 89%  · He will need a home O2 eval at discharge   · Pulmonary disease following   · Recommendations appreciated     Mass of right lung  Assessment & Plan  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  Was scheduled to have IR guided biopsy as outpatient on 7/21 however was done inpatient on 7/21 with IR  · Patient is currently status post for repeat lung biopsy on 07/27 since the biopsy from several days ago resulted in necrotic tissue without viable tumor - target possibly left lung  · Eliquis on hold and on IV heparin until no further procedures are planned-heparin GTT currently on hold in the setting of lung biopsy today  · Resume Eliquis after biopsy tomorrow on 7/28  · Continue heparin drip for now   · Per last oncology note: "Discussed case with Dr Lauren Ramos with interventional radiology who preformed the bx and stated he is unsure if pathology will result with evidence of malignancy   If this bx is negative, next steps may include attempted biopsy of contralateral lung nodule"         COPD (chronic obstructive pulmonary disease) with acute exacerbation ([facility] [address] )  Assessment & Plan  · With acute exacerbation on admission - presenting with worsening shortness of breath, productive cough and wheezing  · Pulmonology consulted appreciate input  · Patient is usually on 3 L nasal cannula at home  · Continue Xopenex and Spiriva and 3% nebulizers  · Continue respiratory protocol  · Will require home oxygen eval upon discharge   · Outpatient Pulmonary follow-up at discharge  · Discharge on Bevespi with albuterol PRN home meds   · Follow up on recommendations for steroid taper     Paroxysmal A-fib with RVR  Assessment & Plan  · Initially presented with AFib RVR, Rate controlled now   · Continue oral amiodarone load - 200 mg TID x 1 week, BID x 1 week, then daily  · Continue routine metoprolol 25mg BID  · Cardiology is on board  · Appreciate input   · On IV heparin in place of usual PO eliquis given lung mass bx on 7/21  · Patient will be having repeat lung biopsy scheduled for 07/27   · Currently holding heparin GTT in the setting of lung biopsy today  · Resume eliquis 7/28     Idiopathic thrombocytopenic purpura (ITP) ([facility])  Assessment & Plan  · Was previously receiving IV Solu-Medrol  · Transitioned to PO Prednisone 20 mg on 07/27  · Platelets remain stable    New onset type 2 diabetes mellitus [facility])  Assessment & Plan  Lab Results   Component Value Date    HGBA1C 6 6 (H) 06/20/2021       Recent Labs     07/26/21  1548 07/26/21  2143 07/27/21  0724 07/27/21  1040   POCGLU 252* 418* 194* 193*       Blood Sugar Average: Last 72 hrs:  (P) 242 [id number]  · A1C 6 6  BG labile over last 24 hours   · Continue sliding scale insulin     Hypothyroidism  Assessment & Plan  · TSH low, free T4 normal range  · Continue levothyroxine 150 mcg daily        Hyponatremia  Assessment & Plan  · Noted at 131, corrects to 135 based on glucose  · Monitor         VTE Pharmacologic Prophylaxis: VTE Score: 5 High Risk (Score >/= 5) - Pharmacological DVT Prophylaxis Ordered: heparin drip  Sequential Compression Devices Ordered  Patient Centered Rounds: I performed bedside rounds with nursing staff today  Discussions with Specialists or Other Care Team Provider: Discussed with RN, VANESSA    Education and Discussions with Family / Patient: Updated  (multiple family members) at bedside  Time Spent for Care: 30 minutes  More than 50% of total time spent on counseling and coordination of care as described above  Current Length of Stay: 13 day(s)  Current Patient Status: Inpatient   Certification Statement: The patient will continue to require additional inpatient hospital stay due to on going monitoring after biopsy   Discharge Plan: Anticipate discharge in 24-48 hrs to home with home services  Code Status: Level 1 - Full Code    Subjective:   Patient reports that he is still having some pain in his right side  Denies pain on left  Still reports coughing  Denies wheezing or chest pain otherwise  Denies fevers or chills  Asking about diet order  Objective:     Vitals:   Temp (24hrs), Av 2 °F (36 8 °C), Min:98 °F (36 7 °C), Max:98 5 °F (36 9 °C)    Temp:  [98 °F (36 7 °C)-98 5 °F (36 9 °C)] 98 5 °F (36 9 °C)  HR:  [52-86] 52  Resp:  [16-20] 18  BP: ()/(50-58) 103/51  SpO2:  [92 %-99 %] 92 %  Body mass index is 30 54 kg/m²  Input and Output Summary (last 24 hours): Intake/Output Summary (Last 24 hours) at 2021 1547  Last data filed at 2021 0445  Gross per 24 hour   Intake --   Output 1060 ml   Net -1060 ml       Physical Exam:   Physical Exam  Constitutional:       General: He is not in acute distress  Appearance: Normal appearance  He is normal weight  He is not ill-appearing or diaphoretic  Interventions: Nasal cannula in place  HENT:      Head: Normocephalic and atraumatic  Mouth/Throat:      Mouth: Mucous membranes are moist    Eyes:      General: No scleral icterus       Pupils: Pupils are equal, round, and reactive to light  Cardiovascular:      Rate and Rhythm: Normal rate and regular rhythm  Pulses: Normal pulses  Heart sounds: Normal heart sounds, S1 normal and S2 normal  No murmur heard  No systolic murmur is present  No diastolic murmur is present  No gallop  No S3 or S4 sounds  Pulmonary:      Effort: Pulmonary effort is normal  No accessory muscle usage or respiratory distress  Breath sounds: Normal breath sounds  No stridor  No decreased breath sounds, wheezing, rhonchi or rales  Chest:      Chest wall: No tenderness  Abdominal:      General: Bowel sounds are normal  There is no distension  Palpations: Abdomen is soft  Tenderness: There is no abdominal tenderness  There is no guarding  Musculoskeletal:      Right lower leg: No edema  Left lower leg: No edema  Skin:     General: Skin is warm and dry  Coloration: Skin is not jaundiced  Neurological:      General: No focal deficit present  Mental Status: He is alert  Mental status is at baseline  Motor: No tremor or seizure activity  Psychiatric:         Behavior: Behavior is cooperative            Additional Data:     Labs:  Results from last 7 days   Lab Units 07/27/21  0350 07/24/21  0442   WBC Thousand/uL 17 91* 16 61*   HEMOGLOBIN g/dL 10 1* 9 6*   HEMATOCRIT % 35 3* 34 6*   PLATELETS Thousands/uL 187 223   LYMPHO PCT %  --  0*   MONO PCT %  --  8   EOS PCT %  --  0     Results from last 7 days   Lab Units 07/27/21  0355   SODIUM mmol/L 131*   POTASSIUM mmol/L 4 8   CHLORIDE mmol/L 98*   CO2 mmol/L 29   BUN mg/dL 31*   CREATININE mg/dL 1 21   ANION GAP mmol/L 4   CALCIUM mg/dL 9 1   GLUCOSE RANDOM mg/dL 269*         Results from last 7 days   Lab Units 07/27/21  1040 07/27/21  0724 07/26/21  2143 07/26/21  1548 07/26/21  1055 07/26/21  0716 07/25/21  2105 07/25/21  1608 07/25/21  1031 07/25/21  0733 07/24/21  2119 07/24/21  1621   POC GLUCOSE mg/dl 193* 194* 418* 252* 167* 151* 196* 248* 314* 144* 318* 327*               Lines/Drains:  Invasive Devices     Peripheral Intravenous Line            Peripheral IV 07/24/21 Right Forearm 3 days                      Imaging: No pertinent imaging reviewed      Recent Cultures (last 7 days):         Last 24 Hours Medication List:   Current Facility-Administered Medications   Medication Dose Route Frequency Provider Last Rate    acetaminophen  650 mg Oral Q6H PRN More Calle PA-C      allopurinol  300 mg Oral Daily Jill Locke PA-C      amiodarone  200 mg Oral TID With Meals Jill Locke PA-C      [START ON 7/28/2021] apixaban  5 mg Oral BID Jerrell Schwartz PA-C      benzonatate  100 mg Oral TID More Calle PA-C      dextromethorphan-guaiFENesin  10 mL Oral Q4H PRN Edwyna Dross BOYD Locke      fish oil  1,000 mg Oral BID Jill Locke PA-C      guaiFENesin  600 mg Oral Q12H [address] Jill Locke PA-C      heparin (porcine)  3-20 Units/kg/hr (Order-Specific) Intravenous Titrated Nick Spicer PA-C Stopped (07/27/21 0559)    heparin (porcine)  2,000 Units Intravenous Q1H PRN Seldon Ear, BOYD      heparin (porcine)  4,000 Units Intravenous Q1H PRN Seldon Ear, PA-ANNI      insulin glargine  10 Units Subcutaneous HS Jill Diasio, PA-C      insulin lispro  1-5 Units Subcutaneous HS Jill Diasio, PA-C      insulin lispro  2-12 Units Subcutaneous TID AC AMARJIT Escobar-ANNI      levalbuterol  1 25 mg Nebulization Q6H Jillissac Locke, PA-C      levothyroxine  150 mcg Oral Daily AMARJIT Escobar-C      lidocaine  1 patch Topical Q24H AMARJIT Escobar-ANNI      metoprolol tartrate  25 mg Oral Q12H [address] AMARJIT Escobar-ANNI      ondansetron  4 mg Intravenous Q6H PRN Jill Diavernello, PA-ANNI      pantoprazole  40 mg Oral Daily Jill Diavernello, PA-C      predniSONE  20 mg Oral Daily Seldon Ear, PA-C      sodium chloride  3 mL Nebulization Q6H Jill Locke, PA-ANNI      tiotropium  18 mcg Inhalation Daily More Calle PA-C          Today, Patient Was Seen By: Nick Spicer PA-C    **Please Note: This note may have been constructed using a voice recognition system  **

## 2021-07-27 NOTE — ASSESSMENT & PLAN NOTE
· Was previously receiving IV Solu-Medrol  · Transitioned to PO Prednisone 20 mg on 07/27  · Platelets remain stable

## 2021-07-27 NOTE — ASSESSMENT & PLAN NOTE
Lab Results   Component Value Date    HGBA1C 6 6 (H) 06/20/2021       Recent Labs     07/26/21  1548 07/26/21  2143 07/27/21  0724 07/27/21  1040   POCGLU 252* 418* 194* 193*       Blood Sugar Average: Last 72 hrs:  (P) 242 [id number]  · A1C 6 6   BG labile over last 24 hours   · Continue sliding scale insulin

## 2021-07-27 NOTE — BRIEF OP NOTE (RAD/CATH)
INTERVENTIONAL RADIOLOGY PROCEDURE NOTE    Date: 7/27/2021    Procedure: CT left lung mass biopsy    Preoperative diagnosis:   1  SULEMA (acute kidney injury) ([facility] [address] )    2  Hypercalcemia    3  Hyperkalemia    4  Paroxysmal A-fib ([facility] [address] )    5  Mass of right lung         Postoperative diagnosis: Same  Surgeon: Amalia Thomas MD     Assistant: None  No qualified resident was available  Blood loss: Minimal    Specimens: 3, 18 G cores of left lung mass     Findings: Pleural based left lung mass, 3 18 g cores taken  Discussed with the patient this is a higher risk procedure as his right lung has a large mass and if his left lung were to become compromised his pulmonary reserve would be significantly diminished  Complications: None immediate      Anesthesia: conscious sedation

## 2021-07-28 PROBLEM — R10.9 RIGHT FLANK PAIN: Status: ACTIVE | Noted: 2021-07-28

## 2021-07-28 LAB
ALBUMIN SERPL BCP-MCNC: 1.5 G/DL (ref 3.5–5)
ALP SERPL-CCNC: 144 U/L (ref 46–116)
ALT SERPL W P-5'-P-CCNC: 37 U/L (ref 12–78)
ANION GAP SERPL CALCULATED.3IONS-SCNC: 4 MMOL/L (ref 4–13)
ANISOCYTOSIS BLD QL SMEAR: PRESENT
AST SERPL W P-5'-P-CCNC: 20 U/L (ref 5–45)
BACTERIA UR QL AUTO: NORMAL /HPF
BASOPHILS # BLD MANUAL: 0 THOUSAND/UL (ref 0–0.1)
BASOPHILS NFR MAR MANUAL: 0 % (ref 0–1)
BILIRUB SERPL-MCNC: 0.55 MG/DL (ref 0.2–1)
BILIRUB UR QL STRIP: NEGATIVE
BUN SERPL-MCNC: 31 MG/DL (ref 5–25)
CALCIUM ALBUM COR SERPL-MCNC: 10.8 MG/DL (ref 8.3–10.1)
CALCIUM SERPL-MCNC: 8.8 MG/DL (ref 8.3–10.1)
CHLORIDE SERPL-SCNC: 100 MMOL/L (ref 100–108)
CLARITY UR: CLEAR
CO2 SERPL-SCNC: 31 MMOL/L (ref 21–32)
COLOR UR: YELLOW
CREAT SERPL-MCNC: 1.34 MG/DL (ref 0.6–1.3)
EOSINOPHIL # BLD MANUAL: 0.1 THOUSAND/UL (ref 0–0.4)
EOSINOPHIL NFR BLD MANUAL: 1 % (ref 0–6)
ERYTHROCYTE [DISTWIDTH] IN BLOOD BY AUTOMATED COUNT: 20.1 % (ref 11.6–15.1)
GFR SERPL CREATININE-BSD FRML MDRD: 50 ML/MIN/1.73SQ M
GLUCOSE SERPL-MCNC: 166 MG/DL (ref 65–140)
GLUCOSE SERPL-MCNC: 183 MG/DL (ref 65–140)
GLUCOSE SERPL-MCNC: 239 MG/DL (ref 65–140)
GLUCOSE SERPL-MCNC: 266 MG/DL (ref 65–140)
GLUCOSE SERPL-MCNC: 417 MG/DL (ref 65–140)
GLUCOSE SERPL-MCNC: 448 MG/DL (ref 65–140)
GLUCOSE UR STRIP-MCNC: ABNORMAL MG/DL
HCT VFR BLD AUTO: 33.5 % (ref 36.5–49.3)
HGB BLD-MCNC: 9.6 G/DL (ref 12–17)
HGB UR QL STRIP.AUTO: ABNORMAL
KETONES UR STRIP-MCNC: NEGATIVE MG/DL
LEUKOCYTE ESTERASE UR QL STRIP: NEGATIVE
LYMPHOCYTES # BLD AUTO: 0.3 THOUSAND/UL (ref 0.6–4.47)
LYMPHOCYTES # BLD AUTO: 3 % (ref 14–44)
MCH RBC QN AUTO: 23.8 PG (ref 26.8–34.3)
MCHC RBC AUTO-ENTMCNC: 28.7 G/DL (ref 31.4–37.4)
MCV RBC AUTO: 83 FL (ref 82–98)
METAMYELOCYTES NFR BLD MANUAL: 7 % (ref 0–1)
MONOCYTES # BLD AUTO: 0.6 THOUSAND/UL (ref 0–1.22)
MONOCYTES NFR BLD: 6 % (ref 4–12)
MYELOCYTES NFR BLD MANUAL: 2 % (ref 0–1)
NEUTROPHILS # BLD MANUAL: 8.14 THOUSAND/UL (ref 1.85–7.62)
NEUTS BAND NFR BLD MANUAL: 4 % (ref 0–8)
NEUTS SEG NFR BLD AUTO: 77 % (ref 43–75)
NITRITE UR QL STRIP: NEGATIVE
NON-SQ EPI CELLS URNS QL MICRO: NORMAL /HPF
NRBC BLD AUTO-RTO: 0 /100 WBCS
PH UR STRIP.AUTO: 6 [PH]
PLATELET # BLD AUTO: 179 THOUSANDS/UL (ref 149–390)
PLATELET BLD QL SMEAR: ADEQUATE
PMV BLD AUTO: 11.5 FL (ref 8.9–12.7)
POTASSIUM SERPL-SCNC: 5.4 MMOL/L (ref 3.5–5.3)
PROT SERPL-MCNC: 6.2 G/DL (ref 6.4–8.2)
PROT UR STRIP-MCNC: NEGATIVE MG/DL
RBC # BLD AUTO: 4.03 MILLION/UL (ref 3.88–5.62)
RBC #/AREA URNS AUTO: NORMAL /HPF
SODIUM SERPL-SCNC: 135 MMOL/L (ref 136–145)
SP GR UR STRIP.AUTO: 1.02 (ref 1–1.03)
TOTAL CELLS COUNTED SPEC: 100
UROBILINOGEN UR QL STRIP.AUTO: 4 E.U./DL
WBC # BLD AUTO: 10.05 THOUSAND/UL (ref 4.31–10.16)
WBC #/AREA URNS AUTO: NORMAL /HPF

## 2021-07-28 PROCEDURE — 80053 COMPREHEN METABOLIC PANEL: CPT | Performed by: PHYSICIAN ASSISTANT

## 2021-07-28 PROCEDURE — 93005 ELECTROCARDIOGRAM TRACING: CPT

## 2021-07-28 PROCEDURE — 85007 BL SMEAR W/DIFF WBC COUNT: CPT | Performed by: PHYSICIAN ASSISTANT

## 2021-07-28 PROCEDURE — 94760 N-INVAS EAR/PLS OXIMETRY 1: CPT

## 2021-07-28 PROCEDURE — 82948 REAGENT STRIP/BLOOD GLUCOSE: CPT

## 2021-07-28 PROCEDURE — 85027 COMPLETE CBC AUTOMATED: CPT | Performed by: PHYSICIAN ASSISTANT

## 2021-07-28 PROCEDURE — 99232 SBSQ HOSP IP/OBS MODERATE 35: CPT | Performed by: PHYSICIAN ASSISTANT

## 2021-07-28 PROCEDURE — 94669 MECHANICAL CHEST WALL OSCILL: CPT

## 2021-07-28 PROCEDURE — 94761 N-INVAS EAR/PLS OXIMETRY MLT: CPT

## 2021-07-28 PROCEDURE — 94668 MNPJ CHEST WALL SBSQ: CPT

## 2021-07-28 PROCEDURE — 81001 URINALYSIS AUTO W/SCOPE: CPT | Performed by: PHYSICIAN ASSISTANT

## 2021-07-28 PROCEDURE — 94640 AIRWAY INHALATION TREATMENT: CPT

## 2021-07-28 PROCEDURE — 99232 SBSQ HOSP IP/OBS MODERATE 35: CPT | Performed by: INTERNAL MEDICINE

## 2021-07-28 RX ORDER — ALBUMIN (HUMAN) 12.5 G/50ML
12.5 SOLUTION INTRAVENOUS ONCE
Status: COMPLETED | OUTPATIENT
Start: 2021-07-29 | End: 2021-07-29

## 2021-07-28 RX ORDER — LEVALBUTEROL 1.25 MG/.5ML
1.25 SOLUTION, CONCENTRATE RESPIRATORY (INHALATION)
Status: DISCONTINUED | OUTPATIENT
Start: 2021-07-29 | End: 2021-08-05 | Stop reason: HOSPADM

## 2021-07-28 RX ORDER — FUROSEMIDE 10 MG/ML
40 INJECTION INTRAMUSCULAR; INTRAVENOUS ONCE
Status: COMPLETED | OUTPATIENT
Start: 2021-07-28 | End: 2021-07-28

## 2021-07-28 RX ORDER — GABAPENTIN 100 MG/1
100 CAPSULE ORAL
Status: DISCONTINUED | OUTPATIENT
Start: 2021-07-28 | End: 2021-08-05 | Stop reason: HOSPADM

## 2021-07-28 RX ORDER — HEPARIN SODIUM 10000 [USP'U]/100ML
3-20 INJECTION, SOLUTION INTRAVENOUS
Status: DISCONTINUED | OUTPATIENT
Start: 2021-07-28 | End: 2021-07-28

## 2021-07-28 RX ORDER — SODIUM CHLORIDE FOR INHALATION 0.9 %
3 VIAL, NEBULIZER (ML) INHALATION
Status: DISCONTINUED | OUTPATIENT
Start: 2021-07-29 | End: 2021-08-05 | Stop reason: HOSPADM

## 2021-07-28 RX ORDER — ALBUTEROL SULFATE 2.5 MG/3ML
2.5 SOLUTION RESPIRATORY (INHALATION) EVERY 6 HOURS PRN
Status: DISCONTINUED | OUTPATIENT
Start: 2021-07-28 | End: 2021-08-05 | Stop reason: HOSPADM

## 2021-07-28 RX ADMIN — AMIODARONE HYDROCHLORIDE 200 MG: 200 TABLET ORAL at 17:44

## 2021-07-28 RX ADMIN — PANTOPRAZOLE SODIUM 40 MG: 40 TABLET, DELAYED RELEASE ORAL at 08:25

## 2021-07-28 RX ADMIN — LEVOTHYROXINE SODIUM 150 MCG: 150 TABLET ORAL at 05:59

## 2021-07-28 RX ADMIN — ISODIUM CHLORIDE 3 ML: 0.03 SOLUTION RESPIRATORY (INHALATION) at 19:17

## 2021-07-28 RX ADMIN — LEVALBUTEROL HYDROCHLORIDE 1.25 MG: 1.25 SOLUTION, CONCENTRATE RESPIRATORY (INHALATION) at 07:34

## 2021-07-28 RX ADMIN — ISODIUM CHLORIDE 3 ML: 0.03 SOLUTION RESPIRATORY (INHALATION) at 13:15

## 2021-07-28 RX ADMIN — ISODIUM CHLORIDE 3 ML: 0.03 SOLUTION RESPIRATORY (INHALATION) at 07:34

## 2021-07-28 RX ADMIN — TIOTROPIUM BROMIDE 18 MCG: 18 CAPSULE ORAL; RESPIRATORY (INHALATION) at 08:28

## 2021-07-28 RX ADMIN — BENZONATATE 100 MG: 100 CAPSULE ORAL at 08:25

## 2021-07-28 RX ADMIN — ALLOPURINOL 300 MG: 300 TABLET ORAL at 08:25

## 2021-07-28 RX ADMIN — FUROSEMIDE 40 MG: 10 INJECTION, SOLUTION INTRAMUSCULAR; INTRAVENOUS at 17:46

## 2021-07-28 RX ADMIN — ISODIUM CHLORIDE 3 ML: 0.03 SOLUTION RESPIRATORY (INHALATION) at 01:26

## 2021-07-28 RX ADMIN — INSULIN GLARGINE 10 UNITS: 100 INJECTION, SOLUTION SUBCUTANEOUS at 21:42

## 2021-07-28 RX ADMIN — DICLOFENAC SODIUM 2 G: 10 GEL TOPICAL at 17:46

## 2021-07-28 RX ADMIN — INSULIN LISPRO 2 UNITS: 100 INJECTION, SOLUTION INTRAVENOUS; SUBCUTANEOUS at 21:42

## 2021-07-28 RX ADMIN — GABAPENTIN 100 MG: 100 CAPSULE ORAL at 21:41

## 2021-07-28 RX ADMIN — APIXABAN 5 MG: 5 TABLET, FILM COATED ORAL at 08:25

## 2021-07-28 RX ADMIN — OMEGA-3 FATTY ACIDS CAP 1000 MG 1000 MG: 1000 CAP at 17:45

## 2021-07-28 RX ADMIN — LIDOCAINE 5% 1 PATCH: 700 PATCH TOPICAL at 11:54

## 2021-07-28 RX ADMIN — INSULIN LISPRO 2 UNITS: 100 INJECTION, SOLUTION INTRAVENOUS; SUBCUTANEOUS at 07:43

## 2021-07-28 RX ADMIN — GUAIFENESIN 600 MG: 600 TABLET, EXTENDED RELEASE ORAL at 21:41

## 2021-07-28 RX ADMIN — BENZONATATE 100 MG: 100 CAPSULE ORAL at 21:41

## 2021-07-28 RX ADMIN — LEVALBUTEROL HYDROCHLORIDE 1.25 MG: 1.25 SOLUTION, CONCENTRATE RESPIRATORY (INHALATION) at 19:17

## 2021-07-28 RX ADMIN — PREDNISONE 20 MG: 20 TABLET ORAL at 08:25

## 2021-07-28 RX ADMIN — DICLOFENAC SODIUM 2 G: 10 GEL TOPICAL at 21:41

## 2021-07-28 RX ADMIN — INSULIN LISPRO 12 UNITS: 100 INJECTION, SOLUTION INTRAVENOUS; SUBCUTANEOUS at 17:46

## 2021-07-28 RX ADMIN — INSULIN LISPRO 4 UNITS: 100 INJECTION, SOLUTION INTRAVENOUS; SUBCUTANEOUS at 11:59

## 2021-07-28 RX ADMIN — DICLOFENAC SODIUM 2 G: 10 GEL TOPICAL at 13:59

## 2021-07-28 RX ADMIN — ACETAMINOPHEN 650 MG: 325 TABLET, FILM COATED ORAL at 20:01

## 2021-07-28 RX ADMIN — HEPARIN SODIUM 2000 UNITS: 1000 INJECTION INTRAVENOUS; SUBCUTANEOUS at 01:20

## 2021-07-28 RX ADMIN — LEVALBUTEROL HYDROCHLORIDE 1.25 MG: 1.25 SOLUTION, CONCENTRATE RESPIRATORY (INHALATION) at 01:26

## 2021-07-28 RX ADMIN — AMIODARONE HYDROCHLORIDE 200 MG: 200 TABLET ORAL at 11:54

## 2021-07-28 RX ADMIN — APIXABAN 5 MG: 5 TABLET, FILM COATED ORAL at 17:44

## 2021-07-28 RX ADMIN — OMEGA-3 FATTY ACIDS CAP 1000 MG 1000 MG: 1000 CAP at 08:25

## 2021-07-28 RX ADMIN — BENZONATATE 100 MG: 100 CAPSULE ORAL at 17:45

## 2021-07-28 RX ADMIN — METOPROLOL TARTRATE 25 MG: 25 TABLET, FILM COATED ORAL at 08:25

## 2021-07-28 RX ADMIN — LEVALBUTEROL HYDROCHLORIDE 1.25 MG: 1.25 SOLUTION, CONCENTRATE RESPIRATORY (INHALATION) at 13:15

## 2021-07-28 RX ADMIN — AMIODARONE HYDROCHLORIDE 200 MG: 200 TABLET ORAL at 08:25

## 2021-07-28 RX ADMIN — GUAIFENESIN 600 MG: 600 TABLET, EXTENDED RELEASE ORAL at 08:25

## 2021-07-28 NOTE — ASSESSMENT & PLAN NOTE
· Patient has been having R flank pain since his R lung biopsy on 7/21; he now reports that pain is persistent and radiate to the RUQ and down the R side of his back  He feels the pain in his RUQ is somewhat of a 'burning' pain   He reports that he has had urinary frequency, urgency, and decreased UO  · DDx: pain 2/2 lung mass vs  Neuropathic pain s/p biopsy vs  UTI  · Check UA to evaluate for UTI  · May need to consider evaluation for nephrolithiasis  · Trial gabapentin QHS for suspected neuropathic RUQ pain  · If work up reveals no yield, consider IP palliative care consultation for pain management; if pain felt to be related to R lung mass, RT may provide symptomatic relief

## 2021-07-28 NOTE — ASSESSMENT & PLAN NOTE
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  Was scheduled to have IR guided biopsy as outpatient on 7/21 however was done inpatient on 7/21 with IR  · Patient is currently status post for repeat lung biopsy on 07/27 since the biopsy from several days ago resulted in necrotic tissue without viable tumor - target possibly left lung  · Eliquis resumed today 7/28  · Per last oncology note: "Discussed case with Dr Darion Santacruz with interventional radiology who preformed the bx and stated he is unsure if pathology will result with evidence of malignancy   If this bx is negative, next steps may include attempted biopsy of contralateral lung nodule"

## 2021-07-28 NOTE — PLAN OF CARE
Problem: MOBILITY - ADULT  Goal: Maintain or return to baseline ADL function  Description: INTERVENTIONS:  -  Assess patient's ability to carry out ADLs; assess patient's baseline for ADL function and identify physical deficits which impact ability to perform ADLs (bathing, care of mouth/teeth, toileting, grooming, dressing, etc )  - Assess/evaluate cause of self-care deficits   - Assess range of motion  - Assess patient's mobility; develop plan if impaired  - Assess patient's need for assistive devices and provide as appropriate  - Encourage maximum independence but intervene and supervise when necessary  - Involve family in performance of ADLs  - Assess for home care needs following discharge   - Consider OT consult to assist with ADL evaluation and planning for discharge  - Provide patient education as appropriate  Outcome: Progressing  Goal: Maintains/Returns to pre admission functional level  Description: INTERVENTIONS:  - Perform BMAT or MOVE assessment daily    - Set and communicate daily mobility goal to care team and patient/family/caregiver     - Collaborate with rehabilitation services on mobility goals if consulted  - Out of bed to chair 3 times a day   - Out of bed for meals 3 times a day  - Out of bed for toileting  - Record patient progress and toleration of activity level   Outcome: Progressing     Problem: CARDIOVASCULAR - ADULT  Goal: Maintains optimal cardiac output and hemodynamic stability  Description: INTERVENTIONS:  - Monitor I/O, vital signs and rhythm  - Monitor for S/S and trends of decreased cardiac output  - Administer and titrate ordered vasoactive medications to optimize hemodynamic stability  - Assess quality of pulses, skin color and temperature  - Assess for signs of decreased coronary artery perfusion  - Instruct patient to report change in severity of symptoms  Outcome: Progressing

## 2021-07-28 NOTE — PROGRESS NOTES
Progress Note - Pulmonary   Claudia Angel 68 y o  male MRN: [id number]  Unit/Bed#: S -01 Encounter: [id number]    Assessment/Plan:    1  Acute pulmonary insufficiency on chronic hypoxic respiratory failure likely multifaceted as listed below        -    Currently on 2 L 96%,  Patient does not wear home O2        -   Maintain saturations greater than 89%        -   Pulmonary toilet:  Deep breathing cough, OOB  As tolerated, IS Q 1 hr    2  Right lung mass highly suspicious for malignancy       -  6/21/2021- EBUS- atypical cells       -  7/21/2021- FNA of RUL- necrotic tissue       -  7/27/2021-  Repeat biopsy contralateral lung       -   Pathology still pending       -   Radiation oncology consulted for consider palliative RT    3  COPD of unknown severity without acute exacerbation        -   Inpatient:  Spiriva nebulizer treatment        -   No indication for steroid        -   Home regimen: Brevespi 120 milligram 1 puff b i d        -   Will need close pulmonary follow-up    4  ITP       -   Platelets 195       -  currrently on prednisone 20 mg       -  Can likely DC prednisone    5  Proximal AFib with RVR      -   Cardiology following likely secondary to electrolyte abnormality      -   Currently maintained on heparin GTT      -   Patient clear to resume Eliquis      -   Patient appears somewhat volume overloaded- will give 1 time IV Lasix 40 mg      -  Outpatient follow-up per discharge instructions  -   Pulmonary will sign off at this time    Chief Complaint:    " I am still having significant abdominal pain"    Subjective:     Marco Hobbs was comfortably sitting in his bed  He reports that his overall respiratory status has improved however he is having significant abdominal pain  No significant overnight events reported  Patient currently denying fevers, chills, hemoptysis, headaches, night sweats, pleuritic chest pain, or palpitations      Objective:    Vitals: Blood pressure 102/56, pulse (!) 54, temperature 98 2 °F (36 8 °C), temperature source Oral, resp  rate 20, height 5' 2" (1 575 m), weight 75 8 kg (167 lb), SpO2 96 %  3L,Body mass index is 30 54 kg/m²  Intake/Output Summary (Last 24 hours) at 7/28/2021 1216  Last data filed at 7/28/2021 0744  Gross per 24 hour   Intake --   Output 1175 ml   Net -1175 ml       Invasive Devices     Peripheral Intravenous Line            Peripheral IV 07/24/21 Right Forearm 4 days                Physical Exam:     Physical Exam  Constitutional:       General: He is not in acute distress  Appearance: Normal appearance  He is normal weight  He is not ill-appearing  HENT:      Head: Normocephalic and atraumatic  Nose: Nose normal  No congestion or rhinorrhea  Mouth/Throat:      Mouth: Mucous membranes are dry  Pharynx: No oropharyngeal exudate or posterior oropharyngeal erythema  Cardiovascular:      Rate and Rhythm: Normal rate and regular rhythm  Pulses: Normal pulses  Heart sounds: Normal heart sounds  No murmur heard  No friction rub  No gallop  Pulmonary:      Effort: Pulmonary effort is normal  No tachypnea, bradypnea, accessory muscle usage or respiratory distress  Breath sounds: Decreased air movement present  No stridor  Examination of the right-lower field reveals rales  Examination of the left-lower field reveals rales  Decreased breath sounds and rales present  No wheezing or rhonchi  Comments: Bilateral lower lobe crackles  Chest:      Chest wall: No tenderness  Abdominal:      General: Abdomen is flat  Bowel sounds are normal  There is no distension  Palpations: Abdomen is soft  There is no mass  Musculoskeletal:         General: No swelling or tenderness  Normal range of motion  Cervical back: Normal range of motion and neck supple  No rigidity or tenderness  Skin:     General: Skin is warm and dry  Coloration: Skin is not jaundiced or pale     Neurological:      General: No focal deficit present  Mental Status: He is alert and oriented to person, place, and time  Mental status is at baseline  Psychiatric:         Mood and Affect: Mood normal          Behavior: Behavior normal          Labs:    I have personally reviewed pertinent lab results CBC:   Lab Results   Component Value Date    WBC 10 05 07/28/2021    HGB 9 6 (L) 07/28/2021    HCT 33 5 (L) 07/28/2021    MCV 83 07/28/2021     07/28/2021    MCH 23 8 (L) 07/28/2021    MCHC 28 7 (L) 07/28/2021    RDW 20 1 (H) 07/28/2021    MPV 11 5 07/28/2021    NRBC 0 07/28/2021   , CMP:   Lab Results   Component Value Date    SODIUM 135 (L) 07/28/2021    K 5 4 (H) 07/28/2021     07/28/2021    CO2 31 07/28/2021    BUN 31 (H) 07/28/2021    CREATININE 1 34 (H) 07/28/2021    CALCIUM 8 8 07/28/2021    AST 20 07/28/2021    ALT 37 07/28/2021    ALKPHOS 144 (H) 07/28/2021    EGFR 50 07/28/2021       Imaging and other studies: I have personally reviewed pertinent films in PACS     Chest CT- 7/22/2021- very large right lung mass in peripheral upper lobe

## 2021-07-28 NOTE — ASSESSMENT & PLAN NOTE
· Improving  RR called due to patient being found with increased work of breathing early in admission with tachypnea and tachycardia  Patient found to be in Afib with RVR, IV metoprolol, IV labetalol given with improvement  Patient placed on BiPAP and transferred to ICU briefly    · At this time, patient does still feel SOB, but notes he has had improvement since admission  · Monitor oxygen status closely   · Patient normally is on 3 L nasal cannula at home  · Titrate to maintain SpO2 greater than or equal to 89%  · Pulmonary disease following   · Recommendations appreciated -- dosing lasix today

## 2021-07-28 NOTE — ASSESSMENT & PLAN NOTE
· Initially presented with AFib RVR, Rate controlled now   · Continue oral amiodarone load - 200 mg TID x 1 week, BID x 1 week, then daily  · Continue routine metoprolol 25mg BID  · Cardiology is on board  · Appreciate input   · eliquis resumed 7/28 post procedure

## 2021-07-28 NOTE — RESPIRATORY THERAPY NOTE
Home Oxygen Qualifying Test       Patient name: Yuliya Mcgill        : 1940   Date of Test:  2021  Diagnosis: Acute Respiratory Failure     Home Oxygen Test:    **Medicare Guidelines require item(s) 1-5 on all ambulatory patients or 1 and 2 on non-ambulatory patients  1   Baseline SPO2 on Room Air at rest: 92%  2   SPO2 during exercise on Room Air: 85-89%  During exercise monitor SpO2  If SPO2 increases >=89% with ambulation do not add supplemental             oxygen  If <= 88% on room air add O2 via NC and titrate patient  Patient must be ambulated with O2 and titrated to > 88% with exertion  3   SPO2 on Oxygen at rest: 92% on 2 lpm     4   SPO2 during exercise on Oxygen: 90-94% a liter flow of 2 lpm     5   Exercise performed:          walking, distance 160 (feet)          [x]  Supplemental Home Oxygen is indicated  []  Client does not qualify for home oxygen  Respiratory Additional Notes: Pt walked a total of 160 ft as tolerated  Pt did have desaturation below 88%  2L NC was applied and no further desaturation occurred  Pt qualifies for home oxygen      Nevaeh Reynolds, RT

## 2021-07-28 NOTE — PLAN OF CARE
Problem: PAIN - ADULT  Goal: Verbalizes/displays adequate comfort level or baseline comfort level  Description: Interventions:  - Encourage patient to monitor pain and request assistance  - Assess pain using appropriate pain scale (e g  0-10)  - Administer analgesics based on type and severity of pain and evaluate response  - Implement non-pharmacological measures as appropriate and evaluate response  - Consider cultural and social influences on pain and pain management  - Notify physician/advanced practitioner if interventions unsuccessful or patient reports new pain  Outcome: Progressing     Problem: INFECTION - ADULT  Goal: Absence or prevention of progression during hospitalization  Description: INTERVENTIONS:  - Assess and monitor for signs and symptoms of infection  - Monitor lab/diagnostic results  - Monitor all insertion sites, i e  IV site, bx site  - [place] appropriate cooling/warming therapies per order  - Administer medications as ordered  - Instruct and encourage patient and family to use good hand hygiene technique  - Identify and instruct in appropriate isolation precautions for identified infection/condition  Outcome: Progressing  Goal: Absence of fever/infection during neutropenic period  Description: INTERVENTIONS:  - Monitor WBC and ANC  - Implement neutropenic precautions if/when pt becomes neutropenic    Outcome: Progressing     Problem: SAFETY ADULT  Goal: Patient will remain free of falls  Description: INTERVENTIONS:  - Educate patient/family on patient safety including physical limitations  - Instruct patient to call for assistance with activity   - Consult OT/PT to assist with strengthening/mobility   - Keep Call bell within reach  - Keep bed low and locked with side rails adjusted as appropriate  - Keep care items and personal belongings within reach  - Initiate and maintain comfort rounds  - Make Fall Risk Sign visible to staff  - Offer Toileting every 2 hours, in advance of need  - Maintain chair alarm  - Obtain necessary fall risk management equipment: chair alarm  - Apply yellow socks and bracelet for high fall risk patients  - Consider moving patient to room near nurses station  Outcome: Progressing  Goal: Maintain or return to baseline ADL function  Description: INTERVENTIONS:  -  Assess patient's ability to carry out ADLs; assess patient's baseline for ADL function and identify physical deficits which impact ability to perform ADLs (bathing, care of mouth/teeth, toileting, grooming, dressing, etc )  - Assess/evaluate cause of self-care deficits   - Assess range of motion  - Assess patient's mobility; develop plan if impaired  - Assess patient's need for assistive devices and provide as appropriate  - Encourage maximum independence but intervene and supervise when necessary  - Involve family in performance of ADLs  - Assess for home care needs following discharge   - Consider OT consult to assist with ADL evaluation and planning for discharge  - Provide patient education as appropriate  Outcome: Progressing  Goal: Maintains/Returns to pre admission functional level  Description: INTERVENTIONS:  - Perform BMAT or MOVE assessment daily    - Set and communicate daily mobility goal to care team and patient/family/caregiver     - Collaborate with rehabilitation services on mobility goals if consulted  - Out of bed to chair 3 times a day   - Out of bed for meals 3 times a day  - Out of bed for toileting  - Record patient progress and toleration of activity level   Outcome: Progressing     Problem: DISCHARGE PLANNING  Goal: Discharge to home or other facility with appropriate resources  Description: INTERVENTIONS:  - Identify barriers to discharge w/patient and caregiver  - Arrange for needed discharge resources and transportation as appropriate  - Identify discharge learning needs (meds, wound care, etc )  - Arrange for interpretive services to assist at discharge as needed  - Refer to Case Management Department for coordinating discharge planning if the patient needs post-hospital services based on physician/advanced practitioner order or complex needs related to functional status, cognitive ability, or social support system  Outcome: Progressing     Problem: Knowledge Deficit  Goal: Patient/family/caregiver demonstrates understanding of disease process, treatment plan, medications, and discharge instructions  Description: Complete learning assessment and assess knowledge base    Interventions:  - Provide teaching at level of understanding  - Provide teaching via preferred learning methods  Outcome: Progressing     Problem: CARDIOVASCULAR - ADULT  Goal: Maintains optimal cardiac output and hemodynamic stability  Description: INTERVENTIONS:  - Monitor I/O, vital signs and rhythm  - Monitor for S/S and trends of decreased cardiac output  - Administer and titrate ordered vasoactive medications to optimize hemodynamic stability  - Assess quality of pulses, skin color and temperature  - Assess for signs of decreased coronary artery perfusion  - Instruct patient to report change in severity of symptoms  Outcome: Progressing  Goal: Absence of cardiac dysrhythmias or at baseline rhythm  Description: INTERVENTIONS:  - Continuous cardiac monitoring, vital signs, obtain 12 lead EKG if ordered  - Administer antiarrhythmic and heart rate control medications as ordered  - Monitor electrolytes and administer replacement therapy as ordered  Outcome: Progressing     Problem: METABOLIC, FLUID AND ELECTROLYTES - ADULT  Goal: Electrolytes maintained within normal limits  Description: INTERVENTIONS:  - Monitor labs and assess patient for signs and symptoms of electrolyte imbalances  - Administer electrolyte replacement as ordered  - Monitor response to electrolyte replacements, including repeat lab results as appropriate  - Instruct patient on fluid and nutrition as appropriate  Outcome: Progressing  Goal: Fluid balance maintained  Description: INTERVENTIONS:  - Monitor labs   - Monitor I/O and WT  - Instruct patient on fluid and nutrition as appropriate  - Assess for signs & symptoms of volume excess or deficit  Outcome: Progressing  Goal: Glucose maintained within target range  Description: INTERVENTIONS:  - Monitor Blood Glucose as ordered and provide SSI/lantus as ordered  - Assess for signs and symptoms of hyperglycemia and hypoglycemia  - Administer ordered medications to maintain glucose within target range  - Assess nutritional intake and initiate nutrition service referral as needed  Outcome: Progressing     Problem: MOBILITY - ADULT  Goal: Maintain or return to baseline ADL function  Description: INTERVENTIONS:  -  Assess patient's ability to carry out ADLs; assess patient's baseline for ADL function and identify physical deficits which impact ability to perform ADLs (bathing, care of mouth/teeth, toileting, grooming, dressing, etc )  - Assess/evaluate cause of self-care deficits   - Assess range of motion  - Assess patient's mobility; develop plan if impaired  - Assess patient's need for assistive devices and provide as appropriate  - Encourage maximum independence but intervene and supervise when necessary  - Involve family in performance of ADLs  - Assess for home care needs following discharge   - Consider OT consult to assist with ADL evaluation and planning for discharge  - Provide patient education as appropriate  Outcome: Progressing  Goal: Maintains/Returns to pre admission functional level  Description: INTERVENTIONS:  - Perform BMAT or MOVE assessment daily    - Set and communicate daily mobility goal to care team and patient/family/caregiver     - Collaborate with rehabilitation services on mobility goals if consulted  - Out of bed to chair 3 times a day   - Out of bed for meals 3 times a day  - Out of bed for toileting  - Record patient progress and toleration of activity level   Outcome: Progressing Problem: Potential for Falls  Goal: Patient will remain free of falls  Description: INTERVENTIONS:  - Educate patient/family on patient safety including physical limitations  - Instruct patient to call for assistance with activity   - Consult OT/PT to assist with strengthening/mobility   - Keep Call bell within reach  - Keep bed low and locked with side rails adjusted as appropriate  - Keep care items and personal belongings within reach  - Initiate and maintain comfort rounds  - Make Fall Risk Sign visible to staff  - Offer Toileting every 2 hours, in advance of need  - Maintain chair alarm  - Obtain necessary fall risk management equipment: chair alarm  - Apply yellow socks and bracelet for high fall risk patients  - Consider moving patient to room near nurses station  Outcome: Progressing     Problem: Prexisting or High Potential for Compromised Skin Integrity  Goal: Skin integrity is maintained or improved  Description: INTERVENTIONS:  - Identify patients at risk for skin breakdown  - Assess and monitor skin integrity  - Assess and monitor nutrition and hydration status  - Monitor labs   - Assess for incontinence   - Turn and reposition patient  - Assist with mobility/ambulation  - Relieve pressure over bony prominences  - Avoid friction and shearing  - Provide appropriate hygiene as needed including keeping skin clean and dry  - Collaborate with interdisciplinary team   - Patient/family teaching  - Consider wound care consult   Outcome: Progressing

## 2021-07-28 NOTE — ASSESSMENT & PLAN NOTE
Lab Results   Component Value Date    HGBA1C 6 6 (H) 06/20/2021       Recent Labs     07/27/21  1614 07/27/21  2106 07/28/21  0657 07/28/21  1122   POCGLU 301* 397* 183* 239*       Blood Sugar Average: Last 72 hrs:  (P) 242 [id number]  · A1C 6 6   BG labile over last 24 hours   · Continue sliding scale insulin

## 2021-07-28 NOTE — PROGRESS NOTES
[facility]  Progress Note - Master Rocha 1940, 68 y o  male MRN: [id number]  Unit/Bed#: S -01 Encounter: [id number]  Primary Care Provider: Dara Mcfadden DO   Date and time admitted to hospital: 7/14/2021  1:39 PM    Right flank pain  Assessment & Plan  · Patient has been having R flank pain since his R lung biopsy on 7/21; he now reports that pain is persistent and radiate to the RUQ and down the R side of his back  He feels the pain in his RUQ is somewhat of a 'burning' pain  He reports that he has had urinary frequency, urgency, and decreased UO  · DDx: pain 2/2 biopsy, cancer related pain, neuropathic pain from biopsy, urinary tract infection, nephrolithiasis  · Check UA to evaluate for UTI  · May need to consider evaluation for nephrolithiasis  · Trial gabapentin QHS for suspected neuropathic RUQ pain  · If above work up results in no yield, consider palliative care consultation for pain management; radiation may also provide symptomatic relief    Hyponatremia  Assessment & Plan  · Noted at 131, corrects to 135 based on glucose  · Monitor     COPD (chronic obstructive pulmonary disease) with acute exacerbation ([facility] [address] )  Assessment & Plan  · With acute exacerbation on admission - presenting with worsening shortness of breath, productive cough and wheezing     · Pulmonology consulted appreciate input  · Patient is usually on 3 L nasal cannula at home  · Continue Xopenex and Spiriva and 3% nebulizers  · Continue respiratory protocol  · Requires home O2 on d/c   · pulm to give lasix 40mg IV x1 today as LE appears edematous  · Outpatient Pulmonary follow-up at discharge  · Discharge on Bevespi with albuterol PRN home meds   · Per pulmonary, steroids not indicated for COPD at this time     New onset type 2 diabetes mellitus [facility])  Assessment & Plan  Lab Results   Component Value Date    HGBA1C 6 6 (H) 06/20/2021       Recent Labs     07/27/21  1614 07/27/21  2106 07/28/21  8037 07/28/21  1122   POCGLU 301* 397* 183* 239*       Blood Sugar Average: Last 72 hrs:  (P) 242 [id number]  · A1C 6 6  BG labile over last 24 hours   · Continue sliding scale insulin     Hypothyroidism  Assessment & Plan  · TSH low, free T4 normal range  · Continue levothyroxine 150 mcg daily  Mass of right lung  Assessment & Plan  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  Was scheduled to have IR guided biopsy as outpatient on 7/21 however was done inpatient on 7/21 with IR  · Patient is currently status post for repeat lung biopsy on 07/27 since the biopsy from several days ago resulted in necrotic tissue without viable tumor - target possibly left lung  · Treatment course pending biopsy results  · Eliquis resumed today 7/28  · Per last oncology note 7/21: "Discussed case with Dr Robert Hanson with interventional radiology who preformed the bx and stated he is unsure if pathology will result with evidence of malignancy  If this bx is negative, next steps may include attempted biopsy of contralateral lung nodule"    Paroxysmal A-fib with RVR  Assessment & Plan  · Initially presented with AFib RVR, Rate controlled now   · Continue oral amiodarone load - 200 mg TID x 1 week, BID x 1 week, then daily  · Continue routine metoprolol 25mg BID  · Cardiology is on board  · Appreciate input   · eliquis resumed 7/28 post procedure    Idiopathic thrombocytopenic purpura (ITP) (HCC)  Assessment & Plan  · Was previously receiving IV Solu-Medrol  · Transitioned to PO Prednisone 20 mg on 07/27  · Platelets remain stable  · D/W Heme/onc-- does receive rituxin as an OP; plan to keep on prednisone 20mg PO QD as he was on prolonged taper as an OP    * Acute respiratory failure with hypoxia and hypercapnia ([facility] [address] )  Assessment & Plan  · Improving   RR called due to patient being found with increased work of breathing early in admission with tachypnea and tachycardia  Patient found to be in Afib with RVR, IV metoprolol, IV labetalol given with improvement  Patient placed on BiPAP and transferred to ICU briefly  · At this time, patient does still feel SOB, but notes he has had improvement since admission  · Monitor oxygen status closely   · Patient normally is on 3 L nasal cannula at home  · Titrate to maintain SpO2 greater than or equal to 89%  · Pulmonary disease following   · Recommendations appreciated -- dosing lasix today    VTE Pharmacologic Prophylaxis: VTE Score: 5 High Risk (Score >/= 5) - Pharmacological DVT Prophylaxis Ordered: apixaban (Eliquis)  Sequential Compression Devices Ordered  Patient Centered Rounds: I performed bedside rounds with nursing staff today  Discussions with Specialists or Other Care Team Provider: CM, RN, Pulm, Oncology     Education and Discussions with Family / Patient: Attempted to update  (daughter) via phone  Left voicemail  Time Spent for Care: 30 minutes  More than 50% of total time spent on counseling and coordination of care as described above  Current Length of Stay: 14 day(s)  Current Patient Status: Inpatient   Certification Statement: The patient will continue to require additional inpatient hospital stay due to ongoing tx of flank pain   Discharge Plan: Anticipate discharge in 24-48 hrs to home with home services  Code Status: Level 1 - Full Code    Subjective:   Patient is not feeling well today  Still w/ SOB  Also w/ R fank pain and RUQ pain  Reports the pain is constant, feels like a burning sensation in RUQ  He feels that his kidneys are being affected, as he has urinary frequency, urgency, and decreased UO       Objective:     Vitals:   Temp (24hrs), Av 2 °F (36 8 °C), Min:98 1 °F (36 7 °C), Max:98 4 °F (36 9 °C)    Temp:  [98 1 °F (36 7 °C)-98 4 °F (36 9 °C)] 98 2 °F (36 8 °C)  HR:  [52-87] 54  Resp:  [16-20] 20  BP: ()/(50-70) 108/52  SpO2:  [92 %-98 %] 97 %  Body mass index is 30 54 kg/m²  Input and Output Summary (last 24 hours): Intake/Output Summary (Last 24 hours) at 7/28/2021 1350  Last data filed at 7/28/2021 0744  Gross per 24 hour   Intake --   Output 1175 ml   Net -1175 ml       Physical Exam:   Physical Exam  Constitutional:       Appearance: Normal appearance  He is ill-appearing  HENT:      Mouth/Throat:      Mouth: Mucous membranes are moist    Eyes:      Pupils: Pupils are equal, round, and reactive to light  Cardiovascular:      Rate and Rhythm: Normal rate and regular rhythm  Heart sounds: No murmur heard  No friction rub  No gallop  Pulmonary:      Effort: Pulmonary effort is normal       Breath sounds: No wheezing  Comments: Decreased R lung fields  Abdominal:      General: Abdomen is flat  Bowel sounds are normal  There is no distension  Palpations: Abdomen is soft  Tenderness: There is abdominal tenderness (RUQ)  There is right CVA tenderness  There is no left CVA tenderness  Musculoskeletal:      Right lower leg: Edema present  Left lower leg: Edema present  Skin:     General: Skin is warm and dry  Neurological:      General: No focal deficit present  Mental Status: He is alert and oriented to person, place, and time  Mental status is at baseline     Psychiatric:         Mood and Affect: Mood normal          Additional Data:     Labs:  Results from last 7 days   Lab Units 07/28/21  0539   WBC Thousand/uL 10 05   HEMOGLOBIN g/dL 9 6*   HEMATOCRIT % 33 5*   PLATELETS Thousands/uL 179   BANDS PCT % 4   LYMPHO PCT % 3*   MONO PCT % 6   EOS PCT % 1     Results from last 7 days   Lab Units 07/28/21  0539   SODIUM mmol/L 135*   POTASSIUM mmol/L 5 4*   CHLORIDE mmol/L 100   CO2 mmol/L 31   BUN mg/dL 31*   CREATININE mg/dL 1 34*   ANION GAP mmol/L 4   CALCIUM mg/dL 8 8   ALBUMIN g/dL 1 5*   TOTAL BILIRUBIN mg/dL 0 55   ALK PHOS U/L 144*   ALT U/L 37   AST U/L 20   GLUCOSE RANDOM mg/dL 166*         Results from last 7 days   Lab Units 07/28/21  1122 07/28/21  0657 07/27/21  2106 07/27/21  1614 07/27/21  1040 07/27/21  0724 07/26/21  2143 07/26/21  1548 07/26/21  1055 07/26/21  0716 07/25/21  2105 07/25/21  1608   POC GLUCOSE mg/dl 239* 183* 397* 301* 193* 194* 418* 252* 167* 151* 196* 248*               Lines/Drains:  Invasive Devices     Peripheral Intravenous Line            Peripheral IV 07/24/21 Right Forearm 4 days                      Imaging: Reviewed radiology reports from this admission including: chest CT scan    Recent Cultures (last 7 days):         Last 24 Hours Medication List:   Current Facility-Administered Medications   Medication Dose Route Frequency Provider Last Rate    acetaminophen  650 mg Oral Q6H PRN Jill Locke PA-C      allopurinol  300 mg Oral Daily Jill Locke PA-C      amiodarone  200 mg Oral TID With Meals Jill Locke PA-C      apixaban  5 mg Oral BID Jerrell Schwartz PA-C      benzonatate  100 mg Oral TID Garett Oliveira PA-C      dextromethorphan-guaiFENesin  10 mL Oral Q4H PRN Linn Locke PA-C      Diclofenac Sodium  2 g Topical 4x Daily Regla Venegas PA-C      fish oil  1,000 mg Oral BID Jill Locke PA-C      furosemide  40 mg Intravenous Once CHRIS Sauceda      gabapentin  100 mg Oral HS Regla Venegas PA-C      guaiFENesin  600 mg Oral Q12H [facility] Jill Locke PA-C      heparin (porcine)  2,000 Units Intravenous Q1H PRN Maria A Hodges PA-C      heparin (porcine)  4,000 Units Intravenous Q1H PRN Maria A Hodges PA-C      insulin glargine  10 Units Subcutaneous HS AMARJIT Escobar-ANNI      insulin lispro  1-5 Units Subcutaneous HS AMARJIT Escobar-ANNI      insulin lispro  2-12 Units Subcutaneous TID AC Jill Locke PA-C      levalbuterol  1 25 mg Nebulization Q6H Jill Locke PA-C      levothyroxine  150 mcg Oral Daily Jill Locke PA-C      lidocaine  1 patch Topical Q24H Jill Locke PA-C      metoprolol tartrate  25 mg Oral Q12H [address] Jill Locke PA-C      ondansetron  4 mg Intravenous Q6H PRN Jill Locke PA-C      pantoprazole  40 mg Oral Daily Jill Locke PA-C      predniSONE  20 mg Oral Daily Leslye Gunter PA-C      sodium chloride  3 mL Nebulization Q6H Jill Locke PA-C      tiotropium  18 mcg Inhalation Daily Jill Locke PA-C          Today, Patient Was Seen By: Elana Ding PA-C    **Please Note: This note may have been constructed using a voice recognition system  **

## 2021-07-28 NOTE — ASSESSMENT & PLAN NOTE
· With acute exacerbation on admission - presenting with worsening shortness of breath, productive cough and wheezing     · Pulmonology consulted appreciate input  · Patient is usually on 3 L nasal cannula at home  · Continue Xopenex and Spiriva and 3% nebulizers  · Continue respiratory protocol  · Requires home O2 on d/c   · pulm to give lasix 40mg IV x1 today as LE appears edematous  · Outpatient Pulmonary follow-up at discharge  · Discharge on Bevespi with albuterol PRN home meds   · Per pulmonary, steroids not indicated for COPD at this time

## 2021-07-29 ENCOUNTER — PATIENT OUTREACH (OUTPATIENT)
Dept: CASE MANAGEMENT | Facility: HOSPITAL | Age: 81
End: 2021-07-29

## 2021-07-29 PROBLEM — E87.1 HYPONATREMIA: Status: RESOLVED | Noted: 2021-07-27 | Resolved: 2021-07-29

## 2021-07-29 LAB
ALBUMIN SERPL BCP-MCNC: 2.7 G/DL (ref 3.5–5)
ALP SERPL-CCNC: 142 U/L (ref 46–116)
ALT SERPL W P-5'-P-CCNC: 29 U/L (ref 12–78)
ANION GAP SERPL CALCULATED.3IONS-SCNC: 8 MMOL/L (ref 4–13)
AST SERPL W P-5'-P-CCNC: 11 U/L (ref 5–45)
BILIRUB SERPL-MCNC: 0.91 MG/DL (ref 0.2–1)
BUN SERPL-MCNC: 30 MG/DL (ref 5–25)
CALCIUM ALBUM COR SERPL-MCNC: 10.3 MG/DL (ref 8.3–10.1)
CALCIUM SERPL-MCNC: 9.3 MG/DL (ref 8.3–10.1)
CHLORIDE SERPL-SCNC: 96 MMOL/L (ref 100–108)
CO2 SERPL-SCNC: 30 MMOL/L (ref 21–32)
CREAT SERPL-MCNC: 1.21 MG/DL (ref 0.6–1.3)
ERYTHROCYTE [DISTWIDTH] IN BLOOD BY AUTOMATED COUNT: 20 % (ref 11.6–15.1)
GFR SERPL CREATININE-BSD FRML MDRD: 57 ML/MIN/1.73SQ M
GLUCOSE SERPL-MCNC: 199 MG/DL (ref 65–140)
GLUCOSE SERPL-MCNC: 202 MG/DL (ref 65–140)
GLUCOSE SERPL-MCNC: 274 MG/DL (ref 65–140)
GLUCOSE SERPL-MCNC: 279 MG/DL (ref 65–140)
GLUCOSE SERPL-MCNC: 345 MG/DL (ref 65–140)
HCT VFR BLD AUTO: 34.2 % (ref 36.5–49.3)
HGB BLD-MCNC: 9.6 G/DL (ref 12–17)
MCH RBC QN AUTO: 23.7 PG (ref 26.8–34.3)
MCHC RBC AUTO-ENTMCNC: 28.1 G/DL (ref 31.4–37.4)
MCV RBC AUTO: 84 FL (ref 82–98)
PLATELET # BLD AUTO: 197 THOUSANDS/UL (ref 149–390)
PMV BLD AUTO: 10.7 FL (ref 8.9–12.7)
POTASSIUM SERPL-SCNC: 4.6 MMOL/L (ref 3.5–5.3)
PROT SERPL-MCNC: 7.3 G/DL (ref 6.4–8.2)
RBC # BLD AUTO: 4.05 MILLION/UL (ref 3.88–5.62)
SODIUM SERPL-SCNC: 134 MMOL/L (ref 136–145)
WBC # BLD AUTO: 9.67 THOUSAND/UL (ref 4.31–10.16)

## 2021-07-29 PROCEDURE — 97116 GAIT TRAINING THERAPY: CPT

## 2021-07-29 PROCEDURE — 94668 MNPJ CHEST WALL SBSQ: CPT

## 2021-07-29 PROCEDURE — 99232 SBSQ HOSP IP/OBS MODERATE 35: CPT | Performed by: INTERNAL MEDICINE

## 2021-07-29 PROCEDURE — 94760 N-INVAS EAR/PLS OXIMETRY 1: CPT

## 2021-07-29 PROCEDURE — 80053 COMPREHEN METABOLIC PANEL: CPT | Performed by: PHYSICIAN ASSISTANT

## 2021-07-29 PROCEDURE — 94640 AIRWAY INHALATION TREATMENT: CPT

## 2021-07-29 PROCEDURE — 85027 COMPLETE CBC AUTOMATED: CPT | Performed by: PHYSICIAN ASSISTANT

## 2021-07-29 PROCEDURE — 82948 REAGENT STRIP/BLOOD GLUCOSE: CPT

## 2021-07-29 PROCEDURE — 93005 ELECTROCARDIOGRAM TRACING: CPT

## 2021-07-29 PROCEDURE — 94669 MECHANICAL CHEST WALL OSCILL: CPT

## 2021-07-29 RX ORDER — ALBUMIN (HUMAN) 12.5 G/50ML
25 SOLUTION INTRAVENOUS ONCE
Status: COMPLETED | OUTPATIENT
Start: 2021-07-29 | End: 2021-07-29

## 2021-07-29 RX ORDER — METOPROLOL TARTRATE 5 MG/5ML
2.5 INJECTION INTRAVENOUS ONCE
Status: COMPLETED | OUTPATIENT
Start: 2021-07-29 | End: 2021-07-29

## 2021-07-29 RX ORDER — DILTIAZEM HYDROCHLORIDE 5 MG/ML
10 INJECTION INTRAVENOUS ONCE
Status: COMPLETED | OUTPATIENT
Start: 2021-07-29 | End: 2021-07-29

## 2021-07-29 RX ORDER — PREDNISONE 20 MG/1
20 TABLET ORAL DAILY
Status: DISCONTINUED | OUTPATIENT
Start: 2021-07-30 | End: 2021-08-05 | Stop reason: HOSPADM

## 2021-07-29 RX ADMIN — ALBUMIN (HUMAN) 12.5 G: 0.25 INJECTION, SOLUTION INTRAVENOUS at 00:02

## 2021-07-29 RX ADMIN — INSULIN LISPRO 4 UNITS: 100 INJECTION, SOLUTION INTRAVENOUS; SUBCUTANEOUS at 08:25

## 2021-07-29 RX ADMIN — BENZONATATE 100 MG: 100 CAPSULE ORAL at 17:30

## 2021-07-29 RX ADMIN — ISODIUM CHLORIDE 3 ML: 0.03 SOLUTION RESPIRATORY (INHALATION) at 13:37

## 2021-07-29 RX ADMIN — INSULIN LISPRO 8 UNITS: 100 INJECTION, SOLUTION INTRAVENOUS; SUBCUTANEOUS at 12:14

## 2021-07-29 RX ADMIN — DICLOFENAC SODIUM 2 G: 10 GEL TOPICAL at 09:00

## 2021-07-29 RX ADMIN — ALUMINA, MAGNESIA, AND SIMETHICONE ORAL SUSPENSION REGULAR STRENGTH 10 ML: 1200; 1200; 120 SUSPENSION ORAL at 21:34

## 2021-07-29 RX ADMIN — BENZONATATE 100 MG: 100 CAPSULE ORAL at 08:21

## 2021-07-29 RX ADMIN — ALLOPURINOL 300 MG: 300 TABLET ORAL at 08:21

## 2021-07-29 RX ADMIN — INSULIN LISPRO 3 UNITS: 100 INJECTION, SOLUTION INTRAVENOUS; SUBCUTANEOUS at 21:21

## 2021-07-29 RX ADMIN — LEVOTHYROXINE SODIUM 150 MCG: 150 TABLET ORAL at 08:22

## 2021-07-29 RX ADMIN — APIXABAN 5 MG: 5 TABLET, FILM COATED ORAL at 17:30

## 2021-07-29 RX ADMIN — OMEGA-3 FATTY ACIDS CAP 1000 MG 1000 MG: 1000 CAP at 17:30

## 2021-07-29 RX ADMIN — DICLOFENAC SODIUM 2 G: 10 GEL TOPICAL at 21:20

## 2021-07-29 RX ADMIN — PANTOPRAZOLE SODIUM 40 MG: 40 TABLET, DELAYED RELEASE ORAL at 08:21

## 2021-07-29 RX ADMIN — DILTIAZEM HYDROCHLORIDE 10 MG: 5 INJECTION, SOLUTION INTRAVENOUS at 08:48

## 2021-07-29 RX ADMIN — AMIODARONE HYDROCHLORIDE 200 MG: 200 TABLET ORAL at 17:30

## 2021-07-29 RX ADMIN — INSULIN LISPRO 2 UNITS: 100 INJECTION, SOLUTION INTRAVENOUS; SUBCUTANEOUS at 17:31

## 2021-07-29 RX ADMIN — ALBUMIN (HUMAN) 25 G: 0.25 INJECTION, SOLUTION INTRAVENOUS at 01:52

## 2021-07-29 RX ADMIN — DICLOFENAC SODIUM 2 G: 10 GEL TOPICAL at 17:31

## 2021-07-29 RX ADMIN — OMEGA-3 FATTY ACIDS CAP 1000 MG 1000 MG: 1000 CAP at 08:21

## 2021-07-29 RX ADMIN — GUAIFENESIN 600 MG: 600 TABLET, EXTENDED RELEASE ORAL at 08:21

## 2021-07-29 RX ADMIN — LEVALBUTEROL HYDROCHLORIDE 1.25 MG: 1.25 SOLUTION, CONCENTRATE RESPIRATORY (INHALATION) at 20:31

## 2021-07-29 RX ADMIN — GUAIFENESIN 600 MG: 600 TABLET, EXTENDED RELEASE ORAL at 21:20

## 2021-07-29 RX ADMIN — PREDNISONE 20 MG: 20 TABLET ORAL at 08:21

## 2021-07-29 RX ADMIN — DICLOFENAC SODIUM 2 G: 10 GEL TOPICAL at 12:11

## 2021-07-29 RX ADMIN — ISODIUM CHLORIDE 3 ML: 0.03 SOLUTION RESPIRATORY (INHALATION) at 07:51

## 2021-07-29 RX ADMIN — LEVALBUTEROL HYDROCHLORIDE 1.25 MG: 1.25 SOLUTION, CONCENTRATE RESPIRATORY (INHALATION) at 13:36

## 2021-07-29 RX ADMIN — ISODIUM CHLORIDE 3 ML: 0.03 SOLUTION RESPIRATORY (INHALATION) at 20:31

## 2021-07-29 RX ADMIN — GABAPENTIN 100 MG: 100 CAPSULE ORAL at 21:20

## 2021-07-29 RX ADMIN — LEVALBUTEROL HYDROCHLORIDE 1.25 MG: 1.25 SOLUTION, CONCENTRATE RESPIRATORY (INHALATION) at 07:51

## 2021-07-29 RX ADMIN — ACETAMINOPHEN 650 MG: 325 TABLET, FILM COATED ORAL at 12:11

## 2021-07-29 RX ADMIN — METOPROLOL TARTRATE 2.5 MG: 5 INJECTION INTRAVENOUS at 01:53

## 2021-07-29 RX ADMIN — INSULIN GLARGINE 10 UNITS: 100 INJECTION, SOLUTION SUBCUTANEOUS at 21:20

## 2021-07-29 RX ADMIN — METOPROLOL TARTRATE 25 MG: 25 TABLET, FILM COATED ORAL at 21:20

## 2021-07-29 RX ADMIN — ALBUMIN (HUMAN) 25 G: 0.25 INJECTION, SOLUTION INTRAVENOUS at 08:50

## 2021-07-29 RX ADMIN — METOPROLOL TARTRATE 25 MG: 25 TABLET, FILM COATED ORAL at 08:21

## 2021-07-29 RX ADMIN — AMIODARONE HYDROCHLORIDE 200 MG: 200 TABLET ORAL at 12:11

## 2021-07-29 RX ADMIN — APIXABAN 5 MG: 5 TABLET, FILM COATED ORAL at 08:21

## 2021-07-29 RX ADMIN — TIOTROPIUM BROMIDE 18 MCG: 18 CAPSULE ORAL; RESPIRATORY (INHALATION) at 09:00

## 2021-07-29 RX ADMIN — BENZONATATE 100 MG: 100 CAPSULE ORAL at 21:20

## 2021-07-29 RX ADMIN — SODIUM CHLORIDE 250 ML: 0.9 INJECTION, SOLUTION INTRAVENOUS at 00:06

## 2021-07-29 RX ADMIN — AMIODARONE HYDROCHLORIDE 200 MG: 200 TABLET ORAL at 08:21

## 2021-07-29 NOTE — PROGRESS NOTES
Progress Note - Pulmonary   Louisa Angel 68 y o  male MRN: [id number]  Unit/Bed#: S -01 Encounter: [id number]    Assessment/Plan:    1  Acute pulmonary insufficiency on chronic hypoxic respiratory failure likely multifaceted as listed below       -  currently 2 L 94%, patient qualified for home O2       --  maintain saturations greater than 89%       -  pulmonary toilet:  Deep breathing cough, OOB as tolerated, IS Q 1 hr    2  Right lung mass highly suspicious for malignancy       -  6/21/2021- EBUS- atypical cells       -  7/21/2021- FNA of RUL- necrotic tissue       -  7/27/2021- FNA NICHELLE- pathology pending       -  radiation oncology consulted for possible palliative RT    3  COPD of unknown severity without acute exacerbation        -  Inpatient:  Spiriva, nebulizer treatment        -  no indication for steroids at this time        -   Home regimen: Brevespi 120 milligram 1 puff b i d        -  will need close pulmonary follow-up    4  ITP      -  platelets have remained stable      -  patient remains on 20 mg prednisone, would likely recommend discontinuing    5  Proximal AFib with RVR      -  cardiology following-likely secondary to electrolyte abnormality      -  patient transition back to [facility]      -  improved output with 1 time IV Lasix, lower extremity edema also improved    6  Right pleuritic pain       -  this could be secondary to large tumor size       -  initiated on gabapentin, and topical gel       -  we consider palliative for pain management        -  outpatient follow-up per discharge instruction            Chief Complaint:    "I Am still having a lot of pain"    Subjective:    Kathrine Rob was comfortably sitting in bed  He is still reporting significant right-sided pleuritic chest pain  Patient reports he does feel somewhat dyspneic upon exertion  No significant overnight events reported    Patient currently denying any fevers, chills, hemoptysis, headaches, night sweats, pleuritic chest pain, or palpitations  Objective:    Vitals: Blood pressure 116/65, pulse 81, temperature 97 9 °F (36 6 °C), temperature source Oral, resp  rate 18, height 5' 2" (1 575 m), weight 75 8 kg (167 lb), SpO2 94 %  3L,Body mass index is 30 54 kg/m²  Intake/Output Summary (Last 24 hours) at 7/29/2021 0751  Last data filed at 7/29/2021 0435  Gross per 24 hour   Intake 250 ml   Output 2575 ml   Net -2325 ml       Invasive Devices     Peripheral Intravenous Line            Peripheral IV 07/24/21 Right Forearm 4 days                Physical Exam:   Physical Exam  Constitutional:       General: He is not in acute distress  Appearance: Normal appearance  He is normal weight  He is not ill-appearing  HENT:      Head: Normocephalic and atraumatic  Nose: Nose normal  No congestion or rhinorrhea  Mouth/Throat:      Mouth: Mucous membranes are dry  Pharynx: No oropharyngeal exudate or posterior oropharyngeal erythema  Cardiovascular:      Rate and Rhythm: Normal rate and regular rhythm  Pulses: Normal pulses  Heart sounds: Normal heart sounds  No murmur heard  No friction rub  No gallop  Pulmonary:      Effort: Pulmonary effort is normal  No respiratory distress  Breath sounds: No stridor  No wheezing, rhonchi or rales  Comments: Improved lower lobe rales, diminished breath sounds  Chest:      Chest wall: No tenderness  Abdominal:      General: Abdomen is flat  Bowel sounds are normal  There is no distension  Palpations: Abdomen is soft  There is no mass  Musculoskeletal:         General: No swelling or tenderness  Normal range of motion  Cervical back: Normal range of motion and neck supple  No rigidity or tenderness  Skin:     General: Skin is warm and dry  Coloration: Skin is not jaundiced or pale  Neurological:      General: No focal deficit present  Mental Status: He is alert and oriented to person, place, and time  Mental status is at baseline  Psychiatric:         Mood and Affect: Mood normal          Behavior: Behavior normal          Labs:  I have personally reviewed pertinent lab results 7/28/2021    Imaging and other studies: I have personally reviewed pertinent films in PACS     Chest CT- 7/22/2021- very large right lung mass in peripheral upper lobe

## 2021-07-29 NOTE — PLAN OF CARE
Problem: PAIN - ADULT  Goal: Verbalizes/displays adequate comfort level or baseline comfort level  Description: Interventions:  - Encourage patient to monitor pain and request assistance  - Assess pain using appropriate pain scale (e g  0-10)  - Administer analgesics based on type and severity of pain and evaluate response  - Implement non-pharmacological measures as appropriate and evaluate response  - Consider cultural and social influences on pain and pain management  - Notify physician/advanced practitioner if interventions unsuccessful or patient reports new pain  Outcome: Progressing     Problem: INFECTION - ADULT  Goal: Absence or prevention of progression during hospitalization  Description: INTERVENTIONS:  - Assess and monitor for signs and symptoms of infection  - Monitor lab/diagnostic results  - Monitor all insertion sites, i e  IV site, bx site  - [place] appropriate cooling/warming therapies per order  - Administer medications as ordered  - Instruct and encourage patient and family to use good hand hygiene technique  - Identify and instruct in appropriate isolation precautions for identified infection/condition  Outcome: Progressing  Goal: Absence of fever/infection during neutropenic period  Description: INTERVENTIONS:  - Monitor WBC and ANC  - Implement neutropenic precautions if/when pt becomes neutropenic    Outcome: Progressing     Problem: SAFETY ADULT  Goal: Patient will remain free of falls  Description: INTERVENTIONS:  - Educate patient/family on patient safety including physical limitations  - Instruct patient to call for assistance with activity   - Consult OT/PT to assist with strengthening/mobility   - Keep Call bell within reach  - Keep bed low and locked with side rails adjusted as appropriate  - Keep care items and personal belongings within reach  - Initiate and maintain comfort rounds  - Make Fall Risk Sign visible to staff  - Offer Toileting every 2 hours, in advance of need  - Management Department for coordinating discharge planning if the patient needs post-hospital services based on physician/advanced practitioner order or complex needs related to functional status, cognitive ability, or social support system  Outcome: Progressing     Problem: Knowledge Deficit  Goal: Patient/family/caregiver demonstrates understanding of disease process, treatment plan, medications, and discharge instructions  Description: Complete learning assessment and assess knowledge base    Interventions:  - Provide teaching at level of understanding  - Provide teaching via preferred learning methods  Outcome: Progressing     Problem: CARDIOVASCULAR - ADULT  Goal: Maintains optimal cardiac output and hemodynamic stability  Description: INTERVENTIONS:  - Monitor I/O, vital signs and rhythm  - Monitor for S/S and trends of decreased cardiac output  - Administer and titrate ordered vasoactive medications to optimize hemodynamic stability  - Assess quality of pulses, skin color and temperature  - Assess for signs of decreased coronary artery perfusion  - Instruct patient to report change in severity of symptoms  Outcome: Progressing  Goal: Absence of cardiac dysrhythmias or at baseline rhythm  Description: INTERVENTIONS:  - Continuous cardiac monitoring, vital signs, obtain 12 lead EKG if ordered  - Administer antiarrhythmic and heart rate control medications as ordered  - Monitor electrolytes and administer replacement therapy as ordered  Outcome: Progressing     Problem: METABOLIC, FLUID AND ELECTROLYTES - ADULT  Goal: Electrolytes maintained within normal limits  Description: INTERVENTIONS:  - Monitor labs and assess patient for signs and symptoms of electrolyte imbalances  - Administer electrolyte replacement as ordered  - Monitor response to electrolyte replacements, including repeat lab results as appropriate  - Instruct patient on fluid and nutrition as appropriate  Outcome: Progressing  Goal: Fluid balance maintained  Description: INTERVENTIONS:  - Monitor labs   - Monitor I/O and WT  - Instruct patient on fluid and nutrition as appropriate  - Assess for signs & symptoms of volume excess or deficit  Outcome: Progressing  Goal: Glucose maintained within target range  Description: INTERVENTIONS:  - Monitor Blood Glucose as ordered and provide SSI/lantus as ordered  - Assess for signs and symptoms of hyperglycemia and hypoglycemia  - Administer ordered medications to maintain glucose within target range  - Assess nutritional intake and initiate nutrition service referral as needed  Outcome: Progressing     Problem: MOBILITY - ADULT  Goal: Maintain or return to baseline ADL function  Description: INTERVENTIONS:  -  Assess patient's ability to carry out ADLs; assess patient's baseline for ADL function and identify physical deficits which impact ability to perform ADLs (bathing, care of mouth/teeth, toileting, grooming, dressing, etc )  - Assess/evaluate cause of self-care deficits   - Assess range of motion  - Assess patient's mobility; develop plan if impaired  - Assess patient's need for assistive devices and provide as appropriate  - Encourage maximum independence but intervene and supervise when necessary  - Involve family in performance of ADLs  - Assess for home care needs following discharge   - Consider OT consult to assist with ADL evaluation and planning for discharge  - Provide patient education as appropriate  Outcome: Progressing  Goal: Maintains/Returns to pre admission functional level  Description: INTERVENTIONS:  - Perform BMAT or MOVE assessment daily    - Set and communicate daily mobility goal to care team and patient/family/caregiver     - Collaborate with rehabilitation services on mobility goals if consulted  - Out of bed to chair 3 times a day   - Out of bed for meals 3 times a day  - Out of bed for toileting  - Record patient progress and toleration of activity level   Outcome: Progressing Problem: Potential for Falls  Goal: Patient will remain free of falls  Description: INTERVENTIONS:  - Educate patient/family on patient safety including physical limitations  - Instruct patient to call for assistance with activity   - Consult OT/PT to assist with strengthening/mobility   - Keep Call bell within reach  - Keep bed low and locked with side rails adjusted as appropriate  - Keep care items and personal belongings within reach  - Initiate and maintain comfort rounds  - Make Fall Risk Sign visible to staff  - Offer Toileting every 2 hours, in advance of need  - Maintain chair alarm  - Obtain necessary fall risk management equipment: chair alarm  - Apply yellow socks and bracelet for high fall risk patients  - Consider moving patient to room near nurses station  Outcome: Progressing     Problem: Prexisting or High Potential for Compromised Skin Integrity  Goal: Skin integrity is maintained or improved  Description: INTERVENTIONS:  - Identify patients at risk for skin breakdown  - Assess and monitor skin integrity  - Assess and monitor nutrition and hydration status  - Monitor labs   - Assess for incontinence   - Turn and reposition patient  - Assist with mobility/ambulation  - Relieve pressure over bony prominences  - Avoid friction and shearing  - Provide appropriate hygiene as needed including keeping skin clean and dry  - Collaborate with interdisciplinary team   - Patient/family teaching  - Consider wound care consult   Outcome: Progressing

## 2021-07-29 NOTE — ASSESSMENT & PLAN NOTE
· With acute exacerbation on admission - presenting with worsening shortness of breath, productive cough and wheezing     · Pulmonology consulted appreciate input  · Patient is usually on 3 L nasal cannula at home  · Continue Xopenex and Spiriva and 3% nebulizers  · Continue respiratory protocol  · Requires home O2 on d/c   · Given Lasix 40 mg IV once 7/28  · Outpatient Pulmonary follow-up at discharge  · Discharge on Bevespi with albuterol PRN home meds   · Per pulmonary, steroids not indicated for COPD at this time - stopped 7/29

## 2021-07-29 NOTE — PLAN OF CARE
Problem: PAIN - ADULT  Goal: Verbalizes/displays adequate comfort level or baseline comfort level  Description: Interventions:  - Encourage patient to monitor pain and request assistance  - Assess pain using appropriate pain scale (e g  0-10)  - Administer analgesics based on type and severity of pain and evaluate response  - Implement non-pharmacological measures as appropriate and evaluate response  - Consider cultural and social influences on pain and pain management  - Notify physician/advanced practitioner if interventions unsuccessful or patient reports new pain  Outcome: Progressing     Problem: INFECTION - ADULT  Goal: Absence or prevention of progression during hospitalization  Description: INTERVENTIONS:  - Assess and monitor for signs and symptoms of infection  - Monitor lab/diagnostic results  - Monitor all insertion sites, i e  IV site, bx site  - [place] appropriate cooling/warming therapies per order  - Administer medications as ordered  - Instruct and encourage patient and family to use good hand hygiene technique  - Identify and instruct in appropriate isolation precautions for identified infection/condition  Outcome: Progressing  Goal: Absence of fever/infection during neutropenic period  Description: INTERVENTIONS:  - Monitor WBC and ANC  - Implement neutropenic precautions if/when pt becomes neutropenic    Outcome: Progressing     Problem: SAFETY ADULT  Goal: Patient will remain free of falls  Description: INTERVENTIONS:  - Educate patient/family on patient safety including physical limitations  - Instruct patient to call for assistance with activity   - Consult OT/PT to assist with strengthening/mobility   - Keep Call bell within reach  - Keep bed low and locked with side rails adjusted as appropriate  - Keep care items and personal belongings within reach  - Initiate and maintain comfort rounds  - Make Fall Risk Sign visible to staff  - Offer Toileting every 2 hours, in advance of need  - Maintain chair alarm  - Obtain necessary fall risk management equipment: chair alarm  - Apply yellow socks and bracelet for high fall risk patients  - Consider moving patient to room near nurses station  Outcome: Progressing  Goal: Maintain or return to baseline ADL function  Description: INTERVENTIONS:  -  Assess patient's ability to carry out ADLs; assess patient's baseline for ADL function and identify physical deficits which impact ability to perform ADLs (bathing, care of mouth/teeth, toileting, grooming, dressing, etc )  - Assess/evaluate cause of self-care deficits   - Assess range of motion  - Assess patient's mobility; develop plan if impaired  - Assess patient's need for assistive devices and provide as appropriate  - Encourage maximum independence but intervene and supervise when necessary  - Involve family in performance of ADLs  - Assess for home care needs following discharge   - Consider OT consult to assist with ADL evaluation and planning for discharge  - Provide patient education as appropriate  Outcome: Progressing  Goal: Maintains/Returns to pre admission functional level  Description: INTERVENTIONS:  - Perform BMAT or MOVE assessment daily    - Set and communicate daily mobility goal to care team and patient/family/caregiver     - Collaborate with rehabilitation services on mobility goals if consulted  - Out of bed to chair 3 times a day   - Out of bed for meals 3 times a day  - Out of bed for toileting  - Record patient progress and toleration of activity level   Outcome: Progressing     Problem: DISCHARGE PLANNING  Goal: Discharge to home or other facility with appropriate resources  Description: INTERVENTIONS:  - Identify barriers to discharge w/patient and caregiver  - Arrange for needed discharge resources and transportation as appropriate  - Identify discharge learning needs (meds, wound care, etc )  - Arrange for interpretive services to assist at discharge as needed  - Refer to Case Management Department for coordinating discharge planning if the patient needs post-hospital services based on physician/advanced practitioner order or complex needs related to functional status, cognitive ability, or social support system  Outcome: Progressing     Problem: Knowledge Deficit  Goal: Patient/family/caregiver demonstrates understanding of disease process, treatment plan, medications, and discharge instructions  Description: Complete learning assessment and assess knowledge base    Interventions:  - Provide teaching at level of understanding  - Provide teaching via preferred learning methods  Outcome: Progressing     Problem: CARDIOVASCULAR - ADULT  Goal: Maintains optimal cardiac output and hemodynamic stability  Description: INTERVENTIONS:  - Monitor I/O, vital signs and rhythm  - Monitor for S/S and trends of decreased cardiac output  - Administer and titrate ordered vasoactive medications to optimize hemodynamic stability  - Assess quality of pulses, skin color and temperature  - Assess for signs of decreased coronary artery perfusion  - Instruct patient to report change in severity of symptoms  Outcome: Progressing  Goal: Absence of cardiac dysrhythmias or at baseline rhythm  Description: INTERVENTIONS:  - Continuous cardiac monitoring, vital signs, obtain 12 lead EKG if ordered  - Administer antiarrhythmic and heart rate control medications as ordered  - Monitor electrolytes and administer replacement therapy as ordered  Outcome: Progressing     Problem: METABOLIC, FLUID AND ELECTROLYTES - ADULT  Goal: Electrolytes maintained within normal limits  Description: INTERVENTIONS:  - Monitor labs and assess patient for signs and symptoms of electrolyte imbalances  - Administer electrolyte replacement as ordered  - Monitor response to electrolyte replacements, including repeat lab results as appropriate  - Instruct patient on fluid and nutrition as appropriate  Outcome: Progressing  Goal: Fluid balance maintained  Description: INTERVENTIONS:  - Monitor labs   - Monitor I/O and WT  - Instruct patient on fluid and nutrition as appropriate  - Assess for signs & symptoms of volume excess or deficit  Outcome: Progressing  Goal: Glucose maintained within target range  Description: INTERVENTIONS:  - Monitor Blood Glucose as ordered and provide SSI/lantus as ordered  - Assess for signs and symptoms of hyperglycemia and hypoglycemia  - Administer ordered medications to maintain glucose within target range  - Assess nutritional intake and initiate nutrition service referral as needed  Outcome: Progressing     Problem: MOBILITY - ADULT  Goal: Maintain or return to baseline ADL function  Description: INTERVENTIONS:  -  Assess patient's ability to carry out ADLs; assess patient's baseline for ADL function and identify physical deficits which impact ability to perform ADLs (bathing, care of mouth/teeth, toileting, grooming, dressing, etc )  - Assess/evaluate cause of self-care deficits   - Assess range of motion  - Assess patient's mobility; develop plan if impaired  - Assess patient's need for assistive devices and provide as appropriate  - Encourage maximum independence but intervene and supervise when necessary  - Involve family in performance of ADLs  - Assess for home care needs following discharge   - Consider OT consult to assist with ADL evaluation and planning for discharge  - Provide patient education as appropriate  Outcome: Progressing  Goal: Maintains/Returns to pre admission functional level  Description: INTERVENTIONS:  - Perform BMAT or MOVE assessment daily    - Set and communicate daily mobility goal to care team and patient/family/caregiver     - Collaborate with rehabilitation services on mobility goals if consulted  - Out of bed to chair 3 times a day   - Out of bed for meals 3 times a day  - Out of bed for toileting  - Record patient progress and toleration of activity level   Outcome: Progressing Problem: Potential for Falls  Goal: Patient will remain free of falls  Description: INTERVENTIONS:  - Educate patient/family on patient safety including physical limitations  - Instruct patient to call for assistance with activity   - Consult OT/PT to assist with strengthening/mobility   - Keep Call bell within reach  - Keep bed low and locked with side rails adjusted as appropriate  - Keep care items and personal belongings within reach  - Initiate and maintain comfort rounds  - Make Fall Risk Sign visible to staff  - Offer Toileting every 2 hours, in advance of need  - Maintain chair alarm  - Obtain necessary fall risk management equipment: chair alarm  - Apply yellow socks and bracelet for high fall risk patients  - Consider moving patient to room near nurses station  Outcome: Progressing     Problem: Prexisting or High Potential for Compromised Skin Integrity  Goal: Skin integrity is maintained or improved  Description: INTERVENTIONS:  - Identify patients at risk for skin breakdown  - Assess and monitor skin integrity  - Assess and monitor nutrition and hydration status  - Monitor labs   - Assess for incontinence   - Turn and reposition patient  - Assist with mobility/ambulation  - Relieve pressure over bony prominences  - Avoid friction and shearing  - Provide appropriate hygiene as needed including keeping skin clean and dry  - Collaborate with interdisciplinary team   - Patient/family teaching  - Consider wound care consult   Outcome: Progressing

## 2021-07-29 NOTE — ASSESSMENT & PLAN NOTE
· Initially presented with AFib RVR, Rate controlled now  Briefly was uncontrolled on 7/29 and was status post Cardizem and IV Albumin and resulted in returning to rate controlled A   Flutter   · Continue oral amiodarone load - 200 mg TID x 1 week, BID x 1 week, then daily  · Continue routine metoprolol 25mg BID  · Cardiology is on board  · Appreciate input   · Eliquis resumed 7/28 post procedure

## 2021-07-29 NOTE — PLAN OF CARE
Problem: PAIN - ADULT  Goal: Verbalizes/displays adequate comfort level or baseline comfort level  Description: Interventions:  - Encourage patient to monitor pain and request assistance  - Assess pain using appropriate pain scale (e g  0-10)  - Administer analgesics based on type and severity of pain and evaluate response  - Implement non-pharmacological measures as appropriate and evaluate response  - Consider cultural and social influences on pain and pain management  - Notify physician/advanced practitioner if interventions unsuccessful or patient reports new pain  Outcome: Progressing     Problem: INFECTION - ADULT  Goal: Absence or prevention of progression during hospitalization  Description: INTERVENTIONS:  - Assess and monitor for signs and symptoms of infection  - Monitor lab/diagnostic results  - Monitor all insertion sites, i e  IV site, bx site  - [place] appropriate cooling/warming therapies per order  - Administer medications as ordered  - Instruct and encourage patient and family to use good hand hygiene technique  - Identify and instruct in appropriate isolation precautions for identified infection/condition  Outcome: Progressing  Goal: Absence of fever/infection during neutropenic period  Description: INTERVENTIONS:  - Monitor WBC and ANC  - Implement neutropenic precautions if/when pt becomes neutropenic    Outcome: Progressing     Problem: SAFETY ADULT  Goal: Patient will remain free of falls  Description: INTERVENTIONS:  - Educate patient/family on patient safety including physical limitations  - Instruct patient to call for assistance with activity   - Consult OT/PT to assist with strengthening/mobility   - Keep Call bell within reach  - Keep bed low and locked with side rails adjusted as appropriate  - Keep care items and personal belongings within reach  - Initiate and maintain comfort rounds  - Make Fall Risk Sign visible to staff  - Offer Toileting every 2 hours, in advance of need  - Maintain chair alarm  - Obtain necessary fall risk management equipment: chair alarm  - Apply yellow socks and bracelet for high fall risk patients  - Consider moving patient to room near nurses station  Outcome: Progressing  Goal: Maintain or return to baseline ADL function  Description: INTERVENTIONS:  -  Assess patient's ability to carry out ADLs; assess patient's baseline for ADL function and identify physical deficits which impact ability to perform ADLs (bathing, care of mouth/teeth, toileting, grooming, dressing, etc )  - Assess/evaluate cause of self-care deficits   - Assess range of motion  - Assess patient's mobility; develop plan if impaired  - Assess patient's need for assistive devices and provide as appropriate  - Encourage maximum independence but intervene and supervise when necessary  - Involve family in performance of ADLs  - Assess for home care needs following discharge   - Consider OT consult to assist with ADL evaluation and planning for discharge  - Provide patient education as appropriate  Outcome: Progressing  Goal: Maintains/Returns to pre admission functional level  Description: INTERVENTIONS:  - Perform BMAT or MOVE assessment daily    - Set and communicate daily mobility goal to care team and patient/family/caregiver     - Collaborate with rehabilitation services on mobility goals if consulted  - Out of bed to chair 3 times a day   - Out of bed for meals 3 times a day  - Out of bed for toileting  - Record patient progress and toleration of activity level   Outcome: Progressing     Problem: DISCHARGE PLANNING  Goal: Discharge to home or other facility with appropriate resources  Description: INTERVENTIONS:  - Identify barriers to discharge w/patient and caregiver  - Arrange for needed discharge resources and transportation as appropriate  - Identify discharge learning needs (meds, wound care, etc )  - Arrange for interpretive services to assist at discharge as needed  - Refer to Case Management Department for coordinating discharge planning if the patient needs post-hospital services based on physician/advanced practitioner order or complex needs related to functional status, cognitive ability, or social support system  Outcome: Progressing     Problem: Knowledge Deficit  Goal: Patient/family/caregiver demonstrates understanding of disease process, treatment plan, medications, and discharge instructions  Description: Complete learning assessment and assess knowledge base    Interventions:  - Provide teaching at level of understanding  - Provide teaching via preferred learning methods  Outcome: Progressing     Problem: CARDIOVASCULAR - ADULT  Goal: Maintains optimal cardiac output and hemodynamic stability  Description: INTERVENTIONS:  - Monitor I/O, vital signs and rhythm  - Monitor for S/S and trends of decreased cardiac output  - Administer and titrate ordered vasoactive medications to optimize hemodynamic stability  - Assess quality of pulses, skin color and temperature  - Assess for signs of decreased coronary artery perfusion  - Instruct patient to report change in severity of symptoms  Outcome: Progressing  Goal: Absence of cardiac dysrhythmias or at baseline rhythm  Description: INTERVENTIONS:  - Continuous cardiac monitoring, vital signs, obtain 12 lead EKG if ordered  - Administer antiarrhythmic and heart rate control medications as ordered  - Monitor electrolytes and administer replacement therapy as ordered  Outcome: Progressing     Problem: METABOLIC, FLUID AND ELECTROLYTES - ADULT  Goal: Electrolytes maintained within normal limits  Description: INTERVENTIONS:  - Monitor labs and assess patient for signs and symptoms of electrolyte imbalances  - Administer electrolyte replacement as ordered  - Monitor response to electrolyte replacements, including repeat lab results as appropriate  - Instruct patient on fluid and nutrition as appropriate  Outcome: Progressing  Goal: Fluid balance maintained  Description: INTERVENTIONS:  - Monitor labs   - Monitor I/O and WT  - Instruct patient on fluid and nutrition as appropriate  - Assess for signs & symptoms of volume excess or deficit  Outcome: Progressing  Goal: Glucose maintained within target range  Description: INTERVENTIONS:  - Monitor Blood Glucose as ordered and provide SSI/lantus as ordered  - Assess for signs and symptoms of hyperglycemia and hypoglycemia  - Administer ordered medications to maintain glucose within target range  - Assess nutritional intake and initiate nutrition service referral as needed  Outcome: Progressing     Problem: MOBILITY - ADULT  Goal: Maintain or return to baseline ADL function  Description: INTERVENTIONS:  -  Assess patient's ability to carry out ADLs; assess patient's baseline for ADL function and identify physical deficits which impact ability to perform ADLs (bathing, care of mouth/teeth, toileting, grooming, dressing, etc )  - Assess/evaluate cause of self-care deficits   - Assess range of motion  - Assess patient's mobility; develop plan if impaired  - Assess patient's need for assistive devices and provide as appropriate  - Encourage maximum independence but intervene and supervise when necessary  - Involve family in performance of ADLs  - Assess for home care needs following discharge   - Consider OT consult to assist with ADL evaluation and planning for discharge  - Provide patient education as appropriate  Outcome: Progressing  Goal: Maintains/Returns to pre admission functional level  Description: INTERVENTIONS:  - Perform BMAT or MOVE assessment daily    - Set and communicate daily mobility goal to care team and patient/family/caregiver     - Collaborate with rehabilitation services on mobility goals if consulted  - Out of bed to chair 3 times a day   - Out of bed for meals 3 times a day  - Out of bed for toileting  - Record patient progress and toleration of activity level   Outcome: Progressing Problem: Potential for Falls  Goal: Patient will remain free of falls  Description: INTERVENTIONS:  - Educate patient/family on patient safety including physical limitations  - Instruct patient to call for assistance with activity   - Consult OT/PT to assist with strengthening/mobility   - Keep Call bell within reach  - Keep bed low and locked with side rails adjusted as appropriate  - Keep care items and personal belongings within reach  - Initiate and maintain comfort rounds  - Make Fall Risk Sign visible to staff  - Offer Toileting every 2 hours, in advance of need  - Maintain chair alarm  - Obtain necessary fall risk management equipment: chair alarm  - Apply yellow socks and bracelet for high fall risk patients  - Consider moving patient to room near nurses station  Outcome: Progressing     Problem: Prexisting or High Potential for Compromised Skin Integrity  Goal: Skin integrity is maintained or improved  Description: INTERVENTIONS:  - Identify patients at risk for skin breakdown  - Assess and monitor skin integrity  - Assess and monitor nutrition and hydration status  - Monitor labs   - Assess for incontinence   - Turn and reposition patient  - Assist with mobility/ambulation  - Relieve pressure over bony prominences  - Avoid friction and shearing  - Provide appropriate hygiene as needed including keeping skin clean and dry  - Collaborate with interdisciplinary team   - Patient/family teaching  - Consider wound care consult   Outcome: Progressing

## 2021-07-29 NOTE — ASSESSMENT & PLAN NOTE
· Patient has been having R flank pain since his R lung biopsy on 7/21; he now reports that pain is persistent and radiate to the RUQ and down the R side of his back  He feels the pain in his RUQ is somewhat of a 'burning' pain   He reports that he has had urinary frequency, urgency, and decreased UO  · DDx: pain 2/2 lung mass vs  Neuropathic pain s/p biopsy vs  UTI  · UA negative for blood or infection, doubt stone   · Trial gabapentin QHS for suspected neuropathic RUQ pain

## 2021-07-29 NOTE — PROGRESS NOTES
[facility]  Progress Note - Foster Cap 1940, 68 y o  male MRN: [id number]  Unit/Bed#: S MS 83587 Encounter: [id number]  Primary Care Provider: Anne Oleary DO   Date and time admitted to hospital: 7/14/2021  1:39 PM    * Acute respiratory failure with hypoxia and hypercapnia (HCC)  Assessment & Plan  · Improving  RR called due to patient being found with increased work of breathing early in admission with tachypnea and tachycardia  Patient found to be in Afib with RVR, IV metoprolol, IV labetalol given with improvement  Patient placed on BiPAP and transferred to ICU briefly  · At this time, patient does still feel SOB, but notes he has had improvement since admission  · Monitor oxygen status closely   · Patient normally is on 3 L nasal cannula at home  · Titrate to maintain SpO2 greater than or equal to 89%  · Pulmonary disease followed and signed off   · Recommendations appreciated  · Steroids not indicated for pulmonary reasons, but continued for ITP     Mass of right lung  Assessment & Plan  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  Was scheduled to have IR guided biopsy as outpatient on 7/21 however was done inpatient on 7/21 with IR  · Patient is currently status post for repeat lung biopsy on 07/27 since the biopsy from several days ago resulted in necrotic tissue without viable tumor - target possibly left lung  · Eliquis resumed 7/28  · Per last oncology note: "Discussed case with Dr Sanchez Carcamo with interventional radiology who preformed the bx and stated he is unsure if pathology will result with evidence of malignancy   If this bx is negative, next steps may include attempted biopsy of contralateral lung nodule"    COPD (chronic obstructive pulmonary disease) with acute exacerbation (HCC)  Assessment & Plan  · With acute exacerbation on admission - presenting with worsening shortness of breath, productive cough and wheezing  · Pulmonology consulted appreciate input  · Patient is usually on 3 L nasal cannula at home  · Continue Xopenex and Spiriva and 3% nebulizers  · Continue respiratory protocol  · Requires home O2 on d/c   · Given Lasix 40 mg IV once 7/28  · Outpatient Pulmonary follow-up at discharge  · Discharge on Bevespi with albuterol PRN home meds   · Per pulmonary, steroids not indicated for COPD at this time - stopped 7/29    Paroxysmal A-fib with RVR  Assessment & Plan  · Initially presented with AFib RVR, Rate controlled now  Briefly was uncontrolled on 7/29 and was status post Cardizem and IV Albumin and resulted in returning to rate controlled A  Flutter   · Continue oral amiodarone load - 200 mg TID x 1 week, BID x 1 week, then daily  · Continue routine metoprolol 25mg BID  · Cardiology is on board  · Appreciate input   · Eliquis resumed 7/28 post procedure    Idiopathic thrombocytopenic purpura (ITP) (HCC)  Assessment & Plan  · Was previously receiving IV Solu-Medrol  · Transitioned to PO Prednisone 20 mg on 07/27  · As per pulmonary disease does not to be on steroid   · Platelets remain stable  · D/W Heme/onc-- does receive rituxin as an OP; plan to keep on prednisone 20mg PO QD as he was on prolonged taper as an OP  · Will continue steroids and have patient follow up with Heme/Onc    New onset type 2 diabetes mellitus [facility])  Assessment & Plan  Lab Results   Component Value Date    HGBA1C 6 6 (H) 06/20/2021       Recent Labs     07/28/21  1632 07/28/21  2052 07/29/21  0804 07/29/21  1103   POCGLU 417* 266* 202* 345*       Blood Sugar Average: Last 72 hrs:  (P) 278 2  · A1C 6 6  BG labile over last 24 hours   · Continue sliding scale insulin     Hypothyroidism  Assessment & Plan  · TSH low, free T4 normal range  · Continue levothyroxine 150 mcg daily        Hyponatremia-resolved as of 7/29/2021  Assessment & Plan  · Noted at 134, corrects to normal based on glucose  · Monitor     Right flank pain  Assessment & Plan  · Patient has been having R flank pain since his R lung biopsy on ; he now reports that pain is persistent and radiate to the RUQ and down the R side of his back  He feels the pain in his RUQ is somewhat of a 'burning' pain  He reports that he has had urinary frequency, urgency, and decreased UO  · DDx: pain 2/2 lung mass vs  Neuropathic pain s/p biopsy vs  UTI  · UA negative for blood or infection, doubt stone   · Trial gabapentin QHS for suspected neuropathic RUQ pain          VTE Pharmacologic Prophylaxis: VTE Score: 5 High Risk (Score >/= 5) - Pharmacological DVT Prophylaxis Ordered: apixaban (Eliquis)  Sequential Compression Devices Ordered  Patient Centered Rounds: I performed bedside rounds with nursing staff today  Discussions with Specialists or Other Care Team Provider: Discussed with RN, CM    Education and Discussions with Family / Patient: Updated  (daughter) via phone  Time Spent for Care: 30 minutes  More than 50% of total time spent on counseling and coordination of care as described above  Current Length of Stay: 15 day(s)  Current Patient Status: Inpatient   Certification Statement: The patient will continue to require additional inpatient hospital stay due to hypotension, tachycardia monitoring   Discharge Plan: Anticipate discharge tomorrow to home with home services  Code Status: Level 1 - Full Code    Subjective:   Patient states that he is still having right sided pain  Reports that Voltaren Gel helps  Still reports cough  Denies chest pain otherwise  Reports only getting SOB when he takes his O2 off  Feels when HR is elevated  Denies dizziness       Objective:     Vitals:   Temp (24hrs), Av 2 °F (36 8 °C), Min:97 8 °F (36 6 °C), Max:98 6 °F (37 °C)    Temp:  [97 8 °F (36 6 °C)-98 6 °F (37 °C)] 98 4 °F (36 9 °C)  HR:  [] 80  Resp:  [] 101  BP: ()/(52-70) 110/62  SpO2:  [93 %-100 %] 98 %  Body mass index is 30 54 kg/m²  Input and Output Summary (last 24 hours): Intake/Output Summary (Last 24 hours) at 7/29/2021 1333  Last data filed at 7/29/2021 0435  Gross per 24 hour   Intake 250 ml   Output 2575 ml   Net -2325 ml       Physical Exam:   Physical Exam  Constitutional:       General: He is not in acute distress  Appearance: Normal appearance  He is normal weight  He is not ill-appearing or diaphoretic  Interventions: Nasal cannula in place  HENT:      Head: Normocephalic and atraumatic  Mouth/Throat:      Mouth: Mucous membranes are moist    Eyes:      General: No scleral icterus  Pupils: Pupils are equal, round, and reactive to light  Cardiovascular:      Rate and Rhythm: Normal rate and regular rhythm  Pulses: Normal pulses  Heart sounds: Normal heart sounds, S1 normal and S2 normal  No murmur heard  No systolic murmur is present  No diastolic murmur is present  No gallop  No S3 or S4 sounds  Pulmonary:      Effort: Pulmonary effort is normal  No accessory muscle usage or respiratory distress  Breath sounds: No stridor  Examination of the right-upper field reveals decreased breath sounds  Examination of the left-upper field reveals decreased breath sounds  Examination of the right-lower field reveals decreased breath sounds  Examination of the left-lower field reveals decreased breath sounds  Decreased breath sounds present  No wheezing, rhonchi or rales  Chest:      Chest wall: Tenderness present  Abdominal:      General: Bowel sounds are normal  There is no distension  Palpations: Abdomen is soft  Tenderness: There is no abdominal tenderness  There is no guarding  Musculoskeletal:      Right lower leg: No edema  Left lower leg: No edema  Skin:     General: Skin is warm and dry  Coloration: Skin is not jaundiced  Neurological:      General: No focal deficit present        Mental Status: He is alert  Mental status is at baseline  Motor: No tremor or seizure activity  Psychiatric:         Behavior: Behavior is cooperative  Additional Data:     Labs:  Results from last 7 days   Lab Units 07/29/21  0920 07/28/21  0539   WBC Thousand/uL 9 67 10 05   HEMOGLOBIN g/dL 9 6* 9 6*   HEMATOCRIT % 34 2* 33 5*   PLATELETS Thousands/uL 197 179   BANDS PCT %  --  4   LYMPHO PCT %  --  3*   MONO PCT %  --  6   EOS PCT %  --  1     Results from last 7 days   Lab Units 07/29/21  0920   SODIUM mmol/L 134*   POTASSIUM mmol/L 4 6   CHLORIDE mmol/L 96*   CO2 mmol/L 30   BUN mg/dL 30*   CREATININE mg/dL 1 21   ANION GAP mmol/L 8   CALCIUM mg/dL 9 3   ALBUMIN g/dL 2 7*   TOTAL BILIRUBIN mg/dL 0 91   ALK PHOS U/L 142*   ALT U/L 29   AST U/L 11   GLUCOSE RANDOM mg/dL 274*         Results from last 7 days   Lab Units 07/29/21  1103 07/29/21  0804 07/28/21  2052 07/28/21  1632 07/28/21  1627 07/28/21  1122 07/28/21  0657 07/27/21  2106 07/27/21  1614 07/27/21  1040 07/27/21  0724 07/26/21  2143   POC GLUCOSE mg/dl 345* 202* 266* 417* 448* 239* 183* 397* 301* 193* 194* 418*               Lines/Drains:  Invasive Devices     Peripheral Intravenous Line            Peripheral IV 07/29/21 Left;Ventral (anterior) Forearm <1 day                  Telemetry:  Telemetry Orders (From admission, onward)             48 Hour Telemetry Monitoring  Continuous x 48 hours     Question:  Reason for 48 Hour Telemetry  Answer:  Arrhythmias Requiring Medical Therapy (eg  SVT, Vtach/fib, Bradycardia, Uncontrolled A-fib)                 Telemetry Reviewed: Atrial flutter   HR averaging   Indication for Continued Telemetry Use: Arrthymias requiring medical therapy           Imaging: Reviewed radiology reports from this admission including: chest xray    Recent Cultures (last 7 days):         Last 24 Hours Medication List:   Current Facility-Administered Medications   Medication Dose Route Frequency Provider Last Rate    acetaminophen  650 mg Oral Q6H PRN Aman Hines PA-C      albuterol  2 5 mg Nebulization Q6H PRN Karla Campbell, CRJASIEL      allopurinol  300 mg Oral Daily Jill Locke PA-C      amiodarone  200 mg Oral TID With Meals Juanita Locke PA-C      apixaban  5 mg Oral BID Corrina Smith PA-C      benzonatate  100 mg Oral TID Aman Hines PA-C      dextromethorphan-guaiFENesin  10 mL Oral Q4H PRN Aman Hines PA-C      Diclofenac Sodium  2 g Topical 4x Daily Regla Venegas PA-C      fish oil  1,000 mg Oral BID Jill Locke PA-C      gabapentin  100 mg Oral HS Regla Venegas PA-C      guaiFENesin  600 mg Oral Q12H [facility] Jill Locke PA-C      heparin (porcine)  2,000 Units Intravenous Q1H PRN Leslye Gunter PA-C      heparin (porcine)  4,000 Units Intravenous Q1H PRN Leslye Gunter PA-C      insulin glargine  10 Units Subcutaneous HS Jill Locke PA-C      insulin lispro  1-5 Units Subcutaneous HS Jill Locke PA-C      insulin lispro  2-12 Units Subcutaneous TID AC Jill Locke PA-C      levalbuterol  1 25 mg Nebulization TID Karla Campbell, CHRIS      levothyroxine  150 mcg Oral Daily Jill Locke PA-C      lidocaine  1 patch Topical Q24H Jill Locke PA-C      metoprolol tartrate  25 mg Oral Q12H [facility] Jill Locke PA-C      ondansetron  4 mg Intravenous Q6H PRN Jill Locke PA-C      pantoprazole  40 mg Oral Daily Jill Locke PA-C      [START ON 7/30/2021] predniSONE  20 mg Oral Daily Jerrell Tejada PA-C      sodium chloride  3 mL Nebulization TID Karla Campbell, CHRIS      tiotropium  18 mcg Inhalation Daily Aman Hines PA-C          Today, Patient Was Seen By: Corrina Smith PA-C    **Please Note: This note may have been constructed using a voice recognition system  **

## 2021-07-29 NOTE — ASSESSMENT & PLAN NOTE
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  Was scheduled to have IR guided biopsy as outpatient on 7/21 however was done inpatient on 7/21 with IR  · Patient is currently status post for repeat lung biopsy on 07/27 since the biopsy from several days ago resulted in necrotic tissue without viable tumor - target possibly left lung  · Eliquis resumed 7/28  · Per last oncology note: "Discussed case with Dr Rosalia Cosme with interventional radiology who preformed the bx and stated he is unsure if pathology will result with evidence of malignancy   If this bx is negative, next steps may include attempted biopsy of contralateral lung nodule"

## 2021-07-29 NOTE — PROGRESS NOTES
LSW attempted to contact pt via telephone, no answer and unable to leave message  LSW will attempt to contact pt at a later date

## 2021-07-29 NOTE — ASSESSMENT & PLAN NOTE
· Improving  RR called due to patient being found with increased work of breathing early in admission with tachypnea and tachycardia  Patient found to be in Afib with RVR, IV metoprolol, IV labetalol given with improvement  Patient placed on BiPAP and transferred to ICU briefly    · At this time, patient does still feel SOB, but notes he has had improvement since admission  · Monitor oxygen status closely   · Patient normally is on 3 L nasal cannula at home  · Titrate to maintain SpO2 greater than or equal to 89%  · Pulmonary disease followed and signed off   · Recommendations appreciated  · Steroids not indicated for pulmonary reasons, but continued for ITP

## 2021-07-29 NOTE — QUICK NOTE
Notified of pathology results from 7/27 lung bx as follows:     Final Diagnosis   A  Lung, bx:  - Almost entirely necrotic tissue  See comment      Comment: Immunohistochemistry for AE1/3 is positive in scattered single cells  TTF-1, p40, and synaptophysin are negative  Ki-67 is positive in few background inflammatory cells  Although the overall radiologic and prior pathologic findings are concerning for malignancy, a definitive diagnosis cannot be rendered  Will discuss case with IR team as well as surgery to determine next best site for tissue biopsy  With 3 biopsies resulting in necrotic tissue, concern for malignant vs non-malignant process but cannot make appropriate recommendations until adequate tissue sample is obtained  Will see patient tomorrow for official progress note with further recommendations

## 2021-07-29 NOTE — ASSESSMENT & PLAN NOTE
Lab Results   Component Value Date    HGBA1C 6 6 (H) 06/20/2021       Recent Labs     07/28/21  1632 07/28/21  2052 07/29/21  0804 07/29/21  1103   POCGLU 417* 266* 202* 345*       Blood Sugar Average: Last 72 hrs:  (P) 278 2  · A1C 6 6   BG labile over last 24 hours   · Continue sliding scale insulin

## 2021-07-29 NOTE — PLAN OF CARE
Problem: PHYSICAL THERAPY ADULT  Goal: Performs mobility at highest level of function for planned discharge setting  See evaluation for individualized goals  Description: Treatment/Interventions: Functional transfer training, LE strengthening/ROM, Therapeutic exercise, Endurance training, Elevations, Patient/family training, Equipment eval/education, Bed mobility, Gait training          See flowsheet documentation for full assessment, interventions and recommendations  Outcome: Progressing  Note: Prognosis: Good  Problem List: Decreased strength, Decreased endurance, Impaired balance, Decreased mobility, Decreased safety awareness, Impaired sensation  Assessment: Patient agreeable to participate in therapy session  Multiple sit<>stand transfers with consistent supervision with good technique and safety  Pt able to ambulate increased gait distance with no assistive device and supervision  Requires frequent pauses with mild SOB and verbal instruction for rest breaks as patient reaches for furniture when fatigued  Continue to focus on OOB mobility with progression of ambulation and initiation of stair training as appropriate and able  PT Discharge Recommendation: Home with home health rehabilitation          See flowsheet documentation for full assessment

## 2021-07-29 NOTE — PHYSICAL THERAPY NOTE
PHYSICAL THERAPY NOTE    Patient Name: Bradley Duffy  ESGBP'Z Date: 21 1453   PT Last Visit   PT Visit Date 21   Note Type   Note Type Treatment   Pain Assessment   Pain Assessment Tool 0-10   Pain Score 9   Pain Location/Orientation Orientation: Right;Location: Back   Restrictions/Precautions   Weight Bearing Precautions Per Order No   Other Precautions Chair Alarm; Bed Alarm;Telemetry;Multiple lines;O2;Fall Risk;Pain;Hard of hearing  (2L O2 via NC)   General   Family/Caregiver Present No   Subjective   Subjective Patient seated EOB and is agreeable to participate in therapy session  Pt identifers obtained from name &   Bed Mobility   Supine to Sit Unable to assess   Sit to Supine Unable to assess   Transfers   Sit to Stand 5  Supervision   Additional items Assist x 1; Armrests; Increased time required;Verbal cues   Stand to Sit 5  Supervision   Additional items Assist x 1; Armrests; Increased time required;Verbal cues   Ambulation/Elevation   Gait pattern Forward Flexion; Short stride   Gait Assistance 5  Supervision   Additional items Assist x 1;Verbal cues   Assistive Device None   Distance 90' x1, 45' x1   Balance   Static Sitting Good   Static Standing Fair +   Ambulatory Fair   Endurance Deficit   Endurance Deficit Yes   Endurance Deficit Description limited ambulation with SOB   Activity Tolerance   Nurse Made Aware Spoke to Dede York RN    Assessment   Prognosis Good   Problem List Decreased strength;Decreased endurance; Impaired balance;Decreased mobility; Decreased safety awareness; Impaired sensation   Assessment Patient agreeable to participate in therapy session  Multiple sit<>stand transfers with consistent supervision with good technique and safety  Pt able to ambulate increased gait distance with no assistive device and supervision   Requires frequent pauses with mild SOB and verbal instruction for rest breaks as patient reaches for furniture when fatigued  Continue to focus on OOB mobility with progression of ambulation and initiation of stair training as appropriate and able  Goals   Patient Goals none stated   STG Expiration Date 07/31/21   PT Treatment Day 3   Plan   Treatment/Interventions Functional transfer training;LE strengthening/ROM; Therapeutic exercise; Endurance training;Patient/family training;Equipment eval/education; Bed mobility;Gait training;Spoke to nursing   Progress Progressing toward goals   PT Frequency 5x/wk   Recommendation   PT Discharge Recommendation Home with home health rehabilitation   [address] [place] [address] Mobility Inpatient   Turning in Bed Without Bedrails 4   Lying on Back to Sitting on Edge of Flat Bed 4   Moving Bed to Chair 3   Standing Up From Chair 3   Walk in Room 3   Climb 3-5 Stairs 2   Basic Mobility Inpatient Raw Score 19   Basic Mobility Standardized Score 42 48     Ayah Lee, PTA

## 2021-07-29 NOTE — ASSESSMENT & PLAN NOTE
· Was previously receiving IV Solu-Medrol  · Transitioned to PO Prednisone 20 mg on 07/27  · As per pulmonary disease does not to be on steroid   · Platelets remain stable  · D/W Heme/onc-- does receive rituxin as an OP; plan to keep on prednisone 20mg PO QD as he was on prolonged taper as an OP  · Will continue steroids and have patient follow up with Heme/Onc

## 2021-07-30 ENCOUNTER — APPOINTMENT (INPATIENT)
Dept: RADIOLOGY | Facility: HOSPITAL | Age: 81
DRG: 682 | End: 2021-07-30
Payer: COMMERCIAL

## 2021-07-30 LAB
ANION GAP SERPL CALCULATED.3IONS-SCNC: 6 MMOL/L (ref 4–13)
ANISOCYTOSIS BLD QL SMEAR: PRESENT
BASOPHILS # BLD MANUAL: 0 THOUSAND/UL (ref 0–0.1)
BASOPHILS NFR MAR MANUAL: 0 % (ref 0–1)
BUN SERPL-MCNC: 26 MG/DL (ref 5–25)
CALCIUM SERPL-MCNC: 9.5 MG/DL (ref 8.3–10.1)
CHLORIDE SERPL-SCNC: 100 MMOL/L (ref 100–108)
CO2 SERPL-SCNC: 30 MMOL/L (ref 21–32)
CREAT SERPL-MCNC: 1.18 MG/DL (ref 0.6–1.3)
EOSINOPHIL # BLD MANUAL: 0 THOUSAND/UL (ref 0–0.4)
EOSINOPHIL NFR BLD MANUAL: 0 % (ref 0–6)
ERYTHROCYTE [DISTWIDTH] IN BLOOD BY AUTOMATED COUNT: 20.1 % (ref 11.6–15.1)
GFR SERPL CREATININE-BSD FRML MDRD: 59 ML/MIN/1.73SQ M
GLUCOSE SERPL-MCNC: 144 MG/DL (ref 65–140)
GLUCOSE SERPL-MCNC: 146 MG/DL (ref 65–140)
GLUCOSE SERPL-MCNC: 152 MG/DL (ref 65–140)
GLUCOSE SERPL-MCNC: 316 MG/DL (ref 65–140)
HCT VFR BLD AUTO: 34.1 % (ref 36.5–49.3)
HGB BLD-MCNC: 9.7 G/DL (ref 12–17)
HYPERCHROMIA BLD QL SMEAR: PRESENT
LYMPHOCYTES # BLD AUTO: 0.68 THOUSAND/UL (ref 0.6–4.47)
LYMPHOCYTES # BLD AUTO: 7 % (ref 14–44)
MCH RBC QN AUTO: 24.1 PG (ref 26.8–34.3)
MCHC RBC AUTO-ENTMCNC: 28.4 G/DL (ref 31.4–37.4)
MCV RBC AUTO: 85 FL (ref 82–98)
METAMYELOCYTES NFR BLD MANUAL: 3 % (ref 0–1)
MONOCYTES # BLD AUTO: 0.39 THOUSAND/UL (ref 0–1.22)
MONOCYTES NFR BLD: 4 % (ref 4–12)
MYELOCYTES NFR BLD MANUAL: 1 % (ref 0–1)
NEUTROPHILS # BLD MANUAL: 8.29 THOUSAND/UL (ref 1.85–7.62)
NEUTS BAND NFR BLD MANUAL: 1 % (ref 0–8)
NEUTS SEG NFR BLD AUTO: 84 % (ref 43–75)
NRBC BLD AUTO-RTO: 0 /100 WBCS
PLATELET # BLD AUTO: 217 THOUSANDS/UL (ref 149–390)
PLATELET BLD QL SMEAR: ADEQUATE
PMV BLD AUTO: 11.1 FL (ref 8.9–12.7)
POTASSIUM SERPL-SCNC: 5.6 MMOL/L (ref 3.5–5.3)
POTASSIUM SERPL-SCNC: 5.7 MMOL/L (ref 3.5–5.3)
RBC # BLD AUTO: 4.03 MILLION/UL (ref 3.88–5.62)
SODIUM SERPL-SCNC: 136 MMOL/L (ref 136–145)
TOTAL CELLS COUNTED SPEC: 100
WBC # BLD AUTO: 9.75 THOUSAND/UL (ref 4.31–10.16)

## 2021-07-30 PROCEDURE — 82948 REAGENT STRIP/BLOOD GLUCOSE: CPT

## 2021-07-30 PROCEDURE — 88305 TISSUE EXAM BY PATHOLOGIST: CPT | Performed by: PATHOLOGY

## 2021-07-30 PROCEDURE — 94669 MECHANICAL CHEST WALL OSCILL: CPT

## 2021-07-30 PROCEDURE — 88341 IMHCHEM/IMCYTCHM EA ADD ANTB: CPT | Performed by: PATHOLOGY

## 2021-07-30 PROCEDURE — 99232 SBSQ HOSP IP/OBS MODERATE 35: CPT | Performed by: PHYSICIAN ASSISTANT

## 2021-07-30 PROCEDURE — 94640 AIRWAY INHALATION TREATMENT: CPT

## 2021-07-30 PROCEDURE — 94760 N-INVAS EAR/PLS OXIMETRY 1: CPT

## 2021-07-30 PROCEDURE — 32555 ASPIRATE PLEURA W/ IMAGING: CPT | Performed by: RADIOLOGY

## 2021-07-30 PROCEDURE — 88342 IMHCHEM/IMCYTCHM 1ST ANTB: CPT | Performed by: PATHOLOGY

## 2021-07-30 PROCEDURE — 32555 ASPIRATE PLEURA W/ IMAGING: CPT

## 2021-07-30 PROCEDURE — 94668 MNPJ CHEST WALL SBSQ: CPT

## 2021-07-30 PROCEDURE — 99232 SBSQ HOSP IP/OBS MODERATE 35: CPT | Performed by: INTERNAL MEDICINE

## 2021-07-30 PROCEDURE — 84132 ASSAY OF SERUM POTASSIUM: CPT | Performed by: INTERNAL MEDICINE

## 2021-07-30 PROCEDURE — 85027 COMPLETE CBC AUTOMATED: CPT | Performed by: PHYSICIAN ASSISTANT

## 2021-07-30 PROCEDURE — 85007 BL SMEAR W/DIFF WBC COUNT: CPT | Performed by: PHYSICIAN ASSISTANT

## 2021-07-30 PROCEDURE — 88112 CYTOPATH CELL ENHANCE TECH: CPT | Performed by: PATHOLOGY

## 2021-07-30 PROCEDURE — 0W993ZZ DRAINAGE OF RIGHT PLEURAL CAVITY, PERCUTANEOUS APPROACH: ICD-10-PCS | Performed by: RADIOLOGY

## 2021-07-30 PROCEDURE — 80048 BASIC METABOLIC PNL TOTAL CA: CPT | Performed by: PHYSICIAN ASSISTANT

## 2021-07-30 RX ORDER — LIDOCAINE WITH 8.4% SOD BICARB 0.9%(10ML)
SYRINGE (ML) INJECTION CODE/TRAUMA/SEDATION MEDICATION
Status: COMPLETED | OUTPATIENT
Start: 2021-07-30 | End: 2021-07-30

## 2021-07-30 RX ADMIN — ISODIUM CHLORIDE 3 ML: 0.03 SOLUTION RESPIRATORY (INHALATION) at 07:18

## 2021-07-30 RX ADMIN — ISODIUM CHLORIDE 3 ML: 0.03 SOLUTION RESPIRATORY (INHALATION) at 20:17

## 2021-07-30 RX ADMIN — INSULIN LISPRO 2 UNITS: 100 INJECTION, SOLUTION INTRAVENOUS; SUBCUTANEOUS at 22:15

## 2021-07-30 RX ADMIN — TIOTROPIUM BROMIDE 18 MCG: 18 CAPSULE ORAL; RESPIRATORY (INHALATION) at 08:48

## 2021-07-30 RX ADMIN — BENZONATATE 100 MG: 100 CAPSULE ORAL at 22:15

## 2021-07-30 RX ADMIN — INSULIN GLARGINE 10 UNITS: 100 INJECTION, SOLUTION SUBCUTANEOUS at 22:15

## 2021-07-30 RX ADMIN — ACETAMINOPHEN 650 MG: 325 TABLET, FILM COATED ORAL at 06:16

## 2021-07-30 RX ADMIN — APIXABAN 5 MG: 5 TABLET, FILM COATED ORAL at 08:44

## 2021-07-30 RX ADMIN — LEVALBUTEROL HYDROCHLORIDE 1.25 MG: 1.25 SOLUTION, CONCENTRATE RESPIRATORY (INHALATION) at 20:17

## 2021-07-30 RX ADMIN — OMEGA-3 FATTY ACIDS CAP 1000 MG 1000 MG: 1000 CAP at 17:46

## 2021-07-30 RX ADMIN — AMIODARONE HYDROCHLORIDE 200 MG: 200 TABLET ORAL at 12:30

## 2021-07-30 RX ADMIN — APIXABAN 5 MG: 5 TABLET, FILM COATED ORAL at 17:47

## 2021-07-30 RX ADMIN — DICLOFENAC SODIUM 2 G: 10 GEL TOPICAL at 08:50

## 2021-07-30 RX ADMIN — Medication 6 ML: at 14:32

## 2021-07-30 RX ADMIN — LEVOTHYROXINE SODIUM 150 MCG: 150 TABLET ORAL at 06:15

## 2021-07-30 RX ADMIN — DICLOFENAC SODIUM 2 G: 10 GEL TOPICAL at 11:20

## 2021-07-30 RX ADMIN — BENZONATATE 100 MG: 100 CAPSULE ORAL at 17:46

## 2021-07-30 RX ADMIN — AMIODARONE HYDROCHLORIDE 200 MG: 200 TABLET ORAL at 08:44

## 2021-07-30 RX ADMIN — PANTOPRAZOLE SODIUM 40 MG: 40 TABLET, DELAYED RELEASE ORAL at 08:44

## 2021-07-30 RX ADMIN — OMEGA-3 FATTY ACIDS CAP 1000 MG 1000 MG: 1000 CAP at 08:44

## 2021-07-30 RX ADMIN — GUAIFENESIN 600 MG: 600 TABLET, EXTENDED RELEASE ORAL at 08:43

## 2021-07-30 RX ADMIN — INSULIN LISPRO 8 UNITS: 100 INJECTION, SOLUTION INTRAVENOUS; SUBCUTANEOUS at 11:18

## 2021-07-30 RX ADMIN — BENZONATATE 100 MG: 100 CAPSULE ORAL at 08:44

## 2021-07-30 RX ADMIN — METOPROLOL TARTRATE 25 MG: 25 TABLET, FILM COATED ORAL at 22:16

## 2021-07-30 RX ADMIN — AMIODARONE HYDROCHLORIDE 200 MG: 200 TABLET ORAL at 17:47

## 2021-07-30 RX ADMIN — ISODIUM CHLORIDE 3 ML: 0.03 SOLUTION RESPIRATORY (INHALATION) at 14:01

## 2021-07-30 RX ADMIN — PREDNISONE 20 MG: 20 TABLET ORAL at 08:44

## 2021-07-30 RX ADMIN — DICLOFENAC SODIUM 2 G: 10 GEL TOPICAL at 22:16

## 2021-07-30 RX ADMIN — LEVALBUTEROL HYDROCHLORIDE 1.25 MG: 1.25 SOLUTION, CONCENTRATE RESPIRATORY (INHALATION) at 07:18

## 2021-07-30 RX ADMIN — GUAIFENESIN 600 MG: 600 TABLET, EXTENDED RELEASE ORAL at 22:15

## 2021-07-30 RX ADMIN — METOPROLOL TARTRATE 25 MG: 25 TABLET, FILM COATED ORAL at 08:45

## 2021-07-30 RX ADMIN — LEVALBUTEROL HYDROCHLORIDE 1.25 MG: 1.25 SOLUTION, CONCENTRATE RESPIRATORY (INHALATION) at 14:01

## 2021-07-30 RX ADMIN — INSULIN LISPRO 2 UNITS: 100 INJECTION, SOLUTION INTRAVENOUS; SUBCUTANEOUS at 17:47

## 2021-07-30 RX ADMIN — DICLOFENAC SODIUM 2 G: 10 GEL TOPICAL at 17:47

## 2021-07-30 RX ADMIN — INSULIN LISPRO 3 UNITS: 100 INJECTION, SOLUTION INTRAVENOUS; SUBCUTANEOUS at 17:48

## 2021-07-30 RX ADMIN — ALLOPURINOL 300 MG: 300 TABLET ORAL at 08:43

## 2021-07-30 RX ADMIN — GABAPENTIN 100 MG: 100 CAPSULE ORAL at 22:15

## 2021-07-30 NOTE — ASSESSMENT & PLAN NOTE
· Patient has been having R flank pain since his R lung biopsy on 7/21; he now reports that pain is persistent and radiate to the RUQ and down the R side of his back  He feels the pain in his RUQ is somewhat of a 'burning' pain   He reports that he has had urinary frequency, urgency, and decreased UO  · DDx: pain 2/2 lung mass vs  Neuropathic pain s/p biopsy   · Trial gabapentin QHS for suspected neuropathic RUQ pain

## 2021-07-30 NOTE — PROGRESS NOTES
Medical Oncology/Hematology Progress Note  Vandana Cummins, male, 68 y o , 1940,  S /S -01, [id number]     Reason for admission: Acute respiratory failure with hypoxia and hypercapnia  Reason for consultation: Mass of right lung      ASSESSMENT AND PLAN:     1  Mass of right lung   Patient presents with worsening SOB/SADLER   IR biopsy lung CT demonstrated increase in left upper lung mass 2 1cm --> 3 7cm from study done on June 18 2021 with small right pleural effusion  Right base presumed metastatic nodule increased 9mm -->14mm   Previous EBUS done on 6/21 with pathology indicating atypical cells, negative for malignancy (see imaging below)   CTA on 6/18 concerning for encasement of right pulmonary artery, encasement with narrowing and occlusion of anterior RUL and RML with obstructive atelectasis    MRI brain on 6/20/21 negative for metastatic disease or other acute pathology   Plan on last admission outpatient oncology follow up with PET CT    Recent bx mass of right lung demonstrated necrotic tissue and could not be sent for ancillary testing   Biopsy of left lung demonstrated necrotic tissue and again cannot be sent for ancillary testing    Plan:    IR  consulted for thoracentesis of right side and will send fluid for cytology   Will discuss patient's case at tumor board on Monday    2  ITP   Managed by Dr Jaimie Ng as outpatient   Currently on prednisone and scheduled for rituxan infusion on 7/23 - will notify Dr Joyce Murphy team of cancellation for this appointment in infusion center   Platelet level 018,024 today   No plan for inpatient rituxan    If steroids are being tapered, should be a very gradual taper    3   Normocytic Anemia  · CBC today pending, iron panel B12 and folate WNL    4  Leukocytosis, neutrophilia  · WBC 13 88, ANC 12 08 likely reactive process          Mass; RUL mass     Lymph Nodes, Lymph Node 7     Lymph Nodes, Lymph Node 7     Lymph Nodes, Lymph Node 10R; RUL Mass Mass; RUL mass   Anatomical Diagram        Patient understands and is in agreement with this plan  Thank you for the opportunity to participate in this patient's care  Interval History: Patient feeling short of breath and fatigued  History of present illness: Patient is a 67 yo male with pmh significant for nicotine dependence, COPD, AFIB, T2DM who presented after being sent by PCP for hypercalcemia, hyperkalemia, SULEMA as seen on labs  Former smoker, 0 5ppd for 40 years  Review of Systems:   Review of Systems   Constitutional: Positive for activity change, appetite change and fatigue  Negative for chills, fever and unexpected weight change  HENT: Negative for ear pain and sore throat  Eyes: Negative for pain and visual disturbance  Respiratory: Positive for cough, chest tightness and shortness of breath  Cardiovascular: Negative for chest pain, palpitations and leg swelling  Gastrointestinal: Negative for abdominal pain, constipation, nausea and vomiting  Genitourinary: Negative for dysuria and hematuria  Musculoskeletal: Negative for arthralgias and back pain  Skin: Negative for color change and rash  Neurological: Negative for seizures and syncope  All other systems reviewed and are negative  PHYSICAL EXAM:    /52 (BP Location: Right arm)   Pulse 95   Temp 98 9 °F (37 2 °C) (Oral)   Resp 20   Ht 5' 2" (1 575 m)   Wt 75 8 kg (167 lb)   SpO2 98%   BMI 30 54 kg/m²     Physical Exam  Vitals and nursing note reviewed  Constitutional:       General: He is in acute distress  Appearance: Normal appearance  He is not ill-appearing  HENT:      Head: Normocephalic and atraumatic  Eyes:      General: No scleral icterus  Cardiovascular:      Rate and Rhythm: Normal rate and regular rhythm  Pulses: Normal pulses  Heart sounds: Normal heart sounds  No murmur heard       Pulmonary:      Effort: Pulmonary effort is normal       Breath sounds: Rhonchi present  No wheezing  Comments: Decreased breath sounds  Chest:      Chest wall: No tenderness  Abdominal:      General: Bowel sounds are normal  There is no distension  Palpations: Abdomen is soft  There is no mass  Tenderness: There is no abdominal tenderness  Musculoskeletal:      Right lower leg: No edema  Left lower leg: No edema  Lymphadenopathy:      Cervical: No cervical adenopathy  Skin:     General: Skin is warm and dry  Coloration: Skin is pale  Neurological:      Mental Status: He is alert and oriented to person, place, and time  Psychiatric:         Thought Content:  Thought content normal          LABS:     Recent Results (from the past 48 hour(s))   Fingerstick Glucose (POCT)    Collection Time: 07/28/21  4:27 PM   Result Value Ref Range    POC Glucose 448 (H) 65 - 140 mg/dl   Fingerstick Glucose (POCT)    Collection Time: 07/28/21  4:32 PM   Result Value Ref Range    POC Glucose 417 (H) 65 - 140 mg/dl   UA w Reflex to Microscopic w Reflex to Culture    Collection Time: 07/28/21  4:34 PM    Specimen: Urine, Other   Result Value Ref Range    Color, UA Yellow     Clarity, UA Clear     Specific Gravity, UA 1 020 1 003 - 1 030    pH, UA 6 0 4 5, 5 0, 5 5, 6 0, 6 5, 7 0, 7 5, 8 0    Leukocytes, UA Negative Negative    Nitrite, UA Negative Negative    Protein, UA Negative Negative mg/dl    Glucose,  (1/2%) (A) Negative mg/dl    Ketones, UA Negative Negative mg/dl    Urobilinogen, UA 4 0 (A) 0 2, 1 0 E U /dl E U /dl    Bilirubin, UA Negative Negative    Blood, UA Trace-Intact (A) Negative   Urine Microscopic    Collection Time: 07/28/21  4:34 PM   Result Value Ref Range    RBC, UA 0-1 None Seen, 0-1, 1-2, 2-4, 0-5 /hpf    WBC, UA 0-1 None Seen, 0-1, 1-2, 0-5, 2-4 /hpf    Epithelial Cells None Seen None Seen, Occasional /hpf    Bacteria, UA None Seen None Seen, Occasional /hpf   Fingerstick Glucose (POCT)    Collection Time: 07/28/21  8:52 PM   Result Value Ref Range POC Glucose 266 (H) 65 - 140 mg/dl   Fingerstick Glucose (POCT)    Collection Time: 07/29/21  8:04 AM   Result Value Ref Range    POC Glucose 202 (H) 65 - 140 mg/dl   CBC    Collection Time: 07/29/21  9:20 AM   Result Value Ref Range    WBC 9 67 4 31 - 10 16 Thousand/uL    RBC 4 05 3 88 - 5 62 Million/uL    Hemoglobin 9 6 (L) 12 0 - 17 0 g/dL    Hematocrit 34 2 (L) 36 5 - 49 3 %    MCV 84 82 - 98 fL    MCH 23 7 (L) 26 8 - 34 3 pg    MCHC 28 1 (L) 31 4 - 37 4 g/dL    RDW 20 0 (H) 11 6 - 15 1 %    Platelets 046 509 - 307 Thousands/uL    MPV 10 7 8 9 - 12 7 fL   Comprehensive metabolic panel    Collection Time: 07/29/21  9:20 AM   Result Value Ref Range    Sodium 134 (L) 136 - 145 mmol/L    Potassium 4 6 3 5 - 5 3 mmol/L    Chloride 96 (L) 100 - 108 mmol/L    CO2 30 21 - 32 mmol/L    ANION GAP 8 4 - 13 mmol/L    BUN 30 (H) 5 - 25 mg/dL    Creatinine 1 21 0 60 - 1 30 mg/dL    Glucose 274 (H) 65 - 140 mg/dL    Calcium 9 3 8 3 - 10 1 mg/dL    Corrected Calcium 10 3 (H) 8 3 - 10 1 mg/dL    AST 11 5 - 45 U/L    ALT 29 12 - 78 U/L    Alkaline Phosphatase 142 (H) 46 - 116 U/L    Total Protein 7 3 6 4 - 8 2 g/dL    Albumin 2 7 (L) 3 5 - 5 0 g/dL    Total Bilirubin 0 91 0 20 - 1 00 mg/dL    eGFR 57 ml/min/1 73sq m   Fingerstick Glucose (POCT)    Collection Time: 07/29/21 11:03 AM   Result Value Ref Range    POC Glucose 345 (H) 65 - 140 mg/dl   Fingerstick Glucose (POCT)    Collection Time: 07/29/21  4:05 PM   Result Value Ref Range    POC Glucose 199 (H) 65 - 140 mg/dl   Fingerstick Glucose (POCT)    Collection Time: 07/29/21  8:05 PM   Result Value Ref Range    POC Glucose 279 (H) 65 - 140 mg/dl   CBC and differential    Collection Time: 07/30/21  5:45 AM   Result Value Ref Range    WBC 9 75 4 31 - 10 16 Thousand/uL    RBC 4 03 3 88 - 5 62 Million/uL    Hemoglobin 9 7 (L) 12 0 - 17 0 g/dL    Hematocrit 34 1 (L) 36 5 - 49 3 %    MCV 85 82 - 98 fL    MCH 24 1 (L) 26 8 - 34 3 pg    MCHC 28 4 (L) 31 4 - 37 4 g/dL    RDW 20 1 (H) 11 6 - 15 1 %    MPV 11 1 8 9 - 12 7 fL    Platelets 733 596 - 154 Thousands/uL    nRBC 0 /100 WBCs   Basic metabolic panel    Collection Time: 21  5:45 AM   Result Value Ref Range    Sodium 136 136 - 145 mmol/L    Potassium 5 7 (H) 3 5 - 5 3 mmol/L    Chloride 100 100 - 108 mmol/L    CO2 30 21 - 32 mmol/L    ANION GAP 6 4 - 13 mmol/L    BUN 26 (H) 5 - 25 mg/dL    Creatinine 1 18 0 60 - 1 30 mg/dL    Glucose 146 (H) 65 - 140 mg/dL    Calcium 9 5 8 3 - 10 1 mg/dL    eGFR 59 ml/min/1 73sq m   Manual Differential(PHLEBS Do Not Order)    Collection Time: 21  5:45 AM   Result Value Ref Range    Segmented % 84 (H) 43 - 75 %    Bands % 1 0 - 8 %    Lymphocytes % 7 (L) 14 - 44 %    Monocytes % 4 4 - 12 %    Eosinophils, % 0 0 - 6 %    Basophils % 0 0 - 1 %    Metamyelocytes% 3 (H) 0 - 1 %    Myelocytes % 1 0 - 1 %    Absolute Neutrophils 8 29 (H) 1 85 - 7 62 Thousand/uL    Lymphocytes Absolute 0 68 0 60 - 4 47 Thousand/uL    Monocytes Absolute 0 39 0 00 - 1 22 Thousand/uL    Eosinophils Absolute 0 00 0 00 - 0 40 Thousand/uL    Basophils Absolute 0 00 0 00 - 0 10 Thousand/uL    Total Counted 100     Anisocytosis Present     Hypochromia Present     Platelet Estimate Adequate Adequate   Fingerstick Glucose (POCT)    Collection Time: 21  7:36 AM   Result Value Ref Range    POC Glucose 144 (H) 65 - 140 mg/dl   Fingerstick Glucose (POCT)    Collection Time: 21 11:13 AM   Result Value Ref Range    POC Glucose 316 (H) 65 - 140 mg/dl       Echo complete with contrast if indicated    Result Date: 2021  Narrative: [address] [place] [address], [address] [place] [address] [phone] Transthoracic Echocardiogram 2D, M-mode, Doppler, and Color Doppler Study date:  2021 Patient: Mary Salas MR number: [id number] Account number: [de-identified] : 1940 Age: 68 years Gender: Male Status: Inpatient Location: Bedside Height: 62 in Weight: 173 6 lb BP: 138/ 65 mmHg Indications: A-fib  Diagnoses: I48 0 - Atrial fibrillation Sonographer:  ROSALINA Ennis Primary Physician:  Serena Hernandez DO Referring Physician:  Armani Briseno PA-C Group:  [facility] Interpreting Physician:  Chadwick Fox MD SUMMARY LEFT VENTRICLE: Systolic function was normal  Ejection fraction was estimated to be 60 %  There were no regional wall motion abnormalities  MITRAL VALVE: There was mild regurgitation  TRICUSPID VALVE: There was mild regurgitation  Estimated peak PA pressure was 42 mmHg  The findings suggest mild pulmonary hypertension  HISTORY: PRIOR HISTORY: PAF, HTN, SULEMA, SOB, lung mass, DM2, thrombocytopenia, current smoker  PROCEDURE: The procedure was performed at the bedside  This was a routine study  The transthoracic approach was used  The study included complete 2D imaging, M-mode, complete spectral Doppler, and color Doppler  The heart rate was 77 bpm, at the start of the study  Images were obtained from the parasternal, apical, subcostal, and suprasternal notch acoustic windows  Image quality was adequate  LEFT VENTRICLE: Size was normal  Systolic function was normal  Ejection fraction was estimated to be 60 %  There were no regional wall motion abnormalities  Wall thickness was normal  DOPPLER: Left ventricular diastolic function parameters were normal for the patient's age  RIGHT VENTRICLE: The size was normal  Systolic function was normal  Wall thickness was normal  LEFT ATRIUM: Size was normal  RIGHT ATRIUM: Size was normal  MITRAL VALVE: Valve structure was normal  There was normal leaflet separation  DOPPLER: The transmitral velocity was within the normal range  There was no evidence for stenosis  There was mild regurgitation  AORTIC VALVE: The valve was trileaflet  Leaflets exhibited normal thickness and normal cuspal separation  DOPPLER: Transaortic velocity was within the normal range  There was no evidence for stenosis  There was no significant regurgitation  TRICUSPID VALVE: The valve structure was normal  There was normal leaflet separation  DOPPLER: The transtricuspid velocity was within the normal range  There was no evidence for stenosis  There was mild regurgitation  Estimated peak PA pressure was 42 mmHg  The findings suggest mild pulmonary hypertension  PULMONIC VALVE: Leaflets exhibited normal thickness, no calcification, and normal cuspal separation  DOPPLER: The transpulmonic velocity was within the normal range  There was no significant regurgitation  PERICARDIUM: There was no pericardial effusion  The pericardium was normal in appearance  AORTA: The root exhibited normal size  SYSTEMIC VEINS: IVC: The inferior vena cava was normal in size  Respirophasic changes were normal  SYSTEM MEASUREMENT TABLES 2D %FS: 30 02 % Ao Diam: 3 54 cm EDV(Teich): 123 19 ml EF(Teich): 56 95 % ESV(Teich): 53 03 ml IVSd: 0 92 cm LA Diam: 4 57 cm LAAs A2C: 23 05 cm2 LAAs A4C: 22 52 cm2 LAESV A-L A2C: 77 35 ml LAESV A-L A4C: 69 99 ml LAESV Index (A-L): 41 99 ml/m2 LAESV MOD A2C: 73 99 ml LAESV MOD A4C: 64 59 ml LAESV(A-L): 75 58 ml LAESV(MOD BP): 70 92 ml LALs A2C: 5 83 cm LALs A4C: 6 15 cm LVIDd: 5 09 cm LVIDs: 3 56 cm LVPWd: 0 85 cm RVIDd: 3 65 cm SV(Teich): 70 16 ml CW AV Env  Ti: 281 64 ms AV MaxP 92 mmHg AV VTI: 22 53 cm AV Vmax: 1 49 m/s AV Vmean: 0 8 m/s AV meanPG: 3 13 mmHg TR MaxP 17 mmHg TR Vmax: 3 13 m/s MM TAPSE: 3 01 cm PW E' Sept: 0 09 m/s E/E' Sept: 11 25 LVOT Env  Ti: 342 53 ms LVOT VTI: 21 45 cm LVOT Vmax: 1 01 m/s LVOT Vmean: 0 63 m/s LVOT maxP 07 mmHg LVOT meanP 9 mmHg MV A Ge: 1 12 m/s MV Dec [place]: 6 18 m/s2 MV DecT: 167 82 ms MV E Ge: 1 04 m/s MV E/A Ratio: 0 93 MV PHT: 48 67 ms MVA By PHT: 4 52 cm2 [employer] Accredited Echocardiography Laboratory Prepared and electronically signed by Braxton Rm MD Signed 2021 12:57:51     XR chest portable    Result Date: 2021  Narrative: CHEST INDICATION:   Shortness of breath  No fibrillation  Wheezing  COMPARISON:  July 17, 2021 EXAM PERFORMED/VIEWS:  XR CHEST PORTABLE FINDINGS:  Trace case pacer pads noted  Large mass over the right lung is unchanged  Left lung is clear  Blunting in the right costophrenic angle suggests small effusion  No pneumothorax  Heart shadow is unremarkable  Osseous structures appear within normal limits for patient age  Impression: No interval change from July 17, 2021  Workstation performed: [id number]     XR chest portable    Result Date: 7/18/2021  Narrative: CHEST INDICATION:  Shortness of breath, wheezing  COMPARISON:  7/14/2021, CTA chest 6/18/2021 EXAM PERFORMED/VIEWS:  XR CHEST PORTABLE FINDINGS: Heart shadow is obscured on the right by adjacent opacity  The trachea is midline  Large mass involving the right mid to upper lung is again identified with obscuration of the right heart border compatible with element of right middle lobe postobstructive atelectasis and/or pneumonia  There is flattening of the right diaphragm suggesting subpulmonic pleural effusion  Faint nodular opacity peripheral left midlung projecting over the inferior border of the scapula in keeping with known nodule on CT  Paravertebral ossifications thoracic spine  Impression: No change in large right lung mass with associated postobstructive atelectasis or pneumonia right middle lobe and small right pleural effusion  Left lung nodule  Workstation performed: [id number]     XR chest 1 view portable    Result Date: 7/14/2021  Narrative: CHEST INDICATION:   known lung mass, with cough  COMPARISON:  6/18/2021 EXAM PERFORMED/VIEWS:  XR CHEST PORTABLE Single view FINDINGS: Large right upper lobe mass consistent with malignancy with mild interval enlargement  small right pleural effusion again evident Cardiomediastinal silhouette appears unremarkable  No pneumothorax Osseous structures appear within normal limits for patient age       Impression: Large right lung mass, slightly increased in size, consistent with malignancy  Persistent small right pleural effusion Workstation performed: [id number]     IR biopsy lung    Result Date: 7/21/2021  Narrative: CT-guided lung mass biopsy Clinical History: Lung mass suspicious for malignant process  Prior endobronchial ultrasound without malignant cells identified  Respiratory failure  Atrial fibrillation on anticoagulation  Moderate sedation time: 35 minutes Procedure: After explaining the risks and benefits of the procedure to the patient, informed consent was obtained  CT was used to localize the lung mass   Radiation dose length product (DLP) for this visit:  1204 mGy   This examination, like all CT scans performed in the [facility], was performed utilizing techniques to minimize radiation dose exposure, including the use of iterative reconstruction and automated exposure control  The overlying skin was prepped and draped in the usual sterile fashion  Local anesthesia was obtained with a 1% lidocaine solution  Using CT guidance, a 17-gauge coaxial needle was advanced  9 passes with an 18g gauge core biopsy needle were performed  The tissue was given to pathology  The needle was removed and final imaging performed  Patient tolerated the procedure without immediate complication Findings: 89 1 cm right lung masslike density  There is architectural distortion and it is difficult to discern what may be intrinsic mass and what may in fact be atelectatic compressed lung  Needle within the mass  Postbiopsy changes without hematoma  Since prior CT of June 18 there has been interval increase in size of the mass and development of a small right pleural effusion  A left upper lung 3 7 cm mass is also present which has increased in size from 2 1 cm on prior study  A right base presumed metastatic nodule is also increased measuring 14 mm, previously 9 mm   Specimens: Flow cytometry, touch prep x4, 18-gauge core x9 The patient tolerated the procedure well and left the department in stable condition  Impression: Impression: CT-guided biopsy of a large right lung mass Since interval CT approximately one month ago there has been growth of the mass and development of accompanying right pleural effusion  Consider thoracentesis if there is persistent respiratory failure  Additional presumed metastatic nodules have also increased in size  Workstation performed: [id number]         HISTORY:    Past Medical History:   Diagnosis Date    Hypertension        Past Surgical History:   Procedure Laterality Date    BACK SURGERY      CARPAL TUNNEL RELEASE Bilateral     IR BIOPSY BONE MARROW  6/10/2021    IR BIOPSY LUNG  2021    IR BIOPSY OTHER  2021    LUMBAR DISC SURGERY         Family History   Problem Relation Age of Onset    Cancer Mother         "ate away her bone"    Anuerysm Father     Cancer Sister         unknown type    Heart attack Maternal Grandfather     Cancer Sister         unknown type    Heart attack Maternal Aunt     Heart attack Maternal Uncle     Heart attack Paternal Aunt        Social History     Socioeconomic History    Marital status: /Civil Union     Spouse name: Brandon Lema Number of children: 10    Years of education: None    Highest education level: None   Occupational History    None   Tobacco Use    Smoking status: Former Smoker     Packs/day: 0 50     Years: 40 00     Pack years: 20 00     Types: Cigarettes     Start date:      Quit date: 2021     Years since quittin 1    Smokeless tobacco: Never Used   Vaping Use    Vaping Use: Never used   Substance and Sexual Activity    Alcohol use: Not Currently     Comment: History of heavier drinking in early 25s  Denies any current alcohol use (Updated 2021)   Drug use: Never    Sexual activity: None   Other Topics Concern    None   Social History Narrative    Previously worked as heavy machinery technician  Lives with his wife who is essentially bed bound  Social Determinants of Health     Financial Resource Strain: High Risk    Difficulty of Paying Living Expenses: Hard   Food Insecurity: Food Insecurity Present    Worried About Running Out of Food in the Last Year: Sometimes true    [place] of Food in the Last Year: Not on file   Transportation Needs:     Lack of Transportation (Medical):      Lack of Transportation (Non-Medical):    Physical Activity:     Days of Exercise per Week:     Minutes of Exercise per Session:    Stress:     Feeling of Stress :    Social Connections:     Frequency of Communication with Friends and Family:     Frequency of Social Gatherings with Friends and Family:     Attends Zoroastrianism Services:     Active Member of Clubs or Organizations:     Attends Club or Organization Meetings:     Marital Status:    Intimate Partner Violence:     Fear of Current or Ex-Partner:     Emotionally Abused:     Physically Abused:     Sexually Abused:          Current Facility-Administered Medications:     acetaminophen (TYLENOL) tablet 650 mg, 650 mg, Oral, Q6H PRN, Jill Locke PA-C, 650 mg at 07/30/21 5119    al mag oxide-diphenhydramine-lidocaine viscous (MAGIC MOUTHWASH) suspension 10 mL, 10 mL, Swish & Spit, Q4H PRN, Jerrell Schwartz PA-C, 10 mL at 07/29/21 2134    albuterol inhalation solution 2 5 mg, 2 5 mg, Nebulization, Q6H PRN, CHRIS Thomson    allopurinol (ZYLOPRIM) tablet 300 mg, 300 mg, Oral, Daily, Jill Locke PA-C, 300 mg at 07/30/21 9691    amiodarone tablet 200 mg, 200 mg, Oral, TID With Meals, Jill Locke PA-C, 200 mg at 07/30/21 1230    apixaban (ELIQUIS) tablet 5 mg, 5 mg, Oral, BID, Jerrell Schwartz PA-C, 5 mg at 07/30/21 0844    benzonatate (TESSALON PERLES) capsule 100 mg, 100 mg, Oral, TID, AMARJIT Escobar-NANI, 100 mg at 07/30/21 0844    dextromethorphan-guaiFENesin (ROBITUSSIN DM) oral syrup 10 mL, 10 mL, Oral, Q4H PRN, Jill Locke PA-C, 10 mL at 07/19/21 0831    Diclofenac Sodium (VOLTAREN) 1 % topical gel 2 g, 2 g, Topical, 4x Daily, Regla Venegas PA-C, 2 g at 07/30/21 1120    fish oil capsule 1,000 mg, 1,000 mg, Oral, BID, Jill Locke PA-C, 1,000 mg at 07/30/21 0844    gabapentin (NEURONTIN) capsule 100 mg, 100 mg, Oral, HS, Regla Venegas PA-C, 100 mg at 07/29/21 2120    guaiFENesin (MUCINEX) 12 hr tablet 600 mg, 600 mg, Oral, Q12H [facility], Jill Locke PA-C, 600 mg at 07/30/21 0843    heparin (porcine) injection 2,000 Units, 2,000 Units, Intravenous, Q1H PRN, Yahaira Mcgregor PA-C, 2,000 Units at 07/28/21 0120    heparin (porcine) injection 4,000 Units, 4,000 Units, Intravenous, Q1H PRN, Yahaira Mcgregor PA-C    insulin glargine (LANTUS) subcutaneous injection 10 Units 0 1 mL, 10 Units, Subcutaneous, HS, Jill Locke PA-C, 10 Units at 07/29/21 2120    insulin lispro (HumaLOG) 100 units/mL subcutaneous injection 1-5 Units, 1-5 Units, Subcutaneous, HS, Jill Locke PA-C, 3 Units at 07/29/21 2121    insulin lispro (HumaLOG) 100 units/mL subcutaneous injection 2-12 Units, 2-12 Units, Subcutaneous, TID AC, 8 Units at 07/30/21 1118 **AND** Fingerstick Glucose (POCT), , , TID AC, Jill Locke PA-C    levalbuterol (XOPENEX) inhalation solution 1 25 mg, 1 25 mg, Nebulization, TID, CHRIS Tinsley, 1 25 mg at 07/30/21 0718    levothyroxine tablet 150 mcg, 150 mcg, Oral, Daily, Jill Locke PA-C, 150 mcg at 07/30/21 0615    lidocaine (LIDODERM) 5 % patch 1 patch, 1 patch, Topical, Q24H, Jill Locke PA-C, 1 patch at 07/28/21 1154    metoprolol tartrate (LOPRESSOR) tablet 25 mg, 25 mg, Oral, Q12H [facility], Jill Locke PA-C, 25 mg at 07/30/21 0845    ondansetron (ZOFRAN) injection 4 mg, 4 mg, Intravenous, Q6H PRN, Jill Locke PA-C    pantoprazole (PROTONIX) EC tablet 40 mg, 40 mg, Oral, Daily, Jill Locke PA-C, 40 mg at 07/30/21 0844    predniSONE tablet 20 mg, 20 mg, Oral, Daily, Jerrell Schwartz PA-C, 20 mg at 07/30/21 0844   sodium chloride 0 9 % inhalation solution 3 mL, 3 mL, Nebulization, TID, CHRIS Short, 3 mL at 21 0718    tiotropium (SPIRIVA) capsule for inhaler 18 mcg, 18 mcg, Inhalation, Daily, Jill Locke PA-C, 18 mcg at 21 0848    Medications Prior to Admission   Medication    acetaminophen (TYLENOL) 100 mg/mL solution    [] albuterol (2 5 mg/3 mL) 0 083 % nebulizer solution    allopurinol (ZYLOPRIM) 300 mg tablet    amLODIPine (NORVASC) 2 5 mg tablet    apixaban (ELIQUIS) 5 mg    benzonatate (TESSALON PERLES) 100 mg capsule    guaiFENesin (MUCINEX) 600 mg 12 hr tablet    levothyroxine 150 mcg tablet    metFORMIN (GLUCOPHAGE) 500 mg tablet    metoprolol tartrate (LOPRESSOR) 25 mg tablet    Omega-3 Fatty Acids (FISH OIL) 1,000 mg    pantoprazole (PROTONIX) 40 mg tablet    tiotropium (SPIRIVA) 18 mcg inhalation capsule       Allergies   Allergen Reactions    Penicillins Hives       Labs and pertinent reports reviewed  This note has been generated by voice recognition software system  Therefore, there may be spelling, grammar, and or syntax errors  Please contact if questions arise

## 2021-07-30 NOTE — SEDATION DOCUMENTATION
Right diagnostic thoracentesis successfully performed  Specimen sent to lab  Band aid to site  Patient tolerated well and transferred back to room in stable condition  Report and care assumed by primary RN

## 2021-07-30 NOTE — ASSESSMENT & PLAN NOTE
· Multifactorial  · Wean supplemental oxygen as tolerated more than 88-90%  · Likely need supplemental oxygen at discharge

## 2021-07-30 NOTE — ASSESSMENT & PLAN NOTE
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  Was scheduled to have IR guided biopsy as outpatient on 7/21 however was done inpatient on 7/21 with IR      · Patient is currently status post for repeat lung biopsy on 07/27 since the biopsy from several days ago resulted in necrotic tissue without viable tumor - target possibly left lung  · Eliquis resumed 7/28  · Oncology following

## 2021-07-30 NOTE — ASSESSMENT & PLAN NOTE
· Initially presented with AFib RVR, Rate controlled now  Briefly was uncontrolled on 7/29 and was status post Cardizem and IV Albumin and resulted in returning to rate controlled A   Flutter   · Continue oral amiodarone load - 200 mg TID x 1 week, BID x 1 week, then daily  · Continue routine metoprolol 25mg BID  · Eliquis for anticoagulation  · Cardiology input noted

## 2021-07-30 NOTE — ASSESSMENT & PLAN NOTE
Lab Results   Component Value Date    HGBA1C 6 6 (H) 06/20/2021       Recent Labs     07/29/21  1605 07/29/21 2005 07/30/21  0736 07/30/21  1113   POCGLU 199* 279* 144* 316*       Blood Sugar Average: Last 72 hrs:  (P) 441 [id number]    Episodes hyperglycemia  Continue Lantus 10 units HS  Will add Humalog t i d   With meals  Titrate insulin dose based on Accu-Cheks  Avoid hypoglycemia  Hypoglycemia protocol in place

## 2021-07-30 NOTE — UTILIZATION REVIEW
Continued Stay Review    Date: 7/30                          Current Patient Class: inpatient  Current Level of Care: med surg    HPI:80 y o  male  w/hx a fib, dm, R lung mass, copd on 3li home o2 initially admitted on 7/14 to med surg as inpatient due to hyperkalemia/calcemia, copd exac, rapid a flutter, lung mass, and SULEMA  Assessment/Plan: 7/30 went into rapid a fib yesterday, hypotensive in 90's  Was on amio and beta blocker  Given IV albumin, IV cardizem  asymptomatic  Remains on nebs, 2li o2, lungs decreased  A fib w/pvc's on tele, PO steroids in progress, tylenol/gabapentin for pain  Lung bx came back yesterday, almost entirely necrotic tissue, can't definitively provide diagnosis  Needs new biopsy  Discussing with IR on next best site to obtain one  Still concern for malignant process  Taken to IR today for R thoracentesis  150ml chilo fluid removed       Vital Signs:   Date/Time  Temp  Pulse  Resp BP  MAP (mmHg)  SpO2   Nasal Cannula O2 Flow Rate (L/min)  O2 Device    07/30/21 1114  --  95  -- 100/52  --  --   2 L/min  Nasal cannula    07/30/21 0739  98 9 °F (37 2 °C)  100  20 111/62  81  98 %   2 L/min  Nasal cannula    07/30/21 0720  --  --  -- --  --  93 %   --  --    07/30/21 0256  98 4 °F (36 9 °C)  84  18 95/62  74  94 %   2 L/min  Nasal cannula    07/29/21 2118  97 9 °F (36 6 °C)  110Abnormal   -- 118/63  --  --   --  --    07/29/21 2034  --  --  -- --  --  97 %   2 L/min  Nasal cannula    07/29/21 1552  --  --  -- --  --  --   2 L/min  Nasal cannula    07/29/21 1443  97 7 °F (36 5 °C)  102  18 102/56  --  95 %   --  None (Room air)    07/29/21 1341  --  --  -- --  --  99 %   2 L/min  Nasal cannula    07/29/21 1155  --  --  -- 110/62  --  --   --  --    07/29/21 0949  --  --  101Abnormal  90/52  --  --   --  --    07/29/21 0841  --  --  130Abnormal  92/58  --  --   --  --    07/29/21 0802  98 4 °F (36 9 °C)  80  18 109/68  74  98 %   2 L/min  Nasal cannula    07/29/21 0757  --  --  -- --  --  -- 2 L/min  Nasal cannula    07/29/21 0752  --  --  -- --  --  100 %   --  --    07/29/21 0445  --  81  -- --  --  --   --  --    07/29/21 0201  --  120Abnormal   18 116/65  84  --   --  --    07/29/21 0157  --  --  -- 113/70  --  --   --  --    07/29/21 0100  --  --  -- 110/66  --  --   --  --    07/29/21 0043  --  104  18 107/69  81  94 %   --  Nasal cannula    07/29/21 0012  --  106Abnormal   -- --  --  --   --  --    07/29/21 0001  --  --  -- 102/62  --  --   --  --    07/28/21 2312  --  122Abnormal   -- --  --  --   --  --    07/28/21 2150  --  104  -- --  --  --   --  --    07/28/21 2100  97 9 °F (36 6 °C)  114Abnormal   20 106/65  79  93 %   2 L/min  Nasal cannula    07/28/21 1919  98 6 °F (37 °C)  125Abnormal   18 128/61  86  95 %   2 L/min  Nasal cannula    07/28/21 1917  --  --  -- --  --  95 %   --  --    07/28/21 1700  --  --  -- --  --  --   2 L/min  Nasal cannula    07/28/21 1515  97 8 °F (36 6 °C)  62  16 116/55  79  94 %   --  None (Room air)     O2 Device: pt  had nasal cannula off when I took vitals at 07/28/21 1515   07/28/21 1319  --  --  -- 108/52  --  --   --  --    07/28/21 1316  --  --  -- --  --  97 %   2 L/min  Nasal cannula    07/28/21 1123  98 2 °F (36 8 °C)  54Abnormal   20 102/56  --  96 %   2 L/min  Nasal cannula    07/28/21 0736  --  --  -- --  --  97 %   2 L/min  Nasal cannula    07/28/21 0655  98 3 °F (36 8 °C)  62  18 125/58  84  98 %   2 L/min  Nasal cannula    07/28/21 0249  98 4 °F (36 9 °C)  59  18 109/70  83  98 %   2 L/min  Nasal cannula    07/28/21 0126  --  --  -- --  --  92 %   2 L/min  Nasal cannula          Pertinent Labs/Diagnostic Results:     7/27 PCXR: No pneumothorax status post biopsy of left upper lobe lung mass      Results from last 7 days   Lab Units 07/30/21  0545 07/29/21  0920 07/28/21  0539 07/27/21  0350 07/24/21  0442   WBC Thousand/uL 9 75 9 67 10 05 17 91* 16 61*   HEMOGLOBIN g/dL 9 7* 9 6* 9 6* 10 1* 9 6*   HEMATOCRIT % 34 1* 34 2* 33 5* 35 3* 34 6* PLATELETS Thousands/uL 217 197 179 187 223   BANDS PCT % 1  --  4  --   --          Results from last 7 days   Lab Units 07/30/21  0545 07/29/21  0920 07/28/21  0539 07/27/21  0355 07/24/21  0442   SODIUM mmol/L 136 134* 135* 131* 136   POTASSIUM mmol/L 5 7* 4 6 5 4* 4 8 4 6   CHLORIDE mmol/L 100 96* 100 98* 98*   CO2 mmol/L 30 30 31 29 33*   ANION GAP mmol/L 6 8 4 4 5   BUN mg/dL 26* 30* 31* 31* 33*   CREATININE mg/dL 1 18 1 21 1 34* 1 21 1 01   EGFR ml/min/1 73sq m 59 57 50 57 71   CALCIUM mg/dL 9 5 9 3 8 8 9 1 9 4     Results from last 7 days   Lab Units 07/29/21  0920 07/28/21  0539   AST U/L 11 20   ALT U/L 29 37   ALK PHOS U/L 142* 144*   TOTAL PROTEIN g/dL 7 3 6 2*   ALBUMIN g/dL 2 7* 1 5*   TOTAL BILIRUBIN mg/dL 0 91 0 55     Results from last 7 days   Lab Units 07/30/21  1113 07/30/21  0736 07/29/21  2005 07/29/21  1605 07/29/21  1103 07/29/21  0804 07/28/21  2052 07/28/21  1632 07/28/21  1627 07/28/21  1122 07/28/21  0657 07/27/21  2106   POC GLUCOSE mg/dl 316* 144* 279* 199* 345* 202* 266* 417* 448* 239* 183* 397*     Results from last 7 days   Lab Units 07/30/21  0545 07/29/21  0920 07/28/21  0539 07/27/21  0355 07/24/21  0442   GLUCOSE RANDOM mg/dL 146* 274* 166* 269* 164*       Results from last 7 days   Lab Units 07/27/21  2253 07/27/21  0331 07/26/21  2141   PTT seconds 58* 97* 79*           Results from last 7 days   Lab Units 07/28/21  1634   CLARITY UA  Clear   COLOR UA  Yellow   SPEC GRAV UA  1 020   PH UA  6 0   GLUCOSE UA mg/dl 500 (1/2%)*   KETONES UA mg/dl Negative   BLOOD UA  Trace-Intact*   PROTEIN UA mg/dl Negative   NITRITE UA  Negative   BILIRUBIN UA  Negative   UROBILINOGEN UA E U /dl 4 0*   LEUKOCYTES UA  Negative   WBC UA /hpf 0-1   RBC UA /hpf 0-1   BACTERIA UA /hpf None Seen   EPITHELIAL CELLS WET PREP /hpf None Seen       Results from last 7 days   Lab Units 07/30/21  0545 07/28/21  0539 07/24/21  0442   TOTAL COUNTED  100 100 100             Medications:   Scheduled Medications:  allopurinol, 300 mg, Oral, Daily  amiodarone, 200 mg, Oral, TID With Meals  apixaban, 5 mg, Oral, BID  benzonatate, 100 mg, Oral, TID  Diclofenac Sodium, 2 g, Topical, 4x Daily  fish oil, 1,000 mg, Oral, BID  gabapentin, 100 mg, Oral, HS  guaiFENesin, 600 mg, Oral, Q12H ZOHAIB  insulin glargine, 10 Units, Subcutaneous, HS  insulin lispro, 1-5 Units, Subcutaneous, HS  insulin lispro, 2-12 Units, Subcutaneous, TID AC  levalbuterol, 1 25 mg, Nebulization, TID  levothyroxine, 150 mcg, Oral, Daily  lidocaine, 1 patch, Topical, Q24H  metoprolol tartrate, 25 mg, Oral, Q12H ZOHAIB  pantoprazole, 40 mg, Oral, Daily  predniSONE, 20 mg, Oral, Daily  sodium chloride, 3 mL, Nebulization, TID  tiotropium, 18 mcg, Inhalation, Daily    IV albumin 7/29 x 3  IV cardizem x 1 7/29  IV lasix x 1 7/28  IV lopressor x 1 7/29  NS 250ml bolus [email or web address]    Continuous IV Infusions:   heparin gtt stopped 7/28    PRN Meds:  acetaminophen, 650 mg, Oral, Q6H PRN using daily doses  al mag oxide-diphenhydramine-lidocaine viscous, 10 mL, Swish & Spit, Q4H PRN  albuterol, 2 5 mg, Nebulization, Q6H PRN  dextromethorphan-guaiFENesin, 10 mL, Oral, Q4H PRN  heparin (porcine), 2,000 Units, Intravenous, Q1H PRN  heparin (porcine), 4,000 Units, Intravenous, Q1H PRN  ondansetron, 4 mg, Intravenous, Q6H PRN      Discharge Plan: D    Network Utilization Review Department  ATTENTION: Please call with any questions or concerns to [phone] and carefully listen to the prompts so that you are directed to the right person  All voicemails are confidential   Alecia Eulalio all requests for admission clinical reviews, approved or denied determinations and any other requests to dedicated fax number below belonging to the campus where the patient is receiving treatment   List of dedicated fax numbers for the Facilities:  FACILITY NAME UR FAX NUMBER   ADMISSION DENIALS (Administrative/Medical Necessity) [phone]   [address] (Maternity/NICU/Pediatrics) [facility],[address] [facility] [address] [address] [place] [address] Shana Arndt [employer] [address] [place] [address] [phone]

## 2021-07-30 NOTE — BRIEF OP NOTE (RAD/CATH)
INTERVENTIONAL RADIOLOGY PROCEDURE NOTE    Date: 7/30/2021    Procedure: Right thoracentesis    Preoperative diagnosis:   1  SULEMA (acute kidney injury) ([facility] [address] )    2  Hypercalcemia    3  Hyperkalemia    4  Paroxysmal A-fib ([facility] [address] )    5  Mass of right lung         Postoperative diagnosis: Same  Surgeon: Bradley Chávez MD     Assistant: None  No qualified resident was available  Blood loss: None    Specimens: 150 mL chilo pleural fluid removed with sample sent to lab    Findings: Successful right thoracentesis    Complications: None immediate      Anesthesia: local

## 2021-07-30 NOTE — PLAN OF CARE
Problem: PAIN - ADULT  Goal: Verbalizes/displays adequate comfort level or baseline comfort level  Description: Interventions:  - Encourage patient to monitor pain and request assistance  - Assess pain using appropriate pain scale (e g  0-10)  - Administer analgesics based on type and severity of pain and evaluate response  - Implement non-pharmacological measures as appropriate and evaluate response  - Consider cultural and social influences on pain and pain management  - Notify physician/advanced practitioner if interventions unsuccessful or patient reports new pain  Outcome: Progressing     Problem: DISCHARGE PLANNING  Goal: Discharge to home or other facility with appropriate resources  Description: INTERVENTIONS:  - Identify barriers to discharge w/patient and caregiver  - Arrange for needed discharge resources and transportation as appropriate  - Identify discharge learning needs (meds, wound care, etc )  - Arrange for interpretive services to assist at discharge as needed  - Refer to Case Management Department for coordinating discharge planning if the patient needs post-hospital services based on physician/advanced practitioner order or complex needs related to functional status, cognitive ability, or social support system  Outcome: Progressing     Problem: CARDIOVASCULAR - ADULT  Goal: Maintains optimal cardiac output and hemodynamic stability  Description: INTERVENTIONS:  - Monitor I/O, vital signs and rhythm  - Monitor for S/S and trends of decreased cardiac output  - Administer and titrate ordered vasoactive medications to optimize hemodynamic stability  - Assess quality of pulses, skin color and temperature  - Assess for signs of decreased coronary artery perfusion  - Instruct patient to report change in severity of symptoms  Outcome: Progressing

## 2021-07-30 NOTE — CASE MANAGEMENT
CM informed by Dr Latoya Butt pt is not yet medically stable for d/c  Anticiapte in the next 24-48 hours  Pt will need a repeat RT eval for Home O2  [address] set up for d/c

## 2021-07-30 NOTE — PROGRESS NOTES
[facility]  Progress Note - Chun Aviles 1940, 68 y o  male MRN: [id number]  Unit/Bed#: S -01 Encounter: [id number]  Primary Care Provider: Aissatou Munoz DO   Date and time admitted to hospital: 7/14/2021  1:39 PM    * Acute respiratory failure with hypoxia and hypercapnia (HCC)  Assessment & Plan  · Multifactorial  · Wean supplemental oxygen as tolerated more than 88-90%  · Likely need supplemental oxygen at discharge    Mass of right lung  Assessment & Plan  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  Was scheduled to have IR guided biopsy as outpatient on 7/21 however was done inpatient on 7/21 with IR  · Patient is currently status post for repeat lung biopsy on 07/27 since the biopsy from several days ago resulted in necrotic tissue without viable tumor - target possibly left lung  · Eliquis resumed 7/28  · Oncology following    Right flank pain  Assessment & Plan  · Patient has been having R flank pain since his R lung biopsy on 7/21; he now reports that pain is persistent and radiate to the RUQ and down the R side of his back  He feels the pain in his RUQ is somewhat of a 'burning' pain  He reports that he has had urinary frequency, urgency, and decreased UO  · DDx: pain 2/2 lung mass vs  Neuropathic pain s/p biopsy   · Trial gabapentin QHS for suspected neuropathic RUQ pain      Hyperkalemia  Assessment & Plan  Monitor closely    COPD (chronic obstructive pulmonary disease) with acute exacerbation (HCC)  Assessment & Plan  · With acute exacerbation on admission - presenting with worsening shortness of breath, productive cough and wheezing     · Pulmonology consulted appreciate input  · Patient is usually on 3 L nasal cannula at home  · Continue Xopenex and Spiriva and 3% nebulizers  · Continue respiratory protocol  · Requires home O2 on d/c · Given Lasix 40 mg IV once 7/28  · Outpatient Pulmonary follow-up at discharge  · Discharge on [place] with albuterol PRN home meds   · Per pulmonary, steroids not indicated for COPD at this time - stopped 7/29    New onset type 2 diabetes mellitus [facility])  Assessment & Plan  Lab Results   Component Value Date    HGBA1C 6 6 (H) 06/20/2021       Recent Labs     07/29/21  1605 07/29/21 2005 07/30/21  0736 07/30/21  1113   POCGLU 199* 279* 144* 316*       Blood Sugar Average: Last 72 hrs:  (P) 920 [id number]    Episodes hyperglycemia  Continue Lantus 10 units HS  Will add Humalog t i d  With meals  Titrate insulin dose based on Accu-Cheks  Avoid hypoglycemia  Hypoglycemia protocol in place    Hypothyroidism  Assessment & Plan  · Continue levothyroxine 150 mcg daily  Paroxysmal A-fib with RVR  Assessment & Plan  · Initially presented with AFib RVR, Rate controlled now  Briefly was uncontrolled on 7/29 and was status post Cardizem and IV Albumin and resulted in returning to rate controlled A  Flutter   · Continue oral amiodarone load - 200 mg TID x 1 week, BID x 1 week, then daily  · Continue routine metoprolol 25mg BID  · Eliquis for anticoagulation  · Cardiology input noted    Idiopathic thrombocytopenic purpura (ITP) (HCC)  Assessment & Plan  · Was previously receiving IV Solu-Medrol  · Transitioned to PO Prednisone 20 mg on 07/27  · As per pulmonary disease does not to be on steroid   · Platelets remain stable  · D/W Heme/onc-- does receive rituxin as an OP; plan to keep on prednisone 20mg PO QD as he was on prolonged taper as an OP  · Will continue steroids and have patient follow up with Heme/Onc          VTE Pharmacologic Prophylaxis: VTE Score: 5 High Risk (Score >/= 5) - Pharmacological DVT Prophylaxis Ordered: apixaban (Eliquis)  Sequential Compression Devices Ordered  Patient Centered Rounds: I performed bedside rounds with nursing staff today    Discussions with Specialists or Other Care Team Provider:  Oncology    Education and Discussions with Family / Patient: Updated  (son) via phone  Time Spent for Care: 30 minutes  More than 50% of total time spent on counseling and coordination of care as described above  Current Length of Stay: 16 day(s)  Current Patient Status: Inpatient   Certification Statement: The patient will continue to require additional inpatient hospital stay due to As outlined  Discharge Plan: Awaiting clinical symptomatic improvement, workup with Oncology ongoing    Code Status: Level 1 - Full Code    Subjective:     Reports feeling tired  Right-sided subcostal flank pain noted  History chart labs medications reviewed  Encouraged out of bed into chair    Objective:     Vitals:   Temp (24hrs), Av 4 °F (36 9 °C), Min:97 9 °F (36 6 °C), Max:98 9 °F (37 2 °C)    Temp:  [97 9 °F (36 6 °C)-98 9 °F (37 2 °C)] 98 9 °F (37 2 °C)  HR:  [] 71  Resp:  [18-22] 22  BP: ()/(52-63) 114/59  SpO2:  [93 %-98 %] 94 %  Body mass index is 30 54 kg/m²  Input and Output Summary (last 24 hours):      Intake/Output Summary (Last 24 hours) at 2021 1514  Last data filed at 2021 1436  Gross per 24 hour   Intake 100 ml   Output 1500 ml   Net -1400 ml       Physical Exam:   Physical Exam     Comfortably sitting up in bed  Neck supple  Lungs diminished breath sounds bilateral  Heart sounds S1 and S2 noted  Abdomen soft  Awake obeys simple commands  Pulses noted  No rash    Additional Data:     Labs:  Results from last 7 days   Lab Units 21  0545   WBC Thousand/uL 9 75   HEMOGLOBIN g/dL 9 7*   HEMATOCRIT % 34 1*   PLATELETS Thousands/uL 217   BANDS PCT % 1   LYMPHO PCT % 7*   MONO PCT % 4   EOS PCT % 0     Results from last 7 days   Lab Units 21  0545 21  0920   SODIUM mmol/L 136 134*   POTASSIUM mmol/L 5 7* 4 6   CHLORIDE mmol/L 100 96*   CO2 mmol/L 30 30   BUN mg/dL 26* 30*   CREATININE mg/dL 1 18 1 21   ANION GAP mmol/L 6 8   CALCIUM mg/dL 9 5 9 3 ALBUMIN g/dL  --  2 7*   TOTAL BILIRUBIN mg/dL  --  0 91   ALK PHOS U/L  --  142*   ALT U/L  --  29   AST U/L  --  11   GLUCOSE RANDOM mg/dL 146* 274*         Results from last 7 days   Lab Units 07/30/21  1113 07/30/21  0736 07/29/21 2005 07/29/21  1605 07/29/21  1103 07/29/21  0804 07/28/21  2052 07/28/21  1632 07/28/21  1627 07/28/21  1122 07/28/21  0657 07/27/21  2106   POC GLUCOSE mg/dl 316* 144* 279* 199* 345* 202* 266* 417* 448* 239* 183* 397*               Lines/Drains:  Invasive Devices     Peripheral Intravenous Line            Peripheral IV 07/29/21 Left;Ventral (anterior) Forearm 1 day                      Imaging: Reviewed radiology reports from this admission including: chest CT scan and abdominal/pelvic CT    Recent Cultures (last 7 days):         Last 24 Hours Medication List:   Current Facility-Administered Medications   Medication Dose Route Frequency Provider Last Rate    acetaminophen  650 mg Oral Q6H PRN Jill Locke PA-C      al mag oxide-diphenhydramine-lidocaine viscous  10 mL Swish & Spit Q4H PRN Jerrell Harper PA-C      albuterol  2 5 mg Nebulization Q6H PRN CHRIS Kearns      allopurinol  300 mg Oral Daily Jill Locke PA-C      amiodarone  200 mg Oral TID With Meals Gerson Jackson PA-C      apixaban  5 mg Oral BID Betsey Gama PA-C      benzonatate  100 mg Oral TID Gerson Jackson PA-C      dextromethorphan-guaiFENesin  10 mL Oral Q4H PRN Neva Locke PA-C      Diclofenac Sodium  2 g Topical 4x Daily Regla Venegas PA-C      fish oil  1,000 mg Oral BID Jill Locke PA-C      gabapentin  100 mg Oral HS Regla Venegas PA-C      guaiFENesin  600 mg Oral Q12H [facility] Jill Locke PA-C      heparin (porcine)  2,000 Units Intravenous Q1H PRN Ja Marlow PA-C      heparin (porcine)  4,000 Units Intravenous Q1H PRN Ja Marlow PA-C      insulin glargine  10 Units Subcutaneous HS Jill Locke PA-C      insulin lispro  1-5 Units Subcutaneous HS Neva Casillas BOYD Locke      insulin lispro  2-12 Units Subcutaneous TID AC Jill Locke PA-C      insulin lispro  3 Units Subcutaneous TID With Meals Danny Goddard MD      levalbuterol  1 25 mg Nebulization TID Bianca CHRIS Stephens      levothyroxine  150 mcg Oral Daily Jill Locke PA-C      lidocaine  1 patch Topical Q24H Jill Locke PA-C      metoprolol tartrate  25 mg Oral Q12H [facility] Jill Locke PA-C      ondansetron  4 mg Intravenous Q6H PRN Jill Locke PA-C      pantoprazole  40 mg Oral Daily Jill Locke PA-C      predniSONE  20 mg Oral Daily Jerrell Boo PA-C      sodium chloride  3 mL Nebulization TID Bianca CHRIS Stephens      tiotropium  18 mcg Inhalation Daily More Calle PA-C          Today, Patient Was Seen By: Danny Goddard MD    **Please Note: This note may have been constructed using a voice recognition system  **

## 2021-07-31 LAB
ANION GAP SERPL CALCULATED.3IONS-SCNC: 4 MMOL/L (ref 4–13)
BUN SERPL-MCNC: 27 MG/DL (ref 5–25)
CALCIUM SERPL-MCNC: 9.6 MG/DL (ref 8.3–10.1)
CHLORIDE SERPL-SCNC: 99 MMOL/L (ref 100–108)
CO2 SERPL-SCNC: 31 MMOL/L (ref 21–32)
CREAT SERPL-MCNC: 1.19 MG/DL (ref 0.6–1.3)
GFR SERPL CREATININE-BSD FRML MDRD: 58 ML/MIN/1.73SQ M
GLUCOSE SERPL-MCNC: 135 MG/DL (ref 65–140)
GLUCOSE SERPL-MCNC: 177 MG/DL (ref 65–140)
GLUCOSE SERPL-MCNC: 197 MG/DL (ref 65–140)
GLUCOSE SERPL-MCNC: 259 MG/DL (ref 65–140)
GLUCOSE SERPL-MCNC: 303 MG/DL (ref 65–140)
GLUCOSE SERPL-MCNC: 315 MG/DL (ref 65–140)
POTASSIUM SERPL-SCNC: 5.1 MMOL/L (ref 3.5–5.3)
SODIUM SERPL-SCNC: 134 MMOL/L (ref 136–145)

## 2021-07-31 PROCEDURE — 94668 MNPJ CHEST WALL SBSQ: CPT

## 2021-07-31 PROCEDURE — 82948 REAGENT STRIP/BLOOD GLUCOSE: CPT

## 2021-07-31 PROCEDURE — 94760 N-INVAS EAR/PLS OXIMETRY 1: CPT

## 2021-07-31 PROCEDURE — 94640 AIRWAY INHALATION TREATMENT: CPT

## 2021-07-31 PROCEDURE — 80048 BASIC METABOLIC PNL TOTAL CA: CPT | Performed by: INTERNAL MEDICINE

## 2021-07-31 PROCEDURE — 99232 SBSQ HOSP IP/OBS MODERATE 35: CPT | Performed by: INTERNAL MEDICINE

## 2021-07-31 PROCEDURE — 94669 MECHANICAL CHEST WALL OSCILL: CPT

## 2021-07-31 RX ORDER — INSULIN GLARGINE 100 [IU]/ML
15 INJECTION, SOLUTION SUBCUTANEOUS
Status: DISCONTINUED | OUTPATIENT
Start: 2021-07-31 | End: 2021-08-05 | Stop reason: HOSPADM

## 2021-07-31 RX ADMIN — INSULIN LISPRO 8 UNITS: 100 INJECTION, SOLUTION INTRAVENOUS; SUBCUTANEOUS at 11:37

## 2021-07-31 RX ADMIN — INSULIN LISPRO 3 UNITS: 100 INJECTION, SOLUTION INTRAVENOUS; SUBCUTANEOUS at 11:37

## 2021-07-31 RX ADMIN — ALLOPURINOL 300 MG: 300 TABLET ORAL at 09:11

## 2021-07-31 RX ADMIN — ACETAMINOPHEN 650 MG: 325 TABLET, FILM COATED ORAL at 06:22

## 2021-07-31 RX ADMIN — DICLOFENAC SODIUM 2 G: 10 GEL TOPICAL at 22:34

## 2021-07-31 RX ADMIN — AMIODARONE HYDROCHLORIDE 200 MG: 200 TABLET ORAL at 17:13

## 2021-07-31 RX ADMIN — DICLOFENAC SODIUM 2 G: 10 GEL TOPICAL at 11:36

## 2021-07-31 RX ADMIN — APIXABAN 5 MG: 5 TABLET, FILM COATED ORAL at 17:13

## 2021-07-31 RX ADMIN — BENZONATATE 100 MG: 100 CAPSULE ORAL at 17:17

## 2021-07-31 RX ADMIN — PANTOPRAZOLE SODIUM 40 MG: 40 TABLET, DELAYED RELEASE ORAL at 09:11

## 2021-07-31 RX ADMIN — LEVALBUTEROL HYDROCHLORIDE 1.25 MG: 1.25 SOLUTION, CONCENTRATE RESPIRATORY (INHALATION) at 19:50

## 2021-07-31 RX ADMIN — INSULIN LISPRO 8 UNITS: 100 INJECTION, SOLUTION INTRAVENOUS; SUBCUTANEOUS at 16:40

## 2021-07-31 RX ADMIN — OMEGA-3 FATTY ACIDS CAP 1000 MG 1000 MG: 1000 CAP at 09:12

## 2021-07-31 RX ADMIN — INSULIN GLARGINE 15 UNITS: 100 INJECTION, SOLUTION SUBCUTANEOUS at 22:33

## 2021-07-31 RX ADMIN — ACETAMINOPHEN 650 MG: 325 TABLET, FILM COATED ORAL at 17:15

## 2021-07-31 RX ADMIN — DICLOFENAC SODIUM 2 G: 10 GEL TOPICAL at 17:13

## 2021-07-31 RX ADMIN — GUAIFENESIN 600 MG: 600 TABLET, EXTENDED RELEASE ORAL at 09:12

## 2021-07-31 RX ADMIN — DICLOFENAC SODIUM 2 G: 10 GEL TOPICAL at 09:22

## 2021-07-31 RX ADMIN — LEVOTHYROXINE SODIUM 150 MCG: 150 TABLET ORAL at 06:21

## 2021-07-31 RX ADMIN — INSULIN LISPRO 2 UNITS: 100 INJECTION, SOLUTION INTRAVENOUS; SUBCUTANEOUS at 09:18

## 2021-07-31 RX ADMIN — APIXABAN 5 MG: 5 TABLET, FILM COATED ORAL at 09:12

## 2021-07-31 RX ADMIN — INSULIN LISPRO 1 UNITS: 100 INJECTION, SOLUTION INTRAVENOUS; SUBCUTANEOUS at 22:33

## 2021-07-31 RX ADMIN — BENZONATATE 100 MG: 100 CAPSULE ORAL at 22:33

## 2021-07-31 RX ADMIN — PREDNISONE 20 MG: 20 TABLET ORAL at 09:11

## 2021-07-31 RX ADMIN — OMEGA-3 FATTY ACIDS CAP 1000 MG 1000 MG: 1000 CAP at 17:12

## 2021-07-31 RX ADMIN — INSULIN LISPRO 3 UNITS: 100 INJECTION, SOLUTION INTRAVENOUS; SUBCUTANEOUS at 09:18

## 2021-07-31 RX ADMIN — TIOTROPIUM BROMIDE 18 MCG: 18 CAPSULE ORAL; RESPIRATORY (INHALATION) at 09:13

## 2021-07-31 RX ADMIN — GUAIFENESIN 600 MG: 600 TABLET, EXTENDED RELEASE ORAL at 22:33

## 2021-07-31 RX ADMIN — LEVALBUTEROL HYDROCHLORIDE 1.25 MG: 1.25 SOLUTION, CONCENTRATE RESPIRATORY (INHALATION) at 13:18

## 2021-07-31 RX ADMIN — ISODIUM CHLORIDE 3 ML: 0.03 SOLUTION RESPIRATORY (INHALATION) at 19:54

## 2021-07-31 RX ADMIN — ISODIUM CHLORIDE 3 ML: 0.03 SOLUTION RESPIRATORY (INHALATION) at 07:27

## 2021-07-31 RX ADMIN — LEVALBUTEROL HYDROCHLORIDE 1.25 MG: 1.25 SOLUTION, CONCENTRATE RESPIRATORY (INHALATION) at 07:27

## 2021-07-31 RX ADMIN — GABAPENTIN 100 MG: 100 CAPSULE ORAL at 22:33

## 2021-07-31 RX ADMIN — AMIODARONE HYDROCHLORIDE 200 MG: 200 TABLET ORAL at 09:11

## 2021-07-31 RX ADMIN — ISODIUM CHLORIDE 3 ML: 0.03 SOLUTION RESPIRATORY (INHALATION) at 13:18

## 2021-07-31 RX ADMIN — AMIODARONE HYDROCHLORIDE 200 MG: 200 TABLET ORAL at 11:36

## 2021-07-31 RX ADMIN — METOPROLOL TARTRATE 25 MG: 25 TABLET, FILM COATED ORAL at 09:11

## 2021-07-31 RX ADMIN — BENZONATATE 100 MG: 100 CAPSULE ORAL at 09:12

## 2021-07-31 NOTE — PROGRESS NOTES
[facility]  Progress Note - Master Rocha 1940, 68 y o  male MRN: [id number]  Unit/Bed#: S -01 Encounter: [id number]  Primary Care Provider: Dara Mcfadden DO   Date and time admitted to hospital: 7/14/2021  1:39 PM    * Acute respiratory failure with hypoxia and hypercapnia (HCC)  Assessment & Plan  · Multifactorial  · Wean supplemental oxygen as tolerated more than 88-90%  · Likely need supplemental oxygen at discharge    Mass of right lung  Assessment & Plan  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  Was scheduled to have IR guided biopsy as outpatient on 7/21 however was done inpatient on 7/21 with IR  · Patient is currently status post for repeat lung biopsy on 07/27 since the biopsy from several days ago resulted in necrotic tissue without viable tumor - target possibly left lung  · Eliquis resumed 7/28  · Oncology following    Right flank pain  Assessment & Plan  · Patient has been having R flank pain since his R lung biopsy on 7/21; he now reports that pain is persistent and radiate to the RUQ and down the R side of his back  He feels the pain in his RUQ is somewhat of a 'burning' pain  He reports that he has had urinary frequency, urgency, and decreased UO  · DDx: pain 2/2 lung mass vs  Neuropathic pain s/p biopsy   · Trial gabapentin QHS for suspected neuropathic RUQ pain      Hyperkalemia  Assessment & Plan  Monitor closely    COPD (chronic obstructive pulmonary disease) with acute exacerbation (HCC)  Assessment & Plan  · With acute exacerbation on admission - presenting with worsening shortness of breath, productive cough and wheezing     · Pulmonology consulted appreciate input  · Patient is usually on 3 L nasal cannula at home  · Continue Xopenex and Spiriva and 3% nebulizers  · Continue respiratory protocol  · Requires home O2 on d/c · Given Lasix 40 mg IV once 7/28  · Outpatient Pulmonary follow-up at discharge  · Discharge on Bevespi with albuterol PRN home meds   · Per pulmonary, steroids not indicated for COPD at this time - stopped 7/29    New onset type 2 diabetes mellitus [facility])  Assessment & Plan  Lab Results   Component Value Date    HGBA1C 6 6 (H) 06/20/2021       Recent Labs     07/30/21  1113 07/30/21  1629 07/31/21  0755 07/31/21  1021   POCGLU 316* 152* 177* 303*       Blood Sugar Average: Last 72 hrs:  (P) 262 [id number]    Episodes hyperglycemia  Continue Lantus HS  Humalog t i d  With meals  Titrate insulin dose based on Accu-Cheks  Avoid hypoglycemia  Hypoglycemia protocol in place    Hypothyroidism  Assessment & Plan  · Continue levothyroxine 150 mcg daily  Paroxysmal A-fib with RVR  Assessment & Plan  · Initially presented with AFib RVR, Rate controlled now  Briefly was uncontrolled on 7/29 and was status post Cardizem and IV Albumin and resulted in returning to rate controlled A  Flutter   · Continue oral amiodarone load - 200 mg TID x 1 week, BID x 1 week, then daily  · Continue routine metoprolol 25mg BID  · Eliquis for anticoagulation  · Cardiology input noted    Idiopathic thrombocytopenic purpura (ITP) (HCC)  Assessment & Plan  · Was previously receiving IV Solu-Medrol  · Transitioned to PO Prednisone 20 mg on 07/27  · As per pulmonary disease does not to be on steroid   · Platelets remain stable  · D/W Heme/onc-- does receive rituxin as an OP; plan to keep on prednisone 20mg PO QD as he was on prolonged taper as an OP  · Will continue steroids and have patient follow up with Heme/Onc            VTE Pharmacologic Prophylaxis: VTE Score: 5 High Risk (Score >/= 5) - Pharmacological DVT Prophylaxis Ordered: apixaban (Eliquis)  Sequential Compression Devices Ordered  Patient Centered Rounds: I performed bedside rounds with nursing staff today    Discussions with Specialists or Other Care Team Provider: Education and Discussions with Family / Patient:  Discussed with patient, reports he is keeping his family updated    Time Spent for Care: 30 minutes  More than 50% of total time spent on counseling and coordination of care as described above  Current Length of Stay: 17 day(s)  Current Patient Status: Inpatient   Certification Statement: The patient will continue to require additional inpatient hospital stay due to As outlined  Discharge Plan: Awaiting workup, Oncology input noted and following    Code Status: Level 1 - Full Code    Subjective:     Comfortably in bed  Reports feeling okay  Encouraged out of bed into chair    Objective:     Vitals:   Temp (24hrs), Av 2 °F (36 8 °C), Min:97 9 °F (36 6 °C), Max:98 8 °F (37 1 °C)    Temp:  [97 9 °F (36 6 °C)-98 8 °F (37 1 °C)] 98 °F (36 7 °C)  HR:  [] 88  Resp:  [16-22] 16  BP: (101-114)/(59-73) 103/65  SpO2:  [90 %-98 %] 92 %  Body mass index is 30 54 kg/m²  Input and Output Summary (last 24 hours):      Intake/Output Summary (Last 24 hours) at 2021 1333  Last data filed at 2021 1001  Gross per 24 hour   Intake 100 ml   Output 2080 ml   Net -1980 ml       Physical Exam:   Physical Exam     Comfortably in bed  Neck supple  Lungs diminished breath sounds bilaterally  Heart sounds S1-S2 noted  Abdomen soft  Awake obeys simple commands  Pulses noted  No rash    Additional Data:     Labs:  Results from last 7 days   Lab Units 21  0545   WBC Thousand/uL 9 75   HEMOGLOBIN g/dL 9 7*   HEMATOCRIT % 34 1*   PLATELETS Thousands/uL 217   BANDS PCT % 1   LYMPHO PCT % 7*   MONO PCT % 4   EOS PCT % 0     Results from last 7 days   Lab Units 21  0609 21  0920   SODIUM mmol/L 134* 134*   POTASSIUM mmol/L 5 1 4 6   CHLORIDE mmol/L 99* 96*   CO2 mmol/L 31 30   BUN mg/dL 27* 30*   CREATININE mg/dL 1 19 1 21   ANION GAP mmol/L 4 8   CALCIUM mg/dL 9 6 9 3   ALBUMIN g/dL  --  2 7*   TOTAL BILIRUBIN mg/dL  --  0 91   ALK PHOS U/L  --  142*   ALT U/L  --  29   AST U/L  --  11   GLUCOSE RANDOM mg/dL 135 274*         Results from last 7 days   Lab Units 07/31/21  1021 07/31/21  0755 07/30/21  1629 07/30/21  1113 07/30/21  0736 07/29/21 2005 07/29/21  1605 07/29/21  1103 07/29/21  0804 07/28/21 2052 07/28/21  1632 07/28/21  1627   POC GLUCOSE mg/dl 303* 177* 152* 316* 144* 279* 199* 345* 202* 266* 417* 448*               Lines/Drains:  Invasive Devices     Peripheral Intravenous Line            Peripheral IV 07/29/21 Left;Ventral (anterior) Forearm 2 days                      Imaging: Reviewed radiology reports from this admission including: chest xray    Recent Cultures (last 7 days):         Last 24 Hours Medication List:   Current Facility-Administered Medications   Medication Dose Route Frequency Provider Last Rate    acetaminophen  650 mg Oral Q6H PRN Jill Locke PA-C      al mag oxide-diphenhydramine-lidocaine viscous  10 mL Swish & Spit Q4H PRN Jerrell John PA-C      albuterol  2 5 mg Nebulization Q6H PRN CHRIS Lui      allopurinol  300 mg Oral Daily Jill Locke PA-C      amiodarone  200 mg Oral TID With Meals AssuranBOYD cisse      apixaban  5 mg Oral BID Holley Ashton PA-C      benzonatate  100 mg Oral TID AssurantBOYD      dextromethorphan-guaiFENesin  10 mL Oral Q4H PRN Yoko Locke PA-C      Diclofenac Sodium  2 g Topical 4x Daily Regla Venegas PA-C      fish oil  1,000 mg Oral BID Jill Locke PA-C      gabapentin  100 mg Oral HS Regla Venegas PA-C      guaiFENesin  600 mg Oral Q12H [address] Jill Locke PA-C      heparin (porcine)  2,000 Units Intravenous Q1H PRN Garry Solorzano PA-C      heparin (porcine)  4,000 Units Intravenous Q1H PRN Garry Solorzano PA-C      insulin glargine  15 Units Subcutaneous HS Mell Valentin MD      insulin lispro  1-5 Units Subcutaneous HS Jill Locke PA-C      insulin lispro  2-12 Units Subcutaneous TID AC Jill Locke PA-C      insulin lispro  5 Units Subcutaneous TID With Meals Susan Nielson MD      levalbuterol  1 25 mg Nebulization TID CHRIS Snider      levothyroxine  150 mcg Oral Daily Radha Iniguez PA-C      lidocaine  1 patch Topical Q24H Jill Locke PA-C      metoprolol tartrate  25 mg Oral Q12H [address] Jill Locke PA-C      ondansetron  4 mg Intravenous Q6H PRN Jill Locke PA-C      pantoprazole  40 mg Oral Daily Jill Locke PA-C      predniSONE  20 mg Oral Daily Jerrell Vera PA-C      sodium chloride  3 mL Nebulization TID CHRIS Snider      tiotropium  18 mcg Inhalation Daily Radha Iniguez PA-C          Today, Patient Was Seen By: Susan Nielson MD    **Please Note: This note may have been constructed using a voice recognition system  **

## 2021-07-31 NOTE — PLAN OF CARE
Problem: PAIN - ADULT  Goal: Verbalizes/displays adequate comfort level or baseline comfort level  Description: Interventions:  - Encourage patient to monitor pain and request assistance  - Assess pain using appropriate pain scale (e g  0-10)  - Administer analgesics based on type and severity of pain and evaluate response  - Implement non-pharmacological measures as appropriate and evaluate response  - Consider cultural and social influences on pain and pain management  - Notify physician/advanced practitioner if interventions unsuccessful or patient reports new pain  Outcome: Progressing

## 2021-08-01 LAB
GLUCOSE SERPL-MCNC: 171 MG/DL (ref 65–140)
GLUCOSE SERPL-MCNC: 202 MG/DL (ref 65–140)
GLUCOSE SERPL-MCNC: 286 MG/DL (ref 65–140)
GLUCOSE SERPL-MCNC: 319 MG/DL (ref 65–140)

## 2021-08-01 PROCEDURE — 94640 AIRWAY INHALATION TREATMENT: CPT

## 2021-08-01 PROCEDURE — 82948 REAGENT STRIP/BLOOD GLUCOSE: CPT

## 2021-08-01 PROCEDURE — 99232 SBSQ HOSP IP/OBS MODERATE 35: CPT | Performed by: INTERNAL MEDICINE

## 2021-08-01 PROCEDURE — 94668 MNPJ CHEST WALL SBSQ: CPT

## 2021-08-01 PROCEDURE — 94760 N-INVAS EAR/PLS OXIMETRY 1: CPT

## 2021-08-01 PROCEDURE — 94669 MECHANICAL CHEST WALL OSCILL: CPT

## 2021-08-01 RX ADMIN — METOPROLOL TARTRATE 25 MG: 25 TABLET, FILM COATED ORAL at 21:11

## 2021-08-01 RX ADMIN — LEVALBUTEROL HYDROCHLORIDE 1.25 MG: 1.25 SOLUTION, CONCENTRATE RESPIRATORY (INHALATION) at 19:34

## 2021-08-01 RX ADMIN — GUAIFENESIN 600 MG: 600 TABLET, EXTENDED RELEASE ORAL at 09:08

## 2021-08-01 RX ADMIN — ALLOPURINOL 300 MG: 300 TABLET ORAL at 09:08

## 2021-08-01 RX ADMIN — BENZONATATE 100 MG: 100 CAPSULE ORAL at 17:32

## 2021-08-01 RX ADMIN — PANTOPRAZOLE SODIUM 40 MG: 40 TABLET, DELAYED RELEASE ORAL at 09:08

## 2021-08-01 RX ADMIN — DICLOFENAC SODIUM 2 G: 10 GEL TOPICAL at 09:10

## 2021-08-01 RX ADMIN — OMEGA-3 FATTY ACIDS CAP 1000 MG 1000 MG: 1000 CAP at 09:08

## 2021-08-01 RX ADMIN — INSULIN LISPRO 6 UNITS: 100 INJECTION, SOLUTION INTRAVENOUS; SUBCUTANEOUS at 17:34

## 2021-08-01 RX ADMIN — METOPROLOL TARTRATE 25 MG: 25 TABLET, FILM COATED ORAL at 09:08

## 2021-08-01 RX ADMIN — AMIODARONE HYDROCHLORIDE 200 MG: 200 TABLET ORAL at 09:08

## 2021-08-01 RX ADMIN — AMIODARONE HYDROCHLORIDE 200 MG: 200 TABLET ORAL at 17:32

## 2021-08-01 RX ADMIN — TIOTROPIUM BROMIDE 18 MCG: 18 CAPSULE ORAL; RESPIRATORY (INHALATION) at 09:20

## 2021-08-01 RX ADMIN — APIXABAN 5 MG: 5 TABLET, FILM COATED ORAL at 09:08

## 2021-08-01 RX ADMIN — INSULIN LISPRO 3 UNITS: 100 INJECTION, SOLUTION INTRAVENOUS; SUBCUTANEOUS at 21:22

## 2021-08-01 RX ADMIN — INSULIN LISPRO 4 UNITS: 100 INJECTION, SOLUTION INTRAVENOUS; SUBCUTANEOUS at 11:55

## 2021-08-01 RX ADMIN — PREDNISONE 20 MG: 20 TABLET ORAL at 09:08

## 2021-08-01 RX ADMIN — APIXABAN 5 MG: 5 TABLET, FILM COATED ORAL at 17:32

## 2021-08-01 RX ADMIN — ISODIUM CHLORIDE 3 ML: 0.03 SOLUTION RESPIRATORY (INHALATION) at 13:18

## 2021-08-01 RX ADMIN — OMEGA-3 FATTY ACIDS CAP 1000 MG 1000 MG: 1000 CAP at 17:32

## 2021-08-01 RX ADMIN — INSULIN LISPRO 2 UNITS: 100 INJECTION, SOLUTION INTRAVENOUS; SUBCUTANEOUS at 09:15

## 2021-08-01 RX ADMIN — BENZONATATE 100 MG: 100 CAPSULE ORAL at 21:18

## 2021-08-01 RX ADMIN — INSULIN GLARGINE 15 UNITS: 100 INJECTION, SOLUTION SUBCUTANEOUS at 21:22

## 2021-08-01 RX ADMIN — BENZONATATE 100 MG: 100 CAPSULE ORAL at 09:08

## 2021-08-01 RX ADMIN — DICLOFENAC SODIUM 2 G: 10 GEL TOPICAL at 21:18

## 2021-08-01 RX ADMIN — ISODIUM CHLORIDE 3 ML: 0.03 SOLUTION RESPIRATORY (INHALATION) at 07:17

## 2021-08-01 RX ADMIN — LEVOTHYROXINE SODIUM 150 MCG: 150 TABLET ORAL at 06:38

## 2021-08-01 RX ADMIN — LEVALBUTEROL HYDROCHLORIDE 1.25 MG: 1.25 SOLUTION, CONCENTRATE RESPIRATORY (INHALATION) at 07:17

## 2021-08-01 RX ADMIN — ISODIUM CHLORIDE 3 ML: 0.03 SOLUTION RESPIRATORY (INHALATION) at 19:34

## 2021-08-01 RX ADMIN — DICLOFENAC SODIUM 2 G: 10 GEL TOPICAL at 17:34

## 2021-08-01 RX ADMIN — GUAIFENESIN 600 MG: 600 TABLET, EXTENDED RELEASE ORAL at 21:11

## 2021-08-01 RX ADMIN — DICLOFENAC SODIUM 2 G: 10 GEL TOPICAL at 11:56

## 2021-08-01 RX ADMIN — GABAPENTIN 100 MG: 100 CAPSULE ORAL at 21:11

## 2021-08-01 RX ADMIN — AMIODARONE HYDROCHLORIDE 200 MG: 200 TABLET ORAL at 11:56

## 2021-08-01 RX ADMIN — LEVALBUTEROL HYDROCHLORIDE 1.25 MG: 1.25 SOLUTION, CONCENTRATE RESPIRATORY (INHALATION) at 13:18

## 2021-08-01 NOTE — ASSESSMENT & PLAN NOTE
Lab Results   Component Value Date    HGBA1C 6 6 (H) 06/20/2021       Recent Labs     07/31/21  1536 07/31/21 2023 08/01/21  0729 08/01/21  1047   POCGLU 315* 197* 171* 202*       Blood Sugar Average: Last 72 hrs:  (P) 232 [id number]    Continue Lantus HS  Humalog t i d   With meals  Titrate insulin dose based on Accu-Cheks  Avoid hypoglycemia  Hypoglycemia protocol in place

## 2021-08-01 NOTE — ASSESSMENT & PLAN NOTE
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  Was scheduled to have IR guided biopsy as outpatient on 7/21 however was done inpatient on 7/21 with IR      · Patient is currently status post for repeat lung biopsy on 07/27 since the biopsy from several days ago resulted in necrotic tissue without viable tumor - target possibly left lung  · Eliquis resumed 7/28  · Status post right thoracocentesis follow-up on cytology  · Oncology following

## 2021-08-01 NOTE — PLAN OF CARE
Problem: PAIN - ADULT  Goal: Verbalizes/displays adequate comfort level or baseline comfort level  Description: Interventions:  - Encourage patient to monitor pain and request assistance  - Assess pain using appropriate pain scale (e g  0-10)  - Administer analgesics based on type and severity of pain and evaluate response  - Implement non-pharmacological measures as appropriate and evaluate response  - Consider cultural and social influences on pain and pain management  - Notify physician/advanced practitioner if interventions unsuccessful or patient reports new pain  Outcome: Progressing     Problem: INFECTION - ADULT  Goal: Absence or prevention of progression during hospitalization  Description: INTERVENTIONS:  - Assess and monitor for signs and symptoms of infection  - Monitor lab/diagnostic results  - Monitor all insertion sites, i e  IV site, bx site  - [place] appropriate cooling/warming therapies per order  - Administer medications as ordered  - Instruct and encourage patient and family to use good hand hygiene technique  - Identify and instruct in appropriate isolation precautions for identified infection/condition  Outcome: Progressing  Goal: Absence of fever/infection during neutropenic period  Description: INTERVENTIONS:  - Monitor WBC and ANC  - Implement neutropenic precautions if/when pt becomes neutropenic    Outcome: Progressing     Problem: SAFETY ADULT  Goal: Patient will remain free of falls  Description: INTERVENTIONS:  - Educate patient/family on patient safety including physical limitations  - Instruct patient to call for assistance with activity   - Consult OT/PT to assist with strengthening/mobility   - Keep Call bell within reach  - Keep bed low and locked with side rails adjusted as appropriate  - Keep care items and personal belongings within reach  - Initiate and maintain comfort rounds  - Make Fall Risk Sign visible to staff  - Offer Toileting every 2 hours, in advance of need  - Maintain chair alarm  - Obtain necessary fall risk management equipment: chair alarm  - Apply yellow socks and bracelet for high fall risk patients  - Consider moving patient to room near nurses station  Outcome: Progressing  Goal: Maintain or return to baseline ADL function  Description: INTERVENTIONS:  -  Assess patient's ability to carry out ADLs; assess patient's baseline for ADL function and identify physical deficits which impact ability to perform ADLs (bathing, care of mouth/teeth, toileting, grooming, dressing, etc )  - Assess/evaluate cause of self-care deficits   - Assess range of motion  - Assess patient's mobility; develop plan if impaired  - Assess patient's need for assistive devices and provide as appropriate  - Encourage maximum independence but intervene and supervise when necessary  - Involve family in performance of ADLs  - Assess for home care needs following discharge   - Consider OT consult to assist with ADL evaluation and planning for discharge  - Provide patient education as appropriate  Outcome: Progressing  Goal: Maintains/Returns to pre admission functional level  Description: INTERVENTIONS:  - Perform BMAT or MOVE assessment daily    - Set and communicate daily mobility goal to care team and patient/family/caregiver     - Collaborate with rehabilitation services on mobility goals if consulted  - Out of bed to chair 3 times a day   - Out of bed for meals 3 times a day  - Out of bed for toileting  - Record patient progress and toleration of activity level   Outcome: Progressing     Problem: DISCHARGE PLANNING  Goal: Discharge to home or other facility with appropriate resources  Description: INTERVENTIONS:  - Identify barriers to discharge w/patient and caregiver  - Arrange for needed discharge resources and transportation as appropriate  - Identify discharge learning needs (meds, wound care, etc )  - Arrange for interpretive services to assist at discharge as needed  - Refer to Case Management Department for coordinating discharge planning if the patient needs post-hospital services based on physician/advanced practitioner order or complex needs related to functional status, cognitive ability, or social support system  Outcome: Progressing     Problem: Knowledge Deficit  Goal: Patient/family/caregiver demonstrates understanding of disease process, treatment plan, medications, and discharge instructions  Description: Complete learning assessment and assess knowledge base    Interventions:  - Provide teaching at level of understanding  - Provide teaching via preferred learning methods  Outcome: Progressing     Problem: CARDIOVASCULAR - ADULT  Goal: Maintains optimal cardiac output and hemodynamic stability  Description: INTERVENTIONS:  - Monitor I/O, vital signs and rhythm  - Monitor for S/S and trends of decreased cardiac output  - Administer and titrate ordered vasoactive medications to optimize hemodynamic stability  - Assess quality of pulses, skin color and temperature  - Assess for signs of decreased coronary artery perfusion  - Instruct patient to report change in severity of symptoms  Outcome: Progressing  Goal: Absence of cardiac dysrhythmias or at baseline rhythm  Description: INTERVENTIONS:  - Continuous cardiac monitoring, vital signs, obtain 12 lead EKG if ordered  - Administer antiarrhythmic and heart rate control medications as ordered  - Monitor electrolytes and administer replacement therapy as ordered  Outcome: Progressing     Problem: METABOLIC, FLUID AND ELECTROLYTES - ADULT  Goal: Electrolytes maintained within normal limits  Description: INTERVENTIONS:  - Monitor labs and assess patient for signs and symptoms of electrolyte imbalances  - Administer electrolyte replacement as ordered  - Monitor response to electrolyte replacements, including repeat lab results as appropriate  - Instruct patient on fluid and nutrition as appropriate  Outcome: Progressing  Goal: Fluid balance maintained  Description: INTERVENTIONS:  - Monitor labs   - Monitor I/O and WT  - Instruct patient on fluid and nutrition as appropriate  - Assess for signs & symptoms of volume excess or deficit  Outcome: Progressing  Goal: Glucose maintained within target range  Description: INTERVENTIONS:  - Monitor Blood Glucose as ordered and provide SSI/lantus as ordered  - Assess for signs and symptoms of hyperglycemia and hypoglycemia  - Administer ordered medications to maintain glucose within target range  - Assess nutritional intake and initiate nutrition service referral as needed  Outcome: Progressing     Problem: RESPIRATORY - ADULT  Goal: Achieves optimal ventilation and oxygenation  Description: INTERVENTIONS:  - Assess for changes in respiratory status  - Assess for changes in mentation and behavior  - Position to facilitate oxygenation and minimize respiratory effort  - Oxygen administered by appropriate delivery if ordered  - Initiate smoking cessation education as indicated  - Encourage broncho-pulmonary hygiene including cough, deep breathe, Incentive Spirometry  - Assess the need for suctioning and aspirate as needed  - Assess and instruct to report SOB or any respiratory difficulty  - Respiratory Therapy support as indicated  Outcome: Progressing     Problem: MOBILITY - ADULT  Goal: Maintain or return to baseline ADL function  Description: INTERVENTIONS:  -  Assess patient's ability to carry out ADLs; assess patient's baseline for ADL function and identify physical deficits which impact ability to perform ADLs (bathing, care of mouth/teeth, toileting, grooming, dressing, etc )  - Assess/evaluate cause of self-care deficits   - Assess range of motion  - Assess patient's mobility; develop plan if impaired  - Assess patient's need for assistive devices and provide as appropriate  - Encourage maximum independence but intervene and supervise when necessary  - Involve family in performance of ADLs  - Assess for home care needs following discharge   - Consider OT consult to assist with ADL evaluation and planning for discharge  - Provide patient education as appropriate  Outcome: Progressing  Goal: Maintains/Returns to pre admission functional level  Description: INTERVENTIONS:  - Perform BMAT or MOVE assessment daily    - Set and communicate daily mobility goal to care team and patient/family/caregiver     - Collaborate with rehabilitation services on mobility goals if consulted  - Out of bed to chair 3 times a day   - Out of bed for meals 3 times a day  - Out of bed for toileting  - Record patient progress and toleration of activity level   Outcome: Progressing     Problem: Potential for Falls  Goal: Patient will remain free of falls  Description: INTERVENTIONS:  - Educate patient/family on patient safety including physical limitations  - Instruct patient to call for assistance with activity   - Consult OT/PT to assist with strengthening/mobility   - Keep Call bell within reach  - Keep bed low and locked with side rails adjusted as appropriate  - Keep care items and personal belongings within reach  - Initiate and maintain comfort rounds  - Make Fall Risk Sign visible to staff  - Offer Toileting every 2 hours, in advance of need  - Maintain chair alarm  - Obtain necessary fall risk management equipment: chair alarm  - Apply yellow socks and bracelet for high fall risk patients  - Consider moving patient to room near nurses station  Outcome: Progressing     Problem: Prexisting or High Potential for Compromised Skin Integrity  Goal: Skin integrity is maintained or improved  Description: INTERVENTIONS:  - Identify patients at risk for skin breakdown  - Assess and monitor skin integrity  - Assess and monitor nutrition and hydration status  - Monitor labs   - Assess for incontinence   - Turn and reposition patient  - Assist with mobility/ambulation  - Relieve pressure over bony prominences  - Avoid friction and shearing  - Provide appropriate hygiene as needed including keeping skin clean and dry  - Collaborate with interdisciplinary team   - Patient/family teaching  - Consider wound care consult   Outcome: Progressing

## 2021-08-01 NOTE — PROGRESS NOTES
[facility]  Progress Note - Courtney Felt 1940, 68 y o  male MRN: [id number]  Unit/Bed#: S -01 Encounter: [id number]  Primary Care Provider: Sasha Gerber DO   Date and time admitted to hospital: 7/14/2021  1:39 PM    * Acute respiratory failure with hypoxia and hypercapnia (HCC)  Assessment & Plan  · Multifactorial  · Wean supplemental oxygen as tolerated more than 88-90%  · Likely need supplemental oxygen at discharge    Mass of right lung  Assessment & Plan  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  Was scheduled to have IR guided biopsy as outpatient on 7/21 however was done inpatient on 7/21 with IR  · Patient is currently status post for repeat lung biopsy on 07/27 since the biopsy from several days ago resulted in necrotic tissue without viable tumor - target possibly left lung  · Eliquis resumed 7/28  · Status post right thoracocentesis follow-up on cytology  · Oncology following    Right flank pain  Assessment & Plan  · Patient has been having R flank pain since his R lung biopsy on 7/21; he now reports that pain is persistent and radiate to the RUQ and down the R side of his back  He feels the pain in his RUQ is somewhat of a 'burning' pain  He reports that he has had urinary frequency, urgency, and decreased UO  · DDx: pain 2/2 lung mass vs  Neuropathic pain s/p biopsy   · Trial gabapentin QHS for suspected neuropathic RUQ pain      Hyperkalemia  Assessment & Plan  Monitor closely    COPD (chronic obstructive pulmonary disease) with acute exacerbation (HCC)  Assessment & Plan  · With acute exacerbation on admission - presenting with worsening shortness of breath, productive cough and wheezing     · Pulmonology consulted appreciate input  · Patient is usually on 3 L nasal cannula at home  · Continue Xopenex and Spiriva and 3% nebulizers  · Continue respiratory protocol  · Requires home O2 on d/c   · Given Lasix 40 mg IV once 7/28  · Outpatient Pulmonary follow-up at discharge  · Discharge on Bevespi with albuterol PRN home meds   · Per pulmonary, steroids not indicated for COPD at this time - stopped 7/29    New onset type 2 diabetes mellitus [facility])  Assessment & Plan  Lab Results   Component Value Date    HGBA1C 6 6 (H) 06/20/2021       Recent Labs     07/31/21  1536 07/31/21 2023 08/01/21  0729 08/01/21  1047   POCGLU 315* 197* 171* 202*       Blood Sugar Average: Last 72 hrs:  (P) 232 [id number]    Continue Lantus HS  Humalog t i d  With meals  Titrate insulin dose based on Accu-Cheks  Avoid hypoglycemia  Hypoglycemia protocol in place    Hypothyroidism  Assessment & Plan  · Continue levothyroxine 150 mcg daily  Paroxysmal A-fib with RVR  Assessment & Plan  · Initially presented with AFib RVR, Rate controlled now  Briefly was uncontrolled on 7/29 and was status post Cardizem and IV Albumin and resulted in returning to rate controlled A  Flutter   · Continue oral amiodarone load - 200 mg TID x 1 week, BID x 1 week, then daily  · Continue routine metoprolol 25mg BID  · Eliquis for anticoagulation  · Cardiology input noted    Idiopathic thrombocytopenic purpura (ITP) (HCC)  Assessment & Plan  · Was previously receiving IV Solu-Medrol  · Transitioned to PO Prednisone 20 mg on 07/27  · As per pulmonary disease does not to be on steroid   · Platelets remain stable  · D/W Heme/onc-- does receive rituxin as an OP; plan to keep on prednisone 20mg PO QD as he was on prolonged taper as an OP  · Will continue steroids and have patient follow up with Heme/Onc              VTE Pharmacologic Prophylaxis: VTE Score: 5 High Risk (Score >/= 5) - Pharmacological DVT Prophylaxis Ordered: apixaban (Eliquis)  Sequential Compression Devices Ordered  Patient Centered Rounds: I performed bedside rounds with nursing staff today    Discussions with Specialists or Other Care Team Provider:     Education and Discussions with Family / Patient: Patient declined call to   Reports he is keeping his family updated    Time Spent for Care: 30 minutes  More than 50% of total time spent on counseling and coordination of care as described above  Current Length of Stay: 18 day(s)  Current Patient Status: Inpatient   Certification Statement: The patient will continue to require additional inpatient hospital stay due to As outlined  Discharge Plan: Awaiting oncology inputs for disposition planning    Code Status: Level 1 - Full Code    Subjective:     Comfortably in bed  Reports feeling okay      Objective:     Vitals:   Temp (24hrs), Av 2 °F (36 8 °C), Min:97 6 °F (36 4 °C), Max:98 8 °F (37 1 °C)    Temp:  [97 6 °F (36 4 °C)-98 8 °F (37 1 °C)] 98 3 °F (36 8 °C)  HR:  [] 91  Resp:  [18] 18  BP: (101-144)/(54-91) 109/61  SpO2:  [91 %-98 %] 96 %  Body mass index is 30 54 kg/m²  Input and Output Summary (last 24 hours):      Intake/Output Summary (Last 24 hours) at 2021 1334  Last data filed at 2021 0300  Gross per 24 hour   Intake --   Output 1100 ml   Net -1100 ml       Physical Exam:   Physical Exam     Comfortably in bed  Neck supple  Lungs diminished breath sounds bilaterally  Heart sounds S1-S2 noted  Abdomen soft  Awake obeys simple commands  Pulses noted  No rash    Additional Data:     Labs:  Results from last 7 days   Lab Units 21  0545   WBC Thousand/uL 9 75   HEMOGLOBIN g/dL 9 7*   HEMATOCRIT % 34 1*   PLATELETS Thousands/uL 217   BANDS PCT % 1   LYMPHO PCT % 7*   MONO PCT % 4   EOS PCT % 0     Results from last 7 days   Lab Units 21  0609 21  0920   SODIUM mmol/L 134* 134*   POTASSIUM mmol/L 5 1 4 6   CHLORIDE mmol/L 99* 96*   CO2 mmol/L 31 30   BUN mg/dL 27* 30*   CREATININE mg/dL 1 19 1 21   ANION GAP mmol/L 4 8   CALCIUM mg/dL 9 6 9 3   ALBUMIN g/dL  --  2 7*   TOTAL BILIRUBIN mg/dL  --  0 91   ALK PHOS U/L  --  142*   ALT U/L  --  29   AST U/L  --  11   GLUCOSE RANDOM mg/dL 135 274*         Results from last 7 days   Lab Units 08/01/21  1047 08/01/21  0729 07/31/21  2023 07/31/21  1536 07/31/21  1021 07/31/21  0755 07/30/21  2205 07/30/21  1629 07/30/21  1113 07/30/21  0736 07/29/21 2005 07/29/21  1605   POC GLUCOSE mg/dl 202* 171* 197* 315* 303* 177* 259* 152* 316* 144* 279* 199*               Lines/Drains:  Invasive Devices     Peripheral Intravenous Line            Peripheral IV 07/29/21 Left;Ventral (anterior) Forearm 3 days                      Imaging: No pertinent imaging reviewed      Recent Cultures (last 7 days):         Last 24 Hours Medication List:   Current Facility-Administered Medications   Medication Dose Route Frequency Provider Last Rate    acetaminophen  650 mg Oral Q6H PRN Jill Locke PA-C      al mag oxide-diphenhydramine-lidocaine viscous  10 mL Swish & Spit Q4H PRN Jerrell Keys PA-C      albuterol  2 5 mg Nebulization Q6H PRN CHRIS Harrell      allopurinol  300 mg Oral Daily Jill Locke PA-C      amiodarone  200 mg Oral TID With Meals Paco Locke PA-C      apixaban  5 mg Oral BID Yaidra Rich PA-C      benzonatate  100 mg Oral TID Celeste Broussard PA-C      dextromethorphan-guaiFENesin  10 mL Oral Q4H PRN Celeste Broussard PA-C      Diclofenac Sodium  2 g Topical 4x Daily Regla Venegas PA-C      fish oil  1,000 mg Oral BID Jill Locke PA-C      gabapentin  100 mg Oral HS Regla Venegas PA-C      guaiFENesin  600 mg Oral Q12H [facility] Jill Locke PA-C      heparin (porcine)  2,000 Units Intravenous Q1H PRN Princess Denton PA-C      heparin (porcine)  4,000 Units Intravenous Q1H PRN Princess Denton PA-C      insulin glargine  15 Units Subcutaneous HS Emily Sams MD      insulin lispro  1-5 Units Subcutaneous HS Jill Locke PA-C      insulin lispro  2-12 Units Subcutaneous TID AC Jill Locke PA-C      insulin lispro  8 Units Subcutaneous TID With Meals Marie Roblero MD      levalbuterol  1 25 mg Nebulization TID CHRIS Sullivan      levothyroxine  150 mcg Oral Daily Janiya Seth PA-C      lidocaine  1 patch Topical Q24H Jill Locke PA-C      metoprolol tartrate  25 mg Oral Q12H [address] Jill Locke PA-C      ondansetron  4 mg Intravenous Q6H PRN Jill Locke PA-C      pantoprazole  40 mg Oral Daily Jill Locke PA-C      predniSONE  20 mg Oral Daily Jerrell Patel PA-C      sodium chloride  3 mL Nebulization TID CHRIS Sullivan      tiotropium  18 mcg Inhalation Daily Janiya Seth PA-C          Today, Patient Was Seen By: Marie Roblero MD    **Please Note: This note may have been constructed using a voice recognition system  **

## 2021-08-02 ENCOUNTER — DOCUMENTATION (OUTPATIENT)
Dept: HEMATOLOGY ONCOLOGY | Facility: CLINIC | Age: 81
End: 2021-08-02

## 2021-08-02 LAB
ATRIAL RATE: 249 BPM
ATRIAL RATE: 258 BPM
ATRIAL RATE: 278 BPM
GLUCOSE SERPL-MCNC: 175 MG/DL (ref 65–140)
GLUCOSE SERPL-MCNC: 198 MG/DL (ref 65–140)
GLUCOSE SERPL-MCNC: 224 MG/DL (ref 65–140)
P AXIS: 89 DEGREES
QRS AXIS: 44 DEGREES
QRS AXIS: 51 DEGREES
QRS AXIS: 65 DEGREES
QRSD INTERVAL: 78 MS
QRSD INTERVAL: 80 MS
QRSD INTERVAL: 82 MS
QT INTERVAL: 300 MS
QT INTERVAL: 312 MS
QT INTERVAL: 332 MS
QTC INTERVAL: 373 MS
QTC INTERVAL: 389 MS
QTC INTERVAL: 461 MS
T WAVE AXIS: 154 DEGREES
T WAVE AXIS: 65 DEGREES
T WAVE AXIS: 84 DEGREES
VENTRICULAR RATE: 101 BPM
VENTRICULAR RATE: 116 BPM
VENTRICULAR RATE: 86 BPM

## 2021-08-02 PROCEDURE — 99233 SBSQ HOSP IP/OBS HIGH 50: CPT | Performed by: PHYSICIAN ASSISTANT

## 2021-08-02 PROCEDURE — 94760 N-INVAS EAR/PLS OXIMETRY 1: CPT

## 2021-08-02 PROCEDURE — 94640 AIRWAY INHALATION TREATMENT: CPT

## 2021-08-02 PROCEDURE — 93010 ELECTROCARDIOGRAM REPORT: CPT | Performed by: INTERNAL MEDICINE

## 2021-08-02 PROCEDURE — 94669 MECHANICAL CHEST WALL OSCILL: CPT

## 2021-08-02 PROCEDURE — 82948 REAGENT STRIP/BLOOD GLUCOSE: CPT

## 2021-08-02 PROCEDURE — 99232 SBSQ HOSP IP/OBS MODERATE 35: CPT | Performed by: PHYSICIAN ASSISTANT

## 2021-08-02 RX ORDER — AMIODARONE HYDROCHLORIDE 200 MG/1
200 TABLET ORAL 2 TIMES DAILY WITH MEALS
Status: DISCONTINUED | OUTPATIENT
Start: 2021-08-02 | End: 2021-08-05 | Stop reason: HOSPADM

## 2021-08-02 RX ADMIN — BENZONATATE 100 MG: 100 CAPSULE ORAL at 22:09

## 2021-08-02 RX ADMIN — LEVALBUTEROL HYDROCHLORIDE 1.25 MG: 1.25 SOLUTION, CONCENTRATE RESPIRATORY (INHALATION) at 07:44

## 2021-08-02 RX ADMIN — APIXABAN 5 MG: 5 TABLET, FILM COATED ORAL at 17:35

## 2021-08-02 RX ADMIN — OMEGA-3 FATTY ACIDS CAP 1000 MG 1000 MG: 1000 CAP at 08:26

## 2021-08-02 RX ADMIN — INSULIN LISPRO 2 UNITS: 100 INJECTION, SOLUTION INTRAVENOUS; SUBCUTANEOUS at 08:27

## 2021-08-02 RX ADMIN — TIOTROPIUM BROMIDE 18 MCG: 18 CAPSULE ORAL; RESPIRATORY (INHALATION) at 08:28

## 2021-08-02 RX ADMIN — ISODIUM CHLORIDE 3 ML: 0.03 SOLUTION RESPIRATORY (INHALATION) at 07:44

## 2021-08-02 RX ADMIN — PANTOPRAZOLE SODIUM 40 MG: 40 TABLET, DELAYED RELEASE ORAL at 08:25

## 2021-08-02 RX ADMIN — ISODIUM CHLORIDE 3 ML: 0.03 SOLUTION RESPIRATORY (INHALATION) at 14:30

## 2021-08-02 RX ADMIN — AMIODARONE HYDROCHLORIDE 200 MG: 200 TABLET ORAL at 17:35

## 2021-08-02 RX ADMIN — GABAPENTIN 100 MG: 100 CAPSULE ORAL at 22:10

## 2021-08-02 RX ADMIN — OMEGA-3 FATTY ACIDS CAP 1000 MG 1000 MG: 1000 CAP at 17:35

## 2021-08-02 RX ADMIN — AMIODARONE HYDROCHLORIDE 200 MG: 200 TABLET ORAL at 11:22

## 2021-08-02 RX ADMIN — PREDNISONE 20 MG: 20 TABLET ORAL at 08:27

## 2021-08-02 RX ADMIN — DICLOFENAC SODIUM 2 G: 10 GEL TOPICAL at 11:22

## 2021-08-02 RX ADMIN — INSULIN LISPRO 4 UNITS: 100 INJECTION, SOLUTION INTRAVENOUS; SUBCUTANEOUS at 17:36

## 2021-08-02 RX ADMIN — DICLOFENAC SODIUM 2 G: 10 GEL TOPICAL at 08:27

## 2021-08-02 RX ADMIN — INSULIN LISPRO 4 UNITS: 100 INJECTION, SOLUTION INTRAVENOUS; SUBCUTANEOUS at 11:23

## 2021-08-02 RX ADMIN — LEVOTHYROXINE SODIUM 150 MCG: 150 TABLET ORAL at 05:41

## 2021-08-02 RX ADMIN — ALLOPURINOL 300 MG: 300 TABLET ORAL at 08:25

## 2021-08-02 RX ADMIN — INSULIN GLARGINE 15 UNITS: 100 INJECTION, SOLUTION SUBCUTANEOUS at 22:09

## 2021-08-02 RX ADMIN — LEVALBUTEROL HYDROCHLORIDE 1.25 MG: 1.25 SOLUTION, CONCENTRATE RESPIRATORY (INHALATION) at 14:30

## 2021-08-02 RX ADMIN — DICLOFENAC SODIUM 2 G: 10 GEL TOPICAL at 17:37

## 2021-08-02 RX ADMIN — METOPROLOL TARTRATE 25 MG: 25 TABLET, FILM COATED ORAL at 22:11

## 2021-08-02 RX ADMIN — DICLOFENAC SODIUM 2 G: 10 GEL TOPICAL at 22:10

## 2021-08-02 RX ADMIN — GUAIFENESIN 600 MG: 600 TABLET, EXTENDED RELEASE ORAL at 08:25

## 2021-08-02 RX ADMIN — INSULIN LISPRO 1 UNITS: 100 INJECTION, SOLUTION INTRAVENOUS; SUBCUTANEOUS at 22:10

## 2021-08-02 RX ADMIN — AMIODARONE HYDROCHLORIDE 200 MG: 200 TABLET ORAL at 08:26

## 2021-08-02 RX ADMIN — APIXABAN 5 MG: 5 TABLET, FILM COATED ORAL at 08:25

## 2021-08-02 RX ADMIN — BENZONATATE 100 MG: 100 CAPSULE ORAL at 08:25

## 2021-08-02 RX ADMIN — GUAIFENESIN 600 MG: 600 TABLET, EXTENDED RELEASE ORAL at 22:10

## 2021-08-02 RX ADMIN — BENZONATATE 100 MG: 100 CAPSULE ORAL at 17:35

## 2021-08-02 NOTE — ASSESSMENT & PLAN NOTE
· Patient has been having R flank pain since his R lung biopsy on 7/21; he now reports that pain is persistent and radiate to the RUQ and down the R side of his back  He feels the pain in his RUQ is somewhat of a 'burning' pain   He reports that he has had urinary frequency, urgency, and decreased UO  · DDx: pain 2/2 lung mass vs  Neuropathic pain s/p biopsy   · UTI ruled out  · Pain control; continue gabapentin for described neuropathic pain

## 2021-08-02 NOTE — ASSESSMENT & PLAN NOTE
· Initially presented with AFib RVR, Rate controlled now  Briefly was uncontrolled on 7/29 and was status post Cardizem and IV Albumin and resulted in returning to rate controlled A   Flutter   · Continue oral amiodarone 200mg PO BID x14 doses, then 200mg PO QD  · Continue routine metoprolol 25mg BID  · Eliquis for anticoagulation  · Cardiology input noted

## 2021-08-02 NOTE — PROGRESS NOTES
[facility] THORACIC ONCOLOGY MULTIDISCIPLINARY CASE REVIEW    DATE:  8/2/2021    PRESENTING DOCTOR: Rosa Ford PA-C    DIAGNOSIS:  TBD  STAGING:     Bradley Duffy is a 68 y o  male who was presented at the Thoracic Oncology Multidisciplinary Tumor Conference today  He has a pmh significant for nicotine dependence, COPD, AFIB, T2DM who presented after being sent by PCP for hypercalcemia, hyperkalemia, SULEMA as seen on labs  Former smoker, 0 5ppd for 40 years  PHYSICIAN RECOMMENDED PLAN: If pleural fluid cytology is not confirmatory, will obtain PET scan to determine optimal site for repeat  IR biopsy  Imaging reviewed: 7/22/2021- CT c/a/[id number] of pulmonary masses highly suspicious for bronchogenic malignancy  The dominant lesion in the perihilar right upper lobe now demonstrates central necrotic cavitation  Pathology reviewed: 6/21/2021 EBUS-Atypical cellular changes but no conclusive evidence of malignancy  PFT's reviewed: Future imaging: PET/CT    Referrals: None at this time  Procedures: None at this time  Team agreed to plan  NCCN guidelines were readily available for review at this discussion    The final treatment plan will be left to the discretion of the patient and the treating physician  DISCLAIMERS:  TO THE TREATING PHYSICIAN:  This conference is a meeting of clinicians from various specialty areas who evaluate and discuss patients for whom a multidisciplinary treatment approach is being considered  Please note that the above opinion was a consensus of the conference attendees and is intended only to assist in quality care of the discussed patient  The responsibility for follow up on the input given during the conference, along with any final decisions regarding plan of care, is that of the patient and the patient's provider  Accordingly, appointments have only been recommended based on this information and have NOT been scheduled unless otherwise noted  TO THE PATIENT:  This summary is a brief record of major aspects of your cancer treatment  You may choose to share a copy with any of your doctors or nurses  However, this is not a detailed or comprehensive record of your care

## 2021-08-02 NOTE — UTILIZATION REVIEW
Continued Stay Review    Date: 8/2/21                          Current Patient Class: IP Current Level of Care: MS    HPI:80 y o  male with hs DM2, COPD, A fib initially admitted on 7/14/21 with hypercalcemia , acute kidney injury, hyperkalemia, COPD exacerbation, lung mass  Pt had lung biopsy 7/21 and 7/27- Recent bx mass of right lung demonstrated necrotic tissue and could not be sent for ancillary testing,2nd biopsy of left lung demonstrated necrotic tissue and again cannot be sent for ancillary testing     Pt went into A fib briefly 7/29, hypotensive  Pt received Cardizem and Albumin and pt returned to rate controlled A flutter  Pt continues on eliquis for AC  Thoracentesis done 7/30  Pt having episodes of hyperglycemia, Humalog added TID with meals on 7/30          Assessment/Plan: 8/2  Pt continues with R flank pain, UTI ruled out  Continue with pain control-gabapentin for neuropathic pain  Decreased breath sounds R mid lung field posterior and anterior  R chest pain deep to anterior chest wall  He does feel that the medications help him somewhat  Pt remains in supplemental O2 and will be on O2 when discharged  Oncology following pt and will be presented at tumor board today with potential of transfer to [facility] for treatment for lung mass-PET scan then have IR biopsy of most metabolically active area and then possibly for induction of targeted treatment  Pt continues on Amiodarone -TID completed, now 200mg PO BID x14 doses, then 200mg PO QD  Fluid analysis pending from thoracentesis 7/30      Vital Signs:   Date/Time  Temp  Pulse  Resp  BP  MAP (mmHg)  SpO2  Calculated FIO2 (%) - Nasal Cannula  Nasal Cannula O2 Flow Rate (L/min)  O2 Device    08/02/21 1456  98 9 °F (37 2 °C)  122Abnormal   18  108/60  78  95 %  --  --  None (Room air)    08/02/21 1431  --  --  --  --  --  96 %  28  2 L/min  Nasal cannula    08/02/21 0746  --  --  --  --  --  99 %  28  2 L/min  Nasal cannula    08/02/21 0706  98 7 °F (37 1 °C)  70  18  102/59  74  98 %  28  2 L/min  Nasal cannula      08/01/21 0717  97 6 °F (36 4 °C)  103  --  144/61  --  93 %  --  --  --       07/31/21 0730  98 °F (36 7 °C)  105  --  112/59  --  92 %  --  --           Pertinent Labs/Diagnostic Results:       Results from last 7 days   Lab Units 07/30/21  0545 07/29/21  0920 07/28/21  0539 07/27/21  0350   WBC Thousand/uL 9 75 9 67 10 05 17 91*   HEMOGLOBIN g/dL 9 7* 9 6* 9 6* 10 1*   HEMATOCRIT % 34 1* 34 2* 33 5* 35 3*   PLATELETS Thousands/uL 217 197 179 187   BANDS PCT % 1  --  4  --          Results from last 7 days   Lab Units 07/31/21  0609 07/30/21  1801 07/30/21  0545 07/29/21  0920 07/28/21  0539 07/27/21  0355   SODIUM mmol/L 134*  --  136 134* 135* 131*   POTASSIUM mmol/L 5 1 5 6* 5 7* 4 6 5 4* 4 8   CHLORIDE mmol/L 99*  --  100 96* 100 98*   CO2 mmol/L 31  --  30 30 31 29   ANION GAP mmol/L 4  --  6 8 4 4   BUN mg/dL 27*  --  26* 30* 31* 31*   CREATININE mg/dL 1 19  --  1 18 1 21 1 34* 1 21   EGFR ml/min/1 73sq m 58  --  59 57 50 57   CALCIUM mg/dL 9 6  --  9 5 9 3 8 8 9 1     Results from last 7 days   Lab Units 07/29/21  0920 07/28/21  0539   AST U/L 11 20   ALT U/L 29 37   ALK PHOS U/L 142* 144*   TOTAL PROTEIN g/dL 7 3 6 2*   ALBUMIN g/dL 2 7* 1 5*   TOTAL BILIRUBIN mg/dL 0 91 0 55     Results from last 7 days   Lab Units 08/02/21  1119 08/02/21  0708 08/01/21 2058 08/01/21  1621 08/01/21  1047 08/01/21  0729 07/31/21  2023 07/31/21  1536 07/31/21  1021 07/31/21  0755 07/30/21  2205 07/30/21  1629   POC GLUCOSE mg/dl 224* 175* 319* 286* 202* 171* 197* 315* 303* 177* 259* 152*     Results from last 7 days   Lab Units 07/31/21  0609 07/30/21  0545 07/29/21  0920 07/28/21  0539 07/27/21  0355   GLUCOSE RANDOM mg/dL 135 146* 274* 166* 269*               Results from last 7 days   Lab Units 07/27/21  2253 07/27/21  0331 07/26/21  2141   PTT seconds 58* 97* 79*               Results from last 7 days   Lab Units 07/28/21  1634   CLARITY UA  Clear   COLOR UA Yellow   SPEC GRAV UA  1 020   PH UA  6 0   GLUCOSE UA mg/dl 500 (1/2%)*   KETONES UA mg/dl Negative   BLOOD UA  Trace-Intact*   PROTEIN UA mg/dl Negative   NITRITE UA  Negative   BILIRUBIN UA  Negative   UROBILINOGEN UA E U /dl 4 0*   LEUKOCYTES UA  Negative   WBC UA /hpf 0-1   RBC UA /hpf 0-1   BACTERIA UA /hpf None Seen   EPITHELIAL CELLS WET PREP /hpf None Seen           Results from last 7 days   Lab Units 07/30/21  0545 07/28/21  0539   TOTAL COUNTED  100 100             Medications:   Scheduled Medications:  allopurinol, 300 mg, Oral, Daily  amiodarone, 200 mg, Oral, BID With Meals  apixaban, 5 mg, Oral, BID  benzonatate, 100 mg, Oral, TID  Diclofenac Sodium, 2 g, Topical, 4x Daily  fish oil, 1,000 mg, Oral, BID  gabapentin, 100 mg, Oral, HS  guaiFENesin, 600 mg, Oral, Q12H ZOHAIB  insulin glargine, 15 Units, Subcutaneous, HS  insulin lispro, 1-5 Units, Subcutaneous, HS  insulin lispro, 2-12 Units, Subcutaneous, TID AC  insulin lispro, 8 Units, Subcutaneous, TID With Meals  levalbuterol, 1 25 mg, Nebulization, TID  levothyroxine, 150 mcg, Oral, Daily  lidocaine, 1 patch, Topical, Q24H  metoprolol tartrate, 25 mg, Oral, Q12H ZOHAIB  pantoprazole, 40 mg, Oral, Daily  predniSONE, 20 mg, Oral, Daily  sodium chloride, 3 mL, Nebulization, TID  tiotropium, 18 mcg, Inhalation, Daily  amiodarone tablet 200 mg   Dose: 200 mg  Freq: 3 times daily with meals Route: PO  Start: 07/19/21 1630 End: 08/02/21 1257    Continuous IV Infusions:     PRN Meds:  acetaminophen, 650 mg, Oral, Q6H PRN x2 7/31  al mag oxide-diphenhydramine-lidocaine viscous, 10 mL, Swish & Spit, Q4H PRN  albuterol, 2 5 mg, Nebulization, Q6H PRN  dextromethorphan-guaiFENesin, 10 mL, Oral, Q4H PRN  heparin (porcine), 2,000 Units, Intravenous, Q1H PRN  heparin (porcine), 4,000 Units, Intravenous, Q1H PRN  ondansetron, 4 mg, Intravenous, Q6H PRN        Discharge Plan: TBD  Network Utilization Review Department  ATTENTION: Please call with any questions or concerns to [phone] and carefully listen to the prompts so that you are directed to the right person  All voicemails are confidential   Iraj Walton all requests for admission clinical reviews, approved or denied determinations and any other requests to dedicated fax number below belonging to the campus where the patient is receiving treatment   List of dedicated fax numbers for the Facilities:  [address] DENIALS (Administrative/Medical Necessity) [phone]   [address] (Maternity/NICU/Pediatrics) [phone]   [address] [place] [phone] [place] [address] Shana Darren Arndt [address] [facility] [place] [address] [phone]

## 2021-08-02 NOTE — ASSESSMENT & PLAN NOTE
· With acute exacerbation on admission - presenting with worsening shortness of breath, productive cough and wheezing     · Pulmonology consulted appreciate input  · Patient is usually on 3 L nasal cannula at home  · Continue Xopenex and Spiriva and 3% nebulizers  · Continue respiratory protocol  · Requires home O2 on d/c   · Given Lasix 40 mg IV once 7/28  · Outpatient Pulmonary follow-up at discharge  · Discharge on Bevespi with albuterol PRN home meds   · Per pulmonary, steroids not indicated for COPD at this time

## 2021-08-02 NOTE — ASSESSMENT & PLAN NOTE
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  Was scheduled to have IR guided biopsy as outpatient on 7/21 however was done inpatient on 7/21 with IR      · Patient is currently status post for repeat lung biopsy on 07/27 since the biopsy from several days ago resulted in necrotic tissue without viable tumor - target possibly left lung  · Eliquis resumed 7/28  · Status post right thoracocentesis follow-up on cytology  · Oncology following-- for presentation at tumor board today 8/2

## 2021-08-02 NOTE — ASSESSMENT & PLAN NOTE
Lab Results   Component Value Date    HGBA1C 6 6 (H) 06/20/2021       Recent Labs     08/01/21  1621 08/01/21  2058 08/02/21  0708 08/02/21  1119   POCGLU 286* 319* 175* 224*       Blood Sugar Average: Last 72 hrs:  (P) 231 [id number]    Continue Lantus HS  Humalog t i d   With meals  Titrate insulin dose based on Accu-Cheks  Avoid hypoglycemia  Hypoglycemia protocol in place

## 2021-08-02 NOTE — PROGRESS NOTES
[facility]  Progress Note - Radha Young 1940, 68 y o  male MRN: [id number]  Unit/Bed#: S -01 Encounter: [id number]  Primary Care Provider: Ary Skaggs DO   Date and time admitted to hospital: 7/14/2021  1:39 PM    * Acute respiratory failure with hypoxia and hypercapnia (HCC)  Assessment & Plan  · Multifactorial  · Wean supplemental oxygen as tolerated more than 88-90%  · Likely need supplemental oxygen at discharge    Right flank pain  Assessment & Plan  · Patient has been having R flank pain since his R lung biopsy on 7/21; he now reports that pain is persistent and radiate to the RUQ and down the R side of his back  He feels the pain in his RUQ is somewhat of a 'burning' pain  He reports that he has had urinary frequency, urgency, and decreased UO  · DDx: pain 2/2 lung mass vs  Neuropathic pain s/p biopsy   · UTI ruled out  · Pain control; continue gabapentin for described neuropathic pain      Hyperkalemia  Assessment & Plan  Monitor closely    COPD (chronic obstructive pulmonary disease) with acute exacerbation (HCC)  Assessment & Plan  · With acute exacerbation on admission - presenting with worsening shortness of breath, productive cough and wheezing     · Pulmonology consulted appreciate input  · Patient is usually on 3 L nasal cannula at home  · Continue Xopenex and Spiriva and 3% nebulizers  · Continue respiratory protocol  · Requires home O2 on d/c   · Given Lasix 40 mg IV once 7/28  · Outpatient Pulmonary follow-up at discharge  · Discharge on Bevespi with albuterol PRN home meds   · Per pulmonary, steroids not indicated for COPD at this time    New onset type 2 diabetes mellitus [facility])  Assessment & Plan  Lab Results   Component Value Date    HGBA1C 6 6 (H) 06/20/2021       Recent Labs     08/01/21  1621 08/01/21  2058 08/02/21  0708 08/02/21  1119   POCGLU 286* 319* 175* 224*       Blood Sugar Average: Last 72 hrs:  (P) 231 [id number]    Continue Lantus HS  Humalog t i d  With meals  Titrate insulin dose based on Accu-Cheks  Avoid hypoglycemia  Hypoglycemia protocol in place    Hypothyroidism  Assessment & Plan  · Continue levothyroxine 150 mcg daily  Mass of right lung  Assessment & Plan  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  Was scheduled to have IR guided biopsy as outpatient on 7/21 however was done inpatient on 7/21 with IR  · Patient is currently status post for repeat lung biopsy on 07/27 since the biopsy from several days ago resulted in necrotic tissue without viable tumor - target possibly left lung  · Eliquis resumed 7/28  · Status post right thoracocentesis follow-up on cytology  · Oncology following-- for presentation at tumor board today 8/2    Paroxysmal A-fib with RVR  Assessment & Plan  · Initially presented with AFib RVR, Rate controlled now  Briefly was uncontrolled on 7/29 and was status post Cardizem and IV Albumin and resulted in returning to rate controlled A  Flutter   · Continue oral amiodarone 200mg PO BID x14 doses, then 200mg PO QD  · Continue routine metoprolol 25mg BID  · Eliquis for anticoagulation  · Cardiology input noted    Idiopathic thrombocytopenic purpura (ITP) (HCC)  Assessment & Plan  · Was previously receiving IV Solu-Medrol  · Transitioned to PO Prednisone 20 mg on 07/27  · As per pulmonary disease does not to be on steroid   · Platelets remain stable  · D/W Heme/onc-- does receive rituxin as an OP; plan to keep on prednisone 20mg PO QD as he was on prolonged taper as an OP  · Will continue steroids and have patient follow up with Heme/Onc        VTE Pharmacologic Prophylaxis: VTE Score: 5 High Risk (Score >/= 5) - Pharmacological DVT Prophylaxis Ordered: apixaban (Eliquis)  Sequential Compression Devices Ordered      Patient Centered Rounds: I performed bedside rounds with nursing staff today   Discussions with Specialists or Other Care Team Provider: VANESSA, RN, oncology     Education and Discussions with Family / Patient: Updated  (daughter) via phone  Time Spent for Care: 30 minutes  More than 50% of total time spent on counseling and coordination of care as described above  Current Length of Stay: 19 day(s)  Current Patient Status: Inpatient   Certification Statement: The patient will continue to require additional inpatient hospital stay due to ongoing discharge planning w/ oncology   Discharge Plan: Anticipate discharge in 24-48 hrs to home vs  transfer to [facility] to initiate tx for lung mass vs  other    Code Status: Level 1 - Full Code    Subjective:   Patient is doing OK  Still w/ R chest pain deep to anterior chest wall  He does feel that the medications help him somewhat  He is concerned that he does not know the treatment plan for his mass  When he gets information, he wants to discuss options w/ family  Objective:     Vitals:   Temp (24hrs), Av 9 °F (37 2 °C), Min:98 7 °F (37 1 °C), Max:99 3 °F (37 4 °C)    Temp:  [98 7 °F (37 1 °C)-99 3 °F (37 4 °C)] 98 7 °F (37 1 °C)  HR:  [70-98] 70  Resp:  [18] 18  BP: (102-118)/(56-62) 102/59  SpO2:  [95 %-99 %] 99 %  Body mass index is 30 54 kg/m²  Input and Output Summary (last 24 hours): Intake/Output Summary (Last 24 hours) at 2021 1300  Last data filed at 2021 0954  Gross per 24 hour   Intake 75 ml   Output 950 ml   Net -875 ml       Physical Exam:   Physical Exam  Constitutional:       Appearance: Normal appearance  HENT:      Head: Normocephalic  Mouth/Throat:      Mouth: Mucous membranes are moist    Eyes:      Pupils: Pupils are equal, round, and reactive to light  Cardiovascular:      Rate and Rhythm: Normal rate and regular rhythm  Heart sounds: No murmur heard  No friction rub  No gallop      Pulmonary:      Effort: Pulmonary effort is normal       Comments: Decreased breath sounds R mid lung field posterior and anterior  Abdominal:      General: Abdomen is flat  Bowel sounds are normal       Palpations: Abdomen is soft  Tenderness: There is no abdominal tenderness  Musculoskeletal:         General: Normal range of motion  Right lower leg: Edema present  Left lower leg: Edema present  Skin:     General: Skin is warm and dry  Findings: No bruising  Neurological:      General: No focal deficit present  Mental Status: He is alert and oriented to person, place, and time  Mental status is at baseline     Psychiatric:         Mood and Affect: Mood normal          Additional Data:     Labs:  Results from last 7 days   Lab Units 07/30/21  0545   WBC Thousand/uL 9 75   HEMOGLOBIN g/dL 9 7*   HEMATOCRIT % 34 1*   PLATELETS Thousands/uL 217   BANDS PCT % 1   LYMPHO PCT % 7*   MONO PCT % 4   EOS PCT % 0     Results from last 7 days   Lab Units 07/31/21  0609 07/29/21  0920   SODIUM mmol/L 134* 134*   POTASSIUM mmol/L 5 1 4 6   CHLORIDE mmol/L 99* 96*   CO2 mmol/L 31 30   BUN mg/dL 27* 30*   CREATININE mg/dL 1 19 1 21   ANION GAP mmol/L 4 8   CALCIUM mg/dL 9 6 9 3   ALBUMIN g/dL  --  2 7*   TOTAL BILIRUBIN mg/dL  --  0 91   ALK PHOS U/L  --  142*   ALT U/L  --  29   AST U/L  --  11   GLUCOSE RANDOM mg/dL 135 274*         Results from last 7 days   Lab Units 08/02/21  1119 08/02/21  0708 08/01/21  2058 08/01/21  1621 08/01/21  1047 08/01/21  0729 07/31/21  2023 07/31/21  1536 07/31/21  1021 07/31/21  0755 07/30/21  2205 07/30/21  1629   POC GLUCOSE mg/dl 224* 175* 319* 286* 202* 171* 197* 315* 303* 177* 259* 152*               Lines/Drains:  Invasive Devices     Peripheral Intravenous Line            Peripheral IV 08/02/21 Right Forearm <1 day                      Imaging: Reviewed radiology reports from this admission including: chest xray    Recent Cultures (last 7 days):         Last 24 Hours Medication List:   Current Facility-Administered Medications   Medication Dose Route Frequency Provider Last Rate    acetaminophen  650 mg Oral Q6H PRN Avi Mckeon PA-C      al mag oxide-diphenhydramine-lidocaine viscous  10 mL Swish & Spit Q4H PRN Theodora Dandy, PA-C      albuterol  2 5 mg Nebulization Q6H PRN CHRIS Pollard      allopurinol  300 mg Oral Daily Jill Locke PA-C      amiodarone  200 mg Oral BID With Meals Regla Venegas PA-C      apixaban  5 mg Oral BID Theodora Dandy, PA-C      benzonatate  100 mg Oral TID Avi Mckeon PA-C      dextromethorphan-guaiFENesin  10 mL Oral Q4H PRN Avi Mckeon PA-C      Diclofenac Sodium  2 g Topical 4x Daily Regla Venegas PA-C      fish oil  1,000 mg Oral BID Jill Locke PA-C      gabapentin  100 mg Oral HS Regla Venegas PA-C      guaiFENesin  600 mg Oral Q12H [address] Jill Locke PA-C      heparin (porcine)  2,000 Units Intravenous Q1H PRN Shayna Kirk PA-C      heparin (porcine)  4,000 Units Intravenous Q1H PRN Shayna Kirk PA-C      insulin glargine  15 Units Subcutaneous HS Gaudencio Romano MD      insulin lispro  1-5 Units Subcutaneous HS Jill Locke PA-C      insulin lispro  2-12 Units Subcutaneous TID AC Jill Locke PA-C      insulin lispro  8 Units Subcutaneous TID With Meals Gaudencio Romano MD      levalbuterol  1 25 mg Nebulization TID CHRIS Pollard      levothyroxine  150 mcg Oral Daily Jill Locke PA-C      lidocaine  1 patch Topical Q24H Jill Locke PA-C      metoprolol tartrate  25 mg Oral Q12H [address] Jill Locke PA-C      ondansetron  4 mg Intravenous Q6H PRN Jill Locke PA-C      pantoprazole  40 mg Oral Daily Jill Locke PA-C      predniSONE  20 mg Oral Daily Jerrelloswaldo Jade LockBOYD simmons      sodium chloride  3 mL Nebulization TID CHRIS Pollard      tiotropium  18 mcg Inhalation Daily Avi Mckeon PA-C          Today, Patient Was Seen By: Jennifer Carlson PA-C    **Please Note: This note may have been constructed using a voice recognition system  **

## 2021-08-02 NOTE — QUICK NOTE
D/W Oncology: at this time, best plan of action would be to obtain PET scan, then have IR biopsy of most metabolically active area  Patient is agreeable to treatment if his lung mass proves to be cancer  At this time, we will work in conjunction w/ oncology to facilitate emergent IP PET scan to be performed @ [facility]  Patient will remain at [facility] for biopsy, and then possibly for induction of targeted treatment  Plan above discussed w/ Oncology, CM, and PACS  Discharge planning is ongoing at this time

## 2021-08-02 NOTE — PROGRESS NOTES
Medical Oncology/Hematology Progress Note  Amanda Irving, male, 68 y o , 1940,  S /S -01, [id number]     Reason for admission: Acute respiratory failure with hypoxia and hypercapnia  Reason for consultation: Mass of right lung      ASSESSMENT AND PLAN:     1  Mass of right lung   Patient presents with worsening SOB/SADLER   IR biopsy lung CT demonstrated increase in left upper lung mass 2 1cm --> 3 7cm from study done on June 18 2021 with small right pleural effusion  Right base presumed metastatic nodule increased 9mm -->14mm   Previous EBUS done on 6/21 with pathology indicating atypical cells, negative for malignancy (see imaging below)   CTA on 6/18 concerning for encasement of right pulmonary artery, encasement with narrowing and occlusion of anterior RUL and RML with obstructive atelectasis    MRI brain on 6/20/21 negative for metastatic disease or other acute pathology   Plan on last admission outpatient oncology follow up with PET CT    Recent bx mass of right lung demonstrated necrotic tissue and could not be sent for ancillary testing   Biopsy of left lung demonstrated necrotic tissue and again cannot be sent for ancillary testing    Plan:    Thoracentesis performed Friday, fluid analysis pending   Discussed patient at thoracic tumor board this afternoon  If pleural fluid cytology is not confirmatory, will obtain PET scan to determine area most metabolically active site for repeat IR biopsy    Inpatient PET scan cannot be performed and if patient is stable, can complete this and obtain biopsy as outpatient    Will call to schedule PET scan and arrange for follow up in our office     2  ITP   Managed by Dr Vianney Dumont as outpatient   Currently on prednisone and scheduled for rituxan infusion on 7/23 - will notify Dr Nadege Vera team of cancellation for this appointment in infusion center   Platelet level [phone] on 7/30    No plan for inpatient rituxan    AM CBC diff ordered    3  Normocytic Anemia  · CBC 7/30 hgb 9 7 and stable, iron panel B12 and folate WNL  · Morning CBC diff ordered            Mass; RUL mass     Lymph Nodes, Lymph Node 7     Lymph Nodes, Lymph Node 7     Lymph Nodes, Lymph Node 10R; RUL Mass     Mass; RUL mass   Anatomical Diagram        Patient understands and is in agreement with this plan  Thank you for the opportunity to participate in this patient's care  Interval History: Mild improvement in SOB, now on 3L nasal cannula and will be dc with home oxygen  Family wished to have patient transfer    History of present illness: Patient is a 69 yo male with pmh significant for nicotine dependence, COPD, AFIB, T2DM who presented after being sent by PCP for hypercalcemia, hyperkalemia, SUELMA as seen on labs  Former smoker, 0 5ppd for 40 years  Review of Systems:   Review of Systems   Constitutional: Positive for activity change, appetite change and fatigue  Negative for chills, fever and unexpected weight change  HENT: Negative for ear pain and sore throat  Eyes: Negative for pain and visual disturbance  Respiratory: Positive for cough, chest tightness and shortness of breath  Cardiovascular: Negative for chest pain, palpitations and leg swelling  Gastrointestinal: Negative for abdominal pain, constipation, nausea and vomiting  Genitourinary: Negative for dysuria and hematuria  Musculoskeletal: Negative for arthralgias and back pain  Skin: Negative for color change and rash  Neurological: Negative for seizures and syncope  All other systems reviewed and are negative  PHYSICAL EXAM:    /60 (BP Location: Left arm)   Pulse (!) 122   Temp 98 9 °F (37 2 °C) (Oral)   Resp 18   Ht 5' 2" (1 575 m)   Wt 75 8 kg (167 lb)   SpO2 95%   BMI 30 54 kg/m²     Physical Exam  Vitals and nursing note reviewed  Constitutional:       General: He is in acute distress  Appearance: Normal appearance  He is not ill-appearing     HENT:      Head: Normocephalic and atraumatic  Eyes:      General: No scleral icterus  Cardiovascular:      Rate and Rhythm: Normal rate and regular rhythm  Pulses: Normal pulses  Heart sounds: Normal heart sounds  No murmur heard  Pulmonary:      Effort: Pulmonary effort is normal       Breath sounds: Rhonchi present  No wheezing  Comments: Decreased breath sounds  Chest:      Chest wall: No tenderness  Abdominal:      General: Bowel sounds are normal  There is no distension  Palpations: Abdomen is soft  There is no mass  Tenderness: There is no abdominal tenderness  Musculoskeletal:      Right lower leg: No edema  Left lower leg: No edema  Lymphadenopathy:      Cervical: No cervical adenopathy  Skin:     General: Skin is warm and dry  Coloration: Skin is pale  Neurological:      Mental Status: He is alert and oriented to person, place, and time  Psychiatric:         Thought Content:  Thought content normal          LABS:     Recent Results (from the past 48 hour(s))   Fingerstick Glucose (POCT)    Collection Time: 07/31/21  8:23 PM   Result Value Ref Range    POC Glucose 197 (H) 65 - 140 mg/dl   Fingerstick Glucose (POCT)    Collection Time: 08/01/21  7:29 AM   Result Value Ref Range    POC Glucose 171 (H) 65 - 140 mg/dl   Fingerstick Glucose (POCT)    Collection Time: 08/01/21 10:47 AM   Result Value Ref Range    POC Glucose 202 (H) 65 - 140 mg/dl   Fingerstick Glucose (POCT)    Collection Time: 08/01/21  4:21 PM   Result Value Ref Range    POC Glucose 286 (H) 65 - 140 mg/dl   Fingerstick Glucose (POCT)    Collection Time: 08/01/21  8:58 PM   Result Value Ref Range    POC Glucose 319 (H) 65 - 140 mg/dl   Fingerstick Glucose (POCT)    Collection Time: 08/02/21  7:08 AM   Result Value Ref Range    POC Glucose 175 (H) 65 - 140 mg/dl   Fingerstick Glucose (POCT)    Collection Time: 08/02/21 11:19 AM   Result Value Ref Range    POC Glucose 224 (H) 65 - 140 mg/dl       Echo complete with contrast if indicated    Result Date: 2021  Narrative: [address] [place] [address], 0 [facility] [phone] Transthoracic Echocardiogram 2D, M-mode, Doppler, and Color Doppler Study date:  2021 Patient: Isiah Hoskins MR number: [id number] Account number: [de-identified] : 1940 Age: 68 years Gender: Male Status: Inpatient Location: Bedside Height: 62 in Weight: 173 6 lb BP: 138/ 65 mmHg Indications: A-fib  Diagnoses: I48 0 - Atrial fibrillation Sonographer:  ROSALINA Vang Primary Physician:  Stephanie Rodas DO Referring Physician:  Yury Lockwood PA-C Group:  [facility] Interpreting Physician:  Yon Godwin MD SUMMARY LEFT VENTRICLE: Systolic function was normal  Ejection fraction was estimated to be 60 %  There were no regional wall motion abnormalities  MITRAL VALVE: There was mild regurgitation  TRICUSPID VALVE: There was mild regurgitation  Estimated peak PA pressure was 42 mmHg  The findings suggest mild pulmonary hypertension  HISTORY: PRIOR HISTORY: PAF, HTN, SULEMA, SOB, lung mass, DM2, thrombocytopenia, current smoker  PROCEDURE: The procedure was performed at the bedside  This was a routine study  The transthoracic approach was used  The study included complete 2D imaging, M-mode, complete spectral Doppler, and color Doppler  The heart rate was 77 bpm, at the start of the study  Images were obtained from the parasternal, apical, subcostal, and suprasternal notch acoustic windows  Image quality was adequate  LEFT VENTRICLE: Size was normal  Systolic function was normal  Ejection fraction was estimated to be 60 %  There were no regional wall motion abnormalities  Wall thickness was normal  DOPPLER: Left ventricular diastolic function parameters were normal for the patient's age   RIGHT VENTRICLE: The size was normal  Systolic function was normal  Wall thickness was normal  LEFT ATRIUM: Size was normal  RIGHT ATRIUM: Size was normal  MITRAL VALVE: Valve structure was normal  There was normal leaflet separation  DOPPLER: The transmitral velocity was within the normal range  There was no evidence for stenosis  There was mild regurgitation  AORTIC VALVE: The valve was trileaflet  Leaflets exhibited normal thickness and normal cuspal separation  DOPPLER: Transaortic velocity was within the normal range  There was no evidence for stenosis  There was no significant regurgitation  TRICUSPID VALVE: The valve structure was normal  There was normal leaflet separation  DOPPLER: The transtricuspid velocity was within the normal range  There was no evidence for stenosis  There was mild regurgitation  Estimated peak PA pressure was 42 mmHg  The findings suggest mild pulmonary hypertension  PULMONIC VALVE: Leaflets exhibited normal thickness, no calcification, and normal cuspal separation  DOPPLER: The transpulmonic velocity was within the normal range  There was no significant regurgitation  PERICARDIUM: There was no pericardial effusion  The pericardium was normal in appearance  AORTA: The root exhibited normal size  SYSTEMIC VEINS: IVC: The inferior vena cava was normal in size  Respirophasic changes were normal  SYSTEM MEASUREMENT TABLES 2D %FS: 30 02 % Ao Diam: 3 54 cm EDV(Teich): 123 19 ml EF(Teich): 56 95 % ESV(Teich): 53 03 ml IVSd: 0 92 cm LA Diam: 4 57 cm LAAs A2C: 23 05 cm2 LAAs A4C: 22 52 cm2 LAESV A-L A2C: 77 35 ml LAESV A-L A4C: 69 99 ml LAESV Index (A-L): 41 99 ml/m2 LAESV MOD A2C: 73 99 ml LAESV MOD A4C: 64 59 ml LAESV(A-L): 75 58 ml LAESV(MOD BP): 70 92 ml LALs A2C: 5 83 cm LALs A4C: 6 15 cm LVIDd: 5 09 cm LVIDs: 3 56 cm LVPWd: 0 85 cm RVIDd: 3 65 cm SV(Teich): 70 16 ml CW AV Env  Ti: 281 64 ms AV MaxP 92 mmHg AV VTI: 22 53 cm AV Vmax: 1 49 m/s AV Vmean: 0 8 m/s AV meanPG: 3 13 mmHg TR MaxP 17 mmHg TR Vmax: 3 13 m/s MM TAPSE: 3 01 cm PW E' Sept: 0 09 m/s E/E' Sept: 11 25 LVOT Env  Ti: 342 53 ms LVOT VTI: 21 45 cm LVOT Vmax: 1 01 m/s LVOT Vmean: 0 63 m/s LVOT maxP 07 mmHg LVOT meanP 9 mmHg MV A Ge: 1 12 m/s MV Dec [place]: 6 18 m/s2 MV DecT: 167 82 ms MV E Ge: 1 04 m/s MV E/A Ratio: 0 93 MV PHT: 48 67 ms MVA By PHT: 4 52 cm2 [address] Accredited Echocardiography [facility] Prepared and electronically signed by Klarissa Falcon MD Signed 2021 12:57:51     XR chest portable    Result Date: 2021  Narrative: CHEST INDICATION:   Shortness of breath  No fibrillation  Wheezing  COMPARISON:  2021 EXAM PERFORMED/VIEWS:  XR CHEST PORTABLE FINDINGS:  Trace case pacer pads noted  Large mass over the right lung is unchanged  Left lung is clear  Blunting in the right costophrenic angle suggests small effusion  No pneumothorax  Heart shadow is unremarkable  Osseous structures appear within normal limits for patient age  Impression: No interval change from 2021  Workstation performed: [id number]     XR chest portable    Result Date: 2021  Narrative: CHEST INDICATION:  Shortness of breath, wheezing  COMPARISON:  2021, CTA chest 2021 EXAM PERFORMED/VIEWS:  XR CHEST PORTABLE FINDINGS: Heart shadow is obscured on the right by adjacent opacity  The trachea is midline  Large mass involving the right mid to upper lung is again identified with obscuration of the right heart border compatible with element of right middle lobe postobstructive atelectasis and/or pneumonia  There is flattening of the right diaphragm suggesting subpulmonic pleural effusion  Faint nodular opacity peripheral left midlung projecting over the inferior border of the scapula in keeping with known nodule on CT  Paravertebral ossifications thoracic spine  Impression: No change in large right lung mass with associated postobstructive atelectasis or pneumonia right middle lobe and small right pleural effusion  Left lung nodule   Workstation performed: [id number]     XR chest 1 view portable    Result Date: 7/14/2021  Narrative: CHEST INDICATION:   known lung mass, with cough  COMPARISON:  6/18/2021 EXAM PERFORMED/VIEWS:  XR CHEST PORTABLE Single view FINDINGS: Large right upper lobe mass consistent with malignancy with mild interval enlargement  small right pleural effusion again evident Cardiomediastinal silhouette appears unremarkable  No pneumothorax Osseous structures appear within normal limits for patient age  Impression: Large right lung mass, slightly increased in size, consistent with malignancy  Persistent small right pleural effusion Workstation performed: [id number]     IR biopsy lung    Result Date: 7/21/2021  Narrative: CT-guided lung mass biopsy Clinical History: Lung mass suspicious for malignant process  Prior endobronchial ultrasound without malignant cells identified  Respiratory failure  Atrial fibrillation on anticoagulation  Moderate sedation time: 35 minutes Procedure: After explaining the risks and benefits of the procedure to the patient, informed consent was obtained  CT was used to localize the lung mass   Radiation dose length product (DLP) for this visit:  1204 mGy   This examination, like all CT scans performed in the [facility], was performed utilizing techniques to minimize radiation dose exposure, including the use of iterative reconstruction and automated exposure control  The overlying skin was prepped and draped in the usual sterile fashion  Local anesthesia was obtained with a 1% lidocaine solution  Using CT guidance, a 17-gauge coaxial needle was advanced  9 passes with an 18g gauge core biopsy needle were performed  The tissue was given to pathology  The needle was removed and final imaging performed  Patient tolerated the procedure without immediate complication Findings: 05 8 cm right lung masslike density  There is architectural distortion and it is difficult to discern what may be intrinsic mass and what may in fact be atelectatic compressed lung  Needle within the mass  Postbiopsy changes without hematoma  Since prior CT of June 18 there has been interval increase in size of the mass and development of a small right pleural effusion  A left upper lung 3 7 cm mass is also present which has increased in size from 2 1 cm on prior study  A right base presumed metastatic nodule is also increased measuring 14 mm, previously 9 mm  Specimens: Flow cytometry, touch prep x4, 18-gauge core x9 The patient tolerated the procedure well and left the department in stable condition  Impression: Impression: CT-guided biopsy of a large right lung mass Since interval CT approximately one month ago there has been growth of the mass and development of accompanying right pleural effusion  Consider thoracentesis if there is persistent respiratory failure  Additional presumed metastatic nodules have also increased in size   Workstation performed: [id number]         HISTORY:    Past Medical History:   Diagnosis Date    Hypertension        Past Surgical History:   Procedure Laterality Date    BACK SURGERY      CARPAL TUNNEL RELEASE Bilateral     IR BIOPSY BONE MARROW  6/10/2021    IR BIOPSY LUNG  7/21/2021    IR BIOPSY OTHER  7/27/2021    IR THORACENTESIS  7/30/2021    LUMBAR DISC SURGERY         Family History   Problem Relation Age of Onset    Cancer Mother         "ate away her bone"    Anuerysm Father     Cancer Sister         unknown type    Heart attack Maternal Grandfather     Cancer Sister         unknown type    Heart attack Maternal Aunt     Heart attack Maternal Uncle     Heart attack Paternal Aunt        Social History     Socioeconomic History    Marital status: /Civil Union     Spouse name: Dory Aguilar Number of children: 10    Years of education: None    Highest education level: None   Occupational History    None   Tobacco Use    Smoking status: Former Smoker     Packs/day: 0 50     Years: 40 00     Pack years: 20 00     Types: Cigarettes     Start date: 14     Quit date: 2021     Years since quittin 1    Smokeless tobacco: Never Used   Vaping Use    Vaping Use: Never used   Substance and Sexual Activity    Alcohol use: Not Currently     Comment: History of heavier drinking in early 25s  Denies any current alcohol use (Updated 2021)   Drug use: Never    Sexual activity: None   Other Topics Concern    None   Social History Narrative    Previously worked as heavy machinery technician  Lives with his wife who is essentially bed bound  Social Determinants of Health     Financial Resource Strain: High Risk    Difficulty of Paying Living Expenses: Hard   Food Insecurity: Food Insecurity Present    Worried About Running Out of Food in the Last Year: Sometimes true    [place] of Food in the Last Year: Not on file   Transportation Needs:     Lack of Transportation (Medical):      Lack of Transportation (Non-Medical):    Physical Activity:     Days of Exercise per Week:     Minutes of Exercise per Session:    Stress:     Feeling of Stress :    Social Connections:     Frequency of Communication with Friends and Family:     Frequency of Social Gatherings with Friends and Family:     Attends [employer] Services:     Active Member of Clubs or Organizations:     Attends Club or Organization Meetings:     Marital Status:    Intimate Partner Violence:     Fear of Current or Ex-Partner:     Emotionally Abused:     Physically Abused:     Sexually Abused:          Current Facility-Administered Medications:     acetaminophen (TYLENOL) tablet 650 mg, 650 mg, Oral, Q6H PRN, Jill Locke PA-C, 650 mg at 21 1715    al mag oxide-diphenhydramine-lidocaine viscous (MAGIC MOUTHWASH) suspension 10 mL, 10 mL, Swish & Spit, Q4H PRN, Jerrell Schwartz PA-C, 10 mL at 21 2134    albuterol inhalation solution 2 5 mg, 2 5 mg, Nebulization, Q6H PRN, CHRIS Morrissey    allopurinol (ZYLOPRIM) tablet 300 mg, 300 mg, Oral, Daily, Jill Locke PA-C, 300 mg at 08/02/21 0825    amiodarone tablet 200 mg, 200 mg, Oral, BID With Meals, Priya Venegas PA-C    apixaban (ELIQUIS) tablet 5 mg, 5 mg, Oral, BID, Jerrell Schwartz PA-C, 5 mg at 08/02/21 0825    benzonatate (TESSALON PERLES) capsule 100 mg, 100 mg, Oral, TID, Jill Locke PA-C, 100 mg at 08/02/21 0825    dextromethorphan-guaiFENesin (ROBITUSSIN DM) oral syrup 10 mL, 10 mL, Oral, Q4H PRN, Jill Locke PA-C, 10 mL at 07/19/21 0831    Diclofenac Sodium (VOLTAREN) 1 % topical gel 2 g, 2 g, Topical, 4x Daily, Regla Venegas PA-C, 2 g at 08/02/21 1122    fish oil capsule 1,000 mg, 1,000 mg, Oral, BID, Jill Locke PA-C, 1,000 mg at 08/02/21 1328    gabapentin (NEURONTIN) capsule 100 mg, 100 mg, Oral, HS, Regla Venegas PA-C, 100 mg at 08/01/21 2111    guaiFENesin (MUCINEX) 12 hr tablet 600 mg, 600 mg, Oral, Q12H [address], Jill Locke PA-C, 600 mg at 08/02/21 0825    heparin (porcine) injection 2,000 Units, 2,000 Units, Intravenous, Q1H PRN, Marce Casey PA-C, 2,000 Units at 07/28/21 0120    heparin (porcine) injection 4,000 Units, 4,000 Units, Intravenous, Q1H PRN, Marce Casey PA-C    insulin glargine (LANTUS) subcutaneous injection 15 Units 0 15 mL, 15 Units, Subcutaneous, HS, Bert Cabrera MD, 15 Units at 08/01/21 2122    insulin lispro (HumaLOG) 100 units/mL subcutaneous injection 1-5 Units, 1-5 Units, Subcutaneous, HS, Jill Locke PA-C, 3 Units at 08/01/21 2122    insulin lispro (HumaLOG) 100 units/mL subcutaneous injection 2-12 Units, 2-12 Units, Subcutaneous, TID AC, 4 Units at 08/02/21 1123 **AND** Fingerstick Glucose (POCT), , , TID AC, Jill Locke PA-C    insulin lispro (HumaLOG) 100 units/mL subcutaneous injection 8 Units, 8 Units, Subcutaneous, TID With Meals, Bert Cabrera MD, 8 Units at 08/02/21 1122    levalbuterol (XOPENEX) inhalation solution 1 25 mg, 1 25 mg, Nebulization, TID, CHRIS Hinojosa, 1 25 mg at 21 1430    levothyroxine tablet 150 mcg, 150 mcg, Oral, Daily, Jill Locke PA-C, 150 mcg at 21 0541    lidocaine (LIDODERM) 5 % patch 1 patch, 1 patch, Topical, Q24H, Jill Locke PA-C, 1 patch at 21 1154    metoprolol tartrate (LOPRESSOR) tablet 25 mg, 25 mg, Oral, Q12H ZOHAIB, Jill Locke PA-C, 25 mg at 21 2111    ondansetron (ZOFRAN) injection 4 mg, 4 mg, Intravenous, Q6H PRN, Jill Locke PA-C    pantoprazole (PROTONIX) EC tablet 40 mg, 40 mg, Oral, Daily, Jill Locke PA-C, 40 mg at 21 0825    predniSONE tablet 20 mg, 20 mg, Oral, Daily, Jerrell Schwartz PA-C, 20 mg at 21 0827    sodium chloride 0 9 % inhalation solution 3 mL, 3 mL, Nebulization, TID, CHRIS Crocker, 3 mL at 21 1430    tiotropium (SPIRIVA) capsule for inhaler 18 mcg, 18 mcg, Inhalation, Daily, Jill Locke PA-C, 18 mcg at 21 6097    Medications Prior to Admission   Medication    acetaminophen (TYLENOL) 100 mg/mL solution    [] albuterol (2 5 mg/3 mL) 0 083 % nebulizer solution    allopurinol (ZYLOPRIM) 300 mg tablet    amLODIPine (NORVASC) 2 5 mg tablet    apixaban (ELIQUIS) 5 mg    benzonatate (TESSALON PERLES) 100 mg capsule    guaiFENesin (MUCINEX) 600 mg 12 hr tablet    levothyroxine 150 mcg tablet    metFORMIN (GLUCOPHAGE) 500 mg tablet    metoprolol tartrate (LOPRESSOR) 25 mg tablet    Omega-3 Fatty Acids (FISH OIL) 1,000 mg    pantoprazole (PROTONIX) 40 mg tablet    tiotropium (SPIRIVA) 18 mcg inhalation capsule       Allergies   Allergen Reactions    Penicillins Hives       Labs and pertinent reports reviewed  This note has been generated by voice recognition software system  Therefore, there may be spelling, grammar, and or syntax errors  Please contact if questions arise

## 2021-08-02 NOTE — PLAN OF CARE
Problem: CARDIOVASCULAR - ADULT  Goal: Maintains optimal cardiac output and hemodynamic stability  Description: INTERVENTIONS:  - Monitor I/O, vital signs and rhythm  - Monitor for S/S and trends of decreased cardiac output  - Administer and titrate ordered vasoactive medications to optimize hemodynamic stability  - Assess quality of pulses, skin color and temperature  - Assess for signs of decreased coronary artery perfusion  - Instruct patient to report change in severity of symptoms  Outcome: Progressing  Goal: Absence of cardiac dysrhythmias or at baseline rhythm  Description: INTERVENTIONS:  - Continuous cardiac monitoring, vital signs, obtain 12 lead EKG if ordered  - Administer antiarrhythmic and heart rate control medications as ordered  - Monitor electrolytes and administer replacement therapy as ordered  Outcome: Progressing     Problem: RESPIRATORY - ADULT  Goal: Achieves optimal ventilation and oxygenation  Description: INTERVENTIONS:  - Assess for changes in respiratory status  - Assess for changes in mentation and behavior  - Position to facilitate oxygenation and minimize respiratory effort  - Oxygen administered by appropriate delivery if ordered  - Initiate smoking cessation education as indicated  - Encourage broncho-pulmonary hygiene including cough, deep breathe, Incentive Spirometry  - Assess the need for suctioning and aspirate as needed  - Assess and instruct to report SOB or any respiratory difficulty  - Respiratory Therapy support as indicated  Outcome: Progressing     Problem: METABOLIC, FLUID AND ELECTROLYTES - ADULT  Goal: Electrolytes maintained within normal limits  Description: INTERVENTIONS:  - Monitor labs and assess patient for signs and symptoms of electrolyte imbalances  - Administer electrolyte replacement as ordered  - Monitor response to electrolyte replacements, including repeat lab results as appropriate  - Instruct patient on fluid and nutrition as appropriate  Outcome: Progressing  Goal: Fluid balance maintained  Description: INTERVENTIONS:  - Monitor labs   - Monitor I/O and WT  - Instruct patient on fluid and nutrition as appropriate  - Assess for signs & symptoms of volume excess or deficit  Outcome: Progressing  Goal: Glucose maintained within target range  Description: INTERVENTIONS:  - Monitor Blood Glucose as ordered and provide SSI/lantus as ordered  - Assess for signs and symptoms of hyperglycemia and hypoglycemia  - Administer ordered medications to maintain glucose within target range  - Assess nutritional intake and initiate nutrition service referral as needed  Outcome: Progressing

## 2021-08-03 LAB
GLUCOSE SERPL-MCNC: 130 MG/DL (ref 65–140)
GLUCOSE SERPL-MCNC: 153 MG/DL (ref 65–140)
GLUCOSE SERPL-MCNC: 176 MG/DL (ref 65–140)
GLUCOSE SERPL-MCNC: 248 MG/DL (ref 65–140)
GLUCOSE SERPL-MCNC: 258 MG/DL (ref 65–140)

## 2021-08-03 PROCEDURE — 97530 THERAPEUTIC ACTIVITIES: CPT

## 2021-08-03 PROCEDURE — 99232 SBSQ HOSP IP/OBS MODERATE 35: CPT | Performed by: PHYSICIAN ASSISTANT

## 2021-08-03 PROCEDURE — 97116 GAIT TRAINING THERAPY: CPT

## 2021-08-03 PROCEDURE — 82948 REAGENT STRIP/BLOOD GLUCOSE: CPT

## 2021-08-03 PROCEDURE — 94640 AIRWAY INHALATION TREATMENT: CPT

## 2021-08-03 PROCEDURE — 94669 MECHANICAL CHEST WALL OSCILL: CPT

## 2021-08-03 PROCEDURE — 94760 N-INVAS EAR/PLS OXIMETRY 1: CPT

## 2021-08-03 RX ADMIN — GUAIFENESIN 600 MG: 600 TABLET, EXTENDED RELEASE ORAL at 21:25

## 2021-08-03 RX ADMIN — ISODIUM CHLORIDE 3 ML: 0.03 SOLUTION RESPIRATORY (INHALATION) at 13:11

## 2021-08-03 RX ADMIN — AMIODARONE HYDROCHLORIDE 200 MG: 200 TABLET ORAL at 16:27

## 2021-08-03 RX ADMIN — DICLOFENAC SODIUM 2 G: 10 GEL TOPICAL at 09:01

## 2021-08-03 RX ADMIN — TIOTROPIUM BROMIDE 18 MCG: 18 CAPSULE ORAL; RESPIRATORY (INHALATION) at 09:02

## 2021-08-03 RX ADMIN — OMEGA-3 FATTY ACIDS CAP 1000 MG 1000 MG: 1000 CAP at 17:22

## 2021-08-03 RX ADMIN — BENZONATATE 100 MG: 100 CAPSULE ORAL at 16:27

## 2021-08-03 RX ADMIN — GABAPENTIN 100 MG: 100 CAPSULE ORAL at 21:25

## 2021-08-03 RX ADMIN — PANTOPRAZOLE SODIUM 40 MG: 40 TABLET, DELAYED RELEASE ORAL at 08:58

## 2021-08-03 RX ADMIN — METOPROLOL TARTRATE 25 MG: 25 TABLET, FILM COATED ORAL at 22:14

## 2021-08-03 RX ADMIN — ISODIUM CHLORIDE 3 ML: 0.03 SOLUTION RESPIRATORY (INHALATION) at 19:39

## 2021-08-03 RX ADMIN — DICLOFENAC SODIUM 2 G: 10 GEL TOPICAL at 21:26

## 2021-08-03 RX ADMIN — LEVOTHYROXINE SODIUM 150 MCG: 150 TABLET ORAL at 06:08

## 2021-08-03 RX ADMIN — OMEGA-3 FATTY ACIDS CAP 1000 MG 1000 MG: 1000 CAP at 08:58

## 2021-08-03 RX ADMIN — INSULIN LISPRO 6 UNITS: 100 INJECTION, SOLUTION INTRAVENOUS; SUBCUTANEOUS at 16:27

## 2021-08-03 RX ADMIN — ISODIUM CHLORIDE 3 ML: 0.03 SOLUTION RESPIRATORY (INHALATION) at 07:07

## 2021-08-03 RX ADMIN — BENZONATATE 100 MG: 100 CAPSULE ORAL at 21:25

## 2021-08-03 RX ADMIN — DICLOFENAC SODIUM 2 G: 10 GEL TOPICAL at 17:22

## 2021-08-03 RX ADMIN — GUAIFENESIN 600 MG: 600 TABLET, EXTENDED RELEASE ORAL at 08:58

## 2021-08-03 RX ADMIN — INSULIN LISPRO 1 UNITS: 100 INJECTION, SOLUTION INTRAVENOUS; SUBCUTANEOUS at 21:26

## 2021-08-03 RX ADMIN — ALLOPURINOL 300 MG: 300 TABLET ORAL at 08:58

## 2021-08-03 RX ADMIN — INSULIN LISPRO 4 UNITS: 100 INJECTION, SOLUTION INTRAVENOUS; SUBCUTANEOUS at 09:03

## 2021-08-03 RX ADMIN — LEVALBUTEROL HYDROCHLORIDE 1.25 MG: 1.25 SOLUTION, CONCENTRATE RESPIRATORY (INHALATION) at 19:39

## 2021-08-03 RX ADMIN — DICLOFENAC SODIUM 2 G: 10 GEL TOPICAL at 12:41

## 2021-08-03 RX ADMIN — APIXABAN 5 MG: 5 TABLET, FILM COATED ORAL at 17:22

## 2021-08-03 RX ADMIN — APIXABAN 5 MG: 5 TABLET, FILM COATED ORAL at 08:58

## 2021-08-03 RX ADMIN — INSULIN GLARGINE 15 UNITS: 100 INJECTION, SOLUTION SUBCUTANEOUS at 21:25

## 2021-08-03 RX ADMIN — PREDNISONE 20 MG: 20 TABLET ORAL at 08:58

## 2021-08-03 RX ADMIN — LEVALBUTEROL HYDROCHLORIDE 1.25 MG: 1.25 SOLUTION, CONCENTRATE RESPIRATORY (INHALATION) at 13:11

## 2021-08-03 RX ADMIN — BENZONATATE 100 MG: 100 CAPSULE ORAL at 08:58

## 2021-08-03 RX ADMIN — AMIODARONE HYDROCHLORIDE 200 MG: 200 TABLET ORAL at 08:58

## 2021-08-03 RX ADMIN — LEVALBUTEROL HYDROCHLORIDE 1.25 MG: 1.25 SOLUTION, CONCENTRATE RESPIRATORY (INHALATION) at 07:07

## 2021-08-03 RX ADMIN — METOPROLOL TARTRATE 25 MG: 25 TABLET, FILM COATED ORAL at 08:58

## 2021-08-03 NOTE — PLAN OF CARE
Problem: PHYSICAL THERAPY ADULT  Goal: Performs mobility at highest level of function for planned discharge setting  See evaluation for individualized goals  Description: Treatment/Interventions: Functional transfer training, LE strengthening/ROM, Therapeutic exercise, Endurance training, Elevations, Patient/family training, Equipment eval/education, Bed mobility, Gait training          See flowsheet documentation for full assessment, interventions and recommendations  Note: Prognosis: Good  Problem List: Decreased strength, Decreased endurance, Impaired balance, Decreased mobility, Decreased safety awareness  Assessment: Pt seen today for PT intervention w/ pt agreeable to participate  Pt performed bed mobility and multiple STS transfers w/ Mod I using armrests w/ increased time required  Pt educated on benefits of wearing O2 while ambulating as pt initially requested to not wear O2 while ambulating  Pt additionally educated on benefits of increased participation in physical activity/funcitonal mobility  Pt ambulated 61' w/o AD and no evidence of LOB  Pt's O2 remained >90% during ambulation w/ pt wearing 2L O2  Pt able to tolerate multiple static standing trials when ambulating to allow for O2 monitoring w/o LOB  Pt repeatedly denied feeling lightheaded, dizzy, or SOB throughout  Pt's HR fluctuated w/ RN arriving to room to assess pt  Pt would continue to benefit from skilled PT intervention to increase pt's tolerance to physical activity; increase pt's ambulatory endurance; and to facilitate progress towards set goals and pt's PLOF  DC rec: home w/ HHPT and family support/supervision  PT Discharge Recommendation: Home with home health rehabilitation (pending step trial)          See flowsheet documentation for full assessment

## 2021-08-03 NOTE — ASSESSMENT & PLAN NOTE
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  Was scheduled to have IR guided biopsy as outpatient on 7/21 however was done inpatient on 7/21 with IR      · Patient is currently status post for repeat lung biopsy on 07/27 since the biopsy from several days ago resulted in necrotic tissue without viable tumor - target possibly left lung  · Eliquis resumed 7/28  · Status post right thoracocentesis 7/30-- follow-up on cytology  · Oncology following-- presented at thoracic tumor board-- plan for PET to determine most metabolically active area and bx that region

## 2021-08-03 NOTE — PLAN OF CARE
Problem: PAIN - ADULT  Goal: Verbalizes/displays adequate comfort level or baseline comfort level  Description: Interventions:  - Encourage patient to monitor pain and request assistance  - Assess pain using appropriate pain scale (e g  0-10)  - Administer analgesics based on type and severity of pain and evaluate response  - Implement non-pharmacological measures as appropriate and evaluate response  - Consider cultural and social influences on pain and pain management  - Notify physician/advanced practitioner if interventions unsuccessful or patient reports new pain  Outcome: Progressing     Problem: INFECTION - ADULT  Goal: Absence or prevention of progression during hospitalization  Description: INTERVENTIONS:  - Assess and monitor for signs and symptoms of infection  - Monitor lab/diagnostic results  - Monitor all insertion sites, i e  IV site, bx site  - [place] appropriate cooling/warming therapies per order  - Administer medications as ordered  - Instruct and encourage patient and family to use good hand hygiene technique  - Identify and instruct in appropriate isolation precautions for identified infection/condition  Outcome: Progressing  Goal: Absence of fever/infection during neutropenic period  Description: INTERVENTIONS:  - Monitor WBC and ANC  - Implement neutropenic precautions if/when pt becomes neutropenic    Outcome: Progressing     Problem: SAFETY ADULT  Goal: Patient will remain free of falls  Description: INTERVENTIONS:  - Educate patient/family on patient safety including physical limitations  - Instruct patient to call for assistance with activity   - Consult OT/PT to assist with strengthening/mobility   - Keep Call bell within reach  - Keep bed low and locked with side rails adjusted as appropriate  - Keep care items and personal belongings within reach  - Initiate and maintain comfort rounds  - Make Fall Risk Sign visible to staff  - Offer Toileting every 2 hours, in advance of need  - Maintain chair alarm  - Obtain necessary fall risk management equipment: chair alarm  - Apply yellow socks and bracelet for high fall risk patients  - Consider moving patient to room near nurses station  Outcome: Progressing  Goal: Maintain or return to baseline ADL function  Description: INTERVENTIONS:  -  Assess patient's ability to carry out ADLs; assess patient's baseline for ADL function and identify physical deficits which impact ability to perform ADLs (bathing, care of mouth/teeth, toileting, grooming, dressing, etc )  - Assess/evaluate cause of self-care deficits   - Assess range of motion  - Assess patient's mobility; develop plan if impaired  - Assess patient's need for assistive devices and provide as appropriate  - Encourage maximum independence but intervene and supervise when necessary  - Involve family in performance of ADLs  - Assess for home care needs following discharge   - Consider OT consult to assist with ADL evaluation and planning for discharge  - Provide patient education as appropriate  Outcome: Progressing  Goal: Maintains/Returns to pre admission functional level  Description: INTERVENTIONS:  - Perform BMAT or MOVE assessment daily    - Set and communicate daily mobility goal to care team and patient/family/caregiver     - Collaborate with rehabilitation services on mobility goals if consulted  - Out of bed to chair 3 times a day   - Out of bed for meals 3 times a day  - Out of bed for toileting  - Record patient progress and toleration of activity level   Outcome: Progressing     Problem: DISCHARGE PLANNING  Goal: Discharge to home or other facility with appropriate resources  Description: INTERVENTIONS:  - Identify barriers to discharge w/patient and caregiver  - Arrange for needed discharge resources and transportation as appropriate  - Identify discharge learning needs (meds, wound care, etc )  - Arrange for interpretive services to assist at discharge as needed  - Refer to Case Management Department for coordinating discharge planning if the patient needs post-hospital services based on physician/advanced practitioner order or complex needs related to functional status, cognitive ability, or social support system  Outcome: Progressing     Problem: Knowledge Deficit  Goal: Patient/family/caregiver demonstrates understanding of disease process, treatment plan, medications, and discharge instructions  Description: Complete learning assessment and assess knowledge base    Interventions:  - Provide teaching at level of understanding  - Provide teaching via preferred learning methods  Outcome: Progressing     Problem: CARDIOVASCULAR - ADULT  Goal: Maintains optimal cardiac output and hemodynamic stability  Description: INTERVENTIONS:  - Monitor I/O, vital signs and rhythm  - Monitor for S/S and trends of decreased cardiac output  - Administer and titrate ordered vasoactive medications to optimize hemodynamic stability  - Assess quality of pulses, skin color and temperature  - Assess for signs of decreased coronary artery perfusion  - Instruct patient to report change in severity of symptoms  Outcome: Progressing  Goal: Absence of cardiac dysrhythmias or at baseline rhythm  Description: INTERVENTIONS:  - Continuous cardiac monitoring, vital signs, obtain 12 lead EKG if ordered  - Administer antiarrhythmic and heart rate control medications as ordered  - Monitor electrolytes and administer replacement therapy as ordered  Outcome: Progressing     Problem: METABOLIC, FLUID AND ELECTROLYTES - ADULT  Goal: Electrolytes maintained within normal limits  Description: INTERVENTIONS:  - Monitor labs and assess patient for signs and symptoms of electrolyte imbalances  - Administer electrolyte replacement as ordered  - Monitor response to electrolyte replacements, including repeat lab results as appropriate  - Instruct patient on fluid and nutrition as appropriate  Outcome: Progressing  Goal: Fluid balance maintained  Description: INTERVENTIONS:  - Monitor labs   - Monitor I/O and WT  - Instruct patient on fluid and nutrition as appropriate  - Assess for signs & symptoms of volume excess or deficit  Outcome: Progressing  Goal: Glucose maintained within target range  Description: INTERVENTIONS:  - Monitor Blood Glucose as ordered and provide SSI/lantus as ordered  - Assess for signs and symptoms of hyperglycemia and hypoglycemia  - Administer ordered medications to maintain glucose within target range  - Assess nutritional intake and initiate nutrition service referral as needed  Outcome: Progressing     Problem: MOBILITY - ADULT  Goal: Maintain or return to baseline ADL function  Description: INTERVENTIONS:  -  Assess patient's ability to carry out ADLs; assess patient's baseline for ADL function and identify physical deficits which impact ability to perform ADLs (bathing, care of mouth/teeth, toileting, grooming, dressing, etc )  - Assess/evaluate cause of self-care deficits   - Assess range of motion  - Assess patient's mobility; develop plan if impaired  - Assess patient's need for assistive devices and provide as appropriate  - Encourage maximum independence but intervene and supervise when necessary  - Involve family in performance of ADLs  - Assess for home care needs following discharge   - Consider OT consult to assist with ADL evaluation and planning for discharge  - Provide patient education as appropriate  Outcome: Progressing  Goal: Maintains/Returns to pre admission functional level  Description: INTERVENTIONS:  - Perform BMAT or MOVE assessment daily    - Set and communicate daily mobility goal to care team and patient/family/caregiver     - Collaborate with rehabilitation services on mobility goals if consulted  - Out of bed to chair 3 times a day   - Out of bed for meals 3 times a day  - Out of bed for toileting  - Record patient progress and toleration of activity level   Outcome: Progressing Problem: Potential for Falls  Goal: Patient will remain free of falls  Description: INTERVENTIONS:  - Educate patient/family on patient safety including physical limitations  - Instruct patient to call for assistance with activity   - Consult OT/PT to assist with strengthening/mobility   - Keep Call bell within reach  - Keep bed low and locked with side rails adjusted as appropriate  - Keep care items and personal belongings within reach  - Initiate and maintain comfort rounds  - Make Fall Risk Sign visible to staff  - Offer Toileting every 2 hours, in advance of need  - Maintain chair alarm  - Obtain necessary fall risk management equipment: chair alarm  - Apply yellow socks and bracelet for high fall risk patients  - Consider moving patient to room near nurses station  Outcome: Progressing     Problem: Prexisting or High Potential for Compromised Skin Integrity  Goal: Skin integrity is maintained or improved  Description: INTERVENTIONS:  - Identify patients at risk for skin breakdown  - Assess and monitor skin integrity  - Assess and monitor nutrition and hydration status  - Monitor labs   - Assess for incontinence   - Turn and reposition patient  - Assist with mobility/ambulation  - Relieve pressure over bony prominences  - Avoid friction and shearing  - Provide appropriate hygiene as needed including keeping skin clean and dry  - Collaborate with interdisciplinary team   - Patient/family teaching  - Consider wound care consult   Outcome: Progressing     Problem: RESPIRATORY - ADULT  Goal: Achieves optimal ventilation and oxygenation  Description: INTERVENTIONS:  - Assess for changes in respiratory status  - Assess for changes in mentation and behavior  - Position to facilitate oxygenation and minimize respiratory effort  - Oxygen administered by appropriate delivery if ordered  - Initiate smoking cessation education as indicated  - Encourage broncho-pulmonary hygiene including cough, deep breathe, Incentive Spirometry  - Assess the need for suctioning and aspirate as needed  - Assess and instruct to report SOB or any respiratory difficulty  - Respiratory Therapy support as indicated  Outcome: Progressing

## 2021-08-03 NOTE — ASSESSMENT & PLAN NOTE
Lab Results   Component Value Date    HGBA1C 6 6 (H) 06/20/2021       Recent Labs     08/02/21  2146 08/03/21  0731 08/03/21  0820 08/03/21  1203   POCGLU 198* 153* 248* 130       Blood Sugar Average: Last 72 hrs:  (P) 221 [id number]    Continue Lantus HS  Humalog t i d   With meals  Titrate insulin dose based on Accu-Cheks  Avoid hypoglycemia  Hypoglycemia protocol in place

## 2021-08-03 NOTE — PHYSICAL THERAPY NOTE
PHYSICAL THERAPY TREATMENT NOTE          Patient Name: Bradley Duffy  AFZXO'B Date: 8/3/2021     Time: 9648-8051  Total time: 27 min         21 0815   PT Last Visit   PT Visit Date 21   Note Type   Note Type Treatment   Pain Assessment   Pain Assessment Tool Pain Assessment not indicated - pt denies pain   Pain Score No Pain   Restrictions/Precautions   Weight Bearing Precautions Per Order No   Other Precautions Chair Alarm; Bed Alarm;O2;Fall Risk  (2L O2 via NC)   General   Chart Reviewed Yes   Additional Pertinent History Pt O2 prior to mobility: 98%; during 94% w/ HR at 140; post in chair 99%; BP: 93/57 HR: fluctuating between 100-140 w/ occasional increased to 200 w/ RN Irina Sanchez notified immediately and arrived to room to assess pt w/ pt's HR decreasing to 70s w/in 1-2 min; pt w/ tremors in hand  Pt denied feeling lightheaded, dizzy, or SOB throughout    Response to Previous Treatment Patient with no complaints from previous session  Family/Caregiver Present No   Cognition   Overall Cognitive Status WFL   Arousal/Participation Alert   Attention Attends with cues to redirect   Orientation Level Oriented to person   Memory Within functional limits   Following Commands Follows one step commands with increased time or repetition   Comments pt ID via name and ; pt agreeable to PT tx   Bed Mobility   Supine to Sit 6  Modified independent   Additional items HOB elevated; Increased time required   Sit to Supine Unable to assess   Additional Comments pt OOB in recliner chair at end of session   Transfers   Sit to Stand 6  Modified independent   Additional items Armrests; Increased time required   Stand to Sit 6  Modified independent   Additional items Armrests; Increased time required   Additional Comments pt able to maintain static standing balance for multiple trials throughout ambulation w/o LOB seen to allow for pulse O2 readings Ambulation/Elevation   Gait pattern Improper Weight shift; Wide DAVE   Gait Assistance 5  Supervision   Additional items Assist x 1   Assistive Device None   Distance 60'   Stair Management Assistance Not tested   Balance   Static Sitting Good   Dynamic Sitting Good   Static Standing Fair +   Dynamic Standing Fair   Ambulatory Fair   Endurance Deficit   Endurance Deficit Yes   Endurance Deficit Description pt w/ limited tolerance to physical activity   Activity Tolerance   Activity Tolerance Patient limited by fatigue   Nurse Made Aware TAMMIE Moreno confirmed pt appropriate for PT tx; post session pt OOB in recliner chair w/ LE elevated, all needs w/in reach, in no apparent distress; RN updated w/ RN confirming pt does not require chair alarm    Assessment   Prognosis Good   Problem List Decreased strength;Decreased endurance; Impaired balance;Decreased mobility; Decreased safety awareness   Assessment Pt seen today for PT intervention w/ pt agreeable to participate  Pt performed bed mobility and multiple STS transfers w/ Mod I using armrests w/ increased time required  Pt educated on benefits of wearing O2 while ambulating as pt initially requested to not wear O2 while ambulating  Pt additionally educated on benefits of increased participation in physical activity/funcitonal mobility  Pt ambulated 61' w/o AD and no evidence of LOB  Pt's O2 remained >90% during ambulation w/ pt wearing 2L O2  Pt able to tolerate multiple static standing trials when ambulating to allow for O2 monitoring w/o LOB  Pt repeatedly denied feeling lightheaded, dizzy, or SOB throughout  Pt's HR fluctuated w/ RN arriving to room to assess pt  Pt would continue to benefit from skilled PT intervention to increase pt's tolerance to physical activity; increase pt's ambulatory endurance; and to facilitate progress towards set goals and pt's PLOF  DC rec: home w/ HHPT and family support/supervision      Goals   Patient Goals to go home and work on [employer] Expiration Date 08/13/21   Short Term Goal #1 Pt will: perform bed mobility independently to decrease caregiver burden; perform all transfers independently to increase OOB mobility; ambulate at least 250' w/ LRAD independently to increase pt's ambulation endurance; perform 2 steps w/ unilateral railing and Mod I to facilitate return to previous living environment; increase LE strength by 1/2 grade to increase pt's tolerance to physical activity; increase all balance ratings by 1/2 grade to decrease pt's risk of falls   PT Treatment Day 4   Plan   Treatment/Interventions Functional transfer training;LE strengthening/ROM; Elevations; Therapeutic exercise; Endurance training;Patient/family training;Equipment eval/education; Bed mobility   Progress Progressing toward goals   PT Frequency Other (Comment)  (4-5x/wk)   Recommendation   PT Discharge Recommendation Home with home health rehabilitation  (pending step trial)   [facility] Basic Mobility Inpatient   Turning in Bed Without Bedrails 4   Lying on Back to Sitting on Edge of Flat Bed 4   Moving Bed to Chair 3   Standing Up From Chair 3   Walk in Room 3   Climb 3-5 Stairs 3   Basic Mobility Inpatient Raw Score 20   Basic Mobility Standardized Score 43 99       The patient's [facility] Basic Mobility Inpatient Short Form Raw Score is 20, Standardized Score is 43 99  A standardized score greater than 42 9 suggests the patient may benefit from discharge to home  Please also refer to the recommendation of the Physical Therapist for safe discharge planning        DC rec: home w/ HHPT and family support/supervision pending step trial        Dami Buckley, PT, DPT  08/03/21

## 2021-08-03 NOTE — ASSESSMENT & PLAN NOTE
· Patient has been having R flank pain since his R lung biopsy on 7/21; he now reports that pain is persistent and radiate to the RUQ and down the R side of his back  He feels the pain in his RUQ is somewhat of a 'burning' pain   He reports that he has had urinary frequency, urgency, and decreased UO  · DDx: pain 2/2 lung mass vs  Neuropathic pain s/p biopsy   · UTI ruled out  · Pain control; continue gabapentin for described neuropathic pain  · Palliative care referral placed by oncology -- appreciate input

## 2021-08-03 NOTE — PROGRESS NOTES
[facility]  Progress Note - Henry Rued 1940, 68 y o  male MRN: [id number]  Unit/Bed#: S -01 Encounter: [id number]  Primary Care Provider: Sabi Yadav DO   Date and time admitted to hospital: 7/14/2021  1:39 PM    * Acute respiratory failure with hypoxia and hypercapnia (HCC)  Assessment & Plan  · Multifactorial  · Wean supplemental oxygen as tolerated more than 88-90%  · Likely need supplemental oxygen at discharge    Right flank pain  Assessment & Plan  · Patient has been having R flank pain since his R lung biopsy on 7/21; he now reports that pain is persistent and radiate to the RUQ and down the R side of his back  He feels the pain in his RUQ is somewhat of a 'burning' pain  He reports that he has had urinary frequency, urgency, and decreased UO  · DDx: pain 2/2 lung mass vs  Neuropathic pain s/p biopsy   · UTI ruled out  · Pain control; continue gabapentin for described neuropathic pain  · Palliative care referral placed by oncology -- appreciate input      Hyperkalemia  Assessment & Plan  Monitor closely    COPD (chronic obstructive pulmonary disease) with acute exacerbation ([facility] [address] )  Assessment & Plan  · With acute exacerbation on admission - presenting with worsening shortness of breath, productive cough and wheezing     · Pulmonology consulted appreciate input  · Patient is usually on 3 L nasal cannula at home  · Continue Xopenex and Spiriva and 3% nebulizers  · Continue respiratory protocol  · Requires home O2 on d/c but needs updated home O2 eval  · Given Lasix 40 mg IV once 7/28  · Outpatient Pulmonary follow-up at discharge  · Discharge on Bevespi with albuterol PRN home meds   · Per pulmonary, steroids not indicated for COPD at this time    New onset type 2 diabetes mellitus [facility])  Assessment & Plan  Lab Results   Component Value Date    HGBA1C 6 6 (H) 06/20/2021       Recent Labs     08/02/21  2146 08/03/21  0731 08/03/21  0820 08/03/21  1203   POCGLU 198* 153* 248* 130       Blood Sugar Average: Last 72 hrs:  (P) 221 [id number]    Continue Lantus HS  Humalog t i d  With meals  Titrate insulin dose based on Accu-Cheks  Avoid hypoglycemia  Hypoglycemia protocol in place    Hypothyroidism  Assessment & Plan  · Continue levothyroxine 150 mcg daily  Mass of right lung  Assessment & Plan  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  Was scheduled to have IR guided biopsy as outpatient on 7/21 however was done inpatient on 7/21 with IR  · Patient is currently status post for repeat lung biopsy on 07/27 since the biopsy from several days ago resulted in necrotic tissue without viable tumor - target possibly left lung  · Eliquis resumed 7/28  · Status post right thoracocentesis 7/30-- follow-up on cytology  · Oncology following-- presented at thoracic tumor board-- plan for PET to determine most metabolically active area and bx that region    Paroxysmal A-fib with RVR  Assessment & Plan  · Initially presented with AFib RVR, Rate controlled now  Briefly was uncontrolled on 7/29 and was status post Cardizem and IV Albumin and resulted in returning to rate controlled A   Flutter   · Continue oral amiodarone 200mg PO BID x14 doses, then 200mg PO QD  · Continue routine metoprolol 25mg BID  · Eliquis for anticoagulation  · Cardiology input noted    Idiopathic thrombocytopenic purpura (ITP) (HCC)  Assessment & Plan  · Was previously receiving IV Solu-Medrol  · Transitioned to PO Prednisone 20 mg on 07/27  · As per pulmonary disease does not to be on steroid   · Platelets remain stable  · D/W Heme/onc-- does receive rituxin as an OP; plan to keep on prednisone 20mg PO QD as he was on prolonged taper as an OP  · Will continue steroids and have patient follow up with Heme/Onc        VTE Pharmacologic Prophylaxis: VTE Score: 5 High Risk (Score >/= 5) - Pharmacological DVT Prophylaxis Ordered: apixaban (Eliquis)  Sequential Compression Devices Ordered  Patient Centered Rounds: I performed bedside rounds with nursing staff today  Discussions with Specialists or Other Care Team Provider: VANESSA, RN, oncology    Education and Discussions with Family / Patient: Patient declined call to   Time Spent for Care: 30 minutes  More than 50% of total time spent on counseling and coordination of care as described above  Current Length of Stay: 20 day(s)  Current Patient Status: Inpatient   Certification Statement: The patient will continue to require additional inpatient hospital stay due to ongoing review of cytology   Discharge Plan: Anticipate discharge in 24-48 hrs to home with home services  Code Status: Level 1 - Full Code    Subjective:   Patient is doing OK  Still w/ R chest pain  Awaiting thoracentesis results    Objective:     Vitals:   Temp (24hrs), Av 2 °F (36 8 °C), Min:98 2 °F (36 8 °C), Max:98 2 °F (36 8 °C)    Temp:  [98 2 °F (36 8 °C)] 98 2 °F (36 8 °C)  HR:  [72-98] 98  Resp:  [16] 16  BP: (117-133)/(56-66) 117/56  SpO2:  [97 %-100 %] 97 %  Body mass index is 30 54 kg/m²  Input and Output Summary (last 24 hours): Intake/Output Summary (Last 24 hours) at 8/3/2021 1515  Last data filed at 8/3/2021 1204  Gross per 24 hour   Intake 240 ml   Output 1625 ml   Net -1385 ml       Physical Exam:   Physical Exam  Constitutional:       Appearance: Normal appearance  HENT:      Head: Normocephalic  Eyes:      Pupils: Pupils are equal, round, and reactive to light  Cardiovascular:      Rate and Rhythm: Normal rate and regular rhythm  Heart sounds: No murmur heard  No friction rub  No gallop  Pulmonary:      Effort: Pulmonary effort is normal       Breath sounds: No wheezing or rales  Comments: Decreased  R mid lung field  Abdominal:      General: Abdomen is flat   Bowel sounds are normal       Palpations: Abdomen is soft       Tenderness: There is no abdominal tenderness  Musculoskeletal:         General: Normal range of motion  Skin:     General: Skin is warm and dry  Neurological:      General: No focal deficit present  Mental Status: He is alert and oriented to person, place, and time  Mental status is at baseline     Psychiatric:         Mood and Affect: Mood normal          Additional Data:     Labs:  Results from last 7 days   Lab Units 07/30/21  0545   WBC Thousand/uL 9 75   HEMOGLOBIN g/dL 9 7*   HEMATOCRIT % 34 1*   PLATELETS Thousands/uL 217   BANDS PCT % 1   LYMPHO PCT % 7*   MONO PCT % 4   EOS PCT % 0     Results from last 7 days   Lab Units 07/31/21  0609 07/29/21  0920   SODIUM mmol/L 134* 134*   POTASSIUM mmol/L 5 1 4 6   CHLORIDE mmol/L 99* 96*   CO2 mmol/L 31 30   BUN mg/dL 27* 30*   CREATININE mg/dL 1 19 1 21   ANION GAP mmol/L 4 8   CALCIUM mg/dL 9 6 9 3   ALBUMIN g/dL  --  2 7*   TOTAL BILIRUBIN mg/dL  --  0 91   ALK PHOS U/L  --  142*   ALT U/L  --  29   AST U/L  --  11   GLUCOSE RANDOM mg/dL 135 274*         Results from last 7 days   Lab Units 08/03/21  1203 08/03/21  0820 08/03/21  0731 08/02/21  2146 08/02/21  1119 08/02/21  0708 08/01/21  2058 08/01/21  1621 08/01/21  1047 08/01/21  0729 07/31/21  2023 07/31/21  1536   POC GLUCOSE mg/dl 130 248* 153* 198* 224* 175* 319* 286* 202* 171* 197* 315*               Lines/Drains:  Invasive Devices     Peripheral Intravenous Line            Peripheral IV 08/02/21 Right Forearm 1 day                      Imaging: Reviewed radiology reports from this admission including: chest CT scan    Recent Cultures (last 7 days):         Last 24 Hours Medication List:   Current Facility-Administered Medications   Medication Dose Route Frequency Provider Last Rate    acetaminophen  650 mg Oral Q6H PRN Jill Locke PA-C      al mag oxide-diphenhydramine-lidocaine viscous  10 mL Swish & Spit Q4H PRN Jerrell Hughes PA-C      albuterol  2 5 mg Nebulization Q6H PRN CHRIS Donaldson      allopurinol  300 mg Oral Daily Peg Leyva PA-C      amiodarone  200 mg Oral BID With Meals Regla Venegas PA-C      apixaban  5 mg Oral BID Jerrell Schwartz PA-C      benzonatate  100 mg Oral TID Iris BOYD Leyva      dextromethorphan-guaiFENesin  10 mL Oral Q4H PRN Peg Leyva PA-C      Diclofenac Sodium  2 g Topical 4x Daily Regla Venegas PA-C      fish oil  1,000 mg Oral BID Jill Locke PA-C      gabapentin  100 mg Oral HS Regla Venegas PA-C      guaiFENesin  600 mg Oral Q12H [address] Jill Locke PA-C      heparin (porcine)  2,000 Units Intravenous Q1H PRN Sai Moss PA-C      heparin (porcine)  4,000 Units Intravenous Q1H PRN Sai Moss PA-C      insulin glargine  15 Units Subcutaneous HS Karolina Foster MD      insulin lispro  1-5 Units Subcutaneous HS Jill Locke PA-C      insulin lispro  2-12 Units Subcutaneous TID AC Jill Locke PA-C      insulin lispro  8 Units Subcutaneous TID With Meals Karolina Foster MD      levalbuterol  1 25 mg Nebulization TID CHRIS Donaldson      levothyroxine  150 mcg Oral Daily Jill Locke PA-C      lidocaine  1 patch Topical Q24H Jill Locke PA-C      metoprolol tartrate  25 mg Oral Q12H [address] Jill Locke PA-C      ondansetron  4 mg Intravenous Q6H PRN Jill Locke PA-C      pantoprazole  40 mg Oral Daily Jill Locke PA-C      predniSONE  20 mg Oral Daily Shawn Alfredia Holter, PA-C      sodium chloride  3 mL Nebulization TID CHRIS Donaldson      tiotropium  18 mcg Inhalation Daily Peg Leyva PA-C          Today, Patient Was Seen By: Sylwia Campbell PA-C    **Please Note: This note may have been constructed using a voice recognition system  **

## 2021-08-03 NOTE — QUICK NOTE
Discussed with primary team, planning to keep patient admitted until pathology results from thoracentesis result  Discussed with patient that PET scans cannot be completed as an inpatient and that a message was sent to finance team to verify coverage of imaging and to hopefully expedite timing of scan should cytology from thoracentesis be inconclusive  Spoke with patient's son Lew Diaz and case management as well for updates

## 2021-08-03 NOTE — ASSESSMENT & PLAN NOTE
· With acute exacerbation on admission - presenting with worsening shortness of breath, productive cough and wheezing     · Pulmonology consulted appreciate input  · Patient is usually on 3 L nasal cannula at home  · Continue Xopenex and Spiriva and 3% nebulizers  · Continue respiratory protocol  · Requires home O2 on d/c but needs updated home O2 eval  · Given Lasix 40 mg IV once 7/28  · Outpatient Pulmonary follow-up at discharge  · Discharge on Bevespi with albuterol PRN home meds   · Per pulmonary, steroids not indicated for COPD at this time

## 2021-08-04 ENCOUNTER — APPOINTMENT (INPATIENT)
Dept: CT IMAGING | Facility: HOSPITAL | Age: 81
DRG: 682 | End: 2021-08-04
Payer: COMMERCIAL

## 2021-08-04 LAB
ATRIAL RATE: 288 BPM
GLUCOSE SERPL-MCNC: 125 MG/DL (ref 65–140)
GLUCOSE SERPL-MCNC: 204 MG/DL (ref 65–140)
GLUCOSE SERPL-MCNC: 215 MG/DL (ref 65–140)
GLUCOSE SERPL-MCNC: 328 MG/DL (ref 65–140)
P AXIS: 242 DEGREES
QRS AXIS: 63 DEGREES
QRSD INTERVAL: 96 MS
QT INTERVAL: 374 MS
QTC INTERVAL: 434 MS
T WAVE AXIS: 80 DEGREES
VENTRICULAR RATE: 81 BPM

## 2021-08-04 PROCEDURE — 94760 N-INVAS EAR/PLS OXIMETRY 1: CPT

## 2021-08-04 PROCEDURE — 93005 ELECTROCARDIOGRAM TRACING: CPT

## 2021-08-04 PROCEDURE — 97530 THERAPEUTIC ACTIVITIES: CPT

## 2021-08-04 PROCEDURE — 93010 ELECTROCARDIOGRAM REPORT: CPT | Performed by: INTERNAL MEDICINE

## 2021-08-04 PROCEDURE — 94761 N-INVAS EAR/PLS OXIMETRY MLT: CPT

## 2021-08-04 PROCEDURE — 82948 REAGENT STRIP/BLOOD GLUCOSE: CPT

## 2021-08-04 PROCEDURE — 99232 SBSQ HOSP IP/OBS MODERATE 35: CPT | Performed by: INTERNAL MEDICINE

## 2021-08-04 PROCEDURE — 71260 CT THORAX DX C+: CPT

## 2021-08-04 PROCEDURE — 97110 THERAPEUTIC EXERCISES: CPT

## 2021-08-04 PROCEDURE — 94669 MECHANICAL CHEST WALL OSCILL: CPT

## 2021-08-04 PROCEDURE — 99233 SBSQ HOSP IP/OBS HIGH 50: CPT | Performed by: PHYSICIAN ASSISTANT

## 2021-08-04 PROCEDURE — 94640 AIRWAY INHALATION TREATMENT: CPT

## 2021-08-04 PROCEDURE — 97116 GAIT TRAINING THERAPY: CPT

## 2021-08-04 PROCEDURE — G1004 CDSM NDSC: HCPCS

## 2021-08-04 RX ADMIN — INSULIN LISPRO 8 UNITS: 100 INJECTION, SOLUTION INTRAVENOUS; SUBCUTANEOUS at 17:15

## 2021-08-04 RX ADMIN — BENZONATATE 100 MG: 100 CAPSULE ORAL at 17:15

## 2021-08-04 RX ADMIN — ISODIUM CHLORIDE 3 ML: 0.03 SOLUTION RESPIRATORY (INHALATION) at 15:54

## 2021-08-04 RX ADMIN — METOPROLOL TARTRATE 25 MG: 25 TABLET, FILM COATED ORAL at 07:35

## 2021-08-04 RX ADMIN — PANTOPRAZOLE SODIUM 40 MG: 40 TABLET, DELAYED RELEASE ORAL at 07:34

## 2021-08-04 RX ADMIN — INSULIN GLARGINE 15 UNITS: 100 INJECTION, SOLUTION SUBCUTANEOUS at 22:13

## 2021-08-04 RX ADMIN — APIXABAN 5 MG: 5 TABLET, FILM COATED ORAL at 07:34

## 2021-08-04 RX ADMIN — BENZONATATE 100 MG: 100 CAPSULE ORAL at 07:34

## 2021-08-04 RX ADMIN — ISODIUM CHLORIDE 3 ML: 0.03 SOLUTION RESPIRATORY (INHALATION) at 07:10

## 2021-08-04 RX ADMIN — ISODIUM CHLORIDE 3 ML: 0.03 SOLUTION RESPIRATORY (INHALATION) at 20:08

## 2021-08-04 RX ADMIN — TIOTROPIUM BROMIDE 18 MCG: 18 CAPSULE ORAL; RESPIRATORY (INHALATION) at 07:40

## 2021-08-04 RX ADMIN — DICLOFENAC SODIUM 2 G: 10 GEL TOPICAL at 22:14

## 2021-08-04 RX ADMIN — METOPROLOL TARTRATE 25 MG: 25 TABLET, FILM COATED ORAL at 22:16

## 2021-08-04 RX ADMIN — DICLOFENAC SODIUM 2 G: 10 GEL TOPICAL at 17:15

## 2021-08-04 RX ADMIN — GUAIFENESIN 600 MG: 600 TABLET, EXTENDED RELEASE ORAL at 22:13

## 2021-08-04 RX ADMIN — LEVALBUTEROL HYDROCHLORIDE 1.25 MG: 1.25 SOLUTION, CONCENTRATE RESPIRATORY (INHALATION) at 20:08

## 2021-08-04 RX ADMIN — DICLOFENAC SODIUM 2 G: 10 GEL TOPICAL at 11:54

## 2021-08-04 RX ADMIN — APIXABAN 5 MG: 5 TABLET, FILM COATED ORAL at 17:15

## 2021-08-04 RX ADMIN — AMIODARONE HYDROCHLORIDE 200 MG: 200 TABLET ORAL at 07:34

## 2021-08-04 RX ADMIN — GABAPENTIN 100 MG: 100 CAPSULE ORAL at 22:13

## 2021-08-04 RX ADMIN — OMEGA-3 FATTY ACIDS CAP 1000 MG 1000 MG: 1000 CAP at 17:15

## 2021-08-04 RX ADMIN — PREDNISONE 20 MG: 20 TABLET ORAL at 07:34

## 2021-08-04 RX ADMIN — GUAIFENESIN 600 MG: 600 TABLET, EXTENDED RELEASE ORAL at 07:33

## 2021-08-04 RX ADMIN — IOHEXOL 85 ML: 350 INJECTION, SOLUTION INTRAVENOUS at 17:00

## 2021-08-04 RX ADMIN — ALBUTEROL SULFATE 2.5 MG: 2.5 SOLUTION RESPIRATORY (INHALATION) at 11:12

## 2021-08-04 RX ADMIN — AMIODARONE HYDROCHLORIDE 200 MG: 200 TABLET ORAL at 17:15

## 2021-08-04 RX ADMIN — INSULIN LISPRO 4 UNITS: 100 INJECTION, SOLUTION INTRAVENOUS; SUBCUTANEOUS at 11:53

## 2021-08-04 RX ADMIN — DICLOFENAC SODIUM 2 G: 10 GEL TOPICAL at 07:38

## 2021-08-04 RX ADMIN — LEVALBUTEROL HYDROCHLORIDE 1.25 MG: 1.25 SOLUTION, CONCENTRATE RESPIRATORY (INHALATION) at 07:10

## 2021-08-04 RX ADMIN — BENZONATATE 100 MG: 100 CAPSULE ORAL at 22:13

## 2021-08-04 RX ADMIN — OMEGA-3 FATTY ACIDS CAP 1000 MG 1000 MG: 1000 CAP at 07:34

## 2021-08-04 RX ADMIN — INSULIN LISPRO 1 UNITS: 100 INJECTION, SOLUTION INTRAVENOUS; SUBCUTANEOUS at 22:13

## 2021-08-04 RX ADMIN — LEVALBUTEROL HYDROCHLORIDE 1.25 MG: 1.25 SOLUTION, CONCENTRATE RESPIRATORY (INHALATION) at 15:54

## 2021-08-04 RX ADMIN — LEVOTHYROXINE SODIUM 150 MCG: 150 TABLET ORAL at 05:43

## 2021-08-04 RX ADMIN — ALLOPURINOL 300 MG: 300 TABLET ORAL at 07:34

## 2021-08-04 NOTE — ASSESSMENT & PLAN NOTE
Lab Results   Component Value Date    HGBA1C 6 6 (H) 06/20/2021       Recent Labs     08/03/21  1203 08/03/21  1553 08/03/21  2106 08/04/21  0656   POCGLU 130 258* 176* 125       Blood Sugar Average: Last 72 hrs:  (P) 205    Continue Lantus HS  Humalog t i d   With meals  Titrate insulin dose based on Accu-Cheks  Avoid hypoglycemia  Hypoglycemia protocol in place

## 2021-08-04 NOTE — PLAN OF CARE
Problem: PAIN - ADULT  Goal: Verbalizes/displays adequate comfort level or baseline comfort level  Description: Interventions:  - Encourage patient to monitor pain and request assistance  - Assess pain using appropriate pain scale (e g  0-10)  - Administer analgesics based on type and severity of pain and evaluate response  - Implement non-pharmacological measures as appropriate and evaluate response  - Consider cultural and social influences on pain and pain management  - Notify physician/advanced practitioner if interventions unsuccessful or patient reports new pain  Outcome: Progressing     Problem: RESPIRATORY - ADULT  Goal: Achieves optimal ventilation and oxygenation  Description: INTERVENTIONS:  - Assess for changes in respiratory status  - Assess for changes in mentation and behavior  - Position to facilitate oxygenation and minimize respiratory effort  - Oxygen administered by appropriate delivery if ordered  - Initiate smoking cessation education as indicated  - Encourage broncho-pulmonary hygiene including cough, deep breathe, Incentive Spirometry  - Assess the need for suctioning and aspirate as needed  - Assess and instruct to report SOB or any respiratory difficulty  - Respiratory Therapy support as indicated  Outcome: Progressing     Problem: Prexisting or High Potential for Compromised Skin Integrity  Goal: Skin integrity is maintained or improved  Description: INTERVENTIONS:  - Identify patients at risk for skin breakdown  - Assess and monitor skin integrity  - Assess and monitor nutrition and hydration status  - Monitor labs   - Assess for incontinence   - Turn and reposition patient  - Assist with mobility/ambulation  - Relieve pressure over bony prominences  - Avoid friction and shearing  - Provide appropriate hygiene as needed including keeping skin clean and dry  - Collaborate with interdisciplinary team   - Patient/family teaching  - Consider wound care consult   Outcome: Progressing

## 2021-08-04 NOTE — PLAN OF CARE
Problem: PAIN - ADULT  Goal: Verbalizes/displays adequate comfort level or baseline comfort level  Description: Interventions:  - Encourage patient to monitor pain and request assistance  - Assess pain using appropriate pain scale (e g  0-10)  - Administer analgesics based on type and severity of pain and evaluate response  - Implement non-pharmacological measures as appropriate and evaluate response  - Consider cultural and social influences on pain and pain management  - Notify physician/advanced practitioner if interventions unsuccessful or patient reports new pain  Outcome: Progressing     Problem: INFECTION - ADULT  Goal: Absence or prevention of progression during hospitalization  Description: INTERVENTIONS:  - Assess and monitor for signs and symptoms of infection  - Monitor lab/diagnostic results  - Monitor all insertion sites, i e  IV site, bx site  - [place] appropriate cooling/warming therapies per order  - Administer medications as ordered  - Instruct and encourage patient and family to use good hand hygiene technique  - Identify and instruct in appropriate isolation precautions for identified infection/condition  Outcome: Progressing     Problem: DISCHARGE PLANNING  Goal: Discharge to home or other facility with appropriate resources  Description: INTERVENTIONS:  - Identify barriers to discharge w/patient and caregiver  - Arrange for needed discharge resources and transportation as appropriate  - Identify discharge learning needs (meds, wound care, etc )  - Arrange for interpretive services to assist at discharge as needed  - Refer to Case Management Department for coordinating discharge planning if the patient needs post-hospital services based on physician/advanced practitioner order or complex needs related to functional status, cognitive ability, or social support system  Outcome: Progressing     Problem: RESPIRATORY - ADULT  Goal: Achieves optimal ventilation and oxygenation  Description: INTERVENTIONS:  - Assess for changes in respiratory status  - Assess for changes in mentation and behavior  - Position to facilitate oxygenation and minimize respiratory effort  - Oxygen administered by appropriate delivery if ordered  - Initiate smoking cessation education as indicated  - Encourage broncho-pulmonary hygiene including cough, deep breathe, Incentive Spirometry  - Assess the need for suctioning and aspirate as needed  - Assess and instruct to report SOB or any respiratory difficulty  - Respiratory Therapy support as indicated  Outcome: Progressing

## 2021-08-04 NOTE — PROGRESS NOTES
[facility]  Progress Note - Amanda Updereck 1940, 68 y o  male MRN: [id number]  Unit/Bed#: S -01 Encounter: [id number]  Primary Care Provider: Patrice Costa DO   Date and time admitted to hospital: 7/14/2021  1:39 PM    Right flank pain  Assessment & Plan  · Patient has been having R flank pain since his R lung biopsy on 7/21; he now reports that pain is persistent and radiate to the RUQ and down the R side of his back  He feels the pain in his RUQ is somewhat of a 'burning' pain  He reports that he has had urinary frequency, urgency, and decreased UO  · DDx: pain 2/2 lung mass vs  Neuropathic pain s/p biopsy   · UTI ruled out  · Pain control; continue gabapentin for described neuropathic pain  · Palliative care referral placed by oncology -- appreciate input      Hyperkalemia  Assessment & Plan  Resolved  Monitor closely    COPD (chronic obstructive pulmonary disease) with acute exacerbation ([facility] [address] )  Assessment & Plan  · With acute exacerbation on admission - presenting with worsening shortness of breath, productive cough and wheezing  · Pulmonology consulted appreciate input  · Patient is usually on 3 L nasal cannula at home  · Continue Xopenex and Spiriva and 3% nebulizers  · Continue respiratory protocol  · Requires home O2 on d/c but needs updated home O2 eval  · Given Lasix 40 mg IV once 7/28  · Outpatient Pulmonary follow-up at discharge  · Discharge on Bevespi with albuterol PRN home meds   · Per pulmonary, steroids not indicated for COPD at this time    New onset type 2 diabetes mellitus [facility])  Assessment & Plan  Lab Results   Component Value Date    HGBA1C 6 6 (H) 06/20/2021       Recent Labs     08/03/21  1203 08/03/21  1553 08/03/21  2106 08/04/21  0656   POCGLU 130 258* 176* 125       Blood Sugar Average: Last 72 hrs:  (P) 205    Continue Lantus HS  Humalog t i d   With meals  Titrate insulin dose based on Accu-Cheks  Avoid hypoglycemia  Hypoglycemia protocol in place    Hypothyroidism  Assessment & Plan  · Continue levothyroxine 150 mcg daily  Mass of right lung  Assessment & Plan  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  Was scheduled to have IR guided biopsy as outpatient on 7/21 however was done inpatient on 7/21 with IR  · Patient is currently status post for repeat lung biopsy on 07/27 since the biopsy from several days ago resulted in necrotic tissue without viable tumor - target possibly left lung  · Eliquis resumed  · Status post right thoracocentesis 7/30-- follow-up on cytology  · Oncology following, plan for PET to determine most metabolically active area and bx that region    Paroxysmal A-fib with RVR  Assessment & Plan  · Initially presented with AFib RVR, Rate controlled now  Briefly was uncontrolled on 7/29 and was status post Cardizem and IV Albumin and resulted in returning to rate controlled A   Flutter   · Continue oral amiodarone 200mg PO BID x14 doses, then 200mg PO QD  · Continue routine metoprolol 25mg BID  · Eliquis for anticoagulation  · Cardiology input noted    Idiopathic thrombocytopenic purpura (ITP) (HCC)  Assessment & Plan  · Was previously receiving IV Solu-Medrol  · Transitioned to PO Prednisone 20 mg on 07/27  · As per pulmonary disease does not to be on steroid   · Platelets remain stable  · D/W Heme/onc-- does receive rituxin as an OP; plan to keep on prednisone 20mg PO QD as he was on prolonged taper as an OP  · Will continue steroids and have patient follow up with Heme/Onc    * Acute respiratory failure with hypoxia and hypercapnia (HCC)  Assessment & Plan  · Multifactorial  · Currently on 2 L saturating adequately  · Wean supplemental oxygen as tolerated more than 88-90%        VTE Pharmacologic Prophylaxis:   Pharmacologic: Apixaban (Eliquis)  Mechanical VTE Prophylaxis in Place: Yes    Patient Centered Rounds: I have performed bedside rounds with nursing staff today  Discussions with Specialists or Other Care Team Provider:  Cm, nursing    Education and Discussions with Family / Patient:  Patient    Time Spent for Care: 30 minutes  More than 50% of total time spent on counseling and coordination of care as described above  Current Length of Stay: 21 day(s)    Current Patient Status: Inpatient   Certification Statement: The patient will continue to require additional inpatient hospital stay due to Pending further oncology evaluation    Discharge Plan:  See above statement    Code Status: Level 1 - Full Code      Subjective:   No Acute complaints    Objective:     Vitals:   Temp (24hrs), Av 4 °F (36 9 °C), Min:97 9 °F (36 6 °C), Max:98 7 °F (37 1 °C)    Temp:  [97 9 °F (36 6 °C)-98 7 °F (37 1 °C)] 97 9 °F (36 6 °C)  HR:  [] 91  Resp:  [16-18] 18  BP: (101-118)/(53-70) 118/53  SpO2:  [96 %-98 %] 98 %  Body mass index is 30 54 kg/m²  Input and Output Summary (last 24 hours): Intake/Output Summary (Last 24 hours) at 2021 0857  Last data filed at 2021 0654  Gross per 24 hour   Intake 240 ml   Output 1725 ml   Net -1485 ml       Physical Exam:     Physical Exam  Constitutional:       General: He is not in acute distress  Appearance: He is well-developed  He is not diaphoretic  HENT:      Head: Normocephalic and atraumatic  Nose: Nose normal       Mouth/Throat:      Pharynx: No oropharyngeal exudate  Eyes:      General: No scleral icterus  Conjunctiva/sclera: Conjunctivae normal    Cardiovascular:      Rate and Rhythm: Normal rate and regular rhythm  Heart sounds: Normal heart sounds  No murmur heard  No friction rub  No gallop  Pulmonary:      Effort: Pulmonary effort is normal  No respiratory distress  Breath sounds: Normal breath sounds  No wheezing or rales  Chest:      Chest wall: No tenderness     Abdominal:      General: Bowel sounds are normal  There is no distension  Palpations: Abdomen is soft  Tenderness: There is no abdominal tenderness  There is no guarding  Musculoskeletal:         General: No tenderness or deformity  Normal range of motion  Cervical back: Normal range of motion and neck supple  Skin:     General: Skin is warm and dry  Findings: No erythema  Neurological:      Mental Status: He is alert  Mental status is at baseline  Additional Data:     Labs:    Results from last 7 days   Lab Units 07/30/21  0545   WBC Thousand/uL 9 75   HEMOGLOBIN g/dL 9 7*   HEMATOCRIT % 34 1*   PLATELETS Thousands/uL 217   BANDS PCT % 1   LYMPHO PCT % 7*   MONO PCT % 4   EOS PCT % 0     Results from last 7 days   Lab Units 07/31/21  0609 07/29/21  0920   SODIUM mmol/L 134* 134*   POTASSIUM mmol/L 5 1 4 6   CHLORIDE mmol/L 99* 96*   CO2 mmol/L 31 30   BUN mg/dL 27* 30*   CREATININE mg/dL 1 19 1 21   ANION GAP mmol/L 4 8   CALCIUM mg/dL 9 6 9 3   ALBUMIN g/dL  --  2 7*   TOTAL BILIRUBIN mg/dL  --  0 91   ALK PHOS U/L  --  142*   ALT U/L  --  29   AST U/L  --  11   GLUCOSE RANDOM mg/dL 135 274*         Results from last 7 days   Lab Units 08/04/21  0656 08/03/21  2106 08/03/21  1553 08/03/21  1203 08/03/21  0820 08/03/21  0731 08/02/21  2146 08/02/21  1119 08/02/21  0708 08/01/21  2058 08/01/21  1621 08/01/21  1047   POC GLUCOSE mg/dl 125 176* 258* 130 248* 153* 198* 224* 175* 319* 286* 202*                   * I Have Reviewed All Lab Data Listed Above  * Additional Pertinent Lab Tests Reviewed:  All Labs Within Last 24 Hours Reviewed    Imaging:    Imaging Reports Reviewed Today Include: na  Imaging Personally Reviewed by Myself Includes:  na    Recent Cultures (last 7 days):           Last 24 Hours Medication List:   Current Facility-Administered Medications   Medication Dose Route Frequency Provider Last Rate    acetaminophen  650 mg Oral Q6H PRN Jill Locke PA-C      al mag oxide-diphenhydramine-lidocaine viscous  10 mL Swish & Spit Q4H PRN Hector LongBOYD      albuterol  2 5 mg Nebulization Q6H PRN CHRIS Snider      allopurinol  300 mg Oral Daily Radha Iniguez PA-C      amiodarone  200 mg Oral BID With Meals Regla Venegas PA-C      apixaban  5 mg Oral BID Hector Quinteros PA-C      benzonatate  100 mg Oral TID Radha Iniguez PA-C      dextromethorphan-guaiFENesin  10 mL Oral Q4H PRN Radha Iniguez PA-C      Diclofenac Sodium  2 g Topical 4x Daily Regla Venegas PA-C      fish oil  1,000 mg Oral BID Jill Locke PA-C      gabapentin  100 mg Oral HS Regla Venegas PA-C      guaiFENesin  600 mg Oral Q12H [address] Jill Locke PA-C      heparin (porcine)  2,000 Units Intravenous Q1H PRN Laura Roberts PA-C      heparin (porcine)  4,000 Units Intravenous Q1H PRN Laura Roberts PA-C      insulin glargine  15 Units Subcutaneous HS Susan Nielson MD      insulin lispro  1-5 Units Subcutaneous HS Jill Locke PA-C      insulin lispro  2-12 Units Subcutaneous TID AC Jill Locke PA-C      insulin lispro  8 Units Subcutaneous TID With Meals Susan Nielson MD      levalbuterol  1 25 mg Nebulization TID CHRIS Snider      levothyroxine  150 mcg Oral Daily Jill Locke PA-C      lidocaine  1 patch Topical Q24H Jill Locke PA-C      metoprolol tartrate  25 mg Oral Q12H [address] Jill Locke PA-C      ondansetron  4 mg Intravenous Q6H PRN Jill Locke PA-C      pantoprazole  40 mg Oral Daily Jill Locke PA-C      predniSONE  20 mg Oral Daily Jerrell Vera PA-C      sodium chloride  3 mL Nebulization TID CHRIS Snider      tiotropium  18 mcg Inhalation Daily Radha Iniguez PA-C          Today, Patient Was Seen By: Rafael Monreal MD    ** Please Note: Dictation voice to text software may have been used in the creation of this document   **

## 2021-08-04 NOTE — CASE MANAGEMENT
CM informed pt anticipated for d/c tomorrow pending chest CT  RT eval completed  Pt open and current with [facility]  CM updated referral via [facility] and notified them of d/c tomorrow  CM met with pt at bedside  Preference is [facility] for his Home O2  Order placed via [facility]  CM reviewed IMM and signature obtained  Copy placed in medical records

## 2021-08-04 NOTE — PHYSICAL THERAPY NOTE
PHYSICAL THERAPY NOTE    Patient Name: Maxwell Arteaga  XNLDQ'G Date: 21 1055   PT Last Visit   PT Visit Date 21   Note Type   Note Type Treatment   Pain Assessment   Pain Assessment Tool 0-10   Pain Score 9   Pain Location/Orientation Orientation: Right;Orientation: Upper; Location: Chest   Restrictions/Precautions   Weight Bearing Precautions Per Order No   Other Precautions Chair Alarm; Bed Alarm;O2;Fall Risk  (2L O2 via NC)   General   Family/Caregiver Present No   Subjective   Subjective Patient seated EOB upon therapist entry to room and is agreeable to participate in therapy session  Pt identiers obtained from name &   Bed Mobility   Supine to Sit Unable to assess   Sit to Supine Unable to assess   Additional Comments Pt seated OOB in recliner post session with call bell and belongings in reach  Transfers   Sit to Stand 6  Modified independent   Additional items Armrests; Increased time required   Stand to Sit 6  Modified independent   Additional items Armrests; Increased time required   Ambulation/Elevation   Gait pattern Improper Weight shift; Wide DAVE   Gait Assistance 5  Supervision   Additional items Assist x 1;Verbal cues   Assistive Device None   Distance 25' x2   Balance   Static Sitting Good   Dynamic Sitting Good   Static Standing Fair +   Dynamic Standing Fair   Ambulatory Fair   Endurance Deficit   Endurance Deficit Yes   Endurance Deficit Description limited ambulation distance and activity tolerance   Activity Tolerance   Activity Tolerance Patient limited by pain; Other (Comment)  (SOB)   Nurse Made Aware Spoke to TAMMIE Lopez    Exercises   [employer] Sitting;10 reps;AROM; Bilateral  (in recliner)   Heelslides Sitting;10 reps;AROM; Bilateral  (in recliner)   Knee AROM Long Arc Quad Sitting;15 reps;AROM; Bilateral   Ankle Pumps Sitting;15 reps;AROM; Bilateral   Marching Sitting;15 reps;AROM; Bilateral   Assessment   Prognosis Good   Problem List Decreased strength;Decreased endurance; Impaired balance;Decreased mobility; Decreased safety awareness   Assessment Patient agreeable to participate in therapy session  Patient reports having increased difficulty with SOB despite 2L O2 via NC throughout session, SpO2 98% and rest and fluctuations above 90% with exertion  Pt with modified independence for sit<>stand transfers with good technique and safety  Patient able to ambulate short gait distance with no assistive device and supervision  Gait distance tolerance limited with complaints of increased effort for O2  Participated in seated B LE exercise program with input for form and pace  Requires seated rest breaks throughout session with instruction for breathing technique  Continue to focus on OOB mobility with progression of ambulation as appropriate and able  Goals   Patient Goals to get better   STG Expiration Date 08/13/21   PT Treatment Day 5   Plan   Treatment/Interventions Functional transfer training;LE strengthening/ROM; Elevations; Therapeutic exercise; Endurance training;Patient/family training;Equipment eval/education; Bed mobility;Spoke to nursing   Progress Progressing toward goals   PT Frequency Other (Comment)  (4-5x/week)   Recommendation   PT Discharge Recommendation Home with home health rehabilitation   Halie 8 in Bed Without Bedrails 4   Lying on Back to Sitting on Edge of Flat Bed 4   Moving Bed to Chair 3   Standing Up From Chair 4   Walk in Room 3   Climb 3-5 Stairs 3   Basic Mobility Inpatient Raw Score 21   Basic Mobility Standardized Score 45 55       The patient's AM-PAC Basic Mobility Inpatient Short Form Raw Score is 21, Standardized Score is 45 55  A standardized score greater than 42 9 suggests the patient may benefit from discharge to home   Please also refer to the recommendation of the Physical Therapist for safe discharge planning        Gerri Elder, PTA

## 2021-08-04 NOTE — RESPIRATORY THERAPY NOTE
Home Oxygen Qualifying Test       Patient name: Calvin Alexander        : 1940   Date of Test:  2021  Diagnosis:      Home Oxygen Test:    **Medicare Guidelines require item(s) 1-5 on all ambulatory patients or 1 and 2 on non-ambulatory patients  1   Baseline SPO2 on Room Air at rest 90 %  2   SPO2 during exercise on Room Air 85  %  During exercise monitor SpO2  If SPO2 increases >=89% with ambulation do not add supplemental             oxygen  If <= 88% on room air add O2 via NC and titrate patient  Patient must be ambulated with O2 and titrated to > 88% with exertion  3   SPO2 on Oxygen at rest 96 % 2 lpm     4   SPO2 during exercise on Oxygen  90% a liter flow of 2 lpm     5   Exercise performed:          Pt ambulated in room x 6minutes           [x]  Supplemental Home Oxygen is indicated  []  Client does not qualify for home oxygen        Respiratory Additional Notes- pt qualidies or home 02 with exertion at 2lpm   Jose Miguel Mcguire

## 2021-08-04 NOTE — PLAN OF CARE
Problem: PHYSICAL THERAPY ADULT  Goal: Performs mobility at highest level of function for planned discharge setting  See evaluation for individualized goals  Description: Treatment/Interventions: Functional transfer training, LE strengthening/ROM, Therapeutic exercise, Endurance training, Elevations, Patient/family training, Equipment eval/education, Bed mobility, Gait training          See flowsheet documentation for full assessment, interventions and recommendations  Outcome: Progressing  Note: Prognosis: Good  Problem List: Decreased strength, Decreased endurance, Impaired balance, Decreased mobility, Decreased safety awareness  Assessment: Patient agreeable to participate in therapy session  Patient reports having increased difficulty with SOB despite 2L O2 via NC throughout session, SpO2 98% and rest and fluctuations above 90% with exertion  Pt with modified independence for sit<>stand transfers with good technique and safety  Patient able to ambulate short gait distance with no assistive device and supervision  Gait distance tolerance limited with complaints of increased effort for O2  Participated in seated B LE exercise program with input for form and pace  Requires seated rest breaks throughout session with instruction for breathing technique  Continue to focus on OOB mobility with progression of ambulation as appropriate and able  PT Discharge Recommendation: Home with home health rehabilitation          See flowsheet documentation for full assessment

## 2021-08-04 NOTE — ASSESSMENT & PLAN NOTE
· Multifactorial  · Currently on 2 L saturating adequately  · Wean supplemental oxygen as tolerated more than 88-90%

## 2021-08-04 NOTE — ASSESSMENT & PLAN NOTE
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  Was scheduled to have IR guided biopsy as outpatient on 7/21 however was done inpatient on 7/21 with IR      · Patient is currently status post for repeat lung biopsy on 07/27 since the biopsy from several days ago resulted in necrotic tissue without viable tumor - target possibly left lung  · Eliquis resumed  · Status post right thoracocentesis 7/30-- follow-up on cytology  · Oncology following, plan for PET to determine most metabolically active area and bx that region

## 2021-08-05 ENCOUNTER — TELEPHONE (OUTPATIENT)
Dept: HEMATOLOGY ONCOLOGY | Facility: CLINIC | Age: 81
End: 2021-08-05

## 2021-08-05 VITALS
RESPIRATION RATE: 15 BRPM | OXYGEN SATURATION: 97 % | WEIGHT: 167 LBS | HEIGHT: 62 IN | DIASTOLIC BLOOD PRESSURE: 57 MMHG | HEART RATE: 145 BPM | SYSTOLIC BLOOD PRESSURE: 92 MMHG | BODY MASS INDEX: 30.73 KG/M2 | TEMPERATURE: 98.4 F

## 2021-08-05 LAB
ANION GAP SERPL CALCULATED.3IONS-SCNC: 6 MMOL/L (ref 4–13)
ANISOCYTOSIS BLD QL SMEAR: PRESENT
BASOPHILS # BLD MANUAL: 0 THOUSAND/UL (ref 0–0.1)
BASOPHILS NFR MAR MANUAL: 0 % (ref 0–1)
BUN SERPL-MCNC: 32 MG/DL (ref 5–25)
CALCIUM SERPL-MCNC: 9.3 MG/DL (ref 8.3–10.1)
CHLORIDE SERPL-SCNC: 98 MMOL/L (ref 100–108)
CO2 SERPL-SCNC: 28 MMOL/L (ref 21–32)
CREAT SERPL-MCNC: 1.39 MG/DL (ref 0.6–1.3)
DME PARACHUTE DELIVERY DATE ACTUAL: NORMAL
DME PARACHUTE DELIVERY DATE EXPECTED: NORMAL
DME PARACHUTE DELIVERY DATE REQUESTED: NORMAL
DME PARACHUTE ITEM DESCRIPTION: NORMAL
DME PARACHUTE ORDER STATUS: NORMAL
DME PARACHUTE SUPPLIER NAME: NORMAL
DME PARACHUTE SUPPLIER PHONE: NORMAL
EOSINOPHIL # BLD MANUAL: 0 THOUSAND/UL (ref 0–0.4)
EOSINOPHIL NFR BLD MANUAL: 0 % (ref 0–6)
ERYTHROCYTE [DISTWIDTH] IN BLOOD BY AUTOMATED COUNT: 19.9 % (ref 11.6–15.1)
GFR SERPL CREATININE-BSD FRML MDRD: 48 ML/MIN/1.73SQ M
GLUCOSE SERPL-MCNC: 132 MG/DL (ref 65–140)
GLUCOSE SERPL-MCNC: 177 MG/DL (ref 65–140)
HCT VFR BLD AUTO: 32.9 % (ref 36.5–49.3)
HGB BLD-MCNC: 9.6 G/DL (ref 12–17)
HYPERCHROMIA BLD QL SMEAR: PRESENT
LYMPHOCYTES # BLD AUTO: 0.18 THOUSAND/UL (ref 0.6–4.47)
LYMPHOCYTES # BLD AUTO: 2 % (ref 14–44)
MCH RBC QN AUTO: 24.4 PG (ref 26.8–34.3)
MCHC RBC AUTO-ENTMCNC: 29.2 G/DL (ref 31.4–37.4)
MCV RBC AUTO: 84 FL (ref 82–98)
MONOCYTES # BLD AUTO: 0.44 THOUSAND/UL (ref 0–1.22)
MONOCYTES NFR BLD: 5 % (ref 4–12)
MYELOCYTES NFR BLD MANUAL: 1 % (ref 0–1)
NEUTROPHILS # BLD MANUAL: 8.15 THOUSAND/UL (ref 1.85–7.62)
NEUTS SEG NFR BLD AUTO: 92 % (ref 43–75)
NRBC BLD AUTO-RTO: 0 /100 WBCS
PLATELET # BLD AUTO: 317 THOUSANDS/UL (ref 149–390)
PLATELET BLD QL SMEAR: ADEQUATE
PMV BLD AUTO: 9.7 FL (ref 8.9–12.7)
POTASSIUM SERPL-SCNC: 4.5 MMOL/L (ref 3.5–5.3)
RBC # BLD AUTO: 3.94 MILLION/UL (ref 3.88–5.62)
SODIUM SERPL-SCNC: 132 MMOL/L (ref 136–145)
TOTAL CELLS COUNTED SPEC: 100
WBC # BLD AUTO: 8.86 THOUSAND/UL (ref 4.31–10.16)

## 2021-08-05 PROCEDURE — 94760 N-INVAS EAR/PLS OXIMETRY 1: CPT

## 2021-08-05 PROCEDURE — 80048 BASIC METABOLIC PNL TOTAL CA: CPT | Performed by: INTERNAL MEDICINE

## 2021-08-05 PROCEDURE — 85027 COMPLETE CBC AUTOMATED: CPT | Performed by: INTERNAL MEDICINE

## 2021-08-05 PROCEDURE — 99239 HOSP IP/OBS DSCHRG MGMT >30: CPT | Performed by: INTERNAL MEDICINE

## 2021-08-05 PROCEDURE — 99233 SBSQ HOSP IP/OBS HIGH 50: CPT | Performed by: PHYSICIAN ASSISTANT

## 2021-08-05 PROCEDURE — 94640 AIRWAY INHALATION TREATMENT: CPT

## 2021-08-05 PROCEDURE — 82948 REAGENT STRIP/BLOOD GLUCOSE: CPT

## 2021-08-05 PROCEDURE — 94664 DEMO&/EVAL PT USE INHALER: CPT

## 2021-08-05 PROCEDURE — 85007 BL SMEAR W/DIFF WBC COUNT: CPT | Performed by: INTERNAL MEDICINE

## 2021-08-05 RX ORDER — LIDOCAINE 50 MG/G
1 PATCH TOPICAL EVERY 24 HOURS
Qty: 60 PATCH | Refills: 0 | Status: SHIPPED | OUTPATIENT
Start: 2021-08-05 | End: 2021-08-24 | Stop reason: HOSPADM

## 2021-08-05 RX ORDER — AMIODARONE HYDROCHLORIDE 200 MG/1
300 TABLET ORAL DAILY
Qty: 60 TABLET | Refills: 0 | Status: SHIPPED | OUTPATIENT
Start: 2021-08-11 | End: 2021-08-24 | Stop reason: HOSPADM

## 2021-08-05 RX ORDER — GABAPENTIN 100 MG/1
100 CAPSULE ORAL
Qty: 30 CAPSULE | Refills: 0 | Status: SHIPPED | OUTPATIENT
Start: 2021-08-05

## 2021-08-05 RX ORDER — GUAIFENESIN/DEXTROMETHORPHAN 100-10MG/5
10 SYRUP ORAL EVERY 4 HOURS PRN
Qty: 473 ML | Refills: 0 | Status: SHIPPED | OUTPATIENT
Start: 2021-08-05

## 2021-08-05 RX ORDER — PREDNISONE 20 MG/1
20 TABLET ORAL DAILY
Qty: 30 TABLET | Refills: 0 | Status: SHIPPED | OUTPATIENT
Start: 2021-08-05 | End: 2021-08-24 | Stop reason: HOSPADM

## 2021-08-05 RX ORDER — AMIODARONE HYDROCHLORIDE 200 MG/1
200 TABLET ORAL 2 TIMES DAILY WITH MEALS
Qty: 9 TABLET | Refills: 0 | Status: SHIPPED | OUTPATIENT
Start: 2021-08-05 | End: 2021-08-24 | Stop reason: HOSPADM

## 2021-08-05 RX ORDER — ALBUTEROL SULFATE 2.5 MG/3ML
2.5 SOLUTION RESPIRATORY (INHALATION) EVERY 6 HOURS PRN
Qty: 360 ML | Refills: 0 | Status: SHIPPED | OUTPATIENT
Start: 2021-08-05 | End: 2021-09-04

## 2021-08-05 RX ADMIN — OMEGA-3 FATTY ACIDS CAP 1000 MG 1000 MG: 1000 CAP at 08:52

## 2021-08-05 RX ADMIN — ALLOPURINOL 300 MG: 300 TABLET ORAL at 08:52

## 2021-08-05 RX ADMIN — PREDNISONE 20 MG: 20 TABLET ORAL at 08:52

## 2021-08-05 RX ADMIN — LEVALBUTEROL HYDROCHLORIDE 1.25 MG: 1.25 SOLUTION, CONCENTRATE RESPIRATORY (INHALATION) at 07:35

## 2021-08-05 RX ADMIN — LEVOTHYROXINE SODIUM 150 MCG: 150 TABLET ORAL at 06:00

## 2021-08-05 RX ADMIN — ALBUTEROL SULFATE 2.5 MG: 2.5 SOLUTION RESPIRATORY (INHALATION) at 01:28

## 2021-08-05 RX ADMIN — PANTOPRAZOLE SODIUM 40 MG: 40 TABLET, DELAYED RELEASE ORAL at 08:52

## 2021-08-05 RX ADMIN — TIOTROPIUM BROMIDE 18 MCG: 18 CAPSULE ORAL; RESPIRATORY (INHALATION) at 08:52

## 2021-08-05 RX ADMIN — APIXABAN 5 MG: 5 TABLET, FILM COATED ORAL at 08:52

## 2021-08-05 RX ADMIN — INSULIN LISPRO 2 UNITS: 100 INJECTION, SOLUTION INTRAVENOUS; SUBCUTANEOUS at 08:51

## 2021-08-05 RX ADMIN — DICLOFENAC SODIUM 2 G: 10 GEL TOPICAL at 08:53

## 2021-08-05 RX ADMIN — ACETAMINOPHEN 650 MG: 325 TABLET, FILM COATED ORAL at 06:00

## 2021-08-05 RX ADMIN — AMIODARONE HYDROCHLORIDE 200 MG: 200 TABLET ORAL at 08:52

## 2021-08-05 RX ADMIN — BENZONATATE 100 MG: 100 CAPSULE ORAL at 08:52

## 2021-08-05 RX ADMIN — GUAIFENESIN 600 MG: 600 TABLET, EXTENDED RELEASE ORAL at 08:52

## 2021-08-05 RX ADMIN — ISODIUM CHLORIDE 3 ML: 0.03 SOLUTION RESPIRATORY (INHALATION) at 07:35

## 2021-08-05 NOTE — ASSESSMENT & PLAN NOTE
Lab Results   Component Value Date    HGBA1C 6 6 (H) 06/20/2021       Recent Labs     08/04/21  1133 08/04/21  1559 08/04/21  2107 08/05/21  0737   POCGLU 215* 328* 204* 177*       Blood Sugar Average: Last 72 hrs:  (P) 434 [id number]    Continue Lantus HS  Humalog t i d   With meals  Titrate insulin dose based on Accu-Cheks  Avoid hypoglycemia  Hypoglycemia protocol in place

## 2021-08-05 NOTE — DISCHARGE SUMMARY
[facility]  Discharge- [facility] April Jeanne 1940, 68 y o  male MRN: [id number]  Unit/Bed#: S -01 Encounter: [id number]  Primary Care Provider: Elias Reyes DO   Date and time admitted to hospital: 7/14/2021  1:39 PM    Right flank pain  Assessment & Plan  · Patient has been having R flank pain since his R lung biopsy on 7/21; he now reports that pain is persistent and radiate to the RUQ and down the R side of his back  He feels the pain in his RUQ is somewhat of a 'burning' pain  He reports that he has had urinary frequency, urgency, and decreased UO  · DDx: pain 2/2 lung mass vs  Neuropathic pain s/p biopsy   · UTI ruled out  · Pain control; continue gabapentin for described neuropathic pain  · Palliative care referral placed by oncology -- appreciate input      COPD (chronic obstructive pulmonary disease) with acute exacerbation ([facility] [address] )  Assessment & Plan  · With acute exacerbation on admission - presenting with worsening shortness of breath, productive cough and wheezing  · Pulmonology consulted appreciate input  · Patient is usually on 3 L nasal cannula at home  · Continue Xopenex and Spiriva and 3% nebulizers  · Continue respiratory protocol  · Outpatient Pulmonary follow-up at discharge  · Discharge on Bevespi with albuterol PRN home meds   · Per pulmonary, steroids not indicated for COPD at this time    New onset type 2 diabetes mellitus [facility])  Assessment & Plan  Lab Results   Component Value Date    HGBA1C 6 6 (H) 06/20/2021       Recent Labs     08/04/21  1133 08/04/21  1559 08/04/21  2107 08/05/21  0737   POCGLU 215* 328* 204* 177*       Blood Sugar Average: Last 72 hrs:  (P) 967 [id number]    Continue Lantus HS  Humalog t i d  With meals  Titrate insulin dose based on Accu-Cheks  Avoid hypoglycemia  Hypoglycemia protocol in place    Hypothyroidism  Assessment & Plan  · Continue levothyroxine 150 mcg daily        Mass of right lung  Assessment & Plan  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  Was scheduled to have IR guided biopsy as outpatient on 7/21 however was done inpatient on 7/21 with IR  · Patient is currently status post for repeat lung biopsy on 07/27 since the biopsy from several days ago resulted in necrotic tissue without viable tumor - target possibly left lung  · Eliquis resumed  · Status post right thoracocentesis 7/30-- follow-up on cytology  · Oncology following, plan for PET later this morning outpatient    Paroxysmal A-fib with RVR  Assessment & Plan  · Initially presented with AFib RVR, Rate controlled now  Briefly was uncontrolled on 7/29 and was status post Cardizem and IV Albumin and resulted in returning to rate controlled A   Flutter   · Continue oral amiodarone 200mg PO BID x14 doses, then 200mg PO QD  · Continue routine metoprolol 25mg BID  · Eliquis for anticoagulation  · Cardiology input noted    Idiopathic thrombocytopenic purpura (ITP) (HCC)  Assessment & Plan  · Was previously receiving IV Solu-Medrol  · Transitioned to PO Prednisone 20 mg on 07/27  · As per pulmonary disease does not to be on steroid   · Platelets remain stable  · D/W Heme/onc-- does receive rituxin as an OP; plan to keep on prednisone 20mg PO QD as he was on prolonged taper as an OP  · Will continue steroids and have patient follow up with Heme/Onc    * Acute respiratory failure with hypoxia and hypercapnia ([facility] [address] )  Assessment & Plan  · Multifactorial  · Currently on 2 L saturating adequately  · Will discharge with home oxygen          Discharging Physician / Practitioner: Colette Sierra MD  PCP: Rosa Watson DO  Admission Date:   Admission Orders (From admission, onward)     Ordered        07/14/21 1523  Inpatient Admission  Once                   Discharge Date: 08/05/21    Medical Problems     Resolved Problems  Date Reviewed: 8/5/2021 Resolved    Hypertensive emergency 7/19/2021     Resolved by  CHRIS Archuleta    SULEMA (acute kidney injury) ([facility] [address] ) 7/19/2021     Resolved by  CHRIS Archuleta    Hypercalcemia 7/20/2021     Resolved by  CHRIS Archuleta    Hyponatremia 7/29/2021     Resolved by  Benedicto Uriarte PA-C                Consultations During Hospital Stay:  · Pulmonology, Cardiology oncology    Procedures Performed:   · none    Significant Findings / Test Results:   CT chest abdomen pelvis w contrast    Result Date: 7/23/2021  Impression: Reidentification of pulmonary masses highly suspicious for bronchogenic malignancy  The dominant lesion in the perihilar right upper lobe now demonstrates central necrotic cavitation  There is slight increasing size of freely layering right pleural effusion  As on prior examination there is prominence of the central pulmonary arterial tree suggesting some element of pulmonary artery hypertension  No tumor mass is identified in the abdomen or pelvis  Workstation performed: [id number]     IR biopsy lung    Result Date: 7/21/2021  · Impression: Impression: CT-guided biopsy of a large right lung mass Since interval CT approximately one month ago there has been growth of the mass and development of accompanying right pleural effusion  Consider thoracentesis if there is persistent respiratory failure  Additional presumed metastatic nodules have also increased in size  Workstation performed: [id number]     Incidental Findings:   · none     Test Results Pending at Discharge (will require follow up):   · nond     Outpatient Tests Requested:  · none    Complications:  none    Reason for Admission: none    Hospital Course:     Tia Chong is a 68 y o  male patient who originally presented to the hospital on 7/14/2021 with past medical history significant COPD, ITP, AFib initially presented shortness of breath  Secondary to acute congestive heart failure COPD    Was treated IV steroids IV diuretics significant improvement was found have mass in the right lung  Followed by oncology  Will have outpatient PET scan later today  Had biopsy performed  Will have outpatient follow-up with Oncology  Qualify for home oxygen 2 L with exertion      Please see above list of diagnoses and related plan for additional information  Condition at Discharge: stable     Discharge Day Visit / Exam:     Subjective:  No acute complaints  Vi  Physical Exam  Constitutional:       General: He is not in acute distress  Appearance: He is well-developed  He is not diaphoretic  HENT:      Head: Normocephalic and atraumatic  Nose: Nose normal       Mouth/Throat:      Pharynx: No oropharyngeal exudate  Eyes:      General: No scleral icterus  Conjunctiva/sclera: Conjunctivae normal    Cardiovascular:      Rate and Rhythm: Normal rate and regular rhythm  Heart sounds: Normal heart sounds  No murmur heard  No friction rub  No gallop  Pulmonary:      Effort: Pulmonary effort is normal  No respiratory distress  Breath sounds: Normal breath sounds  No wheezing or rales  Chest:      Chest wall: No tenderness  Abdominal:      General: Bowel sounds are normal  There is no distension  Palpations: Abdomen is soft  Tenderness: There is no abdominal tenderness  There is no guarding  Musculoskeletal:         General: No tenderness or deformity  Normal range of motion  Cervical back: Normal range of motion and neck supple  Skin:     General: Skin is warm and dry  Findings: No erythema  Neurological:      Mental Status: He is alert  Mental status is at baseline  (  Discussion with Family: pt    Discharge instructions/Information to patient and family:   See after visit summary for information provided to patient and family  Provisions for Follow-Up Care:  See after visit summary for information related to follow-up care and any pertinent home health orders  Disposition:     Home with [facility] Services (Reminder: Complete face to face encounter)    For Discharges to [facility]:   · Not Applicable to this Patient - Not Applicable to this Patient    Planned Readmission: none     Discharge Statement:  I spent 60 minutes discharging the patient  This time was spent on the day of discharge  I had direct contact with the patient on the day of discharge  Greater than 50% of the total time was spent examining patient, answering all patient questions, arranging and discussing plan of care with patient as well as directly providing post-discharge instructions  Additional time then spent on discharge activities  Discharge Medications:  See after visit summary for reconciled discharge medications provided to patient and family        ** Please Note: This note has been constructed using a voice recognition system ** , ,

## 2021-08-05 NOTE — CASE MANAGEMENT
CM made aware patient is medically stable for DC today per care team rounds  Per follow up via [place], set up for concentrator is arranged for between 1-5 pm today  Per conversation with [facility], portable tank to be delivered within the next 30 minutes  CM updated bedside RN of same who reports they will inform patient  Patient's son took off today to help patient get set up at home and provide transportation  CM made [employer] aware via message in [employer] and voicemail for DC today  CM faxed DCI to agency to ensure coordination of care  CM received call back from Arlyn with [facility] agency providing better fax number  CM faxed DCI to [phone]  No further needs noted at this time

## 2021-08-05 NOTE — PROGRESS NOTES
Medical Oncology/Hematology Progress Note  Rolan Fairbanks, male, 68 y o , 1940,  S /S -01, [phone]     Reason for admission: Acute respiratory failure with hypoxia and hypercapnia  Reason for consultation: Mass of right lung      ASSESSMENT AND PLAN:     1  Mass of right lung   Patient presents with worsening SOB/SALDER   IR biopsy lung CT demonstrated increase in left upper lung mass 2 1cm --> 3 7cm from study done on June 18 2021 with small right pleural effusion  Right base presumed metastatic nodule increased 9mm -->14mm   Previous EBUS done on 6/21 with pathology indicating atypical cells, negative for malignancy (see imaging below)   CTA on 6/18 concerning for encasement of right pulmonary artery, encasement with narrowing and occlusion of anterior RUL and RML with obstructive atelectasis    MRI brain on 6/20/21 negative for metastatic disease or other acute pathology   Plan on last admission outpatient oncology follow up with PET CT    Recent bx mass of right lung demonstrated necrotic tissue and could not be sent for ancillary testing   Biopsy of left lung demonstrated necrotic tissue and again cannot be sent for ancillary testing   Pleural fluid cytology resulted from 7/30 negative for malignancy with benign mesothelial cells, neutrophils, and lymphocytes  · Complaints of worsening shortness of breath, SADLER, tachypnea yesterday  CT chest resulted with no significant change to right lung mass and other pulmonary nodules  Plan:    Discussed patient at thoracic tumor board and again with CT surgery for input   Will obtain PET scan as outpatinet to determine  most metabolically active site for repeat IR biopsy    Inpatient PET scan cannot be performed and if patient is stable, can complete this and obtain biopsy as outpatient - approved through patient's insurance, working to schedule appointment    PET scheduled for 8/10 and will arrange follow up with Dr Jill Paulino Please arrange outpatient follow up with IR for biopsy     2  ITP   Managed by Dr Juju Leslie as outpatient   Currently on prednisone and scheduled for rituxan infusion on 7/23 - will notify Dr Lexi Faulkner team of cancellation for this appointment in infusion center   Platelet level 160,079 on 7/30 will repeat cbc   No plan for inpatient rituxan       3  Normocytic Anemia  · CBC 7/30 hgb 9 7 and stable, iron panel B12 and folate WNL  · Morning CBC diff ordered showing hgb 9 6 today and stable            Mass; RUL mass     Lymph Nodes, Lymph Node 7     Lymph Nodes, Lymph Node 7     Lymph Nodes, Lymph Node 10R; RUL Mass     Mass; RUL mass   Anatomical Diagram        Patient understands and is in agreement with this plan  Thank you for the opportunity to participate in this patient's care  Interval History: Worsening SOB, waiting repeat CT scan  Currently on 3L nasal cannula and will be dc with home oxygen  History of present illness: Patient is a 67 yo male with pmh significant for nicotine dependence, COPD, AFIB, T2DM who presented after being sent by PCP for hypercalcemia, hyperkalemia, SULEMA as seen on labs  Former smoker, 0 5ppd for 40 years  Review of Systems:   Review of Systems   Constitutional: Positive for activity change, appetite change and fatigue  Negative for chills, fever and unexpected weight change  HENT: Negative for ear pain and sore throat  Eyes: Negative for pain and visual disturbance  Respiratory: Positive for cough, chest tightness and shortness of breath  Cardiovascular: Negative for chest pain, palpitations and leg swelling  Gastrointestinal: Negative for abdominal pain, constipation, nausea and vomiting  Genitourinary: Negative for dysuria and hematuria  Musculoskeletal: Negative for arthralgias and back pain  Skin: Negative for color change and rash  Neurological: Negative for seizures and syncope  All other systems reviewed and are negative          PHYSICAL EXAM:    BP (!) 89/58 (BP Location: Right arm) Comment: pt currently recieving breathing treatment  Pulse (!) 148   Temp 98 4 °F (36 9 °C) (Oral)   Resp 15   Ht 5' 2" (1 575 m)   Wt 75 8 kg (167 lb)   SpO2 97%   BMI 30 54 kg/m²     Physical Exam  Vitals and nursing note reviewed  Constitutional:       General: He is in acute distress  Appearance: Normal appearance  He is not ill-appearing  HENT:      Head: Normocephalic and atraumatic  Eyes:      General: No scleral icterus  Cardiovascular:      Rate and Rhythm: Normal rate and regular rhythm  Pulses: Normal pulses  Heart sounds: Normal heart sounds  No murmur heard  Pulmonary:      Effort: Pulmonary effort is normal       Breath sounds: Wheezing (expiratory lower left lobe) and rhonchi present  Comments: Diminished breath sounds right   Chest:      Chest wall: Tenderness (over biopsy site) present  Abdominal:      General: Bowel sounds are normal  There is no distension  Palpations: Abdomen is soft  There is no mass  Tenderness: There is no abdominal tenderness  Musculoskeletal:      Right lower leg: No edema  Left lower leg: No edema  Lymphadenopathy:      Cervical: No cervical adenopathy  Skin:     General: Skin is warm and dry  Coloration: Skin is pale  Neurological:      Mental Status: He is alert and oriented to person, place, and time  Psychiatric:         Thought Content:  Thought content normal          LABS:     Recent Results (from the past 48 hour(s))   Fingerstick Glucose (POCT)    Collection Time: 08/03/21 12:03 PM   Result Value Ref Range    POC Glucose 130 65 - 140 mg/dl   Fingerstick Glucose (POCT)    Collection Time: 08/03/21  3:53 PM   Result Value Ref Range    POC Glucose 258 (H) 65 - 140 mg/dl   Fingerstick Glucose (POCT)    Collection Time: 08/03/21  9:06 PM   Result Value Ref Range    POC Glucose 176 (H) 65 - 140 mg/dl   Fingerstick Glucose (POCT)    Collection Time: 08/04/21  6:56 AM   Result Value Ref Range    POC Glucose 125 65 - 140 mg/dl   ECG 12 lead    Collection Time: 08/04/21  9:25 AM   Result Value Ref Range    Ventricular Rate 81 BPM    Atrial Rate 288 BPM    HI Interval  ms    QRSD Interval 96 ms    QT Interval 374 ms    QTC Interval 434 ms    P [place] 242 degrees    QRS Axis 63 degrees    T Wave Axis 80 degrees   Fingerstick Glucose (POCT)    Collection Time: 08/04/21 11:33 AM   Result Value Ref Range    POC Glucose 215 (H) 65 - 140 mg/dl   Fingerstick Glucose (POCT)    Collection Time: 08/04/21  3:59 PM   Result Value Ref Range    POC Glucose 328 (H) 65 - 140 mg/dl   Home O2 Setup    Collection Time: 08/04/21  5:11 PM   Result Value Ref Range    Supplier Name [address]     Supplier Phone Number [phone]     Order Status Ready For Delivery     Delivery Note      Delivery Request Date 08/04/2021     Supplier Name 08/05/2021     Item Description       Home Oxygen Concentrator with Portability, Adult, Standard Liter Flow    Item Description Portable Gaseous Oxygen System     Item Description Portable O2 Contents, Gas     Item Description No Conserving Device    Fingerstick Glucose (POCT)    Collection Time: 08/04/21  9:07 PM   Result Value Ref Range    POC Glucose 204 (H) 65 - 140 mg/dl   CBC and differential    Collection Time: 08/05/21  5:55 AM   Result Value Ref Range    WBC 8 86 4 31 - 10 16 Thousand/uL    RBC 3 94 3 88 - 5 62 Million/uL    Hemoglobin 9 6 (L) 12 0 - 17 0 g/dL    Hematocrit 32 9 (L) 36 5 - 49 3 %    MCV 84 82 - 98 fL    MCH 24 4 (L) 26 8 - 34 3 pg    MCHC 29 2 (L) 31 4 - 37 4 g/dL    RDW 19 9 (H) 11 6 - 15 1 %    MPV 9 7 8 9 - 12 7 fL    Platelets 058 975 - 406 Thousands/uL    nRBC 0 /100 WBCs   Basic metabolic panel    Collection Time: 08/05/21  5:55 AM   Result Value Ref Range    Sodium 132 (L) 136 - 145 mmol/L    Potassium 4 5 3 5 - 5 3 mmol/L    Chloride 98 (L) 100 - 108 mmol/L    CO2 28 21 - 32 mmol/L    ANION GAP 6 4 - 13 mmol/L    BUN 32 (H) 5 - 25 mg/dL    Creatinine 1 39 (H) 0 60 - 1 30 mg/dL    Glucose 132 65 - 140 mg/dL    Calcium 9 3 8 3 - 10 1 mg/dL    eGFR 48 ml/min/1 73sq m   Manual Differential(PHLEBS Do Not Order)    Collection Time: 21  5:55 AM   Result Value Ref Range    Segmented % 92 (H) 43 - 75 %    Lymphocytes % 2 (L) 14 - 44 %    Monocytes % 5 4 - 12 %    Eosinophils, % 0 0 - 6 %    Basophils % 0 0 - 1 %    Myelocytes % 1 0 - 1 %    Absolute Neutrophils 8 15 (H) 1 85 - 7 62 Thousand/uL    Lymphocytes Absolute 0 18 (L) 0 60 - 4 47 Thousand/uL    Monocytes Absolute 0 44 0 00 - 1 22 Thousand/uL    Eosinophils Absolute 0 00 0 00 - 0 40 Thousand/uL    Basophils Absolute 0 00 0 00 - 0 10 Thousand/uL    Total Counted 100     Anisocytosis Present     Hypochromia Present     Platelet Estimate Adequate Adequate   Fingerstick Glucose (POCT)    Collection Time: 21  7:37 AM   Result Value Ref Range    POC Glucose 177 (H) 65 - 140 mg/dl       Echo complete with contrast if indicated    Result Date: 2021  Narrative: [address] [place] [address], [address] [place] [address] [phone] Transthoracic Echocardiogram 2D, M-mode, Doppler, and Color Doppler Study date:  2021 Patient: Chelly Murphy MR number: [id number] Account number: [de-identified] : 1940 Age: 68 years Gender: Male Status: Inpatient Location: Bedside Height: 62 in Weight: 173 6 lb BP: 138/ 65 mmHg Indications: A-fib  Diagnoses: I48 0 - Atrial fibrillation Sonographer:  ROSALINA Ferguson Primary Physician:  Vic Sher DO Referring Physician:  Virginia Carballo PA-C Group:  [id number] Interpreting Physician:  Gloria Chambers MD SUMMARY LEFT VENTRICLE: Systolic function was normal  Ejection fraction was estimated to be 60 %  There were no regional wall motion abnormalities  MITRAL VALVE: There was mild regurgitation  TRICUSPID VALVE: There was mild regurgitation  Estimated peak PA pressure was 42 mmHg   The findings suggest mild pulmonary hypertension  HISTORY: PRIOR HISTORY: PAF, HTN, SULEMA, SOB, lung mass, DM2, thrombocytopenia, current smoker  PROCEDURE: The procedure was performed at the bedside  This was a routine study  The transthoracic approach was used  The study included complete 2D imaging, M-mode, complete spectral Doppler, and color Doppler  The heart rate was 77 bpm, at the start of the study  Images were obtained from the parasternal, apical, subcostal, and suprasternal notch acoustic windows  Image quality was adequate  LEFT VENTRICLE: Size was normal  Systolic function was normal  Ejection fraction was estimated to be 60 %  There were no regional wall motion abnormalities  Wall thickness was normal  DOPPLER: Left ventricular diastolic function parameters were normal for the patient's age  RIGHT VENTRICLE: The size was normal  Systolic function was normal  Wall thickness was normal  LEFT ATRIUM: Size was normal  RIGHT ATRIUM: Size was normal  MITRAL VALVE: Valve structure was normal  There was normal leaflet separation  DOPPLER: The transmitral velocity was within the normal range  There was no evidence for stenosis  There was mild regurgitation  AORTIC VALVE: The valve was trileaflet  Leaflets exhibited normal thickness and normal cuspal separation  DOPPLER: Transaortic velocity was within the normal range  There was no evidence for stenosis  There was no significant regurgitation  TRICUSPID VALVE: The valve structure was normal  There was normal leaflet separation  DOPPLER: The transtricuspid velocity was within the normal range  There was no evidence for stenosis  There was mild regurgitation  Estimated peak PA pressure was 42 mmHg  The findings suggest mild pulmonary hypertension  PULMONIC VALVE: Leaflets exhibited normal thickness, no calcification, and normal cuspal separation  DOPPLER: The transpulmonic velocity was within the normal range  There was no significant regurgitation   PERICARDIUM: There was no pericardial effusion  The pericardium was normal in appearance  AORTA: The root exhibited normal size  SYSTEMIC VEINS: IVC: The inferior vena cava was normal in size  Respirophasic changes were normal  SYSTEM MEASUREMENT TABLES 2D %FS: 30 02 % Ao Diam: 3 54 cm EDV(Teich): 123 19 ml EF(Teich): 56 95 % ESV(Teich): 53 03 ml IVSd: 0 92 cm LA Diam: 4 57 cm LAAs A2C: 23 05 cm2 LAAs A4C: 22 52 cm2 LAESV A-L A2C: 77 35 ml LAESV A-L A4C: 69 99 ml LAESV Index (A-L): 41 99 ml/m2 LAESV MOD A2C: 73 99 ml LAESV MOD A4C: 64 59 ml LAESV(A-L): 75 58 ml LAESV(MOD BP): 70 92 ml LALs A2C: 5 83 cm LALs A4C: 6 15 cm LVIDd: 5 09 cm LVIDs: 3 56 cm LVPWd: 0 85 cm RVIDd: 3 65 cm SV(Teich): 70 16 ml CW AV Env  Ti: 281 64 ms AV MaxP 92 mmHg AV VTI: 22 53 cm AV Vmax: 1 49 m/s AV Vmean: 0 8 m/s AV meanPG: 3 13 mmHg TR MaxP 17 mmHg TR Vmax: 3 13 m/s MM TAPSE: 3 01 cm PW E' Sept: 0 09 m/s E/E' Sept: 11 25 LVOT Env  Ti: 342 53 ms LVOT VTI: 21 45 cm LVOT Vmax: 1 01 m/s LVOT Vmean: 0 63 m/s LVOT maxP 07 mmHg LVOT meanP 9 mmHg MV A Ge: 1 12 m/s MV Dec [place]: 6 18 m/s2 MV DecT: 167 82 ms MV E Ge: 1 04 m/s MV E/A Ratio: 0 93 MV PHT: 48 67 ms MVA By PHT: 4 52 cm2 [employer] Accredited Echocardiography Laboratory Prepared and electronically signed by Yael Sams MD Signed 2021 12:57:51     XR chest portable    Result Date: 2021  Narrative: CHEST INDICATION:   Shortness of breath  No fibrillation  Wheezing  COMPARISON:  2021 EXAM PERFORMED/VIEWS:  XR CHEST PORTABLE FINDINGS:  Trace case pacer pads noted  Large mass over the right lung is unchanged  Left lung is clear  Blunting in the right costophrenic angle suggests small effusion  No pneumothorax  Heart shadow is unremarkable  Osseous structures appear within normal limits for patient age  Impression: No interval change from 2021   Workstation performed: [id number]     XR chest portable    Result Date: 2021  Narrative: CHEST INDICATION: Shortness of breath, wheezing  COMPARISON:  7/14/2021, CTA chest 6/18/2021 EXAM PERFORMED/VIEWS:  XR CHEST PORTABLE FINDINGS: Heart shadow is obscured on the right by adjacent opacity  The trachea is midline  Large mass involving the right mid to upper lung is again identified with obscuration of the right heart border compatible with element of right middle lobe postobstructive atelectasis and/or pneumonia  There is flattening of the right diaphragm suggesting subpulmonic pleural effusion  Faint nodular opacity peripheral left midlung projecting over the inferior border of the scapula in keeping with known nodule on CT  Paravertebral ossifications thoracic spine  Impression: No change in large right lung mass with associated postobstructive atelectasis or pneumonia right middle lobe and small right pleural effusion  Left lung nodule  Workstation performed: [id number]     XR chest 1 view portable    Result Date: 7/14/2021  Narrative: CHEST INDICATION:   known lung mass, with cough  COMPARISON:  6/18/2021 EXAM PERFORMED/VIEWS:  XR CHEST PORTABLE Single view FINDINGS: Large right upper lobe mass consistent with malignancy with mild interval enlargement  small right pleural effusion again evident Cardiomediastinal silhouette appears unremarkable  No pneumothorax Osseous structures appear within normal limits for patient age  Impression: Large right lung mass, slightly increased in size, consistent with malignancy  Persistent small right pleural effusion Workstation performed: [id number]     IR biopsy lung    Result Date: 7/21/2021  Narrative: CT-guided lung mass biopsy Clinical History: Lung mass suspicious for malignant process  Prior endobronchial ultrasound without malignant cells identified  Respiratory failure  Atrial fibrillation on anticoagulation  Moderate sedation time: 35 minutes Procedure: After explaining the risks and benefits of the procedure to the patient, informed consent was obtained   CT was used to localize the lung mass   Radiation dose length product (DLP) for this visit:  1204 mGy   This examination, like all CT scans performed in the [facility], was performed utilizing techniques to minimize radiation dose exposure, including the use of iterative reconstruction and automated exposure control  The overlying skin was prepped and draped in the usual sterile fashion  Local anesthesia was obtained with a 1% lidocaine solution  Using CT guidance, a 17-gauge coaxial needle was advanced  9 passes with an 18g gauge core biopsy needle were performed  The tissue was given to pathology  The needle was removed and final imaging performed  Patient tolerated the procedure without immediate complication Findings: 43 3 cm right lung masslike density  There is architectural distortion and it is difficult to discern what may be intrinsic mass and what may in fact be atelectatic compressed lung  Needle within the mass  Postbiopsy changes without hematoma  Since prior CT of June 18 there has been interval increase in size of the mass and development of a small right pleural effusion  A left upper lung 3 7 cm mass is also present which has increased in size from 2 1 cm on prior study  A right base presumed metastatic nodule is also increased measuring 14 mm, previously 9 mm  Specimens: Flow cytometry, touch prep x4, 18-gauge core x9 The patient tolerated the procedure well and left the department in stable condition  Impression: Impression: CT-guided biopsy of a large right lung mass Since interval CT approximately one month ago there has been growth of the mass and development of accompanying right pleural effusion  Consider thoracentesis if there is persistent respiratory failure  Additional presumed metastatic nodules have also increased in size   Workstation performed: [id number]         HISTORY:    Past Medical History:   Diagnosis Date    Hypertension        Past Surgical History:   Procedure Laterality Date    BACK SURGERY      CARPAL TUNNEL RELEASE Bilateral     IR BIOPSY BONE MARROW  6/10/2021    IR BIOPSY LUNG  2021    IR BIOPSY OTHER  2021    IR THORACENTESIS  2021    LUMBAR DISC SURGERY         Family History   Problem Relation Age of Onset    Cancer Mother         "ate away her bone"    Anuerysm Father     Cancer Sister         unknown type    Heart attack Maternal Grandfather     Cancer Sister         unknown type    Heart attack Maternal Aunt     Heart attack Maternal Uncle     Heart attack Paternal Aunt        Social History     Socioeconomic History    Marital status: /Civil Union     Spouse name: Samson Boyle Number of children: 10    Years of education: None    Highest education level: None   Occupational History    None   Tobacco Use    Smoking status: Former Smoker     Packs/day: 0 50     Years: 40 00     Pack years: 20 00     Types: Cigarettes     Start date:      Quit date: 2021     Years since quittin 1    Smokeless tobacco: Never Used   Vaping Use    Vaping Use: Never used   Substance and Sexual Activity    Alcohol use: Not Currently     Comment: History of heavier drinking in early s  Denies any current alcohol use (Updated 2021)   Drug use: Never    Sexual activity: None   Other Topics Concern    None   Social History Narrative    Previously worked as heavy machinery technician  Lives with his wife who is essentially bed bound  Social Determinants of Health     Financial Resource Strain: High Risk    Difficulty of Paying Living Expenses: Hard   Food Insecurity: Food Insecurity Present    Worried About Running Out of Food in the Last Year: Sometimes true    [place] of Food in the Last Year: Not on file   Transportation Needs:     Lack of Transportation (Medical):      Lack of Transportation (Non-Medical):    Physical Activity:     Days of Exercise per Week:     Minutes of Exercise per Session:    Stress:     Feeling of Stress :    Social Connections:     Frequency of Communication with Friends and Family:     Frequency of Social Gatherings with Friends and Family:     Attends Religion Services:     Active Member of Clubs or Organizations:     Attends Club or Organization Meetings:     Marital Status:    Intimate Partner Violence:     Fear of Current or Ex-Partner:     Emotionally Abused:     Physically Abused:     Sexually Abused:          Current Facility-Administered Medications:     acetaminophen (TYLENOL) tablet 650 mg, 650 mg, Oral, Q6H PRN, Jill Locke PA-C, 650 mg at 08/05/21 0600    al mag oxide-diphenhydramine-lidocaine viscous (MAGIC MOUTHWASH) suspension 10 mL, 10 mL, Swish & Spit, Q4H PRN, Jerrell Schwartz PA-C, 10 mL at 07/29/21 2134    albuterol inhalation solution 2 5 mg, 2 5 mg, Nebulization, Q6H PRN, CHRIS Pollard, 2 5 mg at 08/05/21 0128    allopurinol (ZYLOPRIM) tablet 300 mg, 300 mg, Oral, Daily, Jill Locke PA-C, 300 mg at 08/04/21 0408    amiodarone tablet 200 mg, 200 mg, Oral, BID With Meals, Regla Venegas PA-C, 200 mg at 08/04/21 1715    apixaban (ELIQUIS) tablet 5 mg, 5 mg, Oral, BID, Jerrell Schwartz PA-C, 5 mg at 08/04/21 1715    benzonatate (TESSALON PERLES) capsule 100 mg, 100 mg, Oral, TID, Jill Locke PA-C, 100 mg at 08/04/21 2213    dextromethorphan-guaiFENesin (ROBITUSSIN DM) oral syrup 10 mL, 10 mL, Oral, Q4H PRN, Jill Locke PA-C, 10 mL at 07/19/21 0831    Diclofenac Sodium (VOLTAREN) 1 % topical gel 2 g, 2 g, Topical, 4x Daily, Regla Venegas PA-C, 2 g at 08/04/21 2214    fish oil capsule 1,000 mg, 1,000 mg, Oral, BID, Jill Locke PA-C, 1,000 mg at 08/04/21 1715    gabapentin (NEURONTIN) capsule 100 mg, 100 mg, Oral, HS, Regla Venegas PA-C, 100 mg at 08/04/21 2213    guaiFENesin (MUCINEX) 12 hr tablet 600 mg, 600 mg, Oral, Q12H [address], Jill Locke PA-C, 600 mg at 08/04/21 2213    heparin (porcine) injection 2,000 Units, 2,000 Units, Intravenous, Q1H PRN, Diane Godwin PA-C, 2,000 Units at 07/28/21 0120    heparin (porcine) injection 4,000 Units, 4,000 Units, Intravenous, Q1H PRN, Diane Godwin PA-C    insulin glargine (LANTUS) subcutaneous injection 15 Units 0 15 mL, 15 Units, Subcutaneous, HS, Alexsandra Bond MD, 15 Units at 08/04/21 2213    insulin lispro (HumaLOG) 100 units/mL subcutaneous injection 1-5 Units, 1-5 Units, Subcutaneous, HS, Jill Locke PA-C, 1 Units at 08/04/21 2213    insulin lispro (HumaLOG) 100 units/mL subcutaneous injection 2-12 Units, 2-12 Units, Subcutaneous, TID AC, 8 Units at 08/04/21 1715 **AND** Fingerstick Glucose (POCT), , , TID AC, Jill Locke PA-C    insulin lispro (HumaLOG) 100 units/mL subcutaneous injection 8 Units, 8 Units, Subcutaneous, TID With Meals, Alexsandra Bond MD, 8 Units at 08/04/21 1716    levalbuterol (XOPENEX) inhalation solution 1 25 mg, 1 25 mg, Nebulization, TID, CHRIS Dumas, 1 25 mg at 08/05/21 0735    levothyroxine tablet 150 mcg, 150 mcg, Oral, Daily, Jill Locke PA-C, 150 mcg at 08/05/21 0600    lidocaine (LIDODERM) 5 % patch 1 patch, 1 patch, Topical, Q24H, Jill Locke PA-C, 1 patch at 07/28/21 1154    metoprolol tartrate (LOPRESSOR) tablet 25 mg, 25 mg, Oral, Q12H [address], Jill Locke PA-C, 25 mg at 08/04/21 2216    ondansetron (ZOFRAN) injection 4 mg, 4 mg, Intravenous, Q6H PRN, Jill Locke PA-C    pantoprazole (PROTONIX) EC tablet 40 mg, 40 mg, Oral, Daily, Jill Locke PA-C, 40 mg at 08/04/21 0734    predniSONE tablet 20 mg, 20 mg, Oral, Daily, Jerrell Schwartz PA-C, 20 mg at 08/04/21 0734    sodium chloride 0 9 % inhalation solution 3 mL, 3 mL, Nebulization, TID, CHRIS Dumas, 3 mL at 08/05/21 0735    tiotropium (SPIRIVA) capsule for inhaler 18 mcg, 18 mcg, Inhalation, Daily, Jill Locke PA-C, 18 mcg at 08/04/21 0740    Medications Prior to Admission   Medication    acetaminophen (TYLENOL) 100 mg/mL solution    allopurinol (ZYLOPRIM) 300 mg tablet    amLODIPine (NORVASC) 2 5 mg tablet    apixaban (ELIQUIS) 5 mg    benzonatate (TESSALON PERLES) 100 mg capsule    guaiFENesin (MUCINEX) 600 mg 12 hr tablet    levothyroxine 150 mcg tablet    metFORMIN (GLUCOPHAGE) 500 mg tablet    metoprolol tartrate (LOPRESSOR) 25 mg tablet    Omega-3 Fatty Acids (FISH OIL) 1,000 mg    pantoprazole (PROTONIX) 40 mg tablet    tiotropium (SPIRIVA) 18 mcg inhalation capsule    [DISCONTINUED] albuterol (2 5 mg/3 mL) 0 083 % nebulizer solution       Allergies   Allergen Reactions    Penicillins Hives       Labs and pertinent reports reviewed  This note has been generated by voice recognition software system  Therefore, there may be spelling, grammar, and or syntax errors  Please contact if questions arise

## 2021-08-05 NOTE — TELEPHONE ENCOUNTER
Reached out to patient to inform him of the [email or web address] appt [address]  Reviewed prep   Patient verbalized understanding

## 2021-08-05 NOTE — ASSESSMENT & PLAN NOTE
· With acute exacerbation on admission - presenting with worsening shortness of breath, productive cough and wheezing     · Pulmonology consulted appreciate input  · Patient is usually on 3 L nasal cannula at home  · Continue Xopenex and Spiriva and 3% nebulizers  · Continue respiratory protocol  · Outpatient Pulmonary follow-up at discharge  · Discharge on Bevespi with albuterol PRN home meds   · Per pulmonary, steroids not indicated for COPD at this time

## 2021-08-05 NOTE — ASSESSMENT & PLAN NOTE
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  Was scheduled to have IR guided biopsy as outpatient on 7/21 however was done inpatient on 7/21 with IR      · Patient is currently status post for repeat lung biopsy on 07/27 since the biopsy from several days ago resulted in necrotic tissue without viable tumor - target possibly left lung  · Eliquis resumed  · Status post right thoracocentesis 7/30-- follow-up on cytology  · Oncology following, plan for PET later this morning outpatient

## 2021-08-06 ENCOUNTER — HOSPITAL ENCOUNTER (OUTPATIENT)
Dept: RADIOLOGY | Age: 81
Discharge: HOME | End: 2021-08-06
Payer: COMMERCIAL

## 2021-08-06 DIAGNOSIS — D38.1 NEOPLASM OF UNCERTAIN BEHAVIOR OF RIGHT UPPER LOBE OF LUNG: ICD-10-CM

## 2021-08-06 DIAGNOSIS — I48.0 PAROXYSMAL A-FIB (HCC): ICD-10-CM

## 2021-08-06 LAB — GLUCOSE SERPL-MCNC: 192 MG/DL (ref 65–140)

## 2021-08-06 PROCEDURE — A9552 F18 FDG: HCPCS

## 2021-08-06 PROCEDURE — 78815 PET IMAGE W/CT SKULL-THIGH: CPT

## 2021-08-06 PROCEDURE — 82948 REAGENT STRIP/BLOOD GLUCOSE: CPT

## 2021-08-06 PROCEDURE — G1004 CDSM NDSC: HCPCS

## 2021-08-06 RX ORDER — APIXABAN 5 MG/1
TABLET, FILM COATED ORAL
Qty: 60 TABLET | Refills: 1 | Status: SHIPPED | OUTPATIENT
Start: 2021-08-06 | End: 2021-09-01 | Stop reason: HOSPADM

## 2021-08-06 NOTE — UTILIZATION REVIEW
Notification of Discharge   This is a Notification of Discharge from our facility [address]  Please be advised that this patient has been discharge from our facility  Below you will find the admission and discharge date and time including the patients disposition  UTILIZATION REVIEW CONTACT:  Hector Rondon  Utilization   Network Utilization Review Department  Phone: [phone] x carefully listen to the prompts  All voicemails are confidential   Email: [email or web address]     PHYSICIAN ADVISORY SERVICES:  FOR OFML-DO-XEPX REVIEW - MEDICAL NECESSITY DENIAL  Phone: [phone]  Fax: [phone]  Email: [email or web address]     PRESENTATION DATE: 7/14/2021  1:39 PM  OBERVATION ADMISSION DATE:   INPATIENT ADMISSION DATE: 7/14/21  3:23 PM   DISCHARGE DATE: 8/5/2021  1:54 PM  DISPOSITION: Home with [place] with [address] [place] [address] Road INFORMATION:  Send all requests for admission clinical reviews, approved or denied determinations and any other requests to dedicated fax number below belonging to the campus where the patient is receiving treatment   List of dedicated fax numbers:  [address] [place] [address] DENIALS (Administrative/Medical Necessity) [phone]   [address]  (Maternity/NICU/Pediatrics) [phone]   Elfida Lips [phone]   Melisa Castro [phone]   Maude Moya [phone]   [facility] [address] [facility] [phone]   [facility]  [phone] [address] [facility] [address]  [address] [facility] [address] [facility] [phone]

## 2021-08-10 LAB
MYCOBACTERIUM SPEC CULT: NORMAL
RHODAMINE-AURAMINE STN SPEC: NORMAL

## 2021-08-11 LAB — SCAN RESULT: NORMAL

## 2021-08-13 ENCOUNTER — PATIENT OUTREACH (OUTPATIENT)
Dept: CASE MANAGEMENT | Facility: HOSPITAL | Age: 81
End: 2021-08-13

## 2021-08-13 ENCOUNTER — TELEPHONE (OUTPATIENT)
Dept: CARDIOLOGY CLINIC | Facility: CLINIC | Age: 81
End: 2021-08-13

## 2021-08-13 NOTE — TELEPHONE ENCOUNTER
Patient's visiting nurse called office with and update on his blood pressure  It has been on the low side running 100's / 60's  His highest reading was 118/78  His pulse is irregular and at times up to 130's   During his recent hospitalization amiodarone was titrated and he is currently taking 200mg 1 1/2 tabs daily   Please advise

## 2021-08-13 NOTE — PROGRESS NOTES
LSW reached out to pt for follow up  Pts wife answered the phone and stated Mi Bro was resting, she did get him on the phone after awhile  Pt stated no problems and no assistance needed at this time  Pt was quiet spoken and responded with short answers  LSW asked if there were any needs in finances, transportation or counseling and pt stated no assistance is needed  LSW will check in on pt in the near future

## 2021-08-13 NOTE — TELEPHONE ENCOUNTER
Called visiting nurse with instructions , she will call office next week if his heart rate does not come down

## 2021-08-17 NOTE — TELEPHONE ENCOUNTER
Home Care nurse, Eliseo Mcdowell, called back today  Since increase in Amiodarone to 400 mg daily on 8/13, pt's HR remains the same  He complains of feeling tired  /70 today  Oscar told the nurse the lowest HR he has seen was 120  He check periodically throughout the day  OV with you on 8/27

## 2021-08-18 ENCOUNTER — CLINICAL SUPPORT (OUTPATIENT)
Dept: CARDIOLOGY CLINIC | Facility: CLINIC | Age: 81
End: 2021-08-18
Payer: COMMERCIAL

## 2021-08-18 ENCOUNTER — APPOINTMENT (EMERGENCY)
Dept: RADIOLOGY | Facility: HOSPITAL | Age: 81
DRG: 309 | End: 2021-08-18
Payer: COMMERCIAL

## 2021-08-18 ENCOUNTER — HOSPITAL ENCOUNTER (INPATIENT)
Facility: HOSPITAL | Age: 81
LOS: 6 days | Discharge: HOME HEALTH | DRG: 309 | End: 2021-08-24
Attending: EMERGENCY MEDICINE | Admitting: INTERNAL MEDICINE
Payer: COMMERCIAL

## 2021-08-18 VITALS
SYSTOLIC BLOOD PRESSURE: 98 MMHG | WEIGHT: 166 LBS | HEART RATE: 140 BPM | HEIGHT: 62 IN | DIASTOLIC BLOOD PRESSURE: 60 MMHG | BODY MASS INDEX: 30.55 KG/M2

## 2021-08-18 DIAGNOSIS — R00.0 TACHYCARDIA: ICD-10-CM

## 2021-08-18 DIAGNOSIS — I48.91 ATRIAL FIBRILLATION WITH RVR (HCC): ICD-10-CM

## 2021-08-18 DIAGNOSIS — J96.01 ACUTE RESPIRATORY FAILURE WITH HYPOXIA AND HYPERCAPNIA (HCC): ICD-10-CM

## 2021-08-18 DIAGNOSIS — J44.9 CHRONIC OBSTRUCTIVE PULMONARY DISEASE, UNSPECIFIED COPD TYPE (HCC): ICD-10-CM

## 2021-08-18 DIAGNOSIS — I48.0 PAROXYSMAL A-FIB (HCC): Primary | ICD-10-CM

## 2021-08-18 DIAGNOSIS — I95.9 HYPOTENSION: ICD-10-CM

## 2021-08-18 DIAGNOSIS — I48.0 PAROXYSMAL A-FIB (HCC): ICD-10-CM

## 2021-08-18 DIAGNOSIS — I48.92 ATRIAL FLUTTER (HCC): Primary | ICD-10-CM

## 2021-08-18 DIAGNOSIS — K59.00 CONSTIPATION: ICD-10-CM

## 2021-08-18 DIAGNOSIS — J96.02 ACUTE RESPIRATORY FAILURE WITH HYPOXIA AND HYPERCAPNIA (HCC): ICD-10-CM

## 2021-08-18 DIAGNOSIS — R91.8 MASS OF RIGHT LUNG: ICD-10-CM

## 2021-08-18 LAB
ALBUMIN SERPL BCP-MCNC: 1.6 G/DL (ref 3.5–5)
ALP SERPL-CCNC: 165 U/L (ref 46–116)
ALT SERPL W P-5'-P-CCNC: 36 U/L (ref 12–78)
ANION GAP SERPL CALCULATED.3IONS-SCNC: 6 MMOL/L (ref 4–13)
ANISOCYTOSIS BLD QL SMEAR: PRESENT
ARTIFACT: PRESENT
AST SERPL W P-5'-P-CCNC: 11 U/L (ref 5–45)
ATRIAL RATE: 288 BPM
BACTERIA UR QL AUTO: ABNORMAL /HPF
BASOPHILS # BLD MANUAL: 0 THOUSAND/UL (ref 0–0.1)
BASOPHILS NFR MAR MANUAL: 0 % (ref 0–1)
BILIRUB SERPL-MCNC: 0.62 MG/DL (ref 0.2–1)
BILIRUB UR QL STRIP: ABNORMAL
BUN SERPL-MCNC: 31 MG/DL (ref 5–25)
CALCIUM ALBUM COR SERPL-MCNC: 11.7 MG/DL (ref 8.3–10.1)
CALCIUM SERPL-MCNC: 9.8 MG/DL (ref 8.3–10.1)
CAOX CRY URNS QL MICRO: ABNORMAL /HPF
CHLORIDE SERPL-SCNC: 100 MMOL/L (ref 100–108)
CLARITY UR: CLEAR
CO2 SERPL-SCNC: 26 MMOL/L (ref 21–32)
COLOR UR: YELLOW
COLOR, POC: NORMAL
CREAT SERPL-MCNC: 1.69 MG/DL (ref 0.6–1.3)
EOSINOPHIL # BLD MANUAL: 0 THOUSAND/UL (ref 0–0.4)
EOSINOPHIL NFR BLD MANUAL: 0 % (ref 0–6)
ERYTHROCYTE [DISTWIDTH] IN BLOOD BY AUTOMATED COUNT: 18.9 % (ref 11.6–15.1)
GFR SERPL CREATININE-BSD FRML MDRD: 38 ML/MIN/1.73SQ M
GLUCOSE SERPL-MCNC: 307 MG/DL (ref 65–140)
GLUCOSE UR STRIP-MCNC: ABNORMAL MG/DL
HCT VFR BLD AUTO: 36 % (ref 36.5–49.3)
HGB BLD-MCNC: 10.3 G/DL (ref 12–17)
HGB UR QL STRIP.AUTO: NEGATIVE
HYALINE CASTS #/AREA URNS LPF: ABNORMAL /LPF
KETONES UR STRIP-MCNC: ABNORMAL MG/DL
LEUKOCYTE ESTERASE UR QL STRIP: NEGATIVE
LYMPHOCYTES # BLD AUTO: 0 % (ref 14–44)
LYMPHOCYTES # BLD AUTO: 0 THOUSAND/UL (ref 0.6–4.47)
MAGNESIUM SERPL-MCNC: 1.4 MG/DL (ref 1.6–2.6)
MCH RBC QN AUTO: 24.4 PG (ref 26.8–34.3)
MCHC RBC AUTO-ENTMCNC: 28.6 G/DL (ref 31.4–37.4)
MCV RBC AUTO: 85 FL (ref 82–98)
MONOCYTES # BLD AUTO: 0.69 THOUSAND/UL (ref 0–1.22)
MONOCYTES NFR BLD: 6 % (ref 4–12)
MUCOUS THREADS UR QL AUTO: ABNORMAL
NEUTROPHILS # BLD MANUAL: 10.77 THOUSAND/UL (ref 1.85–7.62)
NEUTS BAND NFR BLD MANUAL: 1 % (ref 0–8)
NEUTS SEG NFR BLD AUTO: 93 % (ref 43–75)
NITRITE UR QL STRIP: NEGATIVE
NON-SQ EPI CELLS URNS QL MICRO: ABNORMAL /HPF
NT-PROBNP SERPL-MCNC: 1615 PG/ML
P AXIS: 243 DEGREES
PH UR STRIP.AUTO: 5.5 [PH] (ref 4.5–8)
PLATELET # BLD AUTO: 419 THOUSANDS/UL (ref 149–390)
PLATELET BLD QL SMEAR: ABNORMAL
PMV BLD AUTO: 10.2 FL (ref 8.9–12.7)
POLYCHROMASIA BLD QL SMEAR: PRESENT
POTASSIUM SERPL-SCNC: 4.9 MMOL/L (ref 3.5–5.3)
PROT SERPL-MCNC: 6.9 G/DL (ref 6.4–8.2)
PROT UR STRIP-MCNC: ABNORMAL MG/DL
QRS AXIS: 62 DEGREES
QRSD INTERVAL: 72 MS
QT INTERVAL: 310 MS
QTC INTERVAL: 419 MS
RBC # BLD AUTO: 4.22 MILLION/UL (ref 3.88–5.62)
RBC #/AREA URNS AUTO: ABNORMAL /HPF
RBC MORPH BLD: PRESENT
SARS-COV-2 RNA RESP QL NAA+PROBE: NEGATIVE
SODIUM SERPL-SCNC: 132 MMOL/L (ref 136–145)
SP GR UR STRIP.AUTO: 1.02 (ref 1–1.03)
T WAVE AXIS: 89 DEGREES
TROPONIN I SERPL-MCNC: <0.02 NG/ML
TSH SERPL DL<=0.05 MIU/L-ACNC: 0.36 UIU/ML (ref 0.36–3.74)
UROBILINOGEN UR QL STRIP.AUTO: 2 E.U./DL
VENTRICULAR RATE: 110 BPM
WBC # BLD AUTO: 11.46 THOUSAND/UL (ref 4.31–10.16)
WBC #/AREA URNS AUTO: ABNORMAL /HPF

## 2021-08-18 PROCEDURE — 94640 AIRWAY INHALATION TREATMENT: CPT

## 2021-08-18 PROCEDURE — 99285 EMERGENCY DEPT VISIT HI MDM: CPT

## 2021-08-18 PROCEDURE — 99211 OFF/OP EST MAY X REQ PHY/QHP: CPT | Performed by: INTERNAL MEDICINE

## 2021-08-18 PROCEDURE — 85007 BL SMEAR W/DIFF WBC COUNT: CPT | Performed by: EMERGENCY MEDICINE

## 2021-08-18 PROCEDURE — 71275 CT ANGIOGRAPHY CHEST: CPT

## 2021-08-18 PROCEDURE — 99291 CRITICAL CARE FIRST HOUR: CPT | Performed by: EMERGENCY MEDICINE

## 2021-08-18 PROCEDURE — U0005 INFEC AGEN DETEC AMPLI PROBE: HCPCS | Performed by: EMERGENCY MEDICINE

## 2021-08-18 PROCEDURE — 93000 ELECTROCARDIOGRAM COMPLETE: CPT | Performed by: INTERNAL MEDICINE

## 2021-08-18 PROCEDURE — 85027 COMPLETE CBC AUTOMATED: CPT | Performed by: EMERGENCY MEDICINE

## 2021-08-18 PROCEDURE — 99291 CRITICAL CARE FIRST HOUR: CPT | Performed by: PHYSICIAN ASSISTANT

## 2021-08-18 PROCEDURE — 84443 ASSAY THYROID STIM HORMONE: CPT | Performed by: EMERGENCY MEDICINE

## 2021-08-18 PROCEDURE — G1004 CDSM NDSC: HCPCS

## 2021-08-18 PROCEDURE — 96367 TX/PROPH/DG ADDL SEQ IV INF: CPT

## 2021-08-18 PROCEDURE — U0003 INFECTIOUS AGENT DETECTION BY NUCLEIC ACID (DNA OR RNA); SEVERE ACUTE RESPIRATORY SYNDROME CORONAVIRUS 2 (SARS-COV-2) (CORONAVIRUS DISEASE [COVID-19]), AMPLIFIED PROBE TECHNIQUE, MAKING USE OF HIGH THROUGHPUT TECHNOLOGIES AS DESCRIBED BY CMS-2020-01-R: HCPCS | Performed by: EMERGENCY MEDICINE

## 2021-08-18 PROCEDURE — 99223 1ST HOSP IP/OBS HIGH 75: CPT | Performed by: INTERNAL MEDICINE

## 2021-08-18 PROCEDURE — 83880 ASSAY OF NATRIURETIC PEPTIDE: CPT | Performed by: EMERGENCY MEDICINE

## 2021-08-18 PROCEDURE — 81001 URINALYSIS AUTO W/SCOPE: CPT

## 2021-08-18 PROCEDURE — 71045 X-RAY EXAM CHEST 1 VIEW: CPT

## 2021-08-18 PROCEDURE — 84484 ASSAY OF TROPONIN QUANT: CPT | Performed by: EMERGENCY MEDICINE

## 2021-08-18 PROCEDURE — 80053 COMPREHEN METABOLIC PANEL: CPT | Performed by: EMERGENCY MEDICINE

## 2021-08-18 PROCEDURE — 83735 ASSAY OF MAGNESIUM: CPT | Performed by: EMERGENCY MEDICINE

## 2021-08-18 PROCEDURE — 36415 COLL VENOUS BLD VENIPUNCTURE: CPT | Performed by: EMERGENCY MEDICINE

## 2021-08-18 PROCEDURE — 93005 ELECTROCARDIOGRAM TRACING: CPT

## 2021-08-18 PROCEDURE — 96366 THER/PROPH/DIAG IV INF ADDON: CPT

## 2021-08-18 PROCEDURE — 96365 THER/PROPH/DIAG IV INF INIT: CPT

## 2021-08-18 PROCEDURE — 93010 ELECTROCARDIOGRAM REPORT: CPT | Performed by: INTERNAL MEDICINE

## 2021-08-18 RX ORDER — CHLORAL HYDRATE 500 MG
1000 CAPSULE ORAL 2 TIMES DAILY
Status: DISCONTINUED | OUTPATIENT
Start: 2021-08-19 | End: 2021-08-24 | Stop reason: HOSPADM

## 2021-08-18 RX ORDER — AMLODIPINE BESYLATE 2.5 MG/1
2.5 TABLET ORAL EVERY MORNING
Status: DISCONTINUED | OUTPATIENT
Start: 2021-08-19 | End: 2021-08-19

## 2021-08-18 RX ORDER — MAGNESIUM SULFATE HEPTAHYDRATE 40 MG/ML
4 INJECTION, SOLUTION INTRAVENOUS ONCE
Status: COMPLETED | OUTPATIENT
Start: 2021-08-18 | End: 2021-08-18

## 2021-08-18 RX ORDER — ALBUTEROL SULFATE 2.5 MG/3ML
2.5 SOLUTION RESPIRATORY (INHALATION) EVERY 4 HOURS PRN
Status: DISCONTINUED | OUTPATIENT
Start: 2021-08-18 | End: 2021-08-24 | Stop reason: HOSPADM

## 2021-08-18 RX ORDER — GABAPENTIN 100 MG/1
100 CAPSULE ORAL
Status: DISCONTINUED | OUTPATIENT
Start: 2021-08-18 | End: 2021-08-24 | Stop reason: HOSPADM

## 2021-08-18 RX ORDER — SODIUM CHLORIDE, SODIUM GLUCONATE, SODIUM ACETATE, POTASSIUM CHLORIDE, MAGNESIUM CHLORIDE, SODIUM PHOSPHATE, DIBASIC, AND POTASSIUM PHOSPHATE .53; .5; .37; .037; .03; .012; .00082 G/100ML; G/100ML; G/100ML; G/100ML; G/100ML; G/100ML; G/100ML
500 INJECTION, SOLUTION INTRAVENOUS ONCE
Status: COMPLETED | OUTPATIENT
Start: 2021-08-18 | End: 2021-08-18

## 2021-08-18 RX ORDER — GUAIFENESIN/DEXTROMETHORPHAN 100-10MG/5
10 SYRUP ORAL EVERY 4 HOURS PRN
Status: DISCONTINUED | OUTPATIENT
Start: 2021-08-18 | End: 2021-08-19

## 2021-08-18 RX ORDER — BENZONATATE 100 MG/1
100 CAPSULE ORAL 3 TIMES DAILY PRN
Status: DISCONTINUED | OUTPATIENT
Start: 2021-08-18 | End: 2021-08-24 | Stop reason: HOSPADM

## 2021-08-18 RX ORDER — ALBUMIN, HUMAN INJ 5% 5 %
25 SOLUTION INTRAVENOUS ONCE
Status: COMPLETED | OUTPATIENT
Start: 2021-08-18 | End: 2021-08-18

## 2021-08-18 RX ORDER — GLIPIZIDE 5 MG/1
5 TABLET ORAL 2 TIMES DAILY
COMMUNITY
Start: 2021-08-07

## 2021-08-18 RX ORDER — DIGOXIN 0.25 MG/ML
250 INJECTION INTRAMUSCULAR; INTRAVENOUS EVERY 6 HOURS
Status: DISPENSED | OUTPATIENT
Start: 2021-08-19 | End: 2021-08-19

## 2021-08-18 RX ORDER — PANTOPRAZOLE SODIUM 40 MG/1
40 TABLET, DELAYED RELEASE ORAL
Status: DISCONTINUED | OUTPATIENT
Start: 2021-08-19 | End: 2021-08-24 | Stop reason: HOSPADM

## 2021-08-18 RX ORDER — HYDROXYZINE HYDROCHLORIDE 25 MG/1
25 TABLET, FILM COATED ORAL 3 TIMES DAILY PRN
Status: DISCONTINUED | OUTPATIENT
Start: 2021-08-18 | End: 2021-08-24 | Stop reason: HOSPADM

## 2021-08-18 RX ORDER — GUAIFENESIN 600 MG
600 TABLET, EXTENDED RELEASE 12 HR ORAL 2 TIMES DAILY
Status: DISCONTINUED | OUTPATIENT
Start: 2021-08-19 | End: 2021-08-24 | Stop reason: HOSPADM

## 2021-08-18 RX ORDER — ISOPROPYL ALCOHOL 0.7 ML/1
SWAB TOPICAL
COMMUNITY
Start: 2021-08-07

## 2021-08-18 RX ORDER — HYDROXYZINE HYDROCHLORIDE 25 MG/1
25 TABLET, FILM COATED ORAL 3 TIMES DAILY PRN
COMMUNITY
Start: 2021-08-12

## 2021-08-18 RX ORDER — LEVOTHYROXINE SODIUM 0.07 MG/1
150 TABLET ORAL
Status: DISCONTINUED | OUTPATIENT
Start: 2021-08-19 | End: 2021-08-24 | Stop reason: HOSPADM

## 2021-08-18 RX ADMIN — IOHEXOL 85 ML: 350 INJECTION, SOLUTION INTRAVENOUS at 20:08

## 2021-08-18 RX ADMIN — SODIUM CHLORIDE, SODIUM GLUCONATE, SODIUM ACETATE, POTASSIUM CHLORIDE, MAGNESIUM CHLORIDE, SODIUM PHOSPHATE, DIBASIC, AND POTASSIUM PHOSPHATE 500 ML: .53; .5; .37; .037; .03; .012; .00082 INJECTION, SOLUTION INTRAVENOUS at 20:52

## 2021-08-18 RX ADMIN — MAGNESIUM SULFATE HEPTAHYDRATE 4 G: 40 INJECTION, SOLUTION INTRAVENOUS at 17:47

## 2021-08-18 RX ADMIN — SODIUM CHLORIDE, SODIUM GLUCONATE, SODIUM ACETATE, POTASSIUM CHLORIDE, MAGNESIUM CHLORIDE, SODIUM PHOSPHATE, DIBASIC, AND POTASSIUM PHOSPHATE 500 ML: .53; .5; .37; .037; .03; .012; .00082 INJECTION, SOLUTION INTRAVENOUS at 17:04

## 2021-08-18 RX ADMIN — AMIODARONE HYDROCHLORIDE 0.5 MG/MIN: 50 INJECTION, SOLUTION INTRAVENOUS at 22:57

## 2021-08-18 RX ADMIN — DEXTROSE 150 MG: 50 INJECTION, SOLUTION INTRAVENOUS at 19:11

## 2021-08-18 RX ADMIN — ALBUMIN (HUMAN) 25 G: 12.5 INJECTION, SOLUTION INTRAVENOUS at 17:52

## 2021-08-18 NOTE — TELEPHONE ENCOUNTER
I spoke with Pepe Sanchez this AM   He is reluctant to go to the ER, though I did tell him this was the best option  [facility]30 for an E[facility]  He is scheduled at 1:30, but has to find a ride  I advised him that he may still be referred to the hospital from our office, based on EKG result   He still prefers the office first

## 2021-08-18 NOTE — TELEPHONE ENCOUNTER
I do suspect if he's had persistently high rates in atrial fibrillation despite the increased dose of amiodarone, he will need re-hospitalization for better control and EP evaluation  Options are to go to [facility] for evaluation  Should not go to [facility], as I do suspect he will need EP to see him  Other option if he is reluctant to go to hospital is to come into office for EKG (again, prefer [address] since if he requires ER, would be [facility])    If he is indeed in atrial fibrillation/flutter, he would still be referred to the hospital

## 2021-08-18 NOTE — ED ATTENDING ATTESTATION
8/18/2021  Rosana OTOOLE MD, saw and evaluated the patient  I have discussed the patient with the resident/non-physician practitioner and agree with the resident's/non-physician practitioner's findings, Plan of Care, and MDM as documented in the resident's/non-physician practitioner's note, except where noted  All available labs and Radiology studies were reviewed  I was present for key portions of any procedure(s) performed by the resident/non-physician practitioner and I was immediately available to provide assistance  At this point I agree with the current assessment done in the Emergency Department  I have conducted an independent evaluation of this patient a history and physical is as follows: Tachypneic, dyspneic, sob  Hypotensive here, large left lung mass, tachycardic with afib  Fluids with lower hr down to 105, still hypotensive  Crit care consult   Will give albumin, magnesium, ct chest, reassess  ED Course         Critical Care Time  CriticalCare Time  Performed by: Rosana Clemente MD  Authorized by: Rosana Clemente MD     Critical care provider statement:     Critical care time (minutes):  45    Critical care time was exclusive of:  Separately billable procedures and treating other patients and teaching time    Critical care was necessary to treat or prevent imminent or life-threatening deterioration of the following conditions:  Respiratory failure and shock    Critical care was time spent personally by me on the following activities:  Obtaining history from patient or surrogate, blood draw for specimens, development of treatment plan with patient or surrogate, discussions with consultants, discussions with primary provider, evaluation of patient's response to treatment, examination of patient, review of old charts, re-evaluation of patient's condition, ordering and review of radiographic studies, ordering and review of laboratory studies and ordering and performing treatments and interventions

## 2021-08-18 NOTE — PROGRESS NOTES
Pt here for EKG under the direction of Dr Darinel Paulino for complaints of rapid/irregular heartbeat-afib/flutter  Pt here with his son, meds reviewed  BP-98/60  P-140  Pt complains of rapid heartbeat, fatigue, SOB  EKG done and reviewed by Dr Juana Mason  Reviewed note from Dr Darinel Paulino, who recommended patient may have to go to hospital for admission is heart rate still elevated-Dr Juana Mason recommends the same  Spoke directly to patient and son, son will take him directly to [facility] ER

## 2021-08-19 PROBLEM — J96.11 CHRONIC RESPIRATORY FAILURE WITH HYPOXIA (HCC): Status: ACTIVE | Noted: 2021-07-19

## 2021-08-19 LAB
ALBUMIN SERPL BCP-MCNC: 1.9 G/DL (ref 3.5–5)
ALP SERPL-CCNC: 136 U/L (ref 46–116)
ALT SERPL W P-5'-P-CCNC: 28 U/L (ref 12–78)
ANION GAP SERPL CALCULATED.3IONS-SCNC: 4 MMOL/L (ref 4–13)
AST SERPL W P-5'-P-CCNC: 11 U/L (ref 5–45)
BILIRUB SERPL-MCNC: 0.55 MG/DL (ref 0.2–1)
BUN SERPL-MCNC: 27 MG/DL (ref 5–25)
CALCIUM ALBUM COR SERPL-MCNC: 11 MG/DL (ref 8.3–10.1)
CALCIUM SERPL-MCNC: 9.3 MG/DL (ref 8.3–10.1)
CHLORIDE SERPL-SCNC: 100 MMOL/L (ref 100–108)
CO2 SERPL-SCNC: 29 MMOL/L (ref 21–32)
CREAT SERPL-MCNC: 1.17 MG/DL (ref 0.6–1.3)
ERYTHROCYTE [DISTWIDTH] IN BLOOD BY AUTOMATED COUNT: 18.9 % (ref 11.6–15.1)
EST. AVERAGE GLUCOSE BLD GHB EST-MCNC: 174 MG/DL
GFR SERPL CREATININE-BSD FRML MDRD: 59 ML/MIN/1.73SQ M
GLUCOSE SERPL-MCNC: 130 MG/DL (ref 65–140)
GLUCOSE SERPL-MCNC: 179 MG/DL (ref 65–140)
GLUCOSE SERPL-MCNC: 188 MG/DL (ref 65–140)
GLUCOSE SERPL-MCNC: 243 MG/DL (ref 65–140)
GLUCOSE SERPL-MCNC: 347 MG/DL (ref 65–140)
HBA1C MFR BLD: 7.7 %
HCT VFR BLD AUTO: 29.7 % (ref 36.5–49.3)
HGB BLD-MCNC: 8.5 G/DL (ref 12–17)
LDH SERPL-CCNC: 144 U/L (ref 81–234)
MAGNESIUM SERPL-MCNC: 1.9 MG/DL (ref 1.6–2.6)
MCH RBC QN AUTO: 24.2 PG (ref 26.8–34.3)
MCHC RBC AUTO-ENTMCNC: 28.6 G/DL (ref 31.4–37.4)
MCV RBC AUTO: 85 FL (ref 82–98)
PHOSPHATE SERPL-MCNC: 2 MG/DL (ref 2.3–4.1)
PLATELET # BLD AUTO: 392 THOUSANDS/UL (ref 149–390)
PMV BLD AUTO: 10 FL (ref 8.9–12.7)
POTASSIUM SERPL-SCNC: 4.5 MMOL/L (ref 3.5–5.3)
PROT SERPL-MCNC: 6.2 G/DL (ref 6.4–8.2)
RBC # BLD AUTO: 3.51 MILLION/UL (ref 3.88–5.62)
SODIUM SERPL-SCNC: 133 MMOL/L (ref 136–145)
TSH SERPL DL<=0.05 MIU/L-ACNC: 0.45 UIU/ML (ref 0.36–3.74)
URATE SERPL-MCNC: 3.9 MG/DL (ref 4.2–8)
WBC # BLD AUTO: 8.64 THOUSAND/UL (ref 4.31–10.16)

## 2021-08-19 PROCEDURE — 84443 ASSAY THYROID STIM HORMONE: CPT | Performed by: INTERNAL MEDICINE

## 2021-08-19 PROCEDURE — 80053 COMPREHEN METABOLIC PANEL: CPT | Performed by: INTERNAL MEDICINE

## 2021-08-19 PROCEDURE — 99222 1ST HOSP IP/OBS MODERATE 55: CPT | Performed by: INTERNAL MEDICINE

## 2021-08-19 PROCEDURE — 94760 N-INVAS EAR/PLS OXIMETRY 1: CPT

## 2021-08-19 PROCEDURE — 94640 AIRWAY INHALATION TREATMENT: CPT

## 2021-08-19 PROCEDURE — 99243 OFF/OP CNSLTJ NEW/EST LOW 30: CPT | Performed by: INTERNAL MEDICINE

## 2021-08-19 PROCEDURE — 82948 REAGENT STRIP/BLOOD GLUCOSE: CPT

## 2021-08-19 PROCEDURE — 83735 ASSAY OF MAGNESIUM: CPT | Performed by: INTERNAL MEDICINE

## 2021-08-19 PROCEDURE — 36415 COLL VENOUS BLD VENIPUNCTURE: CPT | Performed by: INTERNAL MEDICINE

## 2021-08-19 PROCEDURE — 85027 COMPLETE CBC AUTOMATED: CPT | Performed by: HOSPITALIST

## 2021-08-19 PROCEDURE — 85025 COMPLETE CBC W/AUTO DIFF WBC: CPT | Performed by: INTERNAL MEDICINE

## 2021-08-19 PROCEDURE — NC001 PR NO CHARGE: Performed by: PHYSICIAN ASSISTANT

## 2021-08-19 PROCEDURE — 84550 ASSAY OF BLOOD/URIC ACID: CPT | Performed by: NURSE PRACTITIONER

## 2021-08-19 PROCEDURE — 84100 ASSAY OF PHOSPHORUS: CPT | Performed by: INTERNAL MEDICINE

## 2021-08-19 PROCEDURE — 83615 LACTATE (LD) (LDH) ENZYME: CPT | Performed by: NURSE PRACTITIONER

## 2021-08-19 PROCEDURE — 83036 HEMOGLOBIN GLYCOSYLATED A1C: CPT | Performed by: INTERNAL MEDICINE

## 2021-08-19 PROCEDURE — 99232 SBSQ HOSP IP/OBS MODERATE 35: CPT | Performed by: HOSPITALIST

## 2021-08-19 RX ORDER — INSULIN GLARGINE 100 [IU]/ML
4 INJECTION, SOLUTION SUBCUTANEOUS
Status: DISCONTINUED | OUTPATIENT
Start: 2021-08-19 | End: 2021-08-20

## 2021-08-19 RX ORDER — DIGOXIN 125 MCG
125 TABLET ORAL DAILY
Status: DISCONTINUED | OUTPATIENT
Start: 2021-08-20 | End: 2021-08-24 | Stop reason: HOSPADM

## 2021-08-19 RX ORDER — DIGOXIN 0.25 MG/ML
500 INJECTION INTRAMUSCULAR; INTRAVENOUS ONCE
Status: COMPLETED | OUTPATIENT
Start: 2021-08-19 | End: 2021-08-19

## 2021-08-19 RX ORDER — HEPARIN SODIUM 1000 [USP'U]/ML
2000 INJECTION, SOLUTION INTRAVENOUS; SUBCUTANEOUS
Status: DISCONTINUED | OUTPATIENT
Start: 2021-08-19 | End: 2021-08-23

## 2021-08-19 RX ORDER — LEVALBUTEROL 1.25 MG/.5ML
1.25 SOLUTION, CONCENTRATE RESPIRATORY (INHALATION)
Status: DISCONTINUED | OUTPATIENT
Start: 2021-08-19 | End: 2021-08-24 | Stop reason: HOSPADM

## 2021-08-19 RX ORDER — HEPARIN SODIUM 10000 [USP'U]/100ML
3-20 INJECTION, SOLUTION INTRAVENOUS
Status: DISCONTINUED | OUTPATIENT
Start: 2021-08-19 | End: 2021-08-23

## 2021-08-19 RX ORDER — HEPARIN SODIUM 1000 [USP'U]/ML
4000 INJECTION, SOLUTION INTRAVENOUS; SUBCUTANEOUS ONCE
Status: COMPLETED | OUTPATIENT
Start: 2021-08-19 | End: 2021-08-19

## 2021-08-19 RX ORDER — AMIODARONE HYDROCHLORIDE 200 MG/1
400 TABLET ORAL DAILY
Status: DISCONTINUED | OUTPATIENT
Start: 2021-08-20 | End: 2021-08-20

## 2021-08-19 RX ORDER — DIGOXIN 0.25 MG/ML
250 INJECTION INTRAMUSCULAR; INTRAVENOUS EVERY 6 HOURS
Status: COMPLETED | OUTPATIENT
Start: 2021-08-19 | End: 2021-08-20

## 2021-08-19 RX ORDER — AMIODARONE HYDROCHLORIDE 200 MG/1
400 TABLET ORAL ONCE
Status: COMPLETED | OUTPATIENT
Start: 2021-08-19 | End: 2021-08-19

## 2021-08-19 RX ORDER — HEPARIN SODIUM 1000 [USP'U]/ML
4000 INJECTION, SOLUTION INTRAVENOUS; SUBCUTANEOUS
Status: DISCONTINUED | OUTPATIENT
Start: 2021-08-19 | End: 2021-08-23

## 2021-08-19 RX ORDER — METOPROLOL TARTRATE 5 MG/5ML
5 INJECTION INTRAVENOUS EVERY 6 HOURS PRN
Status: DISCONTINUED | OUTPATIENT
Start: 2021-08-19 | End: 2021-08-24 | Stop reason: HOSPADM

## 2021-08-19 RX ADMIN — METOPROLOL TARTRATE 5 MG: 1 INJECTION, SOLUTION INTRAVENOUS at 21:46

## 2021-08-19 RX ADMIN — METOPROLOL TARTRATE 25 MG: 25 TABLET, FILM COATED ORAL at 21:27

## 2021-08-19 RX ADMIN — LEVALBUTEROL HYDROCHLORIDE 1.25 MG: 1.25 SOLUTION, CONCENTRATE RESPIRATORY (INHALATION) at 07:11

## 2021-08-19 RX ADMIN — OMEGA-3 FATTY ACIDS CAP 1000 MG 1000 MG: 1000 CAP at 21:31

## 2021-08-19 RX ADMIN — IPRATROPIUM BROMIDE 0.5 MG: 0.5 SOLUTION RESPIRATORY (INHALATION) at 13:26

## 2021-08-19 RX ADMIN — AMLODIPINE BESYLATE 2.5 MG: 2.5 TABLET ORAL at 12:02

## 2021-08-19 RX ADMIN — AMIODARONE HYDROCHLORIDE 400 MG: 200 TABLET ORAL at 21:30

## 2021-08-19 RX ADMIN — GABAPENTIN 100 MG: 100 CAPSULE ORAL at 21:27

## 2021-08-19 RX ADMIN — INSULIN GLARGINE 4 UNITS: 100 INJECTION, SOLUTION SUBCUTANEOUS at 21:46

## 2021-08-19 RX ADMIN — LEVALBUTEROL HYDROCHLORIDE 1.25 MG: 1.25 SOLUTION, CONCENTRATE RESPIRATORY (INHALATION) at 19:17

## 2021-08-19 RX ADMIN — HEPARIN SODIUM 4000 UNITS: 1000 INJECTION INTRAVENOUS; SUBCUTANEOUS at 21:47

## 2021-08-19 RX ADMIN — GUAIFENESIN 600 MG: 600 TABLET, EXTENDED RELEASE ORAL at 21:30

## 2021-08-19 RX ADMIN — INSULIN LISPRO 2 UNITS: 100 INJECTION, SOLUTION INTRAVENOUS; SUBCUTANEOUS at 21:59

## 2021-08-19 RX ADMIN — APIXABAN 5 MG: 5 TABLET, FILM COATED ORAL at 12:02

## 2021-08-19 RX ADMIN — BENZONATATE 100 MG: 100 CAPSULE ORAL at 21:38

## 2021-08-19 RX ADMIN — DICLOFENAC SODIUM 2 G: 10 GEL TOPICAL at 19:25

## 2021-08-19 RX ADMIN — INSULIN LISPRO 5 UNITS: 100 INJECTION, SOLUTION INTRAVENOUS; SUBCUTANEOUS at 14:25

## 2021-08-19 RX ADMIN — LEVOTHYROXINE SODIUM 150 MCG: 75 TABLET ORAL at 12:02

## 2021-08-19 RX ADMIN — ALBUTEROL SULFATE 2.5 MG: 2.5 SOLUTION RESPIRATORY (INHALATION) at 06:04

## 2021-08-19 RX ADMIN — PANTOPRAZOLE SODIUM 40 MG: 40 TABLET, DELAYED RELEASE ORAL at 05:51

## 2021-08-19 RX ADMIN — DIGOXIN 500 MCG: 0.25 INJECTION INTRAMUSCULAR; INTRAVENOUS at 14:22

## 2021-08-19 RX ADMIN — ALBUTEROL SULFATE 2.5 MG: 2.5 SOLUTION RESPIRATORY (INHALATION) at 00:33

## 2021-08-19 RX ADMIN — DIGOXIN 250 MCG: 0.25 INJECTION INTRAMUSCULAR; INTRAVENOUS at 19:19

## 2021-08-19 RX ADMIN — LEVALBUTEROL HYDROCHLORIDE 1.25 MG: 1.25 SOLUTION, CONCENTRATE RESPIRATORY (INHALATION) at 13:26

## 2021-08-19 RX ADMIN — IPRATROPIUM BROMIDE 0.5 MG: 0.5 SOLUTION RESPIRATORY (INHALATION) at 19:17

## 2021-08-19 RX ADMIN — METOPROLOL TARTRATE 25 MG: 25 TABLET, FILM COATED ORAL at 00:33

## 2021-08-19 RX ADMIN — IPRATROPIUM BROMIDE 0.5 MG: 0.5 SOLUTION RESPIRATORY (INHALATION) at 07:11

## 2021-08-19 RX ADMIN — MORPHINE SULFATE 2 MG: 2 INJECTION, SOLUTION INTRAMUSCULAR; INTRAVENOUS at 00:27

## 2021-08-19 RX ADMIN — GUAIFENESIN 600 MG: 600 TABLET, EXTENDED RELEASE ORAL at 12:02

## 2021-08-19 RX ADMIN — HEPARIN SODIUM 12 UNITS/KG/HR: 10000 INJECTION, SOLUTION INTRAVENOUS at 21:57

## 2021-08-19 RX ADMIN — METOPROLOL TARTRATE 25 MG: 25 TABLET, FILM COATED ORAL at 11:11

## 2021-08-19 RX ADMIN — DIGOXIN 250 MCG: 0.25 INJECTION INTRAMUSCULAR; INTRAVENOUS at 05:52

## 2021-08-19 NOTE — ASSESSMENT & PLAN NOTE
Patient has multiple pulmonary masses which are enlarging  PET scan shows heterogeneous activity of these pulmonary masses  Still awaiting final further oncological opinion  Will also get Pulmonary to see patient  Respiratory support  O2 supplementation

## 2021-08-19 NOTE — ASSESSMENT & PLAN NOTE
Patient has multiple pulmonary masses which are enlarging    PET scan shows heterogeneous activity of right lung mass & left nodule  Has had EBUS & IR biopsy & IR thora in past all inconclusive, Biopsies only showed necrosis  Hence underwent PET scan to see what areas highlight - after which was pendign furtehr work up  Hem onc & pulm consulted - may need another IR biopsy guided by PET scan  Switch eliquis to heparin drip

## 2021-08-19 NOTE — ASSESSMENT & PLAN NOTE
· Formal diagnosis still pending but causes right sided airway collapse and dyspnea  · Add xopenex/atrovent TID

## 2021-08-19 NOTE — ASSESSMENT & PLAN NOTE
Patient has resistant atrial fibrillation with RVR with heart rates ranging from 120 to 140 on amiodarone drip  Will continue with metoprolol 25 mg q 12  Patient is step-down level 1 with ICU consult for follow-up  Cardiology to see patient  Because of enlarging pulmonary masses; may need new echocardiogram but will leave that to Cardiology to decide  Continue with amlodipine 2 5 mg daily  Patient would also need albuterol/Atrovent to achieve bronchodilation and pulmonary relaxation  Pulmonary consult  Will also recheck metabolic profile with CBC tomorrow morning

## 2021-08-19 NOTE — ASSESSMENT & PLAN NOTE
o2 need at BL of 3 L but does get SADLER at baseline  Suspect from Afib, lung mass  Not in COPD exa  COnt RX afib, eval for mass  Cont nebs  Cont AC

## 2021-08-19 NOTE — CONSULTS
Medical Oncology/Hematology Consult Note  Luisana Padilla, male, 68 y o , 1940,  ED 26/ED 26, [phone]       Reason for consultation: Continued evaluation and subsequent management of progressively enlarging pulmonary masses in the setting of difficult to control A-Fib/Flutter  Assessment and Plan:  1  Pulmonary Mass of Right Lung and Left Pulmonary Nodule   Patient has had several unsuccessful biopsies of the pulmonary masses, therefore, currently has no tissue diagnosis  Patient case was discussed at thoracic tumor board and again with CT surgery, however, it was determined the patient was a poor surgical candidate due to mass burden and COPD   Please see oncologic history below for entire work-up:  · PET-CT (08/06/21): showed hot heterogenous activity with areas of necrosis and glucose uptake in the right lung mass and the left pulmonary nodule   o IR CT Guided Biopsy (07/27/21): FNA NICHELLE necrotic tissue  o CT C/A/P (07/23/21): Reidentification of pulmonary masses highly suspicious for bronchogenic malignancy  The dominant lesion in the perihilar right upper lobe now demonstrates central necrotic cavitation  There is slight increasing size of freely layering right pleural effusion  As on prior examination there is prominence of the central pulmonary arterial tree suggesting some element of pulmonary artery hypertension  No tumor mass is identified in the abdomen or pelvis  o IR CT Guided Biopsy (07/21/21): FNA of RUL necrotic tissue  Since interval CT approximately 1 month ago there has been growth of the mass and development of accompanying right pleural effusion Consider thoracentesis if there is persistent respiratory failure  Additional presumed metastatic nodules have also increase in size  · Pleural Fluid (07/30/21): No evidence of malignancy ; benign mesothelial cells, neutrophils, and lymphocytes  o EBUS (06/21/21): showing atypical cellular changes without evidence of malignancy  o MRI Brain (06/20/21): negative for metastatic disease or acute pathology  Cici Louise  o Last Office Visit (08/05/21): Plan was to use the recent PET-CT scan results and use it to guide IR to obtain a biopsy for tissue diagnosis  Plan:   Recommend IR Consult for pulmonary biopsy of the right lung mass and the left pulmonary nodule   o Suggest using recent PET-CT scan findings to obtain biopsy of most metabolically active site of right lung mass and left pulmonary nodule  o Pending results of biopsy, would consider rad/onc consult if biopsy returns malignancy   Ordered Uric Acid and LD for baseline  o Uric Acid: 3 9  o LD: 144   Per Finn Reynolds (Oncology PA-C) -  Patient is not a candidate for emergency radiation at this time  2  Microcytic Anemia   Suspected multifactorial in setting of anemia of chronic inflammation, hypothyroidism, CKD, and potential malignancy   On admission, Hgb 10 3 ; MCV 85  o Per chart review, baseline Hgb 8-10 ; MCV 80-85   Iron Panel (07/21/21): Iron Sat 23%, TIBC 182, Iron 41, Ferritin 2470   Vit B12 (07/21/21): 724   Folate (07/21/21): 12 3   TSH (08/19/21): 0 445   Bone Marrow Biopsy (06/10/21):   · Hypercellular marrow (70% cellularity) with increased granulopoiesis and megakaryopoiesis, scattered mild atypia, can't exclude reactive changes versus early low grade myelodysplastic syndrome  · Aberrant CD56 expression by small subset of the maturing myeloid elements by flow cytometry  · Increased iron stores   Last colonoscopy unknown ; Last heme occult unknown    Plan:   Despite being iron deficient, as patient's Ferritin is 2470, patient would NOT benefit from iron supplementation at this time   Will follow CBC daily      3   History of Thrombocytopenia (Resolved)  Cici Mcgill  o Initially found in 05/2021 (plt count of 3) ; condition appears to have resolved as of 06/2021   Plt on admission 392    Plan:  · No signs of active bleeding  · Will follow CBC daily      4  Preventive Screenings   Last colonoscopy unknown    Plan:  · Would recommend getting colonoscopy outpatient for routine evaluation of colorectal cancer        Oncologic History:  · PET-CT (08/06/21): showed hot heterogenous activity with areas of necrosis and glucose uptake in the right lung mass and the left pulmonary nodule   CT C/A/P (07/23/21): reidentification of pulmonary masses highly suspicious for bronchogenic malignancy  The dominant lesion in the perihilar right upper lobe now demonstrates central necrotic cavitation  There is slight increasing size of freely layering right pleural effusion  As on prior examination there is prominence of the central pulmonary arterial tree suggesting some element of pulmonary artery hypertension  No tumor mass is identified in the abdomen or pelvis   IR CT Guided Biopsy (07/21/21): since interval CT approximately 1 month ago there has been growth of the mass and development of accompanying right pleural effusion Consider thoracentesis if there is persistent respiratory failure  Additional presumed metastatic nodules have also increase in size  · Pleural Fluid (07/30/21): No evidence of malignancy ; benign mesothelial cells, neutrophils, and lymphocytes  · CXR (07/19/21):  No interval change from July 17, 2021   CXR (07/18/21): No change in large right lung mass with associated postobstructive atelectasis or pneumonia right middle lobe and small right pleural effusion  Left lung nodule  [facility] Admission (07/14/21): due to worsening shortness of breath/dyspnea on exertion, A-Fib with RVR, and acute respiratory failure   CXR (07/14/21): Large right lung mass, slightly increased in size, consistent with malignancy  Persistent small right pleural effusion   EBUS (06/21/21): showing atypical cellular changes without evidence of malignancy      CT Chest (06/20/21): demonstrated increase in left upper mass from 2 1cm to 3 7cm with presence of a small pleural effusion  Right base presumed metastatic nodule increased from 9mm to 14mm   MRI Brain (06/20/21): negative for metastatic disease or acute pathology   Pulmonary Consult (06/19/21): Dr Daylin Machado saw patient - 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, 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, suspicious for small cell  Probably contralateral metastasis  Recommend MRI brain   CTA Chest (06/18/21): demonstrated a large solid right lung mass encasing the right pulmonary artery and branches with no narrowing  Encasement with narrowing and occlusion of the anterior right upper lobe and middle lobe bronchial branches with obstructive atelectasis  Scattered bilateral lung nodules largest in the left upper lobe subpleural lobulated mass   CXR (06/11/21): Large right upper lung airspace opacity and small right pleural effusion  Infection versus mass  Recommend follow-up to completion  History of present illness:  Mr Finn Palma is a 68year old  male with past medical history of A-Fib w/ RVR (on Eliquis and Metoprolol), COPD, Type II DM, hypothyroidism, and multiple pulmonary masses (currently being followed by oncology)  Patient presented to the [facility] ED on 08/18/2021 following an abnormal EKG which showed atrial flutter with a rate of 140 in the setting several weeks onset of worsening fatigue and shortness of breath  Patient also had a cough productive of non-bloody sputum which has not changed from his baseline  Patient states he has recently had progressive bilateral lower extremity edema and orthopnea that requires him to sleep upright  Patient was unable to achieve cardioversion while in the ED and was admitted for continued management, including an amiodarone drip  Notably, patient has a history of thrombocytopenia (resolved) and anemia for which he follows Dr Roman Romero outpatient  Patient has an approximate 1/2 pack per day 20 year smoking history    Patient reports he quit smoking on 6/18/2021  Interval History:  Patient states he is overall feeling well but continues to have shortness of breath, which is not new per his baseline  Patient admits to chest pain in a dermatomal pattern along his right chest wall  Patient states the pain has been present since his last biopsy  He states the pain is not worse, nor is it improving  He does not do anything for treatment of his pain  Patient notes that approximately 15 years ago, he fell from a trailer and struck his right side  He was not evaluated at that time by a physician and "took care of things at home"  Patient states he had residual pain there, however, it eventually resolved  Patient is unsure if he developed a hematoma or if he had problems breathing at that time  Patient denies headaches, vision changes, abdominal pain, constipation, diarrhea, dysuria, urinary urgency, increased urinary frequency, and bilateral lower extremity pain  Review of Systems:   Review of Systems   Constitutional: Positive for fatigue  Negative for appetite change, chills, diaphoresis and fever  HENT: Negative for congestion, sinus pressure, sinus pain and sore throat  Eyes: Negative for pain and visual disturbance  Respiratory: Positive for shortness of breath  Negative for chest tightness  Cardiovascular: Positive for chest pain and leg swelling (bilateral)  Negative for palpitations  Gastrointestinal: Negative for abdominal pain, blood in stool, constipation, diarrhea, nausea and vomiting  Genitourinary: Negative for dysuria, frequency, hematuria and urgency  Musculoskeletal: Negative for joint swelling and myalgias  Skin: Negative for color change and rash  Neurological: Negative for dizziness, weakness, light-headedness and headaches  Hematological: Negative for adenopathy  Does not bruise/bleed easily  Psychiatric/Behavioral: Negative for confusion and sleep disturbance   The patient is not nervous/anxious  Past Medical History:   Diagnosis Date    Hypertension        Past Surgical History:   Procedure Laterality Date    BACK SURGERY      CARPAL TUNNEL RELEASE Bilateral     IR BIOPSY BONE MARROW  6/10/2021    IR BIOPSY LUNG  2021    IR BIOPSY OTHER  2021    IR THORACENTESIS  2021    LUMBAR DISC SURGERY         Family History   Problem Relation Age of Onset   Clau Sicks Cancer Mother         "ate away her bone"    Anuerysm Father     Cancer Sister         unknown type    Heart attack Maternal Grandfather     Cancer Sister         unknown type    Heart attack Maternal Aunt     Heart attack Maternal Uncle     Heart attack Paternal Aunt        Social History     Socioeconomic History    Marital status: /Civil Union     Spouse name: Lorena Uribe Number of children: 10    Years of education: None    Highest education level: None   Occupational History    None   Tobacco Use    Smoking status: Former Smoker     Packs/day: 0 50     Years: 40 00     Pack years: 20 00     Types: Cigarettes     Start date:      Quit date: 2021     Years since quittin 1    Smokeless tobacco: Never Used   Vaping Use    Vaping Use: Never used   Substance and Sexual Activity    Alcohol use: Not Currently     Comment: History of heavier drinking in early   Denies any current alcohol use (Updated 2021)   Drug use: Never    Sexual activity: None   Other Topics Concern    None   Social History Narrative    Previously worked as heavy machinery technician  Lives with his wife who is essentially bed bound  Social Determinants of Health     Financial Resource Strain: High Risk    Difficulty of Paying Living Expenses: Hard   Food Insecurity: Food Insecurity Present    Worried About Running Out of Food in the Last Year: Sometimes true    [place] of Food in the Last Year: Not on file   Transportation Needs:     Lack of Transportation (Medical):      Lack of Transportation (Non-Medical):    Physical Activity:     Days of Exercise per Week:     Minutes of Exercise per Session:    Stress:     Feeling of Stress :    Social Connections:     Frequency of Communication with Friends and Family:     Frequency of Social Gatherings with Friends and Family:     Attends Sabianist Services:     Active Member of Clubs or Organizations:     Attends Club or Organization Meetings:     Marital Status:    Intimate Partner Violence:     Fear of Current or Ex-Partner:     Emotionally Abused:     Physically Abused:     Sexually Abused:          Current Facility-Administered Medications:     albuterol inhalation solution 2 5 mg, 2 5 mg, Nebulization, Q4H PRN, Janell Hough MD, 2 5 mg at 08/19/21 0604    amiodarone (CORDARONE) 900 mg in dextrose 5 % 500 mL infusion, 0 5 mg/min, Intravenous, Continuous, Janell Hough MD, Last Rate: 16 7 mL/hr at 08/18/21 2257, 0 5 mg/min at 08/18/21 2257    amLODIPine (NORVASC) tablet 2 5 mg, 2 5 mg, Oral, QAM, Janell Hough MD    apixaban (ELIQUIS) tablet 5 mg, 5 mg, Oral, BID, Janell Hough MD    benzonatate (TESSALON PERLES) capsule 100 mg, 100 mg, Oral, TID PRN, Janell Hough MD    dextromethorphan-guaiFENesin (ROBITUSSIN DM) oral syrup 10 mL, 10 mL, Oral, Q4H PRN, Janell Hough MD    Diclofenac Sodium (VOLTAREN) 1 % topical gel 2 g, 2 g, Topical, 4x Daily, Janell Hough MD    digoxin (LANOXIN) injection 250 mcg, 250 mcg, Intravenous, Q6H, Susan Cordova PA-C, 250 mcg at 08/19/21 1647    fish oil capsule 1,000 mg, 1,000 mg, Oral, BID, Janell Hough MD    gabapentin (NEURONTIN) capsule 100 mg, 100 mg, Oral, HS, Janell Hough MD    guaiFENesin (MUCINEX) 12 hr tablet 600 mg, 600 mg, Oral, BID, Janell Hough MD    hydrOXYzine HCL (ATARAX) tablet 25 mg, 25 mg, Oral, TID PRN, Janell Hough MD    insulin lispro (HumaLOG) 100 units/mL subcutaneous injection 1-5 Units, 1-5 Units, Subcutaneous, , Zaid Larsen MD    insulin lispro (HumaLOG) 100 units/mL subcutaneous injection 1-6 Units, 1-6 Units, Subcutaneous, TID AC **AND** Fingerstick Glucose (POCT), , , TID AC, Zaid Larsen MD    ipratropium (ATROVENT) 0 02 % inhalation solution 0 5 mg, 0 5 mg, Nebulization, TID, Zaid Larsen MD, 0 5 mg at 08/19/21 0711    levalbuterol (Etienne Noel) inhalation solution 1 25 mg, 1 25 mg, Nebulization, TID, Zaid Larsen MD, 1 25 mg at 08/19/21 0336    levothyroxine tablet 150 mcg, 150 mcg, Oral, Early Morning, Zaid Larsen MD    metoprolol tartrate (LOPRESSOR) tablet 25 mg, 25 mg, Oral, Q12H [address], Zaid Larsen MD, 25 mg at 08/19/21 0033    morphine injection 2 mg, 2 mg, Intravenous, Q3H PRN, Zaid Larsen MD, 2 mg at 08/19/21 0027    pantoprazole (PROTONIX) EC tablet 40 mg, 40 mg, Oral, Early Morning, Zaid Larsen MD, 40 mg at 08/19/21 0551    Current Outpatient Medications:     acetaminophen (TYLENOL) 100 mg/mL solution, Take 10 mg/kg by mouth every 4 (four) hours as needed for fever, Disp: , Rfl:     albuterol (2 5 mg/3 mL) 0 083 % nebulizer solution, Take 3 mL (2 5 mg total) by nebulization every 6 (six) hours as needed for wheezing or shortness of breath, Disp: 360 mL, Rfl: 0    Alcohol Swabs ( Sterile Alcohol Prep) PADS, Use as directed, Disp: , Rfl:     benzonatate (TESSALON PERLES) 100 mg capsule, Take 1 capsule (100 mg total) by mouth 3 (three) times a day as needed for cough, Disp: 20 capsule, Rfl: 0    dextromethorphan-guaiFENesin (ROBITUSSIN DM)  mg/5 mL syrup, Take 10 mL by mouth every 4 (four) hours as needed for cough, Disp: 473 mL, Rfl: 0    Diclofenac Sodium (VOLTAREN) 1 %, Apply 2 g topically 4 (four) times a day, Disp: 350 g, Rfl: 0    Eliquis 5 MG, TAKE ONE TABLET (5 MG TOTAL) BY MOUTH 2 (TWO) TIMES A DAY, Disp: 60 tablet, Rfl: 1    gabapentin (NEURONTIN) 100 mg capsule, Take 1 capsule (100 mg total) by mouth daily at bedtime, Disp: 30 capsule, Rfl: 0   glipiZIDE (GLUCOTROL) 5 mg tablet, Take 5 mg by mouth 2 (two) times a day, Disp: , Rfl:     guaiFENesin (MUCINEX) 600 mg 12 hr tablet, Take 1 tablet (600 mg total) by mouth 2 (two) times a day, Disp: 30 tablet, Rfl: 0    levothyroxine 150 mcg tablet, Take 150 mcg by mouth daily, Disp: , Rfl:     metFORMIN (GLUCOPHAGE) 500 mg tablet, Take 1 tablet (500 mg total) by mouth 2 (two) times a day with meals, Disp: 60 tablet, Rfl: 0    metoprolol tartrate (LOPRESSOR) 25 mg tablet, Take 1 tablet (25 mg total) by mouth every 12 (twelve) hours, Disp: 60 tablet, Rfl: 0    Omega-3 Fatty Acids (FISH OIL) 1,000 mg, Take 1,000 mg by mouth 2 (two) times a day, Disp: , Rfl:     pantoprazole (PROTONIX) 40 mg tablet, Take 1 tablet (40 mg total) by mouth daily, Disp: 30 tablet, Rfl: 0    predniSONE 20 mg tablet, Take 1 tablet (20 mg total) by mouth daily, Disp: 30 tablet, Rfl: 0    allopurinol (ZYLOPRIM) 300 mg tablet, Take 300 mg by mouth daily (Patient not taking: Reported on 8/18/2021), Disp: , Rfl:     amiodarone 200 mg tablet, Take 1 tablet (200 mg total) by mouth 2 (two) times a day with meals for 9 doses, Disp: 9 tablet, Rfl: 0    amiodarone 200 mg tablet, Take 1 5 tablets (300 mg total) by mouth daily, Disp: 60 tablet, Rfl: 0    amLODIPine (NORVASC) 2 5 mg tablet, Take 2 5 mg by mouth every morning, Disp: , Rfl:     hydrOXYzine HCL (ATARAX) 25 mg tablet, Take 25 mg by mouth 3 (three) times a day as needed, Disp: , Rfl:     lidocaine (LIDODERM) 5 %, Apply 1 patch topically every 24 hours Remove & Discard patch within 12 hours or as directed by MD (Patient not taking: Reported on 8/18/2021), Disp: 60 patch, Rfl: 0    tiotropium (SPIRIVA) 18 mcg inhalation capsule, Place 1 capsule (18 mcg total) into inhaler and inhale daily (Patient not taking: Reported on 8/18/2021), Disp: 30 capsule, Rfl: 0    (Not in a hospital admission)      Allergies   Allergen Reactions    Penicillins Hives         Physical Exam:    /58 (BP Location: Left arm)   Pulse (!) 145   Temp (!) 97 4 °F (36 3 °C)   Resp (!) 30   Ht 5' 2" (1 575 m)   Wt 75 6 kg (166 lb 9 6 oz)   SpO2 94%   BMI 30 47 kg/m²     Physical Exam  Constitutional:       Appearance: Normal appearance  He is obese  HENT:      Head: Normocephalic  Mouth/Throat:      Mouth: Mucous membranes are moist       Pharynx: Oropharynx is clear  Eyes:      Extraocular Movements: Extraocular movements intact  Conjunctiva/sclera: Conjunctivae normal       Pupils: Pupils are equal, round, and reactive to light  Cardiovascular:      Rate and Rhythm: Normal rate and regular rhythm  Pulses: Normal pulses  Heart sounds: Normal heart sounds  No murmur heard  No friction rub  No gallop  Pulmonary:      Effort: Pulmonary effort is normal  No respiratory distress  Breath sounds: Normal breath sounds  No wheezing, rhonchi or rales  Abdominal:      General: Bowel sounds are normal  There is no distension  Palpations: Abdomen is soft  Tenderness: There is no abdominal tenderness  There is no guarding  Comments: Obese   Musculoskeletal:         General: Swelling (bilateral lower extremities) present  Normal range of motion  Cervical back: Normal range of motion and neck supple  Lymphadenopathy:      Comments: No palpable submental, submandibular, pre/posterior auricular, occipital, cervical, or supraclavicular lymphadenopathy  Skin:     General: Skin is warm and dry  Neurological:      General: No focal deficit present  Mental Status: He is alert  Psychiatric:         Mood and Affect: Mood normal          Behavior: Behavior normal          Thought Content:  Thought content normal          Judgment: Judgment normal          Recent Results (from the past 48 hour(s))   ECG 12 lead    Collection Time: 08/18/21  3:33 PM   Result Value Ref Range    Ventricular Rate 110 BPM    Atrial Rate 288 BPM    ND Interval  ms    QRSD Interval 72 ms QT Interval 310 ms    QTC Interval 419 ms    P Axis 243 degrees    QRS Axis 62 degrees    T Wave Axis 89 degrees   CBC and differential    Collection Time: 08/18/21  3:43 PM   Result Value Ref Range    WBC 11 46 (H) 4 31 - 10 16 Thousand/uL    RBC 4 22 3 88 - 5 62 Million/uL    Hemoglobin 10 3 (L) 12 0 - 17 0 g/dL    Hematocrit 36 0 (L) 36 5 - 49 3 %    MCV 85 82 - 98 fL    MCH 24 4 (L) 26 8 - 34 3 pg    MCHC 28 6 (L) 31 4 - 37 4 g/dL    RDW 18 9 (H) 11 6 - 15 1 %    MPV 10 2 8 9 - 12 7 fL    Platelets 716 (H) 650 - 390 Thousands/uL   Comprehensive metabolic panel    Collection Time: 08/18/21  3:43 PM   Result Value Ref Range    Sodium 132 (L) 136 - 145 mmol/L    Potassium 4 9 3 5 - 5 3 mmol/L    Chloride 100 100 - 108 mmol/L    CO2 26 21 - 32 mmol/L    ANION GAP 6 4 - 13 mmol/L    BUN 31 (H) 5 - 25 mg/dL    Creatinine 1 69 (H) 0 60 - 1 30 mg/dL    Glucose 307 (H) 65 - 140 mg/dL    Calcium 9 8 8 3 - 10 1 mg/dL    Corrected Calcium 11 7 (H) 8 3 - 10 1 mg/dL    AST 11 5 - 45 U/L    ALT 36 12 - 78 U/L    Alkaline Phosphatase 165 (H) 46 - 116 U/L    Total Protein 6 9 6 4 - 8 2 g/dL    Albumin 1 6 (L) 3 5 - 5 0 g/dL    Total Bilirubin 0 62 0 20 - 1 00 mg/dL    eGFR 38 ml/min/1 73sq m   Troponin I    Collection Time: 08/18/21  3:43 PM   Result Value Ref Range    Troponin I <0 02 <=0 04 ng/mL   NT-BNP PRO    Collection Time: 08/18/21  3:43 PM   Result Value Ref Range    NT-proBNP 1,615 (H) <450 pg/mL   TSH    Collection Time: 08/18/21  3:43 PM   Result Value Ref Range    TSH 3RD GENERATON 0 363 0 358 - 3 740 uIU/mL   Magnesium    Collection Time: 08/18/21  3:43 PM   Result Value Ref Range    Magnesium 1 4 (L) 1 6 - 2 6 mg/dL   Manual Differential(PHLEBS Do Not Order)    Collection Time: 08/18/21  3:43 PM   Result Value Ref Range    Segmented % 93 (H) 43 - 75 %    Bands % 1 0 - 8 %    Lymphocytes % 0 (L) 14 - 44 %    Monocytes % 6 4 - 12 %    Eosinophils, % 0 0 - 6 %    Basophils % 0 0 - 1 %    Absolute Neutrophils 10 77 (H) 1 85 - 7 62 Thousand/uL    Lymphocytes Absolute 0 00 (L) 0 60 - 4 47 Thousand/uL    Monocytes Absolute 0 69 0 00 - 1 22 Thousand/uL    Eosinophils Absolute 0 00 0 00 - 0 40 Thousand/uL    Basophils Absolute 0 00 0 00 - 0 10 Thousand/uL    Total Counted      RBC Morphology Present     Anisocytosis Present     Polychromasia Present     Platelet Estimate Increased (A) Adequate    Artifact Present    Novel Coronavirus (Covid-19),PCR [facility] - 2 Hour Stat    Collection Time: 08/18/21  4:08 PM    Specimen: Nose; Nares   Result Value Ref Range    SARS-CoV-2 Negative Negative   Urine Macroscopic, POC    Collection Time: 08/18/21  6:37 PM   Result Value Ref Range    Color, UA Yellow     Clarity, UA Clear     pH, UA 5 5 4 5 - 8 0    Leukocytes, UA Negative Negative    Nitrite, UA Negative Negative    Protein,  (2+) (A) Negative mg/dl    Glucose,  (1/4%) (A) Negative mg/dl    Ketones, UA Trace (A) Negative mg/dl    Urobilinogen, UA 2 0 (A) 0 2, 1 0 E U /dl E U /dl    Bilirubin, UA Interference- unable to analyze (A) Negative    Blood, UA Negative Negative    Specific Gravity, UA 1 025 1 003 - 1 030   Urine Microscopic    Collection Time: 08/18/21  6:37 PM   Result Value Ref Range    RBC, UA None Seen None Seen, 2-4 /hpf    WBC, UA 2-4 None Seen, 2-4, 5-60 /hpf    Epithelial Cells Occasional None Seen, Occasional /hpf    Bacteria, UA Occasional None Seen, Occasional /hpf    Hyaline Casts, UA 0-3 (A) None Seen /lpf    Ca Oxalate Lucia, UA Moderate (A) None Seen /hpf    MUCUS THREADS Moderate (A) None Seen   POCT urinalysis dipstick    Collection Time: 08/18/21  6:40 PM   Result Value Ref Range    Color, UA -    Hemoglobin A1c w/EAG Estimation (Orders if not completed within the last 90 days)    Collection Time: 08/19/21  6:10 AM   Result Value Ref Range    Hemoglobin A1C 7 7 (H) Normal 3 8-5 6%; PreDiabetic 5 7-6 4%;  Diabetic >=6 5%; Glycemic control for adults with diabetes <7 0% %     mg/dl   TSH, 3rd generation    Collection Time: 08/19/21  6:10 AM   Result Value Ref Range    TSH 3RD GENERATON 0 445 0 358 - 3 740 uIU/mL   Comprehensive metabolic panel    Collection Time: 08/19/21  6:10 AM   Result Value Ref Range    Sodium 133 (L) 136 - 145 mmol/L    Potassium 4 5 3 5 - 5 3 mmol/L    Chloride 100 100 - 108 mmol/L    CO2 29 21 - 32 mmol/L    ANION GAP 4 4 - 13 mmol/L    BUN 27 (H) 5 - 25 mg/dL    Creatinine 1 17 0 60 - 1 30 mg/dL    Glucose 188 (H) 65 - 140 mg/dL    Calcium 9 3 8 3 - 10 1 mg/dL    Corrected Calcium 11 0 (H) 8 3 - 10 1 mg/dL    AST 11 5 - 45 U/L    ALT 28 12 - 78 U/L    Alkaline Phosphatase 136 (H) 46 - 116 U/L    Total Protein 6 2 (L) 6 4 - 8 2 g/dL    Albumin 1 9 (L) 3 5 - 5 0 g/dL    Total Bilirubin 0 55 0 20 - 1 00 mg/dL    eGFR 59 ml/min/1 73sq m   Magnesium    Collection Time: 08/19/21  6:10 AM   Result Value Ref Range    Magnesium 1 9 1 6 - 2 6 mg/dL   Phosphorus    Collection Time: 08/19/21  6:10 AM   Result Value Ref Range    Phosphorus 2 0 (L) 2 3 - 4 1 mg/dL   CBC and differential    Collection Time: 08/19/21  6:10 AM   Result Value Ref Range    WBC 8 64 4 31 - 10 16 Thousand/uL    RBC 3 51 (L) 3 88 - 5 62 Million/uL    Hemoglobin 8 5 (L) 12 0 - 17 0 g/dL    Hematocrit 29 7 (L) 36 5 - 49 3 %    MCV 85 82 - 98 fL    MCH 24 2 (L) 26 8 - 34 3 pg    MCHC 28 6 (L) 31 4 - 37 4 g/dL    RDW 18 9 (H) 11 6 - 15 1 %    MPV 10 0 8 9 - 12 7 fL    Platelets 580 (H) 010 - 390 Thousands/uL   Fingerstick Glucose (POCT)    Collection Time: 08/19/21  6:46 AM   Result Value Ref Range    POC Glucose 179 (H) 65 - 140 mg/dl       XR chest 1 view portable    Result Date: 8/18/2021  Narrative: CHEST INDICATION:   sob, concern for pulm edema    COMPARISON:  Compared with 7/27/2021 EXAM PERFORMED/VIEWS:  XR CHEST PORTABLE FINDINGS: Trachea midline  Cardiac silhouette is normal   Large right lung mass is obscured by haziness in the remaining lung parenchyma  Blunted right costophrenic angle    Increasing left mid lung peripheral opacity  Impression: Bilateral pulmonary masses increased with opacification of most of the right lung parenchyma  Effusion and infiltrates suggested on the right  Workstation performed: [id number]     XR chest portable    Result Date: 7/28/2021  Narrative: CHEST INDICATION:   Left lung mass biopsy  COMPARISON:  7/19/2021 EXAM PERFORMED/VIEWS:  XR CHEST PORTABLE FINDINGS: Cardiomediastinal silhouette appears unremarkable  There is a large right lung mass again seen with additional smaller mass along the left upper lobe laterally  There is a small right pleural effusion  There is no pneumothorax  Osseous structures appear within normal limits for patient age  Impression: No pneumothorax status post biopsy of left upper lobe lung mass  Workstation performed: [id number]     CT chest w contrast    Result Date: 8/4/2021  Narrative: CT CHEST WITH IV CONTRAST INDICATION:  Shortness of breath, acute SOB with right lung mass  Patient had 2 lung biopsies one on the right, another on the left  COMPARISON:  CT chest, abdomen and pelvis 7/22/2021 TECHNIQUE: CT examination of the chest was performed  Axial, sagittal, and coronal 2D reformatted images were created from the source data and submitted for interpretation  Radiation dose length product (DLP) for this visit:  274 mGy-cm  This examination, like all CT scans performed in the [facility], was performed utilizing techniques to minimize radiation dose exposure, including the use of iterative reconstruction and automated exposure control  IV Contrast:  85 mL of iohexol (OMNIPAQUE) FINDINGS: The study was not protocoled as a pulmonary angiogram  LUNGS:  A large very low density right lung mass with its epicenter in the perihilar right upper lobe is again identified  The mass measures approximately 14 4 x 9 0 x 12 6 cm AP, transverse and craniocaudal dimensions respectively    Previously this measured about 13 8 x 9 7 x 13 4 cm, similar accounting for differences in interobserver variability  Again the mass obliterates the right middle lobe bronchus causing postobstructive atelectasis  Bubbles of gas again seen within the mass centrally, unchanged  This may be related to changes from previous biopsy  As on previous examination mass encases right upper lobe bronchi as well as perihilar pulmonary arterial vessels  Reidentified is a low density lateral left upper lobe mass measuring 3 6 x 2 5 cm (series 3/57), stable or minimally larger when measured in similar fashion (3 3 x 2 3 cm)  Stable right lower lobe nodule measuring 1 2 cm series 3/96, lingular nodule measuring 7 mm series 3/87 and left lower lobe nodule measuring 6 mm series 3/63 also remain unchanged  No new nodule or mass  Mild background emphysema  PLEURA:  Trace residual right pleural effusion, decreased from prior study  HEART/GREAT VESSELS:  The heart is normal size  Atherosclerotic changes thoracic aorta and coronary arteries  Main pulmonary artery measures about 28 mm  No pericardial effusion  MEDIASTINUM AND NANCY:  Stable subcentimeter mediastinal lymph nodes  CHEST WALL AND LOWER NECK:   Unremarkable  VISUALIZED STRUCTURES IN THE UPPER ABDOMEN:  The liver has a somewhat lobular contour  Fatty infiltration of the pancreas incidentally noted  No adrenal masses  OSSEOUS STRUCTURES:  No acute fracture or destructive osseous lesion  Degenerative changes of the spine  Impression: No significant change in dominant right lung mass with bilateral pulmonary nodules as above, findings remaining highly suspicious for malignancy  Given nondiagnostic biopsy results and obliteration of the right middle lobe bronchus, bronchoscopy with tissue sampling directed to this location may be helpful  Trace residual right pleural effusion, decreased from prior study   Workstation performed: [id number]     IR biopsy other    Result Date: 7/27/2021  Narrative: CT-scan guided needle biopsy of left lung mass History: 68-year-old male with right and left lung mass Radiation dose: 815 mGy Conscious sedation time: 30min Technique: This examination, like all CT scans performed in the [facility], was performed utilizing techniques to minimize radiation dose exposure, including the use of iterative reconstruction and automated exposure control  The patient was brought to the CT scanner and placed supine on the table  After axial images were obtained through the region of interest an area of the skin was then marked, prepped, and draped in usual sterile fashion  Lidocaine was administered to the  skin and a small skin incision was made  A 17-gauge cannula was advanced up to the lesion, and through this, multiple 18-gauge core biopsy specimens were obtained  The specimen was reviewed by pathology and the 3 cores were placed in formalin  The needle was removed and a post biopsy image demonstrated no hemorrhage within the lung or pneumothorax  The patient tolerated the procedure well and suffered no complications  Impression: Impression: Successful percutaneous core biopsy of left lung mass  A full pathology report will follow  Workstation performed: [id number]     IR thoracentesis    Result Date: 7/30/2021  Narrative: PROCEDURE: Ultrasound-guided right thoracentesis Procedural Personnel Attending physician(s): Dr Tracy Perez Pre-procedure diagnosis: Pleural effusion Post-procedure diagnosis: Same Indication: Patient with bilateral lung masses and right pleural effusion presents for thoracentesis  Complications: No immediate complications  Impression: Ultrasound-guided right thoracentesis with drainage of 150 mL of chilo pleural fluid   Plan: Resume care by clinical team  _______________________________________________________________ PROCEDURE SUMMARY: - Limited thoracic ultrasound - Ultrasound-guided thoracentesis PROCEDURE DETAILS: Pre-procedure Consent: Informed consent for the procedure including risks, benefits and alternatives was obtained and time-out was performed prior to the procedure  Preparation: The site was prepared and draped using maximal sterile barrier technique including cutaneous antisepsis  Limited thoracic ultrasound Limited thoracic ultrasound was performed using a curved transducer  A safe window for thoracentesis was identified  Right hemithorax findings: Small pleural effusion Right thoracentesis Local anesthesia was administered  The pleural space was accessed under real-time ultrasound guidance  and fluid return confirmed position  The fluid was drained  The catheter was removed, and a sterile bandage was applied  Catheter placed: 5F Dirk Post-drainage hemithorax findings: No visible pleural effusion Additional Details Specimens removed: Pleural fluid Estimated blood loss (mL): None Standardized report: [email or web address] Attestation Signer name: [facility] I attest that I was present for the entire procedure  I reviewed the stored images and agree with the report as written  Workstation performed: [id number]     CT chest abdomen pelvis w contrast    Result Date: 7/23/2021  Narrative: CT CHEST, ABDOMEN AND PELVIS WITH IV CONTRAST INDICATION:   Large mass right lung on IR CT image guided biopsy concerning for malignancy  Lung masses identified on prior CT of June 18, 2021  Shortness of breath  Endobronchial ultrasound examination was performed June 21, 2021 with biopsies revealing primarily necrotic debris  COMPARISON:  June 18, 2021  TECHNIQUE: CT examination of the chest, abdomen and pelvis was performed  Axial, sagittal, and coronal 2D reformatted images were created from the source data and submitted for interpretation  Radiation dose length product (DLP) for this visit:  711 mGy-cm     This examination, like all CT scans performed in the [facility], was performed utilizing techniques to minimize radiation dose exposure, including the use of iterative reconstruction and automated exposure control  IV Contrast:  100 mL of iohexol (OMNIPAQUE) Enteric Contrast: Enteric contrast was administered  FINDINGS: CHEST LUNGS:  There is a very large and very low density right lung mass with its epicenter in the perihilar right upper lobe  The mass measures 13 8 x 9 7 x 13 4 cm  There is a very thin rind of peripheral enhancing tissue best seen on image 159 of series 602 where it borders postobstructive right middle lobe atelectasis  The mass and postobstructive atelectasis in the right pearl of similar from June 18, 2021  Bubbles of gas are seen within a centrally necrotic cavity in the lesion, approximately 2 5 cm in size  As on previous examination mass encases right middle lobe and right upper lobe bronchi as well as perihilar pulmonary arterial vessels  Also reidentified is a low density mass with its epicenter in the subpleural lateral left upper lobe on image 27 of series 2, not significantly changed in size from prior examination at approximately 2 6 x 2 3 cm  An additional right lower lobe nodule measuring 1 2 cm on image 47 of series 2, lingular nodule measuring 9 mm on image 83 of series 4 and superior segment left lower lobe pulmonary nodule measuring 6 mm on image 56 of series 4 also remain unchanged  No new nodule or mass  Mild background emphysematous changes are reidentified  PLEURA:  Increasing small-to-moderate freely layering right pleural effusion  HEART/GREAT VESSELS:  Unremarkable for patient's age  No pulmonary embolus though as on prior examination encasement of central right pulmonary arterial branches is reidentified  As on prior examination there is prominence of the central pulmonary arterial tree suggesting some element of pulmonary artery hypertension  MEDIASTINUM AND NANCY:  Borderline mediastinal lymph nodes are unchanged  CHEST WALL AND LOWER NECK:   Unremarkable  ABDOMEN LIVER/BILIARY TREE:  Unremarkable   GALLBLADDER:  No calcified gallstones  No pericholecystic inflammatory change  SPLEEN:  Unremarkable  PANCREAS:  Unremarkable  ADRENAL GLANDS:  Unremarkable  KIDNEYS/URETERS:  Unremarkable  No hydronephrosis  STOMACH AND BOWEL:  There is colonic diverticulosis without evidence of acute diverticulitis  APPENDIX:  No findings to suggest appendicitis  ABDOMINOPELVIC CAVITY:  No ascites  No pneumoperitoneum  No lymphadenopathy  VESSELS:  Unremarkable for patient's age  PELVIS REPRODUCTIVE ORGANS:  Unremarkable for patient's age  URINARY BLADDER:  Unremarkable  ABDOMINAL WALL/INGUINAL REGIONS:  Mild body wall edema noted  OSSEOUS STRUCTURES:  No acute fracture or destructive osseous lesion  Impression: Reidentification of pulmonary masses highly suspicious for bronchogenic malignancy  The dominant lesion in the perihilar right upper lobe now demonstrates central necrotic cavitation  There is slight increasing size of freely layering right pleural effusion  As on prior examination there is prominence of the central pulmonary arterial tree suggesting some element of pulmonary artery hypertension  No tumor mass is identified in the abdomen or pelvis  Workstation performed: [id number]     NM PET CT skull base to mid thigh    Result Date: 8/6/2021  Narrative: PET/CT SCAN INDICATION:  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, bronchus and lung     Abnormal chest CT demonstrating pulmonary masses  Right chest wall pain  MODIFIER: PI COMPARISON: Prior CT studies dated 8/4/2021 and earlier CELL TYPE:  Previous biopsies have demonstrated necrotic tissue TECHNIQUE:   10 7 mCi F-18-FDG administered IV  Multiplanar attenuation corrected and non attenuation corrected PET images were acquired 60 minutes post injection  Contiguous, low dose, axial CT sections were obtained from the skull base through the femurs   Intravenous contrast material was not utilized    This examination, like all CT scans performed in the [facility], was performed utilizing techniques to minimize radiation dose exposure, including the use of iterative reconstruction and automated exposure control  Fasting serum glucose: 192 mg/dl FINDINGS: VISUALIZED BRAIN:   No acute abnormalities are seen  HEAD/NECK:   There is a physiologic distribution of FDG  No FDG avid cervical adenopathy is seen  CT images: Unremarkable  CHEST:   Very large right upper lobe pleural-based mass involving the right pulmonary hilum creating mass effect upon the major fissure and contains central pockets of air and intense glucose avidity along its peripheral margins  The mass currently measures approximately 13 1 x 9 1 x 13 1 cm  Greatest SUV max is along its inferior peripheral margin, the value is 10 4  Although the central portion of the mass has scattered areas of increased glucose activity, it is mostly photopenic  The mass involves the  right pulmonary hilum with encasement of the right upper lobe bronchus  There is a pleural-based left upper lobe mass with a small focus of  cavitation measuring 3 5 x 2 5 cm SUV max 7 0  There is a right lower lobe lesion earlier measuring 15 mm currently unchanged in size with SUV max 3 0  Comparing to the CT study from 6/18/2021, this lesion measured 9 mm  There is mildly hypermetabolic right subcarinal adenopathy with short axis diameter 1 6 cm and SUV max 2 7  Patchy areas of airspace disease are seen within the right lower lobe and anterior aspect of the right upper lobe (for example image 90) which do not demonstrate significant glucose avidity  CT images: Small right-sided pleural effusion present  Pulmonary emphysema noted  ABDOMEN:   No FDG avid soft tissue lesions are seen  CT images: Unremarkable  PELVIS: No FDG avid soft tissue lesions are seen  CT images: Colonic diverticulosis OSSEOUS STRUCTURES: No FDG avid lesions are seen  CT images: No significant findings  Impression: 1   Large mass in the right hemithorax with extensive central necrosis but with glucose hypermetabolism along its peripheral margins, especially the inferior aspect  2  Left upper lobe mass with a slight focus of cavitation, also glucose avid 3  Enlarging right lower lobe solid nodule with SUV max 3 0  4  The mass involves the right pulmonary hilum, right upper lobe bronchus, and there is mild right subcarinal adenopathy  There is also a small right effusion and pulmonary emphysema 5  There is no evidence of extrathoracic glucose avid metastatic disease Note is made of previous biopsy attempts of the large mass, left upper lobe lesion and pleural fluid  As per notes in Epic, potential sites for reattempt at soft tissue biopsy diagnostic results would include peripheral margins of the large right upper lobe mass (for example the inferior margin on image 109 with SUV max 10 4), or the enlarging 15 mm solid right lower lobe pulmonary nodule Workstation performed: [id number]     IR biopsy lung    Result Date: 7/21/2021  Narrative: CT-guided lung mass biopsy Clinical History: Lung mass suspicious for malignant process  Prior endobronchial ultrasound without malignant cells identified  Respiratory failure  Atrial fibrillation on anticoagulation  Moderate sedation time: 35 minutes Procedure: After explaining the risks and benefits of the procedure to the patient, informed consent was obtained  CT was used to localize the lung mass   Radiation dose length product (DLP) for this visit:  1204 mGy   This examination, like all CT scans performed in the [facility], was performed utilizing techniques to minimize radiation dose exposure, including the use of iterative reconstruction and automated exposure control  The overlying skin was prepped and draped in the usual sterile fashion  Local anesthesia was obtained with a 1% lidocaine solution  Using CT guidance, a 17-gauge coaxial needle was advanced    9 passes with an 18g gauge core biopsy needle were performed  The tissue was given to pathology  The needle was removed and final imaging performed  Patient tolerated the procedure without immediate complication Findings: 70 0 cm right lung masslike density  There is architectural distortion and it is difficult to discern what may be intrinsic mass and what may in fact be atelectatic compressed lung  Needle within the mass  Postbiopsy changes without hematoma  Since prior CT of June 18 there has been interval increase in size of the mass and development of a small right pleural effusion  A left upper lung 3 7 cm mass is also present which has increased in size from 2 1 cm on prior study  A right base presumed metastatic nodule is also increased measuring 14 mm, previously 9 mm  Specimens: Flow cytometry, touch prep x4, 18-gauge core x9 The patient tolerated the procedure well and left the department in stable condition  Impression: Impression: CT-guided biopsy of a large right lung mass Since interval CT approximately one month ago there has been growth of the mass and development of accompanying right pleural effusion  Consider thoracentesis if there is persistent respiratory failure  Additional presumed metastatic nodules have also increased in size  Workstation performed: [id number]     CTA ED chest PE Study    Result Date: 8/18/2021  Narrative: CTA - CHEST WITH IV CONTRAST - PULMONARY ANGIOGRAM INDICATION:   Tachypnea and dyspnea    COMPARISON: Multiple prior CT examinations of the chest commencing  January 18, 2021 with direct comparison made to August 4, 2021  Correlation PET/CT August 6, 2021 TECHNIQUE: CTA examination of the chest was performed using angiographic technique according to a protocol specifically tailored to evaluate for pulmonary embolism  Axial, sagittal, and coronal 2D reformatted images were created from the source data and  submitted for interpretation    In addition, coronal 3D MIP postprocessing was performed on the acquisition scanner  Radiation dose length product (DLP) for this visit:  653 67 mGy-cm   This examination, like all CT scans performed in the [facility], was performed utilizing techniques to minimize radiation dose exposure, including the use of iterative  reconstruction and automated exposure control  IV Contrast:  85 mL of iohexol (OMNIPAQUE)  FINDINGS: PULMONARY ARTERIAL TREE:  No pulmonary embolus is seen  LUNGS:  Emphysema  Complete collapse of the right upper and middle lobes, increased since August 4, 2021  The right lung mass which broadly abuts the mediastinal structures and encases the pulmonary artery and right bronchi measures approximately 13 2 x 9 9 x 14 1 cm (image 117; series 603, image 146), previously 12 7 x 9 2 x 12 9 cm when measured in a similar fashion (series 3, image 60; series 603, image 148 on prior study) There are several foci of air within the mass, similar to the August 4, 2021 chest CT  Solid right lower lobe nodule is slightly increased in size measuring 1 4 x 1 4 cm, previously 1 2 x 1 2 cm (series 3, image 93)  Segmental atelectasis of the right lower lobe is new since August 4, 2021  Lateral left upper lobe mass has increased in size, measuring 4 0 x 3 2 cm, previously 3 5 x 2 6 cm (series 3, image 53)  Solid 0 6 x 0 4 cm nodule in the left lower lobe has significantly increased since August 4, 2021, previously a nearly imperceptible 0 3 x 0 3 cm groundglass nodule (series 3, image 65 compared to series 3, image 16 on prior study)  0 6 cm lingular nodule is unchanged (series 3, image 84)  0 7 cm left lower lobe nodule is unchanged (series 3, image 58) PLEURA:  New moderate right pleural effusion  HEART/GREAT VESSELS:  Atherosclerotic changes are noted in thoracic aorta and coronary arteries  MEDIASTINUM AND NANCY:  Mediastinal lymph nodes are not significantly changed    Representative example includes a right lower paratracheal lymph node which measures 1 cm in short axis (series 2, image 77)  CHEST WALL AND LOWER NECK:   Unremarkable  VISUALIZED STRUCTURES IN THE UPPER ABDOMEN:  Unremarkable  OSSEOUS STRUCTURES:  Spinal degenerative changes are noted  No acute fracture or destructive osseous lesion  Impression: 1  No pulmonary embolism  2   Since August 4, 2021, complete collapse of the right upper and lower lobes  New segmental atelectasis in the right lower lobe and new moderate right pleural effusion  3   Slight increase in size of the dominant right lung mass  4   Left upper lobe mass and right lower lobe and left lower lobe nodules have increased in size  Workstation performed: [id number]         Labs and pertinent reports reviewed  Disclaimer: This document was prepared using Number [employer] Direct technology  If a word or phrase is confusing, or does not make sense, this is likely due to recognition error which was not discovered during the providers review  If you believe an error has occurred, please contact me for janay? cation

## 2021-08-19 NOTE — ASSESSMENT & PLAN NOTE
Lab Results   Component Value Date    HGBA1C 7 7 (H) 08/19/2021       Recent Labs     08/19/21  0646 08/19/21  1319 08/19/21  1720   POCGLU 179* 347* 130       Blood Sugar Average: Last 72 hrs:  (P) 89 [id number]     Continue insulin sliding scale  Will put on hold metformin and glipizide    Add lantus 4 U HS

## 2021-08-19 NOTE — ASSESSMENT & PLAN NOTE
· Telemetry monitoring  · EP/Cardiology consult  · Amiodarone infusion  · Continue home PO amiodarone and metoprolol as BP allows  · AC with Eliquis  · Consider digoxin if remains tachycardic   · Goal Mg > 2 2 and K > 4  · Critical care will follow along

## 2021-08-19 NOTE — PROGRESS NOTES
[address] [facility]  Progress Note - Erma Goldsmith 1940, 68 y o  male MRN: [id number]  Unit/Bed#: ED 26 Encounter: [id number]  Primary Care Provider: Kelsey Daniels DO   Date and time admitted to hospital: 8/18/2021  3:23 PM    * Atrial fibrillation with RVR [facility])  Assessment & Plan  Patient has resistant atrial fibrillation with RVR with heart rates ranging from 120 to 140 on amiodarone drip  Will continue with metoprolol 25 mg q 12   Cardio consulted - loaded with digoxin IV with plans for PO amiodarone & PO digoxin    HOLD amlodipine 2 5 mg daily  Heparin drip If needs procedure    Chronic respiratory failure with hypoxia (HCC)  Assessment & Plan  o2 need at BL of 3 L but does get SADLER at baseline  Suspect from Afib, lung mass  Not in COPD exa  COnt RX afib, eval for mass  Cont nebs  Cont AC    COPD (chronic obstructive pulmonary disease) ([facility] )  Assessment & Plan  Not in acute exacerbation  Cont nebs, mucolytics    New onset type 2 diabetes mellitus ([facility] )  Assessment & Plan  Lab Results   Component Value Date    HGBA1C 7 7 (H) 08/19/2021       Recent Labs     08/19/21  0646 08/19/21  1319 08/19/21  1720   POCGLU 179* 347* 130       Blood Sugar Average: Last 72 hrs:  (P) 829 [id number]     Continue insulin sliding scale  Will put on hold metformin and glipizide  Add lantus 4 U HS    Hypothyroidism  Assessment & Plan  Continue levothyroxine 150 mcg daily  Mass of right lung  Assessment & Plan  Patient has multiple pulmonary masses which are enlarging    PET scan shows heterogeneous activity of right lung mass & left nodule  Has had EBUS & IR biopsy & IR thora in past all inconclusive, Biopsies only showed necrosis  Hence underwent PET scan to see what areas highlight - after which was pendign furtehr work up  Hem onc & pulm consulted - may need another IR biopsy guided by PET scan  Switch eliquis to heparin drip          [facility]'s Internal Medicine Progress Note  Patient: Keenan Fatima 68 y o  male   MRN: [id number]  PCP: Stephanie Rodas DO  Unit/Bed#: ED 32 Encounter: [id number]  Date Of Visit: 08/19/21    Assessment:    Principal Problem:    Atrial fibrillation with RVR ([facility] )  Active Problems:    Mass of right lung    Hypothyroidism    New onset type 2 diabetes mellitus ([place] [address] )    COPD (chronic obstructive pulmonary disease) ([place] [address] )    Chronic respiratory failure with hypoxia ([place] [address] )      Plan:           VTE Pharmacologic Prophylaxis:     Moderate Risk (Score 3-4) - Pharmacological DVT Prophylaxis Ordered: Heparin Drip  Mechanical VTE Prophylaxis in Place: Yes    Patient Centered Rounds: I have performed bedside rounds with nursing staff today  Discussions with Specialists or Other Care Team Provider:     Education and Discussions with Family / Patient: patient, refused call to wife    Time Spent for Care: 30 minutes  More than 50% of total time spent on counseling and coordination of care as described above  Current Length of Stay: 1 day(s)  Current Patient Status: Inpatient     Certification Statement: The patient will continue to require additional inpatient hospital stay due to not stable    Discharge Plan / Estimated Discharge Date: unclear    Code Status: Level 2 - DNAR: but accepts endotracheal intubation      Subjective:   Feels ok at rest, SADLER    Objective:     Vitals:   No data recorded  HR:  [100-146] 100  Resp:  [18-20] 20  BP: ()/(54-72) 109/68  SpO2:  [92 %-97 %] 92 %  Body mass index is 30 47 kg/m²  Input and Output Summary (last 24 hours): Intake/Output Summary (Last 24 hours) at 8/19/2021 1849  Last data filed at 8/18/2021 2204  Gross per 24 hour   Intake 700 ml   Output --   Net 700 ml       Physical Exam:   Physical Exam  Vitals reviewed  HENT:      Head: Normocephalic and atraumatic  Mouth/Throat:      Mouth: Mucous membranes are moist    Cardiovascular:      Rate and Rhythm: Normal rate and regular rhythm  Heart sounds: Normal heart sounds  Pulmonary:      Effort: Pulmonary effort is normal  No respiratory distress  Breath sounds: Normal breath sounds  No wheezing  Abdominal:      General: There is no distension  Palpations: Abdomen is soft  Tenderness: There is no abdominal tenderness  Musculoskeletal:      Right lower leg: No edema  Left lower leg: No edema  Neurological:      Mental Status: He is alert and oriented to person, place, and time  Additional Data:     Labs:  Results from last 7 days   Lab Units 08/19/21  0610 08/18/21  1543   WBC Thousand/uL 8 64 11 46*   HEMOGLOBIN g/dL 8 5* 10 3*   HEMATOCRIT % 29 7* 36 0*   PLATELETS Thousands/uL 392* 419*   BANDS PCT %  --  1   LYMPHO PCT %  --  0*   MONO PCT %  --  6   EOS PCT %  --  0     Results from last 7 days   Lab Units 08/19/21  0610   SODIUM mmol/L 133*   POTASSIUM mmol/L 4 5   CHLORIDE mmol/L 100   CO2 mmol/L 29   BUN mg/dL 27*   CREATININE mg/dL 1 17   ANION GAP mmol/L 4   CALCIUM mg/dL 9 3   ALBUMIN g/dL 1 9*   TOTAL BILIRUBIN mg/dL 0 55   ALK PHOS U/L 136*   ALT U/L 28   AST U/L 11   GLUCOSE RANDOM mg/dL 188*         Results from last 7 days   Lab Units 08/19/21  1720 08/19/21  1319 08/19/21  0646   POC GLUCOSE mg/dl 130 347* 179*     Results from last 7 days   Lab Units 08/19/21  0610   HEMOGLOBIN A1C % 7 7*           Imaging: No pertinent imaging reviewed      Recent Cultures (last 7 days):           Lines/Drains:  Invasive Devices     Peripheral Intravenous Line            Peripheral IV 08/18/21 Right Antecubital 1 day    Peripheral IV 08/18/21 Right Wrist 1 day                Telemetry:   Telemetry Orders (From admission, onward)             48 Hour Telemetry Monitoring  Continuous x 48 hours     Question:  Reason for 48 Hour Telemetry  Answer:  Arrhythmias Requiring Medical Therapy (eg  SVT, Vtach/fib, Bradycardia, Uncontrolled A-fib)                    Last 24 Hours Medication List:   Current Facility-Administered Medications   Medication Dose Route Frequency Provider Last Rate    albuterol  2 5 mg Nebulization Q4H PRN Darwin Green MD      [START ON 8/20/2021] amiodarone  400 mg Oral Daily Jenna Gilman MD      benzonatate  100 mg Oral TID PRN Darwin Green MD      dextromethorphan-guaiFENesin  10 mL Oral Q4H PRN Darwin Green MD      Diclofenac Sodium  2 g Topical 4x Daily Darwin Green MD      digoxin  250 mcg Intravenous Jerry Davis MD      [START ON 8/20/2021] digoxin  125 mcg Oral Daily Jenna Gilman MD      fish oil  1,000 mg Oral BID Darwin Green MD      gabapentin  100 mg Oral HS Darwin Green MD      guaiFENesin  600 mg Oral BID Darwin Green MD      heparin   Intravenous Once Carolina Mosqueda MD      hydrOXYzine HCL  25 mg Oral TID PRN Darwin Green MD      insulin glargine  4 Units Subcutaneous HS Carolina Mosqueda MD      insulin lispro  1-5 Units Subcutaneous HS Darwin Green MD      insulin lispro  1-6 Units Subcutaneous TID [facility] Darwin Green MD      ipratropium  0 5 mg Nebulization TID Darwin Green MD      levalbuterol  1 25 mg Nebulization TID Darwin Green MD      levothyroxine  150 mcg Oral Early Morning Darwin Green MD      metoprolol tartrate  25 mg Oral Q12H [address] Darwin Green MD      morphine injection  2 mg Intravenous Q3H PRN Darwin Green MD      pantoprazole  40 mg Oral Early Morning Darwin Green MD          Today, Patient Was Seen By: Carolina Mosqueda MD    ** Please Note: This note has been constructed using a voice recognition system   **

## 2021-08-19 NOTE — ASSESSMENT & PLAN NOTE
Lab Results   Component Value Date    HGBA1C 6 6 (H) 06/20/2021     Continue insulin sliding scale  Will put on hold metformin and glipizide  No results for input(s): POCGLU in the last 72 hours      Blood Sugar Average: Last 72 hrs:

## 2021-08-19 NOTE — UTILIZATION REVIEW
Inpatient Admission Authorization Request   NOTIFICATION OF INPATIENT ADMISSION/INPATIENT AUTHORIZATION REQUEST   SERVICING FACILITY:   [facility]  Address: [address] [place] [address], [address] [place] [address]  Tax ID: [id number]  NPI: [id number]  Place of Service: Inpatient [address] [facility] Code: 24     ATTENDING PROVIDER:  Attending Name and NPI#: Frances Perez Md [[id number]]  Address: [address] [place] [address], [address] [place] [address]  Phone: [phone]     UTILIZATION REVIEW CONTACT:  [facility] Utilization   Network Utilization Review Department  Phone: [phone]  Fax: [phone]  Email: [email or web address]     PHYSICIAN ADVISORY SERVICES:  FOR [facility] REVIEW - MEDICAL NECESSITY DENIAL  Phone: [phone]  Fax: [phone]  Email: [email or web address]     TYPE OF REQUEST:  Inpatient Status     ADMISSION INFORMATION:  ADMISSION DATE/TIME: 8/18/21 10:46 PM  PATIENT DIAGNOSIS CODE/DESCRIPTION:  Abnormal ECG [R94 31]  DISCHARGE DATE/TIME: No discharge date for patient encounter  DISCHARGE DISPOSITION (IF DISCHARGED): Home with Home Health Care     IMPORTANT INFORMATION:  Please contact the [facility] directly with any questions or concerns regarding this request  Department voicemails are confidential     Send requests for admission clinical reviews, concurrent reviews, approvals, and administrative denials due to lack of clinical to fax [phone]

## 2021-08-19 NOTE — CONSULTS
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 in the setting of Afib w/ RVR    - Patient initially presented to the ED w/ Afib w/ RVR  On CTA, R lung mass, NICHELLE mass and RLL and LLL nodules were noted, and has been enlarged compared to last imaging    - Previous PET scan showed no metastasis to the CNS, head, neck, pelvis or osseous structures  - EBUS, IR biopsies and thoracentesis previously performed did not show definitive malignancy  - was following PCP/Pulm/Oncology outpatient for workup for SCLC    2  COPD  - recent diagnosis  - extensive smoking h/o, quit three weeks ago  - no PFTs in chart    3  Atrial fibrillation w/ RVR  - in the setting of enlarging lung masses  - troponin neg, BNP elevated      Plan:  1  Right lung mass  - will need IR biopsy in the future once Afib has been resolved  - oncology consulted    2  COPD  - Xopenex 1 25mg 3x daily  - Atrovent 0 5mg 3x daily  - albuterol 2 5mg q4hrs PRN  - Mucinex 600mg q12hrs     3  Atrial fibrillation w/ RVR  - plan per cardio   - metoprolol 25mg q12hrs   - amiodarone drip    History of Present Illness   HPI:  Sean Garcia is a 68 y o  male who was consulted for enlarging pulmonary masses  67 y/o M w/ PMH of Afib on Eliquis, COPD, ITP, who present w/ abnormal EKG  Patient was found tachycardic by visiting nurse, and sent to his Cardiology's office where an EKG that showed Afib with RVR  Patient denied any chest pain or palpitations  Patient reported over the past several weeks he had increased SOB, and a chronic cough w/ productive nonbloody sputum  Recently he noted b/l LE swelling and orthopnea   He has been sleeping sitting upright  Patient is currently being worked up outpatient by PCP/pulmonology/Oncology for [facility]  Per chart review, patient had EBUS performed on 6/2021 and cytology showed atypical cells not conclusive for malignancy  IR guided transthoracic biopsy of right lung mass was recommended by Dr Carmelo Fernandez and was performed on 7/21/2021  Tissue exam showed primarily necrotic debris with scattered blood vessels, histiocytes and fibroblasts  Almost entirely necrotic tissue  Definitive diagnosis cannot be rendered  Cytology was neg for malignancy  At the ED, he was tachycardic w/ HR inthe 130s to 140s and SBP 90s to 100s  B/l LE edema and wheezing were noted on physical exam  Troponin normal  BNP 1615, TSH 0 363  WBC 11 46 H, w/ elevated absolute neutrophils 10 77  CTA imaging showed no PE, complete collapse of RU and RL lobes since August 4, 2021, new segmental atelectasis in RLL and new moderate R pleural effusion  Slight increase in size of R lung mass  NICHELLE mass and RLL and LLL nodules increased in size  CXR showed b/l pulmonary masses increased with opacification of most of the right lung parenchyma  Effusion and infiltrates suggested on the right  Today, patient appeared acutely in distress  Reports feeling SOB and increased WOB  He was coughing w/ sputum production  This has been ongoing per patient  He was recently diagnosed w/ COPD 3 months ago, however never had PFTs performed  He has quit smoking 3 weeks ago  He denied any chest pain, however has intermittent chest palpitations  Denied any fevers or chills, however he was sweating due to the room being hot  Review of systems:  12 point review of systems was completed and was otherwise negative except as listed in HPI        Historical Information   Past Medical History:   Diagnosis Date    Hypertension      Past Surgical History:   Procedure Laterality Date    BACK SURGERY      CARPAL TUNNEL RELEASE Bilateral     IR BIOPSY BONE MARROW  6/10/2021    IR BIOPSY LUNG  7/21/2021    IR BIOPSY OTHER  7/27/2021    IR THORACENTESIS  7/30/2021    LUMBAR DISC SURGERY       Family History   Problem Relation Age of Onset    Cancer Mother         "ate away her bone"    Anuerysm Father     Cancer Sister         unknown type    Heart attack Maternal Grandfather     Cancer Sister         unknown type    Heart attack Maternal Aunt     Heart attack Maternal Uncle     Heart attack Paternal Aunt        Occupational History: heavy     Social History:    Former smoker   - 40 years   - 1 pk/year  Alcohol: N/A  Drugs: N/A    Meds/Allergies   Current Facility-Administered Medications   Medication Dose Route Frequency    albuterol inhalation solution 2 5 mg  2 5 mg Nebulization Q4H PRN    amiodarone (CORDARONE) 900 mg in dextrose 5 % 500 mL infusion  0 5 mg/min Intravenous Continuous    amLODIPine (NORVASC) tablet 2 5 mg  2 5 mg Oral QAM    apixaban (ELIQUIS) tablet 5 mg  5 mg Oral BID    benzonatate (TESSALON PERLES) capsule 100 mg  100 mg Oral TID PRN    dextromethorphan-guaiFENesin (ROBITUSSIN DM) oral syrup 10 mL  10 mL Oral Q4H PRN    Diclofenac Sodium (VOLTAREN) 1 % topical gel 2 g  2 g Topical 4x Daily    digoxin (LANOXIN) injection 250 mcg  250 mcg Intravenous Q6H    fish oil capsule 1,000 mg  1,000 mg Oral BID    gabapentin (NEURONTIN) capsule 100 mg  100 mg Oral HS    guaiFENesin (MUCINEX) 12 hr tablet 600 mg  600 mg Oral BID    hydrOXYzine HCL (ATARAX) tablet 25 mg  25 mg Oral TID PRN    insulin lispro (HumaLOG) 100 units/mL subcutaneous injection 1-5 Units  1-5 Units Subcutaneous HS    insulin lispro (HumaLOG) 100 units/mL subcutaneous injection 1-6 Units  1-6 Units Subcutaneous TID AC    ipratropium (ATROVENT) 0 02 % inhalation solution 0 5 mg  0 5 mg Nebulization TID    levalbuterol (XOPENEX) inhalation solution 1 25 mg  1 25 mg Nebulization TID    levothyroxine tablet 150 mcg  150 mcg Oral Early Morning  metoprolol tartrate (LOPRESSOR) tablet 25 mg  25 mg Oral Q12H [address]    morphine injection 2 mg  2 mg Intravenous Q3H PRN    pantoprazole (PROTONIX) EC tablet 40 mg  40 mg Oral Early Morning     (Not in a hospital admission)    Allergies   Allergen Reactions    Penicillins Hives       Vitals: Blood pressure 103/58, pulse (!) 145, temperature (!) 97 4 °F (36 3 °C), resp  rate (!) 30, height 5' 2" (1 575 m), weight 75 6 kg (166 lb 9 6 oz), SpO2 94 %  , 3L NC, Body mass index is 30 47 kg/m²  Intake/Output Summary (Last 24 hours) at 8/19/2021 1102  Last data filed at 8/18/2021 2204  Gross per 24 hour   Intake 700 ml   Output --   Net 700 ml       Physical Exam  Vitals reviewed  Constitutional:       General: He is in acute distress  Appearance: He is well-developed  He is obese  HENT:      Head: Normocephalic and atraumatic  Right Ear: External ear normal       Left Ear: External ear normal       Nose: Nose normal       Mouth/Throat:      Mouth: Mucous membranes are moist    Eyes:      Extraocular Movements: Extraocular movements intact  Conjunctiva/sclera: Conjunctivae normal    Cardiovascular:      Rate and Rhythm: Rhythm irregular  Heart sounds: Normal heart sounds  No murmur heard  Pulmonary:      Effort: Respiratory distress present  Breath sounds: No stridor  Examination of the right-upper field reveals decreased breath sounds  Examination of the left-upper field reveals decreased breath sounds  Examination of the right-middle field reveals decreased breath sounds  Examination of the left-middle field reveals decreased breath sounds  Examination of the right-lower field reveals decreased breath sounds  Examination of the left-lower field reveals decreased breath sounds  Decreased breath sounds present  Abdominal:      General: Bowel sounds are normal       Palpations: Abdomen is soft  Tenderness: There is no abdominal tenderness     Musculoskeletal:      Cervical back: Normal range of motion and neck supple  Right lower leg: No edema  Left lower leg: No edema  Skin:     General: Skin is warm and dry  Neurological:      General: No focal deficit present  Mental Status: He is alert  Psychiatric:         Mood and Affect: Mood normal          Behavior: Behavior normal          Thought Content: Thought content normal          Judgment: Judgment normal          Labs: I have personally reviewed pertinent lab results  Laboratory and Diagnostics  Results from last 7 days   Lab Units 08/19/21  0610 08/18/21  1543   WBC Thousand/uL 8 64 11 46*   HEMOGLOBIN g/dL 8 5* 10 3*   HEMATOCRIT % 29 7* 36 0*   PLATELETS Thousands/uL 392* 419*   BANDS PCT %  --  1   MONO PCT %  --  6     Results from last 7 days   Lab Units 08/19/21  0610 08/18/21  1543   SODIUM mmol/L 133* 132*   POTASSIUM mmol/L 4 5 4 9   CHLORIDE mmol/L 100 100   CO2 mmol/L 29 26   ANION GAP mmol/L 4 6   BUN mg/dL 27* 31*   CREATININE mg/dL 1 17 1 69*   CALCIUM mg/dL 9 3 9 8   GLUCOSE RANDOM mg/dL 188* 307*   ALT U/L 28 36   AST U/L 11 11   ALK PHOS U/L 136* 165*   ALBUMIN g/dL 1 9* 1 6*   TOTAL BILIRUBIN mg/dL 0 55 0 62     Results from last 7 days   Lab Units 08/19/21  0610 08/18/21  1543   MAGNESIUM mg/dL 1 9 1 4*   PHOSPHORUS mg/dL 2 0*  --            Results from last 7 days   Lab Units 08/18/21  1543   TROPONIN I ng/mL <0 02          EBUS  FNA 6/21/2021  atypical cells but no conclusive for malignancy  Pulmonary cytology 6/21/2021  RUL mass  Atypical cellular changes seen  Lymph node 7  Atypical cellular changes seen  Note  Few atypical epithelioid cells are noted on both the RUL mass and L7 specimens, but insufficient for a definitive diagnosis of malignancy or exclusion of reactive poorly preserved bronchial cells  Both cell blocks are hypocellular and do not contain the atypical cells; confirmed by immunostains performed on both cell blocks   Given the clinical suspicion, additional tissue sampling should be considered for definitive diagnosis  IR biopsy 7/21/2021  Tissue exam   Right lung   - Primarily necrotic debris with scattered blood vessels, histiocytes and fibroblasts  See comment   - No viable tumor identified for ancillary studies  Comment: Flow cytometry is pending, though likely to have insufficient viability  See separate lab entry for full report  Lung biopsy  - Almost entirely necrotic tissue  See comment  Comment: Immunohistochemistry for AE1/3 is positive in scattered single cells  TTF-1, p40, and synaptophysin are negative  Ki-67 is positive in few background inflammatory cells  Although the overall radiologic and prior pathologic findings are concerning for malignancy, a definitive diagnosis cannot be rendered  Cytology  Negative for malignancy  Benign mesothelial cells  Lymphocytes and neutrophils  Imaging and other studies: I have personally reviewed pertinent reports  CTA 8/18/2021  1  No pulmonary embolism  2   Since August 4, 2021, complete collapse of the right upper and lower lobes  New segmental atelectasis in the right lower lobe and new moderate right pleural effusion  3   Slight increase in size of the dominant right lung mass  - 13 2 x 9 9 x 14 1 cm, previously 12 7 x 9 2 x 12 9 cm  4  Left upper lobe mass and right lower lobe and left lower lobe nodules have increased in size  PET scan 8/6/2021  1  Large mass in the right hemithorax with extensive central necrosis but with glucose hypermetabolism along its peripheral margins, especially the inferior aspect  2  Left upper lobe mass with a slight focus of cavitation, also glucose avid  3  Enlarging right lower lobe solid nodule with SUV max 3 0   4  The mass involves the right pulmonary hilum, right upper lobe bronchus, and there is mild right subcarinal adenopathy  There is also a small right effusion and pulmonary emphysema  5   There is no evidence of extrathoracic glucose avid metastatic disease    - no abnormalities noted in CNS, head/neck    Pulmonary function testing: N/A    EKG, Pathology, and Other Studies: I have personally reviewed pertinent reports  Code Status: Level 2 - DNAR: but accepts endotracheal intubation    VTE Pharmacologic Prophylaxis: Eliquis  VTE Mechanical Prophylaxis: N/A    Disclaimer: Portions of the record may have been created with voice recognition software  Occasional wrong word or "sound a like" substitutions may have occurred due to the inherent limitations of voice recognition software  Careful consideration should be taken to recognize, using context, where substitutions have occurred      1 [facility]

## 2021-08-19 NOTE — CONSULTS
Consultation - General Cardiology Team 2  Louisa Angel 68 y o  male MRN: [id number]  Unit/Bed#: ED 26 Encounter: [id number]      Inpatient consult to Cardiology  Consult performed by: Milagros Hanna  Consult ordered by: Charito Bradshaw MD        PCP: Ly Tomlin DO   Outpatient Cardiologist: Manny Chaudhary MD    History of Present Illness   Physician Requesting Consult: Beatrice Garcia MD  Reason for Consult / Principal Problem: Atrial Fibrillation/Atrial Flutter    HPI: Long Hernandez is a 68y o  year old male with a history of paroxysmal atrial fibrillation diagnosed in 6/2021 on Eliquis 5 mg bid, Lopressor 25 mg PO bid, and amiodarone 400 mg PO daily, right and left lung masses without definitive diagnosis, ITP, T2DM, Hypothyroidism, 20 pack year smoking history who presented to B on 8/18 for symptomatic Atrial flutter  Patient was first found to have paroxysmal A fib w/ RVR during an EKG performed on 6/10  On 6/17 he was noted to have SOB with minimal exertion during office visit with hematologist  Patient presented to ED on 6/18, he converted back to sinus rhythm on his own  During this evaluation he was found to have significant right lung mass encasing the right pulmonary artery and branches, also left lung nodules with biopsy showing necrotic tissue  No diagnosis of malignancy made  Patient was discharged from hospital on 6/23 on Lopressor 12 5 mg bid and Eliquis 5 mg bid  Patient returned to 7/14 due to abnomal calcium level  He was found to be in [address] with HR of 150  He was admitted and Lopressor increased to 25 mg bid  On 7/19 a rapid response was called when he had symptoms to prior Afib episodes  His Lopressor was then increased to 50 mg bid and was started on Amiodarone drip and PO Amiordorone load  Per chart review, it appears he received about 9 g of Amiodarone PO prior to discharge on 8/5  He was discharged on Amiodarone 300 mg PO and Lopressor 50 mg PO bid   On 8/13, patients visiting nurse noticed he had an irregular rhythm with heart rate in 130s  At the time he reported increased fatigue and SOB  Cardiolgist was called who recommended increasing Amiodarone dose to 400 mg  On 8/18, patient once again contacted cardiologist office do to persistent fatigue and SOB  He went to cardiologist office where EKG was performed which showed he was in Atrial flutter  Patient then proceeded to [facility] emergency department for further evaluation  EKG upon admission showed Atrial flutter  He was started on Amiodarone drip at 0 5 mg/min  He was administered Lopressor 20 mg at 0033 and Digoxin 150 mcg at 0552 hrs  When speaking with Rambo Charisma today, he reports that he has had worsening SOB on 3L Nasal cannula at home, fatigue, orthopnea, and cough productive of white thick sputum  He reports that he does not feel any palpitations  When he goes into Afib/Aflutter, he gets fatigued and starts to shake  He does not know exactly when this episode began or if there has been any interruption in symptoms  He denies fevers, chills, chest pain, dizziness, or syncope  Review of Systems  A full 10 point review of system was conducted and was negative except for as stated in the HPI  Historical Information   Past Medical History:   Diagnosis Date    Hypertension      Past Surgical History:   Procedure Laterality Date    BACK SURGERY      CARPAL TUNNEL RELEASE Bilateral     IR BIOPSY BONE MARROW  6/10/2021    IR BIOPSY LUNG  7/21/2021    IR BIOPSY OTHER  7/27/2021    IR THORACENTESIS  7/30/2021    LUMBAR DISC SURGERY       Social History     Substance and Sexual Activity   Alcohol Use Not Currently    Comment: History of heavier drinking in early 25s  Denies any current alcohol use (Updated 06/19/2021)        Social History     Substance and Sexual Activity   Drug Use Never     Social History     Tobacco Use   Smoking Status Former Smoker    Packs/day: 0 50    Years: 40 00    Pack years: 20 00  Types: Cigarettes    Start date: 12    Quit date: 2021    Years since quittin 1   Smokeless Tobacco Never Used     Family History:   Family History   Problem Relation Age of Onset    Cancer Mother         "ate away her bone"    Anuerysm Father     Cancer Sister         unknown type    Heart attack Maternal Grandfather     Cancer Sister         unknown type    Heart attack Maternal Aunt     Heart attack Maternal Uncle     Heart attack Paternal Aunt        Meds/Allergies   Hospital Medications:   Current Facility-Administered Medications   Medication Dose Route Frequency    albuterol inhalation solution 2 5 mg  2 5 mg Nebulization Q4H PRN    amiodarone (CORDARONE) 900 mg in dextrose 5 % 500 mL infusion  0 5 mg/min Intravenous Continuous    amLODIPine (NORVASC) tablet 2 5 mg  2 5 mg Oral QAM    apixaban (ELIQUIS) tablet 5 mg  5 mg Oral BID    benzonatate (TESSALON PERLES) capsule 100 mg  100 mg Oral TID PRN    dextromethorphan-guaiFENesin (ROBITUSSIN DM) oral syrup 10 mL  10 mL Oral Q4H PRN    Diclofenac Sodium (VOLTAREN) 1 % topical gel 2 g  2 g Topical 4x Daily    digoxin (LANOXIN) injection 500 mcg  500 mcg Intravenous Once    Followed by   [facility] digoxin (LANOXIN) injection 250 mcg  250 mcg Intravenous Q6H    fish oil capsule 1,000 mg  1,000 mg Oral BID    gabapentin (NEURONTIN) capsule 100 mg  100 mg Oral HS    guaiFENesin (MUCINEX) 12 hr tablet 600 mg  600 mg Oral BID    hydrOXYzine HCL (ATARAX) tablet 25 mg  25 mg Oral TID PRN    insulin lispro (HumaLOG) 100 units/mL subcutaneous injection 1-5 Units  1-5 Units Subcutaneous HS    insulin lispro (HumaLOG) 100 units/mL subcutaneous injection 1-6 Units  1-6 Units Subcutaneous TID AC    ipratropium (ATROVENT) 0 02 % inhalation solution 0 5 mg  0 5 mg Nebulization TID    levalbuterol (XOPENEX) inhalation solution 1 25 mg  1 25 mg Nebulization TID    levothyroxine tablet 150 mcg  150 mcg Oral Early Morning    metoprolol tartrate (LOPRESSOR) tablet 25 mg  25 mg Oral Q12H [address]    morphine injection 2 mg  2 mg Intravenous Q3H PRN    pantoprazole (PROTONIX) EC tablet 40 mg  40 mg Oral Early Morning     Home Medications: (Not in a hospital admission)      Allergies   Allergen Reactions    Penicillins Hives       Objective   Vitals: Blood pressure 113/59, pulse (!) 144, temperature (!) 97 4 °F (36 3 °C), resp  rate (!) 30, height 5' 2" (1 575 m), weight 75 6 kg (166 lb 9 6 oz), SpO2 94 %  Orthostatic Blood Pressures      Most Recent Value   Blood Pressure  113/59 filed at 08/19/2021 1202   Patient Position - Orthostatic VS  Lying filed at 08/19/2021 0843            Invasive Devices     Peripheral Intravenous Line            Peripheral IV 08/18/21 Right Antecubital <1 day    Peripheral IV 08/18/21 Right Wrist <1 day                Physical Exam    GEN: Lawyer Benson appears ill, alert and oriented x 3, pleasant and cooperative   HEENT:  Normocephalic, atraumatic, anicteric, moist mucous membranes  NECK: No JVD or carotid bruits   HEART: regular rhythm, tachycardic, normal S1 and S2, no murmurs, clicks, gallops or rubs   LUNGS: Decreased breath sounds over right lung, clear to ausculation over left lung; no wheezes, rales, or rhonchi; deep inspiration results in cough  ABDOMEN:  Normoactive bowel sounds, soft, no tenderness, no distention  EXTREMITIES: peripheral pulses palpable; no edema  NEURO: no gross focal findings; cranial nerves grossly intact   SKIN:  Dry, intact, warm to touch    Lab Results:   I have personally reviewed pertinent lab results      CBC with diff:   Results from last 7 days   Lab Units 08/19/21  0610   WBC Thousand/uL 8 64   RBC Million/uL 3 51*   HEMOGLOBIN g/dL 8 5*   HEMATOCRIT % 29 7*   MCV fL 85   MCH pg 24 2*   MCHC g/dL 28 6*   RDW % 18 9*   MPV fL 10 0   PLATELETS Thousands/uL 392*     CMP:   Results from last 7 days   Lab Units 08/19/21  0610   SODIUM mmol/L 133*   POTASSIUM mmol/L 4 5   CHLORIDE mmol/L 100   CO2 mmol/L 29   BUN mg/dL 27*   CREATININE mg/dL 1 17   CALCIUM mg/dL 9 3   AST U/L 11   ALT U/L 28   ALK PHOS U/L 136*   EGFR ml/min/1 73sq m 59     Troponin:   0   Lab Value Date/Time    TROPONINI <0 02 08/18/2021 1543    TROPONINI 0 02 07/19/2021 1158    TROPONINI <0 02 07/19/2021 0942    TROPONINI <0 02 06/18/2021 2001    TROPONINI <0 02 06/18/2021 1610    TROPONINI <0 02 06/18/2021 1328     BNP:   Results from last 7 days   Lab Units 08/19/21  0610   POTASSIUM mmol/L 4 5   CHLORIDE mmol/L 100   CO2 mmol/L 29   BUN mg/dL 27*   CREATININE mg/dL 1 17   CALCIUM mg/dL 9 3   EGFR ml/min/1 73sq m 59     Lipid Profile:     Results from last 7 days   Lab Units 08/18/21  1543   TROPONIN I ng/mL <0 02   NT-PRO BNP pg/mL 1,615*     Results from last 7 days   Lab Units 08/19/21  0610 08/18/21  1543   POTASSIUM mmol/L 4 5 4 9   CO2 mmol/L 29 26   CHLORIDE mmol/L 100 100   BUN mg/dL 27* 31*   CREATININE mg/dL 1 17 1 69*     Results from last 7 days   Lab Units 08/19/21  0610 08/18/21  1543   HEMOGLOBIN g/dL 8 5* 10 3*   HEMATOCRIT % 29 7* 36 0*   PLATELETS Thousands/uL 392* 419*                 Imaging: I have personally reviewed pertinent reports  8/18 CTA - CHEST WITH IV CONTRAST - PULMONARY ANGIOGRAM     INDICATION:   Tachypnea and dyspnea        COMPARISON: Multiple prior CT examinations of the chest commencing  January 18, 2021 with direct comparison made to August 4, 2021  Correlation PET/CT August 6, 2021     TECHNIQUE: CTA examination of the chest was performed using angiographic technique according to a protocol specifically tailored to evaluate for pulmonary embolism  Axial, sagittal, and coronal 2D reformatted images were created from the source data and   submitted for interpretation  In addition, coronal 3D MIP postprocessing was performed on the acquisition scanner        Radiation dose length product (DLP) for this visit:  653 67 mGy-cm     This examination, like all CT scans performed in the [facility], was performed utilizing techniques to minimize radiation dose exposure, including the use of iterative   reconstruction and automated exposure control      IV Contrast:  85 mL of iohexol (OMNIPAQUE)     FINDINGS:     PULMONARY ARTERIAL TREE:  No pulmonary embolus is seen       LUNGS:  Emphysema      Complete collapse of the right upper and middle lobes, increased since August 4, 2021        The right lung mass which broadly abuts the mediastinal structures and encases the pulmonary artery and right bronchi measures approximately 13 2 x 9 9 x 14 1 cm (image 117; series 603, image 146), previously 12 7 x 9 2 x 12 9 cm when measured in a   similar fashion (series 3, image 60; series 603, image 148 on prior study) There are several foci of air within the mass, similar to the August 4, 2021 chest CT      Solid right lower lobe nodule is slightly increased in size measuring 1 4 x 1 4 cm, previously 1 2 x 1 2 cm (series 3, image 93)     Segmental atelectasis of the right lower lobe is new since August 4, 2021      Lateral left upper lobe mass has increased in size, measuring 4 0 x 3 2 cm, previously 3 5 x 2 6 cm (series 3, image 53)     Solid 0 6 x 0 4 cm nodule in the left lower lobe has significantly increased since August 4, 2021, previously a nearly imperceptible 0 3 x 0 3 cm groundglass nodule (series 3, image 65 compared to series 3, image 16 on prior study)      0 6 cm lingular nodule is unchanged (series 3, image 84)  0 7 cm left lower lobe nodule is unchanged (series 3, image 58)     PLEURA:  New moderate right pleural effusion      HEART/GREAT VESSELS:  Atherosclerotic changes are noted in thoracic aorta and coronary arteries      MEDIASTINUM AND NANCY:  Mediastinal lymph nodes are not significantly changed    Representative example includes a right lower paratracheal lymph node which measures 1 cm in short axis (series 2, image 77)      CHEST WALL AND LOWER NECK: Unremarkable      VISUALIZED STRUCTURES IN THE UPPER ABDOMEN:  Unremarkable      OSSEOUS STRUCTURES:  Spinal degenerative changes are noted  No acute fracture or destructive osseous lesion      IMPRESSION:     1  No pulmonary embolism  2   Since August 4, 2021, complete collapse of the right upper and lower lobes  New segmental atelectasis in the right lower lobe and new moderate right pleural effusion  3   Slight increase in size of the dominant right lung mass  4   Left upper lobe mass and right lower lobe and left lower lobe nodules have increased in size  8/18 XR chest:   FINDINGS:     Trachea midline  Cardiac silhouette is normal   Large right lung mass is obscured by haziness in the remaining lung parenchyma  Blunted right costophrenic angle  Increasing left mid lung peripheral opacity      IMPRESSION:     Bilateral pulmonary masses increased with opacification of most of the right lung parenchyma  Effusion and infiltrates suggested on the right  Telemetry: 2:1 Atrial flutter,  When tele started patient was at HR of about 110  At 1700 hours HR increased to 140s, remained at that HR up to time of evaluation    EKG:   Date: 8/18  Interpretation: Atrial flutter with variable A-V block  Nonspecific ST and T wave abnormality  Abnormal ECG  When compared with ECG of 04-AUG-2021 09:25,  ST no longer elevated in Inferior leads  Confirmed by Fuad Wang ([id number]) on 8/18/2021 9:05:04 PM      Previous STRESS TEST:  No results found for this or any previous visit  No results found for this or any previous visit  No results found for this or any previous visit  Previous Cath/PCI:  No results found for this or any previous visit        ECHO:  Results for orders placed during the hospital encounter of 06/18/21    Echo complete with contrast if indicated    Narrative  [facility]  [phone]    Transthoracic Echocardiogram  2D, M-mode, Doppler, and Color Doppler    Study date:  2021    Patient: Key Grimaldo  MR number: [id number]  Account number: [de-identified]  : 1940  Age: 68 years  Gender: Male  Status: Inpatient  Location: Bedside  Height: 62 in  Weight: 173 6 lb  BP: 138/ 65 mmHg    Indications: A-fib  Diagnoses: I48 0 - Atrial fibrillation    Sonographer:  ROSALINA Allan  Primary Physician:  Aissatou Munoz DO  Referring Physician:  Janice Chun PA-C  Group:  [facility]  Interpreting Physician:  David Allred MD    SUMMARY    LEFT VENTRICLE:  Systolic function was normal  Ejection fraction was estimated to be 60 %  There were no regional wall motion abnormalities  MITRAL VALVE:  There was mild regurgitation  TRICUSPID VALVE:  There was mild regurgitation  Estimated peak PA pressure was 42 mmHg  The findings suggest mild pulmonary hypertension  HISTORY: PRIOR HISTORY: PAF, HTN, SULEMA, SOB, lung mass, DM2, thrombocytopenia, current smoker  PROCEDURE: The procedure was performed at the bedside  This was a routine study  The transthoracic approach was used  The study included complete 2D imaging, M-mode, complete spectral Doppler, and color Doppler  The heart rate was 77 bpm,  at the start of the study  Images were obtained from the parasternal, apical, subcostal, and suprasternal notch acoustic windows  Image quality was adequate  LEFT VENTRICLE: Size was normal  Systolic function was normal  Ejection fraction was estimated to be 60 %  There were no regional wall motion abnormalities  Wall thickness was normal  DOPPLER: Left ventricular diastolic function parameters  were normal for the patient's age  RIGHT VENTRICLE: The size was normal  Systolic function was normal  Wall thickness was normal     LEFT ATRIUM: Size was normal     RIGHT ATRIUM: Size was normal     MITRAL VALVE: Valve structure was normal  There was normal leaflet separation   DOPPLER: The transmitral velocity was within the normal range  There was no evidence for stenosis  There was mild regurgitation  AORTIC VALVE: The valve was trileaflet  Leaflets exhibited normal thickness and normal cuspal separation  DOPPLER: Transaortic velocity was within the normal range  There was no evidence for stenosis  There was no significant  regurgitation  TRICUSPID VALVE: The valve structure was normal  There was normal leaflet separation  DOPPLER: The transtricuspid velocity was within the normal range  There was no evidence for stenosis  There was mild regurgitation  Estimated peak PA  pressure was 42 mmHg  The findings suggest mild pulmonary hypertension  PULMONIC VALVE: Leaflets exhibited normal thickness, no calcification, and normal cuspal separation  DOPPLER: The transpulmonic velocity was within the normal range  There was no significant regurgitation  PERICARDIUM: There was no pericardial effusion  The pericardium was normal in appearance  AORTA: The root exhibited normal size  SYSTEMIC VEINS: IVC: The inferior vena cava was normal in size  Respirophasic changes were normal     SYSTEM MEASUREMENT TABLES    2D  %FS: 30 02 %  Ao Diam: 3 54 cm  EDV(Teich): 123 19 ml  EF(Teich): 56 95 %  ESV(Teich): 53 03 ml  IVSd: 0 92 cm  LA Diam: 4 57 cm  LAAs A2C: 23 05 cm2  LAAs A4C: 22 52 cm2  LAESV A-L A2C: 77 35 ml  LAESV A-L A4C: 69 99 ml  LAESV Index (A-L): 41 99 ml/m2  LAESV MOD A2C: 73 99 ml  LAESV MOD A4C: 64 59 ml  LAESV(A-L): 75 58 ml  LAESV(MOD BP): 70 92 ml  LALs A2C: 5 83 cm  LALs A4C: 6 15 cm  LVIDd: 5 09 cm  LVIDs: 3 56 cm  LVPWd: 0 85 cm  RVIDd: 3 65 cm  SV(Teich): 70 16 ml    CW  AV Env  Ti: 281 64 ms  AV MaxP 92 mmHg  AV VTI: 22 53 cm  AV Vmax: 1 49 m/s  AV Vmean: 0 8 m/s  AV meanPG: 3 13 mmHg  TR MaxP 17 mmHg  TR Vmax: 3 13 m/s    MM  TAPSE: 3 01 cm    PW  E' Sept: 0 09 m/s  E/E' Sept: 11 25  LVOT Env  Ti: 342 53 ms  LVOT VTI: 21 45 cm  LVOT Vmax: 1 01 m/s  LVOT Vmean: 0 63 m/s  LVOT maxP 07 mmHg  LVOT meanP 9 mmHg  MV A Ge: 1 12 m/s  MV Dec Leake: 6 18 m/s2  MV DecT: 167 82 ms  MV E Ge: 1 04 m/s  MV E/A Ratio: 0 93  MV PHT: 48 67 ms  MVA By PHT: 4 52 cm2    [facility] Accredited Echocardiography Laboratory    Prepared and electronically signed by    David Allred MD  Signed 2021 12:57:51    No results found for this or any previous visit  HOLTER  No results found for this or any previous visit  VTE Prophylaxis: Reason for no pharmacologic prophylaxis Patient on Eliquis       Assessment/Plan     Assessment:      1  Afib with RVR    Laxmi Benson is a 67 yo male with past medical history of paroxysmal atrial fibrillation and atrial flutter in context of 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 and encases the right pulmonary artery and right bronchi  Patient was first noted to be in Afib with RVR on 6/10, with large lung mass first visualized during hospital admission in   At that time he converted into normal sinus rhythm on his own  In July, he had another episode of Afib with RVR during another hospital stay resulting in escalation of pharmacotherapy  On  patient's visiting nurse noticed irregular heart rhythm and tachycardia  He eventually returned to hospital on  following an in office EKG that showed atrial flutter  In the interim, the right lung mass has increased in size and a pathology diagnosis has not been made  Additionally patient has worsening cough and SOB  Administered Amiodarone drip and one time dose of digoxin, patient has not returned to sinus rhythm  Patient has shown poor responsiveness to Amiodarone  He is not a candidate for cardioversion at this time as he will likely return back to Afib/Aflutter due to mass/adrenergic stimulation/shortness of breath  2  Mass of Right Lung  Imaging of lungs shows patient's pulmonary masses are enlarging causing further encroachment on patient's mediastinum   PET scan shows heterogenous activity of these masses  A pathologic diagnosis has yet to be made regarding  Patient may have IR biopsy of right lung mass and left pulmonary nodule     3  Tachycardia  Patient has been tachycardic since admission  He is noted to be in Atrial flutter during exam  Tachycardia can also be a result of adrenergic stimulation and shortness of breath  Plan  - Administer Digoxin 500 mcg IV one time dose  - Followed by Digoxin 250 mcg q6 hours for two doses  - Followed by Digoxin 125 mcg daily   - D/c Amiodarone IV  - Start Amiodarone 400 mg PO daily  - Defer cardioversion at this time  Case discussed and reviewed with Dr Merrilee Runner who agrees with my assessment and plan  Thank you for involving us in the care of your patient  [id number] [employer]    =========================================================================================    ** Please Note: Fluency DirectDictation voice to text software may have been used in the creation of this document   **

## 2021-08-19 NOTE — UTILIZATION REVIEW
Initial Clinical Review    Admission: Date/Time/Statement:   Admission Orders (From admission, onward)     Ordered        08/18/21 2246  Inpatient Admission  Once                   Orders Placed This Encounter   Procedures    Inpatient Admission     Standing Status:   Standing     Number of Occurrences:   1     Order Specific Question:   Level of Care     Answer:   Level 1 Stepdown [13]     Order Specific Question:   Estimated length of stay     Answer:   More than 2 Midnights     Order Specific Question:   Certification     Answer:   I certify that inpatient services are medically necessary for this patient for a duration of greater than two midnights  See H&P and MD Progress Notes for additional information about the patient's course of treatment  ED Arrival Information     Expected Arrival Acuity    - 8/18/2021 14:52 Emergent         Means of arrival Escorted by Service Admission type    [facility] Emergency         Arrival complaint    abnormal ekg        Chief Complaint   Patient presents with    Abnormal ECG     pt was at drs office today for an EKG after visiting nurse reported tachycardia  Pt reports feeling tired and a bit short of breath  Initial Presentation:    67 yo male,  To ER from doctor office,  Admitted IP status,  Level 1 Stepdown  for management of  Afib w/RVR  Pt went to dr office today due to c/o palpitations, fatigue, SOB, wheezing  EKG revealed rapid/irregular afib/flutter  ('s)    On eliquis and metoprolol  Pt currently being workup up for malignancy outpatient  (mass in Rt lung)  Recent b/l LE edema & Orthopnea that is worsening (can only sleep upright position)   While in the ED he was persistently tachycardic with HR 130s-140s and SBP 90s-100s  He received amiodarone 150 mg IV, Mg 4g and a total of 1 5L IVFs  He had improvement in his blood pressure but remained tachycardic  Decision made to admit to SD level 1 with CC consult     H/o COPD - on albuterol puffs q 4 hr prn  Per Critical Care Consult:  Cont telemetry, start amiodarone gtt,  Consult cardiology/EP,   Cont home po amiodarone/metoprolol as bp allows  Supplemental oxygen prn  Date: 8/19       Day 2:  Pt w/wheezing (most likely 2/2 enlarging chest masses)   Uses supplemental oxygen prn  (intermittenly on at 3L NC)   Denies any chest pain except for a site of biopsy  Resistant atrial fibrillation with RVR - HR ranging from 120 to 140 on amiodarone drip  Add albuterol/Atrovent to achieve bronchodilation and pulmonary relaxation  Consult Pulmonary and Cardiology today  Initiate Digoxin q 6 hrs    ED Triage Vitals   Temperature Pulse Respirations Blood Pressure SpO2   08/18/21 1517 08/18/21 1516 08/18/21 1517 08/18/21 1517 08/18/21 1517   (!) 97 4 °F (36 3 °C) (!) 140 18 99/70 97 %      Temp src Heart Rate Source Patient Position - Orthostatic VS BP Location FiO2 (%)   -- 08/18/21 1516 08/18/21 1517 08/18/21 1517 --    Monitor Sitting Left arm       Pain Score       --                 Wt Readings from Last 1 Encounters:   08/18/21 75 6 kg (166 lb 9 6 oz)     Additional Vital Signs:    08/18/21 1830 132Abnormal  16 98/57 96 % None (Room air)   08/18/21 1800 138Abnormal  18 94/58 96 % None (Room air)   08/18/21 1745 138Abnormal  16 85/58Abnormal  96 % None (Room air)   08/18/21 1715 138Abnormal  16 86/58Abnormal  96 % --   08/18/21 1615 110Abnormal  18 96/61 97 % None (Room air)   08/18/21 1545 106Abnormal  -- -- 97 %      08/19/21 1111 144Abnormal  -- 114/54 -- -- --   08/19/21 0843 145Abnormal  30Abnormal  103/58 94 % -- None (Room air)   08/19/21 0730 140Abnormal  18 97/57 93 % -- --   08/19/21 0710 111Abnormal  19 -- 95 % 3 L/min Nasal cannula       Pertinent Labs/Diagnostic Test Results:     8/18   CTAP  -   1   No pulmonary embolism     2   Since August 4, 2021, complete collapse of the right upper and lower lobes   New segmental atelectasis in the right lower lobe and new moderate right pleural effusion  3   Slight increase in size of the dominant right lung mass  4   Left upper lobe mass and right lower lobe and left lower lobe nodules have increased in size  8/18  CXR -  Bilateral pulmonary masses increased with opacification of most of the right lung parenchyma   Effusion and infiltrates suggested on the right         Results from last 7 days   Lab Units 08/18/21  1608   SARS-COV-2  Negative     Results from last 7 days   Lab Units 08/19/21  0610 08/18/21  1543   WBC Thousand/uL 8 64 11 46*   HEMOGLOBIN g/dL 8 5* 10 3*   HEMATOCRIT % 29 7* 36 0*   PLATELETS Thousands/uL 392* 419*   BANDS PCT %  --  1         Results from last 7 days   Lab Units 08/19/21  0610 08/18/21  1543   SODIUM mmol/L 133* 132*   POTASSIUM mmol/L 4 5 4 9   CHLORIDE mmol/L 100 100   CO2 mmol/L 29 26   ANION GAP mmol/L 4 6   BUN mg/dL 27* 31*   CREATININE mg/dL 1 17 1 69*   EGFR ml/min/1 73sq m 59 38   CALCIUM mg/dL 9 3 9 8   MAGNESIUM mg/dL 1 9 1 4*   PHOSPHORUS mg/dL 2 0*  --      Results from last 7 days   Lab Units 08/19/21  0610 08/18/21  1543   AST U/L 11 11   ALT U/L 28 36   ALK PHOS U/L 136* 165*   TOTAL PROTEIN g/dL 6 2* 6 9   ALBUMIN g/dL 1 9* 1 6*   TOTAL BILIRUBIN mg/dL 0 55 0 62     Results from last 7 days   Lab Units 08/19/21  0646   POC GLUCOSE mg/dl 179*     Results from last 7 days   Lab Units 08/19/21  0610 08/18/21  1543   GLUCOSE RANDOM mg/dL 188* 307*         Results from last 7 days   Lab Units 08/19/21  0610   HEMOGLOBIN A1C % 7 7*   EAG mg/dl 174     Results from last 7 days   Lab Units 08/18/21  1543   TROPONIN I ng/mL <0 02     Results from last 7 days   Lab Units 08/19/21  0610 08/18/21  1543   TSH 3RD GENERATON uIU/mL 0 445 0 363     Results from last 7 days   Lab Units 08/18/21  1543   NT-PRO BNP pg/mL 1,615*     Results from last 7 days   Lab Units 08/18/21  1840 08/18/21  1837   CLARITY UA   --  Clear   COLOR UA  - Yellow   SPEC GRAV UA   --  1 025   PH UA   --  5 5   GLUCOSE UA mg/dl  --  250 (1/4%)*   KETONES UA mg/dl  --  Trace*   BLOOD UA   --  Negative   PROTEIN UA mg/dl  --  100 (2+)*   NITRITE UA   --  Negative   BILIRUBIN UA   --  Interference- unable to analyze*   UROBILINOGEN UA E U /dl  --  2 0*   LEUKOCYTES UA   --  Negative   WBC UA /hpf  --  2-4   RBC UA /hpf  --  None Seen   BACTERIA UA /hpf  --  Occasional   EPITHELIAL CELLS WET PREP /hpf  --  Occasional   MUCUS THREADS   --  Moderate*         ED Treatment:   Medication Administration from 08/18/2021 1452 to 08/19/2021 1143       Date/Time Order Dose Route Action     08/18/2021 1704 multi-electrolyte (ISOLYTE-S PH 7 4) bolus 500 mL 500 mL Intravenous New Bag     08/18/2021 1747 magnesium sulfate 4 g/100 mL IVPB (premix) 4 g 4 g Intravenous New Bag     08/18/2021 1911 amiodarone 150 mg in dextrose 5 % 100 mL IV bolus 150 mg Intravenous New Bag     08/18/2021 1752 albumin human (FLEXBUMIN) 5 % injection 25 g 25 g Intravenous New Bag     08/18/2021 2052 multi-electrolyte (ISOLYTE-S PH 7 4) bolus 500 mL 500 mL Intravenous New Bag     08/18/2021 2257 amiodarone (CORDARONE) 900 mg in dextrose 5 % 500 mL infusion 0 5 mg/min Intravenous New Bag     08/19/2021 0604 albuterol inhalation solution 2 5 mg 2 5 mg Nebulization Given     08/19/2021 0033 albuterol inhalation solution 2 5 mg 2 5 mg Nebulization Given     08/19/2021 1111 metoprolol tartrate (LOPRESSOR) tablet 25 mg 25 mg Oral Given     08/19/2021 0033 metoprolol tartrate (LOPRESSOR) tablet 25 mg 25 mg Oral Given     08/19/2021 0551 pantoprazole (PROTONIX) EC tablet 40 mg 40 mg Oral Given     08/19/2021 0027 morphine injection 2 mg 2 mg Intravenous Given     08/19/2021 0552 digoxin (LANOXIN) injection 250 mcg 250 mcg Intravenous Given     08/19/2021 0711 ipratropium (ATROVENT) 0 02 % inhalation solution 0 5 mg 0 5 mg Nebulization Given     08/19/2021 0711 levalbuterol (XOPENEX) inhalation solution 1 25 mg 1 25 mg Nebulization Given        Past Medical History:   Diagnosis Date  Hypertension      Present on Admission:   Mass of right lung   Atrial fibrillation with RVR (HCC)   Acute respiratory failure with hypoxia and hypercapnia (HCC)   COPD (chronic obstructive pulmonary disease) with acute exacerbation (HCC)   Hypothyroidism   New onset type 2 diabetes mellitus ([facility] [address] )      Admitting Diagnosis: Abnormal ECG [R94 31]  Age/Sex: 68 y o  male  Admission Orders:    Telemetry;   PT/OT;  Consult critical care, pulmonology, cardiology,  Oncology;    Daily wgt;  I/O Q shift; accucks qid w/SSI;    Scheduled Medications:  amLODIPine, 2 5 mg, Oral, QAM  apixaban, 5 mg, Oral, BID  Diclofenac Sodium, 2 g, Topical, 4x Daily  digoxin, 250 mcg, Intravenous, Q6H  fish oil, 1,000 mg, Oral, BID  gabapentin, 100 mg, Oral, HS  guaiFENesin, 600 mg, Oral, BID  insulin lispro, 1-5 Units, Subcutaneous, HS  insulin lispro, 1-6 Units, Subcutaneous, TID AC  ipratropium, 0 5 mg, Nebulization, TID  levalbuterol, 1 25 mg, Nebulization, TID  levothyroxine, 150 mcg, Oral, Early Morning  metoprolol tartrate, 25 mg, Oral, Q12H [facility]  pantoprazole, 40 mg, Oral, Early Morning      Continuous IV Infusions:  amiodarone, 0 5 mg/min, Intravenous, Continuous      PRN Meds:  albuterol, 2 5 mg, Nebulization, Q4H PRN   8/19  x2   benzonatate, 100 mg, Oral, TID PRN  dextromethorphan-guaiFENesin, 10 mL, Oral, Q4H PRN  hydrOXYzine HCL, 25 mg, Oral, TID PRN  morphine injection, 2 mg, Intravenous, Q3H PRN    8/19 @  0030      Network Utilization Review Department  ATTENTION: Please call with any questions or concerns to [phone] and carefully listen to the prompts so that you are directed to the right person  All voicemails are confidential   Sukh Anderson all requests for admission clinical reviews, approved or denied determinations and any other requests to dedicated fax number below belonging to the campus where the patient is receiving treatment   List of dedicated fax numbers for the Facilities:  [facility] [place] ADMISSION DENIALS (Administrative/Medical Necessity) [phone]   [address] (Maternity/NICU/Pediatrics) [address] [facility],[address] Floor [facility] [address] [facility] [address] Fred [id number] [place] [address] Shana Arndt [address] [facility] [address] [facility] [phone]

## 2021-08-19 NOTE — H&P
[address] [facility]  H&P- Chun Aviles 1940, 68 y o  male MRN: [id number]  Unit/Bed#: ED 26 Encounter: [id number]  Primary Care Provider: Aissatou Munoz DO   Date and time admitted to hospital: 8/18/2021  3:23 PM    * Atrial fibrillation with RVR [facility])  Assessment & Plan  Patient has resistant atrial fibrillation with RVR with heart rates ranging from 120 to 140 on amiodarone drip  Will continue with metoprolol 25 mg q 12  Patient is step-down level 1 with ICU consult for follow-up  Cardiology to see patient  Because of enlarging pulmonary masses; may need new echocardiogram but will leave that to Cardiology to decide  Continue with amlodipine 2 5 mg daily  Patient would also need albuterol/Atrovent to achieve bronchodilation and pulmonary relaxation  Pulmonary consult  Will also recheck metabolic profile with CBC tomorrow morning  COPD (chronic obstructive pulmonary disease) with acute exacerbation (HCC)  Assessment & Plan  Currently on albuterol inhalation every 4 hours as needed  Atrovent 4 times daily  Respiratory protocol; oxygen as needed  Guaifenesin for expectoration  Cough control  Pulmonary consult  Acute respiratory failure with hypoxia and hypercapnia (HCC)  Assessment & Plan  Continue with oxygen supplementation  Pulmonary consult  Mass of right lung  Assessment & Plan  Patient has multiple pulmonary masses which are enlarging  PET scan shows heterogeneous activity of these pulmonary masses  Still awaiting final further oncological opinion  Will also get Pulmonary to see patient  Respiratory support  O2 supplementation  Hypothyroidism  Assessment & Plan  Continue levothyroxine 150 mcg daily  New onset type 2 diabetes mellitus [facility])  Assessment & Plan  Lab Results   Component Value Date    HGBA1C 6 6 (H) 06/20/2021     Continue insulin sliding scale  Will put on hold metformin and glipizide    No results for input(s): POCGLU in the last 72 hours  Blood Sugar Average: Last 72 hrs:            VTE Prophylaxis: Apixaban (Eliquis)  / sequential compression device   Code Status: Level 2 - DNAR: but accepts endotracheal intubation as discussed with patient  POLST: There is no POLST form on file for this patient (pre-hospital)    Anticipated Length of Stay:  Patient will be admitted on an Inpatient basis with an anticipated length of stay of  greater than 2 midnights  Justification for Hospital Stay: Please see detailed plans noted above  Chief Complaint:     Increasing shortness of breath and incidental finding of uncontrolled atrial fibrillation  History of Present Illness:  Chao Bustos is a 68 y o  male who has past medical history significant for multiple pulmonary masses which is currently being followed up by Oncology  Biopsy has been unsuccessful in determining any tissue diagnosis  Patient also recently had a PET scan which did show hot heterogeneous activity with areas of necrosis and glucose uptake with each of these masses  Despite the patient's masses having area as of non activity, these have been growing in size  Comparing current CT scan from previous, there is demonstrated enlarging size of these masses  However the patient was seen by the visiting nurse this afternoon and he was found to be tachycardic  Having a heart rate of 140 the patient does not have any chest discomfort or any other complaints but clearly has increased shortness of breath  Patient denies any fever or any sputum  Patient therefore went to the emergency room to be evaluated where he was then started on amiodarone drip and initially the heart rate has gone down to 120 but it is currently variable at 120 to 140  Patient also received some fluids  Currently the patient has confirmed that he is DNR level 2  He is accepting intubation for respiratory support      Currently, patient is lying flat on bed and is wheezing but most likely this could be secondary to bronchial obstruction due to the enlarging masses  Patient denies any chest pain except for a right-sided lateral chest discomfort which is secondary to the biopsy that was done weeks ago  Review of Systems:    Constitutional:  Denies fever or chills   Eyes:  Denies change in visual acuity   HENT:  Denies nasal congestion or sore throat   Respiratory:  Patient with Dr  productive white cough  Without any fever  Shortness of breath  Cardiovascular:  Denies chest pain or edema   GI:  Denies abdominal pain, nausea, vomiting, bloody stools or diarrhea   :  Denies dysuria   Musculoskeletal:  Denies back pain or joint pain   Integument:  Denies rash   Neurologic:  Denies headache, focal weakness or sensory changes   Endocrine:  Denies polyuria or polydipsia   Lymphatic:  Denies swollen glands   Psychiatric:  Denies depression or anxiety     Past Medical and Surgical History:   Past Medical History:   Diagnosis Date    Hypertension      Past Surgical History:   Procedure Laterality Date    BACK SURGERY      CARPAL TUNNEL RELEASE Bilateral     IR BIOPSY BONE MARROW  6/10/2021    IR BIOPSY LUNG  7/21/2021    IR BIOPSY OTHER  7/27/2021    IR THORACENTESIS  7/30/2021    LUMBAR DISC SURGERY         Meds/Allergies:  (Not in a hospital admission)      Allergies: Allergies   Allergen Reactions    Penicillins Hives     History:  Marital Status: /Civil Union   Occupation:  Patient is currently retired  Patient Pre-hospital Living Situation:  Lives at home; recently stopped smoking 3 weeks ago  Patient Pre-hospital Level of Mobility:  Ambulatory  Patient Pre-hospital Diet Restrictions:  Cardiac diabetic  Substance Use History:   Social History     Substance and Sexual Activity   Alcohol Use Not Currently    Comment: History of heavier drinking in early 25s  Denies any current alcohol use (Updated 06/19/2021)        Social History     Tobacco Use   Smoking Status Former Smoker    Packs/day: 0 50    Years: 40 00    Pack years: 20 00    Types: Cigarettes    Start date:     Quit date: 2021    Years since quittin 1   Smokeless Tobacco Never Used     Social History     Substance and Sexual Activity   Drug Use Never       Family History:  Family History   Problem Relation Age of Onset    Cancer Mother         "ate away her bone"    Anuerysm Father     Cancer Sister         unknown type    Heart attack Maternal Grandfather     Cancer Sister         unknown type    Heart attack Maternal Aunt     Heart attack Maternal Uncle     Heart attack Paternal Aunt        Physical Exam:     Vitals:   Blood Pressure: 111/64 (21)  Pulse: (!) 132 (21)  Temperature: (!) 97 4 °F (36 3 °C) (21)  Respirations: 18 (21)  Height: 5' 2" (157 5 cm) (21)  Weight - Scale: 75 6 kg (166 lb 9 6 oz) (21)  SpO2: 97 % (21)    Constitutional:  Well developed, well nourished, no acute distress, non-toxic appearance   Eyes:  PERRL, conjunctiva normal   HENT:  Atraumatic, external ears normal, nose normal, oropharynx moist, no pharyngeal exudates  Neck- normal range of motion, no tenderness, supple   Respiratory:  Patient can still speak in long sentences but with conversational dyspnea noted abdominal breathing  Wheezing all over  Cardiovascular:  Normal rate, normal rhythm, no murmurs, no gallops, no rubs   GI:  Soft, nondistended, normal bowel sounds, nontender, no organomegaly, no mass, no rebound, no guarding   :  No costovertebral angle tenderness   Musculoskeletal:  No edema, no tenderness, no deformities   Back- no tenderness  Integument:  Well hydrated, no rash   Lymphatic:  No lymphadenopathy noted   Neurologic:  Alert &awake, communicative, CN 2-12 normal, normal motor function, normal sensory function, no focal deficits noted   Psychiatric:  Speech and behavior appropriate       Lab Results: I have personally reviewed pertinent reports  Results from last 7 days   Lab Units 08/18/21  1543   WBC Thousand/uL 11 46*   HEMOGLOBIN g/dL 10 3*   HEMATOCRIT % 36 0*   PLATELETS Thousands/uL 419*   LYMPHO PCT % 0*   MONO PCT % 6   EOS PCT % 0     Results from last 7 days   Lab Units 08/18/21  1543   POTASSIUM mmol/L 4 9   CHLORIDE mmol/L 100   CO2 mmol/L 26   BUN mg/dL 31*   CREATININE mg/dL 1 69*   CALCIUM mg/dL 9 8   ALK PHOS U/L 165*   ALT U/L 36   AST U/L 11           EKG:  Atrial fibrillation  Rapid ventricular response  Imaging: I have personally reviewed pertinent reports  XR chest 1 view portable    Result Date: 8/18/2021  Narrative: CHEST INDICATION:   sob, concern for pulm edema    COMPARISON:  Compared with 7/27/2021 EXAM PERFORMED/VIEWS:  XR CHEST PORTABLE FINDINGS: Trachea midline  Cardiac silhouette is normal   Large right lung mass is obscured by haziness in the remaining lung parenchyma  Blunted right costophrenic angle  Increasing left mid lung peripheral opacity  Impression: Bilateral pulmonary masses increased with opacification of most of the right lung parenchyma  Effusion and infiltrates suggested on the right  Workstation performed: [id number]     XR chest portable    Result Date: 7/28/2021  Narrative: CHEST INDICATION:   Left lung mass biopsy  COMPARISON:  7/19/2021 EXAM PERFORMED/VIEWS:  XR CHEST PORTABLE FINDINGS: Cardiomediastinal silhouette appears unremarkable  There is a large right lung mass again seen with additional smaller mass along the left upper lobe laterally  There is a small right pleural effusion  There is no pneumothorax  Osseous structures appear within normal limits for patient age  Impression: No pneumothorax status post biopsy of left upper lobe lung mass  Workstation performed: [id number]     CT chest w contrast    Result Date: 8/4/2021  Narrative: CT CHEST WITH IV CONTRAST INDICATION:  Shortness of breath, acute SOB with right lung mass    Patient had 2 lung biopsies one on the right, another on the left  COMPARISON:  CT chest, abdomen and pelvis 7/22/2021 TECHNIQUE: CT examination of the chest was performed  Axial, sagittal, and coronal 2D reformatted images were created from the source data and submitted for interpretation  Radiation dose length product (DLP) for this visit:  274 mGy-cm  This examination, like all CT scans performed in the [facility], was performed utilizing techniques to minimize radiation dose exposure, including the use of iterative reconstruction and automated exposure control  IV Contrast:  85 mL of iohexol (OMNIPAQUE) FINDINGS: The study was not protocoled as a pulmonary angiogram  LUNGS:  A large very low density right lung mass with its epicenter in the perihilar right upper lobe is again identified  The mass measures approximately 14 4 x 9 0 x 12 6 cm AP, transverse and craniocaudal dimensions respectively  Previously this measured about 13 8 x 9 7 x 13 4 cm, similar accounting for differences in interobserver variability  Again the mass obliterates the right middle lobe bronchus causing postobstructive atelectasis  Bubbles of gas again seen within the mass centrally, unchanged  This may be related to changes from previous biopsy  As on previous examination mass encases right upper lobe bronchi as well as perihilar pulmonary arterial vessels  Reidentified is a low density lateral left upper lobe mass measuring 3 6 x 2 5 cm (series 3/57), stable or minimally larger when measured in similar fashion (3 3 x 2 3 cm)  Stable right lower lobe nodule measuring 1 2 cm series 3/96, lingular nodule measuring 7 mm series 3/87 and left lower lobe nodule measuring 6 mm series 3/63 also remain unchanged  No new nodule or mass  Mild background emphysema  PLEURA:  Trace residual right pleural effusion, decreased from prior study  HEART/GREAT VESSELS:  The heart is normal size  Atherosclerotic changes thoracic aorta and coronary arteries  Main pulmonary artery measures about 28 mm  No pericardial effusion  MEDIASTINUM AND NANCY:  Stable subcentimeter mediastinal lymph nodes  CHEST WALL AND LOWER NECK:   Unremarkable  VISUALIZED STRUCTURES IN THE UPPER ABDOMEN:  The liver has a somewhat lobular contour  Fatty infiltration of the pancreas incidentally noted  No adrenal masses  OSSEOUS STRUCTURES:  No acute fracture or destructive osseous lesion  Degenerative changes of the spine  Impression: No significant change in dominant right lung mass with bilateral pulmonary nodules as above, findings remaining highly suspicious for malignancy  Given nondiagnostic biopsy results and obliteration of the right middle lobe bronchus, bronchoscopy with tissue sampling directed to this location may be helpful  Trace residual right pleural effusion, decreased from prior study  Workstation performed: [id number]     IR biopsy other    Result Date: 7/27/2021  Narrative: CT-scan guided needle biopsy of left lung mass History: 72-year-old male with right and left lung mass Radiation dose: 815 mGy Conscious sedation time: 30min Technique: This examination, like all CT scans performed in the [facility], was performed utilizing techniques to minimize radiation dose exposure, including the use of iterative reconstruction and automated exposure control  The patient was brought to the CT scanner and placed supine on the table  After axial images were obtained through the region of interest an area of the skin was then marked, prepped, and draped in usual sterile fashion  Lidocaine was administered to the  skin and a small skin incision was made  A 17-gauge cannula was advanced up to the lesion, and through this, multiple 18-gauge core biopsy specimens were obtained  The specimen was reviewed by pathology and the 3 cores were placed in formalin  The needle was removed and a post biopsy image demonstrated no hemorrhage within the lung or pneumothorax   The patient tolerated the procedure well and suffered no complications  Impression: Impression: Successful percutaneous core biopsy of left lung mass  A full pathology report will follow  Workstation performed: [id number]     IR thoracentesis    Result Date: 7/30/2021  Narrative: PROCEDURE: Ultrasound-guided right thoracentesis Procedural Personnel Attending physician(s): Dr Shaunna Garvin Pre-procedure diagnosis: Pleural effusion Post-procedure diagnosis: Same Indication: Patient with bilateral lung masses and right pleural effusion presents for thoracentesis  Complications: No immediate complications  Impression: Ultrasound-guided right thoracentesis with drainage of 150 mL of chilo pleural fluid  Plan: Resume care by clinical team  _______________________________________________________________ PROCEDURE SUMMARY: - Limited thoracic ultrasound - Ultrasound-guided thoracentesis PROCEDURE DETAILS: Pre-procedure Consent: Informed consent for the procedure including risks, benefits and alternatives was obtained and time-out was performed prior to the procedure  Preparation: The site was prepared and draped using maximal sterile barrier technique including cutaneous antisepsis  Limited thoracic ultrasound Limited thoracic ultrasound was performed using a curved transducer  A safe window for thoracentesis was identified  Right hemithorax findings: Small pleural effusion Right thoracentesis Local anesthesia was administered  The pleural space was accessed under real-time ultrasound guidance  and fluid return confirmed position  The fluid was drained  The catheter was removed, and a sterile bandage was applied  Catheter placed: 5F Dirk Post-drainage hemithorax findings: No visible pleural effusion Additional Details Specimens removed: Pleural fluid Estimated blood loss (mL): None Standardized report: [email or web address] Attestation Signer name: [facility] I attest that I was present for the entire procedure   I reviewed the stored images and agree with the report as written  Workstation performed: [id number]     CT chest abdomen pelvis w contrast    Result Date: 7/23/2021  Narrative: CT CHEST, ABDOMEN AND PELVIS WITH IV CONTRAST INDICATION:   Large mass right lung on IR CT image guided biopsy concerning for malignancy  Lung masses identified on prior CT of June 18, 2021  Shortness of breath  Endobronchial ultrasound examination was performed June 21, 2021 with biopsies revealing primarily necrotic debris  COMPARISON:  June 18, 2021  TECHNIQUE: CT examination of the chest, abdomen and pelvis was performed  Axial, sagittal, and coronal 2D reformatted images were created from the source data and submitted for interpretation  Radiation dose length product (DLP) for this visit:  711 mGy-cm   This examination, like all CT scans performed in the [facility], was performed utilizing techniques to minimize radiation dose exposure, including the use of iterative reconstruction and automated exposure control  IV Contrast:  100 mL of iohexol (OMNIPAQUE) Enteric Contrast: Enteric contrast was administered  FINDINGS: CHEST LUNGS:  There is a very large and very low density right lung mass with its epicenter in the perihilar right upper lobe  The mass measures 13 8 x 9 7 x 13 4 cm  There is a very thin rind of peripheral enhancing tissue best seen on image 159 of series 602 where it borders postobstructive right middle lobe atelectasis  The mass and postobstructive atelectasis in the right pearl of similar from June 18, 2021  Bubbles of gas are seen within a centrally necrotic cavity in the lesion, approximately 2 5 cm in size  As on previous examination mass encases right middle lobe and right upper lobe bronchi as well as perihilar pulmonary arterial vessels   Also reidentified is a low density mass with its epicenter in the subpleural lateral left upper lobe on image 27 of series 2, not significantly changed in size from prior examination at approximately 2 6 x 2 3 cm  An additional right lower lobe nodule measuring 1 2 cm on image 47 of series 2, lingular nodule measuring 9 mm on image 83 of series 4 and superior segment left lower lobe pulmonary nodule measuring 6 mm on image 56 of series 4 also remain unchanged  No new nodule or mass  Mild background emphysematous changes are reidentified  PLEURA:  Increasing small-to-moderate freely layering right pleural effusion  HEART/GREAT VESSELS:  Unremarkable for patient's age  No pulmonary embolus though as on prior examination encasement of central right pulmonary arterial branches is reidentified  As on prior examination there is prominence of the central pulmonary arterial tree suggesting some element of pulmonary artery hypertension  MEDIASTINUM AND NANCY:  Borderline mediastinal lymph nodes are unchanged  CHEST WALL AND LOWER NECK:   Unremarkable  ABDOMEN LIVER/BILIARY TREE:  Unremarkable  GALLBLADDER:  No calcified gallstones  No pericholecystic inflammatory change  SPLEEN:  Unremarkable  PANCREAS:  Unremarkable  ADRENAL GLANDS:  Unremarkable  KIDNEYS/URETERS:  Unremarkable  No hydronephrosis  STOMACH AND BOWEL:  There is colonic diverticulosis without evidence of acute diverticulitis  APPENDIX:  No findings to suggest appendicitis  ABDOMINOPELVIC CAVITY:  No ascites  No pneumoperitoneum  No lymphadenopathy  VESSELS:  Unremarkable for patient's age  PELVIS REPRODUCTIVE ORGANS:  Unremarkable for patient's age  URINARY BLADDER:  Unremarkable  ABDOMINAL WALL/INGUINAL REGIONS:  Mild body wall edema noted  OSSEOUS STRUCTURES:  No acute fracture or destructive osseous lesion  Impression: Reidentification of pulmonary masses highly suspicious for bronchogenic malignancy  The dominant lesion in the perihilar right upper lobe now demonstrates central necrotic cavitation  There is slight increasing size of freely layering right pleural effusion   As on prior examination there is prominence of the central pulmonary arterial tree suggesting some element of pulmonary artery hypertension  No tumor mass is identified in the abdomen or pelvis  Workstation performed: [id number]     NM PET CT skull base to mid thigh    Result Date: 8/6/2021  Narrative: PET/CT SCAN INDICATION:  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, bronchus and lung     Abnormal chest CT demonstrating pulmonary masses  Right chest wall pain  MODIFIER: PI COMPARISON: Prior CT studies dated 8/4/2021 and earlier CELL TYPE:  Previous biopsies have demonstrated necrotic tissue TECHNIQUE:   10 7 mCi F-18-FDG administered IV  Multiplanar attenuation corrected and non attenuation corrected PET images were acquired 60 minutes post injection  Contiguous, low dose, axial CT sections were obtained from the skull base through the femurs   Intravenous contrast material was not utilized  This examination, like all CT scans performed in the [facility], was performed utilizing techniques to minimize radiation dose exposure, including the use of iterative reconstruction and automated exposure control  Fasting serum glucose: 192 mg/dl FINDINGS: VISUALIZED BRAIN:   No acute abnormalities are seen  HEAD/NECK:   There is a physiologic distribution of FDG  No FDG avid cervical adenopathy is seen  CT images: Unremarkable  CHEST:   Very large right upper lobe pleural-based mass involving the right pulmonary hilum creating mass effect upon the major fissure and contains central pockets of air and intense glucose avidity along its peripheral margins  The mass currently measures approximately 13 1 x 9 1 x 13 1 cm  Greatest SUV max is along its inferior peripheral margin, the value is 10 4  Although the central portion of the mass has scattered areas of increased glucose activity, it is mostly photopenic  The mass involves the  right pulmonary hilum with encasement of the right upper lobe bronchus    There is a pleural-based left upper lobe mass with a small focus of  cavitation measuring 3 5 x 2 5 cm SUV max 7 0  There is a right lower lobe lesion earlier measuring 15 mm currently unchanged in size with SUV max 3 0  Comparing to the CT study from 6/18/2021, this lesion measured 9 mm  There is mildly hypermetabolic right subcarinal adenopathy with short axis diameter 1 6 cm and SUV max 2 7  Patchy areas of airspace disease are seen within the right lower lobe and anterior aspect of the right upper lobe (for example image 90) which do not demonstrate significant glucose avidity  CT images: Small right-sided pleural effusion present  Pulmonary emphysema noted  ABDOMEN:   No FDG avid soft tissue lesions are seen  CT images: Unremarkable  PELVIS: No FDG avid soft tissue lesions are seen  CT images: Colonic diverticulosis OSSEOUS STRUCTURES: No FDG avid lesions are seen  CT images: No significant findings  Impression: 1  Large mass in the right hemithorax with extensive central necrosis but with glucose hypermetabolism along its peripheral margins, especially the inferior aspect  2  Left upper lobe mass with a slight focus of cavitation, also glucose avid 3  Enlarging right lower lobe solid nodule with SUV max 3 0  4  The mass involves the right pulmonary hilum, right upper lobe bronchus, and there is mild right subcarinal adenopathy  There is also a small right effusion and pulmonary emphysema 5  There is no evidence of extrathoracic glucose avid metastatic disease Note is made of previous biopsy attempts of the large mass, left upper lobe lesion and pleural fluid    As per notes in Epic, potential sites for reattempt at soft tissue biopsy diagnostic results would include peripheral margins of the large right upper lobe mass (for example the inferior margin on image 109 with SUV max 10 4), or the enlarging 15 mm solid right lower lobe pulmonary nodule Workstation performed: [id number]     IR biopsy lung    Result Date: 7/21/2021  Narrative: CT-guided lung mass biopsy Clinical History: Lung mass suspicious for malignant process  Prior endobronchial ultrasound without malignant cells identified  Respiratory failure  Atrial fibrillation on anticoagulation  Moderate sedation time: 35 minutes Procedure: After explaining the risks and benefits of the procedure to the patient, informed consent was obtained  CT was used to localize the lung mass   Radiation dose length product (DLP) for this visit:  1204 mGy   This examination, like all CT scans performed in the [facility], was performed utilizing techniques to minimize radiation dose exposure, including the use of iterative reconstruction and automated exposure control  The overlying skin was prepped and draped in the usual sterile fashion  Local anesthesia was obtained with a 1% lidocaine solution  Using CT guidance, a 17-gauge coaxial needle was advanced  9 passes with an 18g gauge core biopsy needle were performed  The tissue was given to pathology  The needle was removed and final imaging performed  Patient tolerated the procedure without immediate complication Findings: 68 6 cm right lung masslike density  There is architectural distortion and it is difficult to discern what may be intrinsic mass and what may in fact be atelectatic compressed lung  Needle within the mass  Postbiopsy changes without hematoma  Since prior CT of June 18 there has been interval increase in size of the mass and development of a small right pleural effusion  A left upper lung 3 7 cm mass is also present which has increased in size from 2 1 cm on prior study  A right base presumed metastatic nodule is also increased measuring 14 mm, previously 9 mm  Specimens: Flow cytometry, touch prep x4, 18-gauge core x9 The patient tolerated the procedure well and left the department in stable condition       Impression: Impression: CT-guided biopsy of a large right lung mass Since interval CT approximately one month ago there has been growth of the mass and development of accompanying right pleural effusion  Consider thoracentesis if there is persistent respiratory failure  Additional presumed metastatic nodules have also increased in size  Workstation performed: [id number]     CTA ED chest PE Study    Result Date: 8/18/2021  Narrative: CTA - CHEST WITH IV CONTRAST - PULMONARY ANGIOGRAM INDICATION:   Tachypnea and dyspnea    COMPARISON: Multiple prior CT examinations of the chest commencing  January 18, 2021 with direct comparison made to August 4, 2021  Correlation PET/CT August 6, 2021 TECHNIQUE: CTA examination of the chest was performed using angiographic technique according to a protocol specifically tailored to evaluate for pulmonary embolism  Axial, sagittal, and coronal 2D reformatted images were created from the source data and  submitted for interpretation  In addition, coronal 3D MIP postprocessing was performed on the acquisition scanner  Radiation dose length product (DLP) for this visit:  653 67 mGy-cm   This examination, like all CT scans performed in the [facility], was performed utilizing techniques to minimize radiation dose exposure, including the use of iterative  reconstruction and automated exposure control  IV Contrast:  85 mL of iohexol (OMNIPAQUE)  FINDINGS: PULMONARY ARTERIAL TREE:  No pulmonary embolus is seen  LUNGS:  Emphysema  Complete collapse of the right upper and middle lobes, increased since August 4, 2021  The right lung mass which broadly abuts the mediastinal structures and encases the pulmonary artery and right bronchi measures approximately 13 2 x 9 9 x 14 1 cm (image 117; series 603, image 146), previously 12 7 x 9 2 x 12 9 cm when measured in a similar fashion (series 3, image 60; series 603, image 148 on prior study) There are several foci of air within the mass, similar to the August 4, 2021 chest CT   Solid right lower lobe nodule is slightly increased in size measuring 1 4 x 1 4 cm, previously 1 2 x 1 2 cm (series 3, image 93)  Segmental atelectasis of the right lower lobe is new since August 4, 2021  Lateral left upper lobe mass has increased in size, measuring 4 0 x 3 2 cm, previously 3 5 x 2 6 cm (series 3, image 53)  Solid 0 6 x 0 4 cm nodule in the left lower lobe has significantly increased since August 4, 2021, previously a nearly imperceptible 0 3 x 0 3 cm groundglass nodule (series 3, image 65 compared to series 3, image 16 on prior study)  0 6 cm lingular nodule is unchanged (series 3, image 84)  0 7 cm left lower lobe nodule is unchanged (series 3, image 58) PLEURA:  New moderate right pleural effusion  HEART/GREAT VESSELS:  Atherosclerotic changes are noted in thoracic aorta and coronary arteries  MEDIASTINUM AND NANCY:  Mediastinal lymph nodes are not significantly changed  Representative example includes a right lower paratracheal lymph node which measures 1 cm in short axis (series 2, image 77)  CHEST WALL AND LOWER NECK:   Unremarkable  VISUALIZED STRUCTURES IN THE UPPER ABDOMEN:  Unremarkable  OSSEOUS STRUCTURES:  Spinal degenerative changes are noted  No acute fracture or destructive osseous lesion  Impression: 1  No pulmonary embolism  2   Since August 4, 2021, complete collapse of the right upper and lower lobes  New segmental atelectasis in the right lower lobe and new moderate right pleural effusion  3   Slight increase in size of the dominant right lung mass  4   Left upper lobe mass and right lower lobe and left lower lobe nodules have increased in size  Workstation performed: [id number]         ** Please Note: Dragon 360 Dictation voice to text software was used in the creation of this document   **

## 2021-08-19 NOTE — CONSULTS
[address] 1940, 68 y o  male MRN: [id number]  Unit/Bed#: ED 26 Encounter: [id number]  Primary Care Provider: Laci Sethi DO   Date and time admitted to hospital: 8/18/2021  3:23 PM    Inpatient consult to Carlos Jacob performed by: Chata Wong PA-C  Consult ordered by: Radha Knight MD          Mass of right lung  Assessment & Plan  · Formal diagnosis still pending but causes right sided airway collapse and dyspnea  · Add xopenex/atrovent TID     * Atrial fibrillation with RVR (HCC)  Assessment & Plan  · Telemetry monitoring  · EP/Cardiology consult  · Amiodarone infusion  · Continue home PO amiodarone and metoprolol as BP allows  · AC with Eliquis  · Consider digoxin if remains tachycardic   · Goal Mg > 2 2 and K > 4  · Critical care will follow along    -------------------------------------------------------------------------------------------------------------  Chief Complaint: Afib with RVR    History of Present Illness   Mely Masterson is a 68 y o  male with PMH significant for Afib on Eliquis, newly diagnosed right sided lung mass, DM type 2, hypothyroidism who was found to have an elevated HR by his visiting nurse  He was seen in Cardiology's office where an ECG was performed that confirmed Afib with RVR  Blood pressure was also low and he was advised to go to the ED  While in the ED he was persistently tachycardic with HR 130s-140s and SBP 90s-100s  He received amiodarone 150 mg IV, Mg 4g and a total of 1 5L IVFs  He had improvement in his blood pressure but remained tachycardic  Decision made to admit to [facility] with CC consult  History obtained from chart review and the patient   -------------------------------------------------------------------------------------------------------------  Dispo:  Admit to Stepdown Level 1    Code Status: Level 2 - DNAR: but accepts endotracheal intubation  --------------------------------------------------------------------------------------------------------------  Review of Systems   Constitutional: Negative for chills and fever  Respiratory: Positive for cough, shortness of breath and wheezing  Cardiovascular: Positive for chest pain and palpitations  Negative for leg swelling  Gastrointestinal: Negative for abdominal pain and nausea  Genitourinary: Negative for difficulty urinating  Neurological: Positive for tremors  Negative for headaches  A 12-point, complete review of systems was reviewed and negative except as stated above     Physical Exam  HENT:      Head: Normocephalic and atraumatic  Mouth/Throat:      Mouth: Mucous membranes are moist    Cardiovascular:      Rate and Rhythm: Tachycardia present  Rhythm irregularly irregular  Pulmonary:      Effort: Tachypnea and respiratory distress (mild) present  Breath sounds: Wheezing (expiratory) present  No rhonchi or rales  Comments: No breath sounds on right  Diminished breath sounds on left  Abdominal:      General: Bowel sounds are normal  There is distension  Palpations: Abdomen is soft  Tenderness: There is no abdominal tenderness  Musculoskeletal:      Right lower leg: No edema  Left lower leg: No edema  Skin:     General: Skin is warm and dry  Capillary Refill: Capillary refill takes less than 2 seconds  Neurological:      General: No focal deficit present  Mental Status: He is alert and oriented to person, place, and time        Motor: Tremor (RUE) present          --------------------------------------------------------------------------------------------------------------  Vitals:   Vitals:    08/18/21 1930 08/18/21 1946 08/18/21 2030 08/18/21 2245   BP: 101/60 92/58 92/60 111/64   BP Location: Left arm      Pulse: (!) 132 (!) 132 (!) 132    Resp: 18 18 18 18   Temp:       SpO2: 97% 97% 97%    Weight:       Height:         Temp Min: 97 4 °F (36 3 °C)  Max: 97 4 °F (36 3 °C)  IBW (Ideal Body Weight): 54 6 kg  Height: 5' 2" (157 5 cm)  Body mass index is 30 47 kg/m²  Laboratory and Diagnostics:  Results from last 7 days   Lab Units 21  1543   WBC Thousand/uL 11 46*   HEMOGLOBIN g/dL 10 3*   HEMATOCRIT % 36 0*   PLATELETS Thousands/uL 419*   BANDS PCT % 1   MONO PCT % 6     Results from last 7 days   Lab Units 21  1543   SODIUM mmol/L 132*   POTASSIUM mmol/L 4 9   CHLORIDE mmol/L 100   CO2 mmol/L 26   ANION GAP mmol/L 6   BUN mg/dL 31*   CREATININE mg/dL 1 69*   CALCIUM mg/dL 9 8   GLUCOSE RANDOM mg/dL 307*   ALT U/L 36   AST U/L 11   ALK PHOS U/L 165*   ALBUMIN g/dL 1 6*   TOTAL BILIRUBIN mg/dL 0 62     Results from last 7 days   Lab Units 21  1543   MAGNESIUM mg/dL 1 4*           Results from last 7 days   Lab Units 21  1543   TROPONIN I ng/mL <0 02         ABG:    VBG:          Micro:        EKG: Atrial fibrillation with RVR  Imaging: I have personally reviewed pertinent reports  and I have personally reviewed pertinent films in PACS      Historical Information   Past Medical History:   Diagnosis Date    Hypertension      Past Surgical History:   Procedure Laterality Date    BACK SURGERY      CARPAL TUNNEL RELEASE Bilateral     IR BIOPSY BONE MARROW  6/10/2021    IR BIOPSY LUNG  2021    IR BIOPSY OTHER  2021    IR THORACENTESIS  2021    LUMBAR DISC SURGERY       Social History   Social History     Substance and Sexual Activity   Alcohol Use Not Currently    Comment: History of heavier drinking in early   Denies any current alcohol use (Updated 2021)        Social History     Substance and Sexual Activity   Drug Use Never     Social History     Tobacco Use   Smoking Status Former Smoker    Packs/day: 0 50    Years: 40 00    Pack years: 20 00    Types: Cigarettes    Start date:     Quit date: 2021    Years since quittin 1   Smokeless Tobacco Never Used Exercise History: Mildly ambulatory  Family History:   Family History   Problem Relation Age of Onset    Cancer Mother         "ate away her bone"    Anuerysm Father     Cancer Sister         unknown type    Heart attack Maternal Grandfather     Cancer Sister         unknown type    Heart attack Maternal Aunt     Heart attack Maternal Uncle     Heart attack Paternal Aunt      I have reviewed this patient's family history and commented on sigificant items within the HPI      Medications:  Current Facility-Administered Medications   Medication Dose Route Frequency    albuterol inhalation solution 2 5 mg  2 5 mg Nebulization Q4H PRN    amiodarone (CORDARONE) 900 mg in dextrose 5 % 500 mL infusion  0 5 mg/min Intravenous Continuous    [START ON 8/19/2021] amLODIPine (NORVASC) tablet 2 5 mg  2 5 mg Oral QAM    [START ON 8/19/2021] apixaban (ELIQUIS) tablet 5 mg  5 mg Oral BID    benzonatate (TESSALON PERLES) capsule 100 mg  100 mg Oral TID PRN    dextromethorphan-guaiFENesin (ROBITUSSIN DM) oral syrup 10 mL  10 mL Oral Q4H PRN    Diclofenac Sodium (VOLTAREN) 1 % topical gel 2 g  2 g Topical 4x Daily    [START ON 8/19/2021] fish oil capsule 1,000 mg  1,000 mg Oral BID    gabapentin (NEURONTIN) capsule 100 mg  100 mg Oral HS    [START ON 8/19/2021] guaiFENesin (MUCINEX) 12 hr tablet 600 mg  600 mg Oral BID    hydrOXYzine HCL (ATARAX) tablet 25 mg  25 mg Oral TID PRN    [START ON 8/19/2021] levothyroxine tablet 150 mcg  150 mcg Oral Daily    metoprolol tartrate (LOPRESSOR) tablet 25 mg  25 mg Oral Q12H [address]    morphine injection 2 mg  2 mg Intravenous Q3H PRN    [START ON 8/19/2021] pantoprazole (PROTONIX) EC tablet 40 mg  40 mg Oral Daily     Home medications:  Prior to Admission Medications   Prescriptions Last Dose Informant Patient Reported? Taking?    Alcohol Swabs ( Sterile Alcohol Prep) PADS 8/18/2021 at Unknown time Self Yes Yes   Sig: Use as directed   Diclofenac Sodium (VOLTAREN) 1 % 8/18/2021 at Unknown time Self No Yes   Sig: Apply 2 g topically 4 (four) times a day   Eliquis 5 MG 8/18/2021 at Unknown time Self No Yes   Sig: TAKE ONE TABLET (5 MG TOTAL) BY MOUTH 2 (TWO) TIMES A DAY   Omega-3 Fatty Acids (FISH OIL) 1,000 mg 8/18/2021 at Unknown time Self Yes Yes   Sig: Take 1,000 mg by mouth 2 (two) times a day   acetaminophen (TYLENOL) 100 mg/mL solution 8/18/2021 at Unknown time Self Yes Yes   Sig: Take 10 mg/kg by mouth every 4 (four) hours as needed for fever   albuterol (2 5 mg/3 mL) 0 083 % nebulizer solution 8/18/2021 at Unknown time Self No Yes   Sig: Take 3 mL (2 5 mg total) by nebulization every 6 (six) hours as needed for wheezing or shortness of breath   allopurinol (ZYLOPRIM) 300 mg tablet Not Taking at Unknown time Self Yes No   Sig: Take 300 mg by mouth daily   Patient not taking: Reported on 8/18/2021   amLODIPine (NORVASC) 2 5 mg tablet  Self Yes No   Sig: Take 2 5 mg by mouth every morning   amiodarone 200 mg tablet   No No   Sig: Take 1 tablet (200 mg total) by mouth 2 (two) times a day with meals for 9 doses   amiodarone 200 mg tablet  Self No No   Sig: Take 1 5 tablets (300 mg total) by mouth daily   benzonatate (TESSALON PERLES) 100 mg capsule 8/18/2021 at Unknown time Self No Yes   Sig: Take 1 capsule (100 mg total) by mouth 3 (three) times a day as needed for cough   dextromethorphan-guaiFENesin (ROBITUSSIN DM)  mg/5 mL syrup 8/18/2021 at Unknown time Self No Yes   Sig: Take 10 mL by mouth every 4 (four) hours as needed for cough   gabapentin (NEURONTIN) 100 mg capsule 8/18/2021 at Unknown time Self No Yes   Sig: Take 1 capsule (100 mg total) by mouth daily at bedtime   glipiZIDE (GLUCOTROL) 5 mg tablet 8/18/2021 at Unknown time Self Yes Yes   Sig: Take 5 mg by mouth 2 (two) times a day   guaiFENesin (MUCINEX) 600 mg 12 hr tablet 8/18/2021 at Unknown time Self No Yes   Sig: Take 1 tablet (600 mg total) by mouth 2 (two) times a day   hydrOXYzine HCL (ATARAX) 25 mg tablet  Self Yes No   Sig: Take 25 mg by mouth 3 (three) times a day as needed   levothyroxine 150 mcg tablet 8/18/2021 at Unknown time Self Yes Yes   Sig: Take 150 mcg by mouth daily   lidocaine (LIDODERM) 5 % Not Taking at Unknown time Self No No   Sig: Apply 1 patch topically every 24 hours Remove & Discard patch within 12 hours or as directed by MD   Patient not taking: Reported on 8/18/2021   metFORMIN (GLUCOPHAGE) 500 mg tablet 8/18/2021 at Unknown time Self No Yes   Sig: Take 1 tablet (500 mg total) by mouth 2 (two) times a day with meals   metoprolol tartrate (LOPRESSOR) 25 mg tablet 8/18/2021 at Unknown time Self No Yes   Sig: Take 1 tablet (25 mg total) by mouth every 12 (twelve) hours   pantoprazole (PROTONIX) 40 mg tablet 8/18/2021 at Unknown time Self No Yes   Sig: Take 1 tablet (40 mg total) by mouth daily   predniSONE 20 mg tablet 8/18/2021 at Unknown time Self No Yes   Sig: Take 1 tablet (20 mg total) by mouth daily   tiotropium (SPIRIVA) 18 mcg inhalation capsule Not Taking at Unknown time Self No No   Sig: Place 1 capsule (18 mcg total) into inhaler and inhale daily   Patient not taking: Reported on 8/18/2021      Facility-Administered Medications: None     Allergies: Allergies   Allergen Reactions    Penicillins Hives     ------------------------------------------------------------------------------------------------------------  Advance Directive and Living Will:      Power of :    POLST:    ------------------------------------------------------------------------------------------------------------  Care Time Delivered:   Upon my evaluation, this patient had a high probability of imminent or life-threatening deterioration due to afib with RVR, respiratory distress, which required my direct attention, intervention, and personal management    I have personally provided 36 minutes (2200 to 2330) of critical care time, exclusive of procedures, teaching, family meetings, and any prior time recorded by providers other than myself  Stephanie Perez PA-C        Portions of the record may have been created with voice recognition software  Occasional wrong word or "sound a like" substitutions may have occurred due to the inherent limitations of voice recognition software    Read the chart carefully and recognize, using context, where substitutions have occurred

## 2021-08-19 NOTE — ASSESSMENT & PLAN NOTE
Currently on albuterol inhalation every 4 hours as needed  Atrovent 4 times daily  Respiratory protocol; oxygen as needed  Guaifenesin for expectoration  Cough control  Pulmonary consult

## 2021-08-19 NOTE — PROGRESS NOTES
HR improved after digoxin  Being followed by cardiology  BP stable  OK to transition to SD level 2 with telemetry  CC to sign off  Spoke with Dr Marga Mckeon PA-C  Department of Critical Care  Available in [facility]

## 2021-08-19 NOTE — ASSESSMENT & PLAN NOTE
Patient has resistant atrial fibrillation with RVR with heart rates ranging from 120 to 140 on amiodarone drip  Will continue with metoprolol 25 mg q 12   Cardio consulted - loaded with digoxin IV with plans for PO amiodarone & PO digoxin    HOLD amlodipine 2 5 mg daily    Heparin drip If needs procedure

## 2021-08-20 LAB
APTT PPP: 38 SECONDS (ref 23–37)
APTT PPP: 41 SECONDS (ref 23–37)
APTT PPP: 57 SECONDS (ref 23–37)
APTT PPP: 78 SECONDS (ref 23–37)
ERYTHROCYTE [DISTWIDTH] IN BLOOD BY AUTOMATED COUNT: 18.7 % (ref 11.6–15.1)
GLUCOSE SERPL-MCNC: 150 MG/DL (ref 65–140)
GLUCOSE SERPL-MCNC: 156 MG/DL (ref 65–140)
GLUCOSE SERPL-MCNC: 192 MG/DL (ref 65–140)
GLUCOSE SERPL-MCNC: 256 MG/DL (ref 65–140)
GLUCOSE SERPL-MCNC: 272 MG/DL (ref 65–140)
HCT VFR BLD AUTO: 35.6 % (ref 36.5–49.3)
HGB BLD-MCNC: 10.2 G/DL (ref 12–17)
INR PPP: 1.45 (ref 0.84–1.19)
MCH RBC QN AUTO: 24.5 PG (ref 26.8–34.3)
MCHC RBC AUTO-ENTMCNC: 28.7 G/DL (ref 31.4–37.4)
MCV RBC AUTO: 86 FL (ref 82–98)
PLATELET # BLD AUTO: 398 THOUSANDS/UL (ref 149–390)
PMV BLD AUTO: 9.6 FL (ref 8.9–12.7)
PROTHROMBIN TIME: 17.6 SECONDS (ref 11.6–14.5)
RBC # BLD AUTO: 4.16 MILLION/UL (ref 3.88–5.62)
WBC # BLD AUTO: 11.93 THOUSAND/UL (ref 4.31–10.16)

## 2021-08-20 PROCEDURE — 82948 REAGENT STRIP/BLOOD GLUCOSE: CPT

## 2021-08-20 PROCEDURE — 97163 PT EVAL HIGH COMPLEX 45 MIN: CPT

## 2021-08-20 PROCEDURE — 85730 THROMBOPLASTIN TIME PARTIAL: CPT | Performed by: HOSPITALIST

## 2021-08-20 PROCEDURE — 85610 PROTHROMBIN TIME: CPT | Performed by: HOSPITALIST

## 2021-08-20 PROCEDURE — 99232 SBSQ HOSP IP/OBS MODERATE 35: CPT | Performed by: INTERNAL MEDICINE

## 2021-08-20 PROCEDURE — 94640 AIRWAY INHALATION TREATMENT: CPT

## 2021-08-20 PROCEDURE — 99223 1ST HOSP IP/OBS HIGH 75: CPT | Performed by: SURGERY

## 2021-08-20 PROCEDURE — 97167 OT EVAL HIGH COMPLEX 60 MIN: CPT

## 2021-08-20 PROCEDURE — NC001 PR NO CHARGE: Performed by: PHYSICIAN ASSISTANT

## 2021-08-20 PROCEDURE — 94760 N-INVAS EAR/PLS OXIMETRY 1: CPT

## 2021-08-20 PROCEDURE — 85730 THROMBOPLASTIN TIME PARTIAL: CPT | Performed by: INTERNAL MEDICINE

## 2021-08-20 RX ORDER — POLYETHYLENE GLYCOL 3350 17 G/17G
17 POWDER, FOR SOLUTION ORAL DAILY PRN
Status: DISCONTINUED | OUTPATIENT
Start: 2021-08-20 | End: 2021-08-21

## 2021-08-20 RX ORDER — OXYCODONE HYDROCHLORIDE 5 MG/1
5 TABLET ORAL EVERY 6 HOURS PRN
Status: DISCONTINUED | OUTPATIENT
Start: 2021-08-20 | End: 2021-08-24 | Stop reason: HOSPADM

## 2021-08-20 RX ORDER — BISACODYL 10 MG
10 SUPPOSITORY, RECTAL RECTAL DAILY PRN
Status: DISCONTINUED | OUTPATIENT
Start: 2021-08-20 | End: 2021-08-21

## 2021-08-20 RX ORDER — ACETAMINOPHEN 325 MG/1
975 TABLET ORAL EVERY 8 HOURS SCHEDULED
Status: DISCONTINUED | OUTPATIENT
Start: 2021-08-20 | End: 2021-08-24 | Stop reason: HOSPADM

## 2021-08-20 RX ORDER — SENNOSIDES 8.6 MG
1 TABLET ORAL
Status: DISCONTINUED | OUTPATIENT
Start: 2021-08-20 | End: 2021-08-24 | Stop reason: HOSPADM

## 2021-08-20 RX ORDER — OXYCODONE HYDROCHLORIDE 5 MG/1
2.5 TABLET ORAL EVERY 6 HOURS PRN
Status: DISCONTINUED | OUTPATIENT
Start: 2021-08-20 | End: 2021-08-24 | Stop reason: HOSPADM

## 2021-08-20 RX ORDER — INSULIN GLARGINE 100 [IU]/ML
8 INJECTION, SOLUTION SUBCUTANEOUS
Status: DISCONTINUED | OUTPATIENT
Start: 2021-08-20 | End: 2021-08-21

## 2021-08-20 RX ADMIN — DIGOXIN 125 MCG: 125 TABLET ORAL at 08:10

## 2021-08-20 RX ADMIN — GABAPENTIN 100 MG: 100 CAPSULE ORAL at 21:30

## 2021-08-20 RX ADMIN — ACETAMINOPHEN 975 MG: 325 TABLET, FILM COATED ORAL at 13:50

## 2021-08-20 RX ADMIN — INSULIN LISPRO 1 UNITS: 100 INJECTION, SOLUTION INTRAVENOUS; SUBCUTANEOUS at 08:09

## 2021-08-20 RX ADMIN — HEPARIN SODIUM 20 UNITS/KG/HR: 10000 INJECTION, SOLUTION INTRAVENOUS at 17:24

## 2021-08-20 RX ADMIN — INSULIN LISPRO 2 UNITS: 100 INJECTION, SOLUTION INTRAVENOUS; SUBCUTANEOUS at 21:33

## 2021-08-20 RX ADMIN — PANTOPRAZOLE SODIUM 40 MG: 40 TABLET, DELAYED RELEASE ORAL at 05:19

## 2021-08-20 RX ADMIN — DICLOFENAC SODIUM 2 G: 10 GEL TOPICAL at 11:44

## 2021-08-20 RX ADMIN — OMEGA-3 FATTY ACIDS CAP 1000 MG 1000 MG: 1000 CAP at 08:10

## 2021-08-20 RX ADMIN — HEPARIN SODIUM 4000 UNITS: 1000 INJECTION INTRAVENOUS; SUBCUTANEOUS at 12:36

## 2021-08-20 RX ADMIN — HEPARIN SODIUM 4000 UNITS: 1000 INJECTION INTRAVENOUS; SUBCUTANEOUS at 05:16

## 2021-08-20 RX ADMIN — INSULIN GLARGINE 8 UNITS: 100 INJECTION, SOLUTION SUBCUTANEOUS at 21:32

## 2021-08-20 RX ADMIN — METOPROLOL TARTRATE 25 MG: 25 TABLET, FILM COATED ORAL at 21:32

## 2021-08-20 RX ADMIN — METOPROLOL TARTRATE 25 MG: 25 TABLET, FILM COATED ORAL at 03:46

## 2021-08-20 RX ADMIN — LEVALBUTEROL HYDROCHLORIDE 1.25 MG: 1.25 SOLUTION, CONCENTRATE RESPIRATORY (INHALATION) at 13:54

## 2021-08-20 RX ADMIN — GUAIFENESIN 600 MG: 600 TABLET, EXTENDED RELEASE ORAL at 08:10

## 2021-08-20 RX ADMIN — DICLOFENAC SODIUM 2 G: 10 GEL TOPICAL at 17:24

## 2021-08-20 RX ADMIN — DICLOFENAC SODIUM 2 G: 10 GEL TOPICAL at 21:35

## 2021-08-20 RX ADMIN — OMEGA-3 FATTY ACIDS CAP 1000 MG 1000 MG: 1000 CAP at 17:23

## 2021-08-20 RX ADMIN — LEVALBUTEROL HYDROCHLORIDE 1.25 MG: 1.25 SOLUTION, CONCENTRATE RESPIRATORY (INHALATION) at 20:41

## 2021-08-20 RX ADMIN — INSULIN LISPRO 2 UNITS: 100 INJECTION, SOLUTION INTRAVENOUS; SUBCUTANEOUS at 11:42

## 2021-08-20 RX ADMIN — IPRATROPIUM BROMIDE 0.5 MG: 0.5 SOLUTION RESPIRATORY (INHALATION) at 08:15

## 2021-08-20 RX ADMIN — IPRATROPIUM BROMIDE 0.5 MG: 0.5 SOLUTION RESPIRATORY (INHALATION) at 13:54

## 2021-08-20 RX ADMIN — IPRATROPIUM BROMIDE 0.5 MG: 0.5 SOLUTION RESPIRATORY (INHALATION) at 20:41

## 2021-08-20 RX ADMIN — LEVALBUTEROL HYDROCHLORIDE 1.25 MG: 1.25 SOLUTION, CONCENTRATE RESPIRATORY (INHALATION) at 08:15

## 2021-08-20 RX ADMIN — INSULIN LISPRO 3 UNITS: 100 INJECTION, SOLUTION INTRAVENOUS; SUBCUTANEOUS at 17:23

## 2021-08-20 RX ADMIN — LEVOTHYROXINE SODIUM 150 MCG: 75 TABLET ORAL at 05:19

## 2021-08-20 RX ADMIN — SENNOSIDES 8.6 MG: 8.6 TABLET ORAL at 21:30

## 2021-08-20 RX ADMIN — DIGOXIN 250 MCG: 0.25 INJECTION INTRAMUSCULAR; INTRAVENOUS at 00:04

## 2021-08-20 RX ADMIN — ACETAMINOPHEN 975 MG: 325 TABLET, FILM COATED ORAL at 21:31

## 2021-08-20 RX ADMIN — GUAIFENESIN 600 MG: 600 TABLET, EXTENDED RELEASE ORAL at 17:23

## 2021-08-20 RX ADMIN — AMIODARONE HYDROCHLORIDE 400 MG: 200 TABLET ORAL at 08:10

## 2021-08-20 NOTE — OCCUPATIONAL THERAPY NOTE
Occupational Therapy Evaluation     Patient Name: Willian Chopra  VWLIJ'L Date: 8/20/2021  Problem List  Principal Problem:    Atrial fibrillation with RVR ([facility] )  Active Problems:    Mass of right lung    Hypothyroidism    New onset type 2 diabetes mellitus ([facility] [facility] )    COPD (chronic obstructive pulmonary disease) ([facility] )    Chronic respiratory failure with hypoxia (HCC)    Past Medical History  Past Medical History:   Diagnosis Date    Hypertension      Past Surgical History  Past Surgical History:   Procedure Laterality Date    BACK SURGERY      CARPAL TUNNEL RELEASE Bilateral     IR BIOPSY BONE MARROW  6/10/2021    IR BIOPSY LUNG  7/21/2021    IR BIOPSY OTHER  7/27/2021    IR THORACENTESIS  7/30/2021    LUMBAR [address] SURGERY             08/20/21 0956   OT Last Visit   OT Visit Date 08/20/21   Note Type   Note type Evaluation   Restrictions/Precautions   Weight Bearing Precautions Per Order No   Other Precautions Multiple lines;Telemetry;O2  (3 L NC)   Pain Assessment   Pain Assessment Tool Pain Assessment not indicated - pt denies pain   Home Living   Type of [address] [place] [address] One level  (2 NICK)   Bathroom Shower/Tub Tub/shower unit   Bathroom Toilet Standard   Bathroom Equipment Grab bars in shower; Shower chair  (denies use PTA)   Home Equipment   (pt's wife has RW; pt does not have DME)   Prior Function   Level of Carter Independent with ADLs and functional mobility   Lives With Spouse   Receives Help From Family   ADL Assistance Independent   IADLs Independent   Falls in the last 6 months 0   Comments Pt reports his wife is primarily bedbound and he assists in her care; pt's spouse also has aids that assist  Pt reports he has local children that can also stop by as needed  Pt uses 3 L NC of supplemental oxygen at baseline  Lifestyle   Autonomy  At baseline pt was completing all ADLs/IADLs IND, ambulating IND w/o AD, (+) driving      Reciprocal Relationships supportive spouse & local children [employer] to Others retired   Intrinsic Gratification pt enjoys staying active   Psychosocial   Psychosocial (WDL) [address] "I was healthy up until a couple of months ago "   ADL   Eating Assistance 7  Independent   Grooming Assistance 5  [address] 5  Supervision/Setup    [address]  [address] to Stand 4  Minimal assistance   Additional items Assist x 1; Increased time required   Stand to Sit 5  Supervision   Additional items Increased time required   Additional Comments declined use of RW   Functional Mobility   Functional Mobility 4  Minimal assistance   Additional Comments Mild SADLER  Household distances, pt declined use of RW however held onto portable DASH & IV pole for support  Would likely benefit from RW   Balance   Static Sitting Good   Dynamic Sitting Fair +   Static Standing Fair   Dynamic Standing Fair -   Activity Tolerance   Activity Tolerance Patient limited by fatigue   Medical Staff Made Aware PT   Nurse Made Aware Per RN pt appropriate to be seen   RUE Assessment   RUE Assessment WFL   LUE Assessment   LUE Assessment WFL   Hand Function   Gross Motor Coordination Functional   Fine Motor Coordination Functional   Cognition   Overall Cognitive Status WFL   Arousal/Participation Alert; Cooperative   Attention Within functional limits   Orientation Level Oriented X4   Memory Within functional limits   Following Commands Follows one step commands without difficulty   Assessment   Limitation Decreased ADL status; Decreased endurance;Decreased self-care trans;Decreased high-level ADLs   Prognosis Good   Assessment Pt is a 68 y o  male who was admitted to [facility] on 8/18/2021 with Atrial fibrillation with RVR ([address] )    Of note, pt with history of multiple pulmonary masses; biopsy has been unsuccessful in determining tissue diagnosis  Pt  has a past medical history of Hypertension, microcytic anemia, thrombocytopenia  At baseline pt was completing all ADLs/IADLs IND, ambulating IND w/o AD, (+) driving  Pt lives with his spouse in a 1 SH with 2 NICK  Pt reports his wife is primarily bedbound and he assists in her care; pt's spouse also has aids that assist  Pt reports he has local children that can also stop by as needed  Pt uses 3 L NC of supplemental oxygen at baseline  Currently pt requires MIN A for overall ADLS and for functional mobility/transfers  Pt currently presents with impairments in the following categories -steps to enter environment, limited home support, difficulty performing ADLS and difficulty performing IADLS  activity tolerance, endurance, standing balance/tolerance and sensation   These impairments, as well as pt's fatigue, SOB, decreased caregiver support and risk for falls  limit pt's ability to safely engage in all baseline areas of occupation, includinggrooming, bathing, dressing, toileting, functional mobility/transfers, community mobility, driving, meal prep, cleaning and leisure activities  From OT standpoint, recommend [address] upon D/C  OT will continue to follow to address the below stated goals  Goals   Patient Goals to get better & go home   LTG Time Frame 10-14   Long Term Goal #1 see goals below   Plan   Treatment Interventions ADL retraining;Functional transfer training;UE strengthening/ROM; Endurance training;Cognitive reorientation;Patient/family training;Equipment evaluation/education; Compensatory technique education; Energy conservation; Activityengagement   Goal Expiration Date 09/03/21   OT Frequency 3-5x/wk   Recommendation   OT Discharge Recommendation Home with home health rehabilitation   Equipment Recommended   (pt owns SC, recommend use)   AM-PAC Daily Activity Inpatient   Lower Body Dressing 3   Bathing 3 Toileting 3   Upper Body Dressing 3   Grooming 3   Eating 4   Daily Activity Raw Score 19   Daily Activity Standardized Score (Calc for Raw Score >=11) 40 22   AM-[facility] Applied Cognition Inpatient   Following a Speech/Presentation 4   Understanding Ordinary Conversation 4   Taking Medications 4   Remembering Where Things Are Placed or Put Away 4   Remembering List of 4-5 Errands 4   Taking Care of Complicated Tasks 3   Applied Cognition Raw Score 23   Applied Cognition Standardized Score 53 08         The patient's raw score on the AM-PAC Daily Activity inpatient short form is 19, standardized score is 40 22, greater than 39 4  Patients at this level are likely to benefit from discharge to home  Please refer to the recommendation of the Occupational Therapist for safe discharge planning  GOALS     Pt will improve activity tolerance to G for min 30 min txment sessions     Pt will complete UB ADLs with MOD IND and use of adaptive device and DME as needed     Pt will complete LB ADLs with MOD IND w/ use of AE and DME as needed     Pt will complete toileting w/ MOD IND w/ G hygiene/thoroughness using DME as needed     Pt will improve functional transfers to Mod I on/off all surfaces using DME as needed w/ G balance/safety      Pt will improve functional mobility during ADL/IADL/leisure tasks to Mod I using DME as needed w/ G balance/safety      Pt will demonstrate G carryover of pt/caregiver education and training as appropriate w/ mod I w/o cues w/ good tolerance     Pt to participate in ongoing functional cognitive assessment with Good attention/participation to assist with safe D/C planning  Pt to demo % carryover of energy conservation strategies during functional tasks          Young Cea, OT

## 2021-08-20 NOTE — PLAN OF CARE
Problem: PHYSICAL THERAPY ADULT  Goal: Performs mobility at highest level of function for planned discharge setting  See evaluation for individualized goals  Description: Treatment/Interventions: Functional transfer training, LE strengthening/ROM, Elevations, Therapeutic exercise, Endurance training, Equipment eval/education, Bed mobility, Gait training, Spoke to nursing, Spoke to case management, OT  Equipment Recommended: Jerald Mu       See flowsheet documentation for full assessment, interventions and recommendations  Note: Prognosis: Good  Problem List: Decreased strength, Decreased endurance, Impaired balance, Decreased mobility  Assessment: Pt is a 68 y o  male seen for PT evaluation s/p admit to [facility] on 8/18/2021  Pt was admitted with a primary dx of: a-fib with RVR  PT now consulted for assessment of mobility and d/c needs  Pt with Up with assistance, PT evaluation orders  Pts current comorbidities effecting treatment include: HTN  Other active problems include: paroxysmal A-fib, mass of R lung, tachycardia  Pts current clinical presentation is Unstable/ Unpredictable (high complexity) due to Ongoing medical management for primary dx, Increased reliance on more restrictive AD compared to baseline, Decreased activity tolerance compared to baseline, Fall risk, Increased assistance needed from caregiver at current time, Ongoing telemetry monitoring  Prior to admission, pt was I with ADLs and ambulating w/o AD  Pt lives with his wife in a [facility] with 2 NICK  Pt's wife is mostly bedbound and has aids that help with her ADLs/transfers  Upon evaluation, pt currently is requiring CGA for transfers and Ania for ambulation 50 ft w/ IV pole + dash monitor (declined use of RW)   Pt presents at PT eval functioning below baseline and currently w/ overall mobility deficits 2* to: BLE weakness, impaired balance, decreased endurance, gait deviations, pain, decreased activity tolerance compared to baseline, decreased functional mobility tolerance compared to baseline, fall risk, SOB upon exertion  Pt currently at a fall risk 2* to impairments listed above  Pt will continue to benefit from skilled acute PT interventions to address stated impairments; to maximize functional mobility; for ongoing pt/ family training; and DME needs  At conclusion of PT session pt returned back in chair with phone and call bell within reach  Pt denies any further questions at this time  The patient's AM-PAC Basic Mobility Inpatient Short Form Raw Score is 18, Standardized Score is 41 05  A standardized score less than 42 9 suggests the patient may benefit from discharge to post-acute rehabilitation services  Please also refer to the recommendation of the Physical Therapist for safe discharge planning  Recommend home with HHPT upon hospital D/C  PT Discharge Recommendation: Home with home health rehabilitation          See flowsheet documentation for full assessment

## 2021-08-20 NOTE — PLAN OF CARE
Problem: Potential for Falls  Goal: Patient will remain free of falls  Description: INTERVENTIONS:  - Educate patient/family on patient safety including physical limitations  - Instruct patient to call for assistance with activity   - Consult OT/PT to assist with strengthening/mobility   - Keep Call bell within reach  - Keep bed low and locked with side rails adjusted as appropriate  - Keep care items and personal belongings within reach  - Initiate and maintain comfort rounds  - Make Fall Risk Sign visible to staff  - Offer Toileting every  Hours, in advance of need  - Initiate/Maintain alarm  - Obtain necessary fall risk management equipment:  - Apply yellow socks and bracelet for high fall risk patients  - Consider moving patient to room near nurses station  Outcome: Progressing     Problem: CARDIOVASCULAR - ADULT  Goal: Maintains optimal cardiac output and hemodynamic stability  Description: INTERVENTIONS:  - Monitor I/O, vital signs and rhythm  - Monitor for S/S and trends of decreased cardiac output  - Administer and titrate ordered vasoactive medications to optimize hemodynamic stability  - Assess quality of pulses, skin color and temperature  - Assess for signs of decreased coronary artery perfusion  - Instruct patient to report change in severity of symptoms  Outcome: Progressing  Goal: Absence of cardiac dysrhythmias or at baseline rhythm  Description: INTERVENTIONS:  - Continuous cardiac monitoring, vital signs, obtain 12 lead EKG if ordered  - Administer antiarrhythmic and heart rate control medications as ordered  - Monitor electrolytes and administer replacement therapy as ordered  Outcome: Progressing     Problem: RESPIRATORY - ADULT  Goal: Achieves optimal ventilation and oxygenation  Description: INTERVENTIONS:  - Assess for changes in respiratory status  - Assess for changes in mentation and behavior  - Position to facilitate oxygenation and minimize respiratory effort  - Oxygen administered by appropriate delivery if ordered  - Initiate smoking cessation education as indicated  - Encourage broncho-pulmonary hygiene including cough, deep breathe, Incentive Spirometry  - Assess the need for suctioning and aspirate as needed  - Assess and instruct to report SOB or any respiratory difficulty  - Respiratory Therapy support as indicated  Outcome: Progressing

## 2021-08-20 NOTE — PROGRESS NOTES
Cardiology Team 2 Progress Note - Kuldeep Cifuentes 68 y o  male MRN: [id number]    Unit/Bed#: [facility] 511-01 Encounter: [id number]    1  Paroxysmal Afib  - Found on 6/10 with spontaneous conversion to NSR  - Most recent episode was 8/18 at doctor office >> ED visit   - As' sx include dyspnea, cough  - Tried pt Amiodarone, 1 time dose of digoxin w/o success to converse   - Currently on Amiodarone 400 mg qd, though effectiveness is in question   - Plan:   Heparin ggt in anticipation of lung mass work up   Pt is asymptomatic    Cardioversion is not considered right now due to increased  adrenergic effect and underlying illness (lung mass)    D/c Amiodarone 400 mg QD,    Cont' Digoxin 125 mcg    Cont' Lopressor 25 mg q6h   2  Mass of Right Lung  Imaging of lungs shows patient's pulmonary masses are enlarging causing further encroachment on patient's mediastinum  PET scan shows heterogenous activity of these masses  A pathologic diagnosis has yet to be made regarding  Patient may have IR biopsy of right lung mass and left pulmonary nodule      3  Tachycardia  Patient has been tachycardic since admission  He is noted to be in Atrial flutter during exam  Tachycardia can also be a result of adrenergic stimulation and shortness of breath                  Sari Butler is a 67 yo male with past medical history of paroxysmal atrial fibrillation and atrial flutter in context of 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 and encases the right pulmonary artery and right bronchi  Patient was first noted to be in Afib with RVR on 6/10, with large lung mass first visualized during hospital admission in June  At that time he converted into normal sinus rhythm on his own  In July, he had another episode of Afib with RVR during another hospital stay resulting in escalation of pharmacotherapy  On 8/13 patient's visiting nurse noticed irregular heart rhythm and tachycardia   He eventually returned to hospital on 8/18 following an in office EKG that showed atrial flutter  In the interim, the right lung mass has increased in size and a pathology diagnosis has not been made  Additionally patient has worsening cough and SOB  Administered Amiodarone drip and one time dose of digoxin, patient has not returned to sinus rhythm  Patient has shown poor responsiveness to Amiodarone  He is not a candidate for cardioversion at this time as he will likely return back to Afib/Aflutter due to mass/adrenergic stimulation/shortness of breath      2  Mass of Right Lung  Imaging of lungs shows patient's pulmonary masses are enlarging causing further encroachment on patient's mediastinum  PET scan shows heterogenous activity of these masses  A pathologic diagnosis has yet to be made regarding  Patient may have IR biopsy of right lung mass and left pulmonary nodule      3  Tachycardia  Patient has been tachycardic since admission  He is noted to be in Atrial flutter during exam  Tachycardia can also be a result of adrenergic stimulation and shortness of breath          Plan  - Administer Digoxin 500 mcg IV one time dose  - Followed by Digoxin 250 mcg q6 hours for two doses  - Followed by Digoxin 125 mcg daily   - D/c Amiodarone IV  - Start Amiodarone 400 mg PO daily  - Defer cardioversion at this time  Subjective:   Patient seen and examined  Pt has Atrial fibrillation rhythm at time of exam, denied palpitation, dizziness, was comfortable sitting up as he was getting breathing treatment  Currently anticoagulated with heparin drip as IR plan to perform biopsy on his lung mass  He denied pain, fever, chill  Vitals: Blood pressure 96/52, pulse (!) 115, temperature 98 2 °F (36 8 °C), temperature source Oral, resp  rate 16, height 5' 2" (1 575 m), weight 74 3 kg (163 lb 12 8 oz), SpO2 95 %  , Body mass index is 29 96 kg/m² ,   Orthostatic Blood Pressures      Most Recent Value   Blood Pressure  96/52 filed at 08/20/2021 6617   Patient Position - Orthostatic VS  Lying filed at 08/20/2021 0256            Intake/Output Summary (Last 24 hours) at 8/20/2021 1000  Last data filed at 8/20/2021 5564  Gross per 24 hour   Intake 350 7 ml   Output 630 ml   Net -279 3 ml       Review of System:  Review of system was conducted and was negative except for as stated in the subjective course      Physical Exam:    GEN: Anita Goldman appears well, alert and oriented x 3, pleasant and cooperative   HEENT:  Normocephalic, atraumatic, anicteric, moist mucous membranes  NECK: No JVD or carotid bruits   HEART: Irregular rhythm, tachycardic rate, normal S1 and S2, no murmurs, clicks, gallops or rubs   LUNGS: decreased breath on right lung field, No rales, rhonchi, wheezes heard  ABDOMEN:  Normoactive bowel sounds, soft, no tenderness, no distention  EXTREMITIES: peripheral pulses palpable; no edema  NEURO: no gross focal findings; cranial nerves grossly intact   SKIN:  Dry, intact, warm to touch      Current Facility-Administered Medications:     albuterol inhalation solution 2 5 mg, 2 5 mg, Nebulization, Q4H PRN, Rojelio Salomon MD, 2 5 mg at 08/19/21 0604    amiodarone tablet 400 mg, 400 mg, Oral, Daily, Marie Simpson MD, 400 mg at 08/20/21 0810    benzonatate (TESSALON PERLES) capsule 100 mg, 100 mg, Oral, TID PRN, Rojelio Salomon MD, 100 mg at 08/19/21 2138    Diclofenac Sodium (VOLTAREN) 1 % topical gel 2 g, 2 g, Topical, 4x Daily, Rojelio Salomon MD, 2 g at 08/19/21 1925    digoxin (LANOXIN) tablet 125 mcg, 125 mcg, Oral, Daily, Marie Simpson MD, 125 mcg at 08/20/21 0810    fish oil capsule 1,000 mg, 1,000 mg, Oral, BID, Rojelio Salomon MD, 1,000 mg at 08/20/21 0810    gabapentin (NEURONTIN) capsule 100 mg, 100 mg, Oral, HS, Rojelio Salomon MD, 100 mg at 08/19/21 2127    guaiFENesin (MUCINEX) 12 hr tablet 600 mg, 600 mg, Oral, BID, Rojelio Salomon MD, 600 mg at 08/20/21 0810    heparin (porcine) 25,000 units in 0 45% NaCl 250 mL infusion (premix), 3-20 Units/kg/hr (Order-Specific), Intravenous, Titrated, Shauna Blackmon MD, Last Rate: 12 mL/hr at 08/20/21 0515, 16 Units/kg/hr at 08/20/21 0515    heparin (porcine) injection 2,000 Units, 2,000 Units, Intravenous, Q1H PRN, Shauna Blackmon MD    heparin (porcine) injection 4,000 Units, 4,000 Units, Intravenous, Q1H PRN, Shauna Blackmon MD, 4,000 Units at 08/20/21 0516    hydrOXYzine HCL (ATARAX) tablet 25 mg, 25 mg, Oral, TID PRN, Zahra Vazquez MD    insulin glargine (LANTUS) subcutaneous injection 4 Units 0 04 mL, 4 Units, Subcutaneous, HS, Shauna Blackmon MD, 4 Units at 08/19/21 2146    insulin lispro (HumaLOG) 100 units/mL subcutaneous injection 1-5 Units, 1-5 Units, Subcutaneous, HS, Zahra Vazquez MD, 2 Units at 08/19/21 2159    insulin lispro (HumaLOG) 100 units/mL subcutaneous injection 1-6 Units, 1-6 Units, Subcutaneous, TID AC, 1 Units at 08/20/21 0809 **AND** Fingerstick Glucose (POCT), , , TID AC, Zahra Vazquez MD    ipratropium (ATROVENT) 0 02 % inhalation solution 0 5 mg, 0 5 mg, Nebulization, TID, Zahra Vazquez MD, 0 5 mg at 08/20/21 0815    levalbuterol (Palak Milks) inhalation solution 1 25 mg, 1 25 mg, Nebulization, TID, Zahra Vazquez MD, 1 25 mg at 08/20/21 0815    levothyroxine tablet 150 mcg, 150 mcg, Oral, Early Morning, Zahra Vazquez MD, 150 mcg at 08/20/21 0519    metoprolol (LOPRESSOR) injection 5 mg, 5 mg, Intravenous, Q6H PRN, Rajinder Sommer MD, 5 mg at 08/19/21 2146    metoprolol tartrate (LOPRESSOR) tablet 25 mg, 25 mg, Oral, Q6H, Rajinder Sommer MD, 25 mg at 08/20/21 0346    morphine injection 2 mg, 2 mg, Intravenous, Q3H PRN, Zahra Vazquez MD, 2 mg at 08/19/21 0027    pantoprazole (PROTONIX) EC tablet 40 mg, 40 mg, Oral, Early Morning, Zahra Vazquez MD, 40 mg at 08/20/21 0519    Labs & Results:  Results from last 7 days   Lab Units 08/18/21  1543   TROPONIN I ng/mL <0 02     Results from last 7 days   Lab Units 08/18/21  1543   NT-PRO BNP pg/mL 1,615*     Results from last 7 days   Lab Units 08/19/21  0610 08/18/21  1543   POTASSIUM mmol/L 4 5 4 9   CO2 mmol/L 29 26   CHLORIDE mmol/L 100 100   BUN mg/dL 27* 31*   CREATININE mg/dL 1 17 1 69*     Results from last 7 days   Lab Units 08/19/21  2354 08/19/21  0610 08/18/21  1543   HEMOGLOBIN g/dL 10 2* 8 5* 10 3*   HEMATOCRIT % 35 6* 29 7* 36 0*   PLATELETS Thousands/uL 398* 392* 419*     Results from last 7 days   Lab Units 08/19/21  0610   HEMOGLOBIN A1C % 7 7*       Telemetry: Pt is currently in Atrial fibrillation   Personally reviewed by Tomi Ashton DO:     Imaging: NNI,       VTE Prophylaxis: Heparin ggt            Case discussed and reviewed with Dr Watson Yao who agrees with my assessment and plan  Thank you for involving us in the care of your patient  Tomi Ashton DO   Family Medicine PGY1  ** Please Note: Fluency DirectDictation voice to text software may have been used in the creation of this document   **

## 2021-08-20 NOTE — PLAN OF CARE
Problem: OCCUPATIONAL THERAPY ADULT  Goal: Performs self-care activities at highest level of function for planned discharge setting  See evaluation for individualized goals  Description: Treatment Interventions: ADL retraining, Functional transfer training, UE strengthening/ROM, Endurance training, Cognitive reorientation, Patient/family training, Equipment evaluation/education, Compensatory technique education, Energy conservation, Activityengagement  Equipment Recommended:  (pt owns SC, recommend use)       See flowsheet documentation for full assessment, interventions and recommendations  8/20/2021 1416 by Anisha Duque OT  Note: Limitation: Decreased ADL status, Decreased endurance, Decreased self-care trans, Decreased high-level ADLs  Prognosis: Good  Assessment: Pt is a 68 y o  male who was admitted to [facility] on 8/18/2021 with Atrial fibrillation with RVR ([address] )   Of note, pt with history of multiple pulmonary masses; biopsy has been unsuccessful in determining tissue diagnosis  Pt  has a past medical history of Hypertension, microcytic anemia, thrombocytopenia  At baseline pt was completing all ADLs/IADLs IND, ambulating IND w/o AD, (+) driving  Pt lives with his spouse in a 1  with 2 [facility]  Pt reports his wife is primarily bedbound and he assists in her care; pt's spouse also has aids that assist  Pt reports he has local children that can also stop by as needed  Pt uses 3 L NC of supplemental oxygen at baseline  Currently pt requires MIN A for overall ADLS and for functional mobility/transfers  Pt currently presents with impairments in the following categories -steps to enter environment, limited home support, difficulty performing ADLS and difficulty performing IADLS  activity tolerance, endurance, standing balance/tolerance and sensation    These impairments, as well as pt's fatigue, SOB, decreased caregiver support and risk for falls  limit pt's ability to safely engage in all baseline areas of occupation, includinggrooming, bathing, dressing, toileting, functional mobility/transfers, community mobility, driving, meal prep, cleaning and leisure activities  From OT standpoint, recommend [address] upon D/C  OT will continue to follow to address the below stated goals  OT Discharge Recommendation: Home with home health rehabilitation       8/20/2021 1416 by Jeremy Mckenna OT  Note: Limitation: Decreased ADL status, Decreased endurance, Decreased self-care trans, Decreased high-level ADLs  Prognosis: Good  Assessment: Pt is a 68 y o  [facility] with Atrial fibrillation with RVR ([address] )   Of note, pt with history of multiple pulmonary masses; biopsy has been unsuccessful in determining tissue diagnosis  Pt  has a past medical history of Hypertension, microcytic anemia, thrombocytopenia  At baseline pt was completing all ADLs/IADLs IND, ambulating IND w/o AD, (+) driving  Pt lives with his spouse in a 1  with 2 N[place]  Pt reports his wife is primarily bedbound and he assists in her care; pt's spouse also has aids that assist  Pt reports he has local children that can also stop by as needed  Pt uses 3 L NC of supplemental oxygen at baseline  Currently pt requires MIN A for overall ADLS and for functional mobility/transfers  Pt currently presents with impairments in the following categories -steps to enter environment, limited home support, difficulty performing ADLS and difficulty performing IADLS  activity tolerance, endurance, standing balance/tolerance and sensation    These impairments, as well as pt's fatigue, SOB, decreased caregiver support and risk for falls  limit pt's ability to safely engage in all baseline areas of occupation, includinggrooming, bathing, dressing, toileting, functional mobility/transfers, community mobility, driving, meal prep, cleaning and leisure activities  From OT standpoint, recommend HOME OT/HOME WITH FAMILY SUPPORT upon D/C  OT will continue to follow to address the below stated goals        OT Discharge Recommendation: Home with home health rehabilitation

## 2021-08-20 NOTE — TELEMEDICINE
e-Consult (IPC)  - Interventional Radiology  Claudia Angel 68 y o  male MRN: [id number]  Unit/Bed#: [facility] 511-01 Encounter: [id number]    IR has been consulted to evaluate the patient, determine the appropriate procedure, and whether or not a procedure can and should be performed regarding the care of [address] [place] [address]  We were consulted by oncology concerning large lung masses, and to possibly perform a repeat lung mass biopsy if medically appropriate for the patient  IP Consult to IR  Consult performed by: Montez Caruso PA-C  Consult ordered by: Hilario Serrato MD        08/20/21      Assessment/Recommendation:     68year old male with large bilateral lung masses, has had both left and right masses biopsied with necrotic tissue result  PET obtained since    - will attempt repeat biopsy  Will be up to IR physician to biopsy either outer rim of large right mass, inferior right mass, or repeat left mass  - will tentatively schedule for Monday 8/23 with potential for postponing based on IR schedule  - please keep npo after midnight Sunday into Monday  - please hold AM anticoagulation  Please hold heparin gtt 4 hours prior to lung biopsy (tentatively scheduled for 2pm Monday)      Total time spent in review of data, discussion with requesting provider and rendering advice was 25 minutes       Patient or appropriate family member was verbally informed by oncology of this consultative service on their behalf to provide more timely access to specialty care in lieu of an in person consultation  Verbal consent was obtained  Thank you for allowing Interventional Radiology to participate in the care of [address] [place] [address]  Please don't hesitate to call or TigerText us with any questions       Montez Caruso PA-C

## 2021-08-20 NOTE — PROGRESS NOTES
PULMONOLOGY PROGRESS NOTE     Name: Guille Smart   Age & Sex: 68 y o  male   MRN: [id number]  Unit/Bed#: [facility] 511-01   Encounter: [id number]    PATIENT INFORMATION     Name: Guille Smart   Age & Sex: 68 y o  male   MRN: [id number]  Hospital Stay Days: 2    ASSESSMENT/PLAN     Principal Problem:    Atrial fibrillation with RVR [facility])  Active Problems:    Mass of right lung    Hypothyroidism    New onset type 2 diabetes mellitus ([facility] )    COPD (chronic obstructive pulmonary disease) (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 in the setting of Afib w/ RVR    - Patient initially presented to the ED w/ Afib w/ RVR  On CTA, R lung mass, NICHELLE mass and RLL and LLL nodules were noted, and has been enlarged compared to last imaging    - Previous PET scan showed no metastasis to the CNS, head, neck, pelvis or osseous structures  - EBUS, IR biopsies and thoracentesis previously performed did not show definitive malignancy  - was following PCP/Pulm/Oncology outpatient for workup for SCLC    2  COPD  - recent diagnosis  - extensive smoking h/o, quit three weeks ago  - no PFTs in chart  - patient is not currently having a COPD exacerbation     3  Atrial fibrillation w/ RVR  - in the setting of enlarging lung masses  - troponin neg, BNP elevated  - patient does not appear severely volume overloaded, still having Afib on telemetry        Plan:  1   Right lung mass  - will need IR biopsy in the future once Afib has been resolved  - oncology consulted   - recommend IR consult for pulmonary biopsy of the RL mass and L pulmonary nodule, scheduled for IR biopsy on 8/23/2021   - pending results of biopsy, consider rad/onc consult if biopsy returns malignancy   - ordered uric acid and LD for baseline    - Uric acid: 3 9    - LD: 144  - pulm sign off on patient, call if patient status changes      2  COPD  - Xopenex 1 25mg 3x daily  - Atrovent 0 5mg 3x daily  - albuterol 2 5mg q4hrs PRN  - Mucinex 600mg q12hrs      3  Atrial fibrillation w/ RVR  - plan per cardio   - Afib is most likely exacerbated by hypoxia that is secondary to his pulm causes   - ultimately best rate control is to address his RF   - does not appear grossly volume overloaded, IV diuresis per primary    Disposition: continue inpatient care    SUBJECTIVE     Patient seen and examined  No acute events OVN  He slept with his bed mildly elevated OVN  He was sating well in the mid 90s on 3L of NC  Telemetry shows Afib, however patient is asymptomatic  Patient does not appear severely volume overloaded  He denied any SOB, chest pain or chest palpitations  Denied any fevers or chills  Per cardiology, believes that hypoxia is secondary to pulm causes, and ultimately the best rate control would be addressing his RF  Cardioversion for Afib is not likely, for now better rate control in the setting of low BP's by loading w/ digoxin  OBJECTIVE     Vitals:    21 0536 21 0700 21 0810 21 0815   BP:  104/57     BP Location:       Pulse:  88 95    Resp:  16     Temp:  98 2 °F (36 8 °C)     TempSrc:  Oral     SpO2:  93%  95%   Weight: 74 3 kg (163 lb 12 8 oz)      Height:          Temperature:   Temp (24hrs), Av 2 °F (37 3 °C), Min:98 2 °F (36 8 °C), Max:100 1 °F (37 8 °C)    Temperature: 98 2 °F (36 8 °C)  Intake & Output:  I/O        07 -  07 07 -  07 07 -  0700    P  O   250     I V  (mL/kg) 500 (6 6)      IV Piggyback 200      Total Intake(mL/kg) 700 (9 3) 250 (3 4)     Urine (mL/kg/hr)  450 (0 3)     Total Output  450     Net +700 -200                Weights:   IBW (Ideal Body Weight): 54 6 kg    Body mass index is 29 96 kg/m²    Weight (last 2 days)     Date/Time   Weight    21 0536   74 3 (163 8)    08/18/21 1517   75 6 (166 6)            Physical Exam  Vitals reviewed  Constitutional:       Appearance: He is well-developed  He is ill-appearing  HENT:      Head: Normocephalic and atraumatic  Right Ear: External ear normal       Left Ear: External ear normal       Mouth/Throat:      Pharynx: Oropharynx is clear  Eyes:      Extraocular Movements: Extraocular movements intact  Conjunctiva/sclera: Conjunctivae normal    Cardiovascular:      Rate and Rhythm: Normal rate and regular rhythm  Heart sounds: No murmur heard  Pulmonary:      Effort: Accessory muscle usage present  Breath sounds: Decreased breath sounds present  Abdominal:      General: Bowel sounds are normal  There is no distension  Palpations: Abdomen is soft  Tenderness: There is no abdominal tenderness  Musculoskeletal:         General: Normal range of motion  Cervical back: Normal range of motion and neck supple  Right lower leg: Edema (+1) present  Left lower leg: Edema (+1) present  Skin:     General: Skin is warm and dry  Neurological:      General: No focal deficit present  Mental Status: He is alert  Psychiatric:         Mood and Affect: Mood normal          Behavior: Behavior normal          Thought Content: Thought content normal          Judgment: Judgment normal        LABORATORY DATA     Labs: I have personally reviewed pertinent reports    Results from last 7 days   Lab Units 08/19/21  2354 08/19/21  0610 08/18/21  1543   WBC Thousand/uL 11 93* 8 64 11 46*   HEMOGLOBIN g/dL 10 2* 8 5* 10 3*   HEMATOCRIT % 35 6* 29 7* 36 0*   PLATELETS Thousands/uL 398* 392* 419*   MONO PCT %  --   --  6      Results from last 7 days   Lab Units 08/19/21  0610 08/18/21  1543   POTASSIUM mmol/L 4 5 4 9   CHLORIDE mmol/L 100 100   CO2 mmol/L 29 26   BUN mg/dL 27* 31*   CREATININE mg/dL 1 17 1 69*   CALCIUM mg/dL 9 3 9 8   ALK PHOS U/L 136* 165*   ALT U/L 28 36   AST U/L 11 11     Results from last 7 days   Lab Units 08/19/21  0610 08/18/21  1543   MAGNESIUM mg/dL 1 9 1 4*     Results from last 7 days   Lab Units 08/19/21  0610   PHOSPHORUS mg/dL 2 0*      Results from last 7 days   Lab Units 08/20/21  0352 08/20/21  0002   INR   --  1 45*   PTT seconds 41* 57*         Results from last 7 days   Lab Units 08/18/21  1543   TROPONIN I ng/mL <0 02         Uric acid 3 9    IMAGING & DIAGNOSTIC TESTING     Radiology Results: I have personally reviewed pertinent reports  XR chest 1 view portable    Result Date: 8/18/2021  Impression: Bilateral pulmonary masses increased with opacification of most of the right lung parenchyma  Effusion and infiltrates suggested on the right  Workstation performed: [id number]     CTA ED chest PE Study    Result Date: 8/18/2021  Impression: 1  No pulmonary embolism  2   Since August 4, 2021, complete collapse of the right upper and lower lobes  New segmental atelectasis in the right lower lobe and new moderate right pleural effusion  3   Slight increase in size of the dominant right lung mass  4   Left upper lobe mass and right lower lobe and left lower lobe nodules have increased in size  Workstation performed: [id number]     Other Diagnostic Testing: I have personally reviewed pertinent reports      ACTIVE MEDICATIONS     Current Facility-Administered Medications   Medication Dose Route Frequency    albuterol inhalation solution 2 5 mg  2 5 mg Nebulization Q4H PRN    amiodarone tablet 400 mg  400 mg Oral Daily    benzonatate (TESSALON PERLES) capsule 100 mg  100 mg Oral TID PRN    Diclofenac Sodium (VOLTAREN) 1 % topical gel 2 g  2 g Topical 4x Daily    digoxin (LANOXIN) tablet 125 mcg  125 mcg Oral Daily    fish oil capsule 1,000 mg  1,000 mg Oral BID    gabapentin (NEURONTIN) capsule 100 mg  100 mg Oral HS    guaiFENesin (MUCINEX) 12 hr tablet 600 mg  600 mg Oral BID    heparin (porcine) 25,000 units in 0 45% NaCl 250 mL infusion (premix)  3-20 Units/kg/hr (Order-Specific) Intravenous Titrated    heparin (porcine) injection 2,000 Units  2,000 Units Intravenous Q1H PRN    heparin (porcine) injection 4,000 Units  4,000 Units Intravenous Q1H PRN    hydrOXYzine HCL (ATARAX) tablet 25 mg  25 mg Oral TID PRN    insulin glargine (LANTUS) subcutaneous injection 4 Units 0 04 mL  4 Units Subcutaneous HS    insulin lispro (HumaLOG) 100 units/mL subcutaneous injection 1-5 Units  1-5 Units Subcutaneous HS    insulin lispro (HumaLOG) 100 units/mL subcutaneous injection 1-6 Units  1-6 Units Subcutaneous TID AC    ipratropium (ATROVENT) 0 02 % inhalation solution 0 5 mg  0 5 mg Nebulization TID    levalbuterol (XOPENEX) inhalation solution 1 25 mg  1 25 mg Nebulization TID    levothyroxine tablet 150 mcg  150 mcg Oral Early Morning    metoprolol (LOPRESSOR) injection 5 mg  5 mg Intravenous Q6H PRN    metoprolol tartrate (LOPRESSOR) tablet 25 mg  25 mg Oral Q6H    morphine injection 2 mg  2 mg Intravenous Q3H PRN    pantoprazole (PROTONIX) EC tablet 40 mg  40 mg Oral Early Morning       VTE Pharmacologic Prophylaxis: Heparin  VTE Mechanical Prophylaxis: N/A      Disclaimer: Portions of the record may have been created with voice recognition software  Occasional wrong word or "sound a like" substitutions may have occurred due to the inherent limitations of voice recognition software  Careful consideration should be taken to recognize, using context, where substitutions have occurred      1 [facility]

## 2021-08-20 NOTE — CASE MANAGEMENT
Attempted to meet with patienr to complete assessment  He was sleeping soundly and CM will follow up with patient and family  Message received from RN that she received call from [facility]  Patient is open to them for [facility]  [facility] referral was made

## 2021-08-20 NOTE — PHYSICAL THERAPY NOTE
Physical Therapy Evaluation    Patient's Name: Amanda Irving    Admitting Diagnosis  Atrial flutter ([place] [address] ) [I48 92]  Tachycardia [R00 0]  Hypotension [I95 9]  Abnormal ECG [R94 31]  Paroxysmal A-fib ([facility] ) [I48 0]  Acute respiratory failure with hypoxia and hypercapnia (HCC) [J96 01, J96 02]  Chronic obstructive pulmonary disease, unspecified COPD type ([place] [address] ) [J44 9]  Mass of right lung [R91 8]    Problem List  Patient Active Problem List   Diagnosis    Acute ITP ([place] [address] )    Idiopathic thrombocytopenic purpura (ITP) (HCC)    Paroxysmal A-fib with RVR    Mass of right lung    Hypothyroidism    Leukocytosis    New onset type 2 diabetes mellitus ([place] [address] )    COPD (chronic obstructive pulmonary disease) (HCC)    Hyperkalemia    Chronic respiratory failure with hypoxia (HCC)    Right flank pain    Atrial fibrillation with RVR (HCC)       Past Medical History  Past Medical History:   Diagnosis Date    Hypertension        Past Surgical History  Past Surgical History:   Procedure Laterality Date    BACK SURGERY      CARPAL TUNNEL RELEASE Bilateral     IR BIOPSY BONE MARROW  6/10/2021    IR BIOPSY LUNG  7/21/2021    IR BIOPSY OTHER  7/27/2021    IR THORACENTESIS  7/30/2021    LUMBAR [facility] SUR[facility]ERY          08/20/21 0957   PT Last Visit   PT Visit Date 08/20/21   Note Type   Note type Evaluation   Pain Assessment   Pain Assessment Tool Pain Assessment not indicated - pt denies pain   Home Living   Type of [facility] One level;Stairs to enter with rails  (2 NICK)   Home Equipment Walker  (pt's wife's)   Additional Comments Pt lives in a [facility] with 2 NICK  Was ambulating I w/o AD PTA      Prior Function   Level of [place] Independent with ADLs and functional mobility   Lives With Spouse   Receives Help From Family   ADL Assistance Independent   Falls in the last 6 months 0   Comments Pt lives with his wife who is mostly bedbound, pt reports that she has aids come to help her but that he also assists as needed  Pt wears 3L O2 at baseline   Restrictions/Precautions   Weight Bearing Precautions Per Order No   Other Precautions Multiple lines;Telemetry;O2   Cognition   Overall Cognitive Status WFL   Orientation Level Oriented X4   Memory Within functional limits   Following Commands Follows one step commands without difficulty   RLE Assessment   RLE Assessment WFL   LLE Assessment   LLE Assessment WFL   Bed Mobility   Additional Comments OOB upon arrival   Transfers   Sit to Stand   (CGA)   Additional items Assist x 1; Increased time required   Stand to Sit   (CGA)   Additional items Assist x 1; Increased time required   Additional Comments pt declined use of RW   Ambulation/Elevation   Gait pattern Excessively slow; Short stride;Decreased foot clearance   Gait Assistance 4  Minimal assist   Additional items Assist x 1   Assistive Device Other (Comment)  (dash + IV pole)   Distance 50ft with pt declining use of RW, but using BUE support on IV pole and dash monitor  Mild SADLER after ambulation   Balance   Static Sitting Good   Dynamic Sitting Fair +   Static Standing Fair   Dynamic Standing Fair -   Ambulatory Fair -   Endurance Deficit   Endurance Deficit Yes   Endurance Deficit Description SADLER   Activity Tolerance   Activity Tolerance Patient limited by fatigue   Medical Staff Made Aware Seen with OT 2* pt's medical complexity and multiple comorbidities  PT focus on functional transfers, gait, and endurance   Nurse Made Aware yes   Assessment   Prognosis Good   Problem List Decreased strength;Decreased endurance; Impaired balance;Decreased mobility   Assessment Pt is a 68 y o  male seen for PT evaluation s/p admit to [facility] on 8/18/2021  Pt was admitted with a primary dx of: a-fib with RVR  PT now consulted for assessment of mobility and d/c needs  Pt with Up with assistance, PT evaluation orders  Pts current comorbidities effecting treatment include: HTN   Other active problems include: paroxysmal A-fib, mass of R lung, tachycardia  Pts current clinical presentation is Unstable/ Unpredictable (high complexity) due to Ongoing medical management for primary dx, Increased reliance on more restrictive AD compared to baseline, Decreased activity tolerance compared to baseline, Fall risk, Increased assistance needed from caregiver at current time, Ongoing telemetry monitoring  Prior to admission, pt was I with ADLs and ambulating w/o AD  Pt lives with his wife in a [facility] with 2 NICK  Pt's wife is mostly bedbound and has aids that help with her ADLs/transfers  Upon evaluation, pt currently is requiring CGA for transfers and Ania for ambulation 50 ft w/ IV pole + dash monitor (declined use of RW)  Pt presents at PT eval functioning below baseline and currently w/ overall mobility deficits 2* to: BLE weakness, impaired balance, decreased endurance, gait deviations, pain, decreased activity tolerance compared to baseline, decreased functional mobility tolerance compared to baseline, fall risk, SOB upon exertion  Pt currently at a fall risk 2* to impairments listed above  Pt will continue to benefit from skilled acute PT interventions to address stated impairments; to maximize functional mobility; for ongoing pt/ family training; and DME needs  At conclusion of PT session pt returned back in chair with phone and call bell within reach  Pt denies any further questions at this time  The patient's AM-PAC Basic Mobility Inpatient Short Form Raw Score is 18, Standardized Score is 41 05  A standardized score less than 42 9 suggests the patient may benefit from discharge to post-acute rehabilitation services  Please also refer to the recommendation of the Physical Therapist for safe discharge planning  Recommend home with HHPT upon hospital D/C  Goals   Patient Goals to get better   STG Expiration Date 09/03/21   Short Term Goal #1 In 10-14 days pt will be able to: 1   Demonstrate ability to perform all aspects of bed mobility independently to increase functional independence  2  Perform functional transfers at mod I level to facilitate safe return to previous living environment  3   Ambulate 300 ft with LRAD at mod I level with stable vitals to improve safety with household distances and reduce fall risk  4  Climb 2 steps with HR at mod I level to simulate entrance to home  5  Improve LE strength grades by 1 to increase ease of functional mobility with transfers and gait  6  Pt will demonstrate improved balance by one grade order to decrease risk of falls  PT Treatment Day 0   Plan   Treatment/Interventions Functional transfer training;LE strengthening/ROM; Elevations; Therapeutic exercise; Endurance training;Equipment eval/education; Bed mobility;Gait training;Spoke to nursing;Spoke to case management;OT   PT Frequency Other (Comment)  (3-5x/wk)   Recommendation   PT Discharge Recommendation Home with home health rehabilitation   Equipment Recommended [place] walker   Change/add to Sendmybag?  No   AM-PAC Basic Mobility Inpatient   Turning in Bed Without Bedrails 3   Lying on Back to Sitting on Edge of Flat Bed 3   Moving Bed to Chair 3   Standing Up From Chair 3   Walk in Room 3   Climb 3-5 Stairs 3   Basic Mobility Inpatient Raw Score 18   Basic Mobility Standardized Score 41 05       Governor Olp, PT, DPT

## 2021-08-20 NOTE — CONSULTS
Consultation - [address] [place] [address] Jeanne 68 y o  male MRN: [id number]  Unit/Bed#: [facility] 156-90 Encounter: [id number]      Physician Requesting Consult: Jacqueline Chavez MD  Reason for Consult / Principal Problem: goals of care      Assessment/Plan:  1  R lung mass, pulmonary nodules-increasing  2  Moderate R pleural effusion  3  Acute on chronic hypoxemic respiratory failure  4  COPD  5  Afib w/ RVR on Eliquis as outpatient, on heparin gtt now  6  DM2-new onset  7  Hypothyroidism  8  Goals of care  -continue current medical management/optimization  -patient wishes to pursue further diagnostics/biopsies  -patient wishes to pursue disease directed treatments if needed/offered  -limit of level 2 code status, DNAR but accepts intubation; does not wish to be prolonged on machines  -patient uncertain if agreeable to outpatient palliative f/u at this time  -patient agreeable to tylenol and low dose oxyIR for pain control and dyspnea     -tylenol 975mg q8h kayla     -oxyIR 2 5-5mg q6h prn pain, dyspnea     -continue morphine 2mg IV q3h prn severe pain, dyspnea      -although PDMP has oxyIR 10mg listed as filled 8/12/2021-patient states he never takes and is worried too strong  -will start bowel regimen with oxyIR  -consult spiritual care re prayer support  -will f/u Monday 8/23/2021    Met with patient  Introduced palliative care service  Patient recalled recent hospitalization and reason for this admission  Patient expressed frustrations over being told he has cancer, but no definitive answers from biopsies at this time  Validated patient's frustrations  Patient lives with wife and ranch home and states that he is able to get around pretty good  He denies using a cane or walker  He states that he is typically on 3L NC O2 at home, but he developed increased shortness of breath which prompted this hospital admission  Pulmonology entered during consultation and discussed condition in PET-CT findings   Patient reports that his breathing feels much improved today  Patient is willing to undergo further biopsies and invasive procedures to hopefully determine what cancer this is  He was informed that it is growing and would not be amenable to surgical removal given size, additional nodules, and underlying medical condition  He would pursue additional treatments if offered, but only if he is able to tolerate them  If the disease is not curable and the treatments would not extend his life in reasonable way, he would want to pursue comfort focused care and at home  We had a giorgi discussion about where he would want to be if his condition worsened or if he were closer to death, and he states that he thinks he would want to be at home  He is agreeable to spiritual consultation for prayer support as he is going through all of this  He does not want me to call his wife as he is uncertain she will understand, but asks that I call his son/Too to inform him of our discussion  Spoke with son/Too by phone  Informed him of above  He says that theses are consistent with what patient has expressed in past  Son currently going through his own cancer treatments  Code Status: Level 2 - DNAR: but accepts endotracheal intubation  Advance Directive and Living Will:     No, not in chart  Power of :  No  POLST:  no  Lives in ranch home with wife/Manuela, 2 steps to enter  Wife mostly bed/wheelchair bound  6 children, all in area  7 grandchildren, 2 great grandchildren  Use to work a lot  +spiritual, Sikh      History of Present Illness   HPI: Long Hernandez is a 68y o  year old male who presents with tachycardia, increased shortness of breath found by visiting nurse, sent to ER for evaluation  Patient started on amiodarone gtt, with -140's  Patient with PMH significant for pulmonary masses s/p biopsies since 6/2021 revealing no tissue diagnosis of malignancy  Patient follows with [facility]   Case discussed at thoracic tumor board and with CT surgery, poor surgical candidate due to mass burden and COPD  CT imaging on presentation revealed no PE, complete collapse of RUL, RLL, new moderate pleural effusion, slight increase in R lung mass, NICHELLE mass and RLL, LLL nodules have increased  Oncology consulted, recommend IR consult for biopsy of R lung mass and L pulmonary nodule  Palliative consulted for goals of care  Review of Systems   Constitutional: Positive for fatigue  Respiratory: Positive for shortness of breath  Cardiovascular: Positive for chest pain (R sided) and palpitations  Neurological: Positive for weakness  Psychiatric/Behavioral: Positive for dysphoric mood  All other systems reviewed and are negative        Historical Information   Past Medical History:   Diagnosis Date    Hypertension      Patient Active Problem List   Diagnosis    Acute ITP ([facility] [address] )    Idiopathic thrombocytopenic purpura (ITP) (HCC)    Paroxysmal A-fib with RVR    Mass of right lung    Hypothyroidism    Leukocytosis    New onset type 2 diabetes mellitus (HCC)    COPD (chronic obstructive pulmonary disease) (HCC)    Hyperkalemia    Chronic respiratory failure with hypoxia (HCC)    Right flank pain    Atrial fibrillation with RVR (HCC)     Past Surgical History:   Procedure Laterality Date    BACK SURGERY      CARPAL TUNNEL RELEASE Bilateral     IR BIOPSY BONE MARROW  6/10/2021    IR BIOPSY LUNG  2021    IR BIOPSY OTHER  2021    IR THORACENTESIS  2021    LUMBAR DISC SURGERY       Social History     Socioeconomic History    Marital status: /Civil Union     Spouse name: Rachel Thorpe Number of children: 10    Years of education: None    Highest education level: None   Occupational History    None   Tobacco Use    Smoking status: Former Smoker     Packs/day: 0 50     Years: 40 00     Pack years: 20 00     Types: Cigarettes     Start date: 14     Quit date: 2021     Years since quittin 1    Smokeless tobacco: Never Used   Vaping Use    Vaping Use: Never used   Substance and Sexual Activity    Alcohol use: Not Currently     Comment: History of heavier drinking in early 25s  Denies any current alcohol use (Updated 06/19/2021)   Drug use: Never    Sexual activity: None   Other Topics Concern    None   Social History Narrative    Previously worked as heavy machinery technician  Lives with his wife who is essentially bed bound  Social Determinants of Health     Financial Resource Strain: High Risk    Difficulty of Paying Living Expenses: Hard   Food Insecurity: Food Insecurity Present    Worried About Running Out of Food in the Last Year: Sometimes true    [place] of Food in the Last Year: Not on file   Transportation Needs:     Lack of Transportation (Medical):      Lack of Transportation (Non-Medical):    Physical Activity:     Days of Exercise per Week:     Minutes of Exercise per Session:    Stress:     Feeling of Stress :    Social Connections:     Frequency of Communication with Friends and Family:     Frequency of Social Gatherings with Friends and Family:     Attends Synagogue Services:     Active Member of Clubs or Organizations:     Attends Club or Organization Meetings:     Marital Status:    Intimate Partner Violence:     Fear of Current or Ex-Partner:     Emotionally Abused:     Physically Abused:     Sexually Abused:      Family History   Problem Relation Age of Onset    Cancer Mother         "ate away her bone"    Anuerysm Father     Cancer Sister         unknown type    Heart attack Maternal Grandfather     Cancer Sister         unknown type    Heart attack Maternal Aunt     Heart attack Maternal Uncle     Heart attack Paternal Aunt        Meds/Allergies   all current active meds have been reviewed and current meds:   Current Facility-Administered Medications   Medication Dose Route Frequency    albuterol inhalation solution 2 5 mg  2 5 mg Nebulization Q4H PRN    amiodarone tablet 400 mg  400 mg Oral Daily    benzonatate (TESSALON PERLES) capsule 100 mg  100 mg Oral TID PRN    Diclofenac Sodium (VOLTAREN) 1 % topical gel 2 g  2 g Topical 4x Daily    digoxin (LANOXIN) tablet 125 mcg  125 mcg Oral Daily    fish oil capsule 1,000 mg  1,000 mg Oral BID    gabapentin (NEURONTIN) capsule 100 mg  100 mg Oral HS    guaiFENesin (MUCINEX) 12 hr tablet 600 mg  600 mg Oral BID    heparin (porcine) 25,000 units in 0 45% NaCl 250 mL infusion (premix)  3-20 Units/kg/hr (Order-Specific) Intravenous Titrated    heparin (porcine) injection 2,000 Units  2,000 Units Intravenous Q1H PRN    heparin (porcine) injection 4,000 Units  4,000 Units Intravenous Q1H PRN    hydrOXYzine HCL (ATARAX) tablet 25 mg  25 mg Oral TID PRN    insulin glargine (LANTUS) subcutaneous injection 4 Units 0 04 mL  4 Units Subcutaneous HS    insulin lispro (HumaLOG) 100 units/mL subcutaneous injection 1-5 Units  1-5 Units Subcutaneous HS    insulin lispro (HumaLOG) 100 units/mL subcutaneous injection 1-6 Units  1-6 Units Subcutaneous TID AC    ipratropium (ATROVENT) 0 02 % inhalation solution 0 5 mg  0 5 mg Nebulization TID    levalbuterol (XOPENEX) inhalation solution 1 25 mg  1 25 mg Nebulization TID    levothyroxine tablet 150 mcg  150 mcg Oral Early Morning    metoprolol (LOPRESSOR) injection 5 mg  5 mg Intravenous Q6H PRN    metoprolol tartrate (LOPRESSOR) tablet 25 mg  25 mg Oral Q6H    morphine injection 2 mg  2 mg Intravenous Q3H PRN    pantoprazole (PROTONIX) EC tablet 40 mg  40 mg Oral Early Morning       Allergies   Allergen Reactions    Red Dye - Food Allergy Anaphylaxis     Tolerated all those medications before and currently takes them    Penicillins Hives       I have reviewed the patient's controlled substance dispensing history in the Prescription Drug Monitoring Program in compliance with the [facility] regulations before prescribing any controlled substances  Last fill:  8/12/2021: oxyIR 10mg, #15    Objective   BP 96/52   Pulse (!) 115   Temp 98 2 °F (36 8 °C) (Oral)   Resp 16   Ht 5' 2" (1 575 m)   Wt 74 3 kg (163 lb 12 8 oz)   SpO2 95%   BMI 29 96 kg/m²   Physical Exam  Vitals and nursing note reviewed  Constitutional:       General: He is not in acute distress  Appearance: He is ill-appearing  He is not toxic-appearing or diaphoretic  Comments: oob in chair, on 3L NC O2   HENT:      Head: Normocephalic and atraumatic  Nose: Nose normal       Mouth/Throat:      Mouth: Mucous membranes are moist       Pharynx: Oropharynx is clear  Eyes:      General: No scleral icterus  Extraocular Movements: Extraocular movements intact  Cardiovascular:      Rate and Rhythm: Tachycardia present  Pulmonary:      Effort: Pulmonary effort is normal  No respiratory distress  Breath sounds: No stridor  Abdominal:      General: There is no distension  Musculoskeletal:         General: No swelling  Normal range of motion  Right lower leg: No edema  Left lower leg: No edema  Skin:     General: Skin is warm and dry  Neurological:      General: No focal deficit present  Mental Status: He is alert and oriented to person, place, and time  Psychiatric:         Mood and Affect: Mood normal          Behavior: Behavior normal          Thought Content: Thought content normal          Judgment: Judgment normal          Lab Results:   I have personally reviewed pertinent labs  , CBC:   Lab Results   Component Value Date    WBC 11 93 (H) 08/19/2021    HGB 10 2 (L) 08/19/2021    HCT 35 6 (L) 08/19/2021    MCV 86 08/19/2021     (H) 08/19/2021    MCH 24 5 (L) 08/19/2021    MCHC 28 7 (L) 08/19/2021    RDW 18 7 (H) 08/19/2021    MPV 9 6 08/19/2021   , CMP: No results found for: SODIUM, K, CL, CO2, ANIONGAP, BUN, CREATININE, GLUCOSE, CALCIUM, AST, ALT, ALKPHOS, PROT, BILITOT, EGFR, PT/PTT:  Lab Results   Component Value Date    PTT 41 (H) 08/20/2021     Imaging Studies: I have personally reviewed pertinent reports  EKG, Pathology, and Other Studies: I have personally reviewed pertinent reports  Counseling / Coordination of Care  Total floor / unit time spent today 75 minutes  Greater than 50% of total time was spent with the patient and / or family counseling and / or coordination of care  A description of the counseling / coordination of care: current condition, goals of care      Loulou Dan DO

## 2021-08-21 LAB
ANION GAP SERPL CALCULATED.3IONS-SCNC: 3 MMOL/L (ref 4–13)
APTT PPP: 83 SECONDS (ref 23–37)
BUN SERPL-MCNC: 25 MG/DL (ref 5–25)
CALCIUM SERPL-MCNC: 9.3 MG/DL (ref 8.3–10.1)
CHLORIDE SERPL-SCNC: 103 MMOL/L (ref 100–108)
CO2 SERPL-SCNC: 30 MMOL/L (ref 21–32)
CREAT SERPL-MCNC: 1.05 MG/DL (ref 0.6–1.3)
ERYTHROCYTE [DISTWIDTH] IN BLOOD BY AUTOMATED COUNT: 18.4 % (ref 11.6–15.1)
GFR SERPL CREATININE-BSD FRML MDRD: 68 ML/MIN/1.73SQ M
GLUCOSE SERPL-MCNC: 199 MG/DL (ref 65–140)
GLUCOSE SERPL-MCNC: 215 MG/DL (ref 65–140)
GLUCOSE SERPL-MCNC: 216 MG/DL (ref 65–140)
GLUCOSE SERPL-MCNC: 254 MG/DL (ref 65–140)
GLUCOSE SERPL-MCNC: 258 MG/DL (ref 65–140)
HCT VFR BLD AUTO: 32.1 % (ref 36.5–49.3)
HGB BLD-MCNC: 9.3 G/DL (ref 12–17)
MAGNESIUM SERPL-MCNC: 1.6 MG/DL (ref 1.6–2.6)
MCH RBC QN AUTO: 24.7 PG (ref 26.8–34.3)
MCHC RBC AUTO-ENTMCNC: 29 G/DL (ref 31.4–37.4)
MCV RBC AUTO: 85 FL (ref 82–98)
PLATELET # BLD AUTO: 292 THOUSANDS/UL (ref 149–390)
PMV BLD AUTO: 9.3 FL (ref 8.9–12.7)
POTASSIUM SERPL-SCNC: 4.6 MMOL/L (ref 3.5–5.3)
RBC # BLD AUTO: 3.77 MILLION/UL (ref 3.88–5.62)
SODIUM SERPL-SCNC: 136 MMOL/L (ref 136–145)
WBC # BLD AUTO: 7.84 THOUSAND/UL (ref 4.31–10.16)

## 2021-08-21 PROCEDURE — 94664 DEMO&/EVAL PT USE INHALER: CPT

## 2021-08-21 PROCEDURE — 94640 AIRWAY INHALATION TREATMENT: CPT

## 2021-08-21 PROCEDURE — 97530 THERAPEUTIC ACTIVITIES: CPT

## 2021-08-21 PROCEDURE — 83735 ASSAY OF MAGNESIUM: CPT | Performed by: INTERNAL MEDICINE

## 2021-08-21 PROCEDURE — 99232 SBSQ HOSP IP/OBS MODERATE 35: CPT | Performed by: INTERNAL MEDICINE

## 2021-08-21 PROCEDURE — 97116 GAIT TRAINING THERAPY: CPT

## 2021-08-21 PROCEDURE — 82948 REAGENT STRIP/BLOOD GLUCOSE: CPT

## 2021-08-21 PROCEDURE — 85027 COMPLETE CBC AUTOMATED: CPT | Performed by: INTERNAL MEDICINE

## 2021-08-21 PROCEDURE — 80048 BASIC METABOLIC PNL TOTAL CA: CPT | Performed by: INTERNAL MEDICINE

## 2021-08-21 PROCEDURE — 94760 N-INVAS EAR/PLS OXIMETRY 1: CPT

## 2021-08-21 PROCEDURE — 85730 THROMBOPLASTIN TIME PARTIAL: CPT | Performed by: INTERNAL MEDICINE

## 2021-08-21 RX ORDER — METOPROLOL SUCCINATE 50 MG/1
50 TABLET, EXTENDED RELEASE ORAL EVERY 12 HOURS
Status: DISCONTINUED | OUTPATIENT
Start: 2021-08-21 | End: 2021-08-24 | Stop reason: HOSPADM

## 2021-08-21 RX ORDER — POLYETHYLENE GLYCOL 3350 17 G/17G
17 POWDER, FOR SOLUTION ORAL DAILY PRN
Status: DISCONTINUED | OUTPATIENT
Start: 2021-08-21 | End: 2021-08-24 | Stop reason: HOSPADM

## 2021-08-21 RX ORDER — BISACODYL 10 MG
10 SUPPOSITORY, RECTAL RECTAL DAILY PRN
Status: DISCONTINUED | OUTPATIENT
Start: 2021-08-21 | End: 2021-08-24 | Stop reason: HOSPADM

## 2021-08-21 RX ORDER — INSULIN GLARGINE 100 [IU]/ML
10 INJECTION, SOLUTION SUBCUTANEOUS
Status: DISCONTINUED | OUTPATIENT
Start: 2021-08-21 | End: 2021-08-24 | Stop reason: HOSPADM

## 2021-08-21 RX ORDER — MAGNESIUM SULFATE HEPTAHYDRATE 40 MG/ML
2 INJECTION, SOLUTION INTRAVENOUS ONCE
Status: COMPLETED | OUTPATIENT
Start: 2021-08-21 | End: 2021-08-21

## 2021-08-21 RX ADMIN — LEVALBUTEROL HYDROCHLORIDE 1.25 MG: 1.25 SOLUTION, CONCENTRATE RESPIRATORY (INHALATION) at 20:50

## 2021-08-21 RX ADMIN — GUAIFENESIN 600 MG: 600 TABLET, EXTENDED RELEASE ORAL at 17:01

## 2021-08-21 RX ADMIN — GABAPENTIN 100 MG: 100 CAPSULE ORAL at 21:21

## 2021-08-21 RX ADMIN — ACETAMINOPHEN 975 MG: 325 TABLET, FILM COATED ORAL at 13:51

## 2021-08-21 RX ADMIN — PANTOPRAZOLE SODIUM 40 MG: 40 TABLET, DELAYED RELEASE ORAL at 05:16

## 2021-08-21 RX ADMIN — OMEGA-3 FATTY ACIDS CAP 1000 MG 1000 MG: 1000 CAP at 08:32

## 2021-08-21 RX ADMIN — LEVALBUTEROL HYDROCHLORIDE 1.25 MG: 1.25 SOLUTION, CONCENTRATE RESPIRATORY (INHALATION) at 07:11

## 2021-08-21 RX ADMIN — INSULIN LISPRO 2 UNITS: 100 INJECTION, SOLUTION INTRAVENOUS; SUBCUTANEOUS at 07:54

## 2021-08-21 RX ADMIN — IPRATROPIUM BROMIDE 0.5 MG: 0.5 SOLUTION RESPIRATORY (INHALATION) at 07:11

## 2021-08-21 RX ADMIN — BENZONATATE 100 MG: 100 CAPSULE ORAL at 21:25

## 2021-08-21 RX ADMIN — GUAIFENESIN 600 MG: 600 TABLET, EXTENDED RELEASE ORAL at 08:32

## 2021-08-21 RX ADMIN — ACETAMINOPHEN 975 MG: 325 TABLET, FILM COATED ORAL at 05:16

## 2021-08-21 RX ADMIN — INSULIN LISPRO 3 UNITS: 100 INJECTION, SOLUTION INTRAVENOUS; SUBCUTANEOUS at 17:01

## 2021-08-21 RX ADMIN — METOPROLOL SUCCINATE 50 MG: 50 TABLET, EXTENDED RELEASE ORAL at 19:05

## 2021-08-21 RX ADMIN — LEVOTHYROXINE SODIUM 150 MCG: 75 TABLET ORAL at 05:16

## 2021-08-21 RX ADMIN — DIGOXIN 125 MCG: 125 TABLET ORAL at 08:32

## 2021-08-21 RX ADMIN — INSULIN LISPRO 2 UNITS: 100 INJECTION, SOLUTION INTRAVENOUS; SUBCUTANEOUS at 17:00

## 2021-08-21 RX ADMIN — LEVALBUTEROL HYDROCHLORIDE 1.25 MG: 1.25 SOLUTION, CONCENTRATE RESPIRATORY (INHALATION) at 13:19

## 2021-08-21 RX ADMIN — INSULIN LISPRO 3 UNITS: 100 INJECTION, SOLUTION INTRAVENOUS; SUBCUTANEOUS at 11:03

## 2021-08-21 RX ADMIN — ACETAMINOPHEN 975 MG: 325 TABLET, FILM COATED ORAL at 21:21

## 2021-08-21 RX ADMIN — INSULIN LISPRO 3 UNITS: 100 INJECTION, SOLUTION INTRAVENOUS; SUBCUTANEOUS at 11:01

## 2021-08-21 RX ADMIN — HEPARIN SODIUM 20 UNITS/KG/HR: 10000 INJECTION, SOLUTION INTRAVENOUS at 10:58

## 2021-08-21 RX ADMIN — DICLOFENAC SODIUM 2 G: 10 GEL TOPICAL at 17:03

## 2021-08-21 RX ADMIN — IPRATROPIUM BROMIDE 0.5 MG: 0.5 SOLUTION RESPIRATORY (INHALATION) at 20:50

## 2021-08-21 RX ADMIN — OMEGA-3 FATTY ACIDS CAP 1000 MG 1000 MG: 1000 CAP at 17:01

## 2021-08-21 RX ADMIN — IPRATROPIUM BROMIDE 0.5 MG: 0.5 SOLUTION RESPIRATORY (INHALATION) at 13:19

## 2021-08-21 RX ADMIN — INSULIN LISPRO 2 UNITS: 100 INJECTION, SOLUTION INTRAVENOUS; SUBCUTANEOUS at 21:21

## 2021-08-21 RX ADMIN — MAGNESIUM SULFATE HEPTAHYDRATE 2 G: 40 INJECTION, SOLUTION INTRAVENOUS at 17:12

## 2021-08-21 RX ADMIN — DICLOFENAC SODIUM 2 G: 10 GEL TOPICAL at 21:25

## 2021-08-21 RX ADMIN — INSULIN GLARGINE 10 UNITS: 100 INJECTION, SOLUTION SUBCUTANEOUS at 21:21

## 2021-08-21 NOTE — PROGRESS NOTES
Cardiology Progress Note - Tia Chong 68 y o  male MRN: [id number]    Unit/Bed#: [facility] 511-01 Encounter: [id number]    Hospital Problems:  Principal Problem:    Atrial fibrillation with RVR ([address] )  Active Problems:    Mass of right lung    Hypothyroidism    Type 2 diabetes mellitus (HCC)    COPD (chronic obstructive pulmonary disease) (HCC)    Chronic respiratory failure with hypoxia (HCC)      Assessment & Plan:     62-year-old male with paroxysmal atrial fibrillation enlarged bilateral lung masses concerning for malignancy presented with respiratory distress complicated by AFib with RVR  He is now rate controlled on metoprolol and digoxin, with amiodarone discontinued      Assessment:  Atrial fibrillation with RVR  COPD  Bilateral lung masses  Pleural effusion     Plan:  He has been more than sufficiently loaded with amiodarone and has been on high-dose maintenance amiodarone  I would consider this a failure of therapy  We have stopped amiodarone  We have him on maximum tolerated beta-blocker  We have loaded him with digoxin  His heart rates are well controlled  Will continue with rate-control strategy for now  We will convert metoprolol tartrate 25mg q6h to metoprolol succinate 50mg BID  Continue digoxin 125mcg daily  Given likely biopsies requiring interruption of systemic anticoagulation, we can offer cardioversion at this time  Cardioversion mandates 4 weeks of uninterrupted anticoagulation afterwards  Continue regimen as above for rate control  Restart [facility] when appropriate after procedures  We will sign off       Jack Espinosa MD      Subjective:   No acute events overnight  No new complaints  No palpitations  Objective:     Vitals: Blood pressure 104/62, pulse 73, temperature 97 6 °F (36 4 °C), temperature source Oral, resp  rate 18, height 5' 2" (1 575 m), weight 74 1 kg (163 lb 5 8 oz), SpO2 98 %  , Body mass index is 29 88 kg/m² ,   Orthostatic Blood Pressures      Most Recent Value   Blood Pressure  104/62 filed at 08/21/2021 0349   Patient Position - Orthostatic VS  Lying filed at 08/21/2021 1889            Intake/Output Summary (Last 24 hours) at 8/21/2021 1058  Last data filed at 8/21/2021 0801  Gross per 24 hour   Intake 474 85 ml   Output 740 ml   Net -265 15 ml             Physical Exam:    GEN: Radha Young appears well, alert and oriented x 3, pleasant and cooperative   HEENT: pupils equal, round, and reactive to light; extraocular muscles intact  NECK: supple, no carotid bruits   HEART: irregularly irregular, normal rate, no M/R/G  LUNGS: diminished basilar breath sounds, on NC O2, normal resp effort  ABDOMEN: normal bowel sounds, soft, no tenderness, no distention  EXTREMITIES: no clubbing or cyanosis   Trace edema LE  NEURO: no focal findings   SKIN: normal without suspicious lesions on exposed skin    Medications:      Current Facility-Administered Medications:     acetaminophen (TYLENOL) tablet 975 mg, 975 mg, Oral, Q8H ZOHAIB, Loulou Dan DO, 975 mg at 08/21/21 0516    albuterol inhalation solution 2 5 mg, 2 5 mg, Nebulization, Q4H PRN, Renetta Tariq MD, 2 5 mg at 08/19/21 0604    benzonatate (TESSALON PERLES) capsule 100 mg, 100 mg, Oral, TID PRN, Renetta Tariq MD, 100 mg at 08/19/21 2138    bisacodyl (DULCOLAX) rectal suppository 10 mg, 10 mg, Rectal, Daily PRN, Loulou Dan DO    Diclofenac Sodium (VOLTAREN) 1 % topical gel 2 g, 2 g, Topical, 4x Daily, Renetta Tariq MD, 2 g at 08/20/21 2135    digoxin (LANOXIN) tablet 125 mcg, 125 mcg, Oral, Daily, Marium Ibarra MD, 125 mcg at 08/21/21 5862    fish oil capsule 1,000 mg, 1,000 mg, Oral, BID, Renetta Tariq MD, 1,000 mg at 08/21/21 1310    gabapentin (NEURONTIN) capsule 100 mg, 100 mg, Oral, HS, Renetta Tariq MD, 100 mg at 08/20/21 2130    guaiFENesin (MUCINEX) 12 hr tablet 600 mg, 600 mg, Oral, BID, Renetta Tariq MD, 600 mg at 08/21/21 0832    heparin (porcine) 25,000 units in 0 45% NaCl 250 mL infusion (premix), 3-20 Units/kg/hr (Order-Specific), Intravenous, Titrated, Moni Hurtado MD, Last Rate: 15 mL/hr at 08/21/21 1058, 20 Units/kg/hr at 08/21/21 1058    heparin (porcine) injection 2,000 Units, 2,000 Units, Intravenous, Q1H PRN, Gray Farley MD    heparin (porcine) injection 4,000 Units, 4,000 Units, Intravenous, Q1H PRN, Gray Farley MD, 4,000 Units at 08/20/21 1236    hydrOXYzine HCL (ATARAX) tablet 25 mg, 25 mg, Oral, TID PRN, Ximena Dobbins MD    insulin glargine (LANTUS) subcutaneous injection 10 Units 0 1 mL, 10 Units, Subcutaneous, HS, Neysa Kayser, MD    insulin lispro (HumaLOG) 100 units/mL subcutaneous injection 1-5 Units, 1-5 Units, Subcutaneous, HS, Ximena Dobbins MD, 2 Units at 08/20/21 2133    insulin lispro (HumaLOG) 100 units/mL subcutaneous injection 1-6 Units, 1-6 Units, Subcutaneous, TID AC, 2 Units at 08/21/21 0754 **AND** Fingerstick Glucose (POCT), , , TID AC, Ximena Dobbins MD    insulin lispro (HumaLOG) 100 units/mL subcutaneous injection 3 Units, 3 Units, Subcutaneous, TID With Meals, Neysa Kayser, MD    ipratropium (ATROVENT) 0 02 % inhalation solution 0 5 mg, 0 5 mg, Nebulization, TID, Ximena Dobbins MD, 0 5 mg at 08/21/21 0711    levalbuterol (Elizabeth Serene) inhalation solution 1 25 mg, 1 25 mg, Nebulization, TID, Ximena Dobbins MD, 1 25 mg at 08/21/21 2262    levothyroxine tablet 150 mcg, 150 mcg, Oral, Early Morning, Ximena Dobbins MD, 150 mcg at 08/21/21 0516    metoprolol (LOPRESSOR) injection 5 mg, 5 mg, Intravenous, Q6H PRN, Jake Gabriel MD, 5 mg at 08/19/21 2146    metoprolol tartrate (LOPRESSOR) tablet 25 mg, 25 mg, Oral, Q6H, Jake Gabriel MD, 25 mg at 08/20/21 2132    morphine injection 2 mg, 2 mg, Intravenous, Q3H PRN, Loulou Dan DO    oxyCODONE (ROXICODONE) IR tablet 2 5 mg, 2 5 mg, Oral, Q6H PRN **OR** oxyCODONE (ROXICODONE) IR tablet 5 mg, 5 mg, Oral, Q6H PRN, Loulou Dan DO   pantoprazole (PROTONIX) EC tablet 40 mg, 40 mg, Oral, Early Morning, Lalito Christensen MD, 40 mg at 08/21/21 0516    polyethylene glycol (MIRALAX) packet 17 g, 17 g, Oral, Daily PRN, Loulou ZIMMERMAN Berlin, DO    senna (SENOKOT) tablet 8 6 mg, 1 tablet, Oral, HS, Loulou ZIMMERMAN Crouse DO, 8 6 mg at 08/20/21 2130     Labs & Results:    Results from last 7 days   Lab Units 08/18/21  1543   TROPONIN I ng/mL <0 02     Results from last 7 days   Lab Units 08/21/21  0411 08/19/21  2354 08/19/21  0610   WBC Thousand/uL 7 84 11 93* 8 64   HEMOGLOBIN g/dL 9 3* 10 2* 8 5*   HEMATOCRIT % 32 1* 35 6* 29 7*   PLATELETS Thousands/uL 292 398* 392*         Results from last 7 days   Lab Units 08/21/21  0400 08/19/21  0610 08/18/21  1543   POTASSIUM mmol/L 4 6 4 5 4 9   CHLORIDE mmol/L 103 100 100   CO2 mmol/L 30 29 26   BUN mg/dL 25 27* 31*   CREATININE mg/dL 1 05 1 17 1 69*   CALCIUM mg/dL 9 3 9 3 9 8   ALK PHOS U/L  --  136* 165*   ALT U/L  --  28 36   AST U/L  --  11 11     Results from last 7 days   Lab Units 08/21/21  0410 08/20/21  1900 08/20/21  1119 08/20/21  0002   INR   --   --   --  1 45*   PTT seconds 83* 78* 38* 57*     Results from last 7 days   Lab Units 08/21/21  0400 08/19/21  0610 08/18/21  1543   MAGNESIUM mg/dL 1 6 1 9 1 4*       EKG was not performed today  This note was completed in part utilizing M*Modal fluency direct voice recognition software  Grammatical errors, random word insertion, spelling mistakes, occasional wrong word or "sound-alike" substitutions and incomplete sentences may be an occasional consequence of the system secondary to software limitations, ambient noise and hardware issues  At the time of dictation, efforts were made to edit, clarify and /or correct errors  Please read the chart carefully and recognize, using context, where substitutions have occurred    If you have any questions or concerns about the context, text or information contained within the body of this dictation, please contact myself, the provider, for further clarification

## 2021-08-21 NOTE — PHYSICAL THERAPY NOTE
Physical Therapy Progress Note     08/21/21 1420   PT Last Visit   PT Visit Date 08/21/21   Note Type   Note Type Treatment   Pain Assessment   Pain Assessment Tool 0-10   Pain Score 3   Pain Location/Orientation Location: Generalized   Hospital Pain Intervention(s) Repositioned; Ambulation/increased activity; Emotional support   Restrictions/Precautions   Other Precautions Pain; Fall Risk;O2;Telemetry;Multiple lines  (3L O2 NC )   Subjective   Subjective The pt  states that he is doing okay  Bed Mobility   Supine to Sit 4  Minimal assistance   Additional items Assist x 1   Transfers   Sit to Stand 5  Supervision   Additional items Increased time required   Stand to Sit 5  Supervision   Ambulation/Elevation   Gait pattern Excessively slow; Step to;Short stride; Forward Flexion   Gait Assistance 5  Supervision   Additional items Verbal cues   Assistive Device Other (Comment)  (Pushing IV pole )   Distance 170 feet  Balance   Static Sitting Good   Dynamic Sitting Fair +   Static Standing Fair   Ambulatory Fair -   Activity Tolerance   Activity Tolerance Patient tolerated treatment well   Nurse Maureen Sierra RN  Assessment   Prognosis Good   Problem List Decreased strength;Decreased endurance; Impaired balance;Decreased mobility   Assessment The pt  was able to ambulate community distance today with close supervision  He pushed the IV pole per his request  He had no loss of balance, but he does remain limited from his baseline  He was mildly dyspnic with ambulation today, but he recovered after a few minutes  He was having a breathing treatment at the side of the bed with respiratory therapy post session  Goals   Patient Goals To get better  STG Expiration Date 09/03/21   PT Treatment Day 1   Plan   Treatment/Interventions Functional transfer training;LE strengthening/ROM; Therapeutic exercise; Endurance training;Patient/family training;Bed mobility;Gait training   Progress Progressing toward goals   PT Frequency   (3-5x a week )   Recommendation   PT Discharge Recommendation Home with home health rehabilitation   Equipment Recommended [place] walker   AM-PAC Basic Mobility Inpatient   Turning in Bed Without Bedrails 3   Lying on Back to Sitting on Edge of Flat Bed 3   Moving Bed to Chair 3   Standing Up From Chair 3   Walk in Room 3   Climb 3-5 Stairs 3   Basic Mobility Inpatient Raw Score 18   Basic Mobility Standardized Score 41 05     An AM-PAC Basic Mobility standardized score less than 42 9 suggests the patient may benefit from discharge to post-acute rehab services      Raye Najjar, PTA

## 2021-08-21 NOTE — PLAN OF CARE
Problem: PHYSICAL THERAPY ADULT  Goal: Performs mobility at highest level of function for planned discharge setting  See evaluation for individualized goals  Description: Treatment/Interventions: Functional transfer training, LE strengthening/ROM, Elevations, Therapeutic exercise, Endurance training, Equipment eval/education, Bed mobility, Gait training, Spoke to nursing, Spoke to case management, OT  Equipment Recommended: Lamar Suarez       See flowsheet documentation for full assessment, interventions and recommendations  Outcome: Progressing  Note: Prognosis: Good  Problem List: Decreased strength, Decreased endurance, Impaired balance, Decreased mobility  Assessment: The pt  was able to ambulate community distance today with close supervision  He pushed the IV pole per his request  He had no loss of balance, but he does remain limited from his baseline  He was mildly dyspnic with ambulation today, but he recovered after a few minutes  He was having a breathing treatment at the side of the bed with respiratory therapy post session  PT Discharge Recommendation: Home with home health rehabilitation          See flowsheet documentation for full assessment

## 2021-08-21 NOTE — PROGRESS NOTES
Pastoral Care Progress Note    2021  Patient: Cony James : 1940  Admission Date & Time: 2021 1523  MRN: [id number] [facility]: [id number]       visited patient in response to consult request   Patient in need of prayer, as he has not felt that his own prayers are being heard   provided empathetic listening and support as patient shared his concerns about the delay in having a biopsy, fears regarding his physical health, and concerns about his wife   lifted up prayer on patient's behalf  Patient aware that he can request additional visits and prayer         21 1200   Clinical Encounter Type   Visited With Patient   Routine Visit Introduction   Referral From Physician   Referral To    [facility] Encounters   [facility] Needs Prayer

## 2021-08-21 NOTE — ASSESSMENT & PLAN NOTE
Lab Results   Component Value Date    HGBA1C 7 7 (H) 08/19/2021       Recent Labs     08/20/21  0357 08/20/21  0616 08/20/21  1116 08/20/21  1556   POCGLU 150* 156* 192* 256*       Blood Sugar Average: Last 72 hrs:  (P) 206 625     Metformin/glipizide held  Increase Lantus to 8 units hs  Continue SSI

## 2021-08-21 NOTE — PROGRESS NOTES
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 encases the right pulmonary artery and right bronchi  · AFib initially noted on 06/10/21 when large lung mass was initially seen  Converted to sinus rhythm his own  · Had another episode AFib with RVR in July and was discharged on amiodarone and metoprolol tartrate 25 mg q 12 hours  · On 8/13 his visiting nurse noticed an irregular heart rate with tachycardia upto 130 and lower BP  · Seen by cardiology on 8/18/21 and was noted to have persistent tachycardia with RVR upto 140 and advised admission  · Anticoagulation changed from Eliquis to heparin drip for w/u of lung mass  · Seen by cardiology - considered her failed amiodarone which was hence discontinued  · Loaded with digoxin  · On metoprolol tartrate 25 mg q 6 hours    Mass of right lung  Assessment & Plan  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 hemithorax with extensive central necrosis but with glucose hypermetabolism along its peripheral margins, especially the inferior aspect  Left upper lobe mass with a slight focus of cavitation, also glucose avid  Enlarging right lower lobe solid nodule with SUV max 3 0  The mass involves the right pulmonary hilum, right upper lobe bronchus, and there is mild right subcarinal adenopathy  There is also a small right effusion and pulmonary emphysema   There is no evidence of extrathoracic glucose avid metastatic disease "  · No tissue diagnosis at present as he has had several unsuccessful biopsies and pleural fluid assessment  · Was determined to be a poor surgical candidate due to mass burden and COPD  · Per PET-CT potential sites for reattempt at soft tissue biopsy diagnostic results would include peripheral margins of the large right upper lobe mass (for example the inferior margin on image 109 with SUV max 10 4), or the enlarging 15 mm solid right lower lobe pulmonary nodule  · IR consulted for biopsy - plan for procedure 8/23/21 at 2:00 p m   · Heparin drip to be held 4 hours prior to procedure  · Discussed with oncology - input appreciated  · Patient currently willing to undergo biopsy and treatments if he can tolerate it  · Informed palliative care that if treatments would not extend his life reasonably he would opt for comfort care at home         COPD (chronic obstructive pulmonary disease) ([facility] [address] )  Assessment & Plan  No exacerbation  Continue Xopenex/ipratropium nebs    Type 2 diabetes mellitus [facility])  Assessment & Plan  Lab Results   Component Value Date    HGBA1C 7 7 (H) 08/19/2021       Recent Labs     08/20/21  0357 08/20/21  0616 08/20/21  1116 08/20/21  1556   POCGLU 150* 156* 192* 256*       Blood Sugar Average: Last 72 hrs:  (P) 206 625     Metformin/glipizide held  Increase Lantus to 8 units hs  Continue SSI    Hypothyroidism  Assessment & Plan  Continue levothyroxine 150 mcg daily  TSH normal    Chronic respiratory failure with hypoxia (HCC)  Assessment & Plan  O2 requirements at baseline of 3L          VTE Pharmacologic Prophylaxis: VTE Score: 6 High Risk (Score >/= 5) - Pharmacological DVT Prophylaxis Ordered: heparin drip  Sequential Compression Devices Ordered  Patient Centered Rounds: I performed bedside rounds with nursing staff today  Discussions with Specialists or Other Care Team Provider:  Discussed with  Dr Amanda Allred     Education and Discussions with Family / Patient: Updated  (son) via phone  Time Spent for Care: 20 minutes   More than 50% of total time spent on counseling and coordination of care as described above  Current Length of Stay: 2 day(s)  Current Patient Status: Inpatient   Certification Statement: The patient will continue to require additional inpatient hospital stay due to AFib with RVR, lung mass awaiting biopsy    Code Status: Level 2 - DNAR: but accepts endotracheal intubation    Subjective:   Shortness of breath improved  Objective:     Vitals:   Temp (24hrs), Av 5 °F (36 9 °C), Min:97 4 °F (36 3 °C), Max:100 1 °F (37 8 °C)    Temp:  [97 4 °F (36 3 °C)-100 1 °F (37 8 °C)] 98 1 °F (36 7 °C)  HR:  [] 97  Resp:  [14-20] 17  BP: ()/(52-71) 106/59  SpO2:  [93 %-98 %] 97 %  Body mass index is 29 96 kg/m²  Physical Exam:   Physical Exam  Vitals reviewed  HENT:      Head: Normocephalic  Nose: Nose normal       Mouth/Throat:      Mouth: Mucous membranes are moist    Cardiovascular:      Rate and Rhythm: Rhythm irregular  Pulmonary:      Effort: Pulmonary effort is normal       Comments: Decreased breath sounds on the right  Abdominal:      General: Bowel sounds are normal  There is no distension  Palpations: Abdomen is soft  Tenderness: There is no abdominal tenderness  Musculoskeletal:      Cervical back: Neck supple  Right lower leg: Edema present  Left lower leg: Edema present  Skin:     General: Skin is warm and dry  Neurological:      General: No focal deficit present  Mental Status: He is alert     Psychiatric:         Mood and Affect: Mood normal           Additional Data:     Labs:  Results from last 7 days   Lab Units 21  2354 21  1543   WBC Thousand/uL 11 93* 11 46*   HEMOGLOBIN g/dL 10 2* 10 3*   HEMATOCRIT % 35 6* 36 0*   PLATELETS Thousands/uL 398* 419*   BANDS PCT %  --  1   LYMPHO PCT %  --  0*   MONO PCT %  --  6   EOS PCT %  --  0     Results from last 7 days   Lab Units 21  0610   SODIUM mmol/L 133*   POTASSIUM mmol/L 4 5   CHLORIDE mmol/L 100   CO2 mmol/L 29   BUN mg/dL 27* CREATININE mg/dL 1 17   ANION GAP mmol/L 4   CALCIUM mg/dL 9 3   ALBUMIN g/dL 1 9*   TOTAL BILIRUBIN mg/dL 0 55   ALK PHOS U/L 136*   ALT U/L 28   AST U/L 11   GLUCOSE RANDOM mg/dL 188*     Results from last 7 days   Lab Units 08/20/21  0002   INR  1 45*     Results from last 7 days   Lab Units 08/20/21  1556 08/20/21  1116 08/20/21  0616 08/20/21  0357 08/19/21  2125 08/19/21  1720 08/19/21  1319 08/19/21  0646   POC GLUCOSE mg/dl 256* 192* 156* 150* 243* 130 347* 179*     Results from last 7 days   Lab Units 08/19/21  0610   HEMOGLOBIN A1C % 7 7*           Last 24 Hours Medication List:   Current Facility-Administered Medications   Medication Dose Route Frequency Provider Last Rate    acetaminophen  975 mg Oral Q8H [address] Loulou Dan DO      albuterol  2 5 mg Nebulization Q4H PRN Lisa Dumont MD      benzonatate  100 mg Oral TID PRN Lisa Dumont MD      bisacodyl  10 mg Rectal Daily PRN Loulou Dan DO      Diclofenac Sodium  2 g Topical 4x Daily Lisa Dumont MD      digoxin  125 mcg Oral Daily Khalida Recinos MD      fish oil  1,000 mg Oral BID Lisa Dumont MD      gabapentin  100 mg Oral HS Lisa Dumont MD      guaiFENesin  600 mg Oral BID Lisa Dumont MD      heparin (porcine)  3-20 Units/kg/hr (Order-Specific) Intravenous Titrated Sveta Morales MD 20 Units/kg/hr (08/20/21 1724)    heparin (porcine)  2,000 Units Intravenous Q1H PRN Moni Hurtado MD      heparin (porcine)  4,000 Units Intravenous Q1H PRN Sveta Morales MD      hydrOXYzine HCL  25 mg Oral TID PRN Lisa Dumont MD      insulin glargine  8 Units Subcutaneous HS Luz Maria Mendoza MD      insulin lispro  1-5 Units Subcutaneous HS Lisa Dumont MD      insulin lispro  1-6 Units Subcutaneous TID [facility] Lisa Dumont MD      ipratropium  0 5 mg Nebulization TID Lisa Dumont MD      levalbuterol  1 25 mg Nebulization TID Lisa Dumont MD      levothyroxine  150 mcg Oral Early Morning Morteza Rodas MD      metoprolol  5 mg Intravenous Q6H PRN Yajaira Chen MD      metoprolol tartrate  25 mg Oral Q6H Yajaira Chen MD      morphine injection  2 mg Intravenous Q3H PRN Loulou L [place], DO      oxyCODONE  2 5 mg Oral Q6H PRN Loulou ZIMMERMAN Sy, DO      Or    oxyCODONE  5 mg Oral Q6H PRN Loulou ZIMMERMAN Sy, DO      pantoprazole  40 mg Oral Early Morning Morteza Rodas MD      polyethylene glycol  17 g Oral Daily PRN Loulou ZIMMERMAN [place], DO      senna  1 tablet Oral HS Loulou ZIMMERMAN [place], DO          Today, Patient Was Seen By: Nesha Barillas MD    **Please Note: This note may have been constructed using a voice recognition system  **

## 2021-08-21 NOTE — ASSESSMENT & PLAN NOTE
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 encases the right pulmonary artery and right bronchi  · AFib initially noted on 06/10/21 when large lung mass was initially seen  Converted to sinus rhythm his own  · Had another episode AFib with RVR in July and was discharged on amiodarone and metoprolol tartrate 25 mg q 12 hours  · On 8/13 his visiting nurse noticed an irregular heart rate with tachycardia upto 130 and lower BP  · Seen by cardiology on 8/18/21 and was noted to have persistent tachycardia with RVR upto 140 and advised admission  · Anticoagulation changed from Eliquis to heparin drip for w/u of lung mass  · Seen by cardiology - considered her failed amiodarone which was hence discontinued    · Loaded with digoxin  · On metoprolol tartrate 25 mg q 6 hours

## 2021-08-21 NOTE — ASSESSMENT & PLAN NOTE
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 hemithorax with extensive central necrosis but with glucose hypermetabolism along its peripheral margins, especially the inferior aspect  Left upper lobe mass with a slight focus of cavitation, also glucose avid  Enlarging right lower lobe solid nodule with SUV max 3 0  The mass involves the right pulmonary hilum, right upper lobe bronchus, and there is mild right subcarinal adenopathy  There is also a small right effusion and pulmonary emphysema   There is no evidence of extrathoracic glucose avid metastatic disease "  · No tissue diagnosis at present as he has had several unsuccessful biopsies and pleural fluid assessment  · Was determined to be a poor surgical candidate due to mass burden and COPD  · Per PET-CT potential sites for reattempt at soft tissue biopsy diagnostic results would include peripheral margins of the large right upper lobe mass (for example the inferior margin on image 109 with SUV max 10 4), or the enlarging 15 mm solid right lower lobe pulmonary nodule  · IR consulted for biopsy - plan for procedure 8/23/21 at 2:00 p m   · Heparin drip to be held 4 hours prior to procedure  · Discussed with oncology - input appreciated  · Patient currently willing to undergo biopsy and treatments if he can tolerate it  · Informed palliative care that if treatments would not extend his life reasonably he would opt for comfort care at home

## 2021-08-22 LAB
APTT PPP: 100 SECONDS (ref 23–37)
APTT PPP: 53 SECONDS (ref 23–37)
APTT PPP: 82 SECONDS (ref 23–37)
GLUCOSE SERPL-MCNC: 171 MG/DL (ref 65–140)
GLUCOSE SERPL-MCNC: 176 MG/DL (ref 65–140)
GLUCOSE SERPL-MCNC: 185 MG/DL (ref 65–140)
GLUCOSE SERPL-MCNC: 204 MG/DL (ref 65–140)

## 2021-08-22 PROCEDURE — 82948 REAGENT STRIP/BLOOD GLUCOSE: CPT

## 2021-08-22 PROCEDURE — 94664 DEMO&/EVAL PT USE INHALER: CPT

## 2021-08-22 PROCEDURE — 99232 SBSQ HOSP IP/OBS MODERATE 35: CPT | Performed by: INTERNAL MEDICINE

## 2021-08-22 PROCEDURE — 94640 AIRWAY INHALATION TREATMENT: CPT

## 2021-08-22 PROCEDURE — 85730 THROMBOPLASTIN TIME PARTIAL: CPT | Performed by: INTERNAL MEDICINE

## 2021-08-22 PROCEDURE — 94760 N-INVAS EAR/PLS OXIMETRY 1: CPT

## 2021-08-22 RX ADMIN — ACETAMINOPHEN 975 MG: 325 TABLET, FILM COATED ORAL at 13:40

## 2021-08-22 RX ADMIN — ACETAMINOPHEN 975 MG: 325 TABLET, FILM COATED ORAL at 05:45

## 2021-08-22 RX ADMIN — GABAPENTIN 100 MG: 100 CAPSULE ORAL at 21:01

## 2021-08-22 RX ADMIN — INSULIN LISPRO 1 UNITS: 100 INJECTION, SOLUTION INTRAVENOUS; SUBCUTANEOUS at 17:24

## 2021-08-22 RX ADMIN — INSULIN LISPRO 1 UNITS: 100 INJECTION, SOLUTION INTRAVENOUS; SUBCUTANEOUS at 11:33

## 2021-08-22 RX ADMIN — IPRATROPIUM BROMIDE 0.5 MG: 0.5 SOLUTION RESPIRATORY (INHALATION) at 19:45

## 2021-08-22 RX ADMIN — OMEGA-3 FATTY ACIDS CAP 1000 MG 1000 MG: 1000 CAP at 17:32

## 2021-08-22 RX ADMIN — INSULIN LISPRO 3 UNITS: 100 INJECTION, SOLUTION INTRAVENOUS; SUBCUTANEOUS at 17:24

## 2021-08-22 RX ADMIN — ALBUTEROL SULFATE 2.5 MG: 2.5 SOLUTION RESPIRATORY (INHALATION) at 02:25

## 2021-08-22 RX ADMIN — METOPROLOL SUCCINATE 50 MG: 50 TABLET, EXTENDED RELEASE ORAL at 19:41

## 2021-08-22 RX ADMIN — INSULIN LISPRO 3 UNITS: 100 INJECTION, SOLUTION INTRAVENOUS; SUBCUTANEOUS at 07:49

## 2021-08-22 RX ADMIN — INSULIN GLARGINE 10 UNITS: 100 INJECTION, SOLUTION SUBCUTANEOUS at 21:02

## 2021-08-22 RX ADMIN — INSULIN LISPRO 1 UNITS: 100 INJECTION, SOLUTION INTRAVENOUS; SUBCUTANEOUS at 21:03

## 2021-08-22 RX ADMIN — IPRATROPIUM BROMIDE 0.5 MG: 0.5 SOLUTION RESPIRATORY (INHALATION) at 13:47

## 2021-08-22 RX ADMIN — HEPARIN SODIUM 2000 UNITS: 1000 INJECTION INTRAVENOUS; SUBCUTANEOUS at 06:45

## 2021-08-22 RX ADMIN — GUAIFENESIN 600 MG: 600 TABLET, EXTENDED RELEASE ORAL at 17:32

## 2021-08-22 RX ADMIN — DIGOXIN 125 MCG: 125 TABLET ORAL at 08:30

## 2021-08-22 RX ADMIN — LEVALBUTEROL HYDROCHLORIDE 1.25 MG: 1.25 SOLUTION, CONCENTRATE RESPIRATORY (INHALATION) at 13:47

## 2021-08-22 RX ADMIN — LEVALBUTEROL HYDROCHLORIDE 1.25 MG: 1.25 SOLUTION, CONCENTRATE RESPIRATORY (INHALATION) at 19:45

## 2021-08-22 RX ADMIN — DICLOFENAC SODIUM 2 G: 10 GEL TOPICAL at 11:36

## 2021-08-22 RX ADMIN — PANTOPRAZOLE SODIUM 40 MG: 40 TABLET, DELAYED RELEASE ORAL at 05:46

## 2021-08-22 RX ADMIN — ACETAMINOPHEN 975 MG: 325 TABLET, FILM COATED ORAL at 21:02

## 2021-08-22 RX ADMIN — LEVALBUTEROL HYDROCHLORIDE 1.25 MG: 1.25 SOLUTION, CONCENTRATE RESPIRATORY (INHALATION) at 08:45

## 2021-08-22 RX ADMIN — HEPARIN SODIUM 20 UNITS/KG/HR: 10000 INJECTION, SOLUTION INTRAVENOUS at 04:24

## 2021-08-22 RX ADMIN — BENZONATATE 100 MG: 100 CAPSULE ORAL at 21:02

## 2021-08-22 RX ADMIN — OMEGA-3 FATTY ACIDS CAP 1000 MG 1000 MG: 1000 CAP at 08:30

## 2021-08-22 RX ADMIN — GUAIFENESIN 600 MG: 600 TABLET, EXTENDED RELEASE ORAL at 08:30

## 2021-08-22 RX ADMIN — HEPARIN SODIUM 20 UNITS/KG/HR: 10000 INJECTION, SOLUTION INTRAVENOUS at 21:01

## 2021-08-22 RX ADMIN — DICLOFENAC SODIUM 2 G: 10 GEL TOPICAL at 17:26

## 2021-08-22 RX ADMIN — INSULIN LISPRO 3 UNITS: 100 INJECTION, SOLUTION INTRAVENOUS; SUBCUTANEOUS at 11:33

## 2021-08-22 RX ADMIN — LEVOTHYROXINE SODIUM 150 MCG: 75 TABLET ORAL at 05:45

## 2021-08-22 RX ADMIN — DICLOFENAC SODIUM 2 G: 10 GEL TOPICAL at 21:40

## 2021-08-22 RX ADMIN — IPRATROPIUM BROMIDE 0.5 MG: 0.5 SOLUTION RESPIRATORY (INHALATION) at 08:45

## 2021-08-22 RX ADMIN — INSULIN LISPRO 1 UNITS: 100 INJECTION, SOLUTION INTRAVENOUS; SUBCUTANEOUS at 07:48

## 2021-08-22 NOTE — ASSESSMENT & PLAN NOTE
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 encases the right pulmonary artery and right bronchi  · AFib initially noted on 06/10/21 when large lung mass was initially seen  Converted to sinus rhythm his own  · Had another episode AFib with RVR in July and was discharged on amiodarone and metoprolol tartrate 25 mg q 12 hours  · On 8/13 his visiting nurse noticed an irregular heart rate with tachycardia upto 130 and lower BP  · Seen by cardiology on 8/18/21 and was noted to have persistent tachycardia with RVR upto 140 and advised admission  · Anticoagulation changed from Eliquis to heparin drip for w/u of lung mass  · Seen by cardiology - considered her failed amiodarone which was hence discontinued    · Loaded with digoxin  · On Digoxin 125 mcg, Toprol XL 50 mg BID

## 2021-08-22 NOTE — PROGRESS NOTES
[facility]  Progress Note - Stephen Whitfield 1940, 68 y o  male MRN: [id number]  Unit/Bed#: [facility] 080-40 Encounter: [id number]  Primary Care Provider: Jordyn Caruso DO   Date and time admitted to hospital: 8/18/2021  3:23 PM    * Atrial fibrillation with RVR ([address] )  Assessment & Plan  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 encases the right pulmonary artery and right bronchi  · AFib initially noted on 06/10/21 when large lung mass was initially seen  Converted to sinus rhythm his own  · Had another episode AFib with RVR in July and was discharged on amiodarone and metoprolol tartrate 25 mg q 12 hours  · On 8/13 his visiting nurse noticed an irregular heart rate with tachycardia upto 130 and lower BP  · Seen by cardiology on 8/18/21 and was noted to have persistent tachycardia with RVR upto 140 and advised admission  · Anticoagulation changed from Eliquis to heparin drip for w/u of lung mass  · Seen by cardiology - considered her failed amiodarone which was hence discontinued  · Loaded with digoxin  · On Digoxin 125 mcg, Toprol XL 50 mg BID    Mass of right lung  Assessment & Plan  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 hemithorax with extensive central necrosis but with glucose hypermetabolism along its peripheral margins, especially the inferior aspect  Left upper lobe mass with a slight focus of cavitation, also glucose avid  Enlarging right lower lobe solid nodule with SUV max 3 0  The mass involves the right pulmonary hilum, right upper lobe bronchus, and there is mild right subcarinal adenopathy  There is also a small right effusion and pulmonary emphysema   There is no evidence of extrathoracic glucose avid metastatic disease "  · No tissue diagnosis at present as he has had several unsuccessful biopsies and pleural fluid assessment  · Was determined to be a poor surgical candidate due to mass burden and COPD  · Per PET-CT potential sites for reattempt at soft tissue biopsy diagnostic results would include peripheral margins of the large right upper lobe mass (for example the inferior margin on image 109 with SUV max 10 4), or the enlarging 15 mm solid right lower lobe pulmonary nodule  · IR consulted for biopsy - plan for procedure 8/23/21 at 2:00 p m   · Heparin drip to be held 4 hours prior to procedure  · Discussed with oncology - input appreciated  · Patient currently willing to undergo biopsy and treatments if he can tolerate it  · Informed palliative care that if treatments would not extend his life reasonably he would opt for comfort care at home         COPD (chronic obstructive pulmonary disease) ([facility] [address] )  Assessment & Plan  No exacerbation  Continue Xopenex/ipratropium nebs    Type 2 diabetes mellitus [facility])  Assessment & Plan  Lab Results   Component Value Date    HGBA1C 7 7 (H) 08/19/2021       Recent Labs     08/21/21  0622 08/21/21  1100 08/21/21  1619 08/21/21  2041   POCGLU 199* 258* 216* 254*       Blood Sugar Average: Last 72 hrs:  (P) 289 [id number]     Metformin/glipizide held  Increase Lantus to 10 units hs, add Humalog 3 TID  Continue SSI  Monitor blood sugars and adjust insulin as needed    Hypothyroidism  Assessment & Plan  Continue levothyroxine 150 mcg daily  TSH normal    Chronic respiratory failure with hypoxia (HCC)  Assessment & Plan  O2 requirements at baseline of 3L        VTE Pharmacologic Prophylaxis: VTE Score: 6 High Risk (Score >/= 5) - Pharmacological DVT Prophylaxis Ordered: heparin drip  Sequential Compression Devices Ordered  Patient Centered Rounds: Discussed with RN    Education and Discussions with Family / Patient: Updated  (son) via phone  Time Spent for Care: 20 minutes   More than 50% of total time spent on counseling and coordination of care as described above     Current Length of Stay: 3 day(s)  Current Patient Status: Inpatient   Certification Statement: The patient will continue to require additional inpatient hospital stay due to Awaiting lung mass biopsy    Code Status: Level 2 - DNAR: but accepts endotracheal intubation    Subjective:   No shortness of breath, palpitations, lightheadedness    Objective:     Vitals:   Temp (24hrs), Av 7 °F (36 5 °C), Min:97 5 °F (36 4 °C), Max:98 °F (36 7 °C)    Temp:  [97 5 °F (36 4 °C)-98 °F (36 7 °C)] 97 7 °F (36 5 °C)  HR:  [68-97] 97  Resp:  [18-22] 22  BP: ()/(55-64) 113/64  SpO2:  [96 %-98 %] 98 %  Body mass index is 29 88 kg/m²  Physical Exam:   Physical Exam  Vitals and nursing note reviewed  HENT:      Head: Normocephalic  Nose: Nose normal       Mouth/Throat:      Mouth: Mucous membranes are moist    Eyes:      Extraocular Movements: Extraocular movements intact  Cardiovascular:      Rate and Rhythm: Rhythm irregular  Pulmonary:      Effort: Pulmonary effort is normal  No respiratory distress  Breath sounds: Normal breath sounds  No wheezing  Abdominal:      General: Bowel sounds are normal       Palpations: Abdomen is soft  Tenderness: There is no abdominal tenderness  Musculoskeletal:         General: No swelling  Cervical back: Neck supple  Skin:     General: Skin is warm and dry  Neurological:      General: No focal deficit present  Mental Status: He is alert and oriented to person, place, and time     Psychiatric:         Mood and Affect: Mood normal          Behavior: Behavior normal           Additional Data:     Labs:  Results from last 7 days   Lab Units 21  0411 21  1543   WBC Thousand/uL 7 84 11 46*   HEMOGLOBIN g/dL 9 3* 10 3*   HEMATOCRIT % 32 1* 36 0*   PLATELETS Thousands/uL 292 419*   BANDS PCT %  --  1   LYMPHO PCT %  --  0*   MONO PCT %  --  6   EOS PCT %  --  0     Results from last 7 days   Lab Units 21  0400 21  0610 SODIUM mmol/L 136 133*   POTASSIUM mmol/L 4 6 4 5   CHLORIDE mmol/L 103 100   CO2 mmol/L 30 29   BUN mg/dL 25 27*   CREATININE mg/dL 1 05 1 17   ANION GAP mmol/L 3* 4   CALCIUM mg/dL 9 3 9 3   ALBUMIN g/dL  --  1 9*   TOTAL BILIRUBIN mg/dL  --  0 55   ALK PHOS U/L  --  136*   ALT U/L  --  28   AST U/L  --  11   GLUCOSE RANDOM mg/dL 215* 188*     Results from last 7 days   Lab Units 08/20/21  0002   INR  1 45*     Results from last 7 days   Lab Units 08/21/21  2041 08/21/21  1619 08/21/21  1100 08/21/21  0622 08/20/21  2058 08/20/21  1556 08/20/21  1116 08/20/21  0616 08/20/21  0357 08/19/21  2125 08/19/21  1720 08/19/21  1319   POC GLUCOSE mg/dl 254* 216* 258* 199* 272* 256* 192* 156* 150* 243* 130 347*     Results from last 7 days   Lab Units 08/19/21  0610   HEMOGLOBIN A1C % 7 7*           Lines/Drains:  Invasive Devices     Peripheral Intravenous Line            Peripheral IV 08/18/21 Right Wrist 3 days    Peripheral IV 08/21/21 Left;Ventral (anterior) Forearm <1 day                Last 24 Hours Medication List:   Current Facility-Administered Medications   Medication Dose Route Frequency Provider Last Rate    acetaminophen  975 mg Oral Q8H [address] Mikenelle L Dawson,       albuterol  2 5 mg Nebulization Q4H PRN Linnette Holm MD      benzonatate  100 mg Oral TID PRN Linnette Holm MD      bisacodyl  10 mg Rectal Daily PRN Mehnaz Wagner MD      Diclofenac Sodium  2 g Topical 4x Daily Linnette Holm MD      digoxin  125 mcg Oral Daily Simran Hairston MD      fish oil  1,000 mg Oral BID Linnette Holm MD      gabapentin  100 mg Oral HS Linnette Homl MD      guaiFENesin  600 mg Oral BID Linnette Holm MD      heparin (porcine)  3-20 Units/kg/hr (Order-Specific) Intravenous Titrated Patricia Ramirez MD 20 Units/kg/hr (08/21/21 1058)    heparin (porcine)  2,000 Units Intravenous Q1H PRN Patricia Ramirez MD      heparin (porcine)  4,000 Units Intravenous Q1H PRN Patricia Ramirez MD  hydrOXYzine HCL  25 mg Oral TID PRN Cali Don MD      insulin glargine  10 Units Subcutaneous HS Alex Abreu MD      insulin lispro  1-5 Units Subcutaneous HS Cali Don MD      insulin lispro  1-6 Units Subcutaneous TID [facility] Cali Don MD      insulin lispro  3 Units Subcutaneous TID With Meals Alex Abreu MD      ipratropium  0 5 mg Nebulization TID Cali Don MD      levalbuterol  1 25 mg Nebulization TID Cali Don MD      levothyroxine  150 mcg Oral Early Morning Cali Don MD      metoprolol  5 mg Intravenous Q6H PRN Raúl Dash MD      metoprolol succinate  50 mg Oral Q12H Diane Ortega MD      morphine injection  2 mg Intravenous Q3H PRN Loulou L Sargents, DO      oxyCODONE  2 5 mg Oral Q6H PRN Loulou ZIMMERMAN Sargents, DO      Or    oxyCODONE  5 mg Oral Q6H PRN Donepifanio ZIMMERMAN Sargents, DO      pantoprazole  40 mg Oral Early Morning Cali Don MD      polyethylene glycol  17 g Oral Daily PRN Alex Abreu MD      senna  1 tablet Oral HS Loulou Rushing,           Today, Patient Was Seen By: Alex Abreu MD    **Please Note: This note may have been constructed using a voice recognition system  **

## 2021-08-22 NOTE — ASSESSMENT & PLAN NOTE
Lab Results   Component Value Date    HGBA1C 7 7 (H) 08/19/2021       Recent Labs     08/21/21  0622 08/21/21  1100 08/21/21  1619 08/21/21 2041   POCGLU 199* 258* 216* 254*       Blood Sugar Average: Last 72 hrs:  (P) 763 [id number]     Metformin/glipizide held  Increase Lantus to 10 units hs, add Humalog 3 TID  Continue SSI  Monitor blood sugars and adjust insulin as needed

## 2021-08-22 NOTE — ASSESSMENT & PLAN NOTE
Lab Results   Component Value Date    HGBA1C 7 7 (H) 08/19/2021       Recent Labs     08/21/21  1619 08/21/21  2041 08/22/21  0619 08/22/21  1142   POCGLU 216* 254* 176* 204*       Blood Sugar Average: Last 72 hrs:  (P) 330 [id number]     Metformin/glipizide held  Ct Lantus to 10 units hs, add Humalog 3 TID  Continue SSI  Increase Lantus after biopsy tomorrow  Monitor blood sugars and adjust insulin as needed

## 2021-08-22 NOTE — PROGRESS NOTES
[address] [facility]  Progress Note - Farrukh Pel 1940, 68 y o  male MRN: [id number]  Unit/Bed#: 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 encases the right pulmonary artery and right bronchi  · AFib initially noted on 06/10/21 when large lung mass was initially seen  Converted to sinus rhythm his own  · Had another episode AFib with RVR in July and was discharged on amiodarone and metoprolol tartrate 25 mg q 12 hours  · On 8/13 his visiting nurse noticed an irregular heart rate with tachycardia upto 130 and lower BP  · Seen by cardiology on 8/18/21 and was noted to have persistent tachycardia with RVR upto 140 and advised admission  · Anticoagulation changed from Eliquis to heparin drip for w/u of lung mass  · Seen by cardiology - considered her failed amiodarone which was hence discontinued  · Loaded with digoxin  · On Digoxin 125 mcg, Toprol XL 50 mg BID    Mass of right lung  Assessment & Plan  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 hemithorax with extensive central necrosis but with glucose hypermetabolism along its peripheral margins, especially the inferior aspect  Left upper lobe mass with a slight focus of cavitation, also glucose avid  Enlarging right lower lobe solid nodule with SUV max 3 0  The mass involves the right pulmonary hilum, right upper lobe bronchus, and there is mild right subcarinal adenopathy  There is also a small right effusion and pulmonary emphysema   There is no evidence of extrathoracic glucose avid metastatic disease "  · No tissue diagnosis at present as he has had several unsuccessful biopsies and pleural fluid assessment  · Was determined to be a poor surgical candidate due to mass burden and COPD  · Per PET-CT potential sites for reattempt at soft tissue biopsy diagnostic results would include peripheral margins of the large right upper lobe mass (for example the inferior margin on image 109 with SUV max 10 4), or the enlarging 15 mm solid right lower lobe pulmonary nodule  · IR consulted for biopsy - plan for procedure 8/23/21 at 2:00 p m   · Heparin drip to be held 4 hours prior to procedure  · Patient currently willing to undergo biopsy and treatments if he can tolerate it  · Informed palliative care that if treatments would not extend his life reasonably he would opt for comfort care at home         COPD (chronic obstructive pulmonary disease) ([facility] [address] )  Assessment & Plan  No exacerbation  Continue Xopenex/ipratropium nebs    Type 2 diabetes mellitus [facility])  Assessment & Plan  Lab Results   Component Value Date    HGBA1C 7 7 (H) 08/19/2021       Recent Labs     08/21/21  1619 08/21/21  2041 08/22/21  0619 08/22/21  1142   POCGLU 216* 254* 176* 204*       Blood Sugar Average: Last 72 hrs:  (P) 124 [id number]     Metformin/glipizide held  Ct Lantus to 10 units hs, add Humalog 3 TID  Continue SSI  Increase Lantus after biopsy tomorrow  Monitor blood sugars and adjust insulin as needed    Hypothyroidism  Assessment & Plan  Continue levothyroxine 150 mcg daily  TSH normal    Chronic respiratory failure with hypoxia (HCC)  Assessment & Plan  O2 requirements at baseline of 3L          VTE Pharmacologic Prophylaxis: VTE Score: 6 High Risk (Score >/= 5) - Pharmacological DVT Prophylaxis Ordered: heparin  Sequential Compression Devices Ordered  Patient Centered Rounds: Discussed with RN    Education and Discussions with Family / Patient: Updated  (son) via phone  Time Spent for Care: 20 minutes  More than 50% of total time spent on counseling and coordination of care as described above      Current Length of Stay: 4 day(s)  Current Patient Status: Inpatient   Certification Statement: The patient will continue to require additional inpatient hospital stay due to right lung mass awaiting biopsy    Code Status: Level 2 - DNAR: but accepts endotracheal intubation    Subjective:   No palpitations  Shortness of breath at baseline  Intermittent cough    Objective:     Vitals:   Temp (24hrs), Av 4 °F (36 9 °C), Min:97 7 °F (36 5 °C), Max:99 7 °F (37 6 °C)    Temp:  [97 7 °F (36 5 °C)-99 7 °F (37 6 °C)] 99 7 °F (37 6 °C)  HR:  [69-97] 81  Resp:  [18-22] 18  BP: (102-139)/(58-68) 102/58  SpO2:  [96 %-98 %] 98 %  Body mass index is 29 76 kg/m²  Physical Exam:   Physical Exam  Vitals and nursing note reviewed  HENT:      Head: Normocephalic  Nose: Nose normal       Mouth/Throat:      Mouth: Mucous membranes are moist    Eyes:      Extraocular Movements: Extraocular movements intact  Cardiovascular:      Rate and Rhythm: Rhythm irregular  Pulmonary:      Effort: Pulmonary effort is normal       Comments: Decreased breath sounds on the right  Abdominal:      General: Bowel sounds are normal  There is no distension  Palpations: Abdomen is soft  Tenderness: There is no abdominal tenderness  Musculoskeletal:         General: No swelling  Cervical back: Neck supple  Skin:     General: Skin is warm and dry  Neurological:      General: No focal deficit present  Mental Status: He is alert     Psychiatric:         Mood and Affect: Mood normal           Additional Data:     Labs:  Results from last 7 days   Lab Units 21  0411 21  1543   WBC Thousand/uL 7 84 11 46*   HEMOGLOBIN g/dL 9 3* 10 3*   HEMATOCRIT % 32 1* 36 0*   PLATELETS Thousands/uL 292 419*   BANDS PCT %  --  1   LYMPHO PCT %  --  0*   MONO PCT %  --  6   EOS PCT %  --  0     Results from last 7 days   Lab Units 21  0400 21  0610   SODIUM mmol/L 136 133*   POTASSIUM mmol/L 4 6 4 5   CHLORIDE mmol/L 103 100 CO2 mmol/L 30 29   BUN mg/dL 25 27*   CREATININE mg/dL 1 05 1 17   ANION GAP mmol/L 3* 4   CALCIUM mg/dL 9 3 9 3   ALBUMIN g/dL  --  1 9*   TOTAL BILIRUBIN mg/dL  --  0 55   ALK PHOS U/L  --  136*   ALT U/L  --  28   AST U/L  --  11   GLUCOSE RANDOM mg/dL 215* 188*     Results from last 7 days   Lab Units 08/20/21  0002   INR  1 45*     Results from last 7 days   Lab Units 08/22/21  1142 08/22/21  0619 08/21/21  2041 08/21/21  1619 08/21/21  1100 08/21/21  0622 08/20/21  2058 08/20/21  1556 08/20/21  1116 08/20/21  0616 08/20/21  0357 08/19/21  2125   POC GLUCOSE mg/dl 204* 176* 254* 216* 258* 199* 272* 256* 192* 156* 150* 243*     Results from last 7 days   Lab Units 08/19/21  0610   HEMOGLOBIN A1C % 7 7*           Lines/Drains:  Invasive Devices     Peripheral Intravenous Line            Peripheral IV 08/18/21 Right Wrist 3 days    Peripheral IV 08/21/21 Left;Ventral (anterior) Forearm <1 day                    Last 24 Hours Medication List:   Current Facility-Administered Medications   Medication Dose Route Frequency Provider Last Rate    acetaminophen  975 mg Oral Q8H [address] Loulou Dan DO      albuterol  2 5 mg Nebulization Q4H PRN Renetta Tariq MD      benzonatate  100 mg Oral TID PRN Renetta Tariq MD      bisacodyl  10 mg Rectal Daily PRN Carlene Davis MD      Diclofenac Sodium  2 g Topical 4x Daily Renetta Tariq MD      digoxin  125 mcg Oral Daily Marium Ibarra MD      fish oil  1,000 mg Oral BID Renetta Tariq MD      gabapentin  100 mg Oral HS Renetta Tariq MD      guaiFENesin  600 mg Oral BID Renetta Tariq MD      heparin (porcine)  3-20 Units/kg/hr (Order-Specific) Intravenous Titrated Ace Gutierrez MD 20 Units/kg/hr (08/22/21 1510)    heparin (porcine)  2,000 Units Intravenous Q1H PRN Moni Hurtado MD      heparin (porcine)  4,000 Units Intravenous Q1H PRN Ace Gutierrez MD      hydrOXYzine HCL  25 mg Oral TID PRN MD Lyssa Osuna insulin glargine  10 Units Subcutaneous HS Kale Anthony MD      insulin lispro  1-5 Units Subcutaneous HS Della Jacobs MD      insulin lispro  1-6 Units Subcutaneous TID [facility] Della Jacobs MD      insulin lispro  3 Units Subcutaneous TID With Meals Kale Anthony MD      ipratropium  0 5 mg Nebulization TID Della Jacobs MD      levalbuterol  1 25 mg Nebulization TID Delal Jacobs MD      levothyroxine  150 mcg Oral Early Morning Della Jacobs MD      metoprolol  5 mg Intravenous Q6H PRN Julia Muir MD      metoprolol succinate  50 mg Oral Q12H Елена Palomino MD      morphine injection  2 mg Intravenous Q3H PRN Loulou ZIMMERMAN Crouse, DO      oxyCODONE  2 5 mg Oral Q6H PRN Loulou ZIMMERMAN Crouse, DO      Or    oxyCODONE  5 mg Oral Q6H PRN Loulou ZIMMERMAN Crouse, DO      pantoprazole  40 mg Oral Early Morning Della Jacobs MD      polyethylene glycol  17 g Oral Daily PRN Kale Anthony MD      senna  1 tablet Oral HS Loulou Sevilla DO          Today, Patient Was Seen By: Kale Anthony MD    **Please Note: This note may have been constructed using a voice recognition system  **

## 2021-08-22 NOTE — ASSESSMENT & PLAN NOTE
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 hemithorax with extensive central necrosis but with glucose hypermetabolism along its peripheral margins, especially the inferior aspect  Left upper lobe mass with a slight focus of cavitation, also glucose avid  Enlarging right lower lobe solid nodule with SUV max 3 0  The mass involves the right pulmonary hilum, right upper lobe bronchus, and there is mild right subcarinal adenopathy  There is also a small right effusion and pulmonary emphysema   There is no evidence of extrathoracic glucose avid metastatic disease "  · No tissue diagnosis at present as he has had several unsuccessful biopsies and pleural fluid assessment  · Was determined to be a poor surgical candidate due to mass burden and COPD  · Per PET-CT potential sites for reattempt at soft tissue biopsy diagnostic results would include peripheral margins of the large right upper lobe mass (for example the inferior margin on image 109 with SUV max 10 4), or the enlarging 15 mm solid right lower lobe pulmonary nodule  · IR consulted for biopsy - plan for procedure 8/23/21 at 2:00 p m   · Heparin drip to be held 4 hours prior to procedure  · Patient currently willing to undergo biopsy and treatments if he can tolerate it  · Informed palliative care that if treatments would not extend his life reasonably he would opt for comfort care at home

## 2021-08-23 ENCOUNTER — APPOINTMENT (INPATIENT)
Dept: RADIOLOGY | Facility: HOSPITAL | Age: 81
DRG: 309 | End: 2021-08-23
Payer: COMMERCIAL

## 2021-08-23 LAB
ANION GAP SERPL CALCULATED.3IONS-SCNC: 3 MMOL/L (ref 4–13)
APTT PPP: 101 SECONDS (ref 23–37)
BUN SERPL-MCNC: 16 MG/DL (ref 5–25)
CALCIUM SERPL-MCNC: 9 MG/DL (ref 8.3–10.1)
CHLORIDE SERPL-SCNC: 102 MMOL/L (ref 100–108)
CO2 SERPL-SCNC: 31 MMOL/L (ref 21–32)
CREAT SERPL-MCNC: 0.83 MG/DL (ref 0.6–1.3)
GFR SERPL CREATININE-BSD FRML MDRD: 84 ML/MIN/1.73SQ M
GLUCOSE SERPL-MCNC: 119 MG/DL (ref 65–140)
GLUCOSE SERPL-MCNC: 134 MG/DL (ref 65–140)
GLUCOSE SERPL-MCNC: 144 MG/DL (ref 65–140)
GLUCOSE SERPL-MCNC: 151 MG/DL (ref 65–140)
GLUCOSE SERPL-MCNC: 212 MG/DL (ref 65–140)
MAGNESIUM SERPL-MCNC: 1.5 MG/DL (ref 1.6–2.6)
POTASSIUM SERPL-SCNC: 4 MMOL/L (ref 3.5–5.3)
SODIUM SERPL-SCNC: 136 MMOL/L (ref 136–145)

## 2021-08-23 PROCEDURE — 88333 PATH CONSLTJ SURG CYTO XM 1: CPT | Performed by: PATHOLOGY

## 2021-08-23 PROCEDURE — 82948 REAGENT STRIP/BLOOD GLUCOSE: CPT

## 2021-08-23 PROCEDURE — 88312 SPECIAL STAINS GROUP 1: CPT

## 2021-08-23 PROCEDURE — 99152 MOD SED SAME PHYS/QHP 5/>YRS: CPT | Performed by: RADIOLOGY

## 2021-08-23 PROCEDURE — 85730 THROMBOPLASTIN TIME PARTIAL: CPT | Performed by: INTERNAL MEDICINE

## 2021-08-23 PROCEDURE — 88342 IMHCHEM/IMCYTCHM 1ST ANTB: CPT | Performed by: PATHOLOGY

## 2021-08-23 PROCEDURE — 32408 CORE NDL BX LNG/MED PERQ: CPT | Performed by: RADIOLOGY

## 2021-08-23 PROCEDURE — 99152 MOD SED SAME PHYS/QHP 5/>YRS: CPT

## 2021-08-23 PROCEDURE — 88341 IMHCHEM/IMCYTCHM EA ADD ANTB: CPT

## 2021-08-23 PROCEDURE — 0BBF3ZX EXCISION OF RIGHT LOWER LUNG LOBE, PERCUTANEOUS APPROACH, DIAGNOSTIC: ICD-10-PCS | Performed by: RADIOLOGY

## 2021-08-23 PROCEDURE — 88341 IMHCHEM/IMCYTCHM EA ADD ANTB: CPT | Performed by: PATHOLOGY

## 2021-08-23 PROCEDURE — 88342 IMHCHEM/IMCYTCHM 1ST ANTB: CPT

## 2021-08-23 PROCEDURE — 83735 ASSAY OF MAGNESIUM: CPT | Performed by: INTERNAL MEDICINE

## 2021-08-23 PROCEDURE — 99232 SBSQ HOSP IP/OBS MODERATE 35: CPT | Performed by: INTERNAL MEDICINE

## 2021-08-23 PROCEDURE — 77012 CT SCAN FOR NEEDLE BIOPSY: CPT

## 2021-08-23 PROCEDURE — 88305 TISSUE EXAM BY PATHOLOGIST: CPT | Performed by: PATHOLOGY

## 2021-08-23 PROCEDURE — 94760 N-INVAS EAR/PLS OXIMETRY 1: CPT

## 2021-08-23 PROCEDURE — 88365 INSITU HYBRIDIZATION (FISH): CPT

## 2021-08-23 PROCEDURE — 94640 AIRWAY INHALATION TREATMENT: CPT

## 2021-08-23 PROCEDURE — 0W993ZX DRAINAGE OF RIGHT PLEURAL CAVITY, PERCUTANEOUS APPROACH, DIAGNOSTIC: ICD-10-PCS | Performed by: RADIOLOGY

## 2021-08-23 PROCEDURE — 32408 CORE NDL BX LNG/MED PERQ: CPT

## 2021-08-23 PROCEDURE — 80048 BASIC METABOLIC PNL TOTAL CA: CPT | Performed by: INTERNAL MEDICINE

## 2021-08-23 PROCEDURE — 88313 SPECIAL STAINS GROUP 2: CPT

## 2021-08-23 RX ORDER — FENTANYL CITRATE 50 UG/ML
INJECTION, SOLUTION INTRAMUSCULAR; INTRAVENOUS CODE/TRAUMA/SEDATION MEDICATION
Status: COMPLETED | OUTPATIENT
Start: 2021-08-23 | End: 2021-08-23

## 2021-08-23 RX ORDER — MAGNESIUM SULFATE HEPTAHYDRATE 40 MG/ML
2 INJECTION, SOLUTION INTRAVENOUS ONCE
Status: COMPLETED | OUTPATIENT
Start: 2021-08-23 | End: 2021-08-23

## 2021-08-23 RX ORDER — MIDAZOLAM HYDROCHLORIDE 2 MG/2ML
INJECTION, SOLUTION INTRAMUSCULAR; INTRAVENOUS CODE/TRAUMA/SEDATION MEDICATION
Status: COMPLETED | OUTPATIENT
Start: 2021-08-23 | End: 2021-08-23

## 2021-08-23 RX ADMIN — INSULIN GLARGINE 10 UNITS: 100 INJECTION, SOLUTION SUBCUTANEOUS at 22:28

## 2021-08-23 RX ADMIN — GUAIFENESIN 600 MG: 600 TABLET, EXTENDED RELEASE ORAL at 17:10

## 2021-08-23 RX ADMIN — IPRATROPIUM BROMIDE 0.5 MG: 0.5 SOLUTION RESPIRATORY (INHALATION) at 13:04

## 2021-08-23 RX ADMIN — PANTOPRAZOLE SODIUM 40 MG: 40 TABLET, DELAYED RELEASE ORAL at 05:46

## 2021-08-23 RX ADMIN — DIGOXIN 125 MCG: 125 TABLET ORAL at 08:56

## 2021-08-23 RX ADMIN — LEVALBUTEROL HYDROCHLORIDE 1.25 MG: 1.25 SOLUTION, CONCENTRATE RESPIRATORY (INHALATION) at 19:15

## 2021-08-23 RX ADMIN — IPRATROPIUM BROMIDE 0.5 MG: 0.5 SOLUTION RESPIRATORY (INHALATION) at 07:41

## 2021-08-23 RX ADMIN — INSULIN LISPRO 1 UNITS: 100 INJECTION, SOLUTION INTRAVENOUS; SUBCUTANEOUS at 22:29

## 2021-08-23 RX ADMIN — LEVOTHYROXINE SODIUM 150 MCG: 75 TABLET ORAL at 05:46

## 2021-08-23 RX ADMIN — APIXABAN 5 MG: 5 TABLET, FILM COATED ORAL at 17:26

## 2021-08-23 RX ADMIN — MAGNESIUM SULFATE HEPTAHYDRATE 2 G: 40 INJECTION, SOLUTION INTRAVENOUS at 20:22

## 2021-08-23 RX ADMIN — LEVALBUTEROL HYDROCHLORIDE 1.25 MG: 1.25 SOLUTION, CONCENTRATE RESPIRATORY (INHALATION) at 13:04

## 2021-08-23 RX ADMIN — METOPROLOL SUCCINATE 50 MG: 50 TABLET, EXTENDED RELEASE ORAL at 20:34

## 2021-08-23 RX ADMIN — IPRATROPIUM BROMIDE 0.5 MG: 0.5 SOLUTION RESPIRATORY (INHALATION) at 19:15

## 2021-08-23 RX ADMIN — DICLOFENAC SODIUM 2 G: 10 GEL TOPICAL at 22:30

## 2021-08-23 RX ADMIN — MIDAZOLAM 1 MG: 1 INJECTION INTRAMUSCULAR; INTRAVENOUS at 15:23

## 2021-08-23 RX ADMIN — SENNOSIDES 8.6 MG: 8.6 TABLET ORAL at 22:28

## 2021-08-23 RX ADMIN — GABAPENTIN 100 MG: 100 CAPSULE ORAL at 22:28

## 2021-08-23 RX ADMIN — METOPROLOL SUCCINATE 50 MG: 50 TABLET, EXTENDED RELEASE ORAL at 08:56

## 2021-08-23 RX ADMIN — FENTANYL CITRATE 50 MCG: 50 INJECTION INTRAMUSCULAR; INTRAVENOUS at 15:28

## 2021-08-23 RX ADMIN — OMEGA-3 FATTY ACIDS CAP 1000 MG 1000 MG: 1000 CAP at 17:10

## 2021-08-23 RX ADMIN — FENTANYL CITRATE 50 MCG: 50 INJECTION INTRAMUSCULAR; INTRAVENOUS at 15:23

## 2021-08-23 RX ADMIN — INSULIN LISPRO 3 UNITS: 100 INJECTION, SOLUTION INTRAVENOUS; SUBCUTANEOUS at 17:13

## 2021-08-23 RX ADMIN — LEVALBUTEROL HYDROCHLORIDE 1.25 MG: 1.25 SOLUTION, CONCENTRATE RESPIRATORY (INHALATION) at 07:41

## 2021-08-23 RX ADMIN — ACETAMINOPHEN 975 MG: 325 TABLET, FILM COATED ORAL at 05:46

## 2021-08-23 RX ADMIN — MIDAZOLAM 1 MG: 1 INJECTION INTRAMUSCULAR; INTRAVENOUS at 15:28

## 2021-08-23 RX ADMIN — ACETAMINOPHEN 975 MG: 325 TABLET, FILM COATED ORAL at 22:28

## 2021-08-23 NOTE — SEDATION DOCUMENTATION
Placed on Nasal canula 4 liter/minute  Continues to have moist occasional cough  97-98 % on 4 L/nc  Report called to [address]

## 2021-08-23 NOTE — BRIEF OP NOTE (RAD/CATH)
INTERVENTIONAL RADIOLOGY PROCEDURE NOTE    Date: 8/23/2021    Procedure:   1  Right thoracentesis  2  RLL lung nodule biopsy    Preoperative diagnosis:   1  Atrial flutter ([address] )    2  Mass of right lung    3  Tachycardia    4  Hypotension    5  Chronic obstructive pulmonary disease, unspecified COPD type (HCC)    6  Paroxysmal A-fib with RVR    7  Acute respiratory failure with hypoxia and hypercapnia (HCC)         Postoperative diagnosis: Same  Surgeon: Yariel Falk MD     Assistant: None  No qualified resident was available  Blood loss: 1 mL    Specimens: 4 core needle samples     Findings:   1  Right thoracentesis performed prior to lung biopsy with 900 mL cloudy greenish yellow pleural fluid removed  2  Successful RLL lung nodule biopsy    Complications: None immediate      Anesthesia: conscious sedation and local

## 2021-08-23 NOTE — PALLIATIVE CARE CONFERENCE
Palliative MSW saw patient at the bedside today  MSW appreciates the opportunity to provider patient/family with inpatient emotional support and guidance while patient continues to receive medical attention from the medical team     Topics discussed: We discussed his recent hospitalization and his frustrations  He is having a biopsy done today at 2:00  He is anxious to have these results to see what the plan will be  During our visit a relative came to visit, therefore I excused myself  I asked if it was ok to visit him tomorrow if he hasn't been discharged and he was agreeable       Areas that need follow-up: Support  Resources given: None  Others present:  None    MSW will continue to follow as requested by the medical team, patient, or family

## 2021-08-23 NOTE — SEDATION DOCUMENTATION
Right lung mass biopsy completed by Dr Mcnair  Had hemoptysis about 30-50 ml giorgi blood and blood clots expectorated  Able to protect air way  Oral suction and oral and nasal cleaning provided

## 2021-08-24 VITALS
DIASTOLIC BLOOD PRESSURE: 56 MMHG | OXYGEN SATURATION: 97 % | HEIGHT: 62 IN | RESPIRATION RATE: 19 BRPM | HEART RATE: 69 BPM | TEMPERATURE: 98 F | WEIGHT: 166.45 LBS | BODY MASS INDEX: 30.63 KG/M2 | SYSTOLIC BLOOD PRESSURE: 97 MMHG

## 2021-08-24 PROBLEM — I48.91 ATRIAL FIBRILLATION WITH RVR (HCC): Status: RESOLVED | Noted: 2021-08-18 | Resolved: 2021-08-24

## 2021-08-24 LAB
GLUCOSE SERPL-MCNC: 237 MG/DL (ref 65–140)
GLUCOSE SERPL-MCNC: 279 MG/DL (ref 65–140)

## 2021-08-24 PROCEDURE — 82948 REAGENT STRIP/BLOOD GLUCOSE: CPT

## 2021-08-24 PROCEDURE — 97116 GAIT TRAINING THERAPY: CPT

## 2021-08-24 PROCEDURE — 97530 THERAPEUTIC ACTIVITIES: CPT

## 2021-08-24 PROCEDURE — 97535 SELF CARE MNGMENT TRAINING: CPT

## 2021-08-24 PROCEDURE — 99239 HOSP IP/OBS DSCHRG MGMT >30: CPT | Performed by: GENERAL PRACTICE

## 2021-08-24 PROCEDURE — 94640 AIRWAY INHALATION TREATMENT: CPT

## 2021-08-24 PROCEDURE — 94760 N-INVAS EAR/PLS OXIMETRY 1: CPT

## 2021-08-24 RX ORDER — DIGOXIN 125 MCG
125 TABLET ORAL DAILY
Qty: 30 TABLET | Refills: 0 | Status: SHIPPED | OUTPATIENT
Start: 2021-08-25 | End: 2021-08-27 | Stop reason: SDUPTHER

## 2021-08-24 RX ORDER — METOPROLOL SUCCINATE 50 MG/1
50 TABLET, EXTENDED RELEASE ORAL EVERY 12 HOURS
Qty: 60 TABLET | Refills: 0 | Status: SHIPPED | OUTPATIENT
Start: 2021-08-24 | End: 2021-09-01 | Stop reason: HOSPADM

## 2021-08-24 RX ORDER — SENNOSIDES 8.6 MG
8.6 TABLET ORAL
Qty: 30 TABLET | Refills: 0 | Status: SHIPPED | OUTPATIENT
Start: 2021-08-24

## 2021-08-24 RX ORDER — ACETAMINOPHEN 500 MG
1000 TABLET ORAL EVERY 8 HOURS SCHEDULED
Qty: 180 TABLET | Refills: 0 | Status: SHIPPED | OUTPATIENT
Start: 2021-08-24

## 2021-08-24 RX ADMIN — OMEGA-3 FATTY ACIDS CAP 1000 MG 1000 MG: 1000 CAP at 09:40

## 2021-08-24 RX ADMIN — LEVOTHYROXINE SODIUM 150 MCG: 75 TABLET ORAL at 06:06

## 2021-08-24 RX ADMIN — INSULIN LISPRO 3 UNITS: 100 INJECTION, SOLUTION INTRAVENOUS; SUBCUTANEOUS at 09:41

## 2021-08-24 RX ADMIN — LEVALBUTEROL HYDROCHLORIDE 1.25 MG: 1.25 SOLUTION, CONCENTRATE RESPIRATORY (INHALATION) at 07:38

## 2021-08-24 RX ADMIN — APIXABAN 5 MG: 5 TABLET, FILM COATED ORAL at 09:40

## 2021-08-24 RX ADMIN — GUAIFENESIN 600 MG: 600 TABLET, EXTENDED RELEASE ORAL at 09:40

## 2021-08-24 RX ADMIN — DIGOXIN 125 MCG: 125 TABLET ORAL at 09:41

## 2021-08-24 RX ADMIN — METOPROLOL SUCCINATE 50 MG: 50 TABLET, EXTENDED RELEASE ORAL at 09:41

## 2021-08-24 RX ADMIN — ACETAMINOPHEN 975 MG: 325 TABLET, FILM COATED ORAL at 06:05

## 2021-08-24 RX ADMIN — PANTOPRAZOLE SODIUM 40 MG: 40 TABLET, DELAYED RELEASE ORAL at 06:06

## 2021-08-24 RX ADMIN — IPRATROPIUM BROMIDE 0.5 MG: 0.5 SOLUTION RESPIRATORY (INHALATION) at 07:38

## 2021-08-24 NOTE — CASE MANAGEMENT
Informed by Dr Effie Eduardo that the patient is medically cleared for discharge home today  Met with the patient who reports family will transport and will bring his portable oxygen   ECIN message was sent to [facility] and faxed them the AVS

## 2021-08-24 NOTE — PHYSICAL THERAPY NOTE
PHYSICAL THERAPY NOTE          Patient Name: Chao Failing  INVZW'N Date: 8/24/2021 08/24/21 0915   PT Last Visit   PT Visit Date 08/24/21   Note Type   Note Type Treatment   Pain Assessment   Pain Assessment Tool Pain Assessment not indicated - pt denies pain   Restrictions/Precautions   Weight Bearing Precautions Per Order No   Other Precautions Fall Risk;O2  (3L O2 NC)   General   Chart Reviewed Yes   Response to Previous Treatment Patient with no complaints from previous session  Family/Caregiver Present No   Cognition   Overall Cognitive Status WFL   Arousal/Participation Alert   Attention Within functional limits   Memory Decreased recall of precautions   Following Commands Follows one step commands without difficulty   Comments Pt with some decreased safety awareness and insight into deficits  Subjective   Subjective Pt pleasant and agreeable to participate in therapy session  Bed Mobility   Supine to Sit 5  Supervision   Additional items HOB elevated; Increased time required   Additional Comments Supine in bed upon PT arrival   Pt left upright in bedside chair with chair alarm intact and all needs in reach  Transfers   Sit to Stand 5  Supervision   Additional items Verbal cues   Stand to Sit 5  Supervision   Additional items Verbal cues   Additional Comments Transfers with SPC and RW   VC for hand placement and safety  Increased unsteadiness with SPC  Ambulation/Elevation   Gait pattern Excessively slow;Decreased foot clearance; Redundant gait at times; Foward flexed  (unsteady with SPC)   Gait Assistance   (CGA with SPC, supervision with RW)   Additional items Assist x 1;Verbal cues   Assistive Device SPC;Rolling walker   Distance 40 ft x 2 with SPC, 140 ft x 2 with RW   Stair Management Assistance   (CGA)   Additional items Assist x 1;Verbal cues   Stair Management Technique One rail R;With cane  (SPB in L UE)   Number of Stairs 2   Balance   Static Sitting Good   Dynamic Sitting Fair + Static Standing Fair   Dynamic Standing [address],4, & 5 -  (with RW)   Endurance Deficit   Endurance Deficit Yes   Endurance Deficit Description SOB, fatigue, SpO2 88+%   Activity Tolerance   Activity Tolerance Patient limited by fatigue; Other (Comment)  (SOB)   Nurse Made Aware RN cleared pt to be seen by PT   Assessment   Prognosis Good   Problem List Decreased strength;Decreased endurance;Decreased mobility; Impaired balance;Decreased safety awareness   Assessment Pt seen for PT treatment session with focus on bed mobility, functional transfers, functional mobility, stair negotiation  Pt making steady progress toward goals this session  Trialed gait training with RW vs SPC today, presents with increased stability with RW, few LOB requiring assist for stability with SPC  Pt reports a RW cannot fit through his hallways at home  Pt will benefit from continued practice with SPC as balance impairments remain  Pt also continues to be slightly limited by SOB, requires sitting rest breaks  Pt left upright in bedside chair with chair alarm intact and with all needs in reach  Pt will benefit from skilled therapy in order to address current impairments and functional limitations  PT to follow pt and recommending HHPT once medically cleared  The patient's AM-PAC Basic Mobility Inpatient Short Form Raw Score is 18, Standardized Score is 41 05  A standardized score less than 42 9 suggests the patient may benefit from discharge to post-acute rehabilitation services  Please also refer to the recommendation of the Physical Therapist for safe discharge planning  Based on pt current functioning, recommend HHPT  Barriers to Discharge None   Goals   Patient Goals to go home   STG Expiration Date 09/03/21   Plan   Treatment/Interventions Functional transfer training;LE strengthening/ROM; Therapeutic exercise;Elevations; Endurance training;Patient/family training;Equipment eval/education; Bed mobility;Gait training;Spoke to nursing   Progress Progressing toward goals   PT Frequency Other (Comment)  (3-5x/wk)   Recommendation   PT Discharge Recommendation Home with home health rehabilitation   Rajeev quick   Change/add to [employer]?  No   PT - OK to Discharge Yes  (once medically cleared)   AM-PAC Basic Mobility Inpatient   Turning in Bed Without Bedrails 3   Lying on Back to Sitting on Edge of Flat Bed 3   Moving Bed to Chair 3   Standing Up From Chair 3   Walk in Room 3   Climb 3-5 Stairs 3   Basic Mobility Inpatient Raw Score 18   Basic Mobility Standardized Score 41 05     Davin Salvador, PT, DPT

## 2021-08-24 NOTE — PLAN OF CARE
Problem: OCCUPATIONAL THERAPY ADULT  Goal: Performs self-care activities at highest level of function for planned discharge setting  See evaluation for individualized goals  Description: Treatment Interventions: ADL retraining, Functional transfer training, UE strengthening/ROM, Endurance training, Cognitive reorientation, Patient/family training, Equipment evaluation/education, Compensatory technique education, Energy conservation, Activityengagement  Equipment Recommended:  (pt owns SC, recommend use)       See flowsheet documentation for full assessment, interventions and recommendations  Outcome: Completed  Note: Limitation: Decreased ADL status, Decreased endurance, Decreased self-care trans, Decreased high-level ADLs  Prognosis: Good  Assessment: Patient participated in Skilled OT session this date with interventions consisting of ADL re training with the use of correct body mechnaics and Energy Conservation techniques  Patient agreeable to OT treatment session, upon arrival patient was found seated OOB to Chair  In comparison to previous session, patient with improvements in [address]*   Patient requiring ocassional safety reminders  Patient has attained all treatment goals and is now demonstrating ability to complete UB/LB ADLs at supervision, with the use of Rolling Walker  Patient was educated on [address] , patient verbalized understanding and demonstrated recommended plan correctly  Patient appears to be functioning at baseline  No further acute OT needs identified at this time to warrant continuation of services  D/C OT services  From OT standpoint, recommendation at time of d/c would be Home with family support  The patient's raw score on the AM-PAC Daily Activity inpatient short form is 21, standardized score is 44 27, greater than 39 4  Patients at this level are likely to benefit from discharge to home   Please refer to the recommendation of the Occupational Therapist for safe discharge planning       OT Discharge Recommendation: Home with home health rehabilitation

## 2021-08-24 NOTE — ASSESSMENT & PLAN NOTE
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 encases the right pulmonary artery and right bronchi  · AFib initially noted on 06/10/21 when large lung mass was initially seen  Converted to sinus rhythm his own  · Had another episode AFib with RVR in July and was discharged on amiodarone and metoprolol tartrate 25 mg q 12 hours  · On 8/13 his visiting nurse noticed an irregular heart rate with tachycardia upto 130 and lower BP  · Seen by cardiology on 8/18/21 and was noted to have persistent tachycardia with RVR upto 140 and advised admission  · Anticoagulation changed from Eliquis to heparin drip for w/u of lung mass  · Seen by cardiology - failed amiodarone which was hence discontinued    · Loaded with digoxin  · On Digoxin 125 mcg, Toprol XL 50 mg BID  · Rate controlled  · D/c On Eliquis for [facility]

## 2021-08-24 NOTE — PLAN OF CARE
Problem: PHYSICAL THERAPY ADULT  Goal: Performs mobility at highest level of function for planned discharge setting  See evaluation for individualized goals  Description: Treatment/Interventions: Functional transfer training, LE strengthening/ROM, Elevations, Therapeutic exercise, Endurance training, Equipment eval/education, Bed mobility, Gait training, Spoke to nursing, Spoke to case management, OT  Equipment Recommended: [place]       See flowsheet documentation for full assessment, interventions and recommendations  Outcome: Progressing  Note: Prognosis: Good  Problem List: Decreased strength, Decreased endurance, Decreased mobility, Impaired balance, Decreased safety awareness  Assessment: Pt seen for PT treatment session with focus on bed mobility, functional transfers, functional mobility, stair negotiation  Pt making steady progress toward goals this session  Trialed gait training with RW vs SPC today, presents with increased stability with RW, few LOB requiring assist for stability with SPC  Pt reports a RW cannot fit through his hallways at home  Pt will benefit from continued practice with SPC as balance impairments remain  Pt also continues to be slightly limited by SOB, requires sitting rest breaks  Pt left upright in bedside chair with chair alarm intact and with all needs in reach  Pt will benefit from skilled therapy in order to address current impairments and functional limitations  PT to follow pt and recommending HHPT once medically cleared  The patient's AM-PAC Basic Mobility Inpatient Short Form Raw Score is 18, Standardized Score is 41 05  A standardized score less than 42 9 suggests the patient may benefit from discharge to post-acute rehabilitation services  Please also refer to the recommendation of the Physical Therapist for safe discharge planning  Based on pt current functioning, recommend HHPT    Barriers to Discharge: None        PT Discharge Recommendation: Home with home health rehabilitation     PT - OK to Discharge: Yes (once medically cleared)    See flowsheet documentation for full assessment

## 2021-08-24 NOTE — UTILIZATION REVIEW
Continued Stay Review    Date: 8/23/21                 Current Patient Class: Inpatient  Current Level of Care: Med Surg    HPI:80 y o  male initially admitted on 8/18/21 for evaluation and treatment of atrial fibrillation with RVR  Cardiology on consult, patient failed amiodarone, was loaded with digoxin  Changed from Eliquis to heparin drip for work up of lung mass  8/23 Internal Medicine: s/p CT guided biopsy of R lower lobe lung nodule and paracentesis of 900 ml, ok to restart Eliquis  Rate controlled on Digoxin 125 mcg, Toprol LX 50 BID       Vital Signs:       Date/Time  Temp  Pulse  Resp  BP  MAP   SpO2  Calculated FIO2 (%) - Nasal Cannula  Nasal Cannula O2 Flow Rate (L/min)  O2 Device   08/23/21 2300  98 1 °F (36 7 °C)  102  20  129/66  88  94 %  --  --  Nasal cannula   08/23/21 2000  --  --  --  --  --  --  36  4 L/min  Nasal cannula   08/23/21 1915  --  --  --  --  --  95 %  36  4 L/min  Nasal cannula   08/23/21 1900  97 9 °F (36 6 °C)  108Abnormal   20  134/63  --  --  --  --  --   08/23/21 16:22:55  97 8 °F (36 6 °C)  --  18  153/91  --  --  --  --  --   08/23/21 1607  --  97  18  --  --  97 %  36  4 L/min  Nasal cannula   08/23/21 1559  --  92  18  106/61  79  98 %  36  4 L/min  Nasal cannula   08/23/21 1554  --  68  16  116/58  83  98 %  36  4 L/min  Nasal cannula   08/23/21 1552  --  73  18  122/71  --  99 %  60  10 L/min  Non-rebreather mask   08/23/21 1549  --  89  20  121/72  --  97 %  60  10 L/min  Non-rebreather mask   08/23/21 1544  --  --  18  123/75  95  93 %  60  10 L/min  Non-rebreather mask   08/23/21 1539  --  101  20  128/73  --  93 %  60  10 L/min  Non-rebreather mask   08/23/21 1538  --  102  20  119/82  --  87 %Abnormal   44  6 L/min  Nasal cannula   08/23/21 1534  --  101  18  114/83  95  94 %  44  6 L/min  Nasal cannula   08/23/21 1529  --  94  18  109/58  77  98 %  44  6 L/min  Nasal cannula   08/23/21 1524  --  86  18  112/56  77  98 %  44  6 L/min  Nasal cannula   08/23/21 1523 --  100  18  144/63  --  97 %  44  6 L/min  Nasal cannula   08/23/21 1521  --  88  19  154/64  150  98 %  44  6 L/min  Nasal cannula   08/23/21 1519  --  96  18  142/62  89  98 %  44  6 L/min  Nasal cannula   08/23/21 14:56:59  --  97  20  109/58  --  94 %  --  --  --   08/23/21 1305  --  68  --  --  --  95 %  28  2 L/min  Nasal cannula   08/23/21 1100  98 3 °F (36 8 °C)  69  18  105/52  --  --  --  --  --   08/23/21 0847  97 8 °F (36 6 °C)  70  18  111/61  77  95 %  --  --  --   08/23/21 0800  --  --  --  --  --  --  --  --  None (Room air)   08/23/21 0744  --  80  --  --  --  95 %  32  3 L/min  Nasal cannula   08/22/21 2300  98 4 °F (36 9 °C)  108Abnormal   18  117/72  --  --  --  --  --   08/22/21 1947  --  99  18  --  --  95 %  --  --  --   08/22/21 1945  --  --  --  --  --  95 %  --  --  --   08/22/21 1937  --  --  --  --  --  --  32  3 L/min  Nasal cannula   08/22/21 1900  98 °F (36 7 °C)  105  18  129/65  --  97 %  --  --  --   08/22/21 1500  98 7 °F (37 1 °C)  109Abnormal   20  117/60  --  98 %  --  --  --   08/22/21 1348  --  --  --  --  --  98 %  32  3 L/min  Nasal cannula baseline O2 requirement    08/22/21 11:42:59  99 7 °F (37 6 °C)  --  18  --  --  --  --  --  --   08/22/21 0848  --  --  --  --  --  98 %  --  --  None (Room air)   08/22/21 0830  --  81  --  --  --  --  --  --  --   08/22/21 0829  --  --  --  102/58  --  --  --  --  --   08/22/21 0800  --  --  --  --  --  --  --  --  None (Room air)   08/22/21 0700  98 2 °F (36 8 °C)  80  20  105/68  --  97 %  --  --  Nasal cannula   08/22/21 0245  98 1 °F (36 7 °C)  97  20  139/62  89  98 %  --  --  Nasal cannula           Pertinent Labs/Diagnostic Results:     INTERVENTIONAL RADIOLOGY PROCEDURE NOTE     Date: 8/23/2021     Procedure:   1  Right thoracentesis  2  RLL lung nodule biopsy     Preoperative diagnosis:   1  Atrial flutter ([address] )    2  Mass of right lung    3  Tachycardia    4  Hypotension    5   Chronic obstructive pulmonary disease, unspecified COPD type ([facility] )    6  Paroxysmal A-fib with RVR    7  Acute respiratory failure with hypoxia and hypercapnia (HCC)          Postoperative diagnosis: Same      Blood loss: 1 mL     Specimens: 4 core needle samples      Findings:   1  Right thoracentesis performed prior to lung biopsy with 900 mL cloudy greenish yellow pleural fluid removed    2  Successful RLL lung nodule biopsy     Complications: None immediate      Anesthesia: conscious sedation and local      Results from last 7 days   Lab Units 08/18/21  1608   SARS-COV-2  Negative     Results from last 7 days   Lab Units 08/21/21  0411 08/19/21  2354 08/19/21  0610 08/18/21  1543   WBC Thousand/uL 7 84 11 93* 8 64 11 46*   HEMOGLOBIN g/dL 9 3* 10 2* 8 5* 10 3*   HEMATOCRIT % 32 1* 35 6* 29 7* 36 0*   PLATELETS Thousands/uL 292 398* 392* 419*   BANDS PCT %  --   --   --  1         Results from last 7 days   Lab Units 08/23/21  1015 08/21/21  0400 08/19/21  0610 08/18/21  1543   SODIUM mmol/L 136 136 133* 132*   POTASSIUM mmol/L 4 0 4 6 4 5 4 9   CHLORIDE mmol/L 102 103 100 100   CO2 mmol/L 31 30 29 26   ANION GAP mmol/L 3* 3* 4 6   BUN mg/dL 16 25 27* 31*   CREATININE mg/dL 0 83 1 05 1 17 1 69*   EGFR ml/min/1 73sq m 84 68 59 38   CALCIUM mg/dL 9 0 9 3 9 3 9 8   MAGNESIUM mg/dL 1 5* 1 6 1 9 1 4*   PHOSPHORUS mg/dL  --   --  2 0*  --      Results from last 7 days   Lab Units 08/19/21  0610 08/18/21  1543   AST U/L 11 11   ALT U/L 28 36   ALK PHOS U/L 136* 165*   TOTAL PROTEIN g/dL 6 2* 6 9   ALBUMIN g/dL 1 9* 1 6*   TOTAL BILIRUBIN mg/dL 0 55 0 62     Results from last 7 days   Lab Units 08/23/21  2102 08/23/21  1624 08/23/21  1116 08/23/21  0622 08/22/21  2055 08/22/21  1619 08/22/21  1142 08/22/21  0619 08/21/21  2041 08/21/21  1619   POC GLUCOSE mg/dl 212* 119 151* 144* 185* 171* 204* 176* 254* 216*     Results from last 7 days   Lab Units 08/23/21  1015 08/21/21  0400 08/19/21  0610 08/18/21  1543   GLUCOSE RANDOM mg/dL 134 215* 188* 307* Results from last 7 days   Lab Units 08/19/21  0610   HEMOGLOBIN A1C % 7 7*   EAG mg/dl 174                     Results from last 7 days   Lab Units 08/18/21  1543   TROPONIN I ng/mL <0 02         Results from last 7 days   Lab Units 08/23/21  0250 08/22/21  2128 08/22/21  1326 08/20/21  0002   PROTIME seconds  --   --   --  17 6*   INR   --   --   --  1 45*   PTT seconds 101* 82* 100* 57*     Results from last 7 days   Lab Units 08/19/21  0610 08/18/21  1543   TSH 3RD GENERATON uIU/mL 0 445 0 363                     Results from last 7 days   Lab Units 08/18/21  1543   NT-PRO BNP pg/mL 1,615*                         Results from last 7 days   Lab Units 08/18/21  1840 08/18/21  1837   CLARITY UA   --  Clear   COLOR UA  - Yellow   SPEC GRAV UA   --  1 025   PH UA   --  5 5   GLUCOSE UA mg/dl  --  250 (1/4%)*   KETONES UA mg/dl  --  Trace*   BLOOD UA   --  Negative   PROTEIN UA mg/dl  --  100 (2+)*   NITRITE UA   --  Negative   BILIRUBIN UA   --  Interference- unable to analyze*   UROBILINOGEN UA E U /dl  --  2 0*   LEUKOCYTES UA   --  Negative   WBC UA /hpf  --  2-4   RBC UA /hpf  --  None Seen   BACTERIA UA /hpf  --  Occasional   EPITHELIAL CELLS WET PREP /hpf  --  Occasional   MUCUS THREADS   --  Moderate*             Medications:   Scheduled Medications:    acetaminophen, 975 mg, Oral, Q8H ZOHAIB  apixaban, 5 mg, Oral, BID  Diclofenac Sodium, 2 g, Topical, 4x Daily  digoxin, 125 mcg, Oral, Daily  fish oil, 1,000 mg, Oral, BID  gabapentin, 100 mg, Oral, HS  guaiFENesin, 600 mg, Oral, BID  insulin glargine, 10 Units, Subcutaneous, HS  insulin lispro, 1-5 Units, Subcutaneous, HS  insulin lispro, 1-6 Units, Subcutaneous, TID AC  insulin lispro, 3 Units, Subcutaneous, TID With Meals  ipratropium, 0 5 mg, Nebulization, TID  levalbuterol, 1 25 mg, Nebulization, TID  levothyroxine, 150 mcg, Oral, Early Morning  metoprolol succinate, 50 mg, Oral, Q12H  pantoprazole, 40 mg, Oral, Early Morning  senna, 1 tablet, Oral, HS      Continuous IV Infusions:      heparin (porcine) 25,000 units in 0 45% NaCl 250 mL infusion (premix)   Rate: 2 3-15 mL/hr Dose: 3-20 Units/kg/hr  Weight Dosing Info: 75 kg (Order-Specific)  Freq: Titrated Route: IV  Last Dose: Stopped (08/23/21 1003)  Start: 08/19/21 2130      PRN Meds:      albuterol, 2 5 mg, Nebulization, Q4H PRN  benzonatate, 100 mg, Oral, TID PRN  bisacodyl, 10 mg, Rectal, Daily PRN  hydrOXYzine HCL, 25 mg, Oral, TID PRN  metoprolol, 5 mg, Intravenous, Q6H PRN  morphine injection, 2 mg, Intravenous, Q3H PRN  oxyCODONE, 2 5 mg, Oral, Q6H PRN   Or  oxyCODONE, 5 mg, Oral, Q6H PRN  polyethylene glycol, 17 g, Oral, Daily PRN        Discharge Plan: D        Network Utilization Review Department  ATTENTION: Please call with any questions or concerns to [phone] and carefully listen to the prompts so that you are directed to the right person  All voicemails are confidential   Elnor Raider all requests for admission clinical reviews, approved or denied determinations and any other requests to dedicated fax number below belonging to the campus where the patient is receiving treatment   List of dedicated fax numbers for the Facilities:  [address] DENIALS (Administrative/Medical Necessity) [phone]   [address] (Maternity/NICU/Pediatrics) [phone]   [address] [place] [address] [address] [place] [phone] [place] [address] [address] [facility] [place] [address] [phone]

## 2021-08-24 NOTE — ASSESSMENT & PLAN NOTE
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 encases the right pulmonary artery and right bronchi  · AFib initially noted on 06/10/21 when large lung mass was initially seen  Converted to sinus rhythm his own  · Had another episode AFib with RVR in July and was discharged on amiodarone and metoprolol tartrate 25 mg q 12 hours  · On 8/13 his visiting nurse noticed an irregular heart rate with tachycardia upto 130 and lower BP  · Seen by cardiology on 8/18/21 and was noted to have persistent tachycardia with RVR upto 140 and advised admission  · Anticoagulation changed from Eliquis to heparin drip for w/u of lung mass  · Seen by cardiology - failed amiodarone which was hence discontinued    · Loaded with digoxin  · On Digoxin 125 mcg, Toprol XL 50 mg BID  · Rate controlled

## 2021-08-24 NOTE — NURSING NOTE
Reviewed all discharged information with patient and son  Both state that they do not have any questions  Patient staes he will  medications on the way home  Patient left with all belongings

## 2021-08-24 NOTE — ASSESSMENT & PLAN NOTE
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 hemithorax with extensive central necrosis but with glucose hypermetabolism along its peripheral margins, especially the inferior aspect  Left upper lobe mass with a slight focus of cavitation, also glucose avid  Enlarging right lower lobe solid nodule with SUV max 3 0  The mass involves the right pulmonary hilum, right upper lobe bronchus, and there is mild right subcarinal adenopathy  There is also a small right effusion and pulmonary emphysema   There is no evidence of extrathoracic glucose avid metastatic disease "  · No tissue diagnosis at present as he has had several unsuccessful biopsies and pleural fluid assessment  · Was determined to be a poor surgical candidate due to mass burden and COPD  · Per PET-CT potential sites for reattempt at soft tissue biopsy diagnostic results would include peripheral margins of the large right upper lobe mass (for example the inferior margin on image 109 with SUV max 10 4), or the enlarging 15 mm solid right lower lobe pulmonary nodule  · S/p CT guided biopsy of right lower lobe lung nodule and paracentesis of 900 ml by IR 8/23  · Cleared to restart Eliquis by IR  · Follow-up biopsy results

## 2021-08-24 NOTE — OCCUPATIONAL THERAPY NOTE
Occupational Therapy Progress Note     Patient Name: Chao Bustos  DWKWK'F Date: 8/24/2021  Problem List  Active Problems:    Mass of right lung    Hypothyroidism    Type 2 diabetes mellitus ([facility] )    COPD (chronic obstructive pulmonary disease) ([facility] )    Chronic respiratory failure with hypoxia ([facility] )          08/24/21 1140   OT Last Visit   OT Visit Date 08/24/21   Note Type   Note Type Treatment   Restrictions/Precautions   Weight Bearing Precautions Per Order No   Other Precautions Fall Risk;O2   General   Response to Previous Treatment Patient with no complaints from previous session   Lifestyle   Autonomy  At baseline pt was completing all ADLs/IADLs IND, ambulating IND w/o AD, (+) driving  Reciprocal Relationships supportive spouse & local children   Service to Others retired   Intrinsic Gratification pt enjoys staying active   Pain Assessment   Pain Assessment Tool Pain Assessment not indicated - pt denies pain   Pain Score No Pain   ADL   Where Assessed Chair   UB Dressing Assistance 5  Supervision/Setup   UB Dressing Deficit Thread RUE; Thread LUE   UB Dressing Comments don shirt, doff gown   LB Dressing Assistance 4  Minimal Assistance   LB Dressing Deficit Don/doff R sock; Don/doff L sock; Don/doff R shoe;Don/doff L shoe; Thread RLE into underwear; Thread LLE into underwear; Thread RLE into pants; Thread LLE into pants;Pull up over hips   LB Dressing Comments report using a sock aide at home   Transfers   Sit to Stand 5  Supervision   Stand to Sit 5  Supervision   Cognition   Overall Cognitive Status WFL   Arousal/Participation Cooperative   Attention Within functional limits   Orientation Level Oriented X4   Memory Decreased recall of precautions   Following Commands Follows one step commands without difficulty   Comments pt pleasant and cooperative   Activity Tolerance   Activity Tolerance Patient tolerated treatment well   Medical Staff Made Aware yes, PCA   Assessment   Assessment Patient participated in Skilled OT session this date with interventions consisting of ADL re training with the use of correct body mechnaics and Energy Conservation techniques  Patient agreeable to OT treatment session, upon arrival patient was found seated OOB to Chair  In comparison to previous session, patient with improvements in [address]*   Patient requiring ocassional safety reminders  Patient has attained all treatment goals and is now demonstrating ability to complete UB/LB ADLs at supervision, with the use of Rolling Walker  Patient was educated on [address] , patient verbalized understanding and demonstrated recommended plan correctly  Patient appears to be functioning at baseline  No further acute OT needs identified at this time to warrant continuation of services  D/C OT services  From OT standpoint, recommendation at time of d/c would be Home with family support  The patient's raw score on the AM-PAC Daily Activity inpatient short form is 21, standardized score is 44 27, greater than 39 4  Patients at this level are likely to benefit from discharge to home  Please refer to the recommendation of the Occupational Therapist for safe discharge planning  Plan   Treatment Interventions ADL retraining;Functional transfer training; Endurance training   Goal Expiration Date 09/03/21   OT Treatment Day 1   OT Frequency 3-5x/wk   Recommendation   OT Discharge Recommendation Home with home health rehabilitation   AM-[facility] Daily Activity Inpatient   Lower Body Dressing 3   Bathing 3   Toileting 3   Upper Body Dressing 4   Grooming 4   Eating 4   Daily Activity Raw Score 21   Daily Activity Standardized Score (Calc for Raw Score >=11) 44 27   AM-[facility] Applied Cognition Inpatient   Following a Speech/Presentation 4   Understanding Ordinary Conversation 4   Taking Medications 4   Remembering Where Things Are Placed or Put Away 4   Remembering List of 4-5 Errands 4   Taking Care of Complicated Tasks 3   Applied Cognition Raw Score 23 Applied Cognition Standardized Score 53 08   Modified [place] Scale   Modified Jemma Scale 3     Kenya Arellano MS, OTR/L

## 2021-08-24 NOTE — ASSESSMENT & PLAN NOTE
Lab Results   Component Value Date    HGBA1C 7 7 (H) 08/19/2021       Recent Labs     08/23/21  1624 08/23/21  2102 08/24/21  0938 08/24/21  1103   POCGLU 119 212* 237* 279*       Blood Sugar Average: Last 72 hrs:  (P) 200 [id number]     Metformin/glipizide held and restarted at d/c  Lantus 10 units hs, Humalog 3 TID, SSI while IP

## 2021-08-24 NOTE — ASSESSMENT & PLAN NOTE
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 hemithorax with extensive central necrosis but with glucose hypermetabolism along its peripheral margins, especially the inferior aspect  Left upper lobe mass with a slight focus of cavitation, also glucose avid  Enlarging right lower lobe solid nodule with SUV max 3 0  The mass involves the right pulmonary hilum, right upper lobe bronchus, and there is mild right subcarinal adenopathy  There is also a small right effusion and pulmonary emphysema   There is no evidence of extrathoracic glucose avid metastatic disease "  · No tissue diagnosis at present as he has had several unsuccessful biopsies and pleural fluid assessment  · Was determined to be a poor surgical candidate due to mass burden and COPD  · Per PET-CT potential sites for reattempt at soft tissue biopsy diagnostic results would include peripheral margins of the large right upper lobe mass (for example the inferior margin on image 109 with SUV max 10 4), or the enlarging 15 mm solid right lower lobe pulmonary nodule  · S/p CT guided biopsy of right lower lobe lung nodule and paracentesis of 900 ml by IR today  · Cleared to restart Eliquis by IR  · Follow-up biopsy results

## 2021-08-24 NOTE — DISCHARGE INSTRUCTIONS
Dr Kathleen Gonsalez team will review biopsy results with you    Thoracentesis   WHAT YOU NEED TO KNOW:   A thoracentesis is a procedure to remove extra fluid or air from between your lungs and your inner chest wall  Air or fluid buildup may make it hard for you to breathe  A thoracentesis allows your lungs to expand fully so you can breathe more easily  DISCHARGE INSTRUCTIONS:     Small amount of shoulder pain and bloody sputum is normal after a Thoracentesis  Rest:  Rest when you feel it is needed  Slowly start to do more each day  Return to your daily activities as directed  Resume your normal diet  Small sips of flat soda will help mild nausea  Do not smoke: If you smoke, it is never too late to quit  Ask for information about how to stop smoking if you need help  Contact Interventional Radiology at [phone] [place] PATIENTS: Contact Interventional Radiology at [phone]) Rosettawindy Abdul PATIENTS: Contact Interventional Radiology at [phone]) if:   · You have a fever  · Your puncture site is red, warm, swollen, or draining pus  · You have questions or concerns about your procedure, medicine, or care  Seek care immediately or call 911 if:   · Severe chest pain with inspiration and shortness of breath    · Large amounts of blood in your sputum    Follow up with your healthcare provider as directed  Needle Biopsy of the Lung    WHAT YOU NEED TO KNOW:  A needle biopsy of the lung is a procedure to remove cells or tissue from your lung  You may have a fine needle aspiration biopsy (FNAB), or a core needle biopsy (CNB)  A FNAB is used to remove cells through a thin needle  CNB uses a thicker needle to remove lung tissue  The samples are collected and tested for inflammation, infection, or cancer  DISCHARGE INSTRUCTIONS:   Resume your normal diet  Small sips of flat soda will help with nausea  Limit your activity for 24 hours      Wound Care:      - Remove band aid in 24 hours     Contact Interventional Radiology at [phone] [facility] [place] PATIENTS: Contact Interventional Radiology at [phone]) ([address] [facility] [address]: Contact Interventional Radiology at [phone]) if any of the following occur:    - You have a fever greater than 101*    - You cough up large amounts of bright red blood     - You have chest pain with breathing    - You have shortness of breath    -You have persistent nausea and vomiting    - You have pus, redness or swelling around your biopsy site    - You have questions or concerns about your condition or careProcedural Sedation   WHAT YOU NEED TO KNOW:   Procedural sedation is medicine used during procedures to help you feel relaxed and calm  You will remember little to none of the procedure  After sedation you may feel tired, weak, or unsteady on your feet  You may also have trouble concentrating or short-term memory loss  These symptoms should go away in 24 hours or less  DISCHARGE INSTRUCTIONS:     Call 911 or have someone else call for any of the following:   · You have sudden trouble breathing      · You cannot be woken  Contact Interventional Radiology at [phone]   [facility] [place] PATIENTS: Contact Interventional Radiology at [phone]) [facility] PAT[facility]ENTS: Contact Interventional Radiology at [phone]) if any of the following occur:     · You have a severe headache or dizziness      · Your heart is beating faster than usual     · You have a fever or chills      · Your skin is itchy, swollen, or you have a rash      · You have nausea or are vomiting for more than 8 hours after the procedure       · You have questions or concerns about your condition or care  Self-care:   · Have someone stay with you for 24 hours  This person can drive you to errands and help you do things around the house  This person can also watch for problems       · Rest and do quiet activities for 24 hours  Do not exercise, ride a bike, or play sports   Stand up slowly to prevent dizziness and falls  Take short walks around the house with another person  Slowly return to your usual activities the next day       · Do not drive or use dangerous machines or tools for 24 hours  You may injure yourself or others  Examples include a lawnmower, saw, or drill  Do not return to work for 24 hours if you use dangerous machines or tools for work       · Do not make important decisions for 24 hours  For example, do not sign important papers or invest money       · Drink liquids as directed  Liquids help flush the sedation medicine out of your body  Ask how much liquid to drink each day and which liquids are best for you       · Eat small, frequent meals to prevent nausea and vomiting  Start with clear liquids such as juice or broth  If you do not vomit after clear liquids, you can eat your usual foods       · Do not drink alcohol or take medicines that make you drowsy  This includes medicines that help you sleep and anxiety medicines  Ask your healthcare provider if it is safe for you to take pain medicine  Follow up with your healthcare provider as directed: Write down your questions so you remember to ask them during your visits

## 2021-08-24 NOTE — DISCHARGE SUMMARY
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 encases the right pulmonary artery and right bronchi  · AFib initially noted on 06/10/21 when large lung mass was initially seen  Converted to sinus rhythm his own  · Had another episode AFib with RVR in July and was discharged on amiodarone and metoprolol tartrate 25 mg q 12 hours  · On 8/13 his visiting nurse noticed an irregular heart rate with tachycardia upto 130 and lower BP  · Seen by cardiology on 8/18/21 and was noted to have persistent tachycardia with RVR upto 140 and advised admission  · Anticoagulation changed from Eliquis to heparin drip for w/u of lung mass  · Seen by cardiology - failed amiodarone which was hence discontinued    · Loaded with digoxin  · On Digoxin 125 mcg, Toprol XL 50 mg BID  · Rate controlled  · D/c On Eliquis for AC    Chronic respiratory failure with hypoxia (HCC)  Assessment & Plan  O2 requirements at baseline of 3L    COPD (chronic obstructive pulmonary disease) (HCC)  Assessment & Plan  No exacerbation  Continue Xopenex/ipratropium nebs    Type 2 diabetes mellitus [facility])  Assessment & Plan  Lab Results   Component Value Date    HGBA1C 7 7 (H) 08/19/2021       Recent Labs     08/23/21  1624 08/23/21  2102 08/24/21  0938 08/24/21  1103   POCGLU 119 212* 237* 279*       Blood Sugar Average: Last 72 hrs:  (P) 200 [id number]     Metformin/glipizide held and restarted at d/c  Lantus 10 units hs, Humalog 3 TID, SSI while IP    Hypothyroidism  Assessment & Plan  Continue levothyroxine 150 mcg daily   TSH normal    Mass of right lung  Assessment & Plan  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 hemithorax with extensive central necrosis but with glucose hypermetabolism along its peripheral margins, especially the inferior aspect  Left upper lobe mass with a slight focus of cavitation, also glucose avid  Enlarging right lower lobe solid nodule with SUV max 3 0  The mass involves the right pulmonary hilum, right upper lobe bronchus, and there is mild right subcarinal adenopathy  There is also a small right effusion and pulmonary emphysema   There is no evidence of extrathoracic glucose avid metastatic disease "  · No tissue diagnosis at present as he has had several unsuccessful biopsies and pleural fluid assessment  · Was determined to be a poor surgical candidate due to mass burden and COPD  · Per PET-CT potential sites for reattempt at soft tissue biopsy diagnostic results would include peripheral margins of the large right upper lobe mass (for example the inferior margin on image 109 with SUV max 10 4), or the enlarging 15 mm solid right lower lobe pulmonary nodule  · S/p CT guided biopsy of right lower lobe lung nodule and paracentesis of 900 ml by IR 8/23  · Cleared to restart Eliquis by IR  · Follow-up biopsy results          Medical Problems     Resolved Problems  Date Reviewed: 8/24/2021        Resolved    * (Principal) Atrial fibrillation with RVR ([facility] [address] ) 8/24/2021     Resolved by  Keyshawn Magdaleno DO              Discharging Physician / Practitioner: Keyshawn Magdaleno DO  PCP: Aaron Nails DO  Admission Date:   Admission Orders (From admission, onward)     Ordered        08/18/21 2246  Inpatient Admission  Once                   Discharge Date: 08/24/21    Consultations During Hospital Stay:  · CCRS  · Pulm  · MedOnc  · Cardio  · Palliative  · Pastoral care    Procedures Performed:   · 8/23 thora and RLL lung nodule biopsy    Significant Findings / Test Results: · See above    Incidental Findings:   · none     Test Results Pending at Discharge (will require follow up): · Lung biopsy path - [facility] can review results w/ pt     Outpatient Tests Requested:  · none    Complications:  none    Reason for Admission: Afib w/ RVR    Hospital Course:   Andres Burciaga is a 68 y o  male patient who originally presented to the hospital on 8/18/2021 due to AFib w/ RVR  Loaded w/ Dig and BB increased  Pt converted to NSR  Lung biopsy completed to hopefully obtain dx  Pt known to Dr Magali Rivera who can discuss results w/ pt and p[rovide further recs based on path  Please see above list of diagnoses and related plan for additional information  Condition at Discharge: stable    Discharge Day Visit / Exam:   Subjective:  No acute complaints  Vitals: Blood Pressure: 97/56 (08/24/21 1105)  Pulse: 69 (08/24/21 1105)  Temperature: 98 °F (36 7 °C) (08/24/21 1105)  Temp Source: Oral (08/24/21 1105)  Respirations: 19 (08/24/21 1105)  Height: 5' 2" (157 5 cm) (08/18/21 1517)  Weight - Scale: 75 5 kg (166 lb 7 2 oz) (08/24/21 0600)  SpO2: 97 % (08/24/21 1105)  Exam:   Physical Exam  HENT:      Head: Normocephalic and atraumatic  Nose: Nose normal       Mouth/Throat:      Mouth: Mucous membranes are moist    Eyes:      Extraocular Movements: Extraocular movements intact  Conjunctiva/sclera: Conjunctivae normal    Cardiovascular:      Rate and Rhythm: Normal rate and regular rhythm  Pulmonary:      Effort: Pulmonary effort is normal       Breath sounds: Normal breath sounds  No wheezing or rales  Abdominal:      General: Bowel sounds are normal  There is no distension  Palpations: Abdomen is soft  Tenderness: There is no abdominal tenderness  Musculoskeletal:         General: Normal range of motion  Cervical back: Normal range of motion and neck supple  Right lower leg: No edema  Left lower leg: No edema  Skin:     General: Skin is warm and dry  Neurological:      Mental Status: He is alert and oriented to person, place, and time  Discussion with Family: Updated  (wife and son) via phone  Discharge instructions/Information to patient and family:   See after visit summary for information provided to patient and family  Provisions for Follow-Up Care:  See after visit summary for information related to follow-up care and any pertinent home health orders  Disposition:   Home with [facility] (Reminder: Complete face to face encounter)    Planned Readmission: no     Discharge Statement:  I spent 45 minutes discharging the patient  This time was spent on the day of discharge  I had direct contact with the patient on the day of discharge  Greater than 50% of the total time was spent examining patient, answering all patient questions, arranging and discussing plan of care with patient as well as directly providing post-discharge instructions  Additional time then spent on discharge activities  Discharge Medications:  See after visit summary for reconciled discharge medications provided to patient and/or family        **Please Note: This note may have been constructed using a voice recognition system**

## 2021-08-24 NOTE — ASSESSMENT & PLAN NOTE
Lab Results   Component Value Date    HGBA1C 7 7 (H) 08/19/2021       Recent Labs     08/23/21  0622 08/23/21  1116 08/23/21  1624 08/23/21 2102   POCGLU 144* 151* 119 212*       Blood Sugar Average: Last 72 hrs:  (P) 190 75     Metformin/glipizide held  Ct Lantus 10 units hs, Humalog 3 TID, SSI  Monitor blood sugars and adjust insulin as needed

## 2021-08-24 NOTE — PROGRESS NOTES
[address] [facility]  Progress Note - Brunooswaldo Felt 1940, 68 y o  male MRN: [id number]  Unit/Bed#: [facility] 838-35 Encounter: [id number]  Primary Care Provider: Sasha Gerber DO   Date and time admitted to hospital: 8/18/2021  3:23 PM    * Atrial fibrillation with RVR ([address] )  Assessment & Plan  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 encases the right pulmonary artery and right bronchi  · AFib initially noted on 06/10/21 when large lung mass was initially seen  Converted to sinus rhythm his own  · Had another episode AFib with RVR in July and was discharged on amiodarone and metoprolol tartrate 25 mg q 12 hours  · On 8/13 his visiting nurse noticed an irregular heart rate with tachycardia upto 130 and lower BP  · Seen by cardiology on 8/18/21 and was noted to have persistent tachycardia with RVR upto 140 and advised admission  · Anticoagulation changed from Eliquis to heparin drip for w/u of lung mass  · Seen by cardiology - failed amiodarone which was hence discontinued  · Loaded with digoxin  · On Digoxin 125 mcg, Toprol XL 50 mg BID  · Rate controlled    Mass of right lung  Assessment & Plan  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 hemithorax with extensive central necrosis but with glucose hypermetabolism along its peripheral margins, especially the inferior aspect  Left upper lobe mass with a slight focus of cavitation, also glucose avid  Enlarging right lower lobe solid nodule with SUV max 3 0  The mass involves the right pulmonary hilum, right upper lobe bronchus, and there is mild right subcarinal adenopathy  There is also a small right effusion and pulmonary emphysema   There is no evidence of extrathoracic glucose avid metastatic disease "  · No tissue diagnosis at present as he has had several unsuccessful biopsies and pleural fluid assessment  · Was determined to be a poor surgical candidate due to mass burden and COPD  · Per PET-CT potential sites for reattempt at soft tissue biopsy diagnostic results would include peripheral margins of the large right upper lobe mass (for example the inferior margin on image 109 with SUV max 10 4), or the enlarging 15 mm solid right lower lobe pulmonary nodule  · S/p CT guided biopsy of right lower lobe lung nodule and paracentesis of 900 ml by IR today  · Cleared to restart Eliquis by IR  · Follow-up biopsy results        COPD (chronic obstructive pulmonary disease) ([facility] [address] )  Assessment & Plan  No exacerbation  Continue Xopenex/ipratropium nebs    Type 2 diabetes mellitus [facility])  Assessment & Plan  Lab Results   Component Value Date    HGBA1C 7 7 (H) 08/19/2021       Recent Labs     08/23/21  0622 08/23/21  1116 08/23/21  1624 08/23/21  2102   POCGLU 144* 151* 119 212*       Blood Sugar Average: Last 72 hrs:  (P) 190 75     Metformin/glipizide held  Ct Lantus 10 units hs, Humalog 3 TID, SSI  Monitor blood sugars and adjust insulin as needed    Hypothyroidism  Assessment & Plan  Continue levothyroxine 150 mcg daily  TSH normal    Chronic respiratory failure with hypoxia (HCC)  Assessment & Plan  O2 requirements at baseline of 3L          VTE Pharmacologic Prophylaxis: VTE Score: 6 High Risk (Score >/= 5) - Pharmacological DVT Prophylaxis Ordered: apixaban (Eliquis)  Sequential Compression Devices Ordered  Patient Centered Rounds: Discussed with RN  Discussions with Specialists or Other Care Team Provider:  Discussed with IR    Education and Discussions with Family / Patient: Updated  (wife, son and daughter in law) at bedside  Time Spent for Care: 20 minutes  More than 50% of total time spent on counseling and coordination of care as described above      Current Length of Stay: 6 day(s)  Current Patient Status: Inpatient   Certification Statement: The patient will continue to require additional inpatient hospital stay due to status post lung biopsy being monitored    Code Status: Level 2 - DNAR: but accepts endotracheal intubation    Subjective: Intermittent cough  No palpitations or lightheadedness    Objective:     Vitals:   Temp (24hrs), Av °F (36 7 °C), Min:97 8 °F (36 6 °C), Max:98 3 °F (36 8 °C)    Temp:  [97 8 °F (36 6 °C)-98 3 °F (36 8 °C)] 98 1 °F (36 7 °C)  HR:  [] 102  Resp:  [16-20] 20  BP: (105-154)/(52-91) 129/66  SpO2:  [87 %-99 %] 94 %  Body mass index is 30 56 kg/m²  Physical Exam:   Physical Exam  Vitals and nursing note reviewed  HENT:      Head: Normocephalic  Nose: Nose normal    Eyes:      Extraocular Movements: Extraocular movements intact  Cardiovascular:      Rate and Rhythm: Rhythm irregular  Pulmonary:      Effort: Pulmonary effort is normal       Comments: Decreased breath sounds on the right  Abdominal:      General: Bowel sounds are normal  There is no distension  Palpations: Abdomen is soft  Tenderness: There is no abdominal tenderness  Musculoskeletal:         General: No swelling  Cervical back: Neck supple  Skin:     General: Skin is warm and dry  Neurological:      General: No focal deficit present  Mental Status: He is alert     Psychiatric:         Mood and Affect: Mood normal           Additional Data:     Labs:  Results from last 7 days   Lab Units 21  0411 21  1543   WBC Thousand/uL 7 84 11 46*   HEMOGLOBIN g/dL 9 3* 10 3*   HEMATOCRIT % 32 1* 36 0*   PLATELETS Thousands/uL 292 419*   BANDS PCT %  --  1   LYMPHO PCT %  --  0*   MONO PCT %  --  6   EOS PCT %  --  0     Results from last 7 days   Lab Units 21  1015 21  0610   SODIUM mmol/L 136 133*   POTASSIUM mmol/L 4 0 4 5   CHLORIDE mmol/L 102 100   CO2 mmol/L 31 29   BUN mg/dL 16 27*   CREATININE mg/dL 0 83 1 17   ANION GAP mmol/L 3* 4   CALCIUM mg/dL 9 0 9 3   ALBUMIN g/dL  --  1 9*   TOTAL BILIRUBIN mg/dL  --  0 55   ALK PHOS U/L  --  136*   ALT U/L  --  28   AST U/L  --  11   GLUCOSE RANDOM mg/dL 134 188*     Results from last 7 days   Lab Units 08/20/21  0002   INR  1 45*     Results from last 7 days   Lab Units 08/23/21  2102 08/23/21  1624 08/23/21  1116 08/23/21  0622 08/22/21  2055 08/22/21  1619 08/22/21  1142 08/22/21  0619 08/21/21  2041 08/21/21  1619 08/21/21  1100 08/21/21  0622   POC GLUCOSE mg/dl 212* 119 151* 144* 185* 171* 204* 176* 254* 216* 258* 199*     Results from last 7 days   Lab Units 08/19/21  0610   HEMOGLOBIN A1C % 7 7*           Lines/Drains:  Invasive Devices     Peripheral Intravenous Line            Peripheral IV 08/21/21 Left;Ventral (anterior) Forearm 2 days                      Last 24 Hours Medication List:   Current Facility-Administered Medications   Medication Dose Route Frequency Provider Last Rate    acetaminophen  975 mg Oral Q8H [address] Loulou Dan DO      albuterol  2 5 mg Nebulization Q4H PRN Rojelio Salomon MD      apixaban  5 mg Oral BID Odell Welch MD      benzonatate  100 mg Oral TID PRN Rojelio Salomon MD      bisacodyl  10 mg Rectal Daily PRN Odell Welch MD      Diclofenac Sodium  2 g Topical 4x Daily Rojelio Salomon MD      digoxin  125 mcg Oral Daily Marie Simpson MD      fish oil  1,000 mg Oral BID Rojelio Salomon MD      gabapentin  100 mg Oral HS Rojelio Salomon MD      guaiFENesin  600 mg Oral BID Rojelio Salomon MD      hydrOXYzine HCL  25 mg Oral TID PRN Rojelio Salomon MD      insulin glargine  10 Units Subcutaneous HS Odell Welch MD      insulin lispro  1-5 Units Subcutaneous HS Rojelio Salomon MD      insulin lispro  1-6 Units Subcutaneous TID [facility] Rojelio Salomon MD      insulin lispro  3 Units Subcutaneous TID With Meals Odell Welch MD      ipratropium  0 5 mg Nebulization TID Rojelio aSlomon MD      levalbuterol  1 25 mg Nebulization TID Rojelio Salomon MD      levothyroxine  150 mcg Oral Early Morning Jhonny Tiffany Najera MD      metoprolol  5 mg Intravenous Q6H PRN Rama Lucio MD      metoprolol succinate  50 mg Oral Q12H Shi Sims MD      morphine injection  2 mg Intravenous Q3H PRN Donepifanio ZIMMERMAN Sy, DO      oxyCODONE  2 5 mg Oral Q6H PRN Donepifanio ZIMMERMAN Sy, DO      Or    oxyCODONE  5 mg Oral Q6H PRN Loulou ZIMMERMAN Sy, DO      pantoprazole  40 mg Oral Early Morning Cindy Dyer MD      polyethylene glycol  17 g Oral Daily PRN Bailey Watson MD      senna  1 tablet Oral HS Loulou Sams DO          Today, Patient Was Seen By: Bailey Watson MD    **Please Note: This note may have been constructed using a voice recognition system  **

## 2021-08-25 ENCOUNTER — TELEPHONE (OUTPATIENT)
Dept: HEMATOLOGY ONCOLOGY | Facility: CLINIC | Age: 81
End: 2021-08-25

## 2021-08-25 NOTE — UTILIZATION REVIEW
Notification of Discharge   This is a Notification of Discharge from our facility [address]  Please be advised that this patient has been discharge from our facility  Below you will find the admission and discharge date and time including the patients disposition  UTILIZATION REVIEW CONTACT:  Galindo Quan  Utilization   Network Utilization Review Department  Phone: [phone] x carefully listen to the prompts  All voicemails are confidential   Email: [email or web address]     PHYSICIAN ADVISORY SERVICES:  FOR [id number] REVIEW - MEDICAL NECESSITY DENIAL  Phone: [phone]  Fax: [phone]  Email: [email or web address]     PRESENTATION DATE: 8/18/2021  3:23 PM  OBERVATION ADMISSION DATE:   INPATIENT ADMISSION DATE: 8/18/21 10:46 PM   DISCHARGE DATE: 8/24/2021  1:27 PM  DISPOSITION: Home with [place] with [address] INFORMATION:  Send all requests for admission clinical reviews, approved or denied determinations and any other requests to dedicated fax number below belonging to the campus where the patient is receiving treatment   List of dedicated fax numbers:  [address] DENIALS (Administrative/Medical Necessity) [phone]   [address] [place] [address] (Maternity/NICU/Pediatrics) [phone]   Kashif Pawel [phone]   [facility] [phone]   Brandon Minors [phone]   [address] [phone]   [facility]  [phone]   [address] [facility]  [address] [facility] [address] [facility] [phone]

## 2021-08-26 ENCOUNTER — TELEPHONE (OUTPATIENT)
Dept: CARDIOLOGY CLINIC | Facility: CLINIC | Age: 81
End: 2021-08-26

## 2021-08-26 NOTE — TELEPHONE ENCOUNTER
I called Charis back and left her a message with the information above 
Please note, we have nothing available, does the patient need to be seen sooner?
Scheduling Appointment     Who Is Calling to Schedule Charis @ [facility]    Doctor Dr Mariano Carmen   Date and Time 09/14 at 9 am         Patient verbalized understanding  Next Hospital FU available if wants to be sooner please call back Charis to R/S at [phone] 
see HPI

## 2021-08-26 NOTE — TELEPHONE ENCOUNTER
Patient's visiting nurse called with an update on his bp and hr  On 8/25 his bp was 92/50 hr 70 , today it was 102/60 hr 90  His is currently taking digoxin 125 mcg and toprol xl 50mg bid  He has a hospital follow up appointment with you tomorrow

## 2021-08-27 ENCOUNTER — OFFICE VISIT (OUTPATIENT)
Dept: CARDIOLOGY CLINIC | Facility: CLINIC | Age: 81
End: 2021-08-27
Payer: COMMERCIAL

## 2021-08-27 VITALS
WEIGHT: 163 LBS | BODY MASS INDEX: 30 KG/M2 | HEIGHT: 62 IN | SYSTOLIC BLOOD PRESSURE: 116 MMHG | HEART RATE: 122 BPM | DIASTOLIC BLOOD PRESSURE: 52 MMHG

## 2021-08-27 DIAGNOSIS — R91.8 MASS OF RIGHT LUNG: ICD-10-CM

## 2021-08-27 DIAGNOSIS — J44.9 CHRONIC OBSTRUCTIVE PULMONARY DISEASE, UNSPECIFIED COPD TYPE (HCC): ICD-10-CM

## 2021-08-27 DIAGNOSIS — I48.0 PAROXYSMAL A-FIB (HCC): Primary | ICD-10-CM

## 2021-08-27 DIAGNOSIS — I48.91 ATRIAL FIBRILLATION WITH RVR (HCC): ICD-10-CM

## 2021-08-27 PROCEDURE — 1036F TOBACCO NON-USER: CPT | Performed by: INTERNAL MEDICINE

## 2021-08-27 PROCEDURE — 93000 ELECTROCARDIOGRAM COMPLETE: CPT | Performed by: INTERNAL MEDICINE

## 2021-08-27 PROCEDURE — 1160F RVW MEDS BY RX/DR IN RCRD: CPT | Performed by: INTERNAL MEDICINE

## 2021-08-27 PROCEDURE — 99214 OFFICE O/P EST MOD 30 MIN: CPT | Performed by: INTERNAL MEDICINE

## 2021-08-27 RX ORDER — DIGOXIN 125 MCG
TABLET ORAL
Qty: 45 TABLET | Refills: 3 | Status: SHIPPED | OUTPATIENT
Start: 2021-08-27 | End: 2021-09-01 | Stop reason: HOSPADM

## 2021-08-27 NOTE — PROGRESS NOTES
Cardiology Follow Up    [facility]  1940  [id number]  [facility]  [address] [place]  [address]  [phone]    1  Paroxysmal A-fib with RVR  POCT ECG   2  Mass of right lung     3  Chronic obstructive pulmonary disease, unspecified COPD type ([address] )     4  Atrial fibrillation with RVR (HCC)  digoxin (LANOXIN) 0 125 mg tablet       Discussion/Summary:      Remains in uncontrolled atrial fibrillation  Had failed amiodarone despite high doses an adequate load  blood pressure has been borderline  I will increase the digoxin to alternating 125 in 250 mcg daily  Continue current dose of metoprolol  Anticoagulation with Eliquis  The results of his biopsy are still pending  Depending on this, he will start chemotherapy under the direction of his oncologist     We filled out a POLST  Form but his answers to my questions seem somewhat inconsistent  During his hospitalization, I see that he was DNR but accepting of endotracheal intubation  Today, he said the exact opposite to me that he would want resuscitation, but would not want intubation  His wife and his son were present for these discussions as well  I think there will likely be some changes in his thinking depending on what ever the results of his biopsy are and how he response to chemotherapy  History of Present Illness:      Rambo Zafar returns to the office today for follow-up with me  Large right lung mass  In that setting, was diagnosed with atrial fibrillation back in June  Initially, he had converted back to sinus rhythm and was on metoprolol  He had a recurrent admission in July at which time he was started on amiodarone      Anticoagulation has been off and on and he has not been referred for cardioversion previously because of the need for uninterrupted anticoagulation and the need for lung biopsy which would require holding this  Ultimately, his amiodarone was increased because he was still having episodes of breakthrough  Visiting nurse contacted me recently because he was having ongoing tachycardia and hypotension  He has been asymptomatic with it, but recently his heart rate was in the 140s  I had recommended hospitalization, but he did not want to go directly there so came to the office instead which confirmed AFib with rapid rates  He was then directed to the emergency room  He was taken off of amiodarone as he had been on an appropriate load and felt that this was not being effective, and instead was started on digoxin for rate control  He is back on Eliquis at this point  He is awaiting the results of his most recent lung biopsy to determine whether not he would initiate chemotherapy with his oncologist       Patient's son and his wife were in the office with him today  They brought a POLST form as well which they asked me to help fill out  There was a palliative care consultation during the last admission  he tells me he is feeling okay  No problems with the anticoagulation  His heart rate in the office remains rapid in the 120s  We spent some time discussing his code status  He does not seem to have a great grasped with this  I tried to explain it very clearly to him, but I think he struggles a little bit with understanding the specifics of these discussions  A consistent the team both currently and as I reviewed the hospital records is that he would not want prolonged artificial support  However, he very clearly told me today that he would want CPR and full resuscitation but not prolonged intubation  I filled out his pulsed form indicating this  He does not want hydration or nutrition via tube feeds      Medical Problems     Problem List     Acute ITP (HCC)    Idiopathic thrombocytopenic purpura (ITP) (HCC)    Paroxysmal A-fib with RVR    Mass of right lung Hypothyroidism    Leukocytosis    Type 2 diabetes mellitus (HCC)      Lab Results   Component Value Date    HGBA1C 7 7 (H) 2021         COPD (chronic obstructive pulmonary disease) (HCC)    Hyperkalemia    Chronic respiratory failure with hypoxia (HCC)    Right flank pain              Past Medical History:   Diagnosis Date    Hypertension      Social History     Tobacco Use    Smoking status: Former Smoker     Packs/day: 0 50     Years: 40 00     Pack years: 20 00     Types: Cigarettes     Start date: 14     Quit date: 2021     Years since quittin 1    Smokeless tobacco: Never Used   Vaping Use    Vaping Use: Never used   Substance Use Topics    Alcohol use: Not Currently     Comment: History of heavier drinking in early 25s  Denies any current alcohol use (Updated 2021)       Drug use: Never      Family History   Problem Relation Age of Onset   Lacy Lamar Cancer Mother         "ate away her bone"    Anuerysm Father     Cancer Sister         unknown type    Heart attack Maternal Grandfather     Cancer Sister         unknown type    Heart attack Maternal Aunt     Heart attack Maternal Uncle     Heart attack Paternal Aunt      Past Surgical History:   Procedure Laterality Date    BACK SURGERY      CARPAL TUNNEL RELEASE Bilateral     IR BIOPSY BONE MARROW  6/10/2021    IR BIOPSY LUNG  2021    IR BIOPSY LUNG  2021    IR BIOPSY OTHER  2021    IR THORACENTESIS  2021    LUMBAR DISC SURGERY         Current Outpatient Medications:     acetaminophen (TYLENOL) 500 mg tablet, Take 2 tablets (1,000 mg total) by mouth every 8 (eight) hours, Disp: 180 tablet, Rfl: 0    albuterol (2 5 mg/3 mL) 0 083 % nebulizer solution, Take 3 mL (2 5 mg total) by nebulization every 6 (six) hours as needed for wheezing or shortness of breath, Disp: 360 mL, Rfl: 0    Alcohol Swabs ( Sterile Alcohol Prep) PADS, Use as directed, Disp: , Rfl:     benzonatate (TESSALON PERLES) 100 mg capsule, Take 1 capsule (100 mg total) by mouth 3 (three) times a day as needed for cough, Disp: 20 capsule, Rfl: 0    dextromethorphan-guaiFENesin (ROBITUSSIN DM)  mg/5 mL syrup, Take 10 mL by mouth every 4 (four) hours as needed for cough, Disp: 473 mL, Rfl: 0    Diclofenac Sodium (VOLTAREN) 1 %, Apply 2 g topically 4 (four) times a day, Disp: 350 g, Rfl: 0    digoxin (LANOXIN) 0 125 mg tablet, Alternate 125mcg and 250mcg daily  , Disp: 45 tablet, Rfl: 3    Eliquis 5 MG, TAKE ONE TABLET (5 MG TOTAL) BY MOUTH 2 (TWO) TIMES A DAY, Disp: 60 tablet, Rfl: 1    gabapentin (NEURONTIN) 100 mg capsule, Take 1 capsule (100 mg total) by mouth daily at bedtime, Disp: 30 capsule, Rfl: 0    glipiZIDE (GLUCOTROL) 5 mg tablet, Take 5 mg by mouth 2 (two) times a day, Disp: , Rfl:     guaiFENesin (MUCINEX) 600 mg 12 hr tablet, Take 1 tablet (600 mg total) by mouth 2 (two) times a day, Disp: 30 tablet, Rfl: 0    hydrOXYzine HCL (ATARAX) 25 mg tablet, Take 25 mg by mouth 3 (three) times a day as needed, Disp: , Rfl:     levothyroxine 150 mcg tablet, Take 150 mcg by mouth daily, Disp: , Rfl:     metFORMIN (GLUCOPHAGE) 500 mg tablet, Take 1 tablet (500 mg total) by mouth 2 (two) times a day with meals, Disp: 60 tablet, Rfl: 0    metoprolol succinate (TOPROL-XL) 50 mg 24 hr tablet, Take 1 tablet (50 mg total) by mouth every 12 (twelve) hours, Disp: 60 tablet, Rfl: 0    Omega-3 Fatty Acids (FISH OIL) 1,000 mg, Take 1,000 mg by mouth 2 (two) times a day, Disp: , Rfl:     pantoprazole (PROTONIX) 40 mg tablet, Take 1 tablet (40 mg total) by mouth daily, Disp: 30 tablet, Rfl: 0    senna (SENOKOT) 8 6 mg, Take 1 tablet (8 6 mg total) by mouth daily at bedtime, Disp: 30 tablet, Rfl: 0  Allergies   Allergen Reactions    Red Dye - Food Allergy Anaphylaxis     Tolerated all those medications before and currently takes them    Penicillins Hives       Vitals:    08/27/21 1354   BP: 116/52   Pulse: (!) 122   Weight: 73 9 kg (163 lb) Height: 5' 2" (1 575 m)     Vitals:    08/27/21 1354   Weight: 73 9 kg (163 lb)      Height: 5' 2" (157 5 cm)   Body mass index is 29 81 kg/m²  Physical Exam:   GENERAL: chronically ill appearing  HEENT:  PERRL, EOMI, no scleral icterus, no conjunctival pallor  NECK:  Supple, No elevated JVP, no thyromegaly, no carotid bruits  HEART:  Tachycardic, irregular  LUNGS:  Decreased on R  ABDOMEN:  Soft, non-tender, positive bowel sounds, no rebound or guarding  EXTREMITIES:  No edema  VASCULAR:  Normal pedal pulses   NEURO: No focal deficits,  SKIN: Normal without suspicious lesions on exposed skin    ROS:  Positive for shortness of breath, weakness  Except as noted in HPI, is otherwise reviewed in detail and a 12 point review of systems is negative  Labs:  Lab Results   Component Value Date    SODIUM 136 08/23/2021    K 4 0 08/23/2021     08/23/2021    CREATININE 0 83 08/23/2021    BUN 16 08/23/2021    CO2 31 08/23/2021    ALT 28 08/19/2021    AST 11 08/19/2021    INR 1 45 (H) 08/20/2021    GLUF 217 (H) 07/14/2021    HGBA1C 7 7 (H) 08/19/2021    WBC 7 84 08/21/2021    HGB 9 3 (L) 08/21/2021    HCT 32 1 (L) 08/21/2021     08/21/2021       No results found for: CHOL  Lab Results   Component Value Date    LDLCALC 69 05/28/2021    LDLCALC 60 07/15/2019     Lab Results   Component Value Date    HDL 28 (L) 05/28/2021    HDL 25 (L) 07/15/2019     Lab Results   Component Value Date    TRIG 105 05/28/2021    TRIG 218 (H) 07/15/2019     Testing:  LEFT VENTRICLE:  Systolic function was normal  Ejection fraction was estimated to be 60 %  There were no regional wall motion abnormalities      MITRAL VALVE:  There was mild regurgitation      TRICUSPID VALVE:  There was mild regurgitation  Estimated peak PA pressure was 42 mmHg  The findings suggest mild pulmonary hypertension      EKG:   atrial fibrillation  Rapid ventricular response  122 beats per minute

## 2021-08-30 ENCOUNTER — HOSPITAL ENCOUNTER (INPATIENT)
Facility: HOSPITAL | Age: 81
LOS: 2 days | Discharge: HOSPICE | DRG: 181 | End: 2021-09-01
Attending: EMERGENCY MEDICINE | Admitting: INTERNAL MEDICINE
Payer: COMMERCIAL

## 2021-08-30 ENCOUNTER — APPOINTMENT (EMERGENCY)
Dept: RADIOLOGY | Facility: HOSPITAL | Age: 81
DRG: 181 | End: 2021-08-30
Payer: COMMERCIAL

## 2021-08-30 DIAGNOSIS — R04.2 HEMOPTYSIS: Primary | ICD-10-CM

## 2021-08-30 DIAGNOSIS — R91.8 MASS OF RIGHT LUNG: ICD-10-CM

## 2021-08-30 DIAGNOSIS — R07.9 CHEST PAIN: ICD-10-CM

## 2021-08-30 DIAGNOSIS — Z51.5 HOSPICE CARE: ICD-10-CM

## 2021-08-30 PROBLEM — J96.21 ACUTE ON CHRONIC RESPIRATORY FAILURE WITH HYPOXIA (HCC): Status: ACTIVE | Noted: 2021-07-19

## 2021-08-30 LAB
ABO GROUP BLD: NORMAL
ALBUMIN SERPL BCP-MCNC: 1.4 G/DL (ref 3.5–5)
ALP SERPL-CCNC: 147 U/L (ref 46–116)
ALT SERPL W P-5'-P-CCNC: 21 U/L (ref 12–78)
ANION GAP SERPL CALCULATED.3IONS-SCNC: 5 MMOL/L (ref 4–13)
ANISOCYTOSIS BLD QL SMEAR: PRESENT
APTT PPP: 47 SECONDS (ref 23–37)
AST SERPL W P-5'-P-CCNC: 15 U/L (ref 5–45)
BACTERIA UR QL AUTO: ABNORMAL /HPF
BASOPHILS # BLD MANUAL: 0 THOUSAND/UL (ref 0–0.1)
BASOPHILS NFR MAR MANUAL: 0 % (ref 0–1)
BILIRUB SERPL-MCNC: 0.52 MG/DL (ref 0.2–1)
BILIRUB UR QL STRIP: ABNORMAL
BLD GP AB SCN SERPL QL: NEGATIVE
BUN SERPL-MCNC: 29 MG/DL (ref 5–25)
CALCIUM ALBUM COR SERPL-MCNC: 11.8 MG/DL (ref 8.3–10.1)
CALCIUM SERPL-MCNC: 9.7 MG/DL (ref 8.3–10.1)
CHLORIDE SERPL-SCNC: 101 MMOL/L (ref 100–108)
CLARITY UR: CLEAR
CO2 SERPL-SCNC: 31 MMOL/L (ref 21–32)
COLOR UR: ABNORMAL
CREAT SERPL-MCNC: 1.36 MG/DL (ref 0.6–1.3)
EOSINOPHIL # BLD MANUAL: 0 THOUSAND/UL (ref 0–0.4)
EOSINOPHIL NFR BLD MANUAL: 0 % (ref 0–6)
ERYTHROCYTE [DISTWIDTH] IN BLOOD BY AUTOMATED COUNT: 18.2 % (ref 11.6–15.1)
GFR SERPL CREATININE-BSD FRML MDRD: 49 ML/MIN/1.73SQ M
GLUCOSE SERPL-MCNC: 51 MG/DL (ref 65–140)
GLUCOSE SERPL-MCNC: 58 MG/DL (ref 65–140)
GLUCOSE SERPL-MCNC: 83 MG/DL (ref 65–140)
GLUCOSE UR STRIP-MCNC: NEGATIVE MG/DL
HCT VFR BLD AUTO: 29.1 % (ref 36.5–49.3)
HGB BLD-MCNC: 8.2 G/DL (ref 12–17)
HGB UR QL STRIP.AUTO: NEGATIVE
HYALINE CASTS #/AREA URNS LPF: ABNORMAL /LPF
INR PPP: 2.3 (ref 0.84–1.19)
KETONES UR STRIP-MCNC: NEGATIVE MG/DL
LEUKOCYTE ESTERASE UR QL STRIP: NEGATIVE
LYMPHOCYTES # BLD AUTO: 0.72 THOUSAND/UL (ref 0.6–4.47)
LYMPHOCYTES # BLD AUTO: 6 % (ref 14–44)
MCH RBC QN AUTO: 24.1 PG (ref 26.8–34.3)
MCHC RBC AUTO-ENTMCNC: 28.2 G/DL (ref 31.4–37.4)
MCV RBC AUTO: 86 FL (ref 82–98)
MONOCYTES # BLD AUTO: 0.24 THOUSAND/UL (ref 0–1.22)
MONOCYTES NFR BLD: 2 % (ref 4–12)
NEUTROPHILS # BLD MANUAL: 11.09 THOUSAND/UL (ref 1.85–7.62)
NEUTS SEG NFR BLD AUTO: 92 % (ref 43–75)
NITRITE UR QL STRIP: NEGATIVE
NON-SQ EPI CELLS URNS QL MICRO: ABNORMAL /HPF
PH UR STRIP.AUTO: 5.5 [PH]
PLATELET # BLD AUTO: 399 THOUSANDS/UL (ref 149–390)
PLATELET BLD QL SMEAR: ADEQUATE
PMV BLD AUTO: 9.6 FL (ref 8.9–12.7)
POLYCHROMASIA BLD QL SMEAR: PRESENT
POTASSIUM SERPL-SCNC: 4.6 MMOL/L (ref 3.5–5.3)
PROT SERPL-MCNC: 6.9 G/DL (ref 6.4–8.2)
PROT UR STRIP-MCNC: ABNORMAL MG/DL
PROTHROMBIN TIME: 25.2 SECONDS (ref 11.6–14.5)
RBC # BLD AUTO: 3.4 MILLION/UL (ref 3.88–5.62)
RBC #/AREA URNS AUTO: ABNORMAL /HPF
RH BLD: POSITIVE
SODIUM SERPL-SCNC: 137 MMOL/L (ref 136–145)
SP GR UR STRIP.AUTO: >1.045 (ref 1–1.03)
SPECIMEN EXPIRATION DATE: NORMAL
TROPONIN I SERPL-MCNC: <0.02 NG/ML
UROBILINOGEN UR QL STRIP.AUTO: 1 E.U./DL
WBC # BLD AUTO: 12.05 THOUSAND/UL (ref 4.31–10.16)
WBC #/AREA URNS AUTO: ABNORMAL /HPF

## 2021-08-30 PROCEDURE — 85007 BL SMEAR W/DIFF WBC COUNT: CPT | Performed by: STUDENT IN AN ORGANIZED HEALTH CARE EDUCATION/TRAINING PROGRAM

## 2021-08-30 PROCEDURE — 36415 COLL VENOUS BLD VENIPUNCTURE: CPT | Performed by: STUDENT IN AN ORGANIZED HEALTH CARE EDUCATION/TRAINING PROGRAM

## 2021-08-30 PROCEDURE — 85730 THROMBOPLASTIN TIME PARTIAL: CPT | Performed by: STUDENT IN AN ORGANIZED HEALTH CARE EDUCATION/TRAINING PROGRAM

## 2021-08-30 PROCEDURE — 84484 ASSAY OF TROPONIN QUANT: CPT | Performed by: STUDENT IN AN ORGANIZED HEALTH CARE EDUCATION/TRAINING PROGRAM

## 2021-08-30 PROCEDURE — 86901 BLOOD TYPING SEROLOGIC RH(D): CPT | Performed by: STUDENT IN AN ORGANIZED HEALTH CARE EDUCATION/TRAINING PROGRAM

## 2021-08-30 PROCEDURE — 85027 COMPLETE CBC AUTOMATED: CPT | Performed by: STUDENT IN AN ORGANIZED HEALTH CARE EDUCATION/TRAINING PROGRAM

## 2021-08-30 PROCEDURE — 82948 REAGENT STRIP/BLOOD GLUCOSE: CPT

## 2021-08-30 PROCEDURE — 81001 URINALYSIS AUTO W/SCOPE: CPT | Performed by: INTERNAL MEDICINE

## 2021-08-30 PROCEDURE — 99285 EMERGENCY DEPT VISIT HI MDM: CPT

## 2021-08-30 PROCEDURE — 94664 DEMO&/EVAL PT USE INHALER: CPT

## 2021-08-30 PROCEDURE — 86850 RBC ANTIBODY SCREEN: CPT | Performed by: STUDENT IN AN ORGANIZED HEALTH CARE EDUCATION/TRAINING PROGRAM

## 2021-08-30 PROCEDURE — 80053 COMPREHEN METABOLIC PANEL: CPT | Performed by: STUDENT IN AN ORGANIZED HEALTH CARE EDUCATION/TRAINING PROGRAM

## 2021-08-30 PROCEDURE — 94640 AIRWAY INHALATION TREATMENT: CPT

## 2021-08-30 PROCEDURE — 96374 THER/PROPH/DIAG INJ IV PUSH: CPT

## 2021-08-30 PROCEDURE — 86900 BLOOD TYPING SEROLOGIC ABO: CPT | Performed by: STUDENT IN AN ORGANIZED HEALTH CARE EDUCATION/TRAINING PROGRAM

## 2021-08-30 PROCEDURE — 99223 1ST HOSP IP/OBS HIGH 75: CPT | Performed by: INTERNAL MEDICINE

## 2021-08-30 PROCEDURE — 71260 CT THORAX DX C+: CPT

## 2021-08-30 PROCEDURE — 85610 PROTHROMBIN TIME: CPT | Performed by: STUDENT IN AN ORGANIZED HEALTH CARE EDUCATION/TRAINING PROGRAM

## 2021-08-30 PROCEDURE — 99285 EMERGENCY DEPT VISIT HI MDM: CPT | Performed by: EMERGENCY MEDICINE

## 2021-08-30 PROCEDURE — 93005 ELECTROCARDIOGRAM TRACING: CPT

## 2021-08-30 RX ORDER — HYDROXYZINE HYDROCHLORIDE 25 MG/1
25 TABLET, FILM COATED ORAL 3 TIMES DAILY PRN
Status: DISCONTINUED | OUTPATIENT
Start: 2021-08-30 | End: 2021-09-01 | Stop reason: HOSPADM

## 2021-08-30 RX ORDER — LEVALBUTEROL 1.25 MG/.5ML
1.25 SOLUTION, CONCENTRATE RESPIRATORY (INHALATION)
Status: DISCONTINUED | OUTPATIENT
Start: 2021-08-31 | End: 2021-09-01 | Stop reason: HOSPADM

## 2021-08-30 RX ORDER — ALBUTEROL SULFATE 2.5 MG/3ML
2.5 SOLUTION RESPIRATORY (INHALATION) EVERY 6 HOURS PRN
Status: DISCONTINUED | OUTPATIENT
Start: 2021-08-30 | End: 2021-09-01 | Stop reason: HOSPADM

## 2021-08-30 RX ORDER — DEXTROSE MONOHYDRATE 25 G/50ML
50 INJECTION, SOLUTION INTRAVENOUS ONCE
Status: COMPLETED | OUTPATIENT
Start: 2021-08-30 | End: 2021-08-30

## 2021-08-30 RX ORDER — SODIUM CHLORIDE FOR INHALATION 0.9 %
3 VIAL, NEBULIZER (ML) INHALATION
Status: DISCONTINUED | OUTPATIENT
Start: 2021-08-31 | End: 2021-09-01 | Stop reason: HOSPADM

## 2021-08-30 RX ORDER — METOPROLOL SUCCINATE 50 MG/1
50 TABLET, EXTENDED RELEASE ORAL EVERY 12 HOURS
Status: DISCONTINUED | OUTPATIENT
Start: 2021-08-30 | End: 2021-09-01 | Stop reason: HOSPADM

## 2021-08-30 RX ORDER — LEVOTHYROXINE SODIUM 0.07 MG/1
150 TABLET ORAL
Status: DISCONTINUED | OUTPATIENT
Start: 2021-08-31 | End: 2021-09-01 | Stop reason: HOSPADM

## 2021-08-30 RX ORDER — ONDANSETRON 2 MG/ML
4 INJECTION INTRAMUSCULAR; INTRAVENOUS EVERY 6 HOURS PRN
Status: DISCONTINUED | OUTPATIENT
Start: 2021-08-30 | End: 2021-09-01 | Stop reason: HOSPADM

## 2021-08-30 RX ORDER — HYDROMORPHONE HCL/PF 1 MG/ML
0.5 SYRINGE (ML) INJECTION ONCE
Status: COMPLETED | OUTPATIENT
Start: 2021-08-30 | End: 2021-08-30

## 2021-08-30 RX ORDER — BENZONATATE 100 MG/1
100 CAPSULE ORAL 3 TIMES DAILY PRN
Status: DISCONTINUED | OUTPATIENT
Start: 2021-08-30 | End: 2021-09-01 | Stop reason: HOSPADM

## 2021-08-30 RX ORDER — PANTOPRAZOLE SODIUM 40 MG/1
40 TABLET, DELAYED RELEASE ORAL
Status: DISCONTINUED | OUTPATIENT
Start: 2021-08-31 | End: 2021-09-01 | Stop reason: HOSPADM

## 2021-08-30 RX ORDER — CHLORAL HYDRATE 500 MG
1000 CAPSULE ORAL 2 TIMES DAILY
Status: DISCONTINUED | OUTPATIENT
Start: 2021-08-30 | End: 2021-09-01 | Stop reason: HOSPADM

## 2021-08-30 RX ORDER — DEXTROSE MONOHYDRATE 25 G/50ML
25 INJECTION, SOLUTION INTRAVENOUS ONCE
Status: COMPLETED | OUTPATIENT
Start: 2021-08-31 | End: 2021-08-31

## 2021-08-30 RX ORDER — SODIUM CHLORIDE, SODIUM GLUCONATE, SODIUM ACETATE, POTASSIUM CHLORIDE, MAGNESIUM CHLORIDE, SODIUM PHOSPHATE, DIBASIC, AND POTASSIUM PHOSPHATE .53; .5; .37; .037; .03; .012; .00082 G/100ML; G/100ML; G/100ML; G/100ML; G/100ML; G/100ML; G/100ML
75 INJECTION, SOLUTION INTRAVENOUS CONTINUOUS
Status: DISCONTINUED | OUTPATIENT
Start: 2021-08-30 | End: 2021-08-31

## 2021-08-30 RX ORDER — DIGOXIN 250 MCG
250 TABLET ORAL EVERY OTHER DAY
Status: DISCONTINUED | OUTPATIENT
Start: 2021-09-01 | End: 2021-08-30

## 2021-08-30 RX ORDER — GUAIFENESIN/DEXTROMETHORPHAN 100-10MG/5
10 SYRUP ORAL EVERY 4 HOURS PRN
Status: DISCONTINUED | OUTPATIENT
Start: 2021-08-30 | End: 2021-09-01 | Stop reason: HOSPADM

## 2021-08-30 RX ORDER — SENNOSIDES 8.6 MG
8.6 TABLET ORAL
Status: DISCONTINUED | OUTPATIENT
Start: 2021-08-30 | End: 2021-09-01 | Stop reason: HOSPADM

## 2021-08-30 RX ORDER — DIGOXIN 125 MCG
125 TABLET ORAL EVERY OTHER DAY
Status: DISCONTINUED | OUTPATIENT
Start: 2021-08-31 | End: 2021-09-01 | Stop reason: HOSPADM

## 2021-08-30 RX ORDER — ACETAMINOPHEN 325 MG/1
650 TABLET ORAL EVERY 6 HOURS PRN
Status: DISCONTINUED | OUTPATIENT
Start: 2021-08-30 | End: 2021-09-01 | Stop reason: HOSPADM

## 2021-08-30 RX ORDER — GABAPENTIN 100 MG/1
100 CAPSULE ORAL
Status: DISCONTINUED | OUTPATIENT
Start: 2021-08-30 | End: 2021-09-01 | Stop reason: HOSPADM

## 2021-08-30 RX ORDER — GUAIFENESIN 600 MG
600 TABLET, EXTENDED RELEASE 12 HR ORAL 2 TIMES DAILY
Status: DISCONTINUED | OUTPATIENT
Start: 2021-08-30 | End: 2021-09-01 | Stop reason: HOSPADM

## 2021-08-30 RX ADMIN — HYDROMORPHONE HYDROCHLORIDE 0.5 MG: 1 INJECTION, SOLUTION INTRAMUSCULAR; INTRAVENOUS; SUBCUTANEOUS at 15:15

## 2021-08-30 RX ADMIN — DEXTROSE MONOHYDRATE 50 ML: 500 INJECTION PARENTERAL at 20:30

## 2021-08-30 RX ADMIN — METOPROLOL SUCCINATE 50 MG: 50 TABLET, EXTENDED RELEASE ORAL at 21:36

## 2021-08-30 RX ADMIN — IOHEXOL 85 ML: 350 INJECTION, SOLUTION INTRAVENOUS at 18:03

## 2021-08-30 RX ADMIN — SODIUM CHLORIDE, SODIUM GLUCONATE, SODIUM ACETATE, POTASSIUM CHLORIDE, MAGNESIUM CHLORIDE, SODIUM PHOSPHATE, DIBASIC, AND POTASSIUM PHOSPHATE 75 ML/HR: .53; .5; .37; .037; .03; .012; .00082 INJECTION, SOLUTION INTRAVENOUS at 20:50

## 2021-08-30 RX ADMIN — ALBUTEROL SULFATE 2.5 MG: 2.5 SOLUTION RESPIRATORY (INHALATION) at 21:32

## 2021-08-30 RX ADMIN — OMEGA-3 FATTY ACIDS CAP 1000 MG 1000 MG: 1000 CAP at 21:36

## 2021-08-30 RX ADMIN — GABAPENTIN 100 MG: 100 CAPSULE ORAL at 21:36

## 2021-08-30 RX ADMIN — GUAIFENESIN 600 MG: 600 TABLET, EXTENDED RELEASE ORAL at 21:36

## 2021-08-31 PROBLEM — R65.10 SIRS (SYSTEMIC INFLAMMATORY RESPONSE SYNDROME) (HCC): Status: ACTIVE | Noted: 2021-08-31

## 2021-08-31 PROBLEM — D63.8 ANEMIA OF CHRONIC DISEASE: Status: ACTIVE | Noted: 2021-08-31

## 2021-08-31 LAB
ANION GAP SERPL CALCULATED.3IONS-SCNC: 6 MMOL/L (ref 4–13)
ATRIAL RATE: 110 BPM
BASE EX.OXY STD BLDV CALC-SCNC: 80.8 % (ref 60–80)
BASE EXCESS BLDV CALC-SCNC: 3.5 MMOL/L
BUN SERPL-MCNC: 26 MG/DL (ref 5–25)
CALCIUM SERPL-MCNC: 8.9 MG/DL (ref 8.3–10.1)
CHLORIDE SERPL-SCNC: 100 MMOL/L (ref 100–108)
CO2 SERPL-SCNC: 31 MMOL/L (ref 21–32)
CREAT SERPL-MCNC: 1.11 MG/DL (ref 0.6–1.3)
ERYTHROCYTE [DISTWIDTH] IN BLOOD BY AUTOMATED COUNT: 18.3 % (ref 11.6–15.1)
GFR SERPL CREATININE-BSD FRML MDRD: 63 ML/MIN/1.73SQ M
GLUCOSE SERPL-MCNC: 134 MG/DL (ref 65–140)
GLUCOSE SERPL-MCNC: 161 MG/DL (ref 65–140)
GLUCOSE SERPL-MCNC: 175 MG/DL (ref 65–140)
GLUCOSE SERPL-MCNC: 258 MG/DL (ref 65–140)
GLUCOSE SERPL-MCNC: 382 MG/DL (ref 65–140)
GLUCOSE SERPL-MCNC: 382 MG/DL (ref 65–140)
GLUCOSE SERPL-MCNC: 51 MG/DL (ref 65–140)
GLUCOSE SERPL-MCNC: 70 MG/DL (ref 65–140)
GLUCOSE SERPL-MCNC: 90 MG/DL (ref 65–140)
GLUCOSE SERPL-MCNC: 95 MG/DL (ref 65–140)
HCO3 BLDV-SCNC: 29.4 MMOL/L (ref 24–30)
HCT VFR BLD AUTO: 25.6 % (ref 36.5–49.3)
HGB BLD-MCNC: 7.2 G/DL (ref 12–17)
MCH RBC QN AUTO: 24.2 PG (ref 26.8–34.3)
MCHC RBC AUTO-ENTMCNC: 28.1 G/DL (ref 31.4–37.4)
MCV RBC AUTO: 86 FL (ref 82–98)
O2 CT BLDV-SCNC: 10.3 ML/DL
PCO2 BLDV: 52.1 MM HG (ref 42–50)
PH BLDV: 7.37 [PH] (ref 7.3–7.4)
PLATELET # BLD AUTO: 376 THOUSANDS/UL (ref 149–390)
PMV BLD AUTO: 9.3 FL (ref 8.9–12.7)
PO2 BLDV: 49.1 MM HG (ref 35–45)
POTASSIUM SERPL-SCNC: 4.4 MMOL/L (ref 3.5–5.3)
QRS AXIS: 75 DEGREES
QRSD INTERVAL: 70 MS
QT INTERVAL: 302 MS
QTC INTERVAL: 401 MS
RBC # BLD AUTO: 2.97 MILLION/UL (ref 3.88–5.62)
SODIUM SERPL-SCNC: 137 MMOL/L (ref 136–145)
T WAVE AXIS: 267 DEGREES
VENTRICULAR RATE: 106 BPM
WBC # BLD AUTO: 10.69 THOUSAND/UL (ref 4.31–10.16)

## 2021-08-31 PROCEDURE — 82948 REAGENT STRIP/BLOOD GLUCOSE: CPT

## 2021-08-31 PROCEDURE — 94640 AIRWAY INHALATION TREATMENT: CPT

## 2021-08-31 PROCEDURE — 80048 BASIC METABOLIC PNL TOTAL CA: CPT | Performed by: INTERNAL MEDICINE

## 2021-08-31 PROCEDURE — 99232 SBSQ HOSP IP/OBS MODERATE 35: CPT | Performed by: INTERNAL MEDICINE

## 2021-08-31 PROCEDURE — NC001 PR NO CHARGE: Performed by: INTERNAL MEDICINE

## 2021-08-31 PROCEDURE — 93010 ELECTROCARDIOGRAM REPORT: CPT | Performed by: INTERNAL MEDICINE

## 2021-08-31 PROCEDURE — 99232 SBSQ HOSP IP/OBS MODERATE 35: CPT | Performed by: STUDENT IN AN ORGANIZED HEALTH CARE EDUCATION/TRAINING PROGRAM

## 2021-08-31 PROCEDURE — 94760 N-INVAS EAR/PLS OXIMETRY 1: CPT

## 2021-08-31 PROCEDURE — 99222 1ST HOSP IP/OBS MODERATE 55: CPT | Performed by: INTERNAL MEDICINE

## 2021-08-31 PROCEDURE — 85027 COMPLETE CBC AUTOMATED: CPT | Performed by: INTERNAL MEDICINE

## 2021-08-31 PROCEDURE — 82805 BLOOD GASES W/O2 SATURATION: CPT | Performed by: INTERNAL MEDICINE

## 2021-08-31 PROCEDURE — 99223 1ST HOSP IP/OBS HIGH 75: CPT | Performed by: NURSE PRACTITIONER

## 2021-08-31 RX ORDER — DEXTROSE MONOHYDRATE 25 G/50ML
50 INJECTION, SOLUTION INTRAVENOUS ONCE
Status: COMPLETED | OUTPATIENT
Start: 2021-08-31 | End: 2021-08-31

## 2021-08-31 RX ORDER — ALBUMIN (HUMAN) 12.5 G/50ML
12.5 SOLUTION INTRAVENOUS ONCE
Status: COMPLETED | OUTPATIENT
Start: 2021-08-31 | End: 2021-08-31

## 2021-08-31 RX ORDER — HYDROMORPHONE HCL IN WATER/PF 6 MG/30 ML
0.2 PATIENT CONTROLLED ANALGESIA SYRINGE INTRAVENOUS
Status: DISCONTINUED | OUTPATIENT
Start: 2021-08-31 | End: 2021-09-01 | Stop reason: HOSPADM

## 2021-08-31 RX ORDER — METHYLPREDNISOLONE SODIUM SUCCINATE 125 MG/2ML
125 INJECTION, POWDER, LYOPHILIZED, FOR SOLUTION INTRAMUSCULAR; INTRAVENOUS ONCE
Status: COMPLETED | OUTPATIENT
Start: 2021-08-31 | End: 2021-08-31

## 2021-08-31 RX ORDER — ECHINACEA PURPUREA EXTRACT 125 MG
1 TABLET ORAL
Status: DISCONTINUED | OUTPATIENT
Start: 2021-08-31 | End: 2021-09-01 | Stop reason: HOSPADM

## 2021-08-31 RX ADMIN — ALBUTEROL SULFATE 2.5 MG: 2.5 SOLUTION RESPIRATORY (INHALATION) at 01:21

## 2021-08-31 RX ADMIN — LEVALBUTEROL HYDROCHLORIDE 1.25 MG: 1.25 SOLUTION, CONCENTRATE RESPIRATORY (INHALATION) at 13:29

## 2021-08-31 RX ADMIN — ISODIUM CHLORIDE 3 ML: 0.03 SOLUTION RESPIRATORY (INHALATION) at 07:42

## 2021-08-31 RX ADMIN — GUAIFENESIN 600 MG: 600 TABLET, EXTENDED RELEASE ORAL at 17:18

## 2021-08-31 RX ADMIN — OMEGA-3 FATTY ACIDS CAP 1000 MG 1000 MG: 1000 CAP at 08:58

## 2021-08-31 RX ADMIN — PANTOPRAZOLE SODIUM 40 MG: 40 TABLET, DELAYED RELEASE ORAL at 06:21

## 2021-08-31 RX ADMIN — LEVOTHYROXINE SODIUM 150 MCG: 75 TABLET ORAL at 05:26

## 2021-08-31 RX ADMIN — ACETAMINOPHEN 650 MG: 325 TABLET, FILM COATED ORAL at 17:21

## 2021-08-31 RX ADMIN — STANDARDIZED SENNA CONCENTRATE 8.6 MG: 8.6 TABLET ORAL at 22:13

## 2021-08-31 RX ADMIN — OMEGA-3 FATTY ACIDS CAP 1000 MG 1000 MG: 1000 CAP at 17:18

## 2021-08-31 RX ADMIN — ALBUMIN (HUMAN) 12.5 G: 0.25 INJECTION, SOLUTION INTRAVENOUS at 03:51

## 2021-08-31 RX ADMIN — ISODIUM CHLORIDE 3 ML: 0.03 SOLUTION RESPIRATORY (INHALATION) at 19:12

## 2021-08-31 RX ADMIN — METHYLPREDNISOLONE SODIUM SUCCINATE 125 MG: 125 INJECTION, POWDER, FOR SOLUTION INTRAMUSCULAR; INTRAVENOUS at 13:49

## 2021-08-31 RX ADMIN — METOPROLOL SUCCINATE 50 MG: 50 TABLET, EXTENDED RELEASE ORAL at 08:58

## 2021-08-31 RX ADMIN — ISODIUM CHLORIDE 3 ML: 0.03 SOLUTION RESPIRATORY (INHALATION) at 13:29

## 2021-08-31 RX ADMIN — GUAIFENESIN 600 MG: 600 TABLET, EXTENDED RELEASE ORAL at 08:58

## 2021-08-31 RX ADMIN — DEXTROSE MONOHYDRATE 50 ML: 500 INJECTION PARENTERAL at 04:37

## 2021-08-31 RX ADMIN — LEVALBUTEROL HYDROCHLORIDE 1.25 MG: 1.25 SOLUTION, CONCENTRATE RESPIRATORY (INHALATION) at 07:42

## 2021-08-31 RX ADMIN — DEXTROSE MONOHYDRATE 25 ML: 25 INJECTION, SOLUTION INTRAVENOUS at 00:08

## 2021-08-31 RX ADMIN — DIGOXIN 125 MCG: 125 TABLET ORAL at 08:58

## 2021-08-31 RX ADMIN — LEVALBUTEROL HYDROCHLORIDE 1.25 MG: 1.25 SOLUTION, CONCENTRATE RESPIRATORY (INHALATION) at 19:12

## 2021-08-31 RX ADMIN — GABAPENTIN 100 MG: 100 CAPSULE ORAL at 22:13

## 2021-08-31 RX ADMIN — GUAIFENESIN AND DEXTROMETHORPHAN 10 ML: 100; 10 SYRUP ORAL at 17:18

## 2021-08-31 RX ADMIN — BENZONATATE 100 MG: 100 CAPSULE ORAL at 01:49

## 2021-08-31 RX ADMIN — GUAIFENESIN AND DEXTROMETHORPHAN 10 ML: 100; 10 SYRUP ORAL at 08:58

## 2021-08-31 NOTE — ASSESSMENT & PLAN NOTE
· Baseline approximately 0 9  · Provide gentle IV fluid overnight, recheck BMP in a m    · Measure PVR and bladder scan, check UA  · Avoid/limit nephrotoxins and avoid hypotension

## 2021-08-31 NOTE — CONSULTS
Consultation - Palliative and Supportive Care   Nely Angel 68 y o  male [id number]    Patient Active Problem List   Diagnosis    Acute ITP ([facility] [address] )    Idiopathic thrombocytopenic purpura (ITP) (HCC)    Paroxysmal A-fib with RVR    Mass of right lung    Hypothyroidism    Acute renal failure (ARF) (HCC)    Leukocytosis    Type 2 diabetes mellitus (HCC)    COPD (chronic obstructive pulmonary disease) (HCC)    Hyperkalemia    Acute on chronic respiratory failure with hypoxia (HCC)    Right flank pain    Hemoptysis     Active issues specifically addressed today include:   Goals of care  Mass of the right lung  Hemoptysis    Plan:  1  Symptom management  -      2  Goals  -  Limits set at DNAR/DNI  -  Patient would like to go home after options explored for hemoptysis  -  Family exploring options of home hospice  -       Code Status: DNAR/DNI   - Level 3   Decisional apparatus:  Patient is competent on my exam today  If competence is lost, patient's substitute decision maker would default to spouse by [place]  Advance Directive / Living Will / POLST:  None on file    I have reviewed the patient's controlled substance dispensing history in the Prescription Drug Monitoring Program in compliance with the [facility] regulations before prescribing any controlled substances  We appreciate the invitation to be involved in this patient's care  We will continue to follow  Please do not hesitate to reach our on call provider through our clinic answering service at  should you have acute symptom control concerns  CHRIS Hathaway  Palliative and Supportive Care  Clinic/Answering Service: [phone]  You can find me on [employer]!        IDENTIFICATION:  Inpatient consult to Palliative Care  Consult performed by: CHRIS Mariee  Consult ordered by: Spenser Ivy MD        Physician Requesting Consult: Spenser Ivy MD  Reason for Consult / Principal Problem: goals of care, support  Hx and PE limited by:    HISTORY OF PRESENT ILLNESS:       Guerita Lopez is a 68 y o  male who presents with hemoptysis and shortness of breath  Patient follows with HemOnc  Case discussed at thoracic tumor board and with CT surgery, poor surgical candidate due to mass burden and COPD  CT imaging during lat admission revealed no PE, complete collapse of RUL, RLL, new moderate pleural effusion, slight increase in R lung mass, NICHELLE mass and RLL, LLL nodules have increased  Oncology consulted, recommend IR consult for biopsy of R lung mass and L pulmonary nodule  Most recent imaging revealed Interval progression of disease  Large ill-defined infiltrative right lung mass now occludes the right lower lobe bronchus  There is complete occlusion of the distal right mainstem bronchus  There is resultant atelectasis and a stable moderate right pleural effusion  Also of note is progression of bubbles of gas within the right lung mass  Cannot exclude necrosis or infection  Palliative consulted for goals of care  The patient states he wants to go home  Family expresses that they are frustrated that the patient has undergone multiple procedures and has not received a diagnosis or the opportunity for treatment  The family and patient inquired about home hospice, information was given  Additionally explained the inpatient hospice unit  and qualifications  Family expressed understanding, hospice liaison to come to bedside to offer further information  Review of Systems   Unable to perform ROS: other (patient short of breath, mutiple fmaily members present)   Cardiovascular: Positive for dyspnea on exertion  Respiratory: Positive for shortness of breath          Past Medical History:   Diagnosis Date    Hypertension      Past Surgical History:   Procedure Laterality Date    BACK SURGERY      CARPAL TUNNEL RELEASE Bilateral     IR BIOPSY BONE MARROW  6/10/2021    IR BIOPSY LUNG  7/21/2021    IR BIOPSY LUNG  2021    IR BIOPSY OTHER  2021    IR THORACENTESIS  2021    LUMBAR DISC SURGERY       Social History     Socioeconomic History    Marital status: /Civil Union     Spouse name: Christina Olson Number of children: 10    Years of education: Not on file    Highest education level: Not on file   Occupational History    Not on file   Tobacco Use    Smoking status: Former Smoker     Packs/day: 0 50     Years: 40 00     Pack years: 20 00     Types: Cigarettes     Start date: 14     Quit date: 2021     Years since quittin 2    Smokeless tobacco: Never Used   Vaping Use    Vaping Use: Never used   Substance and Sexual Activity    Alcohol use: Not Currently     Comment: History of heavier drinking in early   Denies any current alcohol use (Updated 2021)   Drug use: Never    Sexual activity: Not on file   Other Topics Concern    Not on file   Social History Narrative    Previously worked as heavy machinery technician  Lives with his wife who is essentially bed bound  Social Determinants of Health     Financial Resource Strain: High Risk    Difficulty of Paying Living Expenses: Hard   Food Insecurity: Food Insecurity Present    Worried About Running Out of Food in the Last Year: Sometimes true    [place] of Food in the Last Year: Not on file   Transportation Needs:     Lack of Transportation (Medical):      Lack of Transportation (Non-Medical):    Physical Activity:     Days of Exercise per Week:     Minutes of Exercise per Session:    Stress:     Feeling of Stress :    Social Connections:     Frequency of Communication with Friends and Family:     Frequency of Social Gatherings with Friends and Family:     Attends [employer] Services:     Active Member of Clubs or Organizations:     Attends Club or Organization Meetings:     Marital Status:    Intimate Partner Violence:     Fear of Current or Ex-Partner:     Emotionally Abused:     Physically Abused:     Sexually Abused:      Family History   Problem Relation Age of Onset    Cancer Mother         "ate away her bone"    Anuerysm Father     Cancer Sister         unknown type    Heart attack Maternal Grandfather     Cancer Sister         unknown type    Heart attack Maternal Aunt     Heart attack Maternal Uncle     Heart attack Paternal Aunt        MEDICATIONS / ALLERGIES:    current meds:   Current Facility-Administered Medications   Medication Dose Route Frequency    acetaminophen (TYLENOL) tablet 650 mg  650 mg Oral Q6H PRN    albuterol inhalation solution 2 5 mg  2 5 mg Nebulization Q6H PRN    benzonatate (TESSALON PERLES) capsule 100 mg  100 mg Oral TID PRN    dextromethorphan-guaiFENesin (ROBITUSSIN DM) oral syrup 10 mL  10 mL Oral Q4H PRN    digoxin (LANOXIN) tablet 125 mcg  125 mcg Oral Every Other Day    fish oil capsule 1,000 mg  1,000 mg Oral BID    gabapentin (NEURONTIN) capsule 100 mg  100 mg Oral HS    guaiFENesin (MUCINEX) 12 hr tablet 600 mg  600 mg Oral BID    HYDROmorphone HCl (DILAUDID) injection 0 2 mg  0 2 mg Intravenous Q3H PRN    hydrOXYzine HCL (ATARAX) tablet 25 mg  25 mg Oral TID PRN    insulin lispro (HumaLOG) 100 units/mL subcutaneous injection 1-5 Units  1-5 Units Subcutaneous Q6H ZOHAIB    levalbuterol (XOPENEX) inhalation solution 1 25 mg  1 25 mg Nebulization TID    levothyroxine tablet 150 mcg  150 mcg Oral Early Morning    metoprolol succinate (TOPROL-XL) 24 hr tablet 50 mg  50 mg Oral Q12H    ondansetron (ZOFRAN) injection 4 mg  4 mg Intravenous Q6H PRN    pantoprazole (PROTONIX) EC tablet 40 mg  40 mg Oral Daily Before Breakfast    phenol (CHLORASEPTIC) 1 4 % mucosal liquid 1 spray  1 spray Mouth/Throat Q2H PRN    senna (SENOKOT) tablet 8 6 mg  8 6 mg Oral HS    sodium chloride (OCEAN) 0 65 % nasal spray 1 spray  1 spray Each Nare Q1H PRN    sodium chloride 0 9 % inhalation solution 3 mL  3 mL Nebulization TID       Allergies   Allergen Reactions  Red Dye - Food Allergy Anaphylaxis     Tolerated all those medications before and currently takes them    Penicillins Hives       OBJECTIVE:    Physical Exam  Physical Exam  Constitutional:       General: He is in acute distress  Appearance: He is ill-appearing  HENT:      Head: Normocephalic and atraumatic  Nose: Nose normal       Mouth/Throat:      Mouth: Mucous membranes are moist       Pharynx: Oropharynx is clear  Eyes:      Extraocular Movements: Extraocular movements intact  Pupils: Pupils are equal, round, and reactive to light  Cardiovascular:      Rate and Rhythm: Tachycardia present  Pulses: Decreased pulses  Pulmonary:      Effort: Tachypnea present  Comments: Abdominal breathing noted  Abdominal:      Palpations: Abdomen is soft  Tenderness: There is no abdominal tenderness  Musculoskeletal:      Comments: Generally weak   Skin:     General: Skin is moist       Coloration: Skin is pale  Neurological:      General: No focal deficit present  Mental Status: He is alert  Psychiatric:         Attention and Perception: He is inattentive  Mood and Affect: Affect is flat  Lab Results:   CBC:   Lab Results   Component Value Date    WBC 10 69 (H) 08/31/2021    HGB 7 2 (L) 08/31/2021    HCT 25 6 (L) 08/31/2021    MCV 86 08/31/2021     08/31/2021    MCH 24 2 (L) 08/31/2021    MCHC 28 1 (L) 08/31/2021    RDW 18 3 (H) 08/31/2021    MPV 9 3 08/31/2021   , CMP:   Lab Results   Component Value Date    SODIUM 137 08/31/2021    K 4 4 08/31/2021     08/31/2021    CO2 31 08/31/2021    BUN 26 (H) 08/31/2021    CREATININE 1 11 08/31/2021    CALCIUM 8 9 08/31/2021    EGFR 63 08/31/2021   , PT/PTT:No results found for: PT, PTT  *    Counseling / Coordination of Care    Total floor / unit time spent today 45+  minutes  Greater than 50% of total time was spent with the patient and / or family counseling and / or coordination of care   A description of the counseling / coordination of care: goals of care, support for family, medication modifications for air hunger, explanation of hospice, collaboration with primary and pulmonary teams

## 2021-08-31 NOTE — ASSESSMENT & PLAN NOTE
Lab Results   Component Value Date    HGBA1C 7 7 (H) 08/19/2021       Recent Labs     08/30/21 2034   POCGLU 51*       Blood Sugar Average: Last 72 hrs:  (P) 51   · With hypoglycemia requiring D50 amp with improvement  · Hold metformin/glipizide, start SSI with Accu-Cheks while inpatient  · Initiate hypoglycemia protocol

## 2021-08-31 NOTE — ASSESSMENT & PLAN NOTE
With underlying history of lung cancer - see plan for lung mass regarding updated/progressive CT findings  Continue to hold anticoagulation (Eliquis)  Appreciate Interventional Radiology and pulmonology input -> IR requested bronchoscopy to determine source of bleed and possibly control, initially, prior to invasive intervention -> patient remains at high risk from a pulmonary standpoint for bronchoscopy, and family has opted to avoid this procedure  Palliative care will follow in regards to goals of care and possible transition to hospice - before family makes ultimate decision process, however, will appreciate radiation oncology evaluation to assess benefits from palliative radiation  Monitor H/H  Supportive care otherwise

## 2021-08-31 NOTE — PLAN OF CARE
Problem: MOBILITY - ADULT  Goal: Maintain or return to baseline ADL function  Description: INTERVENTIONS:  -  Assess patient's ability to carry out ADLs; assess patient's baseline for ADL function and identify physical deficits which impact ability to perform ADLs (bathing, care of mouth/teeth, toileting, grooming, dressing, etc )  - Assess/evaluate cause of self-care deficits   - Assess range of motion  - Assess patient's mobility; develop plan if impaired  - Assess patient's need for assistive devices and provide as appropriate  - Encourage maximum independence but intervene and supervise when necessary  - Involve family in performance of ADLs  - Assess for home care needs following discharge   - Consider OT consult to assist with ADL evaluation and planning for discharge  - Provide patient education as appropriate  Outcome: Progressing  Goal: Maintains/Returns to pre admission functional level  Description: INTERVENTIONS:  - Perform BMAT or MOVE assessment daily    - Set and communicate daily mobility goal to care team and patient/family/caregiver  - Collaborate with rehabilitation services on mobility goals if consulted  - Perform Range of Motion 3 times a day  - Reposition patient every 2 hours    - Dangle patient 3 times a day  - Stand patient 3 times a day  - Ambulate patient 3 times a day  - Out of bed to chair 3 times a day   - Out of bed for meals 2 times a day  - Out of bed for toileting  - Record patient progress and toleration of activity level   Outcome: Progressing     Problem: Potential for Falls  Goal: Patient will remain free of falls  Description: INTERVENTIONS:  - Educate patient/family on patient safety including physical limitations  - Instruct patient to call for assistance with activity   - Consult OT/PT to assist with strengthening/mobility   - Keep Call bell within reach  - Keep bed low and locked with side rails adjusted as appropriate  - Keep care items and personal belongings within reach  - Initiate and maintain comfort rounds  - Make Fall Risk Sign visible to staff  - Offer Toileting every 3 Hours, in advance of need  - Initiate/Maintain 2alarm  - Obtain necessary fall risk management equipment: 3  - Apply yellow socks and bracelet for high fall risk patients  - Consider moving patient to room near nurses station  Outcome: Progressing     Problem: Prexisting or High Potential for Compromised Skin Integrity  Goal: Skin integrity is maintained or improved  Description: INTERVENTIONS:  - Identify patients at risk for skin breakdown  - Assess and monitor skin integrity  - Assess and monitor nutrition and hydration status  - Monitor labs   - Assess for incontinence   - Turn and reposition patient  - Assist with mobility/ambulation  - Relieve pressure over bony prominences  - Avoid friction and shearing  - Provide appropriate hygiene as needed including keeping skin clean and dry  - Evaluate need for skin moisturizer/barrier cream  - Collaborate with interdisciplinary team   - Patient/family teaching  - Consider wound care consult   Outcome: Progressing

## 2021-08-31 NOTE — ASSESSMENT & PLAN NOTE
S/p IR guided lung biopsy on 8/23 - pending final pathology (sent to outside source for expert consultation) - also s/p thoracocentesis with pleural cytology on 7/30 negative for malignancy  CT imaging on presentation noting "Interval progression of disease  Large ill-defined infiltrative right lung mass now occludes the right lower lobe bronchus  There is complete occlusion of the distal right mainstem bronchus  There is resultant atelectasis and a stable moderate right pleural effusion  Also of note is progression of bubbles of gas within the right lung mass  Cannot exclude necrosis or infection    Interval increase in size of multiple masses and nodules in the left lung "  Previously was determined to be a poor surgical candidate due to mass burden and underlying COPD  Pulmonology following -> recommending palliative care evaluation for goals of care discussion due to poor overall prognosis  Awaiting radiation oncology input regarding possibility of palliative radiation for symptom relief

## 2021-08-31 NOTE — ASSESSMENT & PLAN NOTE
Lab Results   Component Value Date    HGBA1C 7 7 (H) 08/19/2021     On SSI coverage per Accu-Cheks while hospitalized - monitor blood sugars and avoid hypoglycemic episodes (intermittent overnight events noted s/p IV dextrose pushes)  Hold oral hypoglycemics

## 2021-08-31 NOTE — UTILIZATION REVIEW
Initial Clinical Review    Admission: Date/Time/Statement:   Admission Orders (From admission, onward)     Ordered        08/30/21 2048  Inpatient Admission  Once                   Orders Placed This Encounter   Procedures    Inpatient Admission     Standing Status:   Standing     Number of Occurrences:   1     Order Specific Question:   Level of Care     Answer:   Level 2 Stepdown / HOT [14]     Order Specific Question:   Estimated length of stay     Answer:   More than 2 Midnights     Order Specific Question:   Certification     Answer:   I certify that inpatient services are medically necessary for this patient for a duration of greater than two midnights  See H&P and MD Progress Notes for additional information about the patient's course of treatment  ED Arrival Information     Expected Arrival Acuity    - 8/30/2021 14:19 Emergent         Means of arrival Escorted by Service Admission type    [facility] Emergency         Arrival complaint    CP/SOB/Spitting up blood        Chief Complaint   Patient presents with    Shortness of Breath     hemoptosis       Initial Presentation: 68year old male to the ED from home with complaints of chest pain, increased shortness of breath and coughing up blood for several days  Admitted to CRITICAL CARE step down for hemoptysis, ARF, CHF  H/O COPD, afib on Eliquis  Wears 3LNC chronically  Recent admit from 8/18/21-8/24/21 for afib with RVR and at that time had biopsy of right lung mass with results pending  Has had an increase in SOB since then  CT chest concerning for progression of masses  Monitor CBC  Pulmonary consult  He arrives tachycardic, tachypneic  Lungs with decreased breath sounds and expiratory wheezing  Requiring 5LNC to maintain pulse ox  NPO after MN  Started on IV fluids  Recheck BMP  IR consult  Date: 8/31   Day 2:   Pulmonary Consult: Hemoptysis secondary to 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    Due to the necrotic and rapidly growing nature of the tumor, it is acting like an aggressive small cell cancer or lymphoma  Start solumedrol and nebulizers and likely progress to hospice  May benefit from bronchoscopy to control bleeding  8/31 IR consult: Recommend bronchoscopy prior to IR intervention, there is no active bleeding seen on CT chest from 08/30 and given patient has masses in both lungs it is unclear as to where patient may be bleeding from  If unable to stop bleeding from bronchoscopy, localization of bleeding region from [address] would be helpful, and empiric embolization would be probable next step    Palliative care consult: Poor surgical candidate  Family and patient exploring home hospice         ED Triage Vitals   Temperature Pulse Respirations Blood Pressure SpO2   08/30/21 1434 08/30/21 1434 08/30/21 1434 08/30/21 1434 08/30/21 1434   99 °F (37 2 °C) (!) 125 (!) 30 136/90 90 %      Temp Source Heart Rate Source Patient Position - Orthostatic VS BP Location FiO2 (%)   08/30/21 1434 08/30/21 1434 08/30/21 1434 08/30/21 1434 --   Tympanic Monitor Lying Right arm       Pain Score       08/30/21 1934       2          Wt Readings from Last 1 Encounters:   08/30/21 70 3 kg (155 lb)     Additional Vital Signs:   Time  Temp Pulse Resp  BP  MAP (mmHg)  SpO2  Calculated FIO2 (%) - Nasal Cannula  Nasal Cannula O2 Flow Rate (L/min)  O2 Device  Patient Position - Orthostatic VS   08/31/21 11:20:56  98 2 °F (36 8 °C) 98 18  110/71  84  94 %  --  --  --  --   08/31/21 08:50:17  -- 104 --  126/68  87  94 %  --  --  --  --   08/31/21 0745  -- -- --  --  --  93 %  40  5 L/min  Nasal cannula  --   08/31/21 07:32:54  98 8 °F (37 1 °C) 114Abnormal  36Abnormal   118/75  89  94 %  --  --  --  --   08/31/21 0356  -- 104 22  112/62  --  --  --  --  --  Lying   08/31/21 03:31:03  98 3 °F (36 8 °C) 117Abnormal  22  83/53Abnormal   63  96 %  --  --  --  --   08/31/21 0211  -- -- --  --  --  --  40  5 L/min  Nasal cannula  -- 08/31/21 0100  -- 111Abnormal  --  149/101Abnormal   117  95 %  --  --  --  --   08/30/21 2300  -- 85 --  --  --  93 %  --  --  --  --   08/30/21 20:27:19  97 9 °F (36 6 °C) 113Abnormal  28Abnormal   149/98  115  96 %  --  --  --  Lying   08/30/21 1936  -- 96 30Abnormal   120/66  85  95 %  --  --  --  --   08/30/21 1700  -- 86 30Abnormal   142/88  --  94 %  --  --  Nasal cannula  --   08/30/21 1434  99 °F (37 2 °C) 125Abnormal  30Abnormal   136/90  --  90 %             Pertinent Labs/Diagnostic Test Results:   8/30 CT chest: Interval progression of disease   Large ill-defined infiltrative right lung mass now occludes the right lower lobe bronchus   There is complete occlusion of the distal right mainstem bronchus   There is resultant atelectasis and a stable moderate right   pleural effusion   Also of note is progression of bubbles of gas within the right lung mass   Cannot exclude necrosis or infection   Interval increase in size of multiple masses and nodules in the left lung     8/30 EKG: Atrial fibrillation has replaced Atrial flutter  ST now depressed in Inferior leads  ST more depressed Anterolateral leads  Inverted T waves have replaced nonspecific T wave abnormality in Inferior leads  T wave inversion now evident in Anterior leads      Results from last 7 days   Lab Units 08/31/21  0604 08/30/21  1450   WBC Thousand/uL 10 69* 12 05*   HEMOGLOBIN g/dL 7 2* 8 2*   HEMATOCRIT % 25 6* 29 1*   PLATELETS Thousands/uL 376 399*         Results from last 7 days   Lab Units 08/31/21  0454 08/30/21  1450   SODIUM mmol/L 137 137   POTASSIUM mmol/L 4 4 4 6   CHLORIDE mmol/L 100 101   CO2 mmol/L 31 31   ANION GAP mmol/L 6 5   BUN mg/dL 26* 29*   CREATININE mg/dL 1 11 1 36*   EGFR ml/min/1 73sq m 63 49   CALCIUM mg/dL 8 9 9 7     Results from last 7 days   Lab Units 08/30/21  1450   AST U/L 15   ALT U/L 21   ALK PHOS U/L 147*   TOTAL PROTEIN g/dL 6 9   ALBUMIN g/dL 1 4*   TOTAL BILIRUBIN mg/dL 0 52     Results from last 7 days   Lab Units 08/31/21  1256 08/31/21  1016 08/31/21  0714 08/31/21  0524 08/31/21  0425 08/31/21  0020 08/30/21  2335 08/30/21  2034   POC GLUCOSE mg/dl 95 90 70 134 51* 161* 58* 51*     Results from last 7 days   Lab Units 08/31/21  0454 08/30/21  1450   GLUCOSE RANDOM mg/dL 175* 83             No results found for: BETA-HYDROXYBUTYRATE       Results from last 7 days   Lab Units 08/31/21  0158   PH KEE  7 370   PCO2 KEE mm Hg 52 1*   PO2 KEE mm Hg 49 1*   HCO3 KEE mmol/L 29 4   BASE EXC KEE mmol/L 3 5   O2 CONTENT KEE ml/dL 10 3   O2 HGB, VENOUS % 80 8*     Results from last 7 days   Lab Units 08/30/21  1450   TROPONIN I ng/mL <0 02       Results from last 7 days   Lab Units 08/30/21  1450   PROTIME seconds 25 2*   INR  2 30*   PTT seconds 47*       Results from last 7 days   Lab Units 08/30/21  2252   CLARITY UA  Clear   COLOR UA  Dk Yellow   SPEC GRAV UA  >1 045*   PH UA  5 5   GLUCOSE UA mg/dl Negative   KETONES UA mg/dl Negative   BLOOD UA  Negative   PROTEIN UA mg/dl 100 (2+)*   NITRITE UA  Negative   BILIRUBIN UA  Interference- unable to analyze*   UROBILINOGEN UA E U /dl 1 0   LEUKOCYTES UA  Negative   WBC UA /hpf None Seen   RBC UA /hpf None Seen   BACTERIA UA /hpf None Seen   EPITHELIAL CELLS WET PREP /hpf None Seen     ED Treatment:   Medication Administration from 08/30/2021 1419 to 08/30/2021 2012       Date/Time Order Dose Route Action     08/30/2021 1515 HYDROmorphone (DILAUDID) injection 0 5 mg 0 5 mg Intravenous Given        Past Medical History:   Diagnosis Date    Hypertension      Present on Admission:   Hemoptysis   Paroxysmal A-fib with RVR   Type 2 diabetes mellitus (HCC)   COPD (chronic obstructive pulmonary disease) (HCC)   Acute on chronic respiratory failure with hypoxia (HCC)   Acute renal failure (ARF) (HCC)   Mass of right lung      Admitting Diagnosis: Chest pain [R07 9]  Hemoptysis [R04 2]  Age/Sex: 68 y o  male  Admission Orders:  SCDs   UP with assist  BMP, CBC    Scheduled Medications:  digoxin, 125 mcg, Oral, Every Other Day  fish oil, 1,000 mg, Oral, BID  gabapentin, 100 mg, Oral, HS  guaiFENesin, 600 mg, Oral, BID  insulin lispro, 1-5 Units, Subcutaneous, Q6H ZOHAIB  levalbuterol, 1 25 mg, Nebulization, TID  levothyroxine, 150 mcg, Oral, Early Morning  metoprolol succinate, 50 mg, Oral, Q12H  pantoprazole, 40 mg, Oral, Daily Before Breakfast  senna, 8 6 mg, Oral, HS  sodium chloride, 3 mL, Nebulization, TID      Continuous IV Infusions:     PRN Meds:  acetaminophen, 650 mg, Oral, Q6H PRN  albuterol, 2 5 mg, Nebulization, Q6H PRN  benzonatate, 100 mg, Oral, TID PRN  dextromethorphan-guaiFENesin, 10 mL, Oral, Q4H PRN  hydrOXYzine HCL, 25 mg, Oral, TID PRN  ondansetron, 4 mg, Intravenous, Q6H PRN  phenol, 1 spray, Mouth/Throat, Q2H PRN  sodium chloride, 1 spray, Each Nare, Q1H PRN        IP CONSULT TO PULMONOLOGY  IP CONSULT TO PALLIATIVE CARE    Network Utilization Review Department  ATTENTION: Please call with any questions or concerns to [phone] and carefully listen to the prompts so that you are directed to the right person  All voicemails are confidential   Willy Mccurdy all requests for admission clinical reviews, approved or denied determinations and any other requests to dedicated fax number below belonging to the campus where the patient is receiving treatment   List of dedicated fax numbers for the Facilities:  [address] DENIALS (Administrative/Medical Necessity) [phone]   [address] (Maternity/NICU/Pediatrics) [phone]   [address] [address] [place] [address]ita [address] [address] [address] [facility] [phone]   [address] [place] [address] [phone]

## 2021-08-31 NOTE — PLAN OF CARE
Problem: MOBILITY - ADULT  Goal: Maintain or return to baseline ADL function  Description: INTERVENTIONS:  -  Assess patient's ability to carry out ADLs; assess patient's baseline for ADL function and identify physical deficits which impact ability to perform ADLs (bathing, care of mouth/teeth, toileting, grooming, dressing, etc )  - Assess/evaluate cause of self-care deficits   - Assess range of motion  - Assess patient's mobility; develop plan if impaired  - Assess patient's need for assistive devices and provide as appropriate  - Encourage maximum independence but intervene and supervise when necessary  - Involve family in performance of ADLs  - Assess for home care needs following discharge   - Consider OT consult to assist with ADL evaluation and planning for discharge  - Provide patient education as appropriate  Outcome: Progressing  Goal: Maintains/Returns to pre admission functional level  Description: INTERVENTIONS:  - Perform BMAT or MOVE assessment daily    - Set and communicate daily mobility goal to care team and patient/family/caregiver     - Collaborate with rehabilitation services on mobility goals if consulted  -- Out of bed for toileting  - Record patient progress and toleration of activity level   Outcome: Progressing     Problem: Potential for Falls  Goal: Patient will remain free of falls  Description: INTERVENTIONS:  - Educate patient/family on patient safety including physical limitations  - Instruct patient to call for assistance with activity   - Consult OT/PT to assist with strengthening/mobility   - Keep Call bell within reach  - Keep bed low and locked with side rails adjusted as appropriate  - Keep care items and personal belongings within reach  - Initiate and maintain comfort rounds  - Make Fall Risk Sign visible to staff  - Apply yellow socks and bracelet for high fall risk patients  - Consider moving patient to room near nurses station  Outcome: Progressing     Problem: Prexisting or High Potential for Compromised Skin Integrity  Goal: Skin integrity is maintained or improved  Description: INTERVENTIONS:  - Identify patients at risk for skin breakdown  - Assess and monitor skin integrity  - Assess and monitor nutrition and hydration status  - Monitor labs   - Assess for incontinence   - Turn and reposition patient  - Assist with mobility/ambulation  - Relieve pressure over bony prominences  - Avoid friction and shearing  - Provide appropriate hygiene as needed including keeping skin clean and dry  - Evaluate need for skin moisturizer/barrier cream  - Collaborate with interdisciplinary team   - Patient/family teaching  - Consider wound care consult   Outcome: Progressing

## 2021-08-31 NOTE — QUICK NOTE
Discussion between Dr Tyler Francis and Dr Trevon Greenfield regarding patient's hemoptysis  Recommend bronchoscopy prior to IR intervention, there is no active bleeding seen on CT chest from 08/30 and given patient has masses in both lungs it is unclear as to where patient may be bleeding from  If unable to stop bleeding from bronchoscopy, localization of bleeding region from [address] would be helpful, and empiric embolization would be probable next step      [address] [place] [address] Chaparro Click

## 2021-08-31 NOTE — ASSESSMENT & PLAN NOTE
Baseline requirement approximately 3 L - currently requiring 5 L via nasal cannula  In the setting of a progressive lung mass and underlying COPD, however, exacerbated by hemoptysis  Continue nebulizers and trialed on IV Solu-Medrol per pulmonology  Respiratory protocol - pulmonary toiletry

## 2021-08-31 NOTE — PROGRESS NOTES
[facility]  Progress Note - Alphion  1940, 68 y o  male MRN: [id number]  Unit/Bed#: [facility] 919-01 Encounter: [id number]  Primary Care Provider: Jason Gallagher DO   Date and time admitted to hospital: 8/30/2021  2:23 PM      * Hemoptysis  Assessment & Plan  With underlying history of lung cancer - see plan for lung mass regarding updated/progressive CT findings  Continue to hold anticoagulation (Eliquis)  Appreciate Interventional Radiology and pulmonology input -> IR requested bronchoscopy to determine source of bleed and possibly control, initially, prior to invasive intervention -> patient remains at high risk from a pulmonary standpoint for bronchoscopy, and family has opted to avoid this procedure  Palliative care will follow in regards to goals of care and possible transition to hospice - before family makes ultimate decision process, however, will appreciate radiation oncology evaluation to assess benefits from palliative radiation  Monitor H/H  Supportive care otherwise    Acute on chronic respiratory failure with hypoxia   Assessment & Plan  Baseline requirement approximately 3 L - currently requiring 5 L via nasal cannula  In the setting of a progressive lung mass and underlying COPD, however, exacerbated by hemoptysis  Continue nebulizers and trialed on IV Solu-Medrol per pulmonology  Respiratory protocol - pulmonary toiletry    Mass of right lung  Assessment & Plan  S/p IR guided lung biopsy on 8/23 - pending final pathology (sent to outside source for expert consultation) - also s/p thoracocentesis with pleural cytology on 7/30 negative for malignancy  CT imaging on presentation noting "Interval progression of disease  Large ill-defined infiltrative right lung mass now occludes the right lower lobe bronchus  There is complete occlusion of the distal right mainstem bronchus  There is resultant atelectasis and a stable moderate right pleural effusion   Also of note is progression of bubbles of gas within the right lung mass  Cannot exclude necrosis or infection  Interval increase in size of multiple masses and nodules in the left lung "  Previously was determined to be a poor surgical candidate due to mass burden and underlying COPD  Pulmonology following -> recommending palliative care evaluation for goals of care discussion due to poor overall prognosis  Awaiting radiation oncology input regarding possibility of palliative radiation for symptom relief    SULEMA (acute kidney injury)  Assessment & Plan  Creatinine improved from 1 36 on admission -> 1 11 currently s/p albumin trial  Monitor renal function and urine output - limit/avoid nephrotoxins if possible - avoid hypotension as possible    SIRS (systemic inflammatory response syndrome)  Assessment & Plan  Intermittent tachycardia/tachypnea and leukocytosis noted - likely reactive due to acute medical issue(s) and corticosteroid administration  Monitor vitals and maintain hemodynamics    COPD (chronic obstructive pulmonary disease)  Assessment & Plan  Maintain oxygenation  Nebulizers on board - trialed on intravenous corticosteroids per pulmonology    Diabetes mellitus type 2  Assessment & Plan  Lab Results   Component Value Date    HGBA1C 7 7 (H) 08/19/2021     On SSI coverage per Accu-Cheks while hospitalized - monitor blood sugars and avoid hypoglycemic episodes (intermittent overnight events noted s/p IV dextrose pushes)  Hold oral hypoglycemics    Paroxysmal atrial fibrillation  Assessment & Plan  C/w Digoxin/Toprol-XL  Holding anticoagulation (Eliquis) in the setting of hemoptysis    Hypothyroidism  Assessment & Plan  Continue Synthroid    Anemia of chronic disease  Assessment & Plan  Monitor H/H in the setting of hemoptysis      DVT Prophylaxis:  SCDs - holding Eliquis       Patient Centered Rounds:  I have performed bedside rounds and discussed plan of care with nursing today      Discussions with Specialists or Other Care Team Provider:  see above assessments if applicable    Education and Discussions with Family / Patient: At bedside    Time Spent for Care:  32 minutes  More than 50% of total time spent on counseling and coordination of care as described above  Current Length of Stay: 1 day(s)    Current Patient Status: Inpatient   Certification Statement:  Patient will continue to require additional hospital stay due to assessments as noted above  Code Status: Level 3 - DNAR and DNI        Subjective:     Seen/examined earlier this afternoon  At time my encounter, patient was lying in a prone position for some symptomatic relief  Complains of weakness/fatigue  Denies chest pain, at this point, however acknowledges intermittent shortness of breath  Objective:     Vitals:   Temp (24hrs), Av 7 °F (37 1 °C), Min:97 9 °F (36 6 °C), Max:100 2 °F (37 9 °C)    Temp:  [97 9 °F (36 6 °C)-100 2 °F (37 9 °C)] 100 2 °F (37 9 °C)  HR:  [] 92  Resp:  [18-36] 18  BP: ()/() 110/46  SpO2:  [93 %-96 %] 94 %  Body mass index is 28 35 kg/m²  Input and Output Summary (last 24 hours):        Intake/Output Summary (Last 24 hours) at 2021 1651  Last data filed at 2021 1232  Gross per 24 hour   Intake 418 75 ml   Output 450 ml   Net -31 25 ml       Physical Exam:     GENERAL:  Weak/fatigued  HEAD:  Normocephalic - atraumatic  EYES: PERRL - EOMI   MOUTH:  Mucosa moist  NECK:  Supple - full range of motion  CARDIAC:  Intermittently tachycardic - S1/S2 positive  PULMONARY:  Diminished breath sounds bilaterally more prominent on right  ABDOMEN:  Soft - nontender/nondistended - active bowel sounds  MUSCULOSKELETAL:  Motor strength/range of motion deconditioned  NEUROLOGIC:  Alert/oriented at baseline  SKIN:  Chronic wrinkles/blemishes   PSYCHIATRIC:  Mood/affect flat      Additional Data:     Labs & Recent Cultures:    Results from last 7 days   Lab Units 21  0604 21  1450   WBC Thousand/uL 10 69* 12 05*   HEMOGLOBIN g/dL 7 2* 8 2*   HEMATOCRIT % 25 6* 29 1*   PLATELETS Thousands/uL 376 399*   LYMPHO PCT %  --  6*   MONO PCT %  --  2*   EOS PCT %  --  0     Results from last 7 days   Lab Units 08/31/21  0454 08/30/21  1450   POTASSIUM mmol/L 4 4 4 6   CHLORIDE mmol/L 100 101   CO2 mmol/L 31 31   BUN mg/dL 26* 29*   CREATININE mg/dL 1 11 1 36*   CALCIUM mg/dL 8 9 9 7   ALK PHOS U/L  --  147*   ALT U/L  --  21   AST U/L  --  15     Results from last 7 days   Lab Units 08/30/21  1450   INR  2 30*     Results from last 7 days   Lab Units 08/31/21  1554 08/31/21  1256 08/31/21  1016 08/31/21  0714 08/31/21  0524 08/31/21  0425 08/31/21  0020 08/30/21  2335 08/30/21  2034   POC GLUCOSE mg/dl 258* 95 90 70 134 51* 161* 58* 51*                         Last 24 Hours Medication List:   Current Facility-Administered Medications   Medication Dose Route Frequency Provider Last Rate    acetaminophen  650 mg Oral Q6H PRN Kayla Juve, DO      albuterol  2 5 mg Nebulization Q6H PRN Kayla Juve, DO      benzonatate  100 mg Oral TID PRN Kayla Juve, DO      dextromethorphan-guaiFENesin  10 mL Oral Q4H PRN Kayla Juve, DO      digoxin  125 mcg Oral Every Other Day Kayla Juve, DO      fish oil  1,000 mg Oral BID Kayla Juve, DO      gabapentin  100 mg Oral HS Kayla Juve, DO      guaiFENesin  600 mg Oral BID Kayla Juve, DO      HYDROmorphone  0 2 mg Intravenous Q3H PRN April CHRIS Nazario      hydrOXYzine HCL  25 mg Oral TID PRN Kayla Juve, DO      insulin lispro  1-5 Units Subcutaneous Q6H [address] Kayla Juve, DO      levalbuterol  1 25 mg Nebulization TID Kayla Juve, DO      levothyroxine  150 mcg Oral Early Morning Kayla Juve, DO      metoprolol succinate  50 mg Oral Q12H Kayla Juve, DO      ondansetron  4 mg Intravenous Q6H PRN Kayla An DO      pantoprazole  40 mg Oral Daily Before Breakfast Kayla An DO      phenol  1 spray Mouth/Throat Q2H PRN Sid Poplin, DO      senna  8 6 mg Oral HS Sid Poplin, DO      sodium chloride  1 spray Each Nare Q1H PRN Sid Poplin, DO      sodium chloride  3 mL Nebulization TID Sid Poplin, DO                    ** Please Note: This note is constructed using a voice recognition dictation system  An occasional wrong word/phrase or sound-a-like substitution may have been picked up by dictation device due to the inherent limitations of voice recognition software  Read the chart carefully and recognize, using reasonable context, where substitutions may have occurred  **

## 2021-08-31 NOTE — CONSULTS
Consultation - Radiation Oncology   Theta Plan Jeanne 68 y o  male MRN: [id number]  Unit/Bed#: [facility] 919-01 Encounter: [id number]        History of Present Illness   Physician Requesting Consult: Neeta Mireles MD  Reason for Consult / Principal Problem: Hemoptysis  Hx and PE limited by: None    Assessment and Plan  Mr Viridiana Huerta is a 68year old man with a prolonged prior smoking history who was previously found to have a large RUL mass resulting on extrinsic airway compression with associated mediastinal adenopathy on CTA Chest, highly suspicious for malignancy  He has undergone multiple prior biopsies without pathologic confirmation of diagnosis, however his history and imaging is highly suggestive of malignancy  He was recently admitted with hemoptysis and is seen today as an inpatient in consideration or urgent RT  We discussed the role of palliative RT in stopping his ongoing hemoptysis and potentially reopening his compressed airways  While we typically await pathologic confirmation prior to proceeding with RT, given his multiple non-diagnostic biopsies and current status I felt starting RT in the absence of pathologic diagnosis would be reasonable in this case  The patient and his family informed me that after significant consideration they had opted to proceed with hospice  We will forego RT at this time  The patient and his family were told that we will remain available for palliative RT should he change his mind  Plan  - Pt opted for hospice; no RT for now  - If patient changes mind, would consider palliative RT to 30 Gy in 10 fractions  HPI:   Mr Viridiana Huerta is a 68year old man with an extensive smoking history, initially seen as inpatient consult on 7/22, at that time awaiting pathologic diagnosis of his presumed lung cancer  The patient underwent multiple lung biopsies, each of which returned as non-diagnostic   After discussion he was ultimately discharged for outpatient PET in order to better assess viable areas of tumor for tissue biopsy  He underwent PET/CT (8/6/21):  1  Large mass in the right hemithorax with extensive central necrosis but with glucose hypermetabolism along its peripheral margins, especially the inferior aspect  2  Left upper lobe mass with a slight focus of cavitation, also glucose avid  3  Enlarging right lower lobe solid nodule with SUV max 3 0   4  The mass involves the right pulmonary hilum, right upper lobe bronchus, and there is mild right subcarinal adenopathy  There is also a small right effusion and pulmonary emphysema  5  There is no evidence of extrathoracic glucose avid metastatic disease    He underwent IR guided biopsy (8/23/21); results remain pending  He presented to [facility] ED on 8/30/21 with worsening SOB and hemoptysis  CT Chest (8/30/21) demonstrated interval POD of his large ill defined infiltrative right lung mass, now occluding the RLL bronchus  Currently he has ongoing hemoptysis and SOB with cough  Historical Information   Previous Oncology History:   Oncology History    No history exists  Past Medical History:   Diagnosis Date    Hypertension      Past Surgical History:   Procedure Laterality Date    BACK SURGERY      CARPAL TUNNEL RELEASE Bilateral     IR BIOPSY BONE MARROW  6/10/2021    IR BIOPSY LUNG  7/21/2021    IR BIOPSY LUNG  8/23/2021    IR BIOPSY OTHER  7/27/2021    IR THORACENTESIS  7/30/2021    LUMBAR DISC SURGERY       Family History   Problem Relation Age of Onset    Cancer Mother         "ate away her bone"    Anuerysm Father     Cancer Sister         unknown type    Heart attack Maternal Grandfather     Cancer Sister         unknown type    Heart attack Maternal Aunt     Heart attack Maternal Uncle     Heart attack Paternal Aunt      Social History   Social History     Substance and Sexual Activity   Alcohol Use Not Currently    Comment: History of heavier drinking in early 25s   Denies any current alcohol use (Updated 2021)  Social History     Substance and Sexual Activity   Drug Use Never     Social History     Tobacco Use   Smoking Status Former Smoker    Packs/day: 0 50    Years: 40 00    Pack years: 20 00    Types: Cigarettes    Start date:     Quit date: 2021    Years since quittin 2   Smokeless Tobacco Never Used       Meds/Allergies   all current active meds have been reviewed    Allergies   Allergen Reactions    Red Dye - Food Allergy Anaphylaxis     Tolerated all those medications before and currently takes them    Penicillins Hives     Objective     Intake/Output Summary (Last 24 hours) at 2021 1623  Last data filed at 2021 1232  Gross per 24 hour   Intake 418 75 ml   Output 450 ml   Net -31 25 ml     Invasive Devices     Peripheral Intravenous Line            Peripheral IV 21 Left Antecubital 1 day              Physical Exam   Lying prone on bed  No increased work of breathing  Full exam deferred  Lab Results: I have personally reviewed pertinent reports  Imaging Studies: I have personally reviewed pertinent films in PACS  EKG, Pathology, and Other Studies: I have personally reviewed pertinent reports  Code Status: Level 3 - DNAR and DNI  Advance Directive and Living Will:      Power of :    POLST:      Counseling / Coordination of Care  Total floor / unit time spent today 45 minutes  Greater than 50% of total time was spent with the patient and / or family counseling and / or coordination of care  A description of the counseling / coordination of care: As above

## 2021-08-31 NOTE — RESPIRATORY THERAPY NOTE
RT Protocol Note  Liana Angel 68 y o  male MRN: [id number]  [facility] Encounter: [id number]    Assessment    Principal Problem:    Hemoptysis  Active Problems:    Paroxysmal A-fib with RVR    Acute renal failure (ARF) (HCC)    Type 2 diabetes mellitus (HCC)    COPD (chronic obstructive pulmonary disease) (HCC)    Chronic respiratory failure with hypoxia (HCC)      Home Pulmonary Medications:  yes  Home Devices/Therapy: (P) Home O2    Past Medical History:   Diagnosis Date    Hypertension      Social History     Socioeconomic History    Marital status: /Civil Union     Spouse name: Katrina Ellsworth Number of children: 10    Years of education: None    Highest education level: None   Occupational History    None   Tobacco Use    Smoking status: Former Smoker     Packs/day: 0 50     Years: 40 00     Pack years: 20 00     Types: Cigarettes     Start date:      Quit date: 2021     Years since quittin 2    Smokeless tobacco: Never Used   Vaping Use    Vaping Use: Never used   Substance and Sexual Activity    Alcohol use: Not Currently     Comment: History of heavier drinking in early s  Denies any current alcohol use (Updated 2021)   Drug use: Never    Sexual activity: None   Other Topics Concern    None   Social History Narrative    Previously worked as heavy machinery technician  Lives with his wife who is essentially bed bound  Social Determinants of Health     Financial Resource Strain: High Risk    Difficulty of Paying Living Expenses: Hard   Food Insecurity: Food Insecurity Present    Worried About Running Out of Food in the Last Year: Sometimes true    [place] of Food in the Last Year: Not on file   Transportation Needs:     Lack of Transportation (Medical):      Lack of Transportation (Non-Medical):    Physical Activity:     Days of Exercise per Week:     Minutes of Exercise per Session:    Stress:     Feeling of Stress :    Social Connections:     Frequency of Communication with Friends and Family:     Frequency of Social Gatherings with Friends and Family:     Attends [employer] Services:     Active Member of Clubs or Organizations:     Attends Club or Organization Meetings:     Marital Status:    Intimate Partner Violence:     Fear of Current or Ex-Partner:     Emotionally Abused:     Physically Abused:     Sexually Abused:        Subjective         Objective    Physical Exam:   Assessment Type: (P) During-treatment  General Appearance: (P) Alert, Awake  Respiratory Pattern: (P) Dyspnea with exertion, Dyspnea at rest  Chest Assessment: (P) Chest expansion symmetrical  Bilateral Breath Sounds: (P) Diminished  R Breath Sounds: (P) Expiratory wheezes    Vitals:  Blood pressure 149/98, pulse (!) 113, temperature 97 9 °F (36 6 °C), temperature source Oral, resp  rate (!) 28, height 5' 2" (1 575 m), weight 70 3 kg (155 lb), SpO2 96 %  Imaging and other studies: I have personally reviewed pertinent reports  Plan    Respiratory Plan: (P) Moderate/Severe Distress pathway        Resp Comments: (P) Pt adm for coughing up blood  Pt has advanced lung CA  P t on 3 l/m Takes resp tx TID at home  Called to room  Pts WOB has increased  RN placed nc from 3 to 5 l/m 96%  Tx was given  prn albuterol  Doing much better

## 2021-08-31 NOTE — ASSESSMENT & PLAN NOTE
· Rate control borderline at this time, suspect in setting of increased oxygen requirements  · Continue Toprol XL 50 mg b i d , digoxin  · Anticoagulated on Eliquis however holding in setting of hemoptysis

## 2021-08-31 NOTE — ASSESSMENT & PLAN NOTE
· Patient on baseline 3L NC, requiring 5 L NC currently  · Suspect this is in setting of worsening lung masses, however hemoptysis and history of COPD are noted  · Workup of hemoptysis as above    Will trial nebulizers with respiratory protocol and assess response  · Continue supplemental oxygen and titrate as needed to maintain SaO2 88-92%  · Respiratory protocol, incentive spirometry, pulmonary toileting

## 2021-08-31 NOTE — ASSESSMENT & PLAN NOTE
Intermittent tachycardia/tachypnea and leukocytosis noted - likely reactive due to acute medical issue(s) and corticosteroid administration  Monitor vitals and maintain hemodynamics

## 2021-08-31 NOTE — H&P
[address] [facility]  H&P- Holger Chess 1940, 68 y o  male MRN: [id number]  Unit/Bed#: [facility] 919-01 Encounter: [id number]  Primary Care Provider: Janene Moreira DO   Date and time admitted to hospital: 8/30/2021  2:23 PM    * Hemoptysis  Assessment & Plan  · Hemoptysis prompting presentation  Patient with known history of lung masses  · CT chest concerning for progression of masses  · Will monitor CBC, consult pulmonology for evaluation  · NPO after midnight    Acute renal failure (ARF) ([facility] )  Assessment & Plan  · Baseline approximately 0 9  · Provide gentle IV fluid overnight, recheck BMP in a m  · Measure PVR and bladder scan, check UA  · Avoid/limit nephrotoxins and avoid hypotension    Acute on chronic respiratory failure with hypoxia (HCC)  Assessment & Plan  · Patient on baseline 3L NC, requiring 5 L NC currently  · Suspect this is in setting of worsening lung masses, however hemoptysis and history of COPD are noted  · Workup of hemoptysis as above    Will trial nebulizers with respiratory protocol and assess response  · Continue supplemental oxygen and titrate as needed to maintain SaO2 88-92%  · Respiratory protocol, incentive spirometry, pulmonary toileting    COPD (chronic obstructive pulmonary disease) (HCC)  Assessment & Plan  · Trial of nebulizers as above    Type 2 diabetes mellitus ([place] [address] )  Assessment & Plan  Lab Results   Component Value Date    HGBA1C 7 7 (H) 08/19/2021       Recent Labs     08/30/21 2034   POCGLU 51*       Blood Sugar Average: Last 72 hrs:  (P) 51   · With hypoglycemia requiring D50 amp with improvement  · Hold metformin/glipizide, start SSI with Accu-Cheks while inpatient  · Initiate hypoglycemia protocol    Mass of right lung  Assessment & Plan  · Patient underwent IR biopsy and paracentesis at last admission, results are pending  · CT imaging concerning for progression  · Will request Oncology input  · Notably previously was determined to be a poor surgical candidate due to mass burden and underlying COPD    Paroxysmal A-fib with RVR  Assessment & Plan  · Rate control borderline at this time, suspect in setting of increased oxygen requirements  · Continue Toprol XL 50 mg b i d , digoxin  · Anticoagulated on Eliquis however holding in setting of hemoptysis      VTE Prophylaxis: Pharmacologic VTE Prophylaxis contraindicated due to Hemoptysis  / sequential compression device   Code Status: Level 3 - DNAR and DNI per patient  POLST: POLST form is not discussed and not completed at this time  Discussion with family:  Attempted to reach son [place] at patient request but unable to reach    Anticipated Length of Stay:  Patient will be admitted on an Inpatient basis with an anticipated length of stay of  greater than 2 midnights  Justification for Hospital Stay: Please see detailed plans noted above  Chief Complaint:     Hemoptysis  History of Present Illness:  Daniel Mccarthy is a 68 y o  male who presented for evaluation of 1 day history of hemoptysis  Has past medical history significant for right lung mass currently undergoing evaluation, atrial fibrillation anticoagulated on Eliquis, COPD, chronic respiratory failure on 3 L NC continuously  Notably had very recent admission at this hospital 08/18/2021-08/24/2021 for atrial fibrillation with RVR prior digoxin loading, additionally underwent biopsy of the right lung mass with results pending  Today patient reported development of hemoptysis described as bright red and almost filling a coffee mug    He additionally endorsed increased shortness of breath from baseline with this  He denies any fevers/chills, known sick contacts, chest pain/pressure, dizziness/lightheadedness, abdominal pain/nausea/vomiting/diarrhea  During ED evaluation he required up to 5 L NC, additionally underwent a CT chest which revealed concerns for progression of known disease    He is admitted for further evaluation of hemoptysis along with further treatment of his acute on chronic respiratory failure  Currently, patient is lying in bed tachypneic appearing; despite this patient states he feels he does not have marked shortness of breath, however expresses concern when I note he is currently on 5L NC      Review of Systems:    Constitutional:  Denies fever or chills   Eyes:  Denies change in visual acuity   HENT:  Denies nasal congestion or sore throat   Respiratory:  Endorses cough, shortness of breath, and hemoptysis  Cardiovascular:  Denies chest pain or edema   GI:  Denies abdominal pain, nausea, vomiting, bloody stools or diarrhea   :  Denies dysuria   Musculoskeletal:  Denies back pain or joint pain   Integument:  Denies rash   Neurologic:  Denies headache, focal weakness or sensory changes   Endocrine:  Denies polyuria or polydipsia   Lymphatic:  Denies swollen glands   Psychiatric:  Denies depression or anxiety     Past Medical and Surgical History:   Past Medical History:   Diagnosis Date    Hypertension      Past Surgical History:   Procedure Laterality Date    BACK SURGERY      CARPAL TUNNEL RELEASE Bilateral     IR BIOPSY BONE MARROW  6/10/2021    IR BIOPSY LUNG  7/21/2021    IR BIOPSY LUNG  8/23/2021    IR BIOPSY OTHER  7/27/2021    IR THORACENTESIS  7/30/2021    LUMBAR DISC SURGERY         Meds/Allergies:  Medications Prior to Admission   Medication    acetaminophen (TYLENOL) 500 mg tablet    albuterol (2 5 mg/3 mL) 0 083 % nebulizer solution    Alcohol Swabs ( Sterile Alcohol Prep) PADS    benzonatate (TESSALON PERLES) 100 mg capsule    dextromethorphan-guaiFENesin (ROBITUSSIN DM)  mg/5 mL syrup    Diclofenac Sodium (VOLTAREN) 1 %    digoxin (LANOXIN) 0 125 mg tablet    Eliquis 5 MG    gabapentin (NEURONTIN) 100 mg capsule    glipiZIDE (GLUCOTROL) 5 mg tablet    guaiFENesin (MUCINEX) 600 mg 12 hr tablet    hydrOXYzine HCL (ATARAX) 25 mg tablet    levothyroxine 150 mcg tablet    metFORMIN (GLUCOPHAGE) 500 mg tablet    metoprolol succinate (TOPROL-XL) 50 mg 24 hr tablet    Omega-3 Fatty Acids (FISH OIL) 1,000 mg    pantoprazole (PROTONIX) 40 mg tablet    senna (SENOKOT) 8 6 mg       Allergies: Allergies   Allergen Reactions    Red Dye - Food Allergy Anaphylaxis     Tolerated all those medications before and currently takes them    Penicillins Hives     History:  Marital Status: /Civil Union     Substance Use History:   Social History     Substance and Sexual Activity   Alcohol Use Not Currently    Comment: History of heavier drinking in early 25s  Denies any current alcohol use (Updated 2021)  Social History     Tobacco Use   Smoking Status Former Smoker    Packs/day: 0 50    Years: 40 00    Pack years: 20 00    Types: Cigarettes    Start date:     Quit date: 2021    Years since quittin 2   Smokeless Tobacco Never Used     Social History     Substance and Sexual Activity   Drug Use Never       Family History:  Family History   Problem Relation Age of Onset    Cancer Mother         "ate away her bone"    Anuerysm Father     Cancer Sister         unknown type    Heart attack Maternal Grandfather     Cancer Sister         unknown type    Heart attack Maternal Aunt     Heart attack Maternal Uncle     Heart attack Paternal Aunt        Physical Exam:     Vitals:   Blood Pressure: 149/98 (21)  Pulse: (!) 113 (21)  Temperature: 97 9 °F (36 6 °C) (21)  Temp Source: Oral (21)  Respirations: (!) 28 (21)  Height: 5' 2" (157 5 cm) (21)  Weight - Scale: 70 3 kg (155 lb) (21)  SpO2: 96 % (21)    Constitutional:  Well developed, well nourished, no acute distress, non-toxic appearance   Eyes:  PERRL, conjunctiva normal   HENT:  Atraumatic, external ears normal, nose normal, oropharynx moist, no pharyngeal exudates   Neck- normal range of motion, no tenderness, supple Respiratory:  Tachypneic appearing, decreased breath sounds with diffuse expiratory wheezing bilaterally  Cardiovascular:  Tachycardic and irregularly irregular rate and rhythm, no murmurs, no gallops, no rubs   GI:  Soft, nondistended, normal bowel sounds, nontender, no organomegaly, no mass, no rebound, no guarding   :  No costovertebral angle tenderness   Musculoskeletal:  No edema, no tenderness, no deformities  Back- no tenderness  Integument:  Well hydrated, no rash   Lymphatic:  No lymphadenopathy noted   Neurologic:  Alert &awake, communicative, CN 2-12 normal, normal motor function, normal sensory function, no focal deficits noted   Psychiatric:  Speech and behavior appropriate       Lab Results: I have personally reviewed pertinent reports  Results from last 7 days   Lab Units 08/30/21  1450   WBC Thousand/uL 12 05*   HEMOGLOBIN g/dL 8 2*   HEMATOCRIT % 29 1*   PLATELETS Thousands/uL 399*   LYMPHO PCT % 6*   MONO PCT % 2*   EOS PCT % 0     Results from last 7 days   Lab Units 08/30/21  1450   POTASSIUM mmol/L 4 6   CHLORIDE mmol/L 101   CO2 mmol/L 31   BUN mg/dL 29*   CREATININE mg/dL 1 36*   CALCIUM mg/dL 9 7   ALK PHOS U/L 147*   ALT U/L 21   AST U/L 15     Results from last 7 days   Lab Units 08/30/21  1450   INR  2 30*       EKG:  Atrial fibrillation , inferolateral strain    Imaging: I have personally reviewed pertinent reports  XR chest 1 view portable    Result Date: 8/18/2021  Narrative: CHEST INDICATION:   sob, concern for pulm edema    COMPARISON:  Compared with 7/27/2021 EXAM PERFORMED/VIEWS:  XR CHEST PORTABLE FINDINGS: Trachea midline  Cardiac silhouette is normal   Large right lung mass is obscured by haziness in the remaining lung parenchyma  Blunted right costophrenic angle  Increasing left mid lung peripheral opacity  Impression: Bilateral pulmonary masses increased with opacification of most of the right lung parenchyma    Effusion and infiltrates suggested on the right  Workstation performed: [id number]     CT chest with contrast    Result Date: 8/30/2021  Narrative: CT CHEST WITH IV CONTRAST INDICATION:   Hx of lung CA, couging up blood  COMPARISON:  8/18/2021 TECHNIQUE: CT examination of the chest was performed  Axial, sagittal, and coronal 2D reformatted images were created from the source data and submitted for interpretation  Radiation dose length product (DLP) for this visit:  531 64 mGy-cm   This examination, like all CT scans performed in the [address] [place] [address], was performed utilizing techniques to minimize radiation dose exposure, including the use of iterative  reconstruction and automated exposure control  IV Contrast:  85 mL of iohexol (OMNIPAQUE) FINDINGS: LUNGS:  Large ill-defined infiltrative right lung mass which encases pulmonary vascularity is reidentified with complete occlusion of the right mainstem bronchus  When compared to prior, there is now occlusion of the right lower lobe bronchus  There is  worsening accumulation of bubbles of gas within the lung mass which is nonspecific  Cannot exclude necrosis or infection  There is progression of opacity in the right lower lobe consistent with airway occlusion  Previous right lower lobe 1 4 cm nodule is obscured  Interval increase in size of the left upper lobe lung mass now measuring 4 6 x 3 3 cm (series 3 image 53)  Previous left lower lobe nodule has increased in size now measuring a centimeter (series 3 image 63)  Lingular nodule is unchanged at 7 mm (series 3 image 82)  Left lower lobe nodule measures 7 mm, unchanged (series 3 image 66)  Increasing size of left upper lobe nodule measuring 9 mm on series 3 image 31)  Redemonstration of emphysema of the left lung  PLEURA:  Stable moderate right pleural effusion  No left pleural effusion  HEART/GREAT VESSELS:  Coronary artery calcification  MEDIASTINUM AND NANCY:  Stable mediastinal lymph nodes    Representative right paratracheal lymph node continues to measure 1 5 x 0 8 cm (series 2 image 19)  CHEST WALL AND LOWER NECK:   Unremarkable  VISUALIZED STRUCTURES IN THE UPPER ABDOMEN:  Unremarkable  OSSEOUS STRUCTURES:  No acute fracture or destructive osseous lesion  Impression: Interval progression of disease  Large ill-defined infiltrative right lung mass now occludes the right lower lobe bronchus  There is complete occlusion of the distal right mainstem bronchus  There is resultant atelectasis and a stable moderate right pleural effusion  Also of note is progression of bubbles of gas within the right lung mass  Cannot exclude necrosis or infection  Interval increase in size of multiple masses and nodules in the left lung  The study was marked in [facility] for immediate notification  Workstation performed: [id number]     CT chest w contrast    Result Date: 8/4/2021  Narrative: CT CHEST WITH IV CONTRAST INDICATION:  Shortness of breath, acute SOB with right lung mass  Patient had 2 lung biopsies one on the right, another on the left  COMPARISON:  CT chest, abdomen and pelvis 7/22/2021 TECHNIQUE: CT examination of the chest was performed  Axial, sagittal, and coronal 2D reformatted images were created from the source data and submitted for interpretation  Radiation dose length product (DLP) for this visit:  274 mGy-cm  This examination, like all CT scans performed in the [facility], was performed utilizing techniques to minimize radiation dose exposure, including the use of iterative reconstruction and automated exposure control  IV Contrast:  85 mL of iohexol (OMNIPAQUE) FINDINGS: The study was not protocoled as a pulmonary angiogram  LUNGS:  A large very low density right lung mass with its epicenter in the perihilar right upper lobe is again identified  The mass measures approximately 14 4 x 9 0 x 12 6 cm AP, transverse and craniocaudal dimensions respectively    Previously this measured about 13 8 x 9 7 x 13 4 cm, similar accounting for differences in interobserver variability  Again the mass obliterates the right middle lobe bronchus causing postobstructive atelectasis  Bubbles of gas again seen within the mass centrally, unchanged  This may be related to changes from previous biopsy  As on previous examination mass encases right upper lobe bronchi as well as perihilar pulmonary arterial vessels  Reidentified is a low density lateral left upper lobe mass measuring 3 6 x 2 5 cm (series 3/57), stable or minimally larger when measured in similar fashion (3 3 x 2 3 cm)  Stable right lower lobe nodule measuring 1 2 cm series 3/96, lingular nodule measuring 7 mm series 3/87 and left lower lobe nodule measuring 6 mm series 3/63 also remain unchanged  No new nodule or mass  Mild background emphysema  PLEURA:  Trace residual right pleural effusion, decreased from prior study  HEART/GREAT VESSELS:  The heart is normal size  Atherosclerotic changes thoracic aorta and coronary arteries  Main pulmonary artery measures about 28 mm  No pericardial effusion  MEDIASTINUM AND NANCY:  Stable subcentimeter mediastinal lymph nodes  CHEST WALL AND LOWER NECK:   Unremarkable  VISUALIZED STRUCTURES IN THE UPPER ABDOMEN:  The liver has a somewhat lobular contour  Fatty infiltration of the pancreas incidentally noted  No adrenal masses  OSSEOUS STRUCTURES:  No acute fracture or destructive osseous lesion  Degenerative changes of the spine  Impression: No significant change in dominant right lung mass with bilateral pulmonary nodules as above, findings remaining highly suspicious for malignancy  Given nondiagnostic biopsy results and obliteration of the right middle lobe bronchus, bronchoscopy with tissue sampling directed to this location may be helpful  Trace residual right pleural effusion, decreased from prior study   Workstation performed: [id number]     NM PET CT skull base to mid thigh    Result Date: 8/6/2021  Narrative: PET/CT SCAN INDICATION:  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, bronchus and lung     Abnormal chest CT demonstrating pulmonary masses  Right chest wall pain  MODIFIER: PI COMPARISON: Prior CT studies dated 8/4/2021 and earlier CELL TYPE:  Previous biopsies have demonstrated necrotic tissue TECHNIQUE:   10 7 mCi F-18-FDG administered IV  Multiplanar attenuation corrected and non attenuation corrected PET images were acquired 60 minutes post injection  Contiguous, low dose, axial CT sections were obtained from the skull base through the femurs   Intravenous contrast material was not utilized  This examination, like all CT scans performed in the [facility], was performed utilizing techniques to minimize radiation dose exposure, including the use of iterative reconstruction and automated exposure control  Fasting serum glucose: 192 mg/dl FINDINGS: VISUALIZED BRAIN:   No acute abnormalities are seen  HEAD/NECK:   There is a physiologic distribution of FDG  No FDG avid cervical adenopathy is seen  CT images: Unremarkable  CHEST:   Very large right upper lobe pleural-based mass involving the right pulmonary hilum creating mass effect upon the major fissure and contains central pockets of air and intense glucose avidity along its peripheral margins  The mass currently measures approximately 13 1 x 9 1 x 13 1 cm  Greatest SUV max is along its inferior peripheral margin, the value is 10 4  Although the central portion of the mass has scattered areas of increased glucose activity, it is mostly photopenic  The mass involves the  right pulmonary hilum with encasement of the right upper lobe bronchus  There is a pleural-based left upper lobe mass with a small focus of  cavitation measuring 3 5 x 2 5 cm SUV max 7 0  There is a right lower lobe lesion earlier measuring 15 mm currently unchanged in size with SUV max 3 0  Comparing to the CT study from 6/18/2021, this lesion measured 9 mm    There is mildly hypermetabolic right subcarinal adenopathy with short axis diameter 1 6 cm and SUV max 2 7  Patchy areas of airspace disease are seen within the right lower lobe and anterior aspect of the right upper lobe (for example image 90) which do not demonstrate significant glucose avidity  CT images: Small right-sided pleural effusion present  Pulmonary emphysema noted  ABDOMEN:   No FDG avid soft tissue lesions are seen  CT images: Unremarkable  PELVIS: No FDG avid soft tissue lesions are seen  CT images: Colonic diverticulosis OSSEOUS STRUCTURES: No FDG avid lesions are seen  CT images: No significant findings  Impression: 1  Large mass in the right hemithorax with extensive central necrosis but with glucose hypermetabolism along its peripheral margins, especially the inferior aspect  2  Left upper lobe mass with a slight focus of cavitation, also glucose avid 3  Enlarging right lower lobe solid nodule with SUV max 3 0  4  The mass involves the right pulmonary hilum, right upper lobe bronchus, and there is mild right subcarinal adenopathy  There is also a small right effusion and pulmonary emphysema 5  There is no evidence of extrathoracic glucose avid metastatic disease Note is made of previous biopsy attempts of the large mass, left upper lobe lesion and pleural fluid  As per notes in Epic, potential sites for reattempt at soft tissue biopsy diagnostic results would include peripheral margins of the large right upper lobe mass (for example the inferior margin on image 109 with SUV max 10 4), or the enlarging 15 mm solid right lower lobe pulmonary nodule Workstation performed: [id number]     IR biopsy lung    Result Date: 8/23/2021  Narrative: PROCEDURE: CT-guided right lung biopsy Procedural Personnel Attending physician(s): Dr Graciela Velasquez Pre-procedure diagnosis: Lung masses Post-procedure diagnosis: Same Indication: Patient with bilateral lung masses previously underwent biopsy which resulted in necrotic tissue  Patient returns for biopsy of a right lower lobe lung nodule to establish diagnosis  Complications: No immediate complications  Impression: CT-guided biopsy of right lower lobe lung nodule  Plan: Specimen(s) sent for evaluation  _______________________________________________________________ PROCEDURE SUMMARY: - Percutaneous CT-guided right lung biopsy PROCEDURE DETAILS: Pre-procedure Reference imaging for biopsy target: None Consent: Informed consent for the procedure including risks, benefits and alternatives was obtained and time-out was performed prior to the procedure  Preparation: The site was prepared and draped using maximal sterile barrier technique including cutaneous antisepsis  Anesthesia/sedation Level of anesthesia/sedation: Moderate sedation (conscious sedation) Anesthesia/sedation administered by: IR nurse under attending supervision with continuous monitoring of the patient's level of consciousness and physiologic status Total intra-service sedation time (minutes): 30 Imaging prior to biopsy The patient was positioned prone  Initial imaging was performed using noncontrast CT  Biopsy target: - Maximal diameter (cm): 1 5 - Location: Right lower lobe lung nodule Biopsy Local anesthesia was administered  Under CT guidance, the biopsy needle was advanced to the target and biopsy was performed  Coaxial needle: 17 gauge Core needle biopsy device: [employer] Core needle size: 18 gauge Number of core specimens: 4 On-site biopsy touch preparation: Yes  Preliminary assessment of sample adequacy: Adequate Needle removal The biopsy needle was removed and a sterile dressing was applied  Imaging following biopsy Immediate post-biopsy imaging was performed using noncontrast CT  Post-biopsy imaging findings: Perilesional hemorrhage  No pneumothorax   Radiation Dose CT dose length product (mGy-cm): 2000 17 Additional Details Specimens removed: Biopsy samples as detailed above Estimated blood loss (mL): 1 Attestation Signer name: [facility] I attest that I was present for the entire procedure  I reviewed the stored images and agree with the report as written  Workstation performed: [id number]     CTA ED chest PE Study    Result Date: 8/18/2021  Narrative: CTA - CHEST WITH IV CONTRAST - PULMONARY ANGIOGRAM INDICATION:   Tachypnea and dyspnea    COMPARISON: Multiple prior CT examinations of the chest commencing  January 18, 2021 with direct comparison made to August 4, 2021  Correlation PET/CT August 6, 2021 TECHNIQUE: CTA examination of the chest was performed using angiographic technique according to a protocol specifically tailored to evaluate for pulmonary embolism  Axial, sagittal, and coronal 2D reformatted images were created from the source data and  submitted for interpretation  In addition, coronal 3D MIP postprocessing was performed on the acquisition scanner  Radiation dose length product (DLP) for this visit:  653 67 mGy-cm   This examination, like all CT scans performed in the [facility], was performed utilizing techniques to minimize radiation dose exposure, including the use of iterative  reconstruction and automated exposure control  IV Contrast:  85 mL of iohexol (OMNIPAQUE)  FINDINGS: PULMONARY ARTERIAL TREE:  No pulmonary embolus is seen  LUNGS:  Emphysema  Complete collapse of the right upper and middle lobes, increased since August 4, 2021  The right lung mass which broadly abuts the mediastinal structures and encases the pulmonary artery and right bronchi measures approximately 13 2 x 9 9 x 14 1 cm (image 117; series 603, image 146), previously 12 7 x 9 2 x 12 9 cm when measured in a similar fashion (series 3, image 60; series 603, image 148 on prior study) There are several foci of air within the mass, similar to the August 4, 2021 chest CT  Solid right lower lobe nodule is slightly increased in size measuring 1 4 x 1 4 cm, previously 1 2 x 1 2 cm (series 3, image 93)  Segmental atelectasis of the right lower lobe is new since August 4, 2021  Lateral left upper lobe mass has increased in size, measuring 4 0 x 3 2 cm, previously 3 5 x 2 6 cm (series 3, image 53)  Solid 0 6 x 0 4 cm nodule in the left lower lobe has significantly increased since August 4, 2021, previously a nearly imperceptible 0 3 x 0 3 cm groundglass nodule (series 3, image 65 compared to series 3, image 16 on prior study)  0 6 cm lingular nodule is unchanged (series 3, image 84)  0 7 cm left lower lobe nodule is unchanged (series 3, image 58) PLEURA:  New moderate right pleural effusion  HEART/GREAT VESSELS:  Atherosclerotic changes are noted in thoracic aorta and coronary arteries  MEDIASTINUM AND NANCY:  Mediastinal lymph nodes are not significantly changed  Representative example includes a right lower paratracheal lymph node which measures 1 cm in short axis (series 2, image 77)  CHEST WALL AND LOWER NECK:   Unremarkable  VISUALIZED STRUCTURES IN THE UPPER ABDOMEN:  Unremarkable  OSSEOUS STRUCTURES:  Spinal degenerative changes are noted  No acute fracture or destructive osseous lesion  Impression: 1  No pulmonary embolism  2   Since August 4, 2021, complete collapse of the right upper and lower lobes  New segmental atelectasis in the right lower lobe and new moderate right pleural effusion  3   Slight increase in size of the dominant right lung mass  4   Left upper lobe mass and right lower lobe and left lower lobe nodules have increased in size  Workstation performed: [id number]         ** Please Note: Dragon 360 Dictation voice to text software was used in the creation of this document   **

## 2021-08-31 NOTE — CASE MANAGEMENT
Per chart review from recent admission:    HOME: Pt lives in a single level home w/ 2 NICK    LIVES W/: Spouse    : Pam Sanchez (Son) [phone]     MEDICAL POA: None reported    PCP: Yousuf John DO    PHARMACY: [address]    INDEPENDENCE: Ind at baseline     DME: None reported    HHC: [address]: None reported    MENTAL HEALTH:None reported    D&A: None reported     TRANSPORTATION AT D/C: Transport TBD    Patient/caregiver received discharge checklist   Content reviewed  Patient/caregiver encouraged to participate in discharge plan of care prior to discharge home  CM reviewed d/c planning process including the following: identifying help at home, patient preference for d/c planning needs, Discharge Lounge, [facility] Meds to Bed program, availability of treatment team to discuss questions or concerns patient and/or family may have regarding understanding medications and recognizing signs and symptoms once discharged  CM also encouraged patient to follow up with all recommended appointments after discharge  Patient advised of importance for patient and family to participate in managing patients medical well being

## 2021-08-31 NOTE — ASSESSMENT & PLAN NOTE
Creatinine improved from 1 36 on admission -> 1 11 currently s/p albumin trial  Monitor renal function and urine output - limit/avoid nephrotoxins if possible - avoid hypotension as possible

## 2021-08-31 NOTE — ASSESSMENT & PLAN NOTE
· Hemoptysis prompting presentation    Patient with known history of lung masses  · CT chest concerning for progression of masses  · Will monitor CBC, consult pulmonology for evaluation  · NPO after midnight

## 2021-08-31 NOTE — ASSESSMENT & PLAN NOTE
· Patient underwent IR biopsy and paracentesis at last admission, results are pending  · CT imaging concerning for progression  · Will request Oncology input  · Notably previously was determined to be a poor surgical candidate due to mass burden and underlying COPD

## 2021-08-31 NOTE — CASE MANAGEMENT
CM was notified that pt's family was considering to transition to hospice and would like a referral sent to [facility]  CM sent referral as requested via [facility]  CM will follow

## 2021-08-31 NOTE — CONSULTS
PULMONOLOGY CONSULT NOTE     Name: Keli Veronica   Age & Sex: 68 y o  male   MRN: [id number]  Unit/Bed#: [facility] 919-01   Encounter: [id number]        Reason for consultation: hemoptysis    Requesting physician: Dr Whit Walsh DO    Assessment:   1  Hemoptysis  - in the setting of progressive R lung mass  - patient has extensive smoking h/o and Afib on Eliquis   - has hemoptysis at baseline that has increased since the last admission 8/18/2021 to 8/24/2021  - no definitive tissue diagnosis for lung mass, and recent biopsy results from 8/23/2021 are still pending  - Hemoptysis most likely secondary to lung malignancy give that recent imaging shows shows disease progression and worsening of multiple lung masses, particularly R side  2  Acute on chronic respiratory failure   - PMH of COPD w/ baseline of 3L of NC, now on 5L NC  - in setting of progressive lung masses and worsening of hemoptysis     3  R lung mass  - noted in June 2021  - no definitive diagnosis as of current; biopsies have been shown to be necrotic tissue however the mass is most likely malignant given  progression of mass seen on recent CT chest compared to previous imaging   - follows Dr Juan Carlos Louise outpatient     Plan:  - continue to hold Eliquis  - hold on bronchoscopy for further evaluation of hemoptysis  - consider hospice case given code status  - pending tissue biopsy results from previous admissions  - Xopenex 1 25mg 3x daily via neb  - Mucinex 600mg BID PO  - albuterol 2 5mg PRN  - Metoprolol 50mg q12hrs PO  - Digoxin 125mcg PO every other day  - incentive spirometry  - rad onc consulted  - palliative following    History of Present Illness   HPI:  Keli Veronica is a 68 y o  male who was consulted for hemoptysis  PMH significant for R lung mass, Afib on Eliquis, COPD, chronic respiratory failure of 3L NC continuously  Patient report one day history of bright red hemoptysis  Has associated symptoms of increased SOB from baseline  Denied any fevers/chills, no sick contacts, chest pain/pressure, lightheadness/dizziness  At the ED, he needed 5L NC  CT chest showed progression of disease, R lung mass that now occludes the RLL bronchus, complete occlusion of distal R mainstem bronchus along w/ atelectasis and moderate R pleural effusion  Presence of gas bubbles in R lung mass (necrosis v  Infection)  Increase in size of multiple masses and nodules in the L lung  Recent admission on 8/18/2021 to 8/24/2021 for Afib w/ RVR, however had additional CT guided biopsy of RLL lung nodule for R lung mass that was noted on June 2021, and paracentesis of 900mL by IR on 8/23  Patient has no tissue diagnosis currently due to several unsucessful biopsies and pleural effusions, and most recent biopsy results are still pending  IR CT Guided Biopsy (8/23/2021): resulting pending  PET CT (08/06/21): showed hot heterogenous activity with areas of necrosis and glucose uptake in the R lung mass and the L pulmonary nodule  There is no evidence of extrathoracic glucose avid metastatic disease  IR CT Guided Biopsy (07/27/21): FNA NICHELLE necrotic tissue  IR CT Guided Biopsy (07/21/21): RUL showed necrotic tissue  Pleural Fluid (07/30/21): No evidence of malignancy  EBUS (06/21/21): atypical cellular changes w/o malignancy  Patient was examined and interviewed this AM  He reported that he began having hemoptysis ever since he had his lung mass biopsied  It has progressively worsened until now  Patient was having quarter size hemoptysis this AM  Denied any chest pain, dizziness  Denied any fevers or chills  Patient is currently sating well on 5L NC while lying prone on his bed  Review of systems:  12 point review of systems was completed and was otherwise negative except as listed in HPI        Historical Information   Past Medical History:   Diagnosis Date    Hypertension      Past Surgical History:   Procedure Laterality Date    BACK SURGERY      CARPAL TUNNEL RELEASE Bilateral     IR BIOPSY BONE MARROW  6/10/2021    IR BIOPSY LUNG  7/21/2021    IR BIOPSY LUNG  8/23/2021    IR BIOPSY OTHER  7/27/2021    IR THORACENTESIS  7/30/2021    LUMBAR DISC SURGERY       Family History   Problem Relation Age of Onset    Cancer Mother         "ate away her bone"    Anuerysm Father     Cancer Sister         unknown type    Heart attack Maternal Grandfather     Cancer Sister         unknown type    Heart attack Maternal Aunt     Heart attack Maternal Uncle     Heart attack Paternal Aunt        Occupational History: heavy     Social History:    Former smoker              - 40 years              - 1 pk/year  Alcohol: N/A  Drugs: N/A    Meds/Allergies   Current Facility-Administered Medications   Medication Dose Route Frequency    acetaminophen (TYLENOL) tablet 650 mg  650 mg Oral Q6H PRN    albuterol inhalation solution 2 5 mg  2 5 mg Nebulization Q6H PRN    benzonatate (TESSALON PERLES) capsule 100 mg  100 mg Oral TID PRN    dextromethorphan-guaiFENesin (ROBITUSSIN DM) oral syrup 10 mL  10 mL Oral Q4H PRN    digoxin (LANOXIN) tablet 125 mcg  125 mcg Oral Every Other Day    fish oil capsule 1,000 mg  1,000 mg Oral BID    gabapentin (NEURONTIN) capsule 100 mg  100 mg Oral HS    guaiFENesin (MUCINEX) 12 hr tablet 600 mg  600 mg Oral BID    hydrOXYzine HCL (ATARAX) tablet 25 mg  25 mg Oral TID PRN    insulin lispro (HumaLOG) 100 units/mL subcutaneous injection 1-5 Units  1-5 Units Subcutaneous Q6H ZOHAIB    levalbuterol (XOPENEX) inhalation solution 1 25 mg  1 25 mg Nebulization TID    levothyroxine tablet 150 mcg  150 mcg Oral Early Morning    metoprolol succinate (TOPROL-XL) 24 hr tablet 50 mg  50 mg Oral Q12H    ondansetron (ZOFRAN) injection 4 mg  4 mg Intravenous Q6H PRN    pantoprazole (PROTONIX) EC tablet 40 mg  40 mg Oral Daily Before Breakfast    phenol (CHLORASEPTIC) 1 4 % mucosal liquid 1 spray  1 spray Mouth/Throat Q2H PRN    senna (SENOKOT) tablet 8 6 mg  8 6 mg Oral HS    sodium chloride (OCEAN) 0 65 % nasal spray 1 spray  1 spray Each Nare Q1H PRN    sodium chloride 0 9 % inhalation solution 3 mL  3 mL Nebulization TID     Medications Prior to Admission   Medication    acetaminophen (TYLENOL) 500 mg tablet    albuterol (2 5 mg/3 mL) 0 083 % nebulizer solution    Alcohol Swabs ( Sterile Alcohol Prep) PADS    benzonatate (TESSALON PERLES) 100 mg capsule    dextromethorphan-guaiFENesin (ROBITUSSIN DM)  mg/5 mL syrup    Diclofenac Sodium (VOLTAREN) 1 %    digoxin (LANOXIN) 0 125 mg tablet    Eliquis 5 MG    gabapentin (NEURONTIN) 100 mg capsule    glipiZIDE (GLUCOTROL) 5 mg tablet    guaiFENesin (MUCINEX) 600 mg 12 hr tablet    hydrOXYzine HCL (ATARAX) 25 mg tablet    levothyroxine 150 mcg tablet    metFORMIN (GLUCOPHAGE) 500 mg tablet    metoprolol succinate (TOPROL-XL) 50 mg 24 hr tablet    Omega-3 Fatty Acids (FISH OIL) 1,000 mg    pantoprazole (PROTONIX) 40 mg tablet    senna (SENOKOT) 8 6 mg     Allergies   Allergen Reactions    Red Dye - Food Allergy Anaphylaxis     Tolerated all those medications before and currently takes them    Penicillins Hives       Vitals: Blood pressure 110/71, pulse 98, temperature 98 2 °F (36 8 °C), resp  rate 18, height 5' 2" (1 575 m), weight 70 3 kg (155 lb), SpO2 94 % , on 5L NC, Body mass index is 28 35 kg/m²  Intake/Output Summary (Last 24 hours) at 8/31/2021 1247  Last data filed at 8/31/2021 1232  Gross per 24 hour   Intake 418 75 ml   Output 450 ml   Net -31 25 ml       Physical Exam  Constitutional:       Appearance: He is ill-appearing  HENT:      Head: Normocephalic and atraumatic  Right Ear: External ear normal       Left Ear: External ear normal       Nose: Nose normal       Mouth/Throat:      Pharynx: Oropharynx is clear  Eyes:      Extraocular Movements: Extraocular movements intact        Conjunctiva/sclera: Conjunctivae normal    Pulmonary: Breath sounds: Decreased breath sounds present  Musculoskeletal:      Cervical back: Normal range of motion  Skin:     General: Skin is dry  Neurological:      General: No focal deficit present  Mental Status: He is alert  Psychiatric:         Mood and Affect: Mood normal          Behavior: Behavior normal          Thought Content: Thought content normal          Judgment: Judgment normal       Labs: I have personally reviewed pertinent lab results  Laboratory and Diagnostics  Results from last 7 days   Lab Units 08/31/21  0604 08/30/21  1450   WBC Thousand/uL 10 69* 12 05*   HEMOGLOBIN g/dL 7 2* 8 2*   HEMATOCRIT % 25 6* 29 1*   PLATELETS Thousands/uL 376 399*   MONO PCT %  --  2*     Results from last 7 days   Lab Units 08/31/21  0454 08/30/21  1450   SODIUM mmol/L 137 137   POTASSIUM mmol/L 4 4 4 6   CHLORIDE mmol/L 100 101   CO2 mmol/L 31 31   ANION GAP mmol/L 6 5   BUN mg/dL 26* 29*   CREATININE mg/dL 1 11 1 36*   CALCIUM mg/dL 8 9 9 7   GLUCOSE RANDOM mg/dL 175* 83   ALT U/L  --  21   AST U/L  --  15   ALK PHOS U/L  --  147*   ALBUMIN g/dL  --  1 4*   TOTAL BILIRUBIN mg/dL  --  0 52          Results from last 7 days   Lab Units 08/30/21  1450   INR  2 30*   PTT seconds 47*      Results from last 7 days   Lab Units 08/30/21  1450   TROPONIN I ng/mL <0 02           Imaging and other studies: I have personally reviewed pertinent reports  CT Chest 8/30/2021  Interval progression of disease   Large ill-defined infiltrative right lung mass now occludes the right lower lobe bronchus   There is complete occlusion of the distal right mainstem bronchus   There is resultant atelectasis and a stable moderate right   pleural effusion   Also of note is progression of bubbles of gas within the right lung mass   Cannot exclude necrosis or infection   Interval increase in size of multiple masses and nodules in the left lung      CTA 8/18/2021  1   No pulmonary embolism    2   Since August 4, 2021, complete collapse of the right upper and lower lobes   New segmental atelectasis in the right lower lobe and new moderate right pleural effusion  3   Slight increase in size of the dominant right lung mass               - 13 2 x 9 9 x 14 1 cm, previously 12 7 x 9 2 x 12 9 cm  4   Left upper lobe mass and right lower lobe and left lower lobe nodules have increased in size      PET scan 8/6/2021  1  Large mass in the right hemithorax with extensive central necrosis but with glucose hypermetabolism along its peripheral margins, especially the inferior aspect  2  Left upper lobe mass with a slight focus of cavitation, also glucose avid  3  Enlarging right lower lobe solid nodule with SUV max 3 0   4  The mass involves the right pulmonary hilum, right upper lobe bronchus, and there is mild right subcarinal adenopathy  Lalla Esau is also a small right effusion and pulmonary emphysema  5  There is no evidence of extrathoracic glucose avid metastatic disease  - no abnormalities noted in CNS, head/neck  Pulmonary function testing: N/A    EKG, Pathology, and Other Studies: I have personally reviewed pertinent films in PACS     Code Status: Level 3 - DNAR and DNI    VTE Pharmacologic Prophylaxis: Reason for no pharmacologic prophylaxis hemoptysis  VTE Mechanical Prophylaxis: N/A    Disclaimer: Portions of the record may have been created with voice recognition software  Occasional wrong word or "sound a like" substitutions may have occurred due to the inherent limitations of voice recognition software  Careful consideration should be taken to recognize, using context, where substitutions have occurred      [id number] [employer]  Pulmonary/Critical Care  [facility]

## 2021-09-01 VITALS
BODY MASS INDEX: 28.52 KG/M2 | WEIGHT: 155 LBS | DIASTOLIC BLOOD PRESSURE: 69 MMHG | HEART RATE: 86 BPM | TEMPERATURE: 97.5 F | RESPIRATION RATE: 16 BRPM | OXYGEN SATURATION: 95 % | HEIGHT: 62 IN | SYSTOLIC BLOOD PRESSURE: 116 MMHG

## 2021-09-01 LAB
ANION GAP SERPL CALCULATED.3IONS-SCNC: 4 MMOL/L (ref 4–13)
ANISOCYTOSIS BLD QL SMEAR: PRESENT
ARTIFACT: PRESENT
BASE EX.OXY STD BLDV CALC-SCNC: 86.3 % (ref 60–80)
BASE EXCESS BLDV CALC-SCNC: 2.9 MMOL/L
BASOPHILS # BLD MANUAL: 0 THOUSAND/UL (ref 0–0.1)
BASOPHILS NFR MAR MANUAL: 0 % (ref 0–1)
BUN SERPL-MCNC: 38 MG/DL (ref 5–25)
CALCIUM SERPL-MCNC: 9.7 MG/DL (ref 8.3–10.1)
CHLORIDE SERPL-SCNC: 101 MMOL/L (ref 100–108)
CO2 SERPL-SCNC: 31 MMOL/L (ref 21–32)
CREAT SERPL-MCNC: 1.31 MG/DL (ref 0.6–1.3)
EOSINOPHIL # BLD MANUAL: 0 THOUSAND/UL (ref 0–0.4)
EOSINOPHIL NFR BLD MANUAL: 0 % (ref 0–6)
ERYTHROCYTE [DISTWIDTH] IN BLOOD BY AUTOMATED COUNT: 18.3 % (ref 11.6–15.1)
GFR SERPL CREATININE-BSD FRML MDRD: 52 ML/MIN/1.73SQ M
GLUCOSE SERPL-MCNC: 334 MG/DL (ref 65–140)
GLUCOSE SERPL-MCNC: 350 MG/DL (ref 65–140)
GLUCOSE SERPL-MCNC: 352 MG/DL (ref 65–140)
GLUCOSE SERPL-MCNC: 412 MG/DL (ref 65–140)
GLUCOSE SERPL-MCNC: 424 MG/DL (ref 65–140)
GLUCOSE SERPL-MCNC: 438 MG/DL (ref 65–140)
GLUCOSE SERPL-MCNC: >500 MG/DL (ref 65–140)
GLUCOSE SERPL-MCNC: >500 MG/DL (ref 65–140)
HCO3 BLDV-SCNC: 28.8 MMOL/L (ref 24–30)
HCT VFR BLD AUTO: 29.7 % (ref 36.5–49.3)
HGB BLD-MCNC: 8.4 G/DL (ref 12–17)
LYMPHOCYTES # BLD AUTO: 0.15 THOUSAND/UL (ref 0.6–4.47)
LYMPHOCYTES # BLD AUTO: 1 % (ref 14–44)
MCH RBC QN AUTO: 24.1 PG (ref 26.8–34.3)
MCHC RBC AUTO-ENTMCNC: 28.3 G/DL (ref 31.4–37.4)
MCV RBC AUTO: 85 FL (ref 82–98)
MICROCYTES BLD QL AUTO: PRESENT
MONOCYTES # BLD AUTO: 0.45 THOUSAND/UL (ref 0–1.22)
MONOCYTES NFR BLD: 3 % (ref 4–12)
MYELOCYTES NFR BLD MANUAL: 1 % (ref 0–1)
NASAL CANNULA: 5
NEUTROPHILS # BLD MANUAL: 14.28 THOUSAND/UL (ref 1.85–7.62)
NEUTS SEG NFR BLD AUTO: 95 % (ref 43–75)
O2 CT BLDV-SCNC: 11.6 ML/DL
PCO2 BLDV: 50.7 MM HG (ref 42–50)
PH BLDV: 7.37 [PH] (ref 7.3–7.4)
PLATELET # BLD AUTO: 475 THOUSANDS/UL (ref 149–390)
PLATELET BLD QL SMEAR: ADEQUATE
PMV BLD AUTO: 9.6 FL (ref 8.9–12.7)
PO2 BLDV: 59 MM HG (ref 35–45)
POIKILOCYTOSIS BLD QL SMEAR: PRESENT
POTASSIUM SERPL-SCNC: 4.4 MMOL/L (ref 3.5–5.3)
RBC # BLD AUTO: 3.49 MILLION/UL (ref 3.88–5.62)
RBC MORPH BLD: PRESENT
SODIUM SERPL-SCNC: 136 MMOL/L (ref 136–145)
WBC # BLD AUTO: 15.03 THOUSAND/UL (ref 4.31–10.16)

## 2021-09-01 PROCEDURE — 80048 BASIC METABOLIC PNL TOTAL CA: CPT | Performed by: INTERNAL MEDICINE

## 2021-09-01 PROCEDURE — 99239 HOSP IP/OBS DSCHRG MGMT >30: CPT | Performed by: INTERNAL MEDICINE

## 2021-09-01 PROCEDURE — 82948 REAGENT STRIP/BLOOD GLUCOSE: CPT

## 2021-09-01 PROCEDURE — 85007 BL SMEAR W/DIFF WBC COUNT: CPT | Performed by: INTERNAL MEDICINE

## 2021-09-01 PROCEDURE — 85027 COMPLETE CBC AUTOMATED: CPT | Performed by: INTERNAL MEDICINE

## 2021-09-01 PROCEDURE — 94760 N-INVAS EAR/PLS OXIMETRY 1: CPT

## 2021-09-01 PROCEDURE — 82805 BLOOD GASES W/O2 SATURATION: CPT | Performed by: PHYSICIAN ASSISTANT

## 2021-09-01 PROCEDURE — 99232 SBSQ HOSP IP/OBS MODERATE 35: CPT | Performed by: INTERNAL MEDICINE

## 2021-09-01 PROCEDURE — 94640 AIRWAY INHALATION TREATMENT: CPT

## 2021-09-01 RX ORDER — LORAZEPAM 2 MG/ML
1 CONCENTRATE ORAL EVERY 4 HOURS PRN
Qty: 30 ML | Refills: 0 | Status: SHIPPED | OUTPATIENT
Start: 2021-09-01 | End: 2021-09-11

## 2021-09-01 RX ORDER — OXYCODONE HYDROCHLORIDE 5 MG/1
5 TABLET ORAL
Qty: 30 TABLET | Refills: 0 | Status: SHIPPED | OUTPATIENT
Start: 2021-09-01 | End: 2021-09-11

## 2021-09-01 RX ORDER — HALOPERIDOL 2 MG/ML
2 SOLUTION ORAL EVERY 6 HOURS PRN
Qty: 120 ML | Refills: 0 | Status: SHIPPED | OUTPATIENT
Start: 2021-09-01

## 2021-09-01 RX ORDER — ECHINACEA PURPUREA EXTRACT 125 MG
1 TABLET ORAL EVERY 2 HOUR PRN
Qty: 30 ML | Refills: 0 | Status: SHIPPED | OUTPATIENT
Start: 2021-09-01

## 2021-09-01 RX ADMIN — LEVALBUTEROL HYDROCHLORIDE 1.25 MG: 1.25 SOLUTION, CONCENTRATE RESPIRATORY (INHALATION) at 09:10

## 2021-09-01 RX ADMIN — LEVOTHYROXINE SODIUM 150 MCG: 75 TABLET ORAL at 06:17

## 2021-09-01 RX ADMIN — INSULIN LISPRO 6 UNITS: 100 INJECTION, SOLUTION INTRAVENOUS; SUBCUTANEOUS at 14:56

## 2021-09-01 RX ADMIN — METOPROLOL SUCCINATE 50 MG: 50 TABLET, EXTENDED RELEASE ORAL at 08:58

## 2021-09-01 RX ADMIN — INSULIN LISPRO 4 UNITS: 100 INJECTION, SOLUTION INTRAVENOUS; SUBCUTANEOUS at 06:36

## 2021-09-01 RX ADMIN — INSULIN LISPRO 5 UNITS: 100 INJECTION, SOLUTION INTRAVENOUS; SUBCUTANEOUS at 01:25

## 2021-09-01 RX ADMIN — ISODIUM CHLORIDE 3 ML: 0.03 SOLUTION RESPIRATORY (INHALATION) at 09:10

## 2021-09-01 RX ADMIN — OMEGA-3 FATTY ACIDS CAP 1000 MG 1000 MG: 1000 CAP at 08:58

## 2021-09-01 RX ADMIN — GUAIFENESIN 600 MG: 600 TABLET, EXTENDED RELEASE ORAL at 08:58

## 2021-09-01 RX ADMIN — ISODIUM CHLORIDE 3 ML: 0.03 SOLUTION RESPIRATORY (INHALATION) at 13:22

## 2021-09-01 RX ADMIN — LEVALBUTEROL HYDROCHLORIDE 1.25 MG: 1.25 SOLUTION, CONCENTRATE RESPIRATORY (INHALATION) at 13:22

## 2021-09-01 RX ADMIN — PANTOPRAZOLE SODIUM 40 MG: 40 TABLET, DELAYED RELEASE ORAL at 06:17

## 2021-09-01 NOTE — NURSING NOTE
Patient's blood sugar to be >500 this afternoon  Patient to be discharged on hospice  Dr Kymberly Christianson advised of same and instructed to not treat since he is hospice and pleasure feeds only  Upon discharge, patient's sons were upset that the patient's blood sugar was still >500 and advised that we do not do peripheral sticks on hospice patients  Patient's home glipizide and metformin was then continued and 6 units of standing humalog was given prior to discharge  Advised to follow-up with PCP for additional prescriptions if needed and hospice services

## 2021-09-01 NOTE — DISCHARGE SUMMARY
Discharge Summary - [facility] Medicine  Patient: Briseyda Herman 68 y o  male   MRN: [id number]  PCP: Patrice Otoole DO  [facility] Encounter: [id number]            Discharging Physician / Practitioner: Francesca Trinh MD  PCP: Patrice Otoole DO  Admission Date:   Admission Orders (From admission, onward)     Ordered        08/30/21 2048  Inpatient Admission  Once         08/30/21 1839  Place in Observation  Once                   Discharge Date: 09/01/21      Reason for Admission:  Hemoptysis       Discharge Diagnoses:     Principal Problem:    Hemoptysis    Active Problems:    Acute on chronic respiratory failure with hypoxia     Mass of right lung    SULEMA (acute kidney injury)    SIRS (systemic inflammatory response syndrome)    COPD (chronic obstructive pulmonary disease)    Diabetes mellitus type 2    Paroxysmal atrial fibrillation    Hypothyroidism    Thrombocytosis    Anemia of chronic disease      Consultations During Hospital Stay:  · Pulmonology  · Palliative care  · Radiation Oncology      Hospital Course:     * Hemoptysis  Assessment & Plan  With underlying history of lung cancer - see plan for lung mass regarding updated/progressive CT findings  Continue to hold anticoagulation (Eliquis)  Appreciate Interventional Radiology and pulmonology input -> IR requested bronchoscopy to determine source of bleed and possibly control, initially, prior to invasive intervention -> patient remains at high risk from a pulmonary standpoint for bronchoscopy, and family has opted to avoid this procedure  Palliative care will follow in regards to goals of care and possible transition to hospice - before family makes ultimate decision process, however, will appreciate radiation oncology evaluation to assess benefits from palliative radiation - no plans for radiation for evaluation  Plan for discharge home with hospice services today     Acute on chronic respiratory failure with hypoxia Assessment & Plan  Baseline requirement approximately 3 L - currently requiring 4-5 L via nasal cannula  In the setting of a progressive lung mass and underlying COPD, however, exacerbated by hemoptysis  Continue nebulizers and trialed on IV Solu-Medrol per pulmonology while hospitalized  Respiratory protocol - pulmonary toiletry during hospital course     Mass of right lung  Assessment & Plan  S/p IR guided lung biopsy on 8/23 - pending final pathology (sent to outside source for expert consultation) - also s/p thoracocentesis with pleural cytology on 7/30 negative for malignancy  CT imaging on presentation noting "Interval progression of disease   Large ill-defined infiltrative right lung mass now occludes the right lower lobe bronchus   There is complete occlusion of the distal right mainstem bronchus  There is resultant atelectasis and a stable moderate right pleural effusion   Also of note is progression of bubbles of gas within the right lung mass  Cannot exclude necrosis or infection   Interval increase in size of multiple masses and nodules in the left lung "  Previously was determined to be a poor surgical candidate due to mass burden and underlying COPD  Pulmonology following -> recommending palliative care evaluation for goals of care discussion due to poor overall prognosis -> plan to discharge home with hospice  Appreciate radiation oncology input regarding possibility of palliative radiation for symptom relief -> no plans for radiation at this time     SULEMA (acute kidney injury)  Assessment & Plan  Creatinine improved from 1 36 on admission -> 1 31 currently s/p albumin trial  Monitor renal function and urine output - limit/avoid nephrotoxins if possible - avoid hypotension as possible     SIRS (systemic inflammatory response syndrome)  Assessment & Plan  Intermittent tachycardia/tachypnea and leukocytosis noted - likely reactive due to acute medical issue(s) and corticosteroid administration     COPD (chronic obstructive pulmonary disease)  Assessment & Plan  Maintain oxygenation  Nebulizers or  on board - trialed on intravenous corticosteroids per pulmonology     Diabetes mellitus type 2  Assessment & Plan        Lab Results   Component Value Date     HGBA1C 7 7 (H) 08/19/2021      On SSI coverage per Accu-Cheks while hospitalized - monitor blood sugars and avoid hypoglycemic episodes (intermittent overnight events noted s/p IV dextrose pushes)  Can resume oral diabetic regimen on discharge per family request despite hospice status     Paroxysmal atrial fibrillation  Assessment & Plan  Previously on Digoxin/Toprol-XL  Holding anticoagulation (Eliquis) in the setting of hemoptysis     Hypothyroidism  Assessment & Plan  Previously on Synthroid     Anemia of chronic disease  Assessment & Plan  H/H fairly stable      Condition at Discharge:  Guarded      Discharge Day Visit / Exam:     Vitals: Blood Pressure: 116/69 (09/01/21 1139)  Pulse: 86 (09/01/21 1139)  Temperature: 97 5 °F (36 4 °C) (09/01/21 1139)  Temp Source: Oral (08/30/21 2027)  Respirations: 16 (09/01/21 1139)  Height: 5' 2" (157 5 cm) (08/30/21 2027)  Weight - Scale: 70 3 kg (155 lb) (08/30/21 2027)  SpO2: 95 % (4L) (09/01/21 1322)      Physical exam - I had a face-to-face encounter with the patient on day of discharge  Discussion with Patient and/or Family:  The patient has been advised to return to the ER immediately if any symptoms recur or worsen  Discharge instructions/Information to Patient and/or Family:   See after visit summary for information provided to patient and/or family  Provisions for Follow-Up Care:  See after visit summary for information related to follow-up care and any pertinent home health orders  Disposition:   Home with hospice services      Discharge Medications:  See after visit summary for reconciled discharge medications provided to patient and/or family          Discharge Statement:  I spent 38 minutes discharging the patient  This time was spent on the day of discharge  I had direct contact with the patient on the day of discharge  Greater than 50% of the total time was spent examining patient, answering all patient questions, arranging and discussing plan of care with patient as well as directly providing post-discharge instructions  Additional time then spent on discharge activities  ** Please Note: This note is constructed using a voice recognition dictation system  An occasional wrong word/phrase or sound-a-like substitution may have been picked up by dictation device due to the inherent limitations of voice recognition software  Read the chart carefully and recognize, using reasonable context, where substitutions may have occurred  **

## 2021-09-01 NOTE — PROGRESS NOTES
PULMONOLOGY PROGRESS NOTE     Name: Melburn Canavan   Age & Sex: 68 y o  male   MRN: [id number]  Unit/Bed#: [facility] 919-01   Encounter: [id number]    PATIENT INFORMATION     Name: Melburn Canavan   Age & Sex: 68 y o  male   MRN: [id number]  Hospital Stay Days: 2    ASSESSMENT/PLAN     Principal Problem:    Hemoptysis  Active Problems:    Paroxysmal atrial fibrillation    Mass of right lung    Hypothyroidism    SULEMA (acute kidney injury)    Diabetes mellitus type 2    COPD (chronic obstructive pulmonary disease)    Acute on chronic respiratory failure with hypoxia     SIRS (systemic inflammatory response syndrome)    Anemia of chronic disease    Assessment:   1  Hemoptysis  - in the setting of progressive R lung mass  - patient has extensive smoking h/o and Afib on Eliquis   - has hemoptysis at baseline that has increased since the last admission 8/18/2021 to 8/24/2021  - no definitive tissue diagnosis for lung mass, and recent biopsy results from 8/23/2021 are still pending  - Hemoptysis most likely secondary to lung malignancy give that recent imaging shows shows disease progression and worsening of multiple lung masses, particularly R side       2   Acute on chronic respiratory failure   - PMH of COPD w/ baseline of 3L of NC, now on 4L NC  - 4L NC will most likely be his new baseline, will need O2 on d/c  - in setting of progressive lung masses and worsening of hemoptysis     3  R lung mass  - noted in June 2021  - no definitive diagnosis as of current; biopsies have been shown to be necrotic tissue however the mass is most likely malignant given  progression of mass seen on recent CT chest compared to previous imaging   - follows Dr Cherise Stewart outpatient      Plan:  - continue to hold Eliquis  - hospice care  - pending tissue biopsy results from previous admissions  - Xopenex 1 25mg 3x daily via neb  - Mucinex 600mg BID PO  - albuterol 2 5mg PRN  - Metoprolol 50mg q12hrs PO  - Digoxin 125mcg PO every other day  - incentive spirometry   - rad onc consulted, will not pursue treatment due to hospice care   - palliative following    Disposition: d/c to hospice care    SUBJECTIVE     Patient seen and examined  No acute events OVN  Was enjoying his breakfast this AM  Reported having hemoptysis yesterday, however none this AM  Sating well in mid 90s on 4L NC  Denied any chest pain, SOB or increased WOB  Denied any fevers or chills  Felt better compared to yesterday  OBJECTIVE     Vitals:    21 0910 21 0914 21 0924 21 1139   BP:   120/71 116/69   BP Location:       Pulse:   104 86   Resp:    16   Temp:    97 5 °F (36 4 °C)   TempSrc:       SpO2: 94% 94% 92% 94%   Weight:       Height:          Temperature:   Temp (24hrs), Av 3 °F (36 8 °C), Min:97 3 °F (36 3 °C), Max:100 2 °F (37 9 °C)    Temperature: 97 5 °F (36 4 °C)  Intake & Output:  I/O       701 -  0700  07 -  0700  07 -  0700    P  O   0     I V  (mL/kg) 368 8 (5 2)      IV Piggyback 50      Total Intake(mL/kg) 418 8 (6) 0 (0)     Urine (mL/kg/hr) 200 550 (0 3)     Total Output 200 550     Net +218 8 -550                Weights:   IBW (Ideal Body Weight): 54 6 kg    Body mass index is 28 35 kg/m²  Weight (last 2 days)     Date/Time   Weight    21 20:27:19   70 3 (155)            Physical Exam  Vitals reviewed  Constitutional:       Appearance: He is well-developed  He is ill-appearing  HENT:      Head: Normocephalic and atraumatic  Right Ear: External ear normal       Left Ear: External ear normal       Nose: Nose normal       Mouth/Throat:      Pharynx: Oropharynx is clear  Eyes:      Extraocular Movements: Extraocular movements intact  Conjunctiva/sclera: Conjunctivae normal    Cardiovascular:      Rate and Rhythm: Normal rate and regular rhythm  Heart sounds: Normal heart sounds  No murmur heard  Pulmonary:      Effort: Pulmonary effort is normal  No respiratory distress  Breath sounds: Examination of the right-upper field reveals decreased breath sounds  Examination of the right-middle field reveals decreased breath sounds  Examination of the right-lower field reveals decreased breath sounds  Decreased breath sounds present  Abdominal:      General: Bowel sounds are normal       Palpations: Abdomen is soft  Tenderness: There is no abdominal tenderness  Musculoskeletal:         General: Normal range of motion  Cervical back: Normal range of motion  Skin:     General: Skin is warm and dry  Neurological:      General: No focal deficit present  Mental Status: He is alert  Psychiatric:         Mood and Affect: Mood normal          Behavior: Behavior normal          Thought Content: Thought content normal          Judgment: Judgment normal        LABORATORY DATA     Labs: I have personally reviewed pertinent reports  Results from last 7 days   Lab Units 09/01/21  0642 08/31/21  0604 08/30/21  1450   WBC Thousand/uL 15 03* 10 69* 12 05*   HEMOGLOBIN g/dL 8 4* 7 2* 8 2*   HEMATOCRIT % 29 7* 25 6* 29 1*   PLATELETS Thousands/uL 475* 376 399*   MONO PCT % 3*  --  2*      Results from last 7 days   Lab Units 09/01/21  0642 08/31/21  0454 08/30/21  1450   POTASSIUM mmol/L 4 4 4 4 4 6   CHLORIDE mmol/L 101 100 101   CO2 mmol/L 31 31 31   BUN mg/dL 38* 26* 29*   CREATININE mg/dL 1 31* 1 11 1 36*   CALCIUM mg/dL 9 7 8 9 9 7   ALK PHOS U/L  --   --  147*   ALT U/L  --   --  21   AST U/L  --   --  15              Results from last 7 days   Lab Units 08/30/21  1450   INR  2 30*   PTT seconds 47*         Results from last 7 days   Lab Units 08/30/21  1450   TROPONIN I ng/mL <0 02       IMAGING & DIAGNOSTIC TESTING     Radiology Results: I have personally reviewed pertinent reports  CT chest with contrast    Result Date: 8/30/2021  Impression: Interval progression of disease  Large ill-defined infiltrative right lung mass now occludes the right lower lobe bronchus    There is complete occlusion of the distal right mainstem bronchus  There is resultant atelectasis and a stable moderate right pleural effusion  Also of note is progression of bubbles of gas within the right lung mass  Cannot exclude necrosis or infection  Interval increase in size of multiple masses and nodules in the left lung  The study was marked in [facility] for immediate notification  Workstation performed: [id number]     Other Diagnostic Testing: I have personally reviewed pertinent reports      ACTIVE MEDICATIONS     Current Facility-Administered Medications   Medication Dose Route Frequency    acetaminophen (TYLENOL) tablet 650 mg  650 mg Oral Q6H PRN    albuterol inhalation solution 2 5 mg  2 5 mg Nebulization Q6H PRN    benzonatate (TESSALON PERLES) capsule 100 mg  100 mg Oral TID PRN    dextromethorphan-guaiFENesin (ROBITUSSIN DM) oral syrup 10 mL  10 mL Oral Q4H PRN    digoxin (LANOXIN) tablet 125 mcg  125 mcg Oral Every Other Day    fish oil capsule 1,000 mg  1,000 mg Oral BID    gabapentin (NEURONTIN) capsule 100 mg  100 mg Oral HS    guaiFENesin (MUCINEX) 12 hr tablet 600 mg  600 mg Oral BID    HYDROmorphone HCl (DILAUDID) injection 0 2 mg  0 2 mg Intravenous Q3H PRN    hydrOXYzine HCL (ATARAX) tablet 25 mg  25 mg Oral TID PRN    insulin lispro (HumaLOG) 100 units/mL subcutaneous injection 1-5 Units  1-5 Units Subcutaneous Q6H ZOHAIB    levalbuterol (XOPENEX) inhalation solution 1 25 mg  1 25 mg Nebulization TID    levothyroxine tablet 150 mcg  150 mcg Oral Early Morning    metoprolol succinate (TOPROL-XL) 24 hr tablet 50 mg  50 mg Oral Q12H    ondansetron (ZOFRAN) injection 4 mg  4 mg Intravenous Q6H PRN    pantoprazole (PROTONIX) EC tablet 40 mg  40 mg Oral Daily Before Breakfast    phenol (CHLORASEPTIC) 1 4 % mucosal liquid 1 spray  1 spray Mouth/Throat Q2H PRN    senna (SENOKOT) tablet 8 6 mg  8 6 mg Oral HS    sodium chloride (OCEAN) 0 65 % nasal spray 1 spray  1 spray Each Nare Q1H PRN  sodium chloride 0 9 % inhalation solution 3 mL  3 mL Nebulization TID       VTE Pharmacologic Prophylaxis: Reason for no pharmacologic prophylaxis hemoptysis  VTE Mechanical Prophylaxis: sequential compression device      Disclaimer: Portions of the record may have been created with voice recognition software  Occasional wrong word or "sound a like" substitutions may have occurred due to the inherent limitations of voice recognition software  Careful consideration should be taken to recognize, using context, where substitutions have occurred      [address] [facility]  Pulmonary/Critical Care  [facility] Pulmonary & Critical Care [facility]

## 2021-09-01 NOTE — CASE MANAGEMENT
Per Dr Ashley Corado pt is cleared for d/c today  Pt will transition home with [facility]  via family transport  VANESSA spoke to Zoe Garcia from [facility] who stated that pt's family would like the equipment delivered before pt returns home  Per Zoe Garcia the equipment will be del;ivered between 2-4 weather permitting  CM spoke with pt's son who stated that he will provide transport home after the equipment has been delivered  CM notified pt's attending and nurse

## 2021-09-01 NOTE — HOSPICE NOTE
Received hospice referral   I met with pt, wife and children to evaluate patient for hospice services  Hospice benefits reviewed per medicare guidelines and all questions answered  Dr Edna Lozoya approved fr home hospice LOC  Consents reviewed and signed by pt  Discussed equipment need at home  Equipment ordered from [facility] and will be delivered to pt's home this afternoon  VANESSA Quintero updated  Pt's son will transport him home this evening

## 2021-09-02 NOTE — UTILIZATION REVIEW
Notification of Discharge   This is a Notification of Discharge from our facility [address]  Please be advised that this patient has been discharge from our facility  Below you will find the admission and discharge date and time including the patients disposition  UTILIZATION REVIEW CONTACT:  Terry Orosco  Utilization   Network Utilization Review Department  Phone: [phone] x carefully listen to the prompts  All voicemails are confidential   Email: [email or web address]     PHYSICIAN ADVISORY SERVICES:  FOR [id number] REVIEW - MEDICAL NECESSITY DENIAL  Phone: [phone]  Fax: [phone]  Email: [email or web address]     PRESENTATION DATE: 8/30/2021  2:23 PM  OBERVATION ADMISSION DATE:   INPATIENT ADMISSION DATE: 8/30/21  8:48 PM   DISCHARGE DATE: 9/1/2021  3:28 PM  DISPOSITION: Home/Self Care Home/Self Care      IMPORTANT INFORMATION:  Send all requests for admission clinical reviews, approved or denied determinations and any other requests to dedicated fax number below belonging to the campus where the patient is receiving treatment   List of dedicated fax numbers:  [address] DENIALS (Administrative/Medical Necessity) [phone]   [address]  (Maternity/NICU/Pediatrics) [phone]   [facility] [phone]   [address] [facility] [phone]   [address] [place] [address] [phone]   [facility] [address] [place] [address] [phone]   [facility]  [phone]   [address] [facility] [address]  [address] [facility] [address] [facility] [phone]

## 2021-09-12 NOTE — ED ATTENDING ATTESTATION
8/30/2021  IHeath DO, saw and evaluated the patient  I have discussed the patient with the resident/non-physician practitioner and agree with the resident's/non-physician practitioner's findings, Plan of Care, and MDM as documented in the resident's/non-physician practitioner's note, except where noted  All available labs and Radiology studies were reviewed  I was present for key portions of any procedure(s) performed by the resident/non-physician practitioner and I was immediately available to provide assistance  At this point I agree with the current assessment done in the Emergency Department  I have conducted an independent evaluation of this patient a history and physical is as follows:    77-year-old male with known lung masses presents for hemoptysis and worsening shortness of breath three he has a fairly aggressive lung cancer of unknown differentiation  He has been placed on Eliquis for AFib  He has been coughing up quarter-sized amount of blood  He has worsening in terms of coughing and shortness of breath  Family would like to pursue diagnostic and treatment options  He is DNR DNI    Plan CT labs admit    ED Course         Critical Care Time  Procedures

## 2021-09-16 LAB
MISCELLANEOUS LAB TEST RESULT: NORMAL
SCAN RESULT: NORMAL
SCAN RESULT: NORMAL
